# Patient Record
Sex: FEMALE | Race: BLACK OR AFRICAN AMERICAN | NOT HISPANIC OR LATINO | Employment: PART TIME | URBAN - METROPOLITAN AREA
[De-identification: names, ages, dates, MRNs, and addresses within clinical notes are randomized per-mention and may not be internally consistent; named-entity substitution may affect disease eponyms.]

---

## 2017-06-23 ENCOUNTER — LAB VISIT (OUTPATIENT)
Dept: LAB | Facility: HOSPITAL | Age: 62
End: 2017-06-23
Attending: INTERNAL MEDICINE
Payer: COMMERCIAL

## 2017-06-23 DIAGNOSIS — Z13.1 DIABETES MELLITUS SCREENING: Primary | ICD-10-CM

## 2017-06-23 DIAGNOSIS — Z13.1 DIABETES MELLITUS SCREENING: ICD-10-CM

## 2017-06-23 DIAGNOSIS — E78.5 HYPERLIPIDEMIA, UNSPECIFIED HYPERLIPIDEMIA TYPE: ICD-10-CM

## 2017-06-23 DIAGNOSIS — I25.10 CORONARY ARTERY DISEASE INVOLVING NATIVE CORONARY ARTERY, ANGINA PRESENCE UNSPECIFIED, UNSPECIFIED WHETHER NATIVE OR TRANSPLANTED HEART: ICD-10-CM

## 2017-06-23 DIAGNOSIS — Z13.220 ENCOUNTER FOR SCREENING FOR LIPID DISORDER: ICD-10-CM

## 2017-06-23 LAB
ALBUMIN SERPL BCP-MCNC: 4 G/DL
ALP SERPL-CCNC: 103 U/L
ALT SERPL W/O P-5'-P-CCNC: 29 U/L
ANION GAP SERPL CALC-SCNC: 10 MMOL/L
AST SERPL-CCNC: 34 U/L
BILIRUB SERPL-MCNC: 0.3 MG/DL
BNP SERPL-MCNC: <10 PG/ML
BUN SERPL-MCNC: 18 MG/DL
CALCIUM SERPL-MCNC: 9.8 MG/DL
CHLORIDE SERPL-SCNC: 105 MMOL/L
CO2 SERPL-SCNC: 27 MMOL/L
CREAT SERPL-MCNC: 0.8 MG/DL
EST. GFR  (AFRICAN AMERICAN): >60 ML/MIN/1.73 M^2
EST. GFR  (NON AFRICAN AMERICAN): >60 ML/MIN/1.73 M^2
GLUCOSE SERPL-MCNC: 93 MG/DL
HDLC SERPL-MCNC: 215 MG/DL
HDLC SERPL-MCNC: 44 MG/DL
POTASSIUM SERPL-SCNC: 4.3 MMOL/L
PROT SERPL-MCNC: 7.5 G/DL
SODIUM SERPL-SCNC: 142 MMOL/L

## 2017-06-23 PROCEDURE — 83880 ASSAY OF NATRIURETIC PEPTIDE: CPT

## 2017-06-23 PROCEDURE — 83701 LIPOPROTEIN BLD HR FRACTION: CPT

## 2017-06-23 PROCEDURE — 80053 COMPREHEN METABOLIC PANEL: CPT

## 2017-06-23 PROCEDURE — 36415 COLL VENOUS BLD VENIPUNCTURE: CPT | Mod: PO

## 2017-06-23 PROCEDURE — 82465 ASSAY BLD/SERUM CHOLESTEROL: CPT

## 2017-06-23 PROCEDURE — 83718 ASSAY OF LIPOPROTEIN: CPT

## 2017-06-27 LAB — LDLC SERPL-MCNC: 143 MG/DL

## 2017-08-02 ENCOUNTER — HOSPITAL ENCOUNTER (OUTPATIENT)
Dept: RADIOLOGY | Facility: HOSPITAL | Age: 62
Discharge: HOME | End: 2017-08-02
Attending: INTERNAL MEDICINE
Payer: COMMERCIAL

## 2017-08-02 ENCOUNTER — OFFICE VISIT (OUTPATIENT)
Dept: INTERNAL MEDICINE | Facility: CLINIC | Age: 62
End: 2017-08-02
Payer: COMMERCIAL

## 2017-08-02 VITALS
HEIGHT: 59 IN | WEIGHT: 157.44 LBS | SYSTOLIC BLOOD PRESSURE: 136 MMHG | DIASTOLIC BLOOD PRESSURE: 74 MMHG | HEART RATE: 84 BPM | OXYGEN SATURATION: 97 % | BODY MASS INDEX: 31.74 KG/M2

## 2017-08-02 DIAGNOSIS — Z85.3 HISTORY OF LEFT BREAST CANCER: ICD-10-CM

## 2017-08-02 DIAGNOSIS — Z12.11 COLON CANCER SCREENING: ICD-10-CM

## 2017-08-02 DIAGNOSIS — M17.10 KNEE ARTHROPATHY: ICD-10-CM

## 2017-08-02 DIAGNOSIS — R60.0 PEDAL EDEMA: Primary | ICD-10-CM

## 2017-08-02 DIAGNOSIS — Z12.4 PAP SMEAR FOR CERVICAL CANCER SCREENING: ICD-10-CM

## 2017-08-02 DIAGNOSIS — I10 ESSENTIAL HYPERTENSION: ICD-10-CM

## 2017-08-02 DIAGNOSIS — M20.42 HAMMER TOE OF LEFT FOOT: ICD-10-CM

## 2017-08-02 PROCEDURE — 73564 X-RAY EXAM KNEE 4 OR MORE: CPT | Mod: 50,TC,PO

## 2017-08-02 PROCEDURE — 99999 PR PBB SHADOW E&M-EST. PATIENT-LVL IV: CPT | Mod: PBBFAC,,, | Performed by: INTERNAL MEDICINE

## 2017-08-02 PROCEDURE — 73564 X-RAY EXAM KNEE 4 OR MORE: CPT | Mod: 26,50,, | Performed by: RADIOLOGY

## 2017-08-02 PROCEDURE — 99386 PREV VISIT NEW AGE 40-64: CPT | Mod: S$GLB,,, | Performed by: INTERNAL MEDICINE

## 2017-08-02 RX ORDER — METOPROLOL SUCCINATE 25 MG/1
TABLET, EXTENDED RELEASE ORAL
COMMUNITY
Start: 2017-08-01 | End: 2017-08-02 | Stop reason: SDUPTHER

## 2017-08-02 RX ORDER — BUMETANIDE 1 MG/1
TABLET ORAL
COMMUNITY
Start: 2014-09-09 | End: 2017-08-02 | Stop reason: SDUPTHER

## 2017-08-02 RX ORDER — MELOXICAM 15 MG/1
15 TABLET ORAL DAILY
Qty: 90 TABLET | Refills: 0 | Status: SHIPPED | OUTPATIENT
Start: 2017-08-02 | End: 2017-08-02 | Stop reason: SDUPTHER

## 2017-08-02 RX ORDER — ASPIRIN 81 MG/1
TABLET ORAL NIGHTLY
COMMUNITY
Start: 2014-11-20 | End: 2021-03-29 | Stop reason: HOSPADM

## 2017-08-02 RX ORDER — METOPROLOL SUCCINATE 25 MG/1
25 TABLET, EXTENDED RELEASE ORAL DAILY
Qty: 90 TABLET | Refills: 0 | Status: SHIPPED | OUTPATIENT
Start: 2017-08-02 | End: 2018-09-05 | Stop reason: SDUPTHER

## 2017-08-02 RX ORDER — IRBESARTAN 150 MG/1
150 TABLET ORAL NIGHTLY
COMMUNITY
End: 2017-12-06 | Stop reason: SDUPTHER

## 2017-08-02 RX ORDER — ROSUVASTATIN CALCIUM 20 MG/1
20 TABLET, COATED ORAL DAILY
Qty: 90 TABLET | Refills: 1 | Status: SHIPPED | OUTPATIENT
Start: 2017-08-02 | End: 2018-02-14 | Stop reason: SDUPTHER

## 2017-08-02 RX ORDER — TAMOXIFEN CITRATE 20 MG/1
TABLET ORAL
COMMUNITY
Start: 2014-09-22 | End: 2017-08-02

## 2017-08-02 RX ORDER — POTASSIUM CHLORIDE 750 MG/1
10 TABLET, EXTENDED RELEASE ORAL DAILY
Qty: 90 TABLET | Refills: 0 | Status: SHIPPED | OUTPATIENT
Start: 2017-08-02 | End: 2017-10-17 | Stop reason: ALTCHOICE

## 2017-08-02 RX ORDER — MELOXICAM 15 MG/1
15 TABLET ORAL DAILY PRN
Qty: 90 TABLET | Refills: 0 | Status: SHIPPED | OUTPATIENT
Start: 2017-08-02 | End: 2017-12-06 | Stop reason: SDUPTHER

## 2017-08-02 RX ORDER — POTASSIUM CHLORIDE 20 MEQ/15ML
SOLUTION ORAL
COMMUNITY
Start: 2014-09-09 | End: 2017-08-02

## 2017-08-02 RX ORDER — BUMETANIDE 1 MG/1
1 TABLET ORAL DAILY
Qty: 90 TABLET | Refills: 0 | Status: SHIPPED | OUTPATIENT
Start: 2017-08-02 | End: 2017-10-17 | Stop reason: ALTCHOICE

## 2017-08-02 NOTE — PATIENT INSTRUCTIONS
Recommendations for today    Please review the medication list today and verify that home medications exactly match the medication list that we have.  When transitioning between doctors it is very important to help verify that your medication list is accurate.  If there are any inaccuracies please contact my office so that we can investigate this further.    Regarding the potassium please review the potassium tablet provided by the pharmacy before purchasing the potassium.  If you need a different potassium suggestion have the pharmacist contact my office so we can change the prescription

## 2017-08-02 NOTE — PROGRESS NOTES
"Portions of this note are generated with voice recognition software. Typographical errors may exist.     Patient Name:SKINNY FULTON  Patient MRN:   [id number]    History of Present Illness   ================================================================  SKINNY FULTON is a 62 y.o. female here for primary care visit for  Chief Complaint   Patient presents with    [facility]    Annual Exam       No past medical history on file.    Past Surgical History:   Procedure Laterality Date    BREAST LUMPECTOMY  2010    PARTIAL HYSTERECTOMY  1978    fibroids    TOTAL ABDOMINAL HYSTERECTOMY W/ BILATERAL SALPINGOOPHORECTOMY  2000       Review of patient's allergies indicates:   Allergen Reactions    Codeine Nausea And Vomiting       No current outpatient prescriptions on file prior to visit.     No current facility-administered medications on file prior to visit.        No family history on file.    Social History     Social History    Marital status: Single     Spouse name: N/A    Number of children: N/A    Years of education: N/A     Occupational History    Not on file.     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 hx of left lumpectomy for "breast cancer"- with 5yrs of tamoxifen use. Sees Dr. Buckley ([facility] for surveillance/MMG)    Patient states that she is not up-to-date on Pap testing.    States that she has been having difficulty with hammertoe symptoms.    Manage predominantly with primary care provider.  No clear relationship with cardiologist.  States that she was started on diuretic " therapy without a specific diagnosis of heart failure.  States that the pedal edema has gotten a little bit worse in recent months.  States that she has been off of Bumex for perhaps more than one month because she was between providers.        Medications Reviewed and Updated    Past medical, family, and social histories were reviewed and updated.    Review of Systems negative unless otherwise noted in history of present illness-  General ROS: negative  Psychological ROS: negative  ENT ROS: negative  Allergy and Immunology ROS: negative  Cardiovascular ROS: negative  Gastrointestinal ROS: negative  Genito-Urinary ROS: negative  Musculoskeletal ROS: negative  Neurological ROS: negative  Dermatological ROS: negative      Allergic:    Review of patient's allergies indicates:   Allergen Reactions    Codeine Nausea And Vomiting       OBJECTIVE:  BP: 136/74 Pulse: 84    Wt Readings from Last 3 Encounters:   08/02/17 71.4 kg (157 lb 6.5 oz)    Body mass index is 31.79 kg/m².  Previous Blood Pressure Readings :   BP Readings from Last 3 Encounters:   08/02/17 136/74       GEN: healthy appearing  HEENT: sclera non-icteric, conjunctiva clear  CV: no peripheral edema, regular rate and rhythm.  No significant murmurs.  PULM: breathing non-labored.  No wheezing.  ABD: obese .  Supple  PSYCH: appropriate affect  MSK: able to rise from chair without assistance  SKIN: normal skin turgor      Pertinent Labs Reviewed       ASSESSMENT/PLAN:    Pedal edema  -     D dimer, quantitative; Future; Expected date: 08/02/2017    Pap smear for cervical cancer screening  -     Ambulatory Referral to Gynecology    Knee arthropathy  -     X-Ray Knee Complete 4 Or More Views Bilat; Future; Expected date: 08/02/2017    Hammer toe of left foot  -     Ambulatory Referral to Podiatry    Colon cancer screening  -     Case request GI: COLONOSCOPY    Other orders  -     potassium chloride SA (K-DUR,KLOR-CON) 10 MEQ tablet; Take 1 tablet (10 mEq total)  by mouth once daily.  Dispense: 90 tablet; Refill: 0  -     Discontinue: meloxicam (MOBIC) 15 MG tablet; Take 1 tablet (15 mg total) by mouth once daily.  Dispense: 90 tablet; Refill: 0  -     Cancel: Ambulatory Referral to Gynecology  -     meloxicam (MOBIC) 15 MG tablet; Take 1 tablet (15 mg total) by mouth daily as needed for Pain.  Dispense: 90 tablet; Refill: 0  -     bumetanide (BUMEX) 1 MG tablet; Take 1 tablet (1 mg total) by mouth once daily.  Dispense: 90 tablet; Refill: 0  -     metoprolol succinate (TOPROL-XL) 25 MG 24 hr tablet; Take 1 tablet (25 mg total) by mouth once daily.  Dispense: 90 tablet; Refill: 0  -     rosuvastatin (CRESTOR) 20 MG tablet; Take 1 tablet (20 mg total) by mouth once daily.  Dispense: 90 tablet; Refill: 1          Future Appointments  Date Time Provider Department Center   8/16/2017 9:15 AM Gigi Alexis DPM [facility]   8/21/2017 9:30 AM Camelia Dennison MD [facility]   9/6/2017 9:20 AM Nayan Linares MD [facility] Kristina Linares  8/2/2017  6:06 PM

## 2017-08-03 ENCOUNTER — TELEPHONE (OUTPATIENT)
Dept: FAMILY MEDICINE | Facility: CLINIC | Age: 62
End: 2017-08-03

## 2017-08-03 NOTE — TELEPHONE ENCOUNTER
----- Message from Nayan Linares MD sent at 8/3/2017  2:47 PM CDT -----  Please contact patient.  Blood work negative for blood clots.  No further testing warranted at this time.

## 2017-08-03 NOTE — TELEPHONE ENCOUNTER
Informed patient labs negative for blood clots and no further testing needs to be done at this time.

## 2017-08-04 NOTE — TELEPHONE ENCOUNTER
----- Message from Nayan Linares MD sent at 8/2/2017  6:29 PM CDT -----  Contact patient regarding my recommendation for repeat echocardiogram.  Reviewing cardiologist's records last echocardiogram was almost 2 years ago.  Given the persistent nature of swelling in the legs getting a repeat echocardiogram is important to verify that stiffness of the heart muscle has not gotten worse.  If She has concerns about completing this let me know

## 2017-08-04 NOTE — TELEPHONE ENCOUNTER
Spoke with patient and informed Dr. Linares would like to repeat echo. Scheduled for 8-9-2017 at 1030am. Patient voices understanding.

## 2017-08-09 ENCOUNTER — CLINICAL SUPPORT (OUTPATIENT)
Dept: CARDIOLOGY | Facility: CLINIC | Age: 62
End: 2017-08-09
Payer: COMMERCIAL

## 2017-08-09 DIAGNOSIS — R60.0 PEDAL EDEMA: ICD-10-CM

## 2017-08-09 DIAGNOSIS — I35.0 NONRHEUMATIC AORTIC VALVE STENOSIS: ICD-10-CM

## 2017-08-09 LAB
DIASTOLIC DYSFUNCTION: NO
ESTIMATED PA SYSTOLIC PRESSURE: 19.71
MITRAL VALVE MOBILITY: NORMAL
MITRAL VALVE REGURGITATION: NORMAL
RETIRED EF AND QEF - SEE NOTES: 60 (ref 55–65)
TRICUSPID VALVE REGURGITATION: NORMAL

## 2017-08-09 PROCEDURE — 93306 TTE W/DOPPLER COMPLETE: CPT | Mod: S$GLB,,, | Performed by: INTERNAL MEDICINE

## 2017-08-16 ENCOUNTER — OFFICE VISIT (OUTPATIENT)
Dept: PODIATRY | Facility: CLINIC | Age: 62
End: 2017-08-16
Payer: COMMERCIAL

## 2017-08-16 VITALS
WEIGHT: 157 LBS | HEART RATE: 72 BPM | BODY MASS INDEX: 31.65 KG/M2 | DIASTOLIC BLOOD PRESSURE: 86 MMHG | HEIGHT: 59 IN | SYSTOLIC BLOOD PRESSURE: 154 MMHG

## 2017-08-16 DIAGNOSIS — M20.41 HAMMER TOES OF BOTH FEET: Primary | ICD-10-CM

## 2017-08-16 DIAGNOSIS — L60.0 INGROWN NAIL: ICD-10-CM

## 2017-08-16 DIAGNOSIS — M20.42 HAMMER TOES OF BOTH FEET: Primary | ICD-10-CM

## 2017-08-16 PROCEDURE — 3008F BODY MASS INDEX DOCD: CPT | Mod: S$GLB,,, | Performed by: PODIATRIST

## 2017-08-16 PROCEDURE — 99202 OFFICE O/P NEW SF 15 MIN: CPT | Mod: S$GLB,,, | Performed by: PODIATRIST

## 2017-08-16 PROCEDURE — 99999 PR PBB SHADOW E&M-EST. PATIENT-LVL III: CPT | Mod: PBBFAC,,, | Performed by: PODIATRIST

## 2017-08-16 NOTE — PROGRESS NOTES
Subjective:      Patient ID: Chelsea Rodriguez is a 62 y.o. female.    Chief Complaint: Hammer Toe (2nd bilateral toe) and Foot Problem (Bilateral pt states swelling. pain when bumped 10/10)    Chelsea is a 62 y.o. female who presents to the podiatry clinic  with complaint of  bilateral foot pain. Onset of the symptoms was several months ago. Precipitating event: none known. Current symptoms include: occational pain at ingrown nails and hammertoes. Aggravating factors: tighter closed toe shoes or bumping toes. Symptoms have waxed and waned. Patient has had no prior foot problems. Evaluation to date: none. Treatment to date: avoidance of offending activity and corn pads or gel sleaves at toes. Patients rates pain 0/10 on pain scale today.        Review of Systems   Constitution: Negative for chills, fever, weakness, malaise/fatigue and night sweats.   Cardiovascular: Negative for chest pain, leg swelling, orthopnea and palpitations.   Respiratory: Negative for cough, shortness of breath and wheezing.    Skin: Negative for color change, itching, poor wound healing and rash.   Musculoskeletal: Positive for joint pain. Negative for arthritis, gout, joint swelling, muscle weakness and myalgias.   Gastrointestinal: Negative for abdominal pain, constipation and nausea.   Neurological: Negative for disturbances in coordination, dizziness, focal weakness, numbness and tremors.           Objective:      Physical Exam   Constitutional: She is oriented to person, place, and time. Vital signs are normal. She appears well-developed. She is cooperative. No distress.   Cardiovascular: Intact distal pulses.    Pulses:       Dorsalis pedis pulses are 2+ on the right side, and 2+ on the left side.        Posterior tibial pulses are 2+ on the right side, and 2+ on the left side.   Musculoskeletal:        Right ankle: Normal.        Left ankle: Normal.        Right foot: There is normal range of motion, no bony tenderness, normal capillary  refill, no crepitus and no deformity.        Left foot: There is normal range of motion, no bony tenderness, normal capillary refill, no crepitus and no deformity.   Semi - Reducible extensor and flexor contractures at the MTPJ and PIPJ of toes 2-5, bilat.     No pain with IPJ, MTPJ, STJ or Ankle ROM, bilat.     Neurological: She is alert and oriented to person, place, and time. She has normal strength. No sensory deficit. She exhibits normal muscle tone. Gait normal.   Reflex Scores:       Achilles reflexes are 2+ on the right side and 2+ on the left side.  Negative Tinels sign and Albert's click, bilat.   Skin: Skin is intact. Capillary refill takes 2 to 3 seconds. No abrasion, no ecchymosis, no lesion and no rash noted. No erythema. Nails show no clubbing.   Mild incurvation at hallux nail borders, bilat. No tenderness or signs of inflammation today.     Hyperkeratotic lesions at the following locations:   Dorsal 2nd PIPJ, bilat.              Assessment:       Encounter Diagnoses   Name Primary?    Hammer toes of both feet Yes    Ingrown nail          Plan:       Chelsea was seen today for hammer toe and foot problem.    Diagnoses and all orders for this visit:    Hammer toes of both feet    Ingrown nail      I counseled the patient on her conditions, their implications and medical management.    Discussed importance of supportive shoes with accommodative toe box to reduce pressure and irritation to forefoot    Gel toe sleeves or pads as needed.    Discussed conservative vs surgical treatment of recurrent painful ingrown toenails with associated risks and benefits.    She will continue local care and consider nail avulsion if pain returns.    After cleansing the  area w/ alcohol prep pad the above mentioned hyperkeratosis was trimmed utilizing No 15 scapel, to a smooth base with out incident. Patient tolerated this  well and reported comfort to the area of 2nd toes, bilat.    F/u prn.

## 2017-08-16 NOTE — PATIENT INSTRUCTIONS
Ingrown Toenail, Not Infected (Home Treatment)  An ingrown toenail occurs when the nail grows sideways into the skin next to the nail. This can cause pain, especially when wearing tight shoes. It can also lead to an infection with redness, swelling, and pus drainage. Most people respond to the treatments described here. Sometimes surgery is needed, however.  Home care  The following guidelines will help you care for your toenail at home:  · Soak the painful toe in warm water twice a day for 10 to 20 minutes each time. Wash the entire foot with an antibacterial soap.  · If there is redness or swelling around the toenail, apply an antibiotic ointment three times a day.  · Insert a small piece of rolled-up cotton under the corner of the nail to promote growth of the nail outward, away from the cuticle.  · Wear shoes that do not put pressure on the toes, such as a sandal or open shoe. Closed shoes should be big enough in the toes so that there is no pressure on the painful toe.  · You may use acetaminophen or ibuprofen for pain, unless another pain medicine was prescribed. Talk with your healthcare provider before using these medicines if you have chronic liver or kidney disease. Also tell your healthcare provider if you have ever had a stomach ulcer or GI bleeding.  Prevention  The following tips will help you prevent ingrown toenails:  · Avoid pointed, tight, or narrow shoes.  · Trim toenails once a month so they dont grow too long. Cut the nail straight across.  Follow-up care  Follow up with your healthcare provider or as advised.  When to seek medical advice  Call your health care provider right away if any of these occur:  · Increasing redness, pain, or swelling of the toe  · Tender red streaks in the skin leading toward the ankle  · Pus or fluid drainage from the toe  · Fever of 100.4°F (38°C) or higher  Date Last Reviewed: 5/14/2015  © 7056-5770 [employer]. [address] [place] [address], [place]  [address]. All rights reserved. This information is not intended as a substitute for professional medical care. Always follow your healthcare professional's instructions.        Understanding Ingrown Toenails    An ingrown nail is the result of a nail growing into the skin that surrounds it. This often occurs at either edge of the big toe. Ingrown nails may be caused by improper trimming, inherited nail deformities, injuries, fungal infections, or pressure.  Symptoms  Ingrown nails may cause pain at the tip of the toe or all the way to the base of the toe. The pain is often worse while walking. An ingrown nail may also lead to infection, inflammation, or a more serious condition. If its infected, you might see pus or redness.  Evaluation  To determine the extent of your problem, your healthcare provider examines and possibly presses the painful area. If other problems are suspected, blood tests, cultures, and X-rays may be done as well.  Treatment  If the nail isnt infected, your healthcare provider may trim the corner of it to help relieve your symptoms. He or she may need to remove one side of your nail back to the cuticle. The base of the nail may then be treated with a chemical to keep the ingrown part from growing back. Severe infections or ingrown nails may require antibiotics and temporary or permanent removal of a portion of the nail. To prevent pain, a local anesthetic may be used in these procedures. This treatment is usually done at your healthcare provider's office.  Prevention  Many nail problems can be prevented by wearing the right shoes and trimming your nails properly. To help avoid infection, keep your feet clean and dry. If you have diabetes, talk with your healthcare provider before doing any foot self-care.  · The right shoes: Get your feet measured (your size may change as you age). Wear shoes that are supportive and roomy enough for your toes to wiggle. Look for shoes made of natural materials  such as leather, which allow your feet to breathe.  · Proper trimming: To avoid problems, trim your toenails straight across without cutting down into the corners. If you cant trim your own nails, ask your healthcare provider to do so for you.  Date Last Reviewed: 10/1/2016  © 7199-1401 [employer]. [address] [place] [address], [place] [address]. All rights reserved. This information is not intended as a substitute for professional medical care. Always follow your healthcare professional's instructions.        Treating Mallet, Hammer, and Claw Toes  Definitions  A hammer toe has an abnormal bend in the middle joint of your toe (toe is bent upward at joint).  Mallet toe affects the joint nearest your toenail (toe is bent downward at joint).  Claw toe affects the joint at the ball of your foot (toe is bent upward at joint), as well as both toe joints (toe is bent downward at both joints).  Hammer toe and mallet toe are most likely to occur in the toe next to your big toe.  Causes  The most common cause for all 3 deformities is poorly fitting shoes and tight shoes, especially high heels for women. Trauma and nerve damage from various diseases like diabetes may also cause these deformities.  Treatment  Buying shoes with more room in the toes, filing down corns and calluses, and padding, taping, or strapping the toe most often relieve the pain. Toe stretching and exercises may also be helpful. If these steps dont work, you may need surgery to straighten your toes.  Shoes  Buy low-heeled shoes with plenty of room in the front. This keeps your toes from being jammed against the end of your shoe. It also keeps your shoe from rubbing the tops of your toes.  Corns and calluses    To file down a corn or callus, soak your foot in warm water. This softens the hard skin. Dry your foot. Then gently rub the corn or callus with a pumice stone or nail file.  Pads and splints  If you still have pain, you may need to put a  pad or splint on your toe. This helps take pressure off the painful corn or callus.  · For a mallet toe, you can put a gel pad on the toe. This keeps the tip of the toe from rubbing against the bottom of the shoe.  · For a hammer or claw toe, you can put a felt or foam pad over the bent joint. This keeps the toe from rubbing on the top of the shoe.  · For a hammer or claw toe that is still flexible, you can put a splint on the toe. This keeps it straight so it doesn't rub on the top of the shoe.    Date Last Reviewed: 9/29/2015  © 5919-7046 [employer]. [address] [place] [address], [place] [address]. All rights reserved. This information is not intended as a substitute for professional medical care. Always follow your healthcare professional's instructions.

## 2017-08-16 NOTE — LETTER
August 16, 2017      Naayn Linares MD  [address] [facility]  [place] [address]           [place] - Podiatry  [address] [facility] [place] [address]  Phone: [phone]          Patient: Chelsea Rodriguez   MR Number: [id number]   YOB: 1955   Date of Visit: 8/16/2017       Dear Dr. Nayan Linares:    Thank you for referring Chelsea Rodriguez to me for evaluation. Attached you will find relevant portions of my assessment and plan of care.    If you have questions, please do not hesitate to call me. I look forward to following Chelsea Rodriguez along with you.    Sincerely,    Gigi Alexis, DELANEY    Enclosure  CC:  No Recipients    If you would like to receive this communication electronically, please contact [email or web address] or [phone] to request more information on "[employer]" Link access.    For providers and/or their staff who would like to refer a patient to [facility], please contact us through our one-stop-shop provider referral line, [facility], at [phone].    If you feel you have received this communication in error or would no longer like to receive these types of communications, please e-mail [email or web address]

## 2017-08-21 ENCOUNTER — OFFICE VISIT (OUTPATIENT)
Dept: OBSTETRICS AND GYNECOLOGY | Facility: CLINIC | Age: 62
End: 2017-08-21
Payer: COMMERCIAL

## 2017-08-21 VITALS — DIASTOLIC BLOOD PRESSURE: 90 MMHG | WEIGHT: 158.94 LBS | SYSTOLIC BLOOD PRESSURE: 142 MMHG | BODY MASS INDEX: 32.1 KG/M2

## 2017-08-21 DIAGNOSIS — Z01.419 WELL WOMAN EXAM WITH ROUTINE GYNECOLOGICAL EXAM: Primary | ICD-10-CM

## 2017-08-21 DIAGNOSIS — Z12.4 SCREENING FOR CERVICAL CANCER: ICD-10-CM

## 2017-08-21 PROCEDURE — 99386 PREV VISIT NEW AGE 40-64: CPT | Mod: S$GLB,,, | Performed by: OBSTETRICS & GYNECOLOGY

## 2017-08-21 PROCEDURE — 99999 PR PBB SHADOW E&M-EST. PATIENT-LVL III: CPT | Mod: PBBFAC,,, | Performed by: OBSTETRICS & GYNECOLOGY

## 2017-08-21 PROCEDURE — 88175 CYTOPATH C/V AUTO FLUID REDO: CPT

## 2017-08-21 PROCEDURE — 87624 HPV HI-RISK TYP POOLED RSLT: CPT

## 2017-08-21 NOTE — PROGRESS NOTES
"       GYNECOLOGY OFFICE NOTE    Reason for visit: annual    HPI: Pt is a 62 y.o.  female  who presents for annual. Pt with hx of supracervical abdominal hysterectomy for uterine fibroids then had BSO. She is not sexually active. She does not desire STI screening. She denies vaginal discharge.  Last pap: , denies hx of abnormal. Last MMG 2016- negative. Pt with hx of left lumpectomy for "breast cancer"- with 5yrs of tamoxifen use. Sees Dr. Buckley ([facility] for surveillance/MMG)    Past Medical History:   Diagnosis Date    Hyperlipidemia     Hypertension        Past Surgical History:   Procedure Laterality Date    BILATERAL SALPINGOOPHORECTOMY      BREAST LUMPECTOMY      supracervical abdominal hysterectomy  1978    fibroids       Family History   Problem Relation Age of Onset    Breast cancer Cousin        Social History   Substance Use Topics    Smoking status: Never Smoker    Smokeless tobacco: Never Used    Alcohol use Yes      Comment: not often        OB History    Para Term  AB Living   2         2   SAB TAB Ectopic Multiple Live Births           1      # Outcome Date GA Lbr Stephan/2nd Weight Sex Delivery Anes PTL Lv   2      F CS-Unspec      1      M CS-Unspec   CHAY          Current Outpatient Prescriptions   Medication Sig    aspirin (ECOTRIN) 81 MG EC tablet once a day    bumetanide (BUMEX) 1 MG tablet Take 1 tablet (1 mg total) by mouth once daily.    irbesartan (AVAPRO) 150 MG tablet Take 150 mg by mouth every evening.    meloxicam (MOBIC) 15 MG tablet Take 1 tablet (15 mg total) by mouth daily as needed for Pain.    metoprolol succinate (TOPROL-XL) 25 MG 24 hr tablet Take 1 tablet (25 mg total) by mouth once daily.    potassium chloride SA (K-DUR,KLOR-CON) 10 MEQ tablet Take 1 tablet (10 mEq total) by mouth once daily.    rosuvastatin (CRESTOR) 20 MG tablet Take 1 tablet (20 mg total) by mouth once daily.     No current facility-administered " medications for this visit.        Allergies: Codeine     BP (!) 142/90   Wt 72.1 kg (158 lb 15.2 oz)   BMI 32.10 kg/m²     ROS:  GENERAL: Denies fever or chills.   SKIN: Denies rash or lesions.   HEAD: Denies head injury or headache.   CHEST: Denies chest pain or shortness of breath.   CARDIOVASCULAR: Denies palpitations or chest pain.   ABDOMEN: No abdominal pain, constipation, diarrhea, nausea, vomiting or rectal bleeding.   URINARY: No dysuria, hematuria, or burning on urination.  REPRODUCTIVE: See HPI.   BREASTS: Denies pain, lumps, or nipple discharge.   NEUROLOGIC: Denies syncope or weakness.     Physical Exam:  GENERAL: alert, appears stated age and cooperative  CHEST: Normal respiratory effort  HEART: S1 and S2 normal, regular rate and rhythm  NECK: normal appearance, no thyromegaly masses or tenderness  SKIN: no acne, striae, hirsutism  BREAST EXAM: breasts appear normal, no suspicious masses, no skin or nipple changes or axillary nodes  ABDOMEN: abdomen is soft without significant tenderness, masses, organomegaly or guarding, no hernias noted  EXTERNAL GENITALIA:  normal general appearance  URETHRA: normal urethra, normal urethral meatus  VAGINA:  normal mucosa without prolapse or lesions  CERVIX:  Normal  UTERUS:  surgically absent  ADNEXA:  no masses    Diagnosis:  1. Well woman exam with routine gynecological exam    2. Screening for cervical cancer        Plan:   1. Annual- pap/hpv today and if normal no longer needs pap. LUX for pap hx    Orders Placed This Encounter    HPV High Risk Genotypes, PCR    Liquid-based pap smear, screening       Patient was counseled today on the new ACS guidelines for cervical cytology screening as well as the current recommendations for breast cancer screening. She was counseled to follow up with her PCP for other routine health maintenance.     Return in about 1 year (around 8/21/2018) for annual.      Camelia Dennison MD  OB/GYN  Pager: [phone]

## 2017-08-21 NOTE — LETTER
August 21, 2017      Nayan Linares MD  [address] [facility]  [place] [address]           [place] - OB/GYN  [address] [facility]  [address]th [address] [facility]  [place] [address]  Phone: [phone]          Patient: Chelsea Rodriguez   MR Number: [id number]   YOB: 1955   Date of Visit: 8/21/2017       Dear Dr. Nayan Linares:    Thank you for referring Chelsea Rodriguez to me for evaluation. Attached you will find relevant portions of my assessment and plan of care.    If you have questions, please do not hesitate to call me. I look forward to following Chelsea Rodriguez along with you.    Sincerely,    Camelia Dennison MD    Enclosure  CC:  No Recipients    If you would like to receive this communication electronically, please contact [email or web address] or [phone] to request more information on [employer] Link access.    For providers and/or their staff who would like to refer a patient to [facility], please contact us through our one-stop-shop provider referral line, [facility], at [phone].    If you feel you have received this communication in error or would no longer like to receive these types of communications, please e-mail [email or web address]

## 2017-08-25 LAB
HPV HR 12 DNA CVX QL NAA+PROBE: NEGATIVE
HPV16 DNA SPEC QL NAA+PROBE: NEGATIVE
HPV18 DNA SPEC QL NAA+PROBE: NEGATIVE

## 2017-08-28 ENCOUNTER — TELEPHONE (OUTPATIENT)
Dept: OBSTETRICS AND GYNECOLOGY | Facility: CLINIC | Age: 62
End: 2017-08-28

## 2017-09-06 ENCOUNTER — TELEPHONE (OUTPATIENT)
Dept: INTERNAL MEDICINE | Facility: CLINIC | Age: 62
End: 2017-09-06

## 2017-09-06 ENCOUNTER — OFFICE VISIT (OUTPATIENT)
Dept: INTERNAL MEDICINE | Facility: CLINIC | Age: 62
End: 2017-09-06
Payer: COMMERCIAL

## 2017-09-06 VITALS
HEART RATE: 78 BPM | SYSTOLIC BLOOD PRESSURE: 132 MMHG | WEIGHT: 157.63 LBS | BODY MASS INDEX: 31.78 KG/M2 | DIASTOLIC BLOOD PRESSURE: 76 MMHG | HEIGHT: 59 IN | OXYGEN SATURATION: 97 %

## 2017-09-06 DIAGNOSIS — M75.21 BICEPS TENDONITIS, RIGHT: Primary | ICD-10-CM

## 2017-09-06 DIAGNOSIS — M17.0 PRIMARY OSTEOARTHRITIS OF BOTH KNEES: ICD-10-CM

## 2017-09-06 DIAGNOSIS — R60.0 PEDAL EDEMA: ICD-10-CM

## 2017-09-06 DIAGNOSIS — Z12.11 COLON CANCER SCREENING: ICD-10-CM

## 2017-09-06 DIAGNOSIS — Z11.59 ENCOUNTER FOR HEPATITIS C SCREENING TEST FOR LOW RISK PATIENT: ICD-10-CM

## 2017-09-06 PROCEDURE — 99999 PR PBB SHADOW E&M-EST. PATIENT-LVL III: CPT | Mod: PBBFAC,,, | Performed by: INTERNAL MEDICINE

## 2017-09-06 PROCEDURE — 99214 OFFICE O/P EST MOD 30 MIN: CPT | Mod: S$GLB,,, | Performed by: INTERNAL MEDICINE

## 2017-09-06 PROCEDURE — 3008F BODY MASS INDEX DOCD: CPT | Mod: S$GLB,,, | Performed by: INTERNAL MEDICINE

## 2017-09-06 NOTE — TELEPHONE ENCOUNTER
----- Message from Nayan Linares MD sent at 9/6/2017 10:06 AM CDT -----  Please help me find any records that have been received for this patient.  Specifically looking for any cardiology notes or reports.

## 2017-09-06 NOTE — PROGRESS NOTES
Portions of this note are generated with voice recognition software. Typographical errors may exist.     SUBJECTIVE:    This is a/an 62 y.o. female here for primary care visit for  Chief Complaint   Patient presents with    Shoulder Pain     follow up     Old records only indicate that she had ventricular bigeminy but no diagnosis of heart failure.  Patient wonders if she needs to reestablish with cardiology.  After reviewing outside cardiology records I cannot tell what clear indication exists for cardiology referral at this time.  It looks like she was getting preventative cardiology services.    Patient states that she is continuing to have some tendinitis problems.  Self-care measures have been minimal.  She is reluctant to use NSAID medicine but states that when she uses it it is helpful.    Compliance with diuretic is sporadic.      Medications Reviewed and Updated    Past medical, family, and social histories were reviewed and updated.    Review of Systems negative unless otherwise noted in history of present illness-  ROS    General ROS: negative  Psychological ROS: negative  cardiovascular ROS: negative  Gastrointestinal ROS: negative  Genito-Urinary ROS: negative  Musculoskeletal ROS: negative  Neurological ROS: negative  Dermatological ROS: negative        Allergic:    Review of patient's allergies indicates:   Allergen Reactions    Codeine Nausea And Vomiting       OBJECTIVE:  BP: 132/76 Pulse: 78    Wt Readings from Last 3 Encounters:   09/06/17 71.5 kg (157 lb 10.1 oz)   08/21/17 72.1 kg (158 lb 15.2 oz)   08/16/17 71.2 kg (157 lb)    Body mass index is 31.84 kg/m².  Previous Blood Pressure Readings :   BP Readings from Last 3 Encounters:   09/06/17 132/76   08/21/17 (!) 142/90   08/16/17 (!) 154/86       Physical Exam    GEN: No apparent distress  HEENT: sclera non-icteric, conjunctiva clear  CV: trace biperipheral edema, regular rate and rhythm.  No significant murmur.  PULM: breathing  non-labored  ABD: Obese, protuberant abdomen.  PSYCH: appropriate affect  MSK: able to rise from chair without assistance.  Biceps tendinitis.  Proximal to the right shoulder.  No deformities.  Strength intact.  SKIN: normal skin turgor    Pertinent Labs Reviewed       ASSESSMENT/PLAN:    Biceps tendonitis, right.Condition not optimally controlled. Detailed counseling on self care measures. Plan to monitor clinically in addition to plan below.   -     Ambulatory Referral to Physical/Occupational Therapy  -     Renal function panel; Future; Expected date: 09/06/2017    Primary osteoarthritis of both knees.Condition not optimally controlled. Detailed counseling on self care measures. Plan to monitor clinically in addition to plan below.   -     Ambulatory Referral to Physical/Occupational Therapy    Colon cancer screening  -     Case request GI: COLONOSCOPY    Pedal edema.Condition stable.  Counseling on self-care measures. Plan to monitor clinically. Continue current medical plan.     Encounter for hepatitis C screening test for low risk patient  -     Hepatitis C antibody; Future; Expected date: 09/06/2017          Future Appointments  Date Time Provider Department Center   9/14/2017 9:30 AM Marco Howard PT [facility]   10/12/2017 8:30 AM LAB, [facility] [place]   10/17/2017 9:00 AM Nayan Linares MD [facility] [place]       Nayan Linares  9/6/2017  6:20 PM

## 2017-09-06 NOTE — PATIENT INSTRUCTIONS
Bicep tendonitis gets worse despite mobic please call us       If you dont receive a call colon department by this Friday, can please call my office directly.

## 2017-09-07 ENCOUNTER — TELEPHONE (OUTPATIENT)
Dept: GASTROENTEROLOGY | Facility: CLINIC | Age: 62
End: 2017-09-07

## 2017-09-07 NOTE — TELEPHONE ENCOUNTER
Referral was sent from Dr. Linares to schedule an Colonoscopy. Patient was scheduled an OV with Dr. Danielson on 09/25/2017. Last Colonoscopy was done 11/20/2012.

## 2017-09-14 ENCOUNTER — CLINICAL SUPPORT (OUTPATIENT)
Dept: REHABILITATION | Facility: HOSPITAL | Age: 62
End: 2017-09-14
Attending: INTERNAL MEDICINE
Payer: COMMERCIAL

## 2017-09-14 DIAGNOSIS — M79.601 PAIN OF RIGHT UPPER EXTREMITY: ICD-10-CM

## 2017-09-14 DIAGNOSIS — M25.562 PAIN IN BOTH KNEES, UNSPECIFIED CHRONICITY: Primary | ICD-10-CM

## 2017-09-14 DIAGNOSIS — M25.561 PAIN IN BOTH KNEES, UNSPECIFIED CHRONICITY: Primary | ICD-10-CM

## 2017-09-14 PROCEDURE — 97161 PT EVAL LOW COMPLEX 20 MIN: CPT

## 2017-09-14 PROCEDURE — G8979 MOBILITY GOAL STATUS: HCPCS | Mod: CJ

## 2017-09-14 PROCEDURE — G8978 MOBILITY CURRENT STATUS: HCPCS | Mod: CK

## 2017-09-14 NOTE — PLAN OF CARE
[facility]   PATIENT EVALUATION    Date: 09/14/2017  Start Time: 0950  Stop Time: 1030  Visit #:    Patient Name: Chelsea Rodriguez  Clinic Number: [id number]  Age: 62 y.o.  Gender: female    Diagnosis:   Encounter Diagnoses   Name Primary?    Pain in both knees, unspecified chronicity Yes    Pain of right upper extremity        Referring Physician: Nayan Linares*  Treatment Orders: PT Eval and Treat      History     Past Medical History:   Diagnosis Date    Hyperlipidemia     Hypertension        Current Outpatient Prescriptions   Medication Sig    aspirin (ECOTRIN) 81 MG EC tablet once a day    bumetanide (BUMEX) 1 MG tablet Take 1 tablet (1 mg total) by mouth once daily.    irbesartan (AVAPRO) 150 MG tablet Take 150 mg by mouth every evening.    meloxicam (MOBIC) 15 MG tablet Take 1 tablet (15 mg total) by mouth daily as needed for Pain.    metoprolol succinate (TOPROL-XL) 25 MG 24 hr tablet Take 1 tablet (25 mg total) by mouth once daily.    potassium chloride SA (K-DUR,KLOR-CON) 10 MEQ tablet Take 1 tablet (10 mEq total) by mouth once daily.    rosuvastatin (CRESTOR) 20 MG tablet Take 1 tablet (20 mg total) by mouth once daily.     No current facility-administered medications for this visit.        Review of patient's allergies indicates:   Allergen Reactions    Codeine Nausea And Vomiting         Subjective     History of Present Condition:   R Arm- Pt reports that pain began approximately 1 month prior and can only recall having a shot in the R arm that may have caused the pain. Does note that she must perform repetitive duties while at work. Pt has a long history of carpal tunnel syndrome that was present prior to her recent shoulder pain. Notes pain along the Upper trapezius but denies headaches, tinnitus, or change in vision attributable to the recent pain.    B Knees- Pain has worsened over the past year. Pt is now having more difficulty with community  ambulation and squatting activity. Denies any specific BELLE that caused pain to worsen over the pat year but does report falling on her knees 10 year prior. Denies numbness or tingling but notes an occasional clicking.     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 UE use and repetitive bending    Sports/Recreational Activities: None per pt report  Extremity Dominance: Right    Prior Level of Function: Independent  Functional Deficits Leading to Referral/Nature of Injury: Pain with vocational activity  Patient Therapy Goals: Decrease pain with work activity  Cultural/Environmental/Spiritual Barriers to Treatment or Learning: None      Objective     Observation: Pt enters PT independently without an assistive device  Posture: Anterior tipping/rounding of the shoulders  Gait: Mildly antalgic initially but gradually improved    Dermatomes: Intact    Palpation:   - R Arm: tnederness to the UT , Infraspinatous, and Pectoralis Minor/Major  - Bilateral knees: Pain noted along joint line bilaterally    Range of Motion:     Right Shoulder:  Flexion: 165  Er: 60  Ir: L3    Right Knee  Flexion: 115  Extension:0    Left Knee  Flexion:125  Extension: 0    Patella Mobility:   Right Knee: Mild restriction all directions    Left Knee: Mild restriction all directions      Flexibility:   90/90Hamstring: R 70; L 70  Prone Quad: R 80; L 90    Strength:   Right Hip  Flexion: 4  Extension: 4-  Abduction: 4-    Left Hip  Flexion: 4  Extension: 4  Abduction: 4    Right Knee  Flexion:  4-  Extension:4    Left Knee  Flexion: 4-  Extension:4    Right shoulder:  Flexion 4-  Abduction 4-  IR 4  ER 4-    Special Tests:   Patrice's (-)  Félix's  Anterior Drawer (-)  Posterior Drawer (-)  Valgus Laxity (-)  Varus Laxity (-)  Drop arm (-)  Lift off (+)  Full can (+)  Speeds (-)  Biceps Load (-)    Treatment:   - Pt educated in her HEP consisting of global hip/LE strengthening, Pectoral stretching and postural education    Functional Limitations Reports - G Codes  Category: Mobility  Tool: FOTO  Score: UE 61; LE 48       Current ():  CK  Goal (): CJ  Discharge ():  NA    Patient History Examination Clinical Presentation Clinical Decision Making   Comorbidities:   See above    Personal Factors:  None       Activity and Participation Restriction:  Pain with ADLs    Body Systems:  Musculoskeletal    Body Regions:  Upper and Lower Extremities Stable and uncomplicated     Low               Assessment     This is a 62 y.o. female referred to outpatient physical therapy and presents with a medical diagnosis of bilateral knee OA and biceps tendonitis in the RUE and demonstrates limitations as described in the problem list. Signs/sx are consistent with mild rotator cuff pathology and referral pain form overactive UT musculature. Pt demonstrates good rehab potential. Pt will benefit from physcial therapy services in order to maximize pain free and/or functional use of the RUE and improve functional mobility. The following goals were discussed with the patient and patient is in agreement with them as to be addressed in the treatment plan. Pt was given a HEP consisting of gross hip and knee stabilization and soft tissue/pain management techniques for the RUE. Pt verbally understood the instructions as they were given and demonstrated proper form and technique during therapy. Pt was advised to perform these exercises free of pain, and to stop performing them if pain occurs.     Medical necessity is  demonstrated by the following problem list:   - Pain limits function of effected part for all activities  - Unable to participate in daily activities   - Requires skilled supervision to complete and progress HEP  - Fall risk - impaired balance   - Continued inability to participate in vocational pursuits    Short Term Goals (4 Weeks):  - Pt will increase ROM to RUE to match the LUE  - Pt will increase strength to bilateral LE grossly to 4/5 in all limited planes  - Decrease Pain to bilateral knees to < 6/10 with community ambulation and RUE pain to < 5/10 with overhead reaching activity  - Pt to self correct posture in sitting and standing without VC  - Pt independent with HEP with progressions.     Long Term Goals (8 Weeks):  - Pt will increase ROM to RLE to match LLE  - Pt will increase strength to RC and scapular stabilizers to >4/5  - Decrease Pain to < 4/10 in the UE and LE with all vocational activity  - Pt to return to work without restriciton      Plan     Pt will be treated by physical therapy 2 times a week for 10 weeks for manual therapy, therapeutic exercise, home exercise program, patient education, and modalities PRN to achieve established goals. Chelsea may at times be seen by a PTA as part of the Rehab Team.     Marco Howard, PT, DPT  09/14/2017    I CERTIFY THE NEED FOR THESE SERVICES FURNISHED UNDER THIS PLAN OF TREATMENT AND WHILE UNDER MY CAR  Physician's comments: _____________________________________________________________________________________________________________________    Physician's Name: ___________________________________

## 2017-09-14 NOTE — PROGRESS NOTES
[facility]   PATIENT EVALUATION    Date: 09/14/2017  Start Time: 0950  Stop Time: 1030  Visit #:    Patient Name: Chelsea Rodriguez  Clinic Number: [id number]  Age: 62 y.o.  Gender: female    Diagnosis:   Encounter Diagnoses   Name Primary?    Pain in both knees, unspecified chronicity Yes    Pain of right upper extremity        Referring Physician: Nayan Linares*  Treatment Orders: PT Eval and Treat      History     Past Medical History:   Diagnosis Date    Hyperlipidemia     Hypertension        Current Outpatient Prescriptions   Medication Sig    aspirin (ECOTRIN) 81 MG EC tablet once a day    bumetanide (BUMEX) 1 MG tablet Take 1 tablet (1 mg total) by mouth once daily.    irbesartan (AVAPRO) 150 MG tablet Take 150 mg by mouth every evening.    meloxicam (MOBIC) 15 MG tablet Take 1 tablet (15 mg total) by mouth daily as needed for Pain.    metoprolol succinate (TOPROL-XL) 25 MG 24 hr tablet Take 1 tablet (25 mg total) by mouth once daily.    potassium chloride SA (K-DUR,KLOR-CON) 10 MEQ tablet Take 1 tablet (10 mEq total) by mouth once daily.    rosuvastatin (CRESTOR) 20 MG tablet Take 1 tablet (20 mg total) by mouth once daily.     No current facility-administered medications for this visit.        Review of patient's allergies indicates:   Allergen Reactions    Codeine Nausea And Vomiting         Subjective     History of Present Condition:   R Arm- Pt reports that pain began approximately 1 month prior and can only recall having a shot in the R arm that may have caused the pain. Does note that she must perform repetitive duties while at work. Pt has a long history of carpal tunnel syndrome that was present prior to her recent shoulder pain. Notes pain along the Upper trapezius but denies headaches, tinnitus, or change in vision attributable to the recent pain.    B Knees- Pain has worsened over the past year. Pt is now having more difficulty with community  ambulation and squatting activity. Denies any specific BELLE that caused pain to worsen over the pat year but does report falling on her knees 10 year prior. Denies numbness or tingling but notes an occasional clicking.     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 UE use and repetitive bending    Sports/Recreational Activities: None per pt report  Extremity Dominance: Right    Prior Level of Function: Independent  Functional Deficits Leading to Referral/Nature of Injury: Pain with vocational activity  Patient Therapy Goals: Decrease pain with work activity  Cultural/Environmental/Spiritual Barriers to Treatment or Learning: None      Objective     Observation: Pt enters PT independently without an assistive device  Posture: Anterior tipping/rounding of the shoulders  Gait: Mildly antalgic initially but gradually improved    Dermatomes: Intact    Palpation:   - R Arm: tnederness to the UT , Infraspinatous, and Pectoralis Minor/Major  - Bilateral knees: Pain noted along joint line bilaterally    Range of Motion:     Right Shoulder:  Flexion: 165  Er: 60  Ir: L3    Right Knee  Flexion: 115  Extension:0    Left Knee  Flexion:125  Extension: 0    Patella Mobility:   Right Knee: Mild restriction all directions    Left Knee: Mild restriction all directions      Flexibility:   90/90Hamstring: R 70; L 70  Prone Quad: R 80; L 90    Strength:   Right Hip  Flexion: 4  Extension: 4-  Abduction: 4-    Left Hip  Flexion: 4  Extension: 4  Abduction: 4    Right Knee  Flexion:  4-  Extension:4    Left Knee  Flexion: 4-  Extension:4    Right shoulder:  Flexion 4-  Abduction 4-  IR 4  ER 4-    Special Tests:   Patrice's (-)  Félix's  Anterior Drawer (-)  Posterior Drawer (-)  Valgus Laxity (-)  Varus Laxity (-)  Drop arm (-)  Lift off (+)  Full can (+)  Speeds (-)  Biceps Load (-)    Treatment:   - Pt educated in her HEP consisting of global hip/LE strengthening, Pectoral stretching and postural education    Functional Limitations Reports - G Codes  Category: Mobility  Tool: FOTO  Score: UE 61; LE 48       Current ():  CK  Goal (): CJ  Discharge ():  NA    Patient History Examination Clinical Presentation Clinical Decision Making   Comorbidities:   See above    Personal Factors:  None       Activity and Participation Restriction:  Pain with ADLs    Body Systems:  Musculoskeletal    Body Regions:  Upper and Lower Extremities Stable and uncomplicated     Low               Assessment     This is a 62 y.o. female referred to outpatient physical therapy and presents with a medical diagnosis of bilateral knee OA and biceps tendonitis in the RUE and demonstrates limitations as described in the problem list. Signs/sx are consistent with mild rotator cuff pathology and referral pain form overactive UT musculature. Pt demonstrates good rehab potential. Pt will benefit from physcial therapy services in order to maximize pain free and/or functional use of the RUE and improve functional mobility. The following goals were discussed with the patient and patient is in agreement with them as to be addressed in the treatment plan. Pt was given a HEP consisting of gross hip and knee stabilization and soft tissue/pain management techniques for the RUE. Pt verbally understood the instructions as they were given and demonstrated proper form and technique during therapy. Pt was advised to perform these exercises free of pain, and to stop performing them if pain occurs.     Medical necessity is  demonstrated by the following problem list:   - Pain limits function of effected part for all activities  - Unable to participate in daily activities   - Requires skilled supervision to complete and progress HEP  - Fall risk - impaired balance   - Continued inability to participate in vocational pursuits    Short Term Goals (4 Weeks):  - Pt will increase ROM to RUE to match the LUE  - Pt will increase strength to bilateral LE grossly to 4/5 in all limited planes  - Decrease Pain to bilateral knees to < 6/10 with community ambulation and RUE pain to < 5/10 with overhead reaching activity  - Pt to self correct posture in sitting and standing without VC  - Pt independent with HEP with progressions.     Long Term Goals (8 Weeks):  - Pt will increase ROM to RLE to match LLE  - Pt will increase strength to RC and scapular stabilizers to >4/5  - Decrease Pain to < 4/10 in the UE and LE with all vocational activity  - Pt to return to work without restriciton      Plan     Pt will be treated by physical therapy 2 times a week for 10 weeks for manual therapy, therapeutic exercise, home exercise program, patient education, and modalities PRN to achieve established goals. Chelsea may at times be seen by a PTA as part of the Rehab Team.     Marco Howard, PT, DPT  09/14/2017    I CERTIFY THE NEED FOR THESE SERVICES FURNISHED UNDER THIS PLAN OF TREATMENT AND WHILE UNDER MY CAR  Physician's comments: _____________________________________________________________________________________________________________________    Physician's Name: ___________________________________

## 2017-09-18 ENCOUNTER — CLINICAL SUPPORT (OUTPATIENT)
Dept: REHABILITATION | Facility: HOSPITAL | Age: 62
End: 2017-09-18
Attending: INTERNAL MEDICINE
Payer: COMMERCIAL

## 2017-09-18 DIAGNOSIS — M79.601 PAIN OF RIGHT UPPER EXTREMITY: ICD-10-CM

## 2017-09-18 DIAGNOSIS — M25.562 PAIN IN BOTH KNEES, UNSPECIFIED CHRONICITY: Primary | ICD-10-CM

## 2017-09-18 DIAGNOSIS — M25.561 PAIN IN BOTH KNEES, UNSPECIFIED CHRONICITY: Primary | ICD-10-CM

## 2017-09-18 PROCEDURE — 97110 THERAPEUTIC EXERCISES: CPT

## 2017-09-21 ENCOUNTER — CLINICAL SUPPORT (OUTPATIENT)
Dept: REHABILITATION | Facility: HOSPITAL | Age: 62
End: 2017-09-21
Attending: INTERNAL MEDICINE
Payer: COMMERCIAL

## 2017-09-21 DIAGNOSIS — M25.561 PAIN IN BOTH KNEES, UNSPECIFIED CHRONICITY: Primary | ICD-10-CM

## 2017-09-21 DIAGNOSIS — M25.562 PAIN IN BOTH KNEES, UNSPECIFIED CHRONICITY: Primary | ICD-10-CM

## 2017-09-21 DIAGNOSIS — M79.601 PAIN OF RIGHT UPPER EXTREMITY: ICD-10-CM

## 2017-09-21 PROCEDURE — 97110 THERAPEUTIC EXERCISES: CPT

## 2017-09-21 NOTE — PROGRESS NOTES
[facility]   PROGRESS NOTE    Date: 09/21/2017  Start Time: 9:09  Stop Time: 10:00  Visit #: 3    Patient Name: Chelsea Rodriguez  Clinic Number: [id number]  Age: 62 y.o.  Gender: female    Diagnosis:   Encounter Diagnoses   Name Primary?    Pain in both knees, unspecified chronicity Yes    Pain of right upper extremity        Referring Physician: Nayan Linares*  Treatment Orders: PT Eval and Treat      History     Past Medical History:   Diagnosis Date    Hyperlipidemia     Hypertension        Current Outpatient Prescriptions   Medication Sig    aspirin (ECOTRIN) 81 MG EC tablet once a day    bumetanide (BUMEX) 1 MG tablet Take 1 tablet (1 mg total) by mouth once daily.    irbesartan (AVAPRO) 150 MG tablet Take 150 mg by mouth every evening.    meloxicam (MOBIC) 15 MG tablet Take 1 tablet (15 mg total) by mouth daily as needed for Pain.    metoprolol succinate (TOPROL-XL) 25 MG 24 hr tablet Take 1 tablet (25 mg total) by mouth once daily.    potassium chloride SA (K-DUR,KLOR-CON) 10 MEQ tablet Take 1 tablet (10 mEq total) by mouth once daily.    rosuvastatin (CRESTOR) 20 MG tablet Take 1 tablet (20 mg total) by mouth once daily.     No current facility-administered medications for this visit.        Review of patient's allergies indicates:   Allergen Reactions    Codeine Nausea And Vomiting         Subjective     Pt states her R shld feels fine but her knees are still a little achy.        Objective     Observation: Pt enters PT independently without an assistive device  Posture: Anterior tipping/rounding of the shoulders  Gait: Mildly antalgic initially but gradually improved      Treatment:   Pt received therapeutic treatment to improve strength, ROM, flexibility, endurance for 25 min that included:   Pt arrived 5 min late to tx today.     Pec stretch towel roll 5 min  Doorway pec stretch 3 x 30 sec   PPT 3 x 10 3 sec hold  Bridges 3 x 10  Shld rows 30 x/ ext  3 x 10 ytb  Scap retractions 30 x 3 sec hold  SLR 30 x B   LLR 2 x 15 B   TKE 3 x 10 btb B  Therex time: 25 min       Functional Limitations Reports - G Codes  Category: Mobility  Tool: FOTO  Score: UE 61; LE 48       Current ():  CK  Goal (): CJ  Discharge ():  NA    Patient History Examination Clinical Presentation Clinical Decision Making   Comorbidities:   See above    Personal Factors:  None       Activity and Participation Restriction:  Pain with ADLs    Body Systems:  Musculoskeletal    Body Regions:  Upper and Lower Extremities Stable and uncomplicated     Low               Assessment     Pt demonstrated increased strength and endurance during therex w/ Vcs for technique.  Pt alex tx well w/ no c/o pn.  Pt edu on and instructed to cont HEP.  Cont to progress as alex.     Pt will benefit from physcial therapy services in order to maximize pain free and/or functional use of the RUE and improve functional mobility. The following goals were discussed with the patient and patient is in agreement with them as to be addressed in the treatment plan. Pt was given a HEP consisting of gross hip and knee stabilization and soft tissue/pain management techniques for the RUE. Pt verbally understood the instructions as they were given and demonstrated proper form and technique during therapy. Pt was advised to perform these exercises free of pain, and to stop performing them if pain occurs.     Medical necessity is demonstrated by the following problem list:   - Pain limits function of effected part for all activities  - Unable to participate in daily activities   - Requires skilled supervision to complete and progress HEP  - Fall risk - impaired balance   - Continued inability to participate in vocational pursuits    Short Term Goals (4 Weeks):  - Pt will increase ROM to RUE to match the LUE  - Pt will increase strength to bilateral LE grossly to 4/5 in all limited planes  - Decrease Pain to bilateral knees to  < 6/10 with community ambulation and RUE pain to < 5/10 with overhead reaching activity  - Pt to self correct posture in sitting and standing without VC  - Pt independent with HEP with progressions.     Long Term Goals (8 Weeks):  - Pt will increase ROM to RLE to match LLE  - Pt will increase strength to RC and scapular stabilizers to >4/5  - Decrease Pain to < 4/10 in the UE and LE with all vocational activity  - Pt to return to work without restriciton      Plan     Pt will be treated by physical therapy 2 times a week for 10 weeks for manual therapy, therapeutic exercise, home exercise program, patient education, and modalities PRN to achieve established goals. Chelsea may at times be seen by a PTA as part of the Rehab Team.     Andrew Sullivan, SERGE  09/21/2017

## 2017-09-25 ENCOUNTER — OFFICE VISIT (OUTPATIENT)
Dept: GASTROENTEROLOGY | Facility: CLINIC | Age: 62
End: 2017-09-25
Payer: COMMERCIAL

## 2017-09-25 VITALS
WEIGHT: 158.69 LBS | BODY MASS INDEX: 31.99 KG/M2 | SYSTOLIC BLOOD PRESSURE: 139 MMHG | HEIGHT: 59 IN | HEART RATE: 73 BPM | DIASTOLIC BLOOD PRESSURE: 73 MMHG

## 2017-09-25 DIAGNOSIS — K59.01 CONSTIPATION BY DELAYED COLONIC TRANSIT: Primary | ICD-10-CM

## 2017-09-25 DIAGNOSIS — Z86.010 HISTORY OF ADENOMATOUS POLYP OF COLON: ICD-10-CM

## 2017-09-25 PROBLEM — Z86.0101 HISTORY OF ADENOMATOUS POLYP OF COLON: Status: ACTIVE | Noted: 2017-09-25

## 2017-09-25 PROCEDURE — 99999 PR PBB SHADOW E&M-EST. PATIENT-LVL III: CPT | Mod: PBBFAC,,, | Performed by: INTERNAL MEDICINE

## 2017-09-25 PROCEDURE — 99204 OFFICE O/P NEW MOD 45 MIN: CPT | Mod: S$GLB,,, | Performed by: INTERNAL MEDICINE

## 2017-09-25 PROCEDURE — 3008F BODY MASS INDEX DOCD: CPT | Mod: S$GLB,,, | Performed by: INTERNAL MEDICINE

## 2017-09-25 NOTE — PROGRESS NOTES
"Subjective:      Patient ID: Chelsea Rodriguez is a 62 y.o. female.    Chief Complaint: Colonoscopy; Constipation; and Gas    HPI:   Patient 62-year-old female presenting for GI follow-up.  She has a history of chronic constipation.  Does not take MiraLAX consistently.  Indicates that she gets relief with aloe Vera and probiotics  Also has a prior history of a small adenoma in 2012.  She is due for follow-up colonoscopy.  Past history includes breast lumpectomy about 7 years ago.  Hypertension.  Family history negative for GI neoplasm.    Review of patient's allergies indicates:   Allergen Reactions    Codeine Nausea And Vomiting     Past Medical History:   Diagnosis Date    Hyperlipidemia     Hypertension      Past Surgical History:   Procedure Laterality Date    BILATERAL SALPINGOOPHORECTOMY  2000    BREAST LUMPECTOMY  2010    supracervical abdominal hysterectomy  1978    fibroids     Family History   Problem Relation Age of Onset    Breast cancer Cousin      Social History     Social History    Marital status: Single     Spouse name: N/A    Number of children: N/A    Years of education: N/A     Occupational History    Not on file.     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 hx of left lumpectomy for "breast cancer"- with 5yrs of tamoxifen use. Sees Dr. Buckley ([facility] for surveillance/MMG)        Dr. Lala former PCP, [facility]                Review of Systems:  Constitutional: Negative for appetite change.   HENT: Negative for trouble swallowing.  Positive for hoarseness  Eyes: Negative for photophobia.   Respiratory: Negative for cough and shortness of breath.   Cardiovascular: " "Negative for palpitations.   Gastrointestinal: See HPI for details.  Genitourinary: Negative for frequency and hematuria.   Skin: Negative for rash.   Musculoskeletal: Joint pains.  Neurological: Negative for weakness and headaches.   Hematological: Negative.   Psychiatric/Behavioral: Negative for suicidal ideas and behavioral problems.     Objective:     /73 (BP Location: Left arm, Patient Position: Sitting)   Pulse 73   Ht 4' 11" (1.499 m)   Wt 72 kg (158 lb 11.2 oz)   BMI 32.05 kg/m²     Physical Exam:  Eyes: Pupils are equal, round, and reactive to light.   Neck: Supple. No mass  Cardiovascular: Regular rhythm . No murmur   Pulmonary/Chest: Lungs clear   Abdominal: Soft. No mass palpated. Nontender, no guarding. Positive bowel sounds   Musculoskeletal: No deformity.  Trace ankle edema   Psychiatric: Alert and oriented    Assessment:     1. Constipation by delayed colonic transit    2. History of adenomatous polyp of colon      Plan:     Chelsea was seen today for colonoscopy, constipation and gas.    Diagnoses and all orders for this visit:    Constipation by delayed colonic transit    History of adenomatous polyp of colon  -     Case request GI: COLONOSCOPY      Plan:  Constipation pamphlet   colonoscopy      "

## 2017-09-25 NOTE — PATIENT INSTRUCTIONS
Colonoscopy     A camera attached to a flexible tube with a viewing lens is used to take video pictures.     Colonoscopy is a test to view the inside of your lower digestive tract (colon and rectum). Sometimes it can show the last part of the small intestine (ileum). During the test, small pieces of tissue may be removed for testing. This is called a biopsy. Small growths, such as polyps, may also be removed.   Why is colonoscopy done?  The test is done to help look for colon cancer. And it can help find the source of abdominal pain, bleeding, and changes in bowel habits. It may be needed once a year, depending on factors such as your:  · Age  · Health history  · Family health history  · Symptoms  · Results from any prior colonoscopy  Risks and possible complications  These include:  · Bleeding               · A puncture or tear in the colon   · Risks of anesthesia  · A cancer lesion not being seen  Getting ready   To prepare for the test:  · Talk with your healthcare provider about the risks of the test (see below). Also ask your healthcare provider about alternatives to the test.  · Tell your healthcare provider about any medicines you take. Also tell him or her about any health conditions you may have.  · Make sure your rectum and colon are empty for the test. Follow the diet and bowel prep instructions exactly. If you dont, the test may need to be rescheduled.  · Plan for a friend or family member to drive you home after the test.     Colonoscopy provides an inside view of the entire colon.     You may discuss the results with your doctor right away or at a future visit.  During the test   The test is usually done in the hospital on an outpatient basis. This means you go home the same day. The procedure takes about 30 minutes. During that time:  · You are given relaxing (sedating) medicine through an IV line. You may be drowsy, or fully asleep.  · The healthcare provider will first give you a physical exam to  check for anal and rectal problems.  · Then the anus is lubricated and the scope inserted.  · If you are awake, you may have a feeling similar to needing to have a bowel movement. You may also feel pressure as air is pumped into the colon. Its OK to pass gas during the procedure.  · Biopsy, polyp removal, or other treatments may be done during the test.  After the test   You may have gas right after the test. It can help to try to pass it to help prevent later bloating. Your healthcare provider may discuss the results with you right away. Or you may need to schedule a follow-up visit to talk about the results. After the test, you can go back to your normal eating and other activities. You may be tired from the sedation and need to rest for a few hours.  Date Last Reviewed: 11/1/2016 © 2000-2017 [employer]. [address] [place] [address]. All rights reserved. This information is not intended as a substitute for professional medical care. Always follow your healthcare professional's instructions.        Constipation (Adult)  Constipation means that you have bowel movements that are less frequent than usual. Stools often become very hard and difficult to pass.  Constipation is very common. At some point in life it affects almost everyone. Since everyone's bowel habits are different, what is constipation to one person may not be to another. Your healthcare provider may do tests to diagnose constipation. It depends on what he or she finds when evaluating you.    Symptoms of constipation include:  · Abdominal pain  · Bloating  · Vomiting  · Painful bowel movements  · Itching, swelling, bleeding, or pain around the anus  Causes  Constipation can have many causes. These include:  · Diet low in fiber  · Too much dairy  · Not drinking enough liquids  · Lack of exercise or physical activity. This is especially true for older adults.  · Changes in lifestyle or daily routine, including pregnancy,  aging, work, and travel  · Frequent use or misuse of laxatives  · Ignoring the urge to have a bowel movement or delaying it until later  · Medicines, such as certain prescription pain medicines, iron supplements, antacids, certain antidepressants, and calcium supplements  · Diseases like irritable bowel syndrome, bowel obstructions, stroke, diabetes, thyroid disease, Parkinson disease, hemorrhoids, and colon cancer  Complications  Potential complications of constipation can include:  · Hemorrhoids  · Rectal bleeding from hemorrhoids or anal fissures (skin tears)  · Hernias  · Dependency on laxatives  · Chronic constipation  · Fecal impaction  · Bowel obstruction or perforation  Home care  All treatment should be done after talking with your healthcare provider. This is especially true if you have another medical problems, are taking prescription medicines, or are an older adult. Treatment most often involves lifestyle changes. You may also need medicines. Your healthcare provider will tell you which will work best for you. Follow the advice below to help avoid this problem in the future.  Lifestyle changes  These lifestyle changes can help prevent constipation:  · Diet. Eat a high-fiber diet, with fresh fruit and vegetables, and reduce dairy intake, meats, and processed foods  · Fluids. It's important to get enough fluids each day. Drink plenty of water when you eat more fiber. If you are on diet that limits the amount of fluid you can have, talk about this with your healthcare provider.  · Regular exercise. Check with your healthcare provider first.  Medications  Take any medicines as directed. Some laxatives are safe to use only every now and then. Others can be taken on a regular basis. Talk with your doctor or pharmacist if you have questions.  Prescription pain medicines can cause constipation. If you are taking this kind of medicine, ask your healthcare provider if you should also take a stool  softener.  Medicines you may take to treat constipation include:  · Fiber supplements  · Stool softeners  · Laxatives  · Enemas  · Rectal suppositories  Follow-up care  Follow up with your healthcare provider if symptoms don't get better in the next few days. You may need to have more tests or see a specialist.  Call 911  Call 911 if any of these occur:  · Trouble breathing  · Stiff, rigid abdomen that is severely painful to touch  · Confusion  · Fainting or loss of consciousness  · Rapid heart rate  · Chest pain  When to seek medical advice  Call your healthcare provider right away if any of these occur:  · Fever over 100.4°F (38°C)  · Failure to resume normal bowel movements  · Pain in your abdomen or back gets worse  · Nausea or vomiting  · Swelling in your abdomen  · Blood in the stool  · Black, tarry stool  · Involuntary weight loss  · Weakness  Date Last Reviewed: 12/30/2015  © 7097-0247 [employer]. [address] [place] [address], [place] [address]. All rights reserved. This information is not intended as a substitute for professional medical care. Always follow your healthcare professional's instructions.

## 2017-09-26 ENCOUNTER — CLINICAL SUPPORT (OUTPATIENT)
Dept: REHABILITATION | Facility: HOSPITAL | Age: 62
End: 2017-09-26
Attending: INTERNAL MEDICINE
Payer: COMMERCIAL

## 2017-09-26 DIAGNOSIS — M79.601 PAIN OF RIGHT UPPER EXTREMITY: ICD-10-CM

## 2017-09-26 DIAGNOSIS — M25.561 PAIN IN BOTH KNEES, UNSPECIFIED CHRONICITY: Primary | ICD-10-CM

## 2017-09-26 DIAGNOSIS — M25.562 PAIN IN BOTH KNEES, UNSPECIFIED CHRONICITY: Primary | ICD-10-CM

## 2017-09-26 PROCEDURE — 97110 THERAPEUTIC EXERCISES: CPT

## 2017-09-26 NOTE — PROGRESS NOTES
[facility]   PROGRESS NOTE    Date: 09/26/2017  Start Time: 11:07  Stop Time: 12:05  Visit #: 4    Patient Name: Chelsea Rodriguez  Clinic Number: [id number]  Age: 62 y.o.  Gender: female    Diagnosis:   Encounter Diagnoses   Name Primary?    Pain in both knees, unspecified chronicity Yes    Pain of right upper extremity        Referring Physician: Nayan Linares*  Treatment Orders: PT Eval and Treat      History     Past Medical History:   Diagnosis Date    Hyperlipidemia     Hypertension        Current Outpatient Prescriptions   Medication Sig    aspirin (ECOTRIN) 81 MG EC tablet once a day    bumetanide (BUMEX) 1 MG tablet Take 1 tablet (1 mg total) by mouth once daily.    irbesartan (AVAPRO) 150 MG tablet Take 150 mg by mouth every evening.    meloxicam (MOBIC) 15 MG tablet Take 1 tablet (15 mg total) by mouth daily as needed for Pain.    metoprolol succinate (TOPROL-XL) 25 MG 24 hr tablet Take 1 tablet (25 mg total) by mouth once daily.    potassium chloride SA (K-DUR,KLOR-CON) 10 MEQ tablet Take 1 tablet (10 mEq total) by mouth once daily.    rosuvastatin (CRESTOR) 20 MG tablet Take 1 tablet (20 mg total) by mouth once daily.     No current facility-administered medications for this visit.        Review of patient's allergies indicates:   Allergen Reactions    Codeine Nausea And Vomiting         Subjective     Pt reports w/ no c/o pn in R shld and having some soreness in B knees.         Objective     Observation: Pt enters PT independently without an assistive device  Posture: Anterior tipping/rounding of the shoulders  Gait: Mildly antalgic initially but gradually improved      Treatment:   Pt received therapeutic treatment to improve strength, ROM, flexibility, endurance for 25 min that included:   Pt arrived 5 min late to tx today.     Pec stretch towel roll 5 min  Doorway pec stretch 3 x 30 sec   PPT 3 x 10 3 sec hold  Bridges 30 x  Shld rows 30 x/ ext  "3 x 10 gtb  Scap retractions 30 x 3 sec hold  SLR 30 x B   Step Downs 2" 3 x 10  LLR 30 x B   TKE 30 x btb B  Therex time: 25 min       Functional Limitations Reports - G Codes  Category: Mobility  Tool: FOTO  Score: UE 61; LE 48       Current ():  CK  Goal (): CJ  Discharge ():  NA    Patient History Examination Clinical Presentation Clinical Decision Making   Comorbidities:   See above    Personal Factors:  None       Activity and Participation Restriction:  Pain with ADLs    Body Systems:  Musculoskeletal    Body Regions:  Upper and Lower Extremities Stable and uncomplicated     Low               Assessment     Pt alex tx well w/ no c/o pn.  Pt displayed improved endurance during therex w/ Vcs for technique.  Pt edu on and instructed to cont HEP.  Cont to progress as alex.     Pt will benefit from physcial therapy services in order to maximize pain free and/or functional use of the RUE and improve functional mobility. The following goals were discussed with the patient and patient is in agreement with them as to be addressed in the treatment plan. Pt was given a HEP consisting of gross hip and knee stabilization and soft tissue/pain management techniques for the RUE. Pt verbally understood the instructions as they were given and demonstrated proper form and technique during therapy. Pt was advised to perform these exercises free of pain, and to stop performing them if pain occurs.     Medical necessity is demonstrated by the following problem list:   - Pain limits function of effected part for all activities  - Unable to participate in daily activities   - Requires skilled supervision to complete and progress HEP  - Fall risk - impaired balance   - Continued inability to participate in vocational pursuits    Short Term Goals (4 Weeks):  - Pt will increase ROM to RUE to match the LUE  - Pt will increase strength to bilateral LE grossly to 4/5 in all limited planes  - Decrease Pain to bilateral knees " to < 6/10 with community ambulation and RUE pain to < 5/10 with overhead reaching activity  - Pt to self correct posture in sitting and standing without VC  - Pt independent with HEP with progressions.     Long Term Goals (8 Weeks):  - Pt will increase ROM to RLE to match LLE  - Pt will increase strength to RC and scapular stabilizers to >4/5  - Decrease Pain to < 4/10 in the UE and LE with all vocational activity  - Pt to return to work without restriciton      Plan     Pt will be treated by physical therapy 2 times a week for 10 weeks for manual therapy, therapeutic exercise, home exercise program, patient education, and modalities PRN to achieve established goals. Chelsea may at times be seen by a PTA as part of the Rehab Team.     Anderw Sullivan, SERGE  09/26/2017

## 2017-09-28 ENCOUNTER — CLINICAL SUPPORT (OUTPATIENT)
Dept: REHABILITATION | Facility: HOSPITAL | Age: 62
End: 2017-09-28
Attending: INTERNAL MEDICINE
Payer: COMMERCIAL

## 2017-09-28 DIAGNOSIS — M25.562 PAIN IN BOTH KNEES, UNSPECIFIED CHRONICITY: Primary | ICD-10-CM

## 2017-09-28 DIAGNOSIS — M79.601 PAIN OF RIGHT UPPER EXTREMITY: ICD-10-CM

## 2017-09-28 DIAGNOSIS — M25.561 PAIN IN BOTH KNEES, UNSPECIFIED CHRONICITY: Primary | ICD-10-CM

## 2017-09-28 PROCEDURE — 97110 THERAPEUTIC EXERCISES: CPT

## 2017-09-28 NOTE — PROGRESS NOTES
[facility]   PROGRESS NOTE    Date: 09/28/2017  Start Time: 11:05  Stop Time: 12:05  Visit #: 5    Patient Name: Chelsea Rodriguez  Clinic Number: [id number]  Age: 62 y.o.  Gender: female    Diagnosis:   Encounter Diagnoses   Name Primary?    Pain in both knees, unspecified chronicity Yes    Pain of right upper extremity        Referring Physician: Nayan Linares*  Treatment Orders: PT Eval and Treat      History     Past Medical History:   Diagnosis Date    Hyperlipidemia     Hypertension        Current Outpatient Prescriptions   Medication Sig    aspirin (ECOTRIN) 81 MG EC tablet once a day    bumetanide (BUMEX) 1 MG tablet Take 1 tablet (1 mg total) by mouth once daily.    irbesartan (AVAPRO) 150 MG tablet Take 150 mg by mouth every evening.    meloxicam (MOBIC) 15 MG tablet Take 1 tablet (15 mg total) by mouth daily as needed for Pain.    metoprolol succinate (TOPROL-XL) 25 MG 24 hr tablet Take 1 tablet (25 mg total) by mouth once daily.    potassium chloride SA (K-DUR,KLOR-CON) 10 MEQ tablet Take 1 tablet (10 mEq total) by mouth once daily.    rosuvastatin (CRESTOR) 20 MG tablet Take 1 tablet (20 mg total) by mouth once daily.     No current facility-administered medications for this visit.        Review of patient's allergies indicates:   Allergen Reactions    Codeine Nausea And Vomiting         Subjective     Pt cont to have some soreness in B knees but they are feeling better in general.         Objective     Observation: Pt enters PT independently without an assistive device  Posture: Anterior tipping/rounding of the shoulders  Gait: Mildly antalgic initially but gradually improved      Treatment:   Pt received therapeutic treatment to improve strength, ROM, flexibility, endurance for 30 min that included:   Pt arrived 5 min late to tx today.     Pec stretch towel roll 5 min  Doorway pec stretch 3 x 30 sec   PPT 3 x 10 3 sec hold  Bridges 30 x w/ 3 sec  "hold  Shld rows 30 x/ ext 30 x gtb  Scap retractions 30 x 3 sec hold  SLR 3 x 10 2# B   Step Ups 2" 30 x B     LLR 3 x 10 2#   TKE 30 x mtb B  Leg Press 3 x 10 60# B   Therex time: 30 min       Functional Limitations Reports - G Codes  Category: Mobility  Tool: FOTO  Score: UE 61; LE 48       Current ():  CK  Goal (): CJ  Discharge ():  NA    Patient History Examination Clinical Presentation Clinical Decision Making   Comorbidities:   See above    Personal Factors:  None       Activity and Participation Restriction:  Pain with ADLs    Body Systems:  Musculoskeletal    Body Regions:  Upper and Lower Extremities Stable and uncomplicated     Low               Assessment     Pt demonstrated increased strength and endurance during therex w/ Vcs for technique.  Pt alex tx well w/ no c/o pn.  Pt edu on and instructed to cont HEP.  Cont to progress as alex.     Pt will benefit from physcial therapy services in order to maximize pain free and/or functional use of the RUE and improve functional mobility. The following goals were discussed with the patient and patient is in agreement with them as to be addressed in the treatment plan. Pt was given a HEP consisting of gross hip and knee stabilization and soft tissue/pain management techniques for the RUE. Pt verbally understood the instructions as they were given and demonstrated proper form and technique during therapy. Pt was advised to perform these exercises free of pain, and to stop performing them if pain occurs.     Medical necessity is demonstrated by the following problem list:   - Pain limits function of effected part for all activities  - Unable to participate in daily activities   - Requires skilled supervision to complete and progress HEP  - Fall risk - impaired balance   - Continued inability to participate in vocational pursuits    Short Term Goals (4 Weeks):  - Pt will increase ROM to RUE to match the LUE  - Pt will increase strength to bilateral LE " grossly to 4/5 in all limited planes  - Decrease Pain to bilateral knees to < 6/10 with community ambulation and RUE pain to < 5/10 with overhead reaching activity  - Pt to self correct posture in sitting and standing without VC  - Pt independent with HEP with progressions.     Long Term Goals (8 Weeks):  - Pt will increase ROM to RLE to match LLE  - Pt will increase strength to RC and scapular stabilizers to >4/5  - Decrease Pain to < 4/10 in the UE and LE with all vocational activity  - Pt to return to work without restriciton      Plan     Pt will be treated by physical therapy 2 times a week for 10 weeks for manual therapy, therapeutic exercise, home exercise program, patient education, and modalities PRN to achieve established goals. Chelsea may at times be seen by a PTA as part of the Rehab Team.     Andrew Sullivan, PTA  09/28/2017

## 2017-10-03 ENCOUNTER — CLINICAL SUPPORT (OUTPATIENT)
Dept: REHABILITATION | Facility: HOSPITAL | Age: 62
End: 2017-10-03
Attending: INTERNAL MEDICINE
Payer: COMMERCIAL

## 2017-10-03 DIAGNOSIS — M79.601 PAIN OF RIGHT UPPER EXTREMITY: ICD-10-CM

## 2017-10-03 DIAGNOSIS — M25.561 PAIN IN BOTH KNEES, UNSPECIFIED CHRONICITY: Primary | ICD-10-CM

## 2017-10-03 DIAGNOSIS — M25.562 PAIN IN BOTH KNEES, UNSPECIFIED CHRONICITY: Primary | ICD-10-CM

## 2017-10-03 PROCEDURE — 97110 THERAPEUTIC EXERCISES: CPT

## 2017-10-03 NOTE — PROGRESS NOTES
[facility]   PROGRESS NOTE    Date: 10/03/2017  Start Time: 10:05  Stop Time: 11:05  Visit #: 6    Patient Name: Chelsea Rodriguez  Clinic Number: [id number]  Age: 62 y.o.  Gender: female    Diagnosis:   Encounter Diagnoses   Name Primary?    Pain in both knees, unspecified chronicity Yes    Pain of right upper extremity        Referring Physician: Nayan Linares*  Treatment Orders: PT Eval and Treat      History     Past Medical History:   Diagnosis Date    Hyperlipidemia     Hypertension        Current Outpatient Prescriptions   Medication Sig    aspirin (ECOTRIN) 81 MG EC tablet once a day    bumetanide (BUMEX) 1 MG tablet Take 1 tablet (1 mg total) by mouth once daily.    irbesartan (AVAPRO) 150 MG tablet Take 150 mg by mouth every evening.    meloxicam (MOBIC) 15 MG tablet Take 1 tablet (15 mg total) by mouth daily as needed for Pain.    metoprolol succinate (TOPROL-XL) 25 MG 24 hr tablet Take 1 tablet (25 mg total) by mouth once daily.    potassium chloride SA (K-DUR,KLOR-CON) 10 MEQ tablet Take 1 tablet (10 mEq total) by mouth once daily.    rosuvastatin (CRESTOR) 20 MG tablet Take 1 tablet (20 mg total) by mouth once daily.     No current facility-administered medications for this visit.        Review of patient's allergies indicates:   Allergen Reactions    Codeine Nausea And Vomiting         Subjective     Pt states feeling well w no c/o pn in R shld and less pn in B knees.       Objective     Observation: Pt enters PT independently without an assistive device  Posture: Anterior tipping/rounding of the shoulders  Gait: Mildly antalgic initially but gradually improved      Treatment:   Pt received therapeutic treatment to improve strength, ROM, flexibility, endurance for 30 min that included:     Pec stretch towel roll 5 min  Doorway pec stretch 3 x 30 sec   PPT 3 x 10 3 sec hold  Bridges 30 x w/ 3 sec hold  Shld rows 30 x/ ext 30 x gtb  Scap retractions  "30 x 3 sec hold np  No Money ytb 3 x 10  SLR 30 x 2# B   Step Ups 3" 30 x B     LLR 2 x 15 2#   TKE 30 x mtb B NP  LAQ 3# 30 x 3 sec hold B   Leg Press 30 x 60# B   Therex time: 30 min       Functional Limitations Reports - G Codes  Category: Mobility  Tool: FOTO  Score: 59  10/3/17       Current ():  CK  Goal (): CJ  Discharge ():  NA    Patient History Examination Clinical Presentation Clinical Decision Making   Comorbidities:   See above    Personal Factors:  None       Activity and Participation Restriction:  Pain with ADLs    Body Systems:  Musculoskeletal    Body Regions:  Upper and Lower Extremities Stable and uncomplicated     Low               Assessment     Pt alex tx well w/ no c/o pn.  Pt displayed improved strength and endurance during therex w/ Vcs for technique.  Pt edu on and instructed to cont HEP.  Cont to progress as alex.     Pt will benefit from physcial therapy services in order to maximize pain free and/or functional use of the RUE and improve functional mobility. The following goals were discussed with the patient and patient is in agreement with them as to be addressed in the treatment plan. Pt was given a HEP consisting of gross hip and knee stabilization and soft tissue/pain management techniques for the RUE. Pt verbally understood the instructions as they were given and demonstrated proper form and technique during therapy. Pt was advised to perform these exercises free of pain, and to stop performing them if pain occurs.     Medical necessity is demonstrated by the following problem list:   - Pain limits function of effected part for all activities  - Unable to participate in daily activities   - Requires skilled supervision to complete and progress HEP  - Fall risk - impaired balance   - Continued inability to participate in vocational pursuits    Short Term Goals (4 Weeks):  - Pt will increase ROM to RUE to match the LUE  - Pt will increase strength to bilateral LE grossly " to 4/5 in all limited planes  - Decrease Pain to bilateral knees to < 6/10 with community ambulation and RUE pain to < 5/10 with overhead reaching activity  - Pt to self correct posture in sitting and standing without VC  - Pt independent with HEP with progressions.     Long Term Goals (8 Weeks):  - Pt will increase ROM to RLE to match LLE  - Pt will increase strength to RC and scapular stabilizers to >4/5  - Decrease Pain to < 4/10 in the UE and LE with all vocational activity  - Pt to return to work without restriciton      Plan     Pt will be treated by physical therapy 2 times a week for 10 weeks for manual therapy, therapeutic exercise, home exercise program, patient education, and modalities PRN to achieve established goals. Chelsea may at times be seen by a PTA as part of the Rehab Team.     Andrwe Sullivan, SERGE  10/03/2017

## 2017-10-05 ENCOUNTER — CLINICAL SUPPORT (OUTPATIENT)
Dept: REHABILITATION | Facility: HOSPITAL | Age: 62
End: 2017-10-05
Attending: INTERNAL MEDICINE
Payer: COMMERCIAL

## 2017-10-05 DIAGNOSIS — M79.601 PAIN OF RIGHT UPPER EXTREMITY: ICD-10-CM

## 2017-10-05 DIAGNOSIS — M25.561 PAIN IN BOTH KNEES, UNSPECIFIED CHRONICITY: Primary | ICD-10-CM

## 2017-10-05 DIAGNOSIS — M25.562 PAIN IN BOTH KNEES, UNSPECIFIED CHRONICITY: Primary | ICD-10-CM

## 2017-10-05 PROCEDURE — 97110 THERAPEUTIC EXERCISES: CPT

## 2017-10-05 PROCEDURE — 97140 MANUAL THERAPY 1/> REGIONS: CPT

## 2017-10-05 NOTE — PROGRESS NOTES
[facility]   PROGRESS NOTE     Date: 10/03/2017  Start Time: 10:05  Stop Time: 11:05  Visit #: 6     Patient Name: Chelsea Rodriguez  Clinic Number: [id number]  Age: 62 y.o.  Gender: female     Diagnosis:        Encounter Diagnoses   Name Primary?    Pain in both knees, unspecified chronicity Yes    Pain of right upper extremity           Referring Physician: Nayan Linares*  Treatment Orders: PT Eval and Treat        History           Past Medical History:   Diagnosis Date    Hyperlipidemia      Hypertension                Current Outpatient Prescriptions   Medication Sig    aspirin (ECOTRIN) 81 MG EC tablet once a day    bumetanide (BUMEX) 1 MG tablet Take 1 tablet (1 mg total) by mouth once daily.    irbesartan (AVAPRO) 150 MG tablet Take 150 mg by mouth every evening.    meloxicam (MOBIC) 15 MG tablet Take 1 tablet (15 mg total) by mouth daily as needed for Pain.    metoprolol succinate (TOPROL-XL) 25 MG 24 hr tablet Take 1 tablet (25 mg total) by mouth once daily.    potassium chloride SA (K-DUR,KLOR-CON) 10 MEQ tablet Take 1 tablet (10 mEq total) by mouth once daily.    rosuvastatin (CRESTOR) 20 MG tablet Take 1 tablet (20 mg total) by mouth once daily.      No current facility-administered medications for this visit.               Review of patient's allergies indicates:   Allergen Reactions    Codeine Nausea And Vomiting            Subjective      Pt reports that her shoulder is no longer constantly hurting and that the knees seem to be feeling better as well.      Objective      Observation: Pt enters PT independently without an assistive device  Posture: Anterior tipping/rounding of the shoulders  Gait: Mildly antalgic initially but gradually improved        Treatment:   Pt received therapeutic treatment to improve strength, ROM, flexibility, endurance for 45 min that included:      Pec stretch towel roll 5 min  Doorway pec stretch 3 x 30 sec   PPT 3 x  "10 3 sec hold  Bridges 30 x w/ 3 sec hold  Shld rows 30 x/ ext 30 x gtb  Scap retractions 30 x 3 sec hold np  No Money ytb 3 x 10  SLR 30 x 2# B   Slantboard stretch x 2 min  Step Ups 3" 30 x B     LLR 2 x 15 2#   TKE 30 x mtb B NP  LAQ 3# 30 x 3 sec hold B   Leg Press 30 x 60# B   Therex time: 30 min    Manual Therapy:  - IASTM to posterior shoulder   - STM rolling to bilateral quad  - Gross hip mobilization into flexion IR/ER        Functional Limitations Reports - G Codes  Category: Mobility  Tool: FOTO  Score: 59  10/3/17         Current ():  CK  Goal (): CJ  Discharge ():  NA     Patient History Examination Clinical Presentation Clinical Decision Making   Comorbidities:   See above     Personal Factors:  None       Activity and Participation Restriction:  Pain with ADLs     Body Systems:  Musculoskeletal     Body Regions:  Upper and Lower Extremities Stable and uncomplicated    Low                Assessment      Pt left PT demonstrating an improved gait pattern and reports she was without pain. Plan to tx 1 more week and ensure comfort with HEP. At that time she will be transitioned into an independent rehab program.     Pt will benefit from physcial therapy services in order to maximize pain free and/or functional use of the RUE and improve functional mobility. The following goals were discussed with the patient and patient is in agreement with them as to be addressed in the treatment plan. Pt was given a HEP consisting of gross hip and knee stabilization and soft tissue/pain management techniques for the RUE. Pt verbally understood the instructions as they were given and demonstrated proper form and technique during therapy. Pt was advised to perform these exercises free of pain, and to stop performing them if pain occurs.      Medical necessity is demonstrated by the following problem list:   - Pain limits function of effected part for all activities  - Unable to participate in daily " activities   - Requires skilled supervision to complete and progress HEP  - Fall risk - impaired balance   - Continued inability to participate in vocational pursuits     Short Term Goals (4 Weeks):  - Pt will increase ROM to RUE to match the LUE  - Pt will increase strength to bilateral LE grossly to 4/5 in all limited planes  - Decrease Pain to bilateral knees to < 6/10 with community ambulation and RUE pain to < 5/10 with overhead reaching activity  - Pt to self correct posture in sitting and standing without VC  - Pt independent with HEP with progressions.      Long Term Goals (8 Weeks):  - Pt will increase ROM to RLE to match LLE  - Pt will increase strength to RC and scapular stabilizers to >4/5  - Decrease Pain to < 4/10 in the UE and LE with all vocational activity  - Pt to return to work without restriciton        Plan      Pt will be treated by physical therapy 2 times a week for 10 weeks for manual therapy, therapeutic exercise, home exercise program, patient education, and modalities PRN to achieve established goals. Chelsea may at times be seen by a PTA as part of the Rehab Team.      Marco Howard, PT , DPT, OCS  10/03/2017

## 2017-10-17 ENCOUNTER — LAB VISIT (OUTPATIENT)
Dept: LAB | Facility: HOSPITAL | Age: 62
End: 2017-10-17
Attending: INTERNAL MEDICINE
Payer: COMMERCIAL

## 2017-10-17 ENCOUNTER — OFFICE VISIT (OUTPATIENT)
Dept: INTERNAL MEDICINE | Facility: CLINIC | Age: 62
End: 2017-10-17
Payer: COMMERCIAL

## 2017-10-17 VITALS
HEIGHT: 59 IN | DIASTOLIC BLOOD PRESSURE: 64 MMHG | HEART RATE: 74 BPM | WEIGHT: 156.94 LBS | BODY MASS INDEX: 31.64 KG/M2 | SYSTOLIC BLOOD PRESSURE: 126 MMHG | OXYGEN SATURATION: 98 %

## 2017-10-17 DIAGNOSIS — R60.0 PEDAL EDEMA: ICD-10-CM

## 2017-10-17 DIAGNOSIS — Z78.9 VARICELLA VACCINATION STATUS UNKNOWN: ICD-10-CM

## 2017-10-17 DIAGNOSIS — M17.0 PRIMARY OSTEOARTHRITIS OF BOTH KNEES: Primary | ICD-10-CM

## 2017-10-17 DIAGNOSIS — I10 ESSENTIAL HYPERTENSION: ICD-10-CM

## 2017-10-17 PROCEDURE — 86787 VARICELLA-ZOSTER ANTIBODY: CPT

## 2017-10-17 PROCEDURE — 36415 COLL VENOUS BLD VENIPUNCTURE: CPT | Mod: PO

## 2017-10-17 PROCEDURE — 99999 PR PBB SHADOW E&M-EST. PATIENT-LVL III: CPT | Mod: PBBFAC,,, | Performed by: INTERNAL MEDICINE

## 2017-10-17 PROCEDURE — 99214 OFFICE O/P EST MOD 30 MIN: CPT | Mod: S$GLB,,, | Performed by: INTERNAL MEDICINE

## 2017-10-17 NOTE — PATIENT INSTRUCTIONS
Recommendations for today    We recommend stopping diuretic tablet bumetanide and potassium.  You do not have chronic heart failure therefore this medication is not indicated.

## 2017-10-17 NOTE — PROGRESS NOTES
Portions of this note are generated with voice recognition software. Typographical errors may exist.     SUBJECTIVE:    This is a/an 62 y.o. female here for primary care visit for  Chief Complaint   Patient presents with    Follow-up     States that after physical therapy osteoarthritis pain has become much more manageable.    States that she hasn't had edema recurring in the ankles and for this reason she doesn't really take bumetanide her potassium much.  She uses supplements over-the-counter  To induced colonic cleansing.  This provider has asked the patient to stop doing this.    Patient states that she never had chickenpox as a child.  She has not had shingles like illness as an adult.  She wonders if she needs varicella vaccination.      Medications Reviewed and Updated    Past medical, family, and social histories were reviewed and updated.    Review of Systems negative unless otherwise noted in history of present illness-  ROS    General ROS: negative  Psychological ROS: negative  ENT ROS: negative  Allergy and Immunology ROS: negative  Cardiovascular ROS: negative  Gastrointestinal ROS: negative  Genito-Urinary ROS: negative  Musculoskeletal ROS: negative        Allergic:    Review of patient's allergies indicates:   Allergen Reactions    Codeine Nausea And Vomiting       OBJECTIVE:  BP: 126/64 Pulse: 74    Wt Readings from Last 3 Encounters:   10/17/17 71.2 kg (156 lb 15.5 oz)   09/25/17 72 kg (158 lb 11.2 oz)   09/06/17 71.5 kg (157 lb 10.1 oz)    Body mass index is 31.7 kg/m².  Previous Blood Pressure Readings :   BP Readings from Last 3 Encounters:   10/17/17 126/64   09/25/17 139/73   09/06/17 132/76       Physical Exam    GEN: No apparent distress  HEENT: sclera non-icteric, conjunctiva clear  CV: no peripheral edema, RRR, no r/m/g  PULM: breathing non-labored  ABD: Obese, protuberant abdomen.  PSYCH: appropriate affect  MSK: able to rise from chair without assistance  SKIN: normal skin  devonte    Pertinent Labs Reviewed       ASSESSMENT/PLAN:    Primary osteoarthritis of both knees.Condition stable.  Counseling on self-care measures. Plan to monitor clinically. Continue current medical plan.     Varicella vaccination status unknown  -     Varicella zoster antibody, IgG; Future; Expected date: 10/17/2017    Pedal edema.Condition stable.  Counseling on self-care measures. Plan to monitor clinically. Continue current medical plan.     Essential hypertension..Condition stable.  Counseling on self-care measures. Plan to monitor clinically. Continue current medical plan.       Future Appointments  Date Time Provider Department Center   3/19/2018 9:00 AM Nayan Linares MD [facility]       Nayan Linares  10/17/2017  6:43 PM

## 2017-10-18 LAB
VARICELLA INTERPRETATION: POSITIVE
VARICELLA ZOSTER IGG: 2.97 ISR

## 2017-10-20 ENCOUNTER — HOSPITAL ENCOUNTER (OUTPATIENT)
Facility: HOSPITAL | Age: 62
Discharge: HOME | End: 2017-10-20
Attending: INTERNAL MEDICINE | Admitting: INTERNAL MEDICINE
Payer: COMMERCIAL

## 2017-10-20 ENCOUNTER — ANESTHESIA (OUTPATIENT)
Dept: ENDOSCOPY | Facility: HOSPITAL | Age: 62
End: 2017-10-20
Payer: COMMERCIAL

## 2017-10-20 ENCOUNTER — ANESTHESIA EVENT (OUTPATIENT)
Dept: ENDOSCOPY | Facility: HOSPITAL | Age: 62
End: 2017-10-20
Payer: COMMERCIAL

## 2017-10-20 ENCOUNTER — SURGERY (OUTPATIENT)
Age: 62
End: 2017-10-20

## 2017-10-20 VITALS
HEIGHT: 59 IN | OXYGEN SATURATION: 99 % | RESPIRATION RATE: 18 BRPM | DIASTOLIC BLOOD PRESSURE: 87 MMHG | BODY MASS INDEX: 31.85 KG/M2 | TEMPERATURE: 97 F | SYSTOLIC BLOOD PRESSURE: 172 MMHG | WEIGHT: 158 LBS | HEART RATE: 67 BPM

## 2017-10-20 DIAGNOSIS — Z12.12 SCREENING FOR COLORECTAL CANCER: ICD-10-CM

## 2017-10-20 DIAGNOSIS — Z12.11 SCREENING FOR COLORECTAL CANCER: ICD-10-CM

## 2017-10-20 DIAGNOSIS — Z86.010 HISTORY OF ADENOMATOUS POLYP OF COLON: Primary | ICD-10-CM

## 2017-10-20 PROCEDURE — 63600175 PHARM REV CODE 636 W HCPCS: Performed by: NURSE ANESTHETIST, CERTIFIED REGISTERED

## 2017-10-20 PROCEDURE — 88305 TISSUE EXAM BY PATHOLOGIST: CPT | Performed by: PATHOLOGY

## 2017-10-20 PROCEDURE — 37000008 HC ANESTHESIA 1ST 15 MINUTES: Performed by: INTERNAL MEDICINE

## 2017-10-20 PROCEDURE — 27201012 HC FORCEPS, HOT/COLD, DISP: Performed by: INTERNAL MEDICINE

## 2017-10-20 PROCEDURE — 37000009 HC ANESTHESIA EA ADD 15 MINS: Performed by: INTERNAL MEDICINE

## 2017-10-20 PROCEDURE — 25000003 PHARM REV CODE 250: Performed by: INTERNAL MEDICINE

## 2017-10-20 PROCEDURE — 88305 TISSUE EXAM BY PATHOLOGIST: CPT | Mod: 26,,, | Performed by: PATHOLOGY

## 2017-10-20 PROCEDURE — 45380 COLONOSCOPY AND BIOPSY: CPT | Mod: 33,,, | Performed by: INTERNAL MEDICINE

## 2017-10-20 PROCEDURE — 45380 COLONOSCOPY AND BIOPSY: CPT | Performed by: INTERNAL MEDICINE

## 2017-10-20 RX ORDER — PROPOFOL 10 MG/ML
VIAL (ML) INTRAVENOUS
Status: DISCONTINUED | OUTPATIENT
Start: 2017-10-20 | End: 2017-10-20

## 2017-10-20 RX ORDER — PROPOFOL 10 MG/ML
VIAL (ML) INTRAVENOUS CONTINUOUS PRN
Status: DISCONTINUED | OUTPATIENT
Start: 2017-10-20 | End: 2017-10-20

## 2017-10-20 RX ORDER — SODIUM CHLORIDE 9 MG/ML
INJECTION, SOLUTION INTRAVENOUS CONTINUOUS
Status: DISCONTINUED | OUTPATIENT
Start: 2017-10-21 | End: 2017-10-20 | Stop reason: HOSPADM

## 2017-10-20 RX ORDER — LIDOCAINE HCL/PF 100 MG/5ML
SYRINGE (ML) INTRAVENOUS
Status: DISCONTINUED | OUTPATIENT
Start: 2017-10-20 | End: 2017-10-20

## 2017-10-20 RX ADMIN — PROPOFOL 150 MCG/KG/MIN: 10 INJECTION, EMULSION INTRAVENOUS at 08:10

## 2017-10-20 RX ADMIN — PROPOFOL 10 MG: 10 INJECTION, EMULSION INTRAVENOUS at 08:10

## 2017-10-20 RX ADMIN — LIDOCAINE HYDROCHLORIDE 80 MG: 20 INJECTION, SOLUTION INTRAVENOUS at 08:10

## 2017-10-20 RX ADMIN — SODIUM CHLORIDE: 0.9 INJECTION, SOLUTION INTRAVENOUS at 08:10

## 2017-10-20 NOTE — H&P (VIEW-ONLY)
"Subjective:      Patient ID: Chelsea Rodriguez is a 62 y.o. female.    Chief Complaint: Colonoscopy; Constipation; and Gas    HPI:   Patient 62-year-old female presenting for GI follow-up.  She has a history of chronic constipation.  Does not take MiraLAX consistently.  Indicates that she gets relief with aloe Vera and probiotics  Also has a prior history of a small adenoma in 2012.  She is due for follow-up colonoscopy.  Past history includes breast lumpectomy about 7 years ago.  Hypertension.  Family history negative for GI neoplasm.    Review of patient's allergies indicates:   Allergen Reactions    Codeine Nausea And Vomiting     Past Medical History:   Diagnosis Date    Hyperlipidemia     Hypertension      Past Surgical History:   Procedure Laterality Date    BILATERAL SALPINGOOPHORECTOMY  2000    BREAST LUMPECTOMY  2010    supracervical abdominal hysterectomy  1978    fibroids     Family History   Problem Relation Age of Onset    Breast cancer Cousin      Social History     Social History    Marital status: Single     Spouse name: N/A    Number of children: N/A    Years of education: N/A     Occupational History    Not on file.     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 hx of left lumpectomy for "breast cancer"- with 5yrs of tamoxifen use. Sees Dr. Buckley ([facility] for surveillance/MMG)        Dr. Lala former PCP, [facility]                Review of Systems:  Constitutional: Negative for appetite change.   HENT: Negative for trouble swallowing.  Positive for hoarseness  Eyes: Negative for photophobia.   Respiratory: Negative for cough and shortness of breath.   Cardiovascular: " "Negative for palpitations.   Gastrointestinal: See HPI for details.  Genitourinary: Negative for frequency and hematuria.   Skin: Negative for rash.   Musculoskeletal: Joint pains.  Neurological: Negative for weakness and headaches.   Hematological: Negative.   Psychiatric/Behavioral: Negative for suicidal ideas and behavioral problems.     Objective:     /73 (BP Location: Left arm, Patient Position: Sitting)   Pulse 73   Ht 4' 11" (1.499 m)   Wt 72 kg (158 lb 11.2 oz)   BMI 32.05 kg/m²     Physical Exam:  Eyes: Pupils are equal, round, and reactive to light.   Neck: Supple. No mass  Cardiovascular: Regular rhythm . No murmur   Pulmonary/Chest: Lungs clear   Abdominal: Soft. No mass palpated. Nontender, no guarding. Positive bowel sounds   Musculoskeletal: No deformity.  Trace ankle edema   Psychiatric: Alert and oriented    Assessment:     1. Constipation by delayed colonic transit    2. History of adenomatous polyp of colon      Plan:     Chelsea was seen today for colonoscopy, constipation and gas.    Diagnoses and all orders for this visit:    Constipation by delayed colonic transit    History of adenomatous polyp of colon  -     Case request GI: COLONOSCOPY      Plan:  Constipation pamphlet   colonoscopy      "

## 2017-10-20 NOTE — ANESTHESIA PREPROCEDURE EVALUATION
10/20/2017  Chelsea Rodriguez is a 62 y.o., female.    Anesthesia Evaluation     I have reviewed the Nursing Notes.   I have reviewed the Medications.     Review of Systems  Anesthesia Hx:  No problems with previous Anesthesia Denies Hx of Anesthetic complications Denies Family Hx of Anesthesia complications.    Social:  Non-Smoker, No Alcohol Use    Hematology/Oncology:  Hematology Normal   Oncology Normal     EENT/Dental:EENT/Dental Normal   Cardiovascular:   Exercise tolerance: good Hypertension  Functional Capacity good / => 4 METS    Pulmonary:  Pulmonary Normal    Renal/:  Renal/ Normal     Hepatic/GI:  Hepatic/GI Normal    Musculoskeletal:  Musculoskeletal Normal    Neurological:  Neurology Normal    Endocrine:  Endocrine Normal        Physical Exam  General:  Well nourished    Airway/Jaw/Neck:  Airway Findings: Mouth Opening: Normal Tongue: Normal  General Airway Assessment: Adult  Mallampati: II  TM Distance: Normal, at least 6 cm  Jaw/Neck Findings:     Neck ROM: Normal ROM      Dental:  Dental Findings: In tact   Chest/Lungs:  Chest/Lungs Findings: Clear to auscultation     Heart/Vascular:  Heart Findings: Rate: Normal        Mental Status:  Mental Status Findings:  Cooperative, Alert and Oriented         Anesthesia Plan  Type of Anesthesia, risks & benefits discussed:  Anesthesia Type:  MAC  Patient's Preference: MAC  Intra-op Monitoring Plan:   Intra-op Monitoring Plan Comments:   Post Op Pain Control Plan:   Post Op Pain Control Plan Comments:   Induction:   IV  Beta Blocker:         Informed Consent: Patient understands risks and agrees with Anesthesia plan.  Questions answered. Anesthesia consent signed with patient.  ASA Score: 2     Day of Surgery Review of History & Physical:            Ready For Surgery From Anesthesia Perspective.

## 2017-10-20 NOTE — ANESTHESIA POSTPROCEDURE EVALUATION
"Anesthesia Post Evaluation    Patient: Chelsea Rodriguez    Procedure(s) Performed: Procedure(s) (LRB):  COLONOSCOPY (N/A)    Final Anesthesia Type: MAC  Patient location during evaluation: GI PACU  Patient participation: Yes- Able to Participate  Level of consciousness: awake and alert  Post-procedure vital signs: reviewed and stable  Pain management: adequate  Airway patency: patent  PONV status at discharge: No PONV  Anesthetic complications: no      Cardiovascular status: blood pressure returned to baseline and hemodynamically stable  Respiratory status: unassisted, spontaneous ventilation and room air  Hydration status: euvolemic  Follow-up not needed.        Visit Vitals  BP (!) 96/51 (BP Location: Right arm, Patient Position: Lying)   Pulse 65   Temp 36.7 °C (98.1 °F) (Oral)   Resp 14   Ht 4' 11" (1.499 m)   Wt 71.7 kg (158 lb)   SpO2 98%   Breastfeeding? No   BMI 31.91 kg/m²       Pain/Chinyere Score: Pain Assessment Performed: Yes (10/20/2017  7:48 AM)  Presence of Pain: denies (10/20/2017  7:48 AM)      "

## 2017-10-20 NOTE — INTERVAL H&P NOTE
The patient has been examined and the H&P has been reviewed:    I concur with the findings and no changes have occurred since H&P was written.       Anesthesia/Surgery risks, benefits and alternative options discussed and understood by patient/family.          Active Hospital Problems    Diagnosis  POA    Screening for colorectal cancer [Z12.11, Z12.12]  Not Applicable      Resolved Hospital Problems    Diagnosis Date Resolved POA   No resolved problems to display.

## 2017-10-20 NOTE — DISCHARGE INSTRUCTIONS
Discharge Summary/Instructions for after Colonoscopy with Biopsy/Polypectomy    Chelsea Rodriguez  10/20/2017  Mitchell Danielson Jr., MD    Restrictions on Activity:    - Do not drive car or operate machinery until the day after the procedure.  - The following day: return to full activity including work.  - For 3 days: No heavy lifting, straining or running.  - Diet: Eat and drink normally unless instructed otherwise.    Treatment for Common Side Effects:  - Mild abdominal pain and bloating or excessive gas: rest, eat lightly and use a heating pad.     Symptoms to watch for and report to your physician:  1. Severe abdominal pain.  2. Fever within 24 hours after a procedure.  3. A large amount of rectal bleeding. (A small amount of blood from the rectum is not serious, especially if hemorrhoids are present.)  4. Because air was put into your colon during the procedure, expelling large amount of air from your rectum is normal.  5. You may not have a bowel movement for 1-3 days because of the colonoscopy prep. This is normal.  6. Go directly to the emergency room if you notice any of the following:     Chills and/or fever over 101   Persistent vomiting   Severe abdominal pain, other than gas cramps   Severe chest pain   Black, tarry stools   Any bleeding - exceeding one tablespoon    If you have any questions or problems, please call your Physician:    Mitchell Danielson Jr., MD      Lab Results: Contact Physician's Office      If a complication or emergency situation arises and you are unable to reach your Physician - GO TO THE EMERGENCY ROOM.

## 2017-10-20 NOTE — TRANSFER OF CARE
"Anesthesia Transfer of Care Note    Patient: Chelsea Rodriguez    Procedure(s) Performed: Procedure(s) (LRB):  COLONOSCOPY (N/A)    Patient location: GI    Anesthesia Type: MAC    Transport from OR: Transported from OR on room air with adequate spontaneous ventilation    Post pain: adequate analgesia    Post assessment: no apparent anesthetic complications and tolerated procedure well    Post vital signs: stable    Level of consciousness: awake, alert and oriented    Nausea/Vomiting: no nausea/vomiting    Complications: none    Transfer of care protocol was followed      Last vitals:   Visit Vitals  BP (!) 174/85 (BP Location: Right arm, Patient Position: Lying)   Pulse 76   Temp 36.7 °C (98.1 °F) (Oral)   Resp 16   Ht 4' 11" (1.499 m)   Wt 71.7 kg (158 lb)   SpO2 100%   Breastfeeding? No   BMI 31.91 kg/m²     "

## 2017-10-23 ENCOUNTER — TELEPHONE (OUTPATIENT)
Dept: ENDOSCOPY | Facility: HOSPITAL | Age: 62
End: 2017-10-23

## 2017-10-25 ENCOUNTER — TELEPHONE (OUTPATIENT)
Dept: GASTROENTEROLOGY | Facility: CLINIC | Age: 62
End: 2017-10-25

## 2017-10-25 NOTE — TELEPHONE ENCOUNTER
----- Message from Mitchell Danielson Jr., MD sent at 10/25/2017  9:46 AM CDT -----  Polyp is an adenoma.  Follow-up colonoscopy in 4 years.

## 2017-10-25 NOTE — TELEPHONE ENCOUNTER
Patient was notified of pathology report results. Recall was put in for follow up Colonoscopy in 4 years.

## 2017-11-06 ENCOUNTER — TELEPHONE (OUTPATIENT)
Dept: INTERNAL MEDICINE | Facility: CLINIC | Age: 62
End: 2017-11-06

## 2017-11-06 NOTE — TELEPHONE ENCOUNTER
----- Message from Leslie Valdovinos sent at 11/3/2017  2:42 PM CDT -----  Contact: [phone]/self  Pt would like to speak with the nurse concerning an elevated blood pressure of 192/102  Advised pt to go to the nearest URGENT CARE or ER.  Please call and advise

## 2017-11-10 ENCOUNTER — CLINICAL SUPPORT (OUTPATIENT)
Dept: URGENT CARE | Facility: CLINIC | Age: 62
End: 2017-11-10

## 2017-11-10 DIAGNOSIS — Z11.1 ENCOUNTER FOR PPD TEST: Primary | ICD-10-CM

## 2017-11-10 PROCEDURE — 86580 TB INTRADERMAL TEST: CPT | Mod: S$GLB,,,

## 2017-12-06 RX ORDER — IRBESARTAN 150 MG/1
150 TABLET ORAL NIGHTLY
Qty: 90 TABLET | Refills: 1 | Status: SHIPPED | OUTPATIENT
Start: 2017-12-06 | End: 2018-09-05 | Stop reason: SDUPTHER

## 2017-12-06 RX ORDER — MELOXICAM 15 MG/1
15 TABLET ORAL DAILY PRN
Qty: 90 TABLET | Refills: 0 | Status: SHIPPED | OUTPATIENT
Start: 2017-12-06 | End: 2018-11-16 | Stop reason: SDUPTHER

## 2017-12-06 NOTE — TELEPHONE ENCOUNTER
----- Message from Terrie Cee sent at 12/6/2017 11:21 AM CST -----  Contact: [phone]/self  Patient requesting to speak with you regarding elevated blood pressure. Please advise.

## 2017-12-06 NOTE — TELEPHONE ENCOUNTER
Spoke with pt who states that sometimes her blood pressure is sometimes elevated and want to be put back on a medication that she use to take. Pt states that she feels like the Irbesartan was really working for her. Pt was informed that a message will be routes to the doctor. Understanding voiced.

## 2017-12-07 DIAGNOSIS — Z12.31 SCREENING MAMMOGRAM, ENCOUNTER FOR: Primary | ICD-10-CM

## 2017-12-21 ENCOUNTER — HOSPITAL ENCOUNTER (OUTPATIENT)
Dept: RADIOLOGY | Facility: HOSPITAL | Age: 62
Discharge: HOME | End: 2017-12-21
Attending: SURGERY
Payer: COMMERCIAL

## 2017-12-21 DIAGNOSIS — Z12.31 SCREENING MAMMOGRAM, ENCOUNTER FOR: ICD-10-CM

## 2017-12-21 PROCEDURE — 77067 SCR MAMMO BI INCL CAD: CPT | Mod: TC

## 2017-12-21 PROCEDURE — 77067 SCR MAMMO BI INCL CAD: CPT | Mod: 26,,, | Performed by: RADIOLOGY

## 2018-02-14 RX ORDER — ROSUVASTATIN CALCIUM 20 MG/1
TABLET, COATED ORAL
Qty: 90 TABLET | Refills: 0 | Status: SHIPPED | OUTPATIENT
Start: 2018-02-14 | End: 2018-03-28

## 2018-03-19 ENCOUNTER — OFFICE VISIT (OUTPATIENT)
Dept: INTERNAL MEDICINE | Facility: CLINIC | Age: 63
End: 2018-03-19
Payer: COMMERCIAL

## 2018-03-19 VITALS
HEIGHT: 59 IN | DIASTOLIC BLOOD PRESSURE: 70 MMHG | SYSTOLIC BLOOD PRESSURE: 136 MMHG | HEART RATE: 70 BPM | WEIGHT: 155.44 LBS | BODY MASS INDEX: 31.34 KG/M2 | OXYGEN SATURATION: 97 %

## 2018-03-19 DIAGNOSIS — Z51.81 MEDICATION MONITORING ENCOUNTER: ICD-10-CM

## 2018-03-19 DIAGNOSIS — G56.03 BILATERAL CARPAL TUNNEL SYNDROME: Primary | ICD-10-CM

## 2018-03-19 DIAGNOSIS — E55.9 VITAMIN D INSUFFICIENCY: ICD-10-CM

## 2018-03-19 DIAGNOSIS — F43.0 STRESS REACTION: ICD-10-CM

## 2018-03-19 DIAGNOSIS — I10 ESSENTIAL HYPERTENSION: ICD-10-CM

## 2018-03-19 DIAGNOSIS — E78.5 HYPERLIPIDEMIA, UNSPECIFIED HYPERLIPIDEMIA TYPE: ICD-10-CM

## 2018-03-19 PROCEDURE — 3078F DIAST BP <80 MM HG: CPT | Mod: CPTII,S$GLB,, | Performed by: INTERNAL MEDICINE

## 2018-03-19 PROCEDURE — 3075F SYST BP GE 130 - 139MM HG: CPT | Mod: CPTII,S$GLB,, | Performed by: INTERNAL MEDICINE

## 2018-03-19 PROCEDURE — 99214 OFFICE O/P EST MOD 30 MIN: CPT | Mod: S$GLB,,, | Performed by: INTERNAL MEDICINE

## 2018-03-19 PROCEDURE — 99999 PR PBB SHADOW E&M-EST. PATIENT-LVL IV: CPT | Mod: PBBFAC,,, | Performed by: INTERNAL MEDICINE

## 2018-03-19 NOTE — PROGRESS NOTES
Portions of this note are generated with voice recognition software. Typographical errors may exist.     SUBJECTIVE:    This is a/an 63 y.o. female here for primary care visit for  Chief Complaint   Patient presents with    Osteoarthritis     both knees     Patient states that she has had a previous definitive diagnosis of bilateral carpal tunnel syndrome.  Evaluation was about 5 years ago by [facility] neurologist.  Evaluation included bilateral EMG studies.  States that she has been having persistent numbness in bilateral hands and failed to pursue orthopedic consultation for interventional strategies.  States that she would like a second opinion regarding her candidacy for interventional strategies.    Patient overwhelmed with caretaking responsibilities for her mother.  Mother has advancing chronic medical illnesses.  Mother recently hospitalized.  Patient having difficulty with balancing caretaking responsibilities and medical costs relating to caretaking.  She is due to and [facility] and go back to working to help with medical expenses.    Medications Reviewed and Updated    Past medical, family, and social histories were reviewed and updated.    Review of Systems negative unless otherwise noted in history of present illness-  ROS    General ROS: negative  Psychological ROS: negative  ENT ROS: negative  Endocrine ROS: Negative  Allergy and Immunology ROS: negative  Cardiovascular ROS: negative  Pulmonary ROS: Negative  Musculoskeletal ROS: negative  Neurological ROS: negative      Allergic:    Review of patient's allergies indicates:   Allergen Reactions    Codeine Nausea And Vomiting       OBJECTIVE:  BP: 136/70 Pulse: 70    Wt Readings from Last 3 Encounters:   03/19/18 70.5 kg (155 lb 6.8 oz)   10/20/17 71.7 kg (158 lb)   10/17/17 71.2 kg (156 lb 15.5 oz)    Body mass index is 31.39 kg/m².  Previous Blood Pressure Readings :   BP Readings from Last 3 Encounters:   03/19/18 136/70   10/20/17 (!)  172/87   10/17/17 126/64       Physical Exam    GEN: No apparent distress  HEENT: sclera non-icteric, conjunctiva clear  CV: no peripheral edema  PULM: breathing non-labored  ABD: Obese, protuberant abdomen.  PSYCH: appropriate affect  MSK: able to rise from chair without assistance and  Neurologic: Interosseous muscle strength 5 out of 5 bilaterally.  Signs of muscle atrophy along the thenar eminence.  SKIN: normal skin turgor    Pertinent Labs Reviewed       ASSESSMENT/PLAN:    Bilateral carpal tunnel syndrome  -     X-Ray Wrist Complete Bilateral; Future; Expected date: 03/19/2018  -     Ambulatory referral to Orthopedics    Hyperlipidemia, unspecified hyperlipidemia type  -     Lipid panel; Future; Expected date: 03/19/2018    Vitamin D insufficiency  -     Vitamin D; Future; Expected date: 03/19/2018    Medication monitoring encounter  -     CK; Future; Expected date: 03/19/2018    Essential hypertension    Stress reaction      Future Appointments  Date Time Provider Department Center   3/20/2018 7:30 AM LAB, [facility] [place]   4/2/2018 8:00 AM Fili Quarles Jr., MD [facility]   7/19/2018 8:40 AM Nayan Linares MD [facility] [place]       Nayan Linares  3/19/2018  9:33 AM

## 2018-03-26 ENCOUNTER — TELEPHONE (OUTPATIENT)
Dept: FAMILY MEDICINE | Facility: CLINIC | Age: 63
End: 2018-03-26

## 2018-03-26 NOTE — TELEPHONE ENCOUNTER
Spoke with patient and instructed to stop cholesterol medication and scheduled f/u appt with Dr. Linares this Wed. Patient voices understanding.

## 2018-03-26 NOTE — TELEPHONE ENCOUNTER
----- Message from Nayan Linares MD sent at 3/20/2018  5:51 PM CDT -----  Please contact patient to come back to clinic first available appointment.  Blood work was completed looking at muscle health.  Results are concerning for cholesterol medication intolerance causing muscle inflammation.  We recommend she stop cholesterol medication rosuvastatin today and schedule a appointment in clinic so we can discuss the significance of blood work results.

## 2018-03-27 ENCOUNTER — HOSPITAL ENCOUNTER (OUTPATIENT)
Dept: RADIOLOGY | Facility: HOSPITAL | Age: 63
Discharge: HOME | End: 2018-03-27
Attending: INTERNAL MEDICINE
Payer: COMMERCIAL

## 2018-03-27 DIAGNOSIS — G56.03 BILATERAL CARPAL TUNNEL SYNDROME: ICD-10-CM

## 2018-03-27 PROCEDURE — 73110 X-RAY EXAM OF WRIST: CPT | Mod: 26,50,, | Performed by: RADIOLOGY

## 2018-03-27 PROCEDURE — 73110 X-RAY EXAM OF WRIST: CPT | Mod: 50,TC,FY

## 2018-03-28 ENCOUNTER — OFFICE VISIT (OUTPATIENT)
Dept: INTERNAL MEDICINE | Facility: CLINIC | Age: 63
End: 2018-03-28
Payer: COMMERCIAL

## 2018-03-28 VITALS
HEIGHT: 59 IN | OXYGEN SATURATION: 97 % | DIASTOLIC BLOOD PRESSURE: 86 MMHG | SYSTOLIC BLOOD PRESSURE: 138 MMHG | HEART RATE: 85 BPM | WEIGHT: 157.19 LBS | TEMPERATURE: 98 F | BODY MASS INDEX: 31.69 KG/M2

## 2018-03-28 DIAGNOSIS — G56.03 CARPAL TUNNEL SYNDROME, BILATERAL: ICD-10-CM

## 2018-03-28 DIAGNOSIS — E78.5 HYPERLIPIDEMIA, UNSPECIFIED HYPERLIPIDEMIA TYPE: ICD-10-CM

## 2018-03-28 DIAGNOSIS — G25.81 RESTLESS LEGS: ICD-10-CM

## 2018-03-28 DIAGNOSIS — R20.2 SENSATION OF SKIN CRAWLING: ICD-10-CM

## 2018-03-28 DIAGNOSIS — R74.8 ELEVATED CPK: Primary | ICD-10-CM

## 2018-03-28 PROCEDURE — 99214 OFFICE O/P EST MOD 30 MIN: CPT | Mod: S$GLB,,, | Performed by: INTERNAL MEDICINE

## 2018-03-28 PROCEDURE — 3079F DIAST BP 80-89 MM HG: CPT | Mod: CPTII,S$GLB,, | Performed by: INTERNAL MEDICINE

## 2018-03-28 PROCEDURE — 99999 PR PBB SHADOW E&M-EST. PATIENT-LVL III: CPT | Mod: PBBFAC,,, | Performed by: INTERNAL MEDICINE

## 2018-03-28 PROCEDURE — 3075F SYST BP GE 130 - 139MM HG: CPT | Mod: CPTII,S$GLB,, | Performed by: INTERNAL MEDICINE

## 2018-03-28 NOTE — PROGRESS NOTES
Portions of this note are generated with voice recognition software. Typographical errors may exist.     SUBJECTIVE:    This is a/an 63 y.o. female here for primary care visit for  Chief Complaint   Patient presents with    Results     chol levels     Patient reports that at the time that CPK bloodwork was collected she was having paresthesias associated with the hands bilaterally which had previously been attributed to carpal tunnel syndrome.  She was also having a sensation of restlessness in the calves bilaterally which is a relapsing and remitting problem that she has had for some time.  She does not know if there was any association with lower extremity symptoms and starting to take rosuvastatin.  In the past she has had a trial of statin therapy and then had the cholesterol medication withdrawn for reason she doesn't remember.  She has never had a severe adverse reaction to statin therapy that she can think of.    Patient doesn't have a previous history of rheumatologic conditions.  She denies any synovitis or arthritis involving the small joints of the body.  States that she has a sensation of crawling diffusely along the skin relapsing and remitting.  This is not pruritus.  There are not any primary skin abnormalities.      Medications Reviewed and Updated    Past medical, family, and social histories were reviewed and updated.    Review of Systems negative unless otherwise noted in history of present illness-  ROS    General ROS: negative  Psychological ROS: negative  ENT ROS: negative  Endocrine ROS: Negative  Allergy and Immunology ROS: negative  Musculoskeletal ROS: negative  Neurological ROS: negative  Dermatological ROS: negative        Allergic:    Review of patient's allergies indicates:   Allergen Reactions    Codeine Nausea And Vomiting       OBJECTIVE:  BP: 138/86 Pulse: 85 Temp: 98.4 °F (36.9 °C)  Wt Readings from Last 3 Encounters:   03/28/18 71.3 kg (157 lb 3 oz)   03/19/18 70.5 kg (155 lb  6.8 oz)   10/20/17 71.7 kg (158 lb)    Body mass index is 31.75 kg/m².  Previous Blood Pressure Readings :   BP Readings from Last 3 Encounters:   03/28/18 138/86   03/19/18 136/70   10/20/17 (!) 172/87       Physical Exam    GEN: No apparent distress  HEENT: sclera non-icteric, conjunctiva clear  CV: no peripheral edema  PULM: breathing non-labored  ABD: non protuberant abdomen.  PSYCH: appropriate affect  MSK: able to rise from chair without assistance, no synovitis or arthritis small joints   SKIN: normal skin turgor.  No primary skin lesions noted on exposed surfaces of the skin or the scalp.    Pertinent Labs Reviewed       ASSESSMENT/PLAN:    Elevated CPK.This is a New problem. The etiology is unknown. The problem is not adequately controlled. The risk of medical complications is moderate. Treatment/diagnostic recommendations are to modify the diagnostic/treatment plan as follows in addition to instructions noted on the After Visit Summary. The patient advised if symptoms change or intensify to seek medical care.   -     CK; Future; Expected date: 03/28/2018  -     Comprehensive metabolic panel; Future; Expected date: 03/28/2018  -     Sedimentation rate, manual; Future; Expected date: 03/28/2018  -     C-reactive protein; Future; Expected date: 03/28/2018    Sensation of skin crawling.Further evaluation warranted.  Recommendations as below.  -     CBC auto differential; Future; Expected date: 03/28/2018  -     Vitamin B12; Future; Expected date: 03/28/2018    Carpal tunnel syndrome, bilateral.Condition stable.  Counseling on self-care measures. Plan to monitor clinically. Continue current medical plan.     Hyperlipidemia, unspecified hyperlipidemia type.Condition stable.  Counseling on self-care measures. Plan to monitor clinically. Continue current medical plan.     Restless legs.Further evaluation warranted.  Recommendations as below.  -     Ferritin; Future; Expected date: 03/28/2018          Future  Appointments  Date Time Provider Department Center   4/2/2018 8:00 AM Fili Quarles Jr., MD [facility]   7/19/2018 8:40 AM Nayan Linares MD [facility]       Nayan Linares  3/28/2018  11:08 AM

## 2018-04-10 ENCOUNTER — OFFICE VISIT (OUTPATIENT)
Dept: ORTHOPEDICS | Facility: CLINIC | Age: 63
End: 2018-04-10
Payer: COMMERCIAL

## 2018-04-10 VITALS — HEIGHT: 59 IN | WEIGHT: 157 LBS | BODY MASS INDEX: 31.65 KG/M2

## 2018-04-10 DIAGNOSIS — G56.03 BILATERAL CARPAL TUNNEL SYNDROME: ICD-10-CM

## 2018-04-10 PROCEDURE — 20526 THER INJECTION CARP TUNNEL: CPT | Mod: 50,S$GLB,, | Performed by: ORTHOPAEDIC SURGERY

## 2018-04-10 PROCEDURE — 99203 OFFICE O/P NEW LOW 30 MIN: CPT | Mod: 25,S$GLB,, | Performed by: ORTHOPAEDIC SURGERY

## 2018-04-10 PROCEDURE — 99999 PR PBB SHADOW E&M-EST. PATIENT-LVL II: CPT | Mod: PBBFAC,,, | Performed by: ORTHOPAEDIC SURGERY

## 2018-04-10 RX ORDER — TRIAMCINOLONE ACETONIDE 40 MG/ML
40 INJECTION, SUSPENSION INTRA-ARTICULAR; INTRAMUSCULAR
Status: COMPLETED | OUTPATIENT
Start: 2018-04-10 | End: 2018-04-10

## 2018-04-10 RX ADMIN — TRIAMCINOLONE ACETONIDE 40 MG: 40 INJECTION, SUSPENSION INTRA-ARTICULAR; INTRAMUSCULAR at 09:04

## 2018-04-10 NOTE — PROGRESS NOTES
INITIAL VISIT HISTORY:  A 63-year-old female presents for evaluation of   bilateral hand symptoms for the past year or so.  She is reporting numbness and   tingling in both hands, usually worse at night and some weakness both hands.    No history of trauma.  No neck problems, no previous nerve test.    PAST MEDICAL HISTORY:  Significant for arthritis, breast cancer, hyperlipidemia   and hypertension.    PAST SURGICAL HISTORY:  Includes breast lumpectomy, hysterectomy, colonoscopy.    FAMILY HISTORY:  Positive for breast cancer, diabetes and cancer.    SOCIAL HISTORY:  The patient does not smoke.  Drinks alcohol occasionally.    REVIEW OF SYSTEMS:  Negative fever, chills, rashes.    CURRENT MEDICATIONS:  Reviewed on chart.    ALLERGIES:  Codeine.    PHYSICAL EXAMINATION:  GENERAL:  Well-developed, well-nourished female in no acute distress, alert and   oriented x3.  MUSCULOSKELETAL:  Examination of upper extremities significant for the hands,   demonstrating moderate atrophy of the thenar muscles bilaterally, right hand   worse than left.  Range of motion in wrist and fingers full.   strength   slightly decreased.  Tinel sign is positive in both wrists.    IMPRESSION:  Bilateral carpal tunnel syndrome with atrophy.    PLAN:  I explained the nature of the problem to the patient.  Recommended a   nerve conduction study ordered both hands to check the extent of the   involvement.    I have also discussed options including injection versus surgery.  The patient   would like to try an injection today.    After pause for timeout, she identified each carpal tunnel injected bilaterally   with combination of Kenalog 20 mg, 0.5 mL Xylocaine, sterile technique.  She   tolerated the procedure well without complication.    She was given two wrist splints for nighttime use.  Recommended followup after   nerve test is complete.      BRUCE  dd: 04/10/2018 09:39:12 (CDT)  td: 04/11/2018 05:55:36 (CDT)  Doc ID   #[id number]  Job ID  #[id number]    CC:

## 2018-04-10 NOTE — LETTER
April 10, 2018        Nayan Linares MD  [address] [facility]  [place] [address]             [facility]  [address] [place] [address] [address]  [place] [address]  Phone: [phone]   Patient: Chelsea Rodriguez   MR Number: [id number]   YOB: 1955   Date of Visit: 4/10/2018       Dear Dr. Linares:    Thank you for referring Chelsea Rodriguez to me for evaluation. Below are the relevant portions of my assessment and plan of care.            If you have questions, please do not hesitate to call me. I look forward to following Chelsea along with you.    Sincerely,      Fili Quarles Jr., MD           CC  No Recipients

## 2018-04-27 ENCOUNTER — OFFICE VISIT (OUTPATIENT)
Dept: ORTHOPEDICS | Facility: CLINIC | Age: 63
End: 2018-04-27
Payer: COMMERCIAL

## 2018-04-27 VITALS — BODY MASS INDEX: 31.45 KG/M2 | WEIGHT: 156 LBS | HEIGHT: 59 IN

## 2018-04-27 DIAGNOSIS — M17.0 PRIMARY OSTEOARTHRITIS OF BOTH KNEES: Primary | ICD-10-CM

## 2018-04-27 PROCEDURE — 99999 PR PBB SHADOW E&M-EST. PATIENT-LVL III: CPT | Mod: PBBFAC,,, | Performed by: ORTHOPAEDIC SURGERY

## 2018-04-27 PROCEDURE — 99213 OFFICE O/P EST LOW 20 MIN: CPT | Mod: S$GLB,,, | Performed by: ORTHOPAEDIC SURGERY

## 2018-04-27 NOTE — LETTER
April 27, 2018      Fili Quarles Jr., MD  [address]  [address]  [place] [address]           [facility]  [address]  [place] [address]  Phone: [phone]          Patient: Chelsea Rodriguez   MR Number: [id number]   YOB: 1955   Date of Visit: 4/27/2018       Dear Dr. Fili Quarles Jr.:    Thank you for referring Chelsea Rodriguez to me for evaluation. Attached you will find relevant portions of my assessment and plan of care.    If you have questions, please do not hesitate to call me. I look forward to following Chelsea Rodriguez along with you.    Sincerely,    Brett Barrera MD    Enclosure  CC:  No Recipients    If you would like to receive this communication electronically, please contact [email or web address] or [phone] to request more information on [employer] Link access.    For providers and/or their staff who would like to refer a patient to [facility], please contact us through our one-stop-shop provider referral line, [facility], at [phone].    If you feel you have received this communication in error or would no longer like to receive these types of communications, please e-mail [email or web address]

## 2018-04-27 NOTE — PROGRESS NOTES
Subjective:      Patient ID: Chelsea Rodriguez is a 63 y.o. female.    Chief Complaint: Pain of the Right Knee and Pain of the Left Knee    HPI     They have experienced problems with their bilateral knee over the past 2 years. The patient denies relevant history of injury/aggravation. Pain is located medially Associated symptoms include pseudolocking and gelling. They have been treated with NSAIDS.   Symptoms have recently improved. Ambulation reportedly has not been impaired. Self care ADLs are not painful.     Review of Systems   Constitution: Negative for fever and weight loss.   HENT: Negative for congestion.    Eyes: Negative for visual disturbance.   Cardiovascular: Negative for chest pain.   Respiratory: Negative for shortness of breath.    Hematologic/Lymphatic: Negative for bleeding problem. Does not bruise/bleed easily.   Skin: Negative for poor wound healing.   Musculoskeletal: Positive for joint pain.   Gastrointestinal: Negative for abdominal pain.   Genitourinary: Negative for dysuria.   Neurological: Negative for seizures.   Psychiatric/Behavioral: Negative for altered mental status.   Allergic/Immunologic: Negative for persistent infections.         Objective:            Ortho/SPM Exam    Right Knee    There were no vitals filed for this visit.    The patient is not in acute distress.   Body habitus is normal.   The patient walks without a limp.  Resisted SLR negative.   The skin over the knee is intact.  Knee effusion 0  Tendernes is located absent  Range of motion- Flexion 0 deg, Extension 140 deg,   Ligament exam:   MCL trace   Lachman intact              Post sag intact    LCL intact  Patellar apprehension negative.  Popliteal cyst negative  Patellar crepitation present.  Flexion/pinch negative.  Pulses DP present, PT present.  Motor normal 5/5 strength in all tested muscle groups.   Sensory normal.    Left Knee      There were no vitals filed for this visit.    This side is idententical to the  contralateral side.    I reviewed the relevant radiographic images for the patient's condition: Both knees have moderate medial compartment narrowing with osteophytes        Assessment:       No diagnosis found.       The process is structurally moderate and well-controlled for now  Plan:       There are no diagnoses linked to this encounter.  I explained my diagnostic impression and the reasoning behind it in detail, using layman's terms.  Models and/or pictures were used to help in the explanation.    Continue meloxicam as needed    Patient would likely benefit from injection if there is a flareup     I explained the potential role of surgery in the treatment of this condition to the patient.  They understand that if nonsurgical measures do not adequately control symptoms, surgery will be considered in the future.         X-ray at follow-up

## 2018-07-19 ENCOUNTER — OFFICE VISIT (OUTPATIENT)
Dept: INTERNAL MEDICINE | Facility: CLINIC | Age: 63
End: 2018-07-19
Payer: COMMERCIAL

## 2018-07-19 ENCOUNTER — LAB VISIT (OUTPATIENT)
Dept: LAB | Facility: HOSPITAL | Age: 63
End: 2018-07-19
Attending: INTERNAL MEDICINE
Payer: COMMERCIAL

## 2018-07-19 VITALS
WEIGHT: 154.31 LBS | HEIGHT: 59 IN | SYSTOLIC BLOOD PRESSURE: 134 MMHG | BODY MASS INDEX: 31.11 KG/M2 | DIASTOLIC BLOOD PRESSURE: 82 MMHG | OXYGEN SATURATION: 96 % | HEART RATE: 80 BPM

## 2018-07-19 DIAGNOSIS — E78.5 HYPERLIPIDEMIA, UNSPECIFIED HYPERLIPIDEMIA TYPE: ICD-10-CM

## 2018-07-19 DIAGNOSIS — M60.9 STATIN-INDUCED MYOSITIS: Primary | ICD-10-CM

## 2018-07-19 DIAGNOSIS — T46.6X5A STATIN-INDUCED MYOSITIS: Primary | ICD-10-CM

## 2018-07-19 DIAGNOSIS — Z12.31 BREAST CANCER SCREENING BY MAMMOGRAM: ICD-10-CM

## 2018-07-19 DIAGNOSIS — M79.10 MYALGIA: ICD-10-CM

## 2018-07-19 DIAGNOSIS — H25.9 SENILE CATARACT, UNSPECIFIED AGE-RELATED CATARACT TYPE, UNSPECIFIED LATERALITY: ICD-10-CM

## 2018-07-19 DIAGNOSIS — Z63.4 BEREAVEMENT: ICD-10-CM

## 2018-07-19 DIAGNOSIS — E55.9 VITAMIN D INSUFFICIENCY: ICD-10-CM

## 2018-07-19 LAB
ALBUMIN SERPL BCP-MCNC: 4 G/DL
ALP SERPL-CCNC: 107 U/L
ALT SERPL W/O P-5'-P-CCNC: 25 U/L
ANION GAP SERPL CALC-SCNC: 10 MMOL/L
AST SERPL-CCNC: 28 U/L
BILIRUB SERPL-MCNC: 0.8 MG/DL
BUN SERPL-MCNC: 13 MG/DL
CALCIUM SERPL-MCNC: 9.9 MG/DL
CHLORIDE SERPL-SCNC: 103 MMOL/L
CHOLEST SERPL-MCNC: 300 MG/DL
CHOLEST/HDLC SERPL: 5.9 {RATIO}
CK SERPL-CCNC: 232 U/L
CO2 SERPL-SCNC: 29 MMOL/L
CREAT SERPL-MCNC: 0.7 MG/DL
CRP SERPL-MCNC: 1.7 MG/L
ERYTHROCYTE [SEDIMENTATION RATE] IN BLOOD BY WESTERGREN METHOD: 5 MM/HR
EST. GFR  (AFRICAN AMERICAN): >60 ML/MIN/1.73 M^2
EST. GFR  (NON AFRICAN AMERICAN): >60 ML/MIN/1.73 M^2
GLUCOSE SERPL-MCNC: 86 MG/DL
HDLC SERPL-MCNC: 51 MG/DL
HDLC SERPL: 17 %
LDLC SERPL CALC-MCNC: 221.4 MG/DL
NONHDLC SERPL-MCNC: 249 MG/DL
POTASSIUM SERPL-SCNC: 4.2 MMOL/L
PROT SERPL-MCNC: 7.4 G/DL
SODIUM SERPL-SCNC: 142 MMOL/L
TRIGL SERPL-MCNC: 138 MG/DL
TSH SERPL DL<=0.005 MIU/L-ACNC: 1.21 UIU/ML

## 2018-07-19 PROCEDURE — 80053 COMPREHEN METABOLIC PANEL: CPT

## 2018-07-19 PROCEDURE — 3008F BODY MASS INDEX DOCD: CPT | Mod: CPTII,S$GLB,, | Performed by: INTERNAL MEDICINE

## 2018-07-19 PROCEDURE — 82550 ASSAY OF CK (CPK): CPT

## 2018-07-19 PROCEDURE — 80061 LIPID PANEL: CPT

## 2018-07-19 PROCEDURE — 86140 C-REACTIVE PROTEIN: CPT

## 2018-07-19 PROCEDURE — 99215 OFFICE O/P EST HI 40 MIN: CPT | Mod: S$GLB,,, | Performed by: INTERNAL MEDICINE

## 2018-07-19 PROCEDURE — 36415 COLL VENOUS BLD VENIPUNCTURE: CPT | Mod: PO

## 2018-07-19 PROCEDURE — 3079F DIAST BP 80-89 MM HG: CPT | Mod: CPTII,S$GLB,, | Performed by: INTERNAL MEDICINE

## 2018-07-19 PROCEDURE — 84443 ASSAY THYROID STIM HORMONE: CPT

## 2018-07-19 PROCEDURE — 3075F SYST BP GE 130 - 139MM HG: CPT | Mod: CPTII,S$GLB,, | Performed by: INTERNAL MEDICINE

## 2018-07-19 PROCEDURE — 85651 RBC SED RATE NONAUTOMATED: CPT

## 2018-07-19 PROCEDURE — 99999 PR PBB SHADOW E&M-EST. PATIENT-LVL IV: CPT | Mod: PBBFAC,,, | Performed by: INTERNAL MEDICINE

## 2018-07-19 SDOH — SOCIAL DETERMINANTS OF HEALTH (SDOH): DISSAPEARANCE AND DEATH OF FAMILY MEMBER: Z63.4

## 2018-07-19 NOTE — PROGRESS NOTES
Portions of this note are generated with voice recognition software. Typographical errors may exist.     SUBJECTIVE:    This is a/an 63 y.o. female here for primary care visit for  Chief Complaint   Patient presents with    Hypertension     follow up      Prior to establishing care with our clinic the patient states that she was placed on rosuvastatin by a previous provider.  The patient has a history of very elevated LDL cholesterol and a family history of hypercholesterolemia.  The patient has had great success in lowering LDL with rosuvastatin 20 mg however the patient has complained of recurring muscle tenderness predominantly affecting the lower extremities.  Because of this the patient had CPK levels measured at the last clinical visit.  The patient received a message in March to stop rosuvastatin based on CPK levels above 600.  Patient states that muscle aches improve soon after stopping the medication but she is concerned that cholesterol levels will rise.  In the past she has tried diligently to adhere to a low-fat and low-cholesterol diet for the primary management of her hypercholesterolemia.  She states that she has never succeeded in getting the LDL level down adequately just with lifestyle modification.  She also feels that recent events with the death of her mother last week are complicating food choices.    Her mother was chronically ill and started to have deterioration in health since November of last year.  She passed weight last week and  arrangements are ongoing.  Patient is going through bereavement process but she denies severe symptoms of depression such as hopelessness or suicidal ideation.    Patient has for gotten to take blood pressure medications which is why her blood pressure is higher than usual today.  She is checking blood pressure at home and give several blood pressure levels that have been done recently.    Patient is wanting to get assistance to reestablish care with a  new ophthalmologist.  Outside ophthalmologist wanted her to have cataracts removed but that ophthalmologist is not in network.        Medications Reviewed and Updated    Past medical, family, and social histories were reviewed and updated.    Review of Systems negative unless otherwise noted in history of present illness-  ROS    General ROS: negative  Psychological ROS: negative  ENT ROS: negative  Endocrine ROS: Negative  Allergy and Immunology ROS: negative  Cardiovascular ROS: negative  Pulmonary ROS: Negative  Gastrointestinal ROS: negative  Genito-Urinary ROS: negative  Musculoskeletal ROS: negative  Neurological ROS: negative  Dermatological ROS: negative        Allergic:    Review of patient's allergies indicates:   Allergen Reactions    Codeine Nausea And Vomiting       OBJECTIVE:  BP: 134/82 Pulse: 80    Wt Readings from Last 3 Encounters:   07/19/18 70 kg (154 lb 5.2 oz)   04/27/18 70.8 kg (156 lb)   04/10/18 71.2 kg (157 lb)    Body mass index is 31.17 kg/m².  Previous Blood Pressure Readings :   BP Readings from Last 3 Encounters:   07/19/18 134/82   03/28/18 138/86   03/19/18 136/70       Physical Exam    GEN: No apparent distress  HEENT: sclera non-icteric, conjunctiva clear  CV: no peripheral edema  PULM: breathing non-labored  ABD: Obese, protuberant abdomen.  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 Detailed counseling on self care measures. Plan to monitor clinically in addition to plan below or as listed on After Visit Summary.   -     Sedimentation rate; Future; Expected date: 07/19/2018  -     C-reactive protein; Future; Expected date: 07/19/2018  -     CK; Future; Expected date: 07/19/2018  -     TSH; Future; Expected date: 07/19/2018    Hyperlipidemia, unspecified hyperlipidemia type.Condition not optimally controlled.  Detailed counseling on self care measures. Plan to monitor clinically in addition to plan below or as listed on After Visit Summary.   -     Lipid panel; Standing  -     Comprehensive metabolic panel; Standing  -     pravastatin (PRAVACHOL) 20 MG tablet; Take 1 tablet (20 mg total) by mouth once daily.  Dispense: 90 tablet; Refill: 0    Vitamin D insufficiency.Condition not optimally controlled. Detailed counseling on self care measures. Plan to monitor clinically in addition to plan below or as listed on After Visit Summary.   -     Vitamin D; Standing  -     ergocalciferol (ERGOCALCIFEROL) 50,000 unit Cap; Take 1 capsule (50,000 Units total) by mouth every 7 days.  Dispense: 12 capsule; Refill: 3    Senile cataract, unspecified age-related cataract type, unspecified laterality.Condition not optimally controlled. Detailed counseling on self care measures. Plan to monitor clinically in addition to plan below or as listed on After Visit Summary.   -     Ambulatory Referral to Ophthalmology    Bereavement.Condition stable.  Counseling given today on self-care measures. Plan to monitor clinically. Continue current medical plan.     Breast cancer screening by mammogram  -     Mammo Digital Screening Bilat with CAD; Standing          Future Appointments  Date Time Provider Department Center   9/24/2018 1:30 PM Sergio Oneil MD [facility] O[facility]TH[facility] [place]   10/22/2018 10:20 AM Nayan Linares MD [facility]       Nayan Linares  7/20/2018  9:15 AM

## 2018-07-20 PROBLEM — M60.9 STATIN-INDUCED MYOSITIS: Status: ACTIVE | Noted: 2018-07-20

## 2018-07-20 PROBLEM — T46.6X5A STATIN-INDUCED MYOSITIS: Status: ACTIVE | Noted: 2018-07-20

## 2018-07-20 RX ORDER — PRAVASTATIN SODIUM 20 MG/1
20 TABLET ORAL DAILY
Qty: 90 TABLET | Refills: 0 | Status: SHIPPED | OUTPATIENT
Start: 2018-07-20 | End: 2019-03-02 | Stop reason: SDUPTHER

## 2018-07-20 RX ORDER — ERGOCALCIFEROL 1.25 MG/1
50000 CAPSULE ORAL
Qty: 12 CAPSULE | Refills: 3 | Status: SHIPPED | OUTPATIENT
Start: 2018-07-20 | End: 2019-08-05 | Stop reason: SDUPTHER

## 2018-07-23 ENCOUNTER — TELEPHONE (OUTPATIENT)
Dept: FAMILY MEDICINE | Facility: CLINIC | Age: 63
End: 2018-07-23

## 2018-07-23 NOTE — TELEPHONE ENCOUNTER
Spoke with patient and instructions given for Vit D and pravastatin. Instructed to start Vitamin D now and pravastatin in one month. Will repeat labs in 2 month and f/u appt with Dr. Linares. Patient voices understanding.

## 2018-07-23 NOTE — TELEPHONE ENCOUNTER
----- Message from Nayan Linares MD sent at 7/23/2018  5:31 PM CDT -----  Contact: [phone]/self  Please find out what further questions she has.     ----- Message -----  From: Leslie Valdovinos  Sent: 7/23/2018   4:45 PM  To: Vijay GÓMEZ Staff    Patient requesting to speak with you concerning test results   Please call and advise

## 2018-07-23 NOTE — TELEPHONE ENCOUNTER
----- Message from Nayan Linares MD sent at 7/20/2018 12:03 PM CDT -----  Please contact patient about recent cholesterol levels.  LDL is very elevated off of her medication Crestor.  It is very possible that she can start on cholesterol medication again but she must 1st correct vitamin-D deficiency.  To help correct vitamin-D deficiency we have sent prescription strength vitamin D called ergo calciferol to her local pharmacy.  We want her to be on this medicine for at least 1 month before starting the new cholesterol medication called pravastatin.  Pravastatin is a more gentle cholesterol medication that is less likely to cause muscle soreness.  I would like the patient to have blood work appointment in 2 months with vitamin D, CPK, cholesterol and come to see me in 2 months to discuss results.

## 2018-07-26 ENCOUNTER — TELEPHONE (OUTPATIENT)
Dept: INTERNAL MEDICINE | Facility: CLINIC | Age: 63
End: 2018-07-26

## 2018-07-26 NOTE — TELEPHONE ENCOUNTER
Spoke with pt who states that she went to the pharmacy and the medication that she was told to  wasn't there. Pt was informed that the medication was sent on 7/201/2018. Pt states that she went to the pharmacy on 7/21//18 to  medication. Pt then realized that the medication she picked up on 7/21/2018 is the medication the doctor just sent for her to start. Pt apologized for the mix up. Understanding voiced.

## 2018-07-26 NOTE — TELEPHONE ENCOUNTER
----- Message from Elli Pierce sent at 7/26/2018  4:04 PM CDT -----  Contact: self - [phone]  Patient is requesting a call back. Please advise

## 2018-09-04 ENCOUNTER — OFFICE VISIT (OUTPATIENT)
Dept: INTERNAL MEDICINE | Facility: CLINIC | Age: 63
End: 2018-09-04
Payer: COMMERCIAL

## 2018-09-04 VITALS
HEART RATE: 77 BPM | BODY MASS INDEX: 31.25 KG/M2 | SYSTOLIC BLOOD PRESSURE: 136 MMHG | TEMPERATURE: 99 F | DIASTOLIC BLOOD PRESSURE: 85 MMHG | WEIGHT: 155 LBS | HEIGHT: 59 IN | OXYGEN SATURATION: 97 %

## 2018-09-04 DIAGNOSIS — E55.9 VITAMIN D INSUFFICIENCY: ICD-10-CM

## 2018-09-04 DIAGNOSIS — M25.511 CHRONIC RIGHT SHOULDER PAIN: ICD-10-CM

## 2018-09-04 DIAGNOSIS — E78.5 HYPERLIPIDEMIA, UNSPECIFIED HYPERLIPIDEMIA TYPE: ICD-10-CM

## 2018-09-04 DIAGNOSIS — J06.9 VIRAL URI: Primary | ICD-10-CM

## 2018-09-04 DIAGNOSIS — M60.9 MYOSITIS OF MULTIPLE SITES, UNSPECIFIED MYOSITIS TYPE: ICD-10-CM

## 2018-09-04 DIAGNOSIS — G89.29 CHRONIC RIGHT SHOULDER PAIN: ICD-10-CM

## 2018-09-04 PROCEDURE — 3079F DIAST BP 80-89 MM HG: CPT | Mod: CPTII,S$GLB,, | Performed by: INTERNAL MEDICINE

## 2018-09-04 PROCEDURE — 99999 PR PBB SHADOW E&M-EST. PATIENT-LVL IV: CPT | Mod: PBBFAC,,, | Performed by: INTERNAL MEDICINE

## 2018-09-04 PROCEDURE — 99214 OFFICE O/P EST MOD 30 MIN: CPT | Mod: S$GLB,,, | Performed by: INTERNAL MEDICINE

## 2018-09-04 PROCEDURE — 3075F SYST BP GE 130 - 139MM HG: CPT | Mod: CPTII,S$GLB,, | Performed by: INTERNAL MEDICINE

## 2018-09-04 PROCEDURE — 3008F BODY MASS INDEX DOCD: CPT | Mod: CPTII,S$GLB,, | Performed by: INTERNAL MEDICINE

## 2018-09-04 NOTE — PATIENT INSTRUCTIONS
Recommendations for today    Today symptoms likely represent a viral respiratory illness.  Within the 1st 10 days of a sudden viral respiratory illness antibiotics are not recommended and they are unlikely to be helpful.  However, by this Friday morning if symptoms have been worsening we recommend that you contact the clinic and we will consider sending an antibiotic to your local pharmacy.  We recommend that you start using over-the-counter cough and decongestant medication because these medications are easier to tolerate and have fewer side effects for a viral respiratory illness.    We do recommend that you start doing physical therapy exercises to help with shoulder pain and to help reduce straining activities at work.  Meloxicam can be used as needed for aches and pains in the joints.    Should you find that aches and pains in the muscles seem to worsen soon after starting the medication pravastatin stop the medication and contact the clinic.

## 2018-09-04 NOTE — PROGRESS NOTES
Portions of this note are generated with voice recognition software. Typographical errors may exist.     SUBJECTIVE:    This is a/an 63 y.o. female here for primary care visit for  Chief Complaint   Patient presents with    Nasal Congestion     Patient states that symptoms started acutely last Saturday.  She started with some scratchiness to her throat and then proceeded to have recurring coughing with sputum production.  No myalgias fevers or chills.  She denies any unusual or uncharacteristic dyspnea with typical activities of daily living.  A little bit of sinus congestion.  No sinus headaches.  Self care measures have been minimal.  Patient is using   Alternative and supplementary remedies predominantly and is finding that these are not helping adequately.     the patient has not been on antibiotic therapies for this current illness and is requesting antibiotic therapy.  With some counseling the patient is willing to wait several more days to see how her illness changes.      The patient is in complying with vitamin D and has just recently started taking pravastatin.  Since starting the pravastatin she has not noticed any unusual areas of muscle tenderness.  She has recurring problems with right shoulder pain on a recurring basis.  She feels that this is due to overuse and eccentric activities at work.  There is routine heavy lifting required at her place of work.  The patient has received physical therapy to help with recurring shoulder pain but has not been complying with home exercise program.  She takes meloxicam most days to help with orthopedic pain.  She finds this to be very helpful.  The     patient states that she has been having some unusual bruising on her skin and finds that she bruises easily.  She notes that she has had a couple spots on her legs recently.  They have resolved on their own.      Medications Reviewed and Updated    Past medical, family, and social histories were reviewed and  updated.    Review of Systems negative unless otherwise noted in history of present illness-  ROS    General ROS: negative  Psychological ROS: negative  ENT ROS: negative  Endocrine ROS: Negative  Allergy and Immunology ROS: negative  Cardiovascular ROS: negative  Pulmonary ROS: Negative  Gastrointestinal ROS: negative  Musculoskeletal ROS: negative  Neurological ROS: negative  Dermatological ROS: negative        Allergic:    Review of patient's allergies indicates:   Allergen Reactions    Codeine Nausea And Vomiting       OBJECTIVE:  BP: 136/85 Pulse: 77 Temp: 98.7 °F (37.1 °C)  Wt Readings from Last 3 Encounters:   09/04/18 70.3 kg (155 lb)   07/19/18 70 kg (154 lb 5.2 oz)   04/27/18 70.8 kg (156 lb)    Body mass index is 31.31 kg/m².  Previous Blood Pressure Readings :   BP Readings from Last 3 Encounters:   09/04/18 136/85   07/19/18 134/82   03/28/18 138/86       Physical Exam    GEN: No apparent distress  HEENT: sclera non-icteric, conjunctiva clear  No pain bilateral sinuses.  Edematous turbinates bilaterally.  The  CV: no peripheral edema  PULM: breathing non-labored  No wheezing bilateral lung fields.  ABD:  protuberant abdomen.  PSYCH: appropriate affect  MSK: able to rise from chair without assistance  SKIN: normal skin turgor.     Pertinent Labs Reviewed       ASSESSMENT/PLAN:    Viral URI.Condition stable.  Counseling given today on self-care measures. Plan to monitor clinically. Continue current medical plan.     Myositis of multiple sites, unspecified myositis type.Etiology unclear. Not controlled. Further evaluation warranted.  Recommendations as below or as written on After Visit Summary.   -     DANGELO; Future; Expected date: 09/04/2018  -     ANTI- NELLY-1 ANTIBODY; Future; Expected date: 09/04/2018    Chronic right shoulder pain.Condition stable.  Counseling given today on self-care measures. Plan to monitor clinically. Continue current medical plan.     Vitamin D insufficiency.Condition stable.   Counseling given today on self-care measures. Plan to monitor clinically. Continue current medical plan.     Hyperlipidemia, unspecified hyperlipidemia type.Condition stable.  Counseling given today on self-care measures. Plan to monitor clinically. Continue current medical plan.       Future Appointments   Date Time Provider Department Center   9/24/2018  1:30 PM Sergio Oneil MD [facility] [place]   10/18/2018  9:30 AM LAB, [facility] [place]   10/22/2018 10:20 AM Nayan Linares MD [facility] Kristina Linares  9/9/2018  1:29 PM

## 2018-09-05 RX ORDER — IRBESARTAN 150 MG/1
TABLET ORAL
Qty: 90 TABLET | Refills: 1 | Status: SHIPPED | OUTPATIENT
Start: 2018-09-05 | End: 2019-06-14 | Stop reason: SDUPTHER

## 2018-09-05 RX ORDER — METOPROLOL SUCCINATE 25 MG/1
TABLET, EXTENDED RELEASE ORAL
Qty: 180 TABLET | Refills: 3 | Status: SHIPPED | OUTPATIENT
Start: 2018-09-05 | End: 2019-09-10 | Stop reason: SDUPTHER

## 2018-09-24 ENCOUNTER — OFFICE VISIT (OUTPATIENT)
Dept: OPHTHALMOLOGY | Facility: CLINIC | Age: 63
End: 2018-09-24
Payer: COMMERCIAL

## 2018-09-24 ENCOUNTER — TELEPHONE (OUTPATIENT)
Dept: OPHTHALMOLOGY | Facility: CLINIC | Age: 63
End: 2018-09-24

## 2018-09-24 DIAGNOSIS — H04.123 DRY EYE SYNDROME OF BOTH EYES: ICD-10-CM

## 2018-09-24 DIAGNOSIS — H26.9 CORTICAL CATARACT OF BOTH EYES: ICD-10-CM

## 2018-09-24 DIAGNOSIS — H43.813 VITREOUS DETACHMENT OF BOTH EYES: ICD-10-CM

## 2018-09-24 DIAGNOSIS — I10 ESSENTIAL HYPERTENSION: ICD-10-CM

## 2018-09-24 DIAGNOSIS — H25.13 NUCLEAR SCLEROSIS OF BOTH EYES: Primary | ICD-10-CM

## 2018-09-24 DIAGNOSIS — H52.7 REFRACTIVE ERROR: ICD-10-CM

## 2018-09-24 PROCEDURE — 92136 OPHTHALMIC BIOMETRY: CPT | Mod: 26,S$GLB,, | Performed by: OPHTHALMOLOGY

## 2018-09-24 PROCEDURE — 99999 PR PBB SHADOW E&M-EST. PATIENT-LVL II: CPT | Mod: PBBFAC,,, | Performed by: OPHTHALMOLOGY

## 2018-09-24 PROCEDURE — 92004 COMPRE OPH EXAM NEW PT 1/>: CPT | Mod: S$GLB,,, | Performed by: OPHTHALMOLOGY

## 2018-09-24 RX ORDER — NEPAFENAC 3 MG/ML
1 SUSPENSION/ DROPS OPHTHALMIC DAILY
Qty: 3 ML | Refills: 1 | Status: SHIPPED | OUTPATIENT
Start: 2018-11-03 | End: 2018-12-03

## 2018-09-24 RX ORDER — DIFLUPREDNATE OPHTHALMIC 0.5 MG/ML
1 EMULSION OPHTHALMIC 4 TIMES DAILY
Qty: 5 ML | Refills: 1 | Status: SHIPPED | OUTPATIENT
Start: 2018-11-06 | End: 2018-12-06

## 2018-09-24 RX ORDER — OFLOXACIN 3 MG/ML
1 SOLUTION/ DROPS OPHTHALMIC 4 TIMES DAILY
Qty: 5 ML | Refills: 1 | Status: SHIPPED | OUTPATIENT
Start: 2018-11-03 | End: 2018-11-13

## 2018-09-24 NOTE — LETTER
September 24, 2018      Nayan Linares MD  [address] [facility] [address]           [place] [facility] Ophthalmology  [address] [facility] [address]  Phone: [phone]  Fax: [phone]          Patient: Chelsea Rodriguez   MR Number: [id number]   YOB: 1955   Date of Visit: 9/24/2018       Dear Dr. Nayan Linares:    Thank you for referring Chelsea Rodriguez to me for evaluation. Attached you will find relevant portions of my assessment and plan of care.    If you have questions, please do not hesitate to call me. I look forward to following Chelsea Rodriguez along with you.    Sincerely,    Sergio Oneil MD    Enclosure  CC:  No Recipients    If you would like to receive this communication electronically, please contact [email or web address] or [phone] to request more information on [employer] access.    For providers and/or their staff who would like to refer a patient to [facility], please contact us through our one-stop-shop provider referral line, [facility], at [phone].    If you feel you have received this communication in error or would no longer like to receive these types of communications, please e-mail [email or web address]

## 2018-09-24 NOTE — PROGRESS NOTES
Subjective:       Patient ID: Chelsea Rodriguez is a 63 y.o. female.    Chief Complaint: Cataract    HPI     Cataract Eval self referreal. Last Eye Exam was 7/2018. Glaucoma   suspect.Every once have a sharp pain both eyes. Denies flashes H/o   floaters bilateral. Blurred vision at distance and near left eye, see okay   out of the right eye. Do have trouble with glare. Eyes always itch and   burn. Every once in a while eyes tear. Dry Eyes bilateral.     Eye Meds: Refresh prn OU     Last edited by BRIDGETT Sutton on 9/24/2018  1:44 PM. (History)             Assessment:       1. Nuclear sclerosis of both eyes    2. Cortical cataract of both eyes    3. Dry eye syndrome of both eyes    4. Vitreous detachment of both eyes    5. Essential hypertension    6. Refractive error        Plan:       Visually significant cataract OU -Pt. Wants Sx.     ARTIE-Doing well.  PVD's OU-Stable.  HTN-No retinopathy OU.  RE        Cataract Surgery Consent: Patient with a visually significant cataract with difficulties of ADLs, reading, driving, night vision, glare (any and all).  Discussed with Patient/Family/Caregiver: options, risks and benefits, expectations of cataract surgery, utilized an eye model with questions and answers to facilitate discussion.  Discussed lens options and patient understands that glasses may be required for optimal vision for distance and/or near vision after cataract surgery.  The Patient/Family/Caregiver  voice good understanding and patient wishes to proceed with surgery.  The patient will likely benefit from surgery and patient signed consent for Left Eye.  CE OS 11/6/18 SN60WF 18.0,        OD 11/20/18 SN60WF 18.0.  AT's.  Control HTN.

## 2018-10-12 ENCOUNTER — TELEPHONE (OUTPATIENT)
Dept: OPHTHALMOLOGY | Facility: CLINIC | Age: 63
End: 2018-10-12

## 2018-10-12 DIAGNOSIS — H25.12 NUCLEAR SCLEROSIS, LEFT: Primary | ICD-10-CM

## 2018-10-18 ENCOUNTER — LAB VISIT (OUTPATIENT)
Dept: LAB | Facility: HOSPITAL | Age: 63
End: 2018-10-18
Attending: INTERNAL MEDICINE
Payer: COMMERCIAL

## 2018-10-18 DIAGNOSIS — T46.6X5A STATIN-INDUCED MYOSITIS: ICD-10-CM

## 2018-10-18 DIAGNOSIS — M60.9 MYOSITIS OF MULTIPLE SITES, UNSPECIFIED MYOSITIS TYPE: ICD-10-CM

## 2018-10-18 DIAGNOSIS — E55.9 VITAMIN D INSUFFICIENCY: ICD-10-CM

## 2018-10-18 DIAGNOSIS — M60.9 STATIN-INDUCED MYOSITIS: ICD-10-CM

## 2018-10-18 LAB
25(OH)D3+25(OH)D2 SERPL-MCNC: 30 NG/ML
CK SERPL-CCNC: 526 U/L

## 2018-10-18 PROCEDURE — 82550 ASSAY OF CK (CPK): CPT

## 2018-10-18 PROCEDURE — 82306 VITAMIN D 25 HYDROXY: CPT

## 2018-10-18 PROCEDURE — 86038 ANTINUCLEAR ANTIBODIES: CPT

## 2018-10-18 PROCEDURE — 36415 COLL VENOUS BLD VENIPUNCTURE: CPT | Mod: PO

## 2018-10-18 PROCEDURE — 86235 NUCLEAR ANTIGEN ANTIBODY: CPT

## 2018-10-19 LAB — ANA SER QL IF: NORMAL

## 2018-10-22 ENCOUNTER — OFFICE VISIT (OUTPATIENT)
Dept: INTERNAL MEDICINE | Facility: CLINIC | Age: 63
End: 2018-10-22
Payer: COMMERCIAL

## 2018-10-22 VITALS
BODY MASS INDEX: 30.8 KG/M2 | DIASTOLIC BLOOD PRESSURE: 92 MMHG | WEIGHT: 152.75 LBS | OXYGEN SATURATION: 98 % | SYSTOLIC BLOOD PRESSURE: 168 MMHG | HEART RATE: 72 BPM | HEIGHT: 59 IN

## 2018-10-22 DIAGNOSIS — Z71.89 ENCOUNTER FOR HERB AND VITAMIN SUPPLEMENT MANAGEMENT: ICD-10-CM

## 2018-10-22 DIAGNOSIS — E78.5 HYPERLIPIDEMIA, UNSPECIFIED HYPERLIPIDEMIA TYPE: ICD-10-CM

## 2018-10-22 DIAGNOSIS — M60.9 MYOSITIS, UNSPECIFIED MYOSITIS TYPE, UNSPECIFIED SITE: Primary | ICD-10-CM

## 2018-10-22 PROCEDURE — 3008F BODY MASS INDEX DOCD: CPT | Mod: CPTII,S$GLB,, | Performed by: INTERNAL MEDICINE

## 2018-10-22 PROCEDURE — 99214 OFFICE O/P EST MOD 30 MIN: CPT | Mod: S$GLB,,, | Performed by: INTERNAL MEDICINE

## 2018-10-22 PROCEDURE — 3080F DIAST BP >= 90 MM HG: CPT | Mod: CPTII,S$GLB,, | Performed by: INTERNAL MEDICINE

## 2018-10-22 PROCEDURE — 3077F SYST BP >= 140 MM HG: CPT | Mod: CPTII,S$GLB,, | Performed by: INTERNAL MEDICINE

## 2018-10-22 PROCEDURE — 99999 PR PBB SHADOW E&M-EST. PATIENT-LVL IV: CPT | Mod: PBBFAC,,, | Performed by: INTERNAL MEDICINE

## 2018-10-22 NOTE — PROGRESS NOTES
Portions of this note are generated with voice recognition software. Typographical errors may exist.     SUBJECTIVE:    This is a/an 63 y.o. female here for primary care visit for  Chief Complaint   Patient presents with    Hypertension     3 month      Patient states that she does not know why blood pressure is more elevated than usual today.  She has been complying with her blood pressure medication.  She does not feel uncharacteristically stressed today.  She typically works the night shift and this is not any different than previous evaluations in clinic.  She is usually seen after working in over night shift.  She does check home blood pressure numbers at home and feels that blood pressure numbers at home have been better on average.  She has not been checking recently in agrees to do this and report back to the clinic    Patient has been complying with pravastatin daily.  She does not feel that the sites of pain have changed appreciably since starting pravastatin.  She states that she has predominantly pain in the right shoulder and the bilateral knees.  The pain is not always there.  It does seem to be influenced by work activities.  She denies any persisting weakness in the proximal musculature    At the time of the last laboratory evaluation CPK was below 300 and the patient had just recently started statin medication again using pravastatin.  Repeat evaluation is on pravastatin showing an increase in CPK levels    The patient states that she has tried to refrain from taking multivitamins without informing my office but she does state that she is currently on a multivitamin that include niacinamide.  She agrees to discontinue this    Medications Reviewed and Updated    Past medical, family, and social histories were reviewed and updated.    Review of Systems negative unless otherwise noted in history of present illness-  ROS    General ROS: negative  Psychological ROS: negative  ENT ROS:  negative  Endocrine ROS: Negative  Allergy and Immunology ROS: negative  Cardiovascular ROS: negative  Pulmonary ROS: Negative  Gastrointestinal ROS: negative  Genito-Urinary ROS: negative  Musculoskeletal ROS: negative  Neurological ROS: negative  Dermatological ROS: negative        Allergic:    Review of patient's allergies indicates:   Allergen Reactions    Codeine Nausea And Vomiting       OBJECTIVE:  BP: (!) 168/92 Pulse: 72    Wt Readings from Last 3 Encounters:   10/22/18 69.3 kg (152 lb 12.5 oz)   09/04/18 70.3 kg (155 lb)   07/19/18 70 kg (154 lb 5.2 oz)    Body mass index is 30.86 kg/m².  Previous Blood Pressure Readings :   BP Readings from Last 3 Encounters:   10/22/18 (!) 168/92   09/04/18 136/85   07/19/18 134/82       Physical Exam    GEN: No apparent distress  HEENT: sclera non-icteric, conjunctiva clear  CV: no peripheral edema  PULM: breathing non-labored  ABD: Obese, protuberant abdomen.  PSYCH: appropriate affect  MSK: able to rise from chair without assistance  - no synovitis noted along the small joints of the hands bilaterally.  Neurologic:  5/5 muscle strength flexion and extension from the shoulders down upper extremities.  Hip flexion 5/5 bilaterally  SKIN: normal skin turgor    Pertinent Labs Reviewed       ASSESSMENT/PLAN:    Myositis, unspecified myositis type, unspecified site.Etiology unclear. Not controlled. Further evaluation warranted.  Recommendations as below or as written on After Visit Summary.   -     Hepatitis B surface antigen; Future; Expected date: 10/22/2018  -     Ambulatory referral to Rheumatology  -     HIV-1 and HIV-2 antibodies; Future; Expected date: 10/22/2018    Hyperlipidemia, unspecified hyperlipidemia type.Condition stable.  Counseling given today on self-care measures. Plan to monitor clinically. Continue current medical plan.     Encounter for herb and vitamin supplement management    Other orders  -     Cancel: HIV-1 and HIV-2 antibodies; Future; Expected  date: 10/22/2018          Future Appointments   Date Time Provider Department Center   10/25/2018  9:15 AM LAB, [facility] LAB [place]   11/7/2018  8:00 AM Sergio Oneil MD [facility] [place]   11/14/2018  9:00 AM Sergio Oneil MD [facility] [place]   11/21/2018 10:00 AM Sergio Oneil MD [facility] O[facility] [place]   11/27/2018 10:20 AM Nayan Linares MD [facility] IM [place]   11/28/2018  9:00 AM Frandy Harrison OD [facility] OPTOMTY [place]   12/19/2018  9:00 AM Sergio Oneil MD [facility] OPH[facility] [place]   12/26/2018 10:00 AM Teresa Iqbal MD [facility] [place]       Nayan Linares  10/22/2018  10:25 AM

## 2018-10-22 NOTE — PATIENT INSTRUCTIONS
Recommendations for today    After checking blood pressure for 7 days consecutively call my office with a report on home blood pressure numbers.  Based on this information we will decide whether to continue with the scheduled appointment or push it back    We recommend that you stop the supplement Niacinamide as this can cause muscle problems.    .  Watching blood pressure  · Make sure you have a blood pressure machine at home.      · Make sure you read the instructions and use it appropriately.    · Please bring blood pressure machine to your appointments initially so we can review your numbers. Your doctor may ask you to keep a log of your numbers instead.    · Check blood pressure daily    · The best time is first thing in the morning AND just prior to bedtime.    · You should sit down at a table for approximately 3-4 minutes quietly.  If you do not have time to do this then you do not have time to check blood pressure and you should wait to take the blood pressure at a different time..    · Keep a log of your blood pressure.  Write down the date and time and the blood pressure number.      · After checking blood pressure if you're not satisfied with the number you should not check on the same arm.  Instead you should check on the opposite arm.  If still not satisfied with the number should not check blood pressure again for approximately one hour.    · Comment next to any abnormally high blood pressure numbers if you can tell that there was a circumstance that led to this value (for example, taking blood pressure when you were upset, in pain or right after having a cigarette or drinking coffee).     Avoid common mistakes when checking blood pressure  · Taking blood pressure over the top of clothing.  · Checking blood pressure repeatedly on the same arm (better to check on the other arm)  · Using blood pressure cuff that is too small (inflatable portion should cover at least 75% of your upper arm)    GOAL BLOOD  PRESSURE:     Top number less than 140 and bottom number less than 90.    Please provide us with your blood pressure numbers

## 2018-10-23 ENCOUNTER — TELEPHONE (OUTPATIENT)
Dept: OPTOMETRY | Facility: CLINIC | Age: 63
End: 2018-10-23

## 2018-10-23 DIAGNOSIS — H25.11 NS (NUCLEAR SCLEROSIS), RIGHT: Primary | ICD-10-CM

## 2018-10-24 ENCOUNTER — TELEPHONE (OUTPATIENT)
Dept: INTERNAL MEDICINE | Facility: CLINIC | Age: 63
End: 2018-10-24

## 2018-10-24 LAB — ENA JO1 AB SER IA-ACNC: <1 INDEX

## 2018-10-24 NOTE — TELEPHONE ENCOUNTER
Spoke with patient and  She reports sometimes she has a tingling sensation in her head and she thought since she had parasites during chemo she might have them in her head.  Informed pt will send message to Dr. Linares and call back with recommendations.

## 2018-10-24 NOTE — TELEPHONE ENCOUNTER
Spoke with pt who states that she was seen on 10/22/18and spoke with the doctor about things going on in her head. Pt states that she has labs on 10/25/18 and would like to get tested for parasites in her head. Pt was informed that a message will be routed to the doctor and she will get a call back. Pt voiced understanding.

## 2018-10-24 NOTE — TELEPHONE ENCOUNTER
----- Message from Nayan Linares MD sent at 10/24/2018  5:31 PM CDT -----  Kindly let the patient know that this kind of testing is not indicated for healthy individuals. Only people living with HIV or other serious conditions need this kind of testing. If she is concerned about something new that's happening or that she did not tell me about I would recommend an extra visit.     ----- Message -----  From: Carie Tolliver MA  Sent: 10/24/2018   2:48 PM  To: Nayan Linares MD    Please advise pt is requesting testing for parasites in her head. She would like to get this done with her labs on 10/25/2018.

## 2018-10-24 NOTE — TELEPHONE ENCOUNTER
----- Message from Sindhu Dasilva sent at 10/24/2018  1:21 PM CDT -----  Contact: Self/ [phone]  Patient called in requesting to speak with you. Patient prefers to speak with a nurse.     Please call and advise.

## 2018-10-25 ENCOUNTER — LAB VISIT (OUTPATIENT)
Dept: LAB | Facility: HOSPITAL | Age: 63
End: 2018-10-25
Attending: INTERNAL MEDICINE
Payer: COMMERCIAL

## 2018-10-25 DIAGNOSIS — M60.9 MYOSITIS, UNSPECIFIED MYOSITIS TYPE, UNSPECIFIED SITE: ICD-10-CM

## 2018-10-25 DIAGNOSIS — E78.5 HYPERLIPIDEMIA, UNSPECIFIED HYPERLIPIDEMIA TYPE: ICD-10-CM

## 2018-10-25 LAB
CHOLEST SERPL-MCNC: 218 MG/DL
CHOLEST/HDLC SERPL: 4 {RATIO}
HDLC SERPL-MCNC: 55 MG/DL
HDLC SERPL: 25.2 %
LDLC SERPL CALC-MCNC: 147 MG/DL
NONHDLC SERPL-MCNC: 163 MG/DL
TRIGL SERPL-MCNC: 80 MG/DL

## 2018-10-25 PROCEDURE — 87340 HEPATITIS B SURFACE AG IA: CPT

## 2018-10-25 PROCEDURE — 86703 HIV-1/HIV-2 1 RESULT ANTBDY: CPT

## 2018-10-25 PROCEDURE — 36415 COLL VENOUS BLD VENIPUNCTURE: CPT | Mod: PO

## 2018-10-25 PROCEDURE — 80061 LIPID PANEL: CPT

## 2018-10-26 LAB
HBV SURFACE AG SERPL QL IA: NEGATIVE
HIV 1+2 AB+HIV1 P24 AG SERPL QL IA: NEGATIVE

## 2018-11-01 ENCOUNTER — TELEPHONE (OUTPATIENT)
Dept: INTERNAL MEDICINE | Facility: CLINIC | Age: 63
End: 2018-11-01

## 2018-11-01 ENCOUNTER — TELEPHONE (OUTPATIENT)
Dept: OPHTHALMOLOGY | Facility: CLINIC | Age: 63
End: 2018-11-01

## 2018-11-01 NOTE — TELEPHONE ENCOUNTER
----- Message from Lady Graff sent at 11/1/2018 11:36 AM CDT -----  Contact: [phone]  Patient requested to speak with the nurse about her BP. Please call.

## 2018-11-02 ENCOUNTER — TELEPHONE (OUTPATIENT)
Dept: OPHTHALMOLOGY | Facility: CLINIC | Age: 63
End: 2018-11-02

## 2018-11-02 NOTE — TELEPHONE ENCOUNTER
Pt states that she did not get the chance to see her PCP on yesterday. A nurse check her BP and it was normal. Pt schedule to have surgery on 11/06/2018 . If PCP prescribe medication than pt can take medication prior to surgery.

## 2018-11-04 NOTE — H&P
[facility]  History & Physical    Subjective:      Chief Complaint/Reason for Admission:     Chelsea Rodriguez is a 63 y.o. female.    Past Medical History:   Diagnosis Date    Arthritis     Breast cancer 2011    left breast    Cataract     Hyperlipidemia     Hypertension      Past Surgical History:   Procedure Laterality Date    BILATERAL SALPINGOOPHORECTOMY  2000    BREAST BIOPSY Left     BREAST LUMPECTOMY  2010    COLONOSCOPY N/A 10/20/2017    Procedure: COLONOSCOPY;  Surgeon: Mitchell Danielson Jr., MD;  Location: [facility] ENDO;  Service: Endoscopy;  Laterality: N/A;    COLONOSCOPY N/A 10/20/2017    Performed by Mitchell Danielson Jr., MD at [facility] ENDO    HYSTERECTOMY      supracervical abdominal hysterectomy  1978    fibroids     Family History   Problem Relation Age of Onset    Breast cancer Cousin     Hypertension Mother     Heart disease Mother     Diabetes Mother     Hyperlipidemia Mother     Pancreatitis Mother     Cataracts Mother     Macular degeneration Mother     Cancer Father     Amblyopia Neg Hx     Blindness Neg Hx     Glaucoma Neg Hx     Strabismus Neg Hx     Retinal detachment Neg Hx      Social History     Tobacco Use    Smoking status: Never Smoker    Smokeless tobacco: Never Used   Substance Use Topics    Alcohol use: Yes     Comment: not often     Drug use: No       No medications prior to admission.     Review of patient's allergies indicates:   Allergen Reactions    Codeine Nausea And Vomiting        Review of Systems   Eyes: Positive for blurred vision.   All other systems reviewed and are negative.      Objective:      Vital Signs (Most Recent)       Vital Signs Range (Last 24H):       Physical Exam   Constitutional: She is oriented to person, place, and time. She appears well-developed and well-nourished.   HENT:   Head: Normocephalic.   Eyes: Conjunctivae and EOM are normal. Pupils are equal, round, and reactive to light.   Neck: Normal range  of motion. Neck supple.   Cardiovascular: Normal rate, regular rhythm and normal heart sounds.   Pulmonary/Chest: Effort normal and breath sounds normal.   Abdominal: Soft. Bowel sounds are normal.   Musculoskeletal: Normal range of motion.   Neurological: She is alert and oriented to person, place, and time.   Skin: Skin is warm.   Psychiatric: She has a normal mood and affect.       Data Review:   ECG:     Assessment:      Cataract OS.    Plan:    CE OS.

## 2018-11-06 ENCOUNTER — ANESTHESIA EVENT (OUTPATIENT)
Dept: SURGERY | Facility: HOSPITAL | Age: 63
End: 2018-11-06
Payer: COMMERCIAL

## 2018-11-06 ENCOUNTER — HOSPITAL ENCOUNTER (OUTPATIENT)
Facility: HOSPITAL | Age: 63
Discharge: HOME | End: 2018-11-06
Attending: OPHTHALMOLOGY | Admitting: OPHTHALMOLOGY
Payer: COMMERCIAL

## 2018-11-06 ENCOUNTER — ANESTHESIA (OUTPATIENT)
Dept: SURGERY | Facility: HOSPITAL | Age: 63
End: 2018-11-06
Payer: COMMERCIAL

## 2018-11-06 VITALS
OXYGEN SATURATION: 99 % | SYSTOLIC BLOOD PRESSURE: 151 MMHG | HEART RATE: 70 BPM | DIASTOLIC BLOOD PRESSURE: 72 MMHG | RESPIRATION RATE: 18 BRPM | TEMPERATURE: 98 F

## 2018-11-06 DIAGNOSIS — H25.10 SENILE NUCLEAR SCLEROSIS: ICD-10-CM

## 2018-11-06 PROCEDURE — D9220A PRA ANESTHESIA: Mod: ANES,,, | Performed by: ANESTHESIOLOGY

## 2018-11-06 PROCEDURE — 63600175 PHARM REV CODE 636 W HCPCS: Performed by: OPHTHALMOLOGY

## 2018-11-06 PROCEDURE — V2632 POST CHMBR INTRAOCULAR LENS: HCPCS | Performed by: OPHTHALMOLOGY

## 2018-11-06 PROCEDURE — 25000003 PHARM REV CODE 250

## 2018-11-06 PROCEDURE — D9220A PRA ANESTHESIA: Mod: CRNA,,, | Performed by: NURSE ANESTHETIST, CERTIFIED REGISTERED

## 2018-11-06 PROCEDURE — 37000008 HC ANESTHESIA 1ST 15 MINUTES: Performed by: OPHTHALMOLOGY

## 2018-11-06 PROCEDURE — 66984 XCAPSL CTRC RMVL W/O ECP: CPT | Mod: LT,,, | Performed by: OPHTHALMOLOGY

## 2018-11-06 PROCEDURE — 63600175 PHARM REV CODE 636 W HCPCS: Performed by: NURSE ANESTHETIST, CERTIFIED REGISTERED

## 2018-11-06 PROCEDURE — 25000003 PHARM REV CODE 250: Performed by: OPHTHALMOLOGY

## 2018-11-06 PROCEDURE — 36000707: Performed by: OPHTHALMOLOGY

## 2018-11-06 PROCEDURE — 71000015 HC POSTOP RECOV 1ST HR: Performed by: OPHTHALMOLOGY

## 2018-11-06 PROCEDURE — 37000009 HC ANESTHESIA EA ADD 15 MINS: Performed by: OPHTHALMOLOGY

## 2018-11-06 PROCEDURE — 36000706: Performed by: OPHTHALMOLOGY

## 2018-11-06 DEVICE — LENS 18.0: Type: IMPLANTABLE DEVICE | Site: EYE | Status: FUNCTIONAL

## 2018-11-06 RX ORDER — MIDAZOLAM HYDROCHLORIDE 1 MG/ML
INJECTION, SOLUTION INTRAMUSCULAR; INTRAVENOUS
Status: DISCONTINUED | OUTPATIENT
Start: 2018-11-06 | End: 2018-11-06

## 2018-11-06 RX ORDER — LIDOCAINE HYDROCHLORIDE 40 MG/ML
INJECTION, SOLUTION RETROBULBAR
Status: DISCONTINUED | OUTPATIENT
Start: 2018-11-06 | End: 2018-11-06 | Stop reason: HOSPADM

## 2018-11-06 RX ORDER — PREDNISOLONE ACETATE 10 MG/ML
SUSPENSION/ DROPS OPHTHALMIC
Status: DISCONTINUED
Start: 2018-11-06 | End: 2018-11-06 | Stop reason: HOSPADM

## 2018-11-06 RX ORDER — PREDNISOLONE ACETATE 10 MG/ML
SUSPENSION/ DROPS OPHTHALMIC
Status: DISCONTINUED | OUTPATIENT
Start: 2018-11-06 | End: 2018-11-06 | Stop reason: HOSPADM

## 2018-11-06 RX ORDER — FENTANYL CITRATE 50 UG/ML
INJECTION, SOLUTION INTRAMUSCULAR; INTRAVENOUS
Status: DISCONTINUED | OUTPATIENT
Start: 2018-11-06 | End: 2018-11-06

## 2018-11-06 RX ORDER — HYDROCODONE BITARTRATE AND ACETAMINOPHEN 5; 325 MG/1; MG/1
1 TABLET ORAL EVERY 4 HOURS PRN
Status: DISCONTINUED | OUTPATIENT
Start: 2018-11-06 | End: 2018-11-06 | Stop reason: HOSPADM

## 2018-11-06 RX ORDER — TROPICAMIDE 10 MG/ML
1 SOLUTION/ DROPS OPHTHALMIC
Status: DISCONTINUED | OUTPATIENT
Start: 2018-11-06 | End: 2019-06-13

## 2018-11-06 RX ORDER — ACETAMINOPHEN 325 MG/1
TABLET ORAL
Status: DISCONTINUED
Start: 2018-11-06 | End: 2018-11-06 | Stop reason: HOSPADM

## 2018-11-06 RX ORDER — OFLOXACIN 3 MG/ML
SOLUTION/ DROPS OPHTHALMIC
Status: DISCONTINUED | OUTPATIENT
Start: 2018-11-06 | End: 2018-11-06 | Stop reason: HOSPADM

## 2018-11-06 RX ORDER — EPINEPHRINE 1 MG/ML
INJECTION, SOLUTION INTRACARDIAC; INTRAMUSCULAR; INTRAVENOUS; SUBCUTANEOUS
Status: DISCONTINUED | OUTPATIENT
Start: 2018-11-06 | End: 2018-11-06 | Stop reason: HOSPADM

## 2018-11-06 RX ORDER — PHENYLEPHRINE HYDROCHLORIDE 25 MG/ML
SOLUTION/ DROPS OPHTHALMIC
Status: COMPLETED
Start: 2018-11-06 | End: 2018-11-06

## 2018-11-06 RX ORDER — ONDANSETRON 2 MG/ML
INJECTION INTRAMUSCULAR; INTRAVENOUS
Status: DISCONTINUED | OUTPATIENT
Start: 2018-11-06 | End: 2018-11-06

## 2018-11-06 RX ORDER — EPINEPHRINE 1 MG/ML
INJECTION, SOLUTION INTRACARDIAC; INTRAMUSCULAR; INTRAVENOUS; SUBCUTANEOUS
Status: DISCONTINUED
Start: 2018-11-06 | End: 2018-11-06 | Stop reason: WASHOUT

## 2018-11-06 RX ORDER — TROPICAMIDE 10 MG/ML
SOLUTION/ DROPS OPHTHALMIC
Status: COMPLETED
Start: 2018-11-06 | End: 2018-11-06

## 2018-11-06 RX ORDER — CYCLOPENTOLATE HYDROCHLORIDE 10 MG/ML
SOLUTION/ DROPS OPHTHALMIC
Status: COMPLETED
Start: 2018-11-06 | End: 2018-11-06

## 2018-11-06 RX ORDER — CYCLOPENTOLATE HYDROCHLORIDE 10 MG/ML
1 SOLUTION/ DROPS OPHTHALMIC
Status: DISCONTINUED | OUTPATIENT
Start: 2018-11-06 | End: 2019-06-13

## 2018-11-06 RX ORDER — PROPARACAINE HYDROCHLORIDE 5 MG/ML
1 SOLUTION/ DROPS OPHTHALMIC
Status: DISCONTINUED | OUTPATIENT
Start: 2018-11-06 | End: 2019-06-13

## 2018-11-06 RX ORDER — SODIUM CHLORIDE 9 MG/ML
INJECTION, SOLUTION INTRAVENOUS CONTINUOUS
Status: DISCONTINUED | OUTPATIENT
Start: 2018-11-06 | End: 2019-06-13

## 2018-11-06 RX ORDER — LIDOCAINE HYDROCHLORIDE 40 MG/ML
INJECTION, SOLUTION RETROBULBAR
Status: DISCONTINUED
Start: 2018-11-06 | End: 2018-11-06 | Stop reason: HOSPADM

## 2018-11-06 RX ORDER — PHENYLEPHRINE HYDROCHLORIDE 25 MG/ML
1 SOLUTION/ DROPS OPHTHALMIC
Status: DISCONTINUED | OUTPATIENT
Start: 2018-11-06 | End: 2019-06-13

## 2018-11-06 RX ORDER — OFLOXACIN 3 MG/ML
SOLUTION/ DROPS OPHTHALMIC
Status: COMPLETED
Start: 2018-11-06 | End: 2018-11-06

## 2018-11-06 RX ORDER — PROPARACAINE HYDROCHLORIDE 5 MG/ML
SOLUTION/ DROPS OPHTHALMIC
Status: COMPLETED
Start: 2018-11-06 | End: 2018-11-06

## 2018-11-06 RX ORDER — OFLOXACIN 3 MG/ML
1 SOLUTION/ DROPS OPHTHALMIC
Status: COMPLETED | OUTPATIENT
Start: 2018-11-06 | End: 2018-11-06

## 2018-11-06 RX ORDER — ACETAMINOPHEN 325 MG/1
650 TABLET ORAL EVERY 4 HOURS PRN
Status: DISCONTINUED | OUTPATIENT
Start: 2018-11-06 | End: 2018-11-06 | Stop reason: HOSPADM

## 2018-11-06 RX ADMIN — MIDAZOLAM HYDROCHLORIDE 2 MG: 1 INJECTION, SOLUTION INTRAMUSCULAR; INTRAVENOUS at 01:11

## 2018-11-06 RX ADMIN — TROPICAMIDE 1 DROP: 10 SOLUTION/ DROPS OPHTHALMIC at 12:11

## 2018-11-06 RX ADMIN — ONDANSETRON 4 MG: 2 INJECTION INTRAMUSCULAR; INTRAVENOUS at 01:11

## 2018-11-06 RX ADMIN — PHENYLEPHRINE HYDROCHLORIDE 1 DROP: 25 SOLUTION/ DROPS OPHTHALMIC at 12:11

## 2018-11-06 RX ADMIN — OFLOXACIN 1 DROP: 3 SOLUTION OPHTHALMIC at 12:11

## 2018-11-06 RX ADMIN — PROPARACAINE HYDROCHLORIDE 1 DROP: 5 SOLUTION/ DROPS OPHTHALMIC at 12:11

## 2018-11-06 RX ADMIN — CYCLOPENTOLATE HYDROCHLORIDE 1 DROP: 10 SOLUTION/ DROPS OPHTHALMIC at 12:11

## 2018-11-06 RX ADMIN — ACETAMINOPHEN 650 MG: 325 TABLET ORAL at 01:11

## 2018-11-06 RX ADMIN — FENTANYL CITRATE 50 MCG: 50 INJECTION, SOLUTION INTRAMUSCULAR; INTRAVENOUS at 01:11

## 2018-11-06 RX ADMIN — OFLOXACIN 1 DROP: 3 SOLUTION/ DROPS OPHTHALMIC at 12:11

## 2018-11-06 RX ADMIN — SODIUM CHLORIDE 1000 ML: 0.9 INJECTION, SOLUTION INTRAVENOUS at 12:11

## 2018-11-06 NOTE — ANESTHESIA POSTPROCEDURE EVALUATION
Anesthesia Post Evaluation    Patient: Chelsea Rodriguez    Procedure(s) Performed: Procedure(s) (LRB):  INSERTION, IOL PROSTHESIS (Left)  PHACOEMULSIFICATION, CATARACT (Left)    Final Anesthesia Type: MAC  Patient location during evaluation: PACU  Patient participation: Yes- Able to Participate  Level of consciousness: awake and alert  Post-procedure vital signs: reviewed and stable  Pain management: adequate  Airway patency: patent  PONV status at discharge: No PONV  Anesthetic complications: no      Cardiovascular status: hemodynamically stable  Respiratory status: unassisted, spontaneous ventilation and room air  Hydration status: euvolemic  Follow-up not needed.        Visit Vitals  BP (!) 151/72   Pulse 70   Temp 36.6 °C (97.9 °F) (Temporal)   Resp 18   LMP  (LMP Unknown)   SpO2 99%   Breastfeeding? No       Pain/Chinyere Score: Pain Assessment Performed: Yes (11/6/2018  2:26 PM)  Presence of Pain: denies (11/6/2018  2:26 PM)  Pain Rating Prior to Med Admin: 2 (11/6/2018  1:46 PM)  Chinyere Score: 10 (11/6/2018  2:26 PM)

## 2018-11-06 NOTE — PLAN OF CARE
D/C instructions reviewed with pt and family. Questions answered. Handouts and grey bag provided. Pt alert, VSS, tolerating PO liquids, no pain or nausea. Criteria met for d/c home. Out in wheelchair with family driving

## 2018-11-06 NOTE — DISCHARGE INSTRUCTIONS
Discharge Instructions for Cataract Surgery  A surgeon removed the cloudy lens in your eye and replaced it with a clear man-made lens. Be sure to have an adult family member or friend drive you home after surgery. Heres what you can expect following surgery and tips for a healthy recovery.  It is normal to have the following:  · Bruised or bloodshot eye for 7 days  · Itching and mild discomfort for several days  · Some fluid discharge  · Sensitivity to light  · Scratchy, sandlike feeling in the eye for 2 weeks  · Feeling tired, especially during the first 24 hours  Activity level  · Do not drive for 2 days or as instructed by your doctor.  · Do not drink alcohol for at least 24 hours.  · Avoid bending at the waist to  objects or lifting anything heavy for 2 days.  · Relax for the first 24 hours after surgery. Watching TV and reading are OK and wont harm your eye.  Eye protection  · Do not rub or press on your eye.  · Sleep on your back or on your unoperated side for 2 nights.  · If instructed, wear a bandage over your eye for 2 days and 2 nights.  · If instructed, wear a shield to protect your eye for 2 days and 2 nights.  Using eyedrops  You may be given special eyedrops or ointment. Here is one way to use eyedrops:  · Tilt your head back.  · Pull your bottom eyelid down.  · Squeeze one drop into your eye. Do not touch your eye with the bottle tip.  · Close your eyes for a few seconds.  · If you need more than one drop, wait a few minutes before adding the next one.   Call your doctor right away if you have any of the following:  · Bleeding or discharge from the eye  · Your vision suddenly becomes worse  · Pain medicine you are told to take does not ease your pain  · Nausea or vomiting  · Chills or fever over 100.4°F (39.1°C)   Date Last Reviewed: 5/30/2015  © 7447-8027 [employer]. [address] [place] [address], [place] [address]. All rights reserved. This information is not intended as a  substitute for professional medical care. Always follow your healthcare professional's instructions.        Recovery After Procedural Sedation (Adult)  You have been given medicine by vein to make you sleep during your surgery. This may have included both a pain medicine and sleeping medicine. Most of the effects have worn off. But you may still have some drowsiness for the next 6 to 8 hours.  Home care  Follow these guidelines when you get home:  · For the next 8 hours, you should be watched by a responsible adult. This person should make sure your condition is not getting worse.  · Don't drink any alcohol for the next 24 hours.  · Don't drive, operate dangerous machinery, or make important business or personal decisions during the next 24 hours.  Note: Your healthcare provider may tell you not to take any medicine by mouth for pain or sleep in the next 4 hours. These medicines may react with the medicines you were given in the hospital. This could cause a much stronger response than usual.  Follow-up care  Follow up with your healthcare provider if you are not alert and back to your usual level of activity within 12 hours.  When to seek medical advice  Call your healthcare provider right away if any of these occur:  · Drowsiness gets worse  · Weakness or dizziness gets worse  · Repeated vomiting  · You can't be awakened   Date Last Reviewed: 10/18/2016  © 6991-5826 [employer]. [address] [place] [address], [place] [address]. All rights reserved. This information is not intended as a substitute for professional medical care. Always follow your healthcare professional's instructions.

## 2018-11-06 NOTE — ANESTHESIA PREPROCEDURE EVALUATION
11/06/2018  Chelsea Rodriguez is a 63 y.o., female.  Past Medical History:   Diagnosis Date    Arthritis     Breast cancer 2011    left breast    Cataract     Hyperlipidemia     Hypertension      Past Surgical History:   Procedure Laterality Date    BILATERAL SALPINGOOPHORECTOMY  2000    BREAST BIOPSY Left     BREAST LUMPECTOMY  2010    COLONOSCOPY N/A 10/20/2017    Procedure: COLONOSCOPY;  Surgeon: Mitchell Danielson Jr., MD;  Location: [facility] ENDO;  Service: Endoscopy;  Laterality: N/A;    COLONOSCOPY N/A 10/20/2017    Performed by Mitchell Danielson Jr., MD at [facility] ENDO    HYSTERECTOMY      supracervical abdominal hysterectomy  1978    fibroids       Anesthesia Evaluation    I have reviewed the Patient Summary Reports.    I have reviewed the Nursing Notes.   I have reviewed the Medications.     Review of Systems  Anesthesia Hx:  History of prior surgery of interest to airway management or planning: Previous anesthesia: General Denies Family Hx of Anesthesia complications.  Personal Hx of Anesthesia complications, Post-Operative Nausea/Vomiting   Social:  Non-Smoker    Cardiovascular:   Hypertension Denies MI.  Denies CAD.       Pulmonary:  Pulmonary Normal    Renal/:  Renal/ Normal     Hepatic/GI:  Hepatic/GI Normal    Endocrine:  Endocrine Normal        Physical Exam  General:  Well nourished    Airway/Jaw/Neck:  Airway Findings: Mouth Opening: Normal Tongue: Normal  General Airway Assessment: Adult  Mallampati: I  TM Distance: Normal, at least 6 cm  Jaw/Neck Findings:  Neck ROM: Normal ROM      Dental:  Dental Findings: In tact   Chest/Lungs:  Chest/Lungs Findings: Normal Respiratory Rate     Heart/Vascular:  Heart Findings: Rate: Normal        Mental Status:  Mental Status Findings:  Cooperative, Alert and Oriented         Anesthesia Plan  Type of Anesthesia, risks & benefits  discussed:  Anesthesia Type:  MAC  Patient's Preference:   Intra-op Monitoring Plan:   Intra-op Monitoring Plan Comments:   Post Op Pain Control Plan:   Post Op Pain Control Plan Comments:   Induction:   IV  Beta Blocker:  Patient is on a Beta-Blocker and has received one dose within the past 24 hours (No further documentation required).       Informed Consent: Patient understands risks and agrees with Anesthesia plan.  Questions answered. Anesthesia consent signed with patient.  ASA Score: 2     Day of Surgery Review of History & Physical:    H&P update referred to the surgeon.         Ready For Surgery From Anesthesia Perspective.

## 2018-11-06 NOTE — ANESTHESIA RELEASE NOTE
Anesthesia Release from PACU Note    Patient: Chelsea Rodriguez    Procedure(s) Performed: Procedure(s) (LRB):  INSERTION, IOL PROSTHESIS (Left)  PHACOEMULSIFICATION, CATARACT (Left)    Anesthesia type: MAC    Post pain: Adequate analgesia    Post assessment: no apparent anesthetic complications and tolerated procedure well    Last Vitals:   Visit Vitals  BP (!) 151/72   Pulse 70   Temp 36.6 °C (97.9 °F) (Temporal)   Resp 18   LMP  (LMP Unknown)   SpO2 99%   Breastfeeding? No       Post vital signs: stable    Level of consciousness: awake and alert     Nausea/Vomiting: no nausea/no vomiting    Complications: none    Airway Patency: patent    Respiratory: unassisted, spontaneous ventilation, room air    Cardiovascular: stable and blood pressure at baseline    Hydration: euvolemic

## 2018-11-06 NOTE — BRIEF OP NOTE
Operative Note     SUMMARY     Surgery Date: 11/6/2018    Surgeon(s) and Role:      Sergio Oneil MD - Primary    Pre-op Diagnosis:  Nuclear sclerosis     Post-op/ Diagnosis:  Same    Final Diagnosis: Cataract    Procedure(s) (LRB):  PHACOEMULSIFICATION-ASPIRATION-CATARACT   INSERTION-INTRAOCULAR LENS (IOL)     Anesthesia: Monitored Anesthesia Care    Findings/Key Components:  Cataract    Outcome: Tolerated procedure well    Estimated Blood Loss: None         Specimens     None          Follow-up:  Tomorrow in clinic      Discharge Note      SUMMARY     Admit Date: 11/6/2018    Attending Physician: Sergio Oneil MD    Discharge Physician: Sergio Oneil MD    Discharge Date: 11/6/2018    Final Diagnosis: Cataract    Outcome: Tolerated procedure well    Disposition: Discharge to Home.    Medications:       Chelsea Rodriguez   Home Medication Instructions DIONNE:[id number]    Printed on:11/06/18 0310   Medication Information                      aspirin (ECOTRIN) 81 MG EC tablet  once a day             difluprednate (DUREZOL) 0.05 % Drop ophthalmic solution  Place 1 drop into the left eye 4 (four) times daily. For 30 days             ergocalciferol (ERGOCALCIFEROL) 50,000 unit Cap  Take 1 capsule (50,000 Units total) by mouth every 7 days.             ILEVRO 0.3 % DrpS  Place 1 drop into both eyes once daily. For 30 days             irbesartan (AVAPRO) 150 MG tablet  TAKE 1 TABLET(150 MG) BY MOUTH EVERY EVENING             meloxicam (MOBIC) 15 MG tablet  Take 1 tablet (15 mg total) by mouth daily as needed for Pain.             metoprolol succinate (TOPROL-XL) 25 MG 24 hr tablet  TAKE 1 TABLET BY MOUTH TWICE DAILY             ofloxacin (OCUFLOX) 0.3 % ophthalmic solution  Place 1 drop into the left eye 4 (four) times daily. for 10 days             pravastatin (PRAVACHOL) 20 MG tablet  Take 1 tablet (20 mg total) by mouth once daily.                   Patient Discharge Instructions:     Yasmeen Montano  Shield over operated eye when not using drops.     DIET:  Regular    Activity: No heavy lifting or bending X 1 week.    Follow-up:  Tomorrow in clinic

## 2018-11-07 ENCOUNTER — OFFICE VISIT (OUTPATIENT)
Dept: OPHTHALMOLOGY | Facility: CLINIC | Age: 63
End: 2018-11-07
Payer: COMMERCIAL

## 2018-11-07 VITALS — HEART RATE: 60 BPM | SYSTOLIC BLOOD PRESSURE: 130 MMHG | DIASTOLIC BLOOD PRESSURE: 75 MMHG

## 2018-11-07 DIAGNOSIS — Z98.890 POST-OPERATIVE STATE: Primary | ICD-10-CM

## 2018-11-07 PROCEDURE — 99999 PR PBB SHADOW E&M-EST. PATIENT-LVL III: CPT | Mod: PBBFAC,,, | Performed by: OPHTHALMOLOGY

## 2018-11-07 PROCEDURE — 99024 POSTOP FOLLOW-UP VISIT: CPT | Mod: S$GLB,,, | Performed by: OPHTHALMOLOGY

## 2018-11-07 NOTE — PROGRESS NOTES
Subjective:       Patient ID: Chelsea Rodriguez is a 63 y.o. female.    Chief Complaint: Post-op Evaluation (1 day po os)    HPI     Post-op Evaluation      Additional comments: 1 day po os              Comments     S/p phaco w/IOL OS - 11/6/18    1 day po os    Pt states sx went well. Pt denies pain and discomfort.    Eyemeds  Ofloxacin QID OS  Ilevro QD OS  Durezol QID OS           Last edited by Sergio Oneil MD on 11/7/2018  5:12 PM. (History)               Assessment:       1. Post-operative state        Plan:       S/p CE OS- Doing well.             CM OS.  RTC 1 wk.

## 2018-11-07 NOTE — OP NOTE
DATE OF PROCEDURE:  11/06/2018    SURGEON:  Sergio Oneil M.D.    PREOPERATIVE DIAGNOSIS:  Nuclear sclerotic cataract, left eye.    POSTOPERATIVE DIAGNOSIS:  Nuclear sclerotic cataract, left eye.    PROCEDURE: Clear corneal phacoemulsification with posterior chamber intraocular implant,                             left eye.    ANESTHESIA:  Monitored anesthesia care with 4% lidocaine topically.    PROCEDURE IN DETAIL:  After the appropriate preoperative consent was obtained,   the patient had the 4% lidocaine jelly applied to the left cornea.  The patient   was then brought to the operating room and placed into the supine position.  The   left periorbit was then prepped and draped in the usual sterile ophthalmic   fashion.  A lid speculum was then inserted into the left eye.  Several drops of   the 4% lidocaine were placed onto the left cornea.  The 4% lidocaine was also   applied to the perilimbal region with the Weck-stephen sponge.  Using the 0.12-mm   forceps and the Super Sharp blade, a paracentesis site was made at the 6 o'clock   position.  Approximately 0.5 mL of the 4% lidocaine was injected into the   anterior chamber.  Next, Viscoat was injected into the anterior chamber through   the paracentesis site.  The 2.75-mm keratome and the cyclodialysis spatula were   used to create a 2.75-mm clear corneal temporal groove.  The cystitomy needle   and Utrata forceps were then used to create a continuous tear 360-degree   capsulorrhexis.  BSS in a cannula was then used for hydrodissection.    Phacoemulsification then proceeded in a stop-and-chop fashion without any   complications.  Another 0.5 mL of the 4% lidocaine was injected into the   anterior chamber.  The curved tip irrigation aspiration handpiece was then used   to remove the residual cortical material from the capsular bag.  Again, the 4%   lidocaine was applied to the perilimbal region with the Weck-stephen sponge.  Healon   GV was then injected into the  anterior chamber and capsular bag.  An [employer] intraocular lens model SN60WF, 18.0 diopters in power, and serial #   [id number] was injected into the capsular bag with the lens injector.  The   Sinskey hook was used to position the lens into its proper place.  The residual   viscoelastic material was removed from the anterior chamber and capsular bag   with the curved tip irrigation aspiration handpiece.  Both wounds were hydrated   with BSS on a cannula.  Both wounds were checked and found to be watertight.    The lid speculum was then removed from the left eye.  The patient then had 1   drop of Vigamox ophthalmic drop and 1 drop of Econopred +1% ophthalmic drop   instilled onto the left cornea.  The eye was then shielded.  The patient   tolerated the procedure well and was then brought to the recovery room in good   condition.      MALCOLM  dd: 11/06/2018 19:36:15 (CST)  td: 11/06/2018 23:18:20 (CST)  Doc ID   #[id number]  Job ID #[id number]    CC:

## 2018-11-14 ENCOUNTER — OFFICE VISIT (OUTPATIENT)
Dept: OPHTHALMOLOGY | Facility: CLINIC | Age: 63
End: 2018-11-14
Payer: COMMERCIAL

## 2018-11-14 ENCOUNTER — TELEPHONE (OUTPATIENT)
Dept: OPHTHALMOLOGY | Facility: CLINIC | Age: 63
End: 2018-11-14

## 2018-11-14 DIAGNOSIS — Z98.890 POST-OPERATIVE STATE: Primary | ICD-10-CM

## 2018-11-14 DIAGNOSIS — H25.11 NUCLEAR SCLEROSIS OF RIGHT EYE: ICD-10-CM

## 2018-11-14 PROCEDURE — 99999 PR PBB SHADOW E&M-EST. PATIENT-LVL II: CPT | Mod: PBBFAC,,, | Performed by: OPHTHALMOLOGY

## 2018-11-14 PROCEDURE — 99024 POSTOP FOLLOW-UP VISIT: CPT | Mod: S$GLB,,, | Performed by: OPHTHALMOLOGY

## 2018-11-14 PROCEDURE — 92136 OPHTHALMIC BIOMETRY: CPT | Mod: 26,RT,S$GLB, | Performed by: OPHTHALMOLOGY

## 2018-11-14 NOTE — PROGRESS NOTES
Subjective:       Patient ID: Chelsea Rodriguez is a 63 y.o. female.    Chief Complaint: Post-op Evaluation (1 week po os)    HPI     Post-op Evaluation      Additional comments: 1 week po os              Comments     63 y.o female is here for 1 week po os. Pt has floaters OS. Pt denies   pain and discomfort.     Eyemeds  Durezol BID OS  Ilevro QD OS          Last edited by Clarissa Schmitz on 11/14/2018  9:24 AM. (History)             Assessment:       1. Post-operative state    2. Nuclear sclerosis of right eye        Plan:       S/p CE OS- Doing well.     Visually significant cataract OD -Pt. Wants Sx.        Taper gtts OS.  Cataract Surgery Consent: Patient with a visually significant cataract with difficulties of ADLs, reading, driving, night vision, glare (any and all).  Discussed with Patient/Family/Caregiver: options, risks and benefits, expectations of cataract surgery, utilized an eye model with questions and answers to facilitate discussion.  Discussed lens options and patient understands that glasses may be required for optimal vision for distance and/or near vision after cataract surgery.  The Patient/Family/Caregiver  voice good understanding and patient wishes to proceed with surgery.  The patient will likely benefit from surgery and patient signed consent for Right Eye.  CE OD 11/20/18.

## 2018-11-17 RX ORDER — MELOXICAM 15 MG/1
TABLET ORAL
Qty: 90 TABLET | Refills: 0 | Status: SHIPPED | OUTPATIENT
Start: 2018-11-17 | End: 2019-05-27 | Stop reason: SDUPTHER

## 2018-11-17 NOTE — H&P
[facility]  History & Physical    Subjective:      Chief Complaint/Reason for Admission:     Chelsea Rodriguez is a 63 y.o. female.    Past Medical History:   Diagnosis Date    Arthritis     Breast cancer 2011    left breast    Cataract     Hyperlipidemia     Hypertension      Past Surgical History:   Procedure Laterality Date    BILATERAL SALPINGOOPHORECTOMY  2000    BREAST BIOPSY Left     BREAST LUMPECTOMY  2010    COLONOSCOPY N/A 10/20/2017    Procedure: COLONOSCOPY;  Surgeon: Mitchell Danielson Jr., MD;  Location: [facility] ENDO;  Service: Endoscopy;  Laterality: N/A;    COLONOSCOPY N/A 10/20/2017    Performed by Mitchell Danielson Jr., MD at [facility] ENDO    HYSTERECTOMY      INSERTION, IOL PROSTHESIS Left 11/6/2018    Performed by Sergio Oneil MD at [facility] OR [address] [place] [address]    INTRAOCULAR PROSTHESES INSERTION Left 11/6/2018    Procedure: INSERTION, IOL PROSTHESIS;  Surgeon: Sergio Oneil MD;  Location: [facility] OR [address] [place] [address];  Service: Ophthalmology;  Laterality: Left;    PHACOEMULSIFICATION OF CATARACT Left 11/6/2018    Procedure: PHACOEMULSIFICATION, CATARACT;  Surgeon: Sergio Oneil MD;  Location: [facility] OR [address] [place] [address];  Service: Ophthalmology;  Laterality: Left;    PHACOEMULSIFICATION, CATARACT Left 11/6/2018    Performed by Sergio Oneil MD at [facility] OR [address] [place] [address]    supracervical abdominal hysterectomy  1978    fibroids     Family History   Problem Relation Age of Onset    Breast cancer Cousin     Hypertension Mother     Heart disease Mother     Diabetes Mother     Hyperlipidemia Mother     Pancreatitis Mother     Cataracts Mother     Macular degeneration Mother     Cancer Father     Amblyopia Neg Hx     Blindness Neg Hx     Glaucoma Neg Hx     Strabismus Neg Hx     Retinal detachment Neg Hx      Social History     Tobacco Use    Smoking status: Never Smoker    Smokeless tobacco: Never Used   Substance Use Topics    Alcohol use: Yes     Comment: not  often     Drug use: No       No medications prior to admission.     Review of patient's allergies indicates:   Allergen Reactions    Codeine Nausea And Vomiting        Review of Systems   Eyes: Positive for blurred vision.   All other systems reviewed and are negative.      Objective:      Vital Signs (Most Recent)       Vital Signs Range (Last 24H):       Physical Exam   Constitutional: She is oriented to person, place, and time. She appears well-developed and well-nourished.   HENT:   Head: Normocephalic.   Eyes: Conjunctivae and EOM are normal. Pupils are equal, round, and reactive to light.   Neck: Normal range of motion. Neck supple.   Cardiovascular: Normal rate, regular rhythm and normal heart sounds.   Pulmonary/Chest: Effort normal and breath sounds normal.   Abdominal: Soft. Bowel sounds are normal.   Musculoskeletal: Normal range of motion.   Neurological: She is alert and oriented to person, place, and time.   Skin: Skin is warm.   Psychiatric: She has a normal mood and affect.       Data Review:    ECG:    Assessment:      Cataract OD.    Plan:    CE OD.

## 2018-11-20 ENCOUNTER — ANESTHESIA (OUTPATIENT)
Dept: SURGERY | Facility: HOSPITAL | Age: 63
End: 2018-11-20
Payer: COMMERCIAL

## 2018-11-20 ENCOUNTER — HOSPITAL ENCOUNTER (OUTPATIENT)
Facility: HOSPITAL | Age: 63
Discharge: HOME | End: 2018-11-20
Attending: OPHTHALMOLOGY | Admitting: OPHTHALMOLOGY
Payer: COMMERCIAL

## 2018-11-20 ENCOUNTER — ANESTHESIA EVENT (OUTPATIENT)
Dept: SURGERY | Facility: HOSPITAL | Age: 63
End: 2018-11-20
Payer: COMMERCIAL

## 2018-11-20 VITALS
WEIGHT: 150 LBS | BODY MASS INDEX: 30.24 KG/M2 | TEMPERATURE: 98 F | RESPIRATION RATE: 18 BRPM | HEART RATE: 77 BPM | HEIGHT: 59 IN | SYSTOLIC BLOOD PRESSURE: 140 MMHG | OXYGEN SATURATION: 99 % | DIASTOLIC BLOOD PRESSURE: 75 MMHG

## 2018-11-20 DIAGNOSIS — H25.10 SENILE NUCLEAR SCLEROSIS: ICD-10-CM

## 2018-11-20 PROCEDURE — 37000008 HC ANESTHESIA 1ST 15 MINUTES: Performed by: OPHTHALMOLOGY

## 2018-11-20 PROCEDURE — C9447 INJ, PHENYLEPHRINE KETOROLAC: HCPCS | Performed by: OPHTHALMOLOGY

## 2018-11-20 PROCEDURE — 37000009 HC ANESTHESIA EA ADD 15 MINS: Performed by: OPHTHALMOLOGY

## 2018-11-20 PROCEDURE — 63600175 PHARM REV CODE 636 W HCPCS: Performed by: NURSE ANESTHETIST, CERTIFIED REGISTERED

## 2018-11-20 PROCEDURE — 25000003 PHARM REV CODE 250: Performed by: OPHTHALMOLOGY

## 2018-11-20 PROCEDURE — V2632 POST CHMBR INTRAOCULAR LENS: HCPCS | Performed by: OPHTHALMOLOGY

## 2018-11-20 PROCEDURE — 63600175 PHARM REV CODE 636 W HCPCS: Performed by: OPHTHALMOLOGY

## 2018-11-20 PROCEDURE — D9220A PRA ANESTHESIA: Mod: ANES,,, | Performed by: ANESTHESIOLOGY

## 2018-11-20 PROCEDURE — 36000707: Performed by: OPHTHALMOLOGY

## 2018-11-20 PROCEDURE — 36000706: Performed by: OPHTHALMOLOGY

## 2018-11-20 PROCEDURE — 66984 XCAPSL CTRC RMVL W/O ECP: CPT | Mod: 79,RT,, | Performed by: OPHTHALMOLOGY

## 2018-11-20 PROCEDURE — 71000015 HC POSTOP RECOV 1ST HR: Performed by: OPHTHALMOLOGY

## 2018-11-20 PROCEDURE — D9220A PRA ANESTHESIA: Mod: CRNA,,, | Performed by: NURSE ANESTHETIST, CERTIFIED REGISTERED

## 2018-11-20 DEVICE — LENS 18.0: Type: IMPLANTABLE DEVICE | Site: EYE | Status: FUNCTIONAL

## 2018-11-20 RX ORDER — ACETAMINOPHEN 325 MG/1
650 TABLET ORAL EVERY 4 HOURS PRN
Status: DISCONTINUED | OUTPATIENT
Start: 2018-11-20 | End: 2018-11-20 | Stop reason: HOSPADM

## 2018-11-20 RX ORDER — PHENYLEPHRINE HYDROCHLORIDE 25 MG/ML
1 SOLUTION/ DROPS OPHTHALMIC
Status: DISCONTINUED | OUTPATIENT
Start: 2018-11-20 | End: 2019-06-13

## 2018-11-20 RX ORDER — UBIDECARENONE 30 MG
100 CAPSULE ORAL 3 TIMES DAILY
COMMUNITY
End: 2021-03-06 | Stop reason: ALTCHOICE

## 2018-11-20 RX ORDER — TROPICAMIDE 10 MG/ML
1 SOLUTION/ DROPS OPHTHALMIC
Status: DISCONTINUED | OUTPATIENT
Start: 2018-11-20 | End: 2019-06-13

## 2018-11-20 RX ORDER — SODIUM CHLORIDE 0.9 % (FLUSH) 0.9 %
3 SYRINGE (ML) INJECTION
Status: DISCONTINUED | OUTPATIENT
Start: 2018-11-20 | End: 2018-11-20 | Stop reason: HOSPADM

## 2018-11-20 RX ORDER — HYDROCODONE BITARTRATE AND ACETAMINOPHEN 5; 325 MG/1; MG/1
1 TABLET ORAL EVERY 4 HOURS PRN
Status: DISCONTINUED | OUTPATIENT
Start: 2018-11-20 | End: 2018-11-20 | Stop reason: HOSPADM

## 2018-11-20 RX ORDER — PROPARACAINE HYDROCHLORIDE 5 MG/ML
1 SOLUTION/ DROPS OPHTHALMIC
Status: DISCONTINUED | OUTPATIENT
Start: 2018-11-20 | End: 2019-06-13

## 2018-11-20 RX ORDER — MIDAZOLAM HYDROCHLORIDE 1 MG/ML
INJECTION, SOLUTION INTRAMUSCULAR; INTRAVENOUS
Status: DISCONTINUED | OUTPATIENT
Start: 2018-11-20 | End: 2018-11-20

## 2018-11-20 RX ORDER — PREDNISOLONE ACETATE 10 MG/ML
SUSPENSION/ DROPS OPHTHALMIC
Status: DISCONTINUED
Start: 2018-11-20 | End: 2018-11-20 | Stop reason: HOSPADM

## 2018-11-20 RX ORDER — OFLOXACIN 3 MG/ML
SOLUTION/ DROPS OPHTHALMIC
Status: DISCONTINUED | OUTPATIENT
Start: 2018-11-20 | End: 2018-11-20 | Stop reason: HOSPADM

## 2018-11-20 RX ORDER — ACETAMINOPHEN 325 MG/1
TABLET ORAL
Status: DISCONTINUED
Start: 2018-11-20 | End: 2018-11-20 | Stop reason: HOSPADM

## 2018-11-20 RX ORDER — CYCLOPENTOLATE HYDROCHLORIDE 10 MG/ML
1 SOLUTION/ DROPS OPHTHALMIC
Status: COMPLETED | OUTPATIENT
Start: 2018-11-20 | End: 2018-11-20

## 2018-11-20 RX ORDER — LIDOCAINE HYDROCHLORIDE 40 MG/ML
INJECTION, SOLUTION RETROBULBAR
Status: DISCONTINUED | OUTPATIENT
Start: 2018-11-20 | End: 2018-11-20 | Stop reason: HOSPADM

## 2018-11-20 RX ORDER — LIDOCAINE HYDROCHLORIDE 40 MG/ML
INJECTION, SOLUTION RETROBULBAR
Status: DISCONTINUED
Start: 2018-11-20 | End: 2018-11-20 | Stop reason: HOSPADM

## 2018-11-20 RX ORDER — OFLOXACIN 3 MG/ML
1 SOLUTION/ DROPS OPHTHALMIC
Status: COMPLETED | OUTPATIENT
Start: 2018-11-20 | End: 2018-11-20

## 2018-11-20 RX ORDER — SODIUM CHLORIDE 9 MG/ML
INJECTION, SOLUTION INTRAVENOUS CONTINUOUS
Status: DISCONTINUED | OUTPATIENT
Start: 2018-11-20 | End: 2019-06-13

## 2018-11-20 RX ORDER — PREDNISOLONE ACETATE 10 MG/ML
SUSPENSION/ DROPS OPHTHALMIC
Status: DISCONTINUED | OUTPATIENT
Start: 2018-11-20 | End: 2018-11-20 | Stop reason: HOSPADM

## 2018-11-20 RX ORDER — FENTANYL CITRATE 50 UG/ML
INJECTION, SOLUTION INTRAMUSCULAR; INTRAVENOUS
Status: DISCONTINUED | OUTPATIENT
Start: 2018-11-20 | End: 2018-11-20

## 2018-11-20 RX ADMIN — TROPICAMIDE 1 DROP: 10 SOLUTION/ DROPS OPHTHALMIC at 08:11

## 2018-11-20 RX ADMIN — MIDAZOLAM HYDROCHLORIDE 2 MG: 1 INJECTION, SOLUTION INTRAMUSCULAR; INTRAVENOUS at 10:11

## 2018-11-20 RX ADMIN — OFLOXACIN 1 DROP: 3 SOLUTION OPHTHALMIC at 08:11

## 2018-11-20 RX ADMIN — SODIUM CHLORIDE: 0.9 INJECTION, SOLUTION INTRAVENOUS at 10:11

## 2018-11-20 RX ADMIN — CYCLOPENTOLATE HYDROCHLORIDE 1 DROP: 10 SOLUTION/ DROPS OPHTHALMIC at 08:11

## 2018-11-20 RX ADMIN — FENTANYL CITRATE 50 MCG: 50 INJECTION, SOLUTION INTRAMUSCULAR; INTRAVENOUS at 10:11

## 2018-11-20 RX ADMIN — FENTANYL CITRATE 25 MCG: 50 INJECTION, SOLUTION INTRAMUSCULAR; INTRAVENOUS at 10:11

## 2018-11-20 RX ADMIN — PROPARACAINE HYDROCHLORIDE 1 DROP: 5 SOLUTION/ DROPS OPHTHALMIC at 08:11

## 2018-11-20 RX ADMIN — PHENYLEPHRINE HYDROCHLORIDE 1 DROP: 25 SOLUTION/ DROPS OPHTHALMIC at 08:11

## 2018-11-20 RX ADMIN — ACETAMINOPHEN 650 MG: 325 TABLET, FILM COATED ORAL at 11:11

## 2018-11-20 RX ADMIN — SODIUM CHLORIDE 1000 ML: 0.9 INJECTION, SOLUTION INTRAVENOUS at 09:11

## 2018-11-20 NOTE — ANESTHESIA POSTPROCEDURE EVALUATION
"Anesthesia Post Evaluation    Patient: Chelsea Rodriguez    Procedure(s) Performed: Procedure(s) (LRB):  PHACOEMULSIFICATION, CATARACT (Right)  INSERTION, IOL PROSTHESIS (Right)    Final Anesthesia Type: MAC  Patient location during evaluation: [facility]  Patient participation: Yes- Able to Participate  Level of consciousness: awake and alert and oriented  Post-procedure vital signs: reviewed and stable  Pain management: adequate  Airway patency: patent  PONV status at discharge: No PONV  Anesthetic complications: no      Cardiovascular status: hemodynamically stable  Respiratory status: unassisted  Hydration status: euvolemic  Follow-up not needed.        Visit Vitals  BP (!) 140/75   Pulse 77   Temp 36.8 °C (98.2 °F) (Temporal)   Resp 18   Ht 4' 11" (1.499 m)   Wt 68 kg (150 lb)   LMP  (LMP Unknown)   SpO2 99%   Breastfeeding? No   BMI 30.30 kg/m²       Pain/Chinyere Score: Pain Assessment Performed: Yes (11/20/2018 11:35 AM)  Presence of Pain: complains of pain/discomfort (11/20/2018 11:35 AM)  Pain Rating Prior to Med Admin: 3 (11/20/2018 11:15 AM)  Pain Rating Post Med Admin: 1 (11/20/2018 11:35 AM)  Chinyere Score: 10 (11/20/2018 11:35 AM)  Modified Chinyere Score: 19 (11/20/2018 11:35 AM)        "

## 2018-11-20 NOTE — TRANSFER OF CARE
"Anesthesia Transfer of Care Note    Patient: Chelsea Rodriguez    Procedure(s) Performed: Procedure(s) (LRB):  PHACOEMULSIFICATION, CATARACT (Right)  INSERTION, IOL PROSTHESIS (Right)    Patient location: [facility]    Anesthesia Type: MAC    Transport from OR: Transported from OR on room air with adequate spontaneous ventilation    Post pain: adequate analgesia    Post assessment: no apparent anesthetic complications and tolerated procedure well    Post vital signs: stable    Level of consciousness: awake and alert    Nausea/Vomiting: no nausea/vomiting    Complications: none    Transfer of care protocol was followed      Last vitals:   Visit Vitals  BP (!) 165/75 (BP Location: Left arm, Patient Position: Lying)   Pulse 77   Temp 36.7 °C (98.1 °F) (Oral)   Resp 18   Ht 4' 11" (1.499 m)   Wt 68 kg (150 lb)   LMP  (LMP Unknown)   SpO2 99%   Breastfeeding? No   BMI 30.30 kg/m²     "

## 2018-11-20 NOTE — DISCHARGE INSTRUCTIONS
Discharge Instructions for Cataract Surgery  A surgeon removed the cloudy lens in your eye and replaced it with a clear man-made lens. Be sure to have an adult family member or friend drive you home after surgery. Heres what you can expect following surgery and tips for a healthy recovery.  It is normal to have the following:  · Bruised or bloodshot eye for 7 days  · Itching and mild discomfort for several days  · Some fluid discharge  · Sensitivity to light  · Scratchy, sandlike feeling in the eye for 2 weeks  · Feeling tired, especially during the first 24 hours  Activity level  · Do not drive for 2 days or as instructed by your doctor.  · Do not drink alcohol for at least 24 hours.  · Avoid bending at the waist to  objects or lifting anything heavy for 2 days.  · Relax for the first 24 hours after surgery. Watching TV and reading are OK and wont harm your eye.  Eye protection  · Do not rub or press on your eye.  · Sleep on your back or on your unoperated side for 2 nights.  · If instructed, wear a bandage over your eye for 2 days and 2 nights.  · If instructed, wear a shield to protect your eye for 2 days and 2 nights.  Using eyedrops  You may be given special eyedrops or ointment. Here is one way to use eyedrops:  · Tilt your head back.  · Pull your bottom eyelid down.  · Squeeze one drop into your eye. Do not touch your eye with the bottle tip.  · Close your eyes for a few seconds.  · If you need more than one drop, wait a few minutes before adding the next one.   Call your doctor right away if you have any of the following:  · Bleeding or discharge from the eye  · Your vision suddenly becomes worse  · Pain medicine you are told to take does not ease your pain  · Nausea or vomiting  · Chills or fever over 100.4°F (39.1°C)   Date Last Reviewed: 5/30/2015  © 0822-8467 [employer]. [address] [place] [address], [place] [address]. All rights reserved. This information is not intended as a  substitute for professional medical care. Always follow your healthcare professional's instructions.

## 2018-11-20 NOTE — BRIEF OP NOTE
Operative Note     SUMMARY     Surgery Date: 11/20/2018    Surgeon(s) and Role:      Sergio Oneil MD - Primary    Pre-op Diagnosis:  Nuclear sclerosis     Post-op/ Diagnosis:  Same    Final Diagnosis: Cataract    Procedure(s) (LRB):  PHACOEMULSIFICATION-ASPIRATION-CATARACT   INSERTION-INTRAOCULAR LENS (IOL)     Anesthesia: Monitored Anesthesia Care    Findings/Key Components:  Cataract    Outcome: Tolerated procedure well    Estimated Blood Loss: None         Specimens     None          Follow-up:  Tomorrow in clinic      Discharge Note      SUMMARY     Admit Date: 11/20/2018    Attending Physician: Sergio Oneil MD    Discharge Physician: Sergio Oneil MD    Discharge Date: 11/20/2018    Final Diagnosis: Cataract    Outcome: Tolerated procedure well    Disposition: Discharge to Home.    Medications:       Chelsea Rodriguez   Home Medication Instructions DIONNE:[id number]    Printed on:11/20/18 1102   Medication Information                      aspirin (ECOTRIN) 81 MG EC tablet  once a day             co-enzyme Q-10 30 mg capsule  Take 30 mg by mouth 3 (three) times daily.             difluprednate (DUREZOL) 0.05 % Drop ophthalmic solution  Place 1 drop into the left eye 4 (four) times daily. For 30 days             ergocalciferol (ERGOCALCIFEROL) 50,000 unit Cap  Take 1 capsule (50,000 Units total) by mouth every 7 days.             ILEVRO 0.3 % DrpS  Place 1 drop into both eyes once daily. For 30 days             irbesartan (AVAPRO) 150 MG tablet  TAKE 1 TABLET(150 MG) BY MOUTH EVERY EVENING             meloxicam (MOBIC) 15 MG tablet  TAKE 1 TABLET(15 MG) BY MOUTH DAILY AS NEEDED FOR PAIN             metoprolol succinate (TOPROL-XL) 25 MG 24 hr tablet  TAKE 1 TABLET BY MOUTH TWICE DAILY             pravastatin (PRAVACHOL) 20 MG tablet  Take 1 tablet (20 mg total) by mouth once daily.                   Patient Discharge Instructions:     Keep Montano Shield over operated eye when not using  drops.     DIET:  Regular    Activity: No heavy lifting or bending X 1 week.    Follow-up:  Tomorrow in clinic

## 2018-11-20 NOTE — OP NOTE
DATE OF PROCEDURE:  11/20/2018    SURGEON:  Sergio Oneil M.D.    PREOPERATIVE DIAGNOSIS:  Nuclear sclerotic cataract, right eye.    POSTOPERATIVE DIAGNOSIS:  Nuclear sclerotic cataract, right eye.     PROCEDURE:  Clear corneal phacoemulsification with posterior chamber intraocular   lens implant, right eye.     ANESTHESIA:  Monitored anesthesia care with 4% lidocaine topically.     PROCEDURE IN DETAIL:  After the appropriate preoperative consent was obtained,   the patient had the 2% Xylocaine jelly applied to the right cornea.  The patient   was then brought to the operating room and placed into the supine position.    The right periorbit was then prepped and draped in the usual sterile ophthalmic   fashion.  A lid speculum was then inserted into the right eye.  Several drops of   the 1% lidocaine were placed onto the right cornea.  The 1% lidocaine was also   applied to the perilimbal region with the Weck-stephen sponge.  Using the 0.12-mm   forceps and the Super Sharp blade, a paracentesis site was made at the 6 o'clock   position.  Approximately 0.5 mL of the 1% lidocaine was injected into the   anterior chamber.  Next, Viscoat was injected into the anterior chamber through   the paracentesis site.  The 2.75-mm keratome and the cyclodialysis spatula were   used to create a 2.75-mm clear corneal temporal groove.  The cystitomy needle   and Utrata forceps were then used to create a continuous tear 360-degree   capsulorrhexis.  BSS in a cannula was then used for hydrodissection.    Phacoemulsification then proceeded in a stop-and-chop fashion without any   complications.  Another 0.5 mL of the 1% lidocaine was injected into the   anterior chamber.  The curved tip irrigation aspiration handpiece was then used   to remove the residual cortical material from the capsular bag.  Again, the 1%   lidocaine was applied to the perilimbal region with the Weck-stephen sponge.  Healon   GV was then injected into the anterior  chamber and capsular bag.  An [employer] intraocular lens model SN60WF, 18.0 diopters in power, and serial   #[id number] was injected into the capsular bag with the lens injector.  The   Sinskey hook was used to position the lens into its proper place.  The residual   viscoelastic material was removed from the anterior chamber and capsular bag   with the curved tip irrigation aspiration handpiece.  Both wounds were hydrated   with BSS on a cannula.  Both wounds were checked and found to be watertight.    The lid speculum was then removed from the right eye.  The patient then had 1   drop of Vigamox ophthalmic drop and 1 drop of Econopred +1% ophthalmic drop   instilled onto the right cornea.  The eye was then shielded.  The patient   tolerated the procedure well and was then brought to the recovery room in good   condition.      MALCOLM  dd: 11/20/2018 17:56:15 (CST)  td: 11/20/2018 18:10:41 (CST)  Doc ID   #[id number]  Job ID #[id number]    CC:

## 2018-11-20 NOTE — ANESTHESIA PREPROCEDURE EVALUATION
11/20/2018  Chelsea Rodriguez is a 63 y.o., female.    Pre-operative evaluation for Procedure(s) (LRB):  PHACOEMULSIFICATION, CATARACT (Right)  INSERTION, IOL PROSTHESIS (Right)    Chelsea Rodriguez is a 63 y.o. female     Patient Active Problem List   Diagnosis    History of left breast cancer    History of adenomatous polyp of colon    Pain in both knees    Pain of right upper extremity    Screening for colorectal cancer    Essential hypertension    Bilateral carpal tunnel syndrome    Statin-induced myositis    Refractive error    Vitreous detachment of both eyes    Dry eye syndrome of both eyes    Cortical cataract of both eyes    Nuclear sclerosis of right eye    Senile nuclear sclerosis    Post-operative state       Review of patient's allergies indicates:   Allergen Reactions    Codeine Nausea And Vomiting       Current Facility-Administered Medications on File Prior to Encounter   Medication Dose Route Frequency Provider Last Rate Last Dose    0.9%  NaCl infusion   Intravenous Continuous Sergio Oneil MD   Stopped at 11/06/18 1330    cyclopentolate 1% ophthalmic solution 1 drop  1 drop Left Eye On Call Procedure Sergio Oneil MD   1 drop at 11/06/18 1240    phenylephrine HCL 2.5% ophthalmic solution 1 drop  1 drop Left Eye On Call Procedure Sergio Oneil MD   1 drop at 11/06/18 1240    proparacaine 0.5 % ophthalmic solution 1 drop  1 drop Left Eye On Call Procedure Sergio Oneil MD   1 drop at 11/06/18 1200    tropicamide 1% ophthalmic solution 1 drop  1 drop Left Eye On Call Procedure Sergio Oneil MD   1 drop at 11/06/18 1239     Current Outpatient Medications on File Prior to Encounter   Medication Sig Dispense Refill    aspirin (ECOTRIN) 81 MG EC tablet once a day      co-enzyme Q-10 30 mg capsule Take 30 mg by mouth 3 (three) times daily.       ILEVRO 0.3 % DrpS Place 1 drop into both eyes once daily. For 30 days 3 mL 1    irbesartan (AVAPRO) 150 MG tablet TAKE 1 TABLET(150 MG) BY MOUTH EVERY EVENING 90 tablet 1    metoprolol succinate (TOPROL-XL) 25 MG 24 hr tablet TAKE 1 TABLET BY MOUTH TWICE DAILY (Patient taking differently: TAKE 1 TABLET BY MOUTH TWICE DAILY pt taking once a day) 180 tablet 3    pravastatin (PRAVACHOL) 20 MG tablet Take 1 tablet (20 mg total) by mouth once daily. 90 tablet 0    difluprednate (DUREZOL) 0.05 % Drop ophthalmic solution Place 1 drop into the left eye 4 (four) times daily. For 30 days 5 mL 1    ergocalciferol (ERGOCALCIFEROL) 50,000 unit Cap Take 1 capsule (50,000 Units total) by mouth every 7 days. 12 capsule 3       Past Surgical History:   Procedure Laterality Date    BILATERAL SALPINGOOPHORECTOMY      BREAST BIOPSY Left     BREAST LUMPECTOMY       SECTION      x2    COLONOSCOPY N/A 10/20/2017    Procedure: COLONOSCOPY;  Surgeon: Mitchell Danielson Jr., MD;  Location: [facility] ENDO;  Service: Endoscopy;  Laterality: N/A;    COLONOSCOPY N/A 10/20/2017    Performed by Mitchell Danielson Jr., MD at [facility] END[facility]    CYST REMOVAL      on back    HERNIA REPAIR      HYSTERECTOMY      INSERTION, IOL PROSTHESIS Left 2018    Performed by Sergio Oneil MD at [facility] OR [address] [place] [address]    INTRAOCULAR PROSTHESES INSERTION Left 2018    Procedure: INSERTION, IOL PROSTHESIS;  Surgeon: Sergio Oneil MD;  Location: [facility] OR [address] [place] [address];  Service: Ophthalmology;  Laterality: Left;    PHACOEMULSIFICATION OF CATARACT Left 2018    Procedure: PHACOEMULSIFICATION, CATARACT;  Surgeon: Sergio Oneil MD;  Location: [facility] OR [address] [place] [address];  Service: Ophthalmology;  Laterality: Left;    PHACOEMULSIFICATION, CATARACT Left 2018    Performed by Sergio Oneil MD at [facility] OR [address] [place] [address]    supracervical abdominal hysterectomy  1978    fibroids       Social History     Socioeconomic History     "Marital status: Single     Spouse name: Not on file    Number of children: Not on file    Years of education: Not on file    Highest education level: Not on file   Social Needs    Financial resource strain: Not on file    Food insecurity - worry: Not on file    Food insecurity - inability: Not on file    Transportation needs - medical: Not on file    Transportation needs - non-medical: Not on file   Occupational History    Not on file   Tobacco Use    Smoking status: Never Smoker    Smokeless tobacco: Never Used   Substance and Sexual Activity    Alcohol use: Yes     Comment: not often     Drug use: No    Sexual activity: Not Currently   Other Topics Concern    Not on file   Social History Narrative    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 hx of left lumpectomy for "breast cancer"- with 5yrs of tamoxifen use. Sees Dr. Buckley ([facility] for surveillance/MMG)        Dr. Lala former PCP, [facility]              CBC: No results for input(s): WBC, RBC, HGB, HCT, PLT, MCV, MCH, MCHC in the last 72 hours.    CMP: No results for input(s): NA, K, CL, CO2, BUN, CREATININE, GLU, MG, PHOS, CALCIUM, ALBUMIN, PROT, ALKPHOS, ALT, AST, BILITOT in the last 72 hours.    INR  No results for input(s): PT, INR, PROTIME, APTT in the last 72 hours.        Diagnostic Studies:      EKD Echo:  Results for orders placed or performed in visit on 17   2D Echo w/ Color Flow Doppler   Result Value Ref Range    QEF 60 55 - 65    Mitral Valve Regurgitation MILD     Diastolic Dysfunction No     Est. PA Systolic Pressure 19.71     Mitral Valve Mobility NORMAL     Tricuspid Valve Regurgitation TRIVIAL          Anesthesia Evaluation    I have reviewed the Patient Summary Reports.    I have reviewed the Nursing Notes.      Review of Systems  Anesthesia Hx:  Hx of Anesthetic complications PONV History of prior surgery of interest to airway management or planning: " Denies Family Hx of Anesthesia complications.    Hematology/Oncology:         -- Cancer in past history:   Cardiovascular:   Hypertension        Physical Exam  General:  Well nourished    Airway/Jaw/Neck:  Airway Findings: Mouth Opening: Normal Tongue: Normal  General Airway Assessment: Adult  Mallampati: III  Improves to II with phonation.  TM Distance: Normal, at least 6 cm  Jaw/Neck Findings:  Neck ROM: Extension Decreased, Mild      Dental:  Dental Findings: In tact   Chest/Lungs:  Chest/Lungs Findings: Clear to auscultation, Normal Respiratory Rate     Heart/Vascular:  Heart Findings: Rate: Normal  Rhythm: Regular Rhythm        Mental Status:  Mental Status Findings:  Cooperative, Alert and Oriented         Anesthesia Plan  Type of Anesthesia, risks & benefits discussed:  Anesthesia Type:  MAC  Patient's Preference:   Intra-op Monitoring Plan: standard ASA monitors  Intra-op Monitoring Plan Comments:   Post Op Pain Control Plan: IV/PO Opioids PRN  Post Op Pain Control Plan Comments:   Induction:   IV  Beta Blocker:  Patient is not currently on a Beta-Blocker (No further documentation required).       Informed Consent: Patient understands risks and agrees with Anesthesia plan.  Questions answered. Anesthesia consent signed with patient.  ASA Score: 2     Day of Surgery Review of History & Physical:    H&P update referred to the surgeon.         Ready For Surgery From Anesthesia Perspective.

## 2018-11-21 ENCOUNTER — OFFICE VISIT (OUTPATIENT)
Dept: OPHTHALMOLOGY | Facility: CLINIC | Age: 63
End: 2018-11-21
Payer: COMMERCIAL

## 2018-11-21 VITALS — DIASTOLIC BLOOD PRESSURE: 82 MMHG | SYSTOLIC BLOOD PRESSURE: 139 MMHG | HEART RATE: 69 BPM

## 2018-11-21 DIAGNOSIS — Z98.890 POST-OPERATIVE STATE: Primary | ICD-10-CM

## 2018-11-21 PROCEDURE — 99024 POSTOP FOLLOW-UP VISIT: CPT | Mod: S$GLB,,, | Performed by: OPHTHALMOLOGY

## 2018-11-21 PROCEDURE — 99999 PR PBB SHADOW E&M-EST. PATIENT-LVL III: CPT | Mod: PBBFAC,,, | Performed by: OPHTHALMOLOGY

## 2018-11-21 NOTE — PROGRESS NOTES
Subjective:       Patient ID: Chelsea Rodriguez is a 63 y.o. female.    Chief Complaint: Post-op Evaluation (1 day PO OD. CE IOL 11/20/2018 OD. )    HPI     Post-op Evaluation      Additional comments: 1 day PO OD. CE IOL 11/20/2018 OD.               Comments     63 y.o. Female is here for 1 day PO OD. CE IOL 11/20/2018 OD. Denies eye   pain and flashes. Notice white floaters left eye about three times a day,   but has decrease over the last few days. No discomfort.     Eye Meds: Ofloxacin QID OD                     Durezol QID OD                     Ilevro qd OD               Last edited by BRIDGETT Sutton on 11/21/2018  9:52 AM. (History)             Assessment:       1. Post-operative state        Plan:       S/p CE OU- Doing well.        CPM OD.  Taper gtts OS.  RTC 1 wk.

## 2018-11-27 ENCOUNTER — OFFICE VISIT (OUTPATIENT)
Dept: INTERNAL MEDICINE | Facility: CLINIC | Age: 63
End: 2018-11-27
Payer: COMMERCIAL

## 2018-11-27 VITALS
WEIGHT: 150.38 LBS | DIASTOLIC BLOOD PRESSURE: 80 MMHG | OXYGEN SATURATION: 98 % | SYSTOLIC BLOOD PRESSURE: 124 MMHG | HEART RATE: 75 BPM | BODY MASS INDEX: 30.37 KG/M2

## 2018-11-27 DIAGNOSIS — I10 ESSENTIAL HYPERTENSION: Primary | ICD-10-CM

## 2018-11-27 DIAGNOSIS — Z23 NEED FOR SHINGLES VACCINE: ICD-10-CM

## 2018-11-27 DIAGNOSIS — H25.9 SENILE CATARACT, UNSPECIFIED AGE-RELATED CATARACT TYPE, UNSPECIFIED LATERALITY: ICD-10-CM

## 2018-11-27 DIAGNOSIS — R45.89 ANXIETY ABOUT HEALTH: ICD-10-CM

## 2018-11-27 PROCEDURE — 3008F BODY MASS INDEX DOCD: CPT | Mod: CPTII,S$GLB,, | Performed by: INTERNAL MEDICINE

## 2018-11-27 PROCEDURE — 3074F SYST BP LT 130 MM HG: CPT | Mod: CPTII,S$GLB,, | Performed by: INTERNAL MEDICINE

## 2018-11-27 PROCEDURE — 3079F DIAST BP 80-89 MM HG: CPT | Mod: CPTII,S$GLB,, | Performed by: INTERNAL MEDICINE

## 2018-11-27 PROCEDURE — 99999 PR PBB SHADOW E&M-EST. PATIENT-LVL III: CPT | Mod: PBBFAC,,, | Performed by: INTERNAL MEDICINE

## 2018-11-27 PROCEDURE — 99214 OFFICE O/P EST MOD 30 MIN: CPT | Mod: S$GLB,,, | Performed by: INTERNAL MEDICINE

## 2018-11-27 NOTE — PROGRESS NOTES
Portions of this note are generated with voice recognition software. Typographical errors may exist.     SUBJECTIVE:    This is a/an 63 y.o. female here for primary care visit for  Chief Complaint   Patient presents with    Follow-up     HTN     Patient states that she has been checking blood pressure at home and on average the blood pressure numbers are within range as noted with home blood pressure numbers indicated below.  The patient is complying with her blood pressure medication metoprolol.  She is very reluctant to pursue additional pharmacotherapy.  She states that she has chronically had problems with generalized anxiety and gets anxious with most life stressors and has a lot of difficulty controlling the anxiety.  She is very reluctant to pursue pharmacotherapy for this and is also very reluctant to establish care with a counselor.  On recommendation for self-directed learning for better coping strategies for anxiety the patient sounds modestly interested in the self-directed learning.    146/86  129/87  130/86  133/86  142/86  129/78  130/78  130/79    The patient has recently completed successfully extraction of 2 cataracts and is recovering well from the procedures    Medications Reviewed and Updated    Past medical, family, and social histories were reviewed and updated.    Review of Systems negative unless otherwise noted in history of present illness-  ROS    General ROS: negative  Psychological ROS: negative  ENT ROS: negative  Allergy and Immunology ROS: negative  Cardiovascular ROS: negative  Pulmonary ROS: Negative  Gastrointestinal ROS: negative  Genito-Urinary ROS: negative  Musculoskeletal ROS: negative  Neurological ROS: negative      Allergic:    Review of patient's allergies indicates:   Allergen Reactions    Codeine Nausea And Vomiting       OBJECTIVE:  BP: 124/80 Pulse: 75    Wt Readings from Last 3 Encounters:   11/27/18 68.2 kg (150 lb 5.7 oz)   11/20/18 68 kg (150 lb)   10/22/18 69.3  kg (152 lb 12.5 oz)    Body mass index is 30.37 kg/m².  Previous Blood Pressure Readings :   BP Readings from Last 3 Encounters:   11/27/18 124/80   11/21/18 139/82   11/20/18 (!) 140/75       Physical Exam    GEN: No apparent distress  HEENT: sclera non-icteric, conjunctiva clear  CV: no peripheral edema regular rate and rhythm.  No murmurs  PULM: breathing non-labored  ABD:  protuberant abdomen.  PSYCH: appropriate affect  MSK: able to rise from chair without assistance  SKIN: normal skin turgor    Pertinent Labs Reviewed       ASSESSMENT/PLAN:    Essential hypertension.Condition stable.  Counseling given today on self-care measures. Plan to monitor clinically. Continue current medical plan.     Anxiety about health.Condition not optimally controlled. Detailed counseling on self care measures. Plan to monitor clinically in addition to plan below or as listed on After Visit Summary.     Senile cataract, unspecified age-related cataract type, unspecified laterality.Condition improving.  Counseling on self-care measures today.  Continue with current plan in addition to recommendations written on After Visit Summary.     Need for shingles vaccine  -     varicella-zoster [id number], PF, (SHINGRIX, PF,) 50 mcg/0.5 mL injection; Inject 0.5 mLs into the muscle As instructed. 2 doses  Dispense: 0.5 mL; Refill: 2          Future Appointments   Date Time Provider Department Center   11/28/2018  9:00 AM Frandy Harrison OD [facility] OPTOMTY [place]   12/19/2018  9:00 AM Sergio Oneil MD [facility] OPHTHAL [place]   12/26/2018 10:00 AM Teresa Iqbal MD [facility] RHEUM [facility]   3/22/2019  7:45 AM LAB, [facility] [place]   3/27/2019  9:40 AM Nayan Linares MD [facility]  11/27/2018  10:42 AM

## 2018-11-28 ENCOUNTER — OFFICE VISIT (OUTPATIENT)
Dept: OPTOMETRY | Facility: CLINIC | Age: 63
End: 2018-11-28
Payer: COMMERCIAL

## 2018-11-28 DIAGNOSIS — Z98.41 CATARACT EXTRACTION STATUS OF EYE, RIGHT: Primary | ICD-10-CM

## 2018-11-28 PROCEDURE — 99024 POSTOP FOLLOW-UP VISIT: CPT | Mod: S$GLB,,, | Performed by: OPTOMETRIST

## 2018-11-28 PROCEDURE — 99999 PR PBB SHADOW E&M-EST. PATIENT-LVL II: CPT | Mod: PBBFAC,,, | Performed by: OPTOMETRIST

## 2018-11-28 NOTE — PROGRESS NOTES
HPI     DLS:11/14/18 Dr. edmondson   11/20/2018 OD; S/p phaco w/IOL OS - 11/6/18   Pt states her VA is good states the right eye is not as clear as the left   but it is getting better.pt states she can see the leans they put in, in   her Va. States she cant read with out OTC reader +2.50  Flouters in both eyes before  Sx    durezol 0.05% 2x a day   ilevro 0.3% 1 a day     Last edited by Rae Mahoney MA on 11/28/2018  9:24 AM. (History)          ROS     Positive for: Eyes (cat surgery OU)    Negative for: Constitutional, Gastrointestinal, Neurological, Skin,   Genitourinary, Musculoskeletal, HENT, Endocrine, Cardiovascular,   Respiratory, Psychiatric, Allergic/Imm, Heme/Lymph    Last edited by Frandy Harrison, OD on 11/28/2018 11:10 AM. (History)        Assessment /Plan     For exam results, see Encounter Report.    Cataract extraction status of eye, right      1 week post op cat surgery OD--doing well    PLAN:    1. Cont med taper as per written schedule  2. rtc 3 weeks as scheduled w Dr Tillman

## 2018-12-13 DIAGNOSIS — N60.19 FIBROCYSTIC DISEASE OF BREAST: Primary | ICD-10-CM

## 2018-12-14 ENCOUNTER — HOSPITAL ENCOUNTER (OUTPATIENT)
Dept: RADIOLOGY | Facility: HOSPITAL | Age: 63
Discharge: HOME | End: 2018-12-14
Attending: ORTHOPAEDIC SURGERY
Payer: COMMERCIAL

## 2018-12-14 ENCOUNTER — OFFICE VISIT (OUTPATIENT)
Dept: ORTHOPEDICS | Facility: CLINIC | Age: 63
End: 2018-12-14
Payer: COMMERCIAL

## 2018-12-14 VITALS — HEIGHT: 59 IN | WEIGHT: 150 LBS | BODY MASS INDEX: 30.24 KG/M2

## 2018-12-14 DIAGNOSIS — M17.0 PRIMARY OSTEOARTHRITIS OF BOTH KNEES: Primary | ICD-10-CM

## 2018-12-14 DIAGNOSIS — M17.0 PRIMARY OSTEOARTHRITIS OF BOTH KNEES: ICD-10-CM

## 2018-12-14 PROCEDURE — 99213 OFFICE O/P EST LOW 20 MIN: CPT | Mod: S$GLB,,, | Performed by: ORTHOPAEDIC SURGERY

## 2018-12-14 PROCEDURE — 3008F BODY MASS INDEX DOCD: CPT | Mod: CPTII,S$GLB,, | Performed by: ORTHOPAEDIC SURGERY

## 2018-12-14 PROCEDURE — 73564 X-RAY EXAM KNEE 4 OR MORE: CPT | Mod: TC,50,PN

## 2018-12-14 PROCEDURE — 73564 X-RAY EXAM KNEE 4 OR MORE: CPT | Mod: 26,,, | Performed by: RADIOLOGY

## 2018-12-14 PROCEDURE — 99999 PR PBB SHADOW E&M-EST. PATIENT-LVL II: CPT | Mod: PBBFAC,,, | Performed by: ORTHOPAEDIC SURGERY

## 2018-12-14 NOTE — PROGRESS NOTES
Subjective:      Patient ID: Chelsea Rodriguez is a 63 y.o. female.    Chief Complaint: Pain of the Left Knee and Pain of the Right Knee    HPI follow-up for osteoarthritis.  The patient reports that both her of her knees are bothersome.  She finds it difficult to work or walk long distances.  NSAIDs and injections have been modestly beneficial    Review of Systems   Constitution: Negative for fever and weight loss.   HENT: Negative for congestion.    Eyes: Negative for visual disturbance.   Cardiovascular: Negative for chest pain.   Respiratory: Negative for shortness of breath.    Hematologic/Lymphatic: Negative for bleeding problem. Does not bruise/bleed easily.   Skin: Negative for poor wound healing.   Musculoskeletal: Positive for joint pain.   Gastrointestinal: Negative for abdominal pain.   Genitourinary: Negative for dysuria.   Neurological: Negative for seizures.   Psychiatric/Behavioral: Negative for altered mental status.   Allergic/Immunologic: Negative for persistent infections.         Objective:            Ortho/SPM Exam  Right knee    [unfilled]    The patient is not in acute distress.   Sclerae normal  Body habitus is normal.  Respiratory distress:  none   The patient walks with a limp.  Hip irritability  negative.   The skin over the knee is intact.  Knee effusion trace  Tendernes is located medially  Range of motion- Flexion 120 deg, Extension 0 deg,   Ligament laxity exam:   MCL 2+   Lachman negative   Post sag negative    LCL 0  Patellar apprehension negative.  Popliteal cyst negative  Patellar crepitation absent.  Flexion/pinch not applicable  Pulses DP intact, PT intact.  Motor normal 5/5 strength in all tested muscle groups.   Sensory normal.    I reviewed the relevant radiographic images for the patient's condition:  Recent films of both knees show very advanced medial narrowing with osteophytes      Assessment:       Encounter Diagnosis   Name Primary?    Primary osteoarthritis of both knees  Yes        the patient has advanced objective findings and fairly poor symptom control with nonsurgical measures.  Plan:       Chelsea was seen today for pain and pain.    Diagnoses and all orders for this visit:    Primary osteoarthritis of both knees      I explained my diagnostic impression and the reasoning behind it in detail, using layman's terms.  Models and/or pictures were used to help in the explanation.    We discussed the possibility of more injections and bracing and the patient wants to hold off on this for now.    Continue NSAIDs per primary care    The patient ask questions about disability which is reasonable.  I told her that I believe she would qualify but the final determination is made by cover mental agencies.  She is advised to contact social security    I explained the potential role of surgery in the treatment of this condition to the patient.  They understand that if nonsurgical measures do not adequately control symptoms, surgery will be considered in the future.

## 2018-12-19 ENCOUNTER — TELEPHONE (OUTPATIENT)
Dept: INTERNAL MEDICINE | Facility: CLINIC | Age: 63
End: 2018-12-19

## 2018-12-19 ENCOUNTER — TELEPHONE (OUTPATIENT)
Dept: OPHTHALMOLOGY | Facility: CLINIC | Age: 63
End: 2018-12-19

## 2018-12-19 ENCOUNTER — OFFICE VISIT (OUTPATIENT)
Dept: OPHTHALMOLOGY | Facility: CLINIC | Age: 63
End: 2018-12-19
Payer: COMMERCIAL

## 2018-12-19 DIAGNOSIS — Z98.890 POST-OPERATIVE STATE: Primary | ICD-10-CM

## 2018-12-19 DIAGNOSIS — H52.7 REFRACTIVE ERROR: ICD-10-CM

## 2018-12-19 PROCEDURE — 99024 POSTOP FOLLOW-UP VISIT: CPT | Mod: S$GLB,,, | Performed by: OPHTHALMOLOGY

## 2018-12-19 PROCEDURE — 99999 PR PBB SHADOW E&M-EST. PATIENT-LVL II: CPT | Mod: PBBFAC,,, | Performed by: OPHTHALMOLOGY

## 2018-12-19 NOTE — PROGRESS NOTES
Subjective:       Patient ID: Chelsea Rodriguez is a 63 y.o. female.    Chief Complaint: Post-op Evaluation (3 weeks po ou)    HPI     Post-op Evaluation      Additional comments: 3 weeks po ou              Comments     DSL- 11/28/18     64 y/o female is here of 3 weeks po ou. Pt states Va is well. Pt denies   pain and discomfort.     Eyemeds  No gtts          Last edited by Clarissa Schmitz on 12/19/2018  9:38 AM. (History)             Assessment:       1. Post-operative state    2. Refractive error        Plan:       S/p CE OU- Doing well.  RE-Doing well with readers.        Readers.  RTC 6 mos.

## 2018-12-19 NOTE — TELEPHONE ENCOUNTER
----- Message from Christa Gaspar sent at 12/19/2018 12:52 PM CST -----  Contact: Chelsea  Ms. Rodriguez would like to know if you her PD distance. She can be reached at [phone].

## 2018-12-19 NOTE — TELEPHONE ENCOUNTER
----- Message from Elli Pierce sent at 12/19/2018  2:26 PM CST -----  Contact: self / [phone]  Patient is requesting a call back regarding, her rheumatoid arthritis. Please advise

## 2018-12-26 ENCOUNTER — HOSPITAL ENCOUNTER (OUTPATIENT)
Dept: RADIOLOGY | Facility: HOSPITAL | Age: 63
Discharge: HOME | End: 2018-12-26
Attending: SURGERY
Payer: COMMERCIAL

## 2018-12-26 ENCOUNTER — OFFICE VISIT (OUTPATIENT)
Dept: RHEUMATOLOGY | Facility: CLINIC | Age: 63
End: 2018-12-26
Payer: COMMERCIAL

## 2018-12-26 VITALS
DIASTOLIC BLOOD PRESSURE: 84 MMHG | WEIGHT: 153.25 LBS | HEIGHT: 59 IN | BODY MASS INDEX: 30.89 KG/M2 | SYSTOLIC BLOOD PRESSURE: 138 MMHG | HEART RATE: 71 BPM

## 2018-12-26 DIAGNOSIS — R74.8 ABNORMAL CPK: Primary | ICD-10-CM

## 2018-12-26 DIAGNOSIS — Z85.3 HX OF BREAST CANCER: ICD-10-CM

## 2018-12-26 DIAGNOSIS — N60.19 FIBROCYSTIC DISEASE OF BREAST: ICD-10-CM

## 2018-12-26 PROCEDURE — 77063 BREAST TOMOSYNTHESIS BI: CPT | Mod: TC

## 2018-12-26 PROCEDURE — 3008F BODY MASS INDEX DOCD: CPT | Mod: CPTII,S$GLB,, | Performed by: INTERNAL MEDICINE

## 2018-12-26 PROCEDURE — 99999 PR PBB SHADOW E&M-EST. PATIENT-LVL III: CPT | Mod: PBBFAC,,, | Performed by: INTERNAL MEDICINE

## 2018-12-26 PROCEDURE — 77067 SCR MAMMO BI INCL CAD: CPT | Mod: TC

## 2018-12-26 PROCEDURE — 3079F DIAST BP 80-89 MM HG: CPT | Mod: CPTII,S$GLB,, | Performed by: INTERNAL MEDICINE

## 2018-12-26 PROCEDURE — 77063 BREAST TOMOSYNTHESIS BI: CPT | Mod: 26,,, | Performed by: RADIOLOGY

## 2018-12-26 PROCEDURE — 77067 SCR MAMMO BI INCL CAD: CPT | Mod: 26,,, | Performed by: RADIOLOGY

## 2018-12-26 PROCEDURE — 99204 OFFICE O/P NEW MOD 45 MIN: CPT | Mod: S$GLB,,, | Performed by: INTERNAL MEDICINE

## 2018-12-26 PROCEDURE — 3075F SYST BP GE 130 - 139MM HG: CPT | Mod: CPTII,S$GLB,, | Performed by: INTERNAL MEDICINE

## 2018-12-26 NOTE — PROGRESS NOTES
Subjective:       Patient ID: Chelsea Rodriguez is a 63 y.o. female.    Chief Complaint: No chief complaint on file.    HPI 63 year old F with PMH of HTN, HL, left breast cancer( around 2010, s/p lumpectomy/radiation), CTS of both hands here for evaluation.   She  Reports that she has pain in knees and right shoulder. Denies muscle pain.   Reports that her pain level in the knees can be as high as 9/10 when she gets up.  She has had knee pain for last 2 years. Denies joint swelling.  Reports mild stiffness in hands. Reports that her knees prevent her getting up. She reports that she has had the right shoulder pain for last 3 years.  She has not hurt the shoulder. She is on pravastatin since July. She was previously on  rosuvastatin. Denies any rashes, oral ulcers, raynauds, pleurisy , or photosensitivity.  She takes meloxicam and it helps her pain. Moving makes her pain. Resting improves her pain.      Past Medical History:   Diagnosis Date    Arthritis     Breast cancer 2011    left breast    Cataract     Hyperlipidemia     Hypertension        Review of Systems   Constitutional: Negative for activity change, appetite change, chills, diaphoresis, fever and unexpected weight change.   HENT: Negative for congestion, ear discharge, ear pain, facial swelling, mouth sores, sinus pressure, sneezing, sore throat, tinnitus and trouble swallowing.    Eyes: Negative for photophobia, pain, discharge, redness, itching and visual disturbance.   Respiratory: Negative for apnea, chest tightness, shortness of breath, wheezing and stridor.    Cardiovascular: Negative for leg swelling.   Gastrointestinal: Negative for abdominal distention, abdominal pain, anal bleeding, blood in stool, constipation, diarrhea and nausea.   Endocrine: Negative for cold intolerance and heat intolerance.   Genitourinary: Negative for difficulty urinating and dysuria.   Musculoskeletal: Positive for arthralgias. Negative for back pain, gait problem,  "joint swelling, myalgias, neck pain and neck stiffness.   Skin: Negative for color change, pallor, rash and wound.   Neurological: Negative for dizziness, seizures, light-headedness and numbness.   Hematological: Negative for adenopathy. Does not bruise/bleed easily.   Psychiatric/Behavioral: Negative for sleep disturbance. The patient is not nervous/anxious.            Objective:   /84   Pulse 71   Ht 4' 11" (1.499 m)   Wt 69.5 kg (153 lb 3.5 oz)   LMP  (LMP Unknown)   BMI 30.95 kg/m²      Physical Exam   Constitutional: She is oriented to person, place, and time.   HENT:   Head: Normocephalic and atraumatic.   Right Ear: External ear normal.   Left Ear: External ear normal.   Nose: Nose normal.   Mouth/Throat: Oropharynx is clear and moist. No oropharyngeal exudate.   Eyes: Conjunctivae and EOM are normal. Pupils are equal, round, and reactive to light. Right eye exhibits no discharge. Left eye exhibits no discharge. No scleral icterus.   Neck: Neck supple. No JVD present. No thyromegaly present.   Cardiovascular: Normal rate, regular rhythm, normal heart sounds and intact distal pulses.  Exam reveals no gallop and no friction rub.    No murmur heard.  Pulmonary/Chest: Effort normal and breath sounds normal. No respiratory distress. She has no wheezes. She has no rales. She exhibits no tenderness.   Abdominal: Soft. Bowel sounds are normal. She exhibits no distension and no mass. There is no tenderness. There is no rebound and no guarding.   Lymphadenopathy:     She has no cervical adenopathy.   Neurological: She is alert and oriented to person, place, and time. No cranial nerve deficit. Gait normal. Coordination normal.   Skin: Skin is dry. No rash noted. No erythema. No pallor.     Psychiatric: Affect and judgment normal.   Musculoskeletal: She exhibits no edema, tenderness or deformity.         5/5 strength in upper and lower extremity          Knee xrays ( 12/2018)I personally reviewed) : " DJD  Assessment:     63 year old F with PMH of HTN, HL, left breast cancer( around 2010, s/p lumpectomy/radiation), CTS of both hands here for evaluation of elevated CPK.  Patient denies myalgias.  She reports bilateral knee pain but she has known DJD.  She has 5/5 strength on exam so low suspicion for a myositis but she may be having myopathy from statin.        1. Abnormal CPK            Plan:     * labs  Discussed causes of elevated cpk  rtc pending results

## 2018-12-26 NOTE — LETTER
December 26, 2018      Nayan Linares MD  [address] [facility]  [place] [address]           [facility]  [address] [place] [address] [address]  [facility] [address]  Phone: [phone]          Patient: Chelsea Rodriguez   MR Number: [id number]   YOB: 1955   Date of Visit: 12/26/2018       Dear Dr. Nayan Linares:    Thank you for referring Chelsea Rodriguez to me for evaluation. Attached you will find relevant portions of my assessment and plan of care.    If you have questions, please do not hesitate to call me. I look forward to following Chelsea Rodriguez along with you.    Sincerely,    Teresa Iqbal MD    Enclosure  CC:  No Recipients    If you would like to receive this communication electronically, please contact [email or web address] or [phone] to request more information on [facility] access.    For providers and/or their staff who would like to refer a patient to [facility], please contact us through our one-stop-shop provider referral line, [facility], at [phone].    If you feel you have received this communication in error or would no longer like to receive these types of communications, please e-mail [email or web address]

## 2019-02-13 ENCOUNTER — OFFICE VISIT (OUTPATIENT)
Dept: URGENT CARE | Facility: CLINIC | Age: 64
End: 2019-02-13
Payer: COMMERCIAL

## 2019-02-13 VITALS
TEMPERATURE: 98 F | SYSTOLIC BLOOD PRESSURE: 128 MMHG | OXYGEN SATURATION: 96 % | DIASTOLIC BLOOD PRESSURE: 48 MMHG | RESPIRATION RATE: 18 BRPM | HEART RATE: 75 BPM | BODY MASS INDEX: 30.64 KG/M2 | HEIGHT: 59 IN | WEIGHT: 152 LBS

## 2019-02-13 DIAGNOSIS — J10.1 INFLUENZA A: Primary | ICD-10-CM

## 2019-02-13 DIAGNOSIS — R68.89 FLU-LIKE SYMPTOMS: ICD-10-CM

## 2019-02-13 LAB
CTP QC/QA: YES
FLUAV AG NPH QL: POSITIVE
FLUBV AG NPH QL: NEGATIVE

## 2019-02-13 PROCEDURE — 3074F SYST BP LT 130 MM HG: CPT | Mod: CPTII,S$GLB,, | Performed by: PHYSICIAN ASSISTANT

## 2019-02-13 PROCEDURE — 3078F DIAST BP <80 MM HG: CPT | Mod: CPTII,S$GLB,, | Performed by: PHYSICIAN ASSISTANT

## 2019-02-13 PROCEDURE — 87804 INFLUENZA ASSAY W/OPTIC: CPT | Mod: QW,S$GLB,, | Performed by: PHYSICIAN ASSISTANT

## 2019-02-13 PROCEDURE — 87804 POCT INFLUENZA A/B: ICD-10-PCS | Mod: 59,QW,S$GLB, | Performed by: PHYSICIAN ASSISTANT

## 2019-02-13 PROCEDURE — 99214 PR OFFICE/OUTPT VISIT, EST, LEVL IV, 30-39 MIN: ICD-10-PCS | Mod: S$GLB,,, | Performed by: PHYSICIAN ASSISTANT

## 2019-02-13 PROCEDURE — 3074F PR MOST RECENT SYSTOLIC BLOOD PRESSURE < 130 MM HG: ICD-10-PCS | Mod: CPTII,S$GLB,, | Performed by: PHYSICIAN ASSISTANT

## 2019-02-13 PROCEDURE — 99214 OFFICE O/P EST MOD 30 MIN: CPT | Mod: S$GLB,,, | Performed by: PHYSICIAN ASSISTANT

## 2019-02-13 PROCEDURE — 3078F PR MOST RECENT DIASTOLIC BLOOD PRESSURE < 80 MM HG: ICD-10-PCS | Mod: CPTII,S$GLB,, | Performed by: PHYSICIAN ASSISTANT

## 2019-02-13 PROCEDURE — 3008F PR BODY MASS INDEX (BMI) DOCUMENTED: ICD-10-PCS | Mod: CPTII,S$GLB,, | Performed by: PHYSICIAN ASSISTANT

## 2019-02-13 PROCEDURE — 3008F BODY MASS INDEX DOCD: CPT | Mod: CPTII,S$GLB,, | Performed by: PHYSICIAN ASSISTANT

## 2019-02-13 RX ORDER — PROMETHAZINE HYDROCHLORIDE AND DEXTROMETHORPHAN HYDROBROMIDE 6.25; 15 MG/5ML; MG/5ML
5 SYRUP ORAL 3 TIMES DAILY PRN
Qty: 180 ML | Refills: 0 | Status: SHIPPED | OUTPATIENT
Start: 2019-02-13 | End: 2019-02-23

## 2019-02-14 NOTE — PROGRESS NOTES
"Subjective:       Patient ID: Chelsea Rodriguez is a 64 y.o. female.    Vitals:  height is 4' 11" (1.499 m) and weight is 68.9 kg (152 lb). Her temperature is 98.4 °F (36.9 °C). Her blood pressure is 128/48 (abnormal) and her pulse is 75. Her respiration is 18 and oxygen saturation is 96%.     Chief Complaint: Sinus Problem    Sinus Problem   This is a new problem. Episode onset: 4 days. The problem has been gradually worsening since onset. There has been no fever. Her pain is at a severity of 2/10. The pain is mild. Associated symptoms include congestion, coughing and sinus pressure. Pertinent negatives include no chills, diaphoresis, ear pain, shortness of breath or sore throat. Past treatments include oral decongestants. The treatment provided no relief.       Constitution: Positive for fatigue. Negative for chills, sweating and fever.   HENT: Positive for congestion, sinus pain and sinus pressure. Negative for ear pain, sore throat and voice change.    Neck: Negative for painful lymph nodes.   Eyes: Negative for eye redness.   Respiratory: Positive for cough, sputum production and wheezing. Negative for chest tightness, bloody sputum, COPD, shortness of breath, stridor and asthma.    Gastrointestinal: Negative for nausea and vomiting.   Musculoskeletal: Positive for muscle ache.   Skin: Negative for rash.   Allergic/Immunologic: Negative for seasonal allergies and asthma.   Hematologic/Lymphatic: Negative for swollen lymph nodes.       Objective:      Physical Exam   Constitutional: She is oriented to person, place, and time. She appears well-developed and well-nourished. She is cooperative.  Non-toxic appearance. She does not appear ill. No distress.   HENT:   Head: Normocephalic and atraumatic.   Right Ear: Hearing, tympanic membrane, external ear and ear canal normal.   Left Ear: Hearing, tympanic membrane, external ear and ear canal normal.   Nose: Mucosal edema and rhinorrhea present. No nasal deformity. No " epistaxis. Right sinus exhibits no maxillary sinus tenderness and no frontal sinus tenderness. Left sinus exhibits no maxillary sinus tenderness and no frontal sinus tenderness.   Mouth/Throat: Uvula is midline and mucous membranes are normal. No trismus in the jaw. Normal dentition. No uvula swelling. Posterior oropharyngeal erythema present. Tonsils are 1+ on the right. Tonsils are 1+ on the left. No tonsillar exudate.   Eyes: Conjunctivae and lids are normal. No scleral icterus.   Sclera clear bilat   Neck: Trachea normal, full passive range of motion without pain and phonation normal. Neck supple.   Cardiovascular: Normal rate, regular rhythm, normal heart sounds, intact distal pulses and normal pulses.   Pulmonary/Chest: Effort normal and breath sounds normal. No accessory muscle usage or stridor. No respiratory distress. She has no decreased breath sounds. She has no wheezes. She has no rhonchi. She has no rales.   Abdominal: Soft. Normal appearance and bowel sounds are normal. She exhibits no distension. There is no tenderness.   Musculoskeletal: Normal range of motion. She exhibits no edema or deformity.   Neurological: She is alert and oriented to person, place, and time. She exhibits normal muscle tone. Coordination normal.   Skin: Skin is warm, dry and intact. She is not diaphoretic. No pallor.   Psychiatric: She has a normal mood and affect. Her speech is normal and behavior is normal. Judgment and thought content normal. Cognition and memory are normal.   Nursing note and vitals reviewed.      Assessment:       1. Influenza A    2. Flu-like symptoms        Plan:         Influenza A  -     promethazine-dextromethorphan (PROMETHAZINE-DM) 6.25-15 mg/5 mL Syrp; Take 5 mLs by mouth 3 (three) times daily as needed (cough).  Dispense: 180 mL; Refill: 0    Flu-like symptoms  -     POCT Influenza A/B          The Flu (Influenza)     The virus that causes the flu spreads through the air in droplets when someone  who has the flu coughs, sneezes, laughs, or talks.   The flu (influenza) is an infection that affects your respiratory tract. This tract is made up of your mouth, nose, and lungs, and the passages between them. Unlike a cold, the flu can make you very ill. And it can lead to pneumonia, a serious lung infection. The flu can have serious complications and even cause death.  Who is at risk for the flu?  Anyone can get the flu. But you are more likely to become infected if you:  · Have a weakened immune system  · Work in a healthcare setting where you may be exposed to flu germs  · Live or work with someone who has the flu  · Havent had an annual flu shot  How does the flu spread?  The flu is caused by a virus. The virus spreads through the air in droplets when someone who has the flu coughs, sneezes, laughs, or talks. You can become infected when you inhale these viruses directly. You can also become infected when you touch a surface on which the droplets have landed and then transfer the germs to your eyes, nose, or mouth. Touching used tissues, or sharing utensils, drinking glasses, or a toothbrush from an infected person can expose you to flu viruses, too.  What are the symptoms of the flu?  Flu symptoms tend to come on quickly and may last a few days to a few weeks. They include:  · Fever usually higher than 100.4°F  (38°C) and chills  · Sore throat and headache  · Dry cough  · Runny nose  · Tiredness and weakness  · Muscle aches  Who is at risk for flu complications?  For some people, the flu can be very serious. The risk for complications is greater for:  · Children younger than age 5  · Adults ages 65 and older  · People with a chronic illness such as diabetes or heart, kidney, or lung disease  · People who live in a nursing home or long-term care facility   How is the flu treated?  The flu usually gets better after 7 days or so. In some cases, your healthcare provider may prescribe an antiviral medicine. This  may help you get well a little sooner. For the medicine to help, you need to take it as soon as possible (ideally within 48 hours) after your symptoms start. If you develop pneumonia or other serious illness, you may need to stay in the hospital.  Easing flu symptoms  · Drink lots of fluids such as water, juice, and warm soup. A good rule is to drink enough so that you urinate your normal amount.  · Get plenty of rest.  · Ask your healthcare provider what to take for fever and pain.  · Call your provider if your fever is 100.4°F (38°C) or higher, or you become dizzy, lightheaded, or short of breath.  Taking steps to protect others  · Wash your hands often, especially after coughing or sneezing. Or clean your hands with an alcohol-based hand  containing at least 60% alcohol.  · Cough or sneeze into a tissue. Then throw the tissue away and wash your hands. If you dont have a tissue, cough and sneeze into your elbow.  · Stay home until at least 24 hours after you no longer have a fever or chills. Be sure the fever isnt being hidden by fever-reducing medicine.  · Dont share food, utensils, drinking glasses, or a toothbrush with others.  · Ask your healthcare provider if others in your household should get antiviral medicine to help them avoid infection.  How can the flu be prevented?  · One of the best ways to avoid the flu is to get a flu vaccine each year. The virus that causes the flu changes from year to year. For that reason, healthcare providers recommend getting the flu vaccine each year, as soon as it's available in your area. The vaccine is given as a shot. Your healthcare provider can tell you which vaccine is right for you. A nasal spray is also available but is not recommended for the 1821-4894 flu season. The CDC says this is because the nasal spray did not seem to protect against the flu over the last several flu seasons. In the past, it was meant for people ages 2 to 49.  · Wash your hands  often. Frequent handwashing is a proven way to help prevent infection.  · Carry an alcohol-based hand gel containing at least 60% alcohol. Use it when you can't use soap and water. Then wash your hands as soon as you can.  · Avoid touching your eyes, nose, and mouth.  · At home and work, clean phones, computer keyboards, and toys often with disinfectant wipes.  · If possible, avoid close contact with others who have the flu or symptoms of the flu.  Handwashing tips  Handwashing is one of the best ways to prevent many common infections. If you are caring for or visiting someone with the flu, wash your hands each time you enter and leave the room. Follow these steps:  · Use warm water and plenty of soap. Rub your hands together well.  · Clean the whole hand, including under your nails, between your fingers, and up the wrists.  · Wash for at least 15 seconds.  · Rinse, letting the water run down your fingers, not up your wrists.  · Dry your hands well. Use a paper towel to turn off the faucet and open the door.  Using alcohol-based hand   Alcohol-based hand  are also a good choice. Use them when you can't use soap and water. Follow these steps:  · Squeeze about a tablespoon of gel into the palm of one hand.  · Rub your hands together briskly, cleaning the backs of your hands, the palms, between your fingers, and up the wrists.  · Rub until the gel is gone and your hands are completely dry.  Preventing the flu in healthcare settings  The flu is a special concern for people in hospitals and long-term care facilities. To help prevent the spread of flu, many hospitals and nursing homes take these steps:  · Healthcare providers wash their hands or use an alcohol-based hand  before and after treating each patient.  · People with the flu have private rooms and bathrooms or share a room with someone with the same infection.  · People who are at high risk for the flu but don't have it are encouraged to  get the flu and pneumonia vaccines.  · All healthcare workers are encouraged or required to get flu shots.   Date Last Reviewed: 12/1/2016  © 7259-0350 [employer]. [address] [place] [address], [place] [address]. All rights reserved. This information is not intended as a substitute for professional medical care. Always follow your healthcare professional's instructions.      Please follow up with your Primary care provider within 2-5 days if your signs and symptoms have not resolved or worsen.     If your condition worsens or fails to improve we recommend that you receive another evaluation at the emergency room immediately or contact your primary medical clinic to discuss your concerns.   You must understand that you have received an Urgent Care treatment only and that you may be released before all of your medical problems are known or treated. You, the patient, will arrange for follow up care as instructed.     RED FLAGS/WARNING SYMPTOMS DISCUSSED WITH PATIENT THAT WOULD WARRANT EMERGENT MEDICAL ATTENTION. PATIENT VERBALIZED UNDERSTANDING.

## 2019-02-14 NOTE — PATIENT INSTRUCTIONS
The Flu (Influenza)     The virus that causes the flu spreads through the air in droplets when someone who has the flu coughs, sneezes, laughs, or talks.   The flu (influenza) is an infection that affects your respiratory tract. This tract is made up of your mouth, nose, and lungs, and the passages between them. Unlike a cold, the flu can make you very ill. And it can lead to pneumonia, a serious lung infection. The flu can have serious complications and even cause death.  Who is at risk for the flu?  Anyone can get the flu. But you are more likely to become infected if you:  · Have a weakened immune system  · Work in a healthcare setting where you may be exposed to flu germs  · Live or work with someone who has the flu  · Havent had an annual flu shot  How does the flu spread?  The flu is caused by a virus. The virus spreads through the air in droplets when someone who has the flu coughs, sneezes, laughs, or talks. You can become infected when you inhale these viruses directly. You can also become infected when you touch a surface on which the droplets have landed and then transfer the germs to your eyes, nose, or mouth. Touching used tissues, or sharing utensils, drinking glasses, or a toothbrush from an infected person can expose you to flu viruses, too.  What are the symptoms of the flu?  Flu symptoms tend to come on quickly and may last a few days to a few weeks. They include:  · Fever usually higher than 100.4°F  (38°C) and chills  · Sore throat and headache  · Dry cough  · Runny nose  · Tiredness and weakness  · Muscle aches  Who is at risk for flu complications?  For some people, the flu can be very serious. The risk for complications is greater for:  · Children younger than age 5  · Adults ages 65 and older  · People with a chronic illness such as diabetes or heart, kidney, or lung disease  · People who live in a nursing home or long-term care facility   How is the flu treated?  The flu usually gets  better after 7 days or so. In some cases, your healthcare provider may prescribe an antiviral medicine. This may help you get well a little sooner. For the medicine to help, you need to take it as soon as possible (ideally within 48 hours) after your symptoms start. If you develop pneumonia or other serious illness, you may need to stay in the hospital.  Easing flu symptoms  · Drink lots of fluids such as water, juice, and warm soup. A good rule is to drink enough so that you urinate your normal amount.  · Get plenty of rest.  · Ask your healthcare provider what to take for fever and pain.  · Call your provider if your fever is 100.4°F (38°C) or higher, or you become dizzy, lightheaded, or short of breath.  Taking steps to protect others  · Wash your hands often, especially after coughing or sneezing. Or clean your hands with an alcohol-based hand  containing at least 60% alcohol.  · Cough or sneeze into a tissue. Then throw the tissue away and wash your hands. If you dont have a tissue, cough and sneeze into your elbow.  · Stay home until at least 24 hours after you no longer have a fever or chills. Be sure the fever isnt being hidden by fever-reducing medicine.  · Dont share food, utensils, drinking glasses, or a toothbrush with others.  · Ask your healthcare provider if others in your household should get antiviral medicine to help them avoid infection.  How can the flu be prevented?  · One of the best ways to avoid the flu is to get a flu vaccine each year. The virus that causes the flu changes from year to year. For that reason, healthcare providers recommend getting the flu vaccine each year, as soon as it's available in your area. The vaccine is given as a shot. Your healthcare provider can tell you which vaccine is right for you. A nasal spray is also available but is not recommended for the 0929-0045 flu season. The CDC says this is because the nasal spray did not seem to protect against the flu  over the last several flu seasons. In the past, it was meant for people ages 2 to 49.  · Wash your hands often. Frequent handwashing is a proven way to help prevent infection.  · Carry an alcohol-based hand gel containing at least 60% alcohol. Use it when you can't use soap and water. Then wash your hands as soon as you can.  · Avoid touching your eyes, nose, and mouth.  · At home and work, clean phones, computer keyboards, and toys often with disinfectant wipes.  · If possible, avoid close contact with others who have the flu or symptoms of the flu.  Handwashing tips  Handwashing is one of the best ways to prevent many common infections. If you are caring for or visiting someone with the flu, wash your hands each time you enter and leave the room. Follow these steps:  · Use warm water and plenty of soap. Rub your hands together well.  · Clean the whole hand, including under your nails, between your fingers, and up the wrists.  · Wash for at least 15 seconds.  · Rinse, letting the water run down your fingers, not up your wrists.  · Dry your hands well. Use a paper towel to turn off the faucet and open the door.  Using alcohol-based hand   Alcohol-based hand  are also a good choice. Use them when you can't use soap and water. Follow these steps:  · Squeeze about a tablespoon of gel into the palm of one hand.  · Rub your hands together briskly, cleaning the backs of your hands, the palms, between your fingers, and up the wrists.  · Rub until the gel is gone and your hands are completely dry.  Preventing the flu in healthcare settings  The flu is a special concern for people in hospitals and long-term care facilities. To help prevent the spread of flu, many hospitals and nursing homes take these steps:  · Healthcare providers wash their hands or use an alcohol-based hand  before and after treating each patient.  · People with the flu have private rooms and bathrooms or share a room with someone  with the same infection.  · People who are at high risk for the flu but don't have it are encouraged to get the flu and pneumonia vaccines.  · All healthcare workers are encouraged or required to get flu shots.   Date Last Reviewed: 12/1/2016  © 3094-6774 [employer]. [address] [place] [address]. All rights reserved. This information is not intended as a substitute for professional medical care. Always follow your healthcare professional's instructions.      Please follow up with your Primary care provider within 2-5 days if your signs and symptoms have not resolved or worsen.     If your condition worsens or fails to improve we recommend that you receive another evaluation at the emergency room immediately or contact your primary medical clinic to discuss your concerns.   You must understand that you have received an Urgent Care treatment only and that you may be released before all of your medical problems are known or treated. You, the patient, will arrange for follow up care as instructed.     RED FLAGS/WARNING SYMPTOMS DISCUSSED WITH PATIENT THAT WOULD WARRANT EMERGENT MEDICAL ATTENTION. PATIENT VERBALIZED UNDERSTANDING.

## 2019-03-02 DIAGNOSIS — E78.5 HYPERLIPIDEMIA, UNSPECIFIED HYPERLIPIDEMIA TYPE: ICD-10-CM

## 2019-03-06 RX ORDER — PRAVASTATIN SODIUM 20 MG/1
TABLET ORAL
Qty: 90 TABLET | Refills: 0 | Status: SHIPPED | OUTPATIENT
Start: 2019-03-06 | End: 2019-05-01

## 2019-03-15 ENCOUNTER — OFFICE VISIT (OUTPATIENT)
Dept: ORTHOPEDICS | Facility: CLINIC | Age: 64
End: 2019-03-15
Payer: COMMERCIAL

## 2019-03-15 VITALS — WEIGHT: 152 LBS | HEIGHT: 59 IN | BODY MASS INDEX: 30.64 KG/M2

## 2019-03-15 DIAGNOSIS — M17.0 PRIMARY OSTEOARTHRITIS OF BOTH KNEES: Primary | ICD-10-CM

## 2019-03-15 PROCEDURE — 3008F PR BODY MASS INDEX (BMI) DOCUMENTED: ICD-10-PCS | Mod: CPTII,S$GLB,, | Performed by: ORTHOPAEDIC SURGERY

## 2019-03-15 PROCEDURE — 99214 PR OFFICE/OUTPT VISIT, EST, LEVL IV, 30-39 MIN: ICD-10-PCS | Mod: S$GLB,,, | Performed by: ORTHOPAEDIC SURGERY

## 2019-03-15 PROCEDURE — 99214 OFFICE O/P EST MOD 30 MIN: CPT | Mod: S$GLB,,, | Performed by: ORTHOPAEDIC SURGERY

## 2019-03-15 PROCEDURE — 99999 PR PBB SHADOW E&M-EST. PATIENT-LVL III: ICD-10-PCS | Mod: PBBFAC,,, | Performed by: ORTHOPAEDIC SURGERY

## 2019-03-15 PROCEDURE — 3008F BODY MASS INDEX DOCD: CPT | Mod: CPTII,S$GLB,, | Performed by: ORTHOPAEDIC SURGERY

## 2019-03-15 PROCEDURE — 99999 PR PBB SHADOW E&M-EST. PATIENT-LVL III: CPT | Mod: PBBFAC,,, | Performed by: ORTHOPAEDIC SURGERY

## 2019-03-22 ENCOUNTER — LAB VISIT (OUTPATIENT)
Dept: LAB | Facility: HOSPITAL | Age: 64
End: 2019-03-22
Attending: INTERNAL MEDICINE
Payer: COMMERCIAL

## 2019-03-22 DIAGNOSIS — E78.5 HYPERLIPIDEMIA, UNSPECIFIED HYPERLIPIDEMIA TYPE: ICD-10-CM

## 2019-03-22 LAB
ALBUMIN SERPL BCP-MCNC: 4.2 G/DL
ALP SERPL-CCNC: 94 U/L
ALT SERPL W/O P-5'-P-CCNC: 22 U/L
ANION GAP SERPL CALC-SCNC: 9 MMOL/L
AST SERPL-CCNC: 31 U/L
BILIRUB SERPL-MCNC: 0.7 MG/DL
BUN SERPL-MCNC: 17 MG/DL
CALCIUM SERPL-MCNC: 10 MG/DL
CHLORIDE SERPL-SCNC: 105 MMOL/L
CHOLEST SERPL-MCNC: 234 MG/DL
CHOLEST/HDLC SERPL: 4.4 {RATIO}
CO2 SERPL-SCNC: 27 MMOL/L
CREAT SERPL-MCNC: 0.7 MG/DL
EST. GFR  (AFRICAN AMERICAN): >60 ML/MIN/1.73 M^2
EST. GFR  (NON AFRICAN AMERICAN): >60 ML/MIN/1.73 M^2
GLUCOSE SERPL-MCNC: 88 MG/DL
HDLC SERPL-MCNC: 53 MG/DL
HDLC SERPL: 22.6 %
LDLC SERPL CALC-MCNC: 168.4 MG/DL
NONHDLC SERPL-MCNC: 181 MG/DL
POTASSIUM SERPL-SCNC: 3.8 MMOL/L
PROT SERPL-MCNC: 7.5 G/DL
SODIUM SERPL-SCNC: 141 MMOL/L
TRIGL SERPL-MCNC: 63 MG/DL

## 2019-03-22 PROCEDURE — 36415 COLL VENOUS BLD VENIPUNCTURE: CPT | Mod: PO

## 2019-03-22 PROCEDURE — 80061 LIPID PANEL: CPT

## 2019-03-22 PROCEDURE — 80053 COMPREHEN METABOLIC PANEL: CPT

## 2019-04-17 ENCOUNTER — TELEPHONE (OUTPATIENT)
Dept: RHEUMATOLOGY | Facility: CLINIC | Age: 64
End: 2019-04-17

## 2019-05-01 ENCOUNTER — OFFICE VISIT (OUTPATIENT)
Dept: INTERNAL MEDICINE | Facility: CLINIC | Age: 64
End: 2019-05-01
Payer: COMMERCIAL

## 2019-05-01 VITALS
HEIGHT: 59 IN | SYSTOLIC BLOOD PRESSURE: 136 MMHG | HEART RATE: 80 BPM | BODY MASS INDEX: 30.32 KG/M2 | WEIGHT: 150.38 LBS | OXYGEN SATURATION: 98 % | DIASTOLIC BLOOD PRESSURE: 78 MMHG

## 2019-05-01 DIAGNOSIS — I10 ESSENTIAL HYPERTENSION: Primary | ICD-10-CM

## 2019-05-01 DIAGNOSIS — E78.5 HYPERLIPIDEMIA, UNSPECIFIED HYPERLIPIDEMIA TYPE: ICD-10-CM

## 2019-05-01 DIAGNOSIS — S86.912A KNEE STRAIN, LEFT, INITIAL ENCOUNTER: ICD-10-CM

## 2019-05-01 PROCEDURE — 3075F PR MOST RECENT SYSTOLIC BLOOD PRESS GE 130-139MM HG: ICD-10-PCS | Mod: CPTII,S$GLB,, | Performed by: INTERNAL MEDICINE

## 2019-05-01 PROCEDURE — 3075F SYST BP GE 130 - 139MM HG: CPT | Mod: CPTII,S$GLB,, | Performed by: INTERNAL MEDICINE

## 2019-05-01 PROCEDURE — 99214 PR OFFICE/OUTPT VISIT, EST, LEVL IV, 30-39 MIN: ICD-10-PCS | Mod: S$GLB,,, | Performed by: INTERNAL MEDICINE

## 2019-05-01 PROCEDURE — 3008F BODY MASS INDEX DOCD: CPT | Mod: CPTII,S$GLB,, | Performed by: INTERNAL MEDICINE

## 2019-05-01 PROCEDURE — 99214 OFFICE O/P EST MOD 30 MIN: CPT | Mod: S$GLB,,, | Performed by: INTERNAL MEDICINE

## 2019-05-01 PROCEDURE — 3078F DIAST BP <80 MM HG: CPT | Mod: CPTII,S$GLB,, | Performed by: INTERNAL MEDICINE

## 2019-05-01 PROCEDURE — 3078F PR MOST RECENT DIASTOLIC BLOOD PRESSURE < 80 MM HG: ICD-10-PCS | Mod: CPTII,S$GLB,, | Performed by: INTERNAL MEDICINE

## 2019-05-01 PROCEDURE — 99999 PR PBB SHADOW E&M-EST. PATIENT-LVL III: CPT | Mod: PBBFAC,,, | Performed by: INTERNAL MEDICINE

## 2019-05-01 PROCEDURE — 99999 PR PBB SHADOW E&M-EST. PATIENT-LVL III: ICD-10-PCS | Mod: PBBFAC,,, | Performed by: INTERNAL MEDICINE

## 2019-05-01 PROCEDURE — 3008F PR BODY MASS INDEX (BMI) DOCUMENTED: ICD-10-PCS | Mod: CPTII,S$GLB,, | Performed by: INTERNAL MEDICINE

## 2019-05-01 RX ORDER — PRAVASTATIN SODIUM 40 MG/1
40 TABLET ORAL DAILY
Qty: 90 TABLET | Refills: 0 | Status: SHIPPED | OUTPATIENT
Start: 2019-05-01 | End: 2019-09-10 | Stop reason: SDUPTHER

## 2019-05-01 NOTE — PATIENT INSTRUCTIONS
Recommendations for today    Continue checking blood pressure daily.  In a given week if you start to have 3 or more blood pressure numbers that are above the target contact my office for an extra visit to adjust blood pressure medicine    Top number should be consistently less than 140 and bottom number should be less than 90    We recommend increasing the dosage of pravastatin from the very low dosage of 20 mg to 40 mg daily.  This should substantially help you to reach sure LDL cholesterol target of less than 130.

## 2019-05-01 NOTE — PROGRESS NOTES
Portions of this note are generated with voice recognition software. Typographical errors may exist.     SUBJECTIVE:    This is a/an 64 y.o. female here for primary care visit for  Chief Complaint   Patient presents with    Hypertension     4m follow up      The patient states that she has been tolerating her pravastatin 20 mg.  Since she has started the medication there have not been any problems with diffuse myalgias or arthralgias.    The only problem she is having is focal.  States that the left shin has been causing problems but she feels that she favors this leg because she has chronic osteoarthritis pain in the right knee.    Patient has been checking home blood pressure and these were the following numbers.    175/71  123/76  137/73  135/83  131/79  148/83  151/89  130/71      Medications Reviewed and Updated    Past medical, family, and social histories were reviewed and updated.    Review of Systems negative unless otherwise noted in history of present illness-  ROS    General ROS: negative  Psychological ROS: negative  Endocrine ROS: Negative  Allergy and Immunology ROS: negative  Cardiovascular ROS: negative  Pulmonary ROS: Negative  Gastrointestinal ROS: negative  Genito-Urinary ROS: negative  Musculoskeletal ROS: negative  Neurological ROS: negative        Allergic:    Review of patient's allergies indicates:   Allergen Reactions    Codeine Nausea And Vomiting       OBJECTIVE:  BP: 136/78 Pulse: 80    Wt Readings from Last 3 Encounters:   05/01/19 68.2 kg (150 lb 5.7 oz)   03/15/19 68.9 kg (152 lb)   02/13/19 68.9 kg (152 lb)    Body mass index is 30.37 kg/m².  Previous Blood Pressure Readings :   BP Readings from Last 3 Encounters:   05/01/19 136/78   02/13/19 (!) 128/48   12/26/18 138/84       Physical Exam    GEN: No apparent distress  HEENT: sclera non-icteric, conjunctiva clear  CV: no peripheral edema  PULM: breathing non-labored  ABD: Obese, protuberant abdomen.  PSYCH: appropriate  affect  MSK: able to rise from chair without assistance  SKIN: normal skin turgor    Pertinent Labs Reviewed       ASSESSMENT/PLAN:    Essential hypertension.Condition stable.  Counseling given today on self-care measures. Plan to monitor clinically. Continue current medical plan.     Hyperlipidemia, unspecified hyperlipidemia type.Condition not optimally controlled. Detailed counseling on self care measures. Plan to monitor clinically in addition to plan below or as listed on After Visit Summary.   -     pravastatin (PRAVACHOL) 40 MG tablet; Take 1 tablet (40 mg total) by mouth once daily.  Dispense: 90 tablet; Refill: 0    Knee strain, left, initial encounter.Condition improving.  Counseling on self-care measures today.  Continue with current plan in addition to recommendations written on After Visit Summary.           Future Appointments   Date Time Provider Department Center   6/5/2019 11:30 AM Teresa Iqbal MD [facility]   6/6/2019  9:00 AM [facility]   6/13/2019  9:40 AM Nayan Linares MD [facility]  5/3/2019  10:52 AM

## 2019-05-27 RX ORDER — MELOXICAM 15 MG/1
TABLET ORAL
Qty: 90 TABLET | Refills: 0 | Status: SHIPPED | OUTPATIENT
Start: 2019-05-27 | End: 2019-09-07 | Stop reason: SDUPTHER

## 2019-05-31 ENCOUNTER — OFFICE VISIT (OUTPATIENT)
Dept: ORTHOPEDICS | Facility: CLINIC | Age: 64
End: 2019-05-31
Payer: COMMERCIAL

## 2019-05-31 ENCOUNTER — TELEPHONE (OUTPATIENT)
Dept: ORTHOPEDICS | Facility: CLINIC | Age: 64
End: 2019-05-31

## 2019-05-31 VITALS — BODY MASS INDEX: 30.24 KG/M2 | WEIGHT: 150 LBS | HEIGHT: 59 IN

## 2019-05-31 DIAGNOSIS — M53.87 SCIATICA ASSOCIATED WITH DISORDER OF LUMBOSACRAL SPINE: Primary | ICD-10-CM

## 2019-05-31 DIAGNOSIS — M17.0 PRIMARY OSTEOARTHRITIS OF BOTH KNEES: ICD-10-CM

## 2019-05-31 PROCEDURE — 99999 PR PBB SHADOW E&M-EST. PATIENT-LVL III: ICD-10-PCS | Mod: PBBFAC,,, | Performed by: ORTHOPAEDIC SURGERY

## 2019-05-31 PROCEDURE — 99999 PR PBB SHADOW E&M-EST. PATIENT-LVL III: CPT | Mod: PBBFAC,,, | Performed by: ORTHOPAEDIC SURGERY

## 2019-05-31 PROCEDURE — 3008F PR BODY MASS INDEX (BMI) DOCUMENTED: ICD-10-PCS | Mod: CPTII,S$GLB,, | Performed by: ORTHOPAEDIC SURGERY

## 2019-05-31 PROCEDURE — 99213 PR OFFICE/OUTPT VISIT, EST, LEVL III, 20-29 MIN: ICD-10-PCS | Mod: S$GLB,,, | Performed by: ORTHOPAEDIC SURGERY

## 2019-05-31 PROCEDURE — 3008F BODY MASS INDEX DOCD: CPT | Mod: CPTII,S$GLB,, | Performed by: ORTHOPAEDIC SURGERY

## 2019-05-31 PROCEDURE — 99213 OFFICE O/P EST LOW 20 MIN: CPT | Mod: S$GLB,,, | Performed by: ORTHOPAEDIC SURGERY

## 2019-05-31 RX ORDER — METHYLPREDNISOLONE 4 MG/1
TABLET ORAL
Qty: 1 PACKAGE | Refills: 1 | Status: SHIPPED | OUTPATIENT
Start: 2019-05-31 | End: 2019-06-13

## 2019-05-31 NOTE — TELEPHONE ENCOUNTER
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    Please call to discuss today.

## 2019-05-31 NOTE — PROGRESS NOTES
Subjective:      Patient ID: Chelsea Rodriguez is a 64 y.o. female.    Chief Complaint: Follow-up of the Left Knee and Follow-up of the Right Knee    HPI follow-up for osteoarthritis.  Patient complains of recent worsening in the left lower extremity.  She has pain in the lower back and buttock radiating down the side of the extremity to the foot. She also notes gelling in the knee. No current specific treatment  Review of Systems   Constitution: Negative for fever and weight loss.   HENT: Negative for congestion.    Eyes: Negative for visual disturbance.   Cardiovascular: Negative for chest pain.   Respiratory: Negative for shortness of breath.    Hematologic/Lymphatic: Negative for bleeding problem. Does not bruise/bleed easily.   Skin: Negative for poor wound healing.   Musculoskeletal: Positive for back pain and joint pain.   Gastrointestinal: Negative for abdominal pain.   Genitourinary: Negative for dysuria.   Neurological: Negative for seizures.   Psychiatric/Behavioral: Negative for altered mental status.   Allergic/Immunologic: Negative for persistent infections.         Objective:            Ortho/SPM Exam    Left knee    [unfilled]    The patient is not in acute distress.   Sclerae normal  Body habitus is normal.  Respiratory distress:  none   The patient walks without a limp.  Hip irritability  negative.   The skin over the knee is intact.  Knee effusion trace  Tendernes is located none  Range of motion- Flexion 125 deg, Extension 0 deg,   Ligament laxity exam:   MCL 1+   Lachman 0   Post sag  0    LCL 0  Patellar apprehension negative.  Popliteal cyst negative  Patellar crepitation present.  Flexion/pinch negative  Pulses DP present, PT present.  Motor normal 5/5 strength in all tested muscle groups.   Sensory normal  The sciatic tension findings.        Assessment:       No diagnosis found.       The arthritis is chronic and longstanding  The sciatic is causing the acute symptoms  Plan:       There are no  diagnoses linked to this encounter.  I explained my diagnostic impression and the reasoning behind it in detail, using layman's terms.  Models and/or pictures were used to help in the explanation.    Medrol Dosepak protocol    Physical therapy    I explained the potential role of surgery in the treatment of this condition to the patient.  They understand that if nonsurgical measures do not adequately control symptoms, surgery will be considered in the future.

## 2019-06-03 ENCOUNTER — TELEPHONE (OUTPATIENT)
Dept: ORTHOPEDICS | Facility: CLINIC | Age: 64
End: 2019-06-03

## 2019-06-03 NOTE — TELEPHONE ENCOUNTER
Confirmed patient's handicap paperwork , patient was told to pick it up at our [place] office on Thursday 6/6 . Verbalized understanding   ----- Message from Sumi Kim sent at 5/31/2019  3:22 PM CDT -----  Contact: self, [phone]  Patient called in returning your call. Please advise.

## 2019-06-05 ENCOUNTER — OFFICE VISIT (OUTPATIENT)
Dept: RHEUMATOLOGY | Facility: CLINIC | Age: 64
End: 2019-06-05
Payer: COMMERCIAL

## 2019-06-05 VITALS
SYSTOLIC BLOOD PRESSURE: 129 MMHG | HEIGHT: 58 IN | DIASTOLIC BLOOD PRESSURE: 70 MMHG | WEIGHT: 147.69 LBS | HEART RATE: 72 BPM | BODY MASS INDEX: 31 KG/M2

## 2019-06-05 DIAGNOSIS — R74.8 ABNORMAL CPK: Primary | ICD-10-CM

## 2019-06-05 DIAGNOSIS — M17.0 PRIMARY OSTEOARTHRITIS OF BOTH KNEES: ICD-10-CM

## 2019-06-05 PROCEDURE — 3078F DIAST BP <80 MM HG: CPT | Mod: CPTII,S$GLB,, | Performed by: INTERNAL MEDICINE

## 2019-06-05 PROCEDURE — 3074F PR MOST RECENT SYSTOLIC BLOOD PRESSURE < 130 MM HG: ICD-10-PCS | Mod: CPTII,S$GLB,, | Performed by: INTERNAL MEDICINE

## 2019-06-05 PROCEDURE — 3008F PR BODY MASS INDEX (BMI) DOCUMENTED: ICD-10-PCS | Mod: CPTII,S$GLB,, | Performed by: INTERNAL MEDICINE

## 2019-06-05 PROCEDURE — 99214 PR OFFICE/OUTPT VISIT, EST, LEVL IV, 30-39 MIN: ICD-10-PCS | Mod: S$GLB,,, | Performed by: INTERNAL MEDICINE

## 2019-06-05 PROCEDURE — 99999 PR PBB SHADOW E&M-EST. PATIENT-LVL III: CPT | Mod: PBBFAC,,, | Performed by: INTERNAL MEDICINE

## 2019-06-05 PROCEDURE — 99999 PR PBB SHADOW E&M-EST. PATIENT-LVL III: ICD-10-PCS | Mod: PBBFAC,,, | Performed by: INTERNAL MEDICINE

## 2019-06-05 PROCEDURE — 3008F BODY MASS INDEX DOCD: CPT | Mod: CPTII,S$GLB,, | Performed by: INTERNAL MEDICINE

## 2019-06-05 PROCEDURE — 99214 OFFICE O/P EST MOD 30 MIN: CPT | Mod: S$GLB,,, | Performed by: INTERNAL MEDICINE

## 2019-06-05 PROCEDURE — 3078F PR MOST RECENT DIASTOLIC BLOOD PRESSURE < 80 MM HG: ICD-10-PCS | Mod: CPTII,S$GLB,, | Performed by: INTERNAL MEDICINE

## 2019-06-05 PROCEDURE — 3074F SYST BP LT 130 MM HG: CPT | Mod: CPTII,S$GLB,, | Performed by: INTERNAL MEDICINE

## 2019-06-05 NOTE — PROGRESS NOTES
Subjective:       Patient ID: Chelsea Rodriguez is a 63 y.o. female.    Chief Complaint: No chief complaint on file.    HPI 63 year old F with PMH of HTN, HL, left breast cancer( around 2010, s/p lumpectomy/radiation), CTS of both hands here for evaluation.   She  Reports that she has pain in knees and right shoulder. Denies muscle pain.   Reports that her pain level in the knees can be as high as 9/10 when she gets up.  She has had knee pain for last 2 years. Denies joint swelling.  Reports mild stiffness in hands. Reports that her knees prevent her getting up. She reports that she has had the right shoulder pain for last 3 years.  She has not hurt the shoulder. She is on pravastatin since July. She was previously on  rosuvastatin. Denies any rashes, oral ulcers, raynauds, pleurisy , or photosensitivity.  She takes meloxicam and it helps her pain. Moving makes her pain. Resting improves her pain.      Interval history: denies any muscle pain. Reports she continues to have pain in both knees.     Reports that her pain level in the knees can be as high as 9/10 when she gets up.  She has had knee pain for last 2 years. Denies joint swelling.  Reports mild stiffness in hands. Reports that her knees prevent her getting up.  Past Medical History:   Diagnosis Date    Arthritis     Breast cancer 2011    left breast    Cataract     Hyperlipidemia     Hypertension        Review of Systems   Constitutional: Negative for activity change, appetite change, chills, diaphoresis, fever and unexpected weight change.   HENT: Negative for congestion, ear discharge, ear pain, facial swelling, mouth sores, sinus pressure, sneezing, sore throat, tinnitus and trouble swallowing.    Eyes: Negative for photophobia, pain, discharge, redness, itching and visual disturbance.   Respiratory: Negative for apnea, chest tightness, shortness of breath, wheezing and stridor.    Cardiovascular: Negative for leg swelling.   Gastrointestinal: Negative  for abdominal distention, abdominal pain, anal bleeding, blood in stool, constipation, diarrhea and nausea.   Endocrine: Negative for cold intolerance and heat intolerance.   Genitourinary: Negative for difficulty urinating and dysuria.   Musculoskeletal: Positive for arthralgias. Negative for back pain, gait problem, joint swelling, myalgias, neck pain and neck stiffness.   Skin: Negative for color change, pallor, rash and wound.   Neurological: Negative for dizziness, seizures, light-headedness and numbness.   Hematological: Negative for adenopathy. Does not bruise/bleed easily.   Psychiatric/Behavioral: Negative for sleep disturbance. The patient is not nervous/anxious.            Objective:        Physical Exam   Constitutional: She is oriented to person, place, and time.   HENT:   Head: Normocephalic and atraumatic.   Right Ear: External ear normal.   Left Ear: External ear normal.   Nose: Nose normal.   Mouth/Throat: Oropharynx is clear and moist. No oropharyngeal exudate.   Eyes: Conjunctivae and EOM are normal. Pupils are equal, round, and reactive to light. Right eye exhibits no discharge. Left eye exhibits no discharge. No scleral icterus.   Neck: Neck supple. No JVD present. No thyromegaly present.   Cardiovascular: Normal rate, regular rhythm, normal heart sounds and intact distal pulses.  Exam reveals no gallop and no friction rub.    No murmur heard.  Pulmonary/Chest: Effort normal and breath sounds normal. No respiratory distress. She has no wheezes. She has no rales. She exhibits no tenderness.   Abdominal: Soft. Bowel sounds are normal. She exhibits no distension and no mass. There is no tenderness. There is no rebound and no guarding.   Lymphadenopathy:     She has no cervical adenopathy.   Neurological: She is alert and oriented to person, place, and time. No cranial nerve deficit. Gait normal. Coordination normal.   Skin: Skin is dry. No rash noted. No erythema. No pallor.     Psychiatric: Affect  and judgment normal.   Musculoskeletal: She exhibits no edema, tenderness or deformity.         5/5 strength in upper and lower extremity          Knee xrays ( 12/2018)I personally reviewed) : DJD  Assessment:     63 year old F with PMH of HTN, HL, left breast cancer( around 2010, s/p lumpectomy/radiation), CTS of both hands here for evaluation of elevated CPK.  Patient denies myalgias.  She reports bilateral knee pain but she has known DJD.  She has 5/5 strength on exam so low suspicion for a myositis.        1. Abnormal CPK              Plan:     * labs  Discussed causes of elevated cpk  Follow up with ortho for cpk elevation  rtc prn

## 2019-06-06 ENCOUNTER — LAB VISIT (OUTPATIENT)
Dept: LAB | Facility: HOSPITAL | Age: 64
End: 2019-06-06
Attending: INTERNAL MEDICINE
Payer: COMMERCIAL

## 2019-06-06 DIAGNOSIS — T46.6X5A STATIN-INDUCED MYOSITIS: ICD-10-CM

## 2019-06-06 DIAGNOSIS — E78.5 HYPERLIPIDEMIA, UNSPECIFIED HYPERLIPIDEMIA TYPE: ICD-10-CM

## 2019-06-06 DIAGNOSIS — M60.9 STATIN-INDUCED MYOSITIS: ICD-10-CM

## 2019-06-06 DIAGNOSIS — E55.9 VITAMIN D INSUFFICIENCY: ICD-10-CM

## 2019-06-06 LAB
25(OH)D3+25(OH)D2 SERPL-MCNC: 26 NG/ML (ref 30–96)
ALBUMIN SERPL BCP-MCNC: 3.8 G/DL (ref 3.5–5.2)
ALP SERPL-CCNC: 86 U/L (ref 55–135)
ALT SERPL W/O P-5'-P-CCNC: 17 U/L (ref 10–44)
ANION GAP SERPL CALC-SCNC: 11 MMOL/L (ref 8–16)
AST SERPL-CCNC: 22 U/L (ref 10–40)
BILIRUB SERPL-MCNC: 0.5 MG/DL (ref 0.1–1)
BUN SERPL-MCNC: 16 MG/DL (ref 8–23)
CALCIUM SERPL-MCNC: 9.5 MG/DL (ref 8.7–10.5)
CHLORIDE SERPL-SCNC: 106 MMOL/L (ref 95–110)
CHOLEST SERPL-MCNC: 205 MG/DL (ref 120–199)
CHOLEST/HDLC SERPL: 3.9 {RATIO} (ref 2–5)
CK SERPL-CCNC: 226 U/L (ref 20–180)
CO2 SERPL-SCNC: 24 MMOL/L (ref 23–29)
CREAT SERPL-MCNC: 0.7 MG/DL (ref 0.5–1.4)
EST. GFR  (AFRICAN AMERICAN): >60 ML/MIN/1.73 M^2
EST. GFR  (NON AFRICAN AMERICAN): >60 ML/MIN/1.73 M^2
GLUCOSE SERPL-MCNC: 86 MG/DL (ref 70–110)
HDLC SERPL-MCNC: 53 MG/DL (ref 40–75)
HDLC SERPL: 25.9 % (ref 20–50)
LDLC SERPL CALC-MCNC: 138.4 MG/DL (ref 63–159)
NONHDLC SERPL-MCNC: 152 MG/DL
POTASSIUM SERPL-SCNC: 4 MMOL/L (ref 3.5–5.1)
PROT SERPL-MCNC: 7 G/DL (ref 6–8.4)
SODIUM SERPL-SCNC: 141 MMOL/L (ref 136–145)
TRIGL SERPL-MCNC: 68 MG/DL (ref 30–150)

## 2019-06-06 PROCEDURE — 80061 LIPID PANEL: CPT

## 2019-06-06 PROCEDURE — 80053 COMPREHEN METABOLIC PANEL: CPT

## 2019-06-06 PROCEDURE — 82550 ASSAY OF CK (CPK): CPT

## 2019-06-06 PROCEDURE — 36415 COLL VENOUS BLD VENIPUNCTURE: CPT | Mod: PO

## 2019-06-06 PROCEDURE — 82306 VITAMIN D 25 HYDROXY: CPT

## 2019-06-13 ENCOUNTER — OFFICE VISIT (OUTPATIENT)
Dept: INTERNAL MEDICINE | Facility: CLINIC | Age: 64
End: 2019-06-13
Payer: COMMERCIAL

## 2019-06-13 VITALS
BODY MASS INDEX: 30.73 KG/M2 | WEIGHT: 146.38 LBS | HEART RATE: 68 BPM | OXYGEN SATURATION: 98 % | DIASTOLIC BLOOD PRESSURE: 85 MMHG | HEIGHT: 58 IN | SYSTOLIC BLOOD PRESSURE: 121 MMHG

## 2019-06-13 DIAGNOSIS — E78.5 HYPERLIPIDEMIA, UNSPECIFIED HYPERLIPIDEMIA TYPE: ICD-10-CM

## 2019-06-13 DIAGNOSIS — I10 ESSENTIAL HYPERTENSION: ICD-10-CM

## 2019-06-13 DIAGNOSIS — M54.32 SCIATICA OF LEFT SIDE: Primary | ICD-10-CM

## 2019-06-13 PROCEDURE — 3074F SYST BP LT 130 MM HG: CPT | Mod: CPTII,S$GLB,, | Performed by: INTERNAL MEDICINE

## 2019-06-13 PROCEDURE — 3079F DIAST BP 80-89 MM HG: CPT | Mod: CPTII,S$GLB,, | Performed by: INTERNAL MEDICINE

## 2019-06-13 PROCEDURE — 3008F BODY MASS INDEX DOCD: CPT | Mod: CPTII,S$GLB,, | Performed by: INTERNAL MEDICINE

## 2019-06-13 PROCEDURE — 3074F PR MOST RECENT SYSTOLIC BLOOD PRESSURE < 130 MM HG: ICD-10-PCS | Mod: CPTII,S$GLB,, | Performed by: INTERNAL MEDICINE

## 2019-06-13 PROCEDURE — 3079F PR MOST RECENT DIASTOLIC BLOOD PRESSURE 80-89 MM HG: ICD-10-PCS | Mod: CPTII,S$GLB,, | Performed by: INTERNAL MEDICINE

## 2019-06-13 PROCEDURE — 3008F PR BODY MASS INDEX (BMI) DOCUMENTED: ICD-10-PCS | Mod: CPTII,S$GLB,, | Performed by: INTERNAL MEDICINE

## 2019-06-13 PROCEDURE — 99213 OFFICE O/P EST LOW 20 MIN: CPT | Mod: S$GLB,,, | Performed by: INTERNAL MEDICINE

## 2019-06-13 PROCEDURE — 99999 PR PBB SHADOW E&M-EST. PATIENT-LVL IV: ICD-10-PCS | Mod: PBBFAC,,, | Performed by: INTERNAL MEDICINE

## 2019-06-13 PROCEDURE — 99999 PR PBB SHADOW E&M-EST. PATIENT-LVL IV: CPT | Mod: PBBFAC,,, | Performed by: INTERNAL MEDICINE

## 2019-06-13 PROCEDURE — 99213 PR OFFICE/OUTPT VISIT, EST, LEVL III, 20-29 MIN: ICD-10-PCS | Mod: S$GLB,,, | Performed by: INTERNAL MEDICINE

## 2019-06-13 NOTE — PATIENT INSTRUCTIONS
Recommendations for today    It is very important that you  the medication irbesartan consistently from the pharmacy.  Current pharmacy records indicate that you are falling behind on picking up the medication.    Should you have a flare-up of sciatica pain we would recommend that you contact my clinic so we can consider a supplemental prescription tramadol.  If I am not in the office I would recommend that you schedule an urgent care appointment with the 1st available general provider who may be able to address the prescription request      Sciatica    Sciatica is a condition that causes pain in the lower back that spreads down into the buttock, hip, and leg. Sometimes the leg pain can happen without any back pain. Sciatica happens when a spinal nerve is irritated or has pressure put on it as comes out of the spinal canal in the lower back. This most often happens when a bulge or rupture of a nearby spinal disk presses on the nerve. Sciatica can also be caused by a narrowing of the spinal canal (spinal stenosis) or spasm of the muscle in the buttocks that the sciatic nerve passes through (pyriform muscle). Sciatica is also called lumbar radiculopathy.  Sciatica may begin after a sudden twisting or bending force, such as in a car accident. Or it can happen after a simple awkward movement. In either case, muscle spasm often also happens. Muscle spasm makes the pain worse.  A healthcare provider makes a diagnosis of sciatica from your symptoms and a physical exam. Unless you had an injury from a car accident or fall, you usually wont have X-rays taken at this time. This is because the nerves and disks in your back cant be seen on an X-ray. If the provider sees signs of a compressed nerve, you will need to schedule an MRI scan as an outpatient. Signs of a compressed nerve include loss of strength in a leg.  Most sciatica gets better with medicine, exercise, and physical therapy. If your symptoms continue after  at least 3 months of medical treatment, you may need surgery or injections to your lower back.  Home care  Follow these tips when caring for yourself at home:  · You may need to stay in bed the first few days. But as soon as possible, begin sitting up or walking. This will help you avoid problems that come from staying in bed for long periods.  · When in bed, try to find a position that is comfortable. A firm mattress is best. Try lying flat on your back with pillows under your knees. You can also try lying on your side with your knees bent up toward your chest and a pillow between your knees.  · Avoid sitting for long periods. This puts more stress on your lower back than standing or walking.  · Use heat from a hot shower, hot bath, or heating pad to help ease pain. Massage can also help. You can also try using an ice pack. You can make your own ice pack by putting ice cubes in a plastic bag. Wrap the bag in a thin towel. Try both heat and cold to see which works best. Use the method that feels best for 20 minutes several times a day.  · You may use acetaminophen or ibuprofen to ease pain, unless another pain medicine was prescribed. Note: If you have chronic liver or kidney disease, talk with your healthcare provider before taking these medicines. Also talk with your provider if youve had a stomach ulcer or gastrointestinal bleeding.  · Use safe lifting methods. Dont lift anything heavier than 15 pounds until all of the pain is gone.  Follow-up care  Follow up with your healthcare provider, or as advised. You may need physical therapy or additional tests.  If X-rays were taken, a radiologist will look at them. You will be told of any new findings that may affect your care.  When to seek medical advice  Call your healthcare provider right away if any of these occur:  · Pain gets worse even after taking prescribed medicine  · Weakness or numbness in 1 or both legs or hips  · Numbness in your groin or genital  area  · You cant control your bowel or bladder  · Fever  · Redness or swelling over your back or spine   Date Last Reviewed: 8/1/2016  © 3305-5478 [employer]. [address] [place] [address], [place] [address]. All rights reserved. This information is not intended as a substitute for professional medical care. Always follow your healthcare professional's instructions.         independent

## 2019-06-13 NOTE — PROGRESS NOTES
Portions of this note are generated with voice recognition software. Typographical errors may exist.     SUBJECTIVE:    This is a/an 64 y.o. female here for primary care visit for  Chief Complaint   Patient presents with    Hypertension     follow up     Results     Patient states that she might have been inconsistent with irbesartan over the last 6 months after reviewing refill data from pharmacy.  Patient has picked up her medication at the pharmacy cholesterol May 2019 but did not  her irbesartan.  Patient states that she has been having for about 3 or 4 weeks daily symptoms of sciatica.  This is her 1st episode of sciatica.  She finds that she can't find positions to avoid the pain. However sitting on hard surfaces for prolonged duration does worsen the pain. Goes down the left gluteal and posterior thigh sometimes crossing the knee.  The pain is tolerable now that she has adapted to different positions to stop the pain. Is not disrupting sleep.  She takes meloxicam for arthritis regularly.  Provide some modest relief to sciatica.  She never started the Medrol Dosepak fearing side effects.  Because her pain is not severe she wants to forego taking the medication for now.    120/78  119/84  153/91  117/74  115/90  128/78  131/85  148/82  121/68      Medications Reviewed and Updated    Past medical, family, and social histories were reviewed and updated.    Review of Systems negative unless otherwise noted in history of present illness-  ROS    General ROS: negative  Psychological ROS: negative  ENT ROS: negative  Allergy and Immunology ROS: negative  Cardiovascular ROS: negative  Pulmonary ROS: Negative  Gastrointestinal ROS: negative  Genito-Urinary ROS: negative  Musculoskeletal ROS: negative  Neurological ROS: negative    Allergic:    Review of patient's allergies indicates:   Allergen Reactions    Codeine Nausea And Vomiting       OBJECTIVE:  BP: 121/85(home log/ here in acute pain) Pulse: 68    Wt  Readings from Last 3 Encounters:   06/13/19 66.4 kg (146 lb 6.2 oz)   06/05/19 67 kg (147 lb 11.3 oz)   05/31/19 68 kg (150 lb)    Body mass index is 30.59 kg/m².  Previous Blood Pressure Readings :   BP Readings from Last 3 Encounters:   06/13/19 121/85   06/05/19 129/70   05/01/19 136/78       Physical Exam    GEN: No apparent distress  HEENT: sclera non-icteric, conjunctiva clear  CV: no peripheral edema regular rate and rhythm. No murmurs.  PULM: breathing non-labored  ABD: Obese, protuberant abdomen.  PSYCH: appropriate affect  MSK: able to rise from chair without assistance  SKIN: normal skin turgor    Pertinent Labs Reviewed       ASSESSMENT/PLAN:    Sciatica of left side.Condition not optimally controlled. Detailed counseling on self care measures. Plan to monitor clinically in addition to plan below or as listed on After Visit Summary.   -     Ambulatory Referral to Physical/Occupational Therapy    Essential hypertension.Condition not optimally controlled. Detailed counseling on self care measures. Plan to monitor clinically in addition to plan below or as listed on After Visit Summary.    - adherence support    Hyperlipidemia. .Condition stable.  Counseling given today on self-care measures. Plan to monitor clinically. Continue current medical plan.       Future Appointments   Date Time Provider Department Center   7/12/2019 10:00 AM Brett Barrera MD [facility]   9/18/2019 10:00 AM Nayan Linares MD [facility]       Nayan Linares  6/13/2019  10:05 AM

## 2019-06-14 RX ORDER — IRBESARTAN 150 MG/1
TABLET ORAL
Qty: 90 TABLET | Refills: 0 | Status: SHIPPED | OUTPATIENT
Start: 2019-06-14 | End: 2019-09-07 | Stop reason: SDUPTHER

## 2019-06-25 ENCOUNTER — TELEPHONE (OUTPATIENT)
Dept: OPHTHALMOLOGY | Facility: CLINIC | Age: 64
End: 2019-06-25

## 2019-06-25 NOTE — TELEPHONE ENCOUNTER
----- Message from Elana Gonzalez sent at 6/25/2019  2:38 PM CDT -----  Contact: Patient    Reason for call: Patient would like to ask Dr. ZARAGOZA question        Communication Preference: [phone]    Additional Information:

## 2019-06-27 ENCOUNTER — OFFICE VISIT (OUTPATIENT)
Dept: OPTOMETRY | Facility: CLINIC | Age: 64
End: 2019-06-27
Payer: COMMERCIAL

## 2019-06-27 DIAGNOSIS — H52.4 PRESBYOPIA: ICD-10-CM

## 2019-06-27 DIAGNOSIS — H26.493 CLOUDY POSTERIOR CAPSULE, BILATERAL: Primary | ICD-10-CM

## 2019-06-27 DIAGNOSIS — Z13.5 GLAUCOMA SCREENING: ICD-10-CM

## 2019-06-27 PROCEDURE — 92014 PR EYE EXAM, EST PATIENT,COMPREHESV: ICD-10-PCS | Mod: S$GLB,,, | Performed by: OPTOMETRIST

## 2019-06-27 PROCEDURE — 99999 PR PBB SHADOW E&M-EST. PATIENT-LVL II: ICD-10-PCS | Mod: PBBFAC,,, | Performed by: OPTOMETRIST

## 2019-06-27 PROCEDURE — 92015 PR REFRACTION: ICD-10-PCS | Mod: S$GLB,,, | Performed by: OPTOMETRIST

## 2019-06-27 PROCEDURE — 99999 PR PBB SHADOW E&M-EST. PATIENT-LVL II: CPT | Mod: PBBFAC,,, | Performed by: OPTOMETRIST

## 2019-06-27 PROCEDURE — 92015 DETERMINE REFRACTIVE STATE: CPT | Mod: S$GLB,,, | Performed by: OPTOMETRIST

## 2019-06-27 PROCEDURE — 92014 COMPRE OPH EXAM EST PT 1/>: CPT | Mod: S$GLB,,, | Performed by: OPTOMETRIST

## 2019-06-27 NOTE — PROGRESS NOTES
HPI     DLS: 12/19/18 11/20/2018 OD; S/p phaco w/IOL OS - 11/6/18   Pt states no VA problems but states should would like an RX for   progressive glasses with blue blocker. States that she is having more   floaters then before. Also states sometimes she has a stabbing pain in her   eyes sharp then goes away. Wears OTC readers +2.75  No flashes   Refresh gtts PRN       Last edited by Rae Mahoney MA on 6/27/2019  1:15 PM. (History)        ROS     Positive for: Eyes (cat surgery OU)    Negative for: Constitutional, Gastrointestinal, Neurological, Skin,   Genitourinary, Musculoskeletal, HENT, Endocrine, Cardiovascular,   Respiratory, Psychiatric, Allergic/Imm, Heme/Lymph    Last edited by Frandy Harrison, OD on 6/27/2019  1:26 PM. (History)        Assessment /Plan     For exam results, see Encounter Report.    Cloudy posterior capsule, bilateral    Glaucoma screening    Presbyopia      1. Cloudy capsule sp pciol OU    PLAN:    1. Consult--Dr Tillman--YAG OU  2. Wrote new spex Rx today per pts request

## 2019-07-24 ENCOUNTER — TELEPHONE (OUTPATIENT)
Dept: ORTHOPEDICS | Facility: CLINIC | Age: 64
End: 2019-07-24

## 2019-07-24 NOTE — TELEPHONE ENCOUNTER
----- Message from Mahogany Mahoney sent at 7/24/2019 12:31 PM CDT -----  Contact: [phone]/ self   Patient requesting to speak with you regarding not taking medication prescribed by MD. Pt would like to process of gel shot for knee. Please call to further discuss.

## 2019-07-31 ENCOUNTER — TELEPHONE (OUTPATIENT)
Dept: OPHTHALMOLOGY | Facility: CLINIC | Age: 64
End: 2019-07-31

## 2019-07-31 NOTE — TELEPHONE ENCOUNTER
----- Message from Melva Henriquez sent at 7/31/2019  1:08 PM CDT -----  Contact: Chelsea  Needs Advice    Reason for call:Pt called to discuss lasik surgery options.        Communication Preference:[phone]    Additional Information:

## 2019-08-05 DIAGNOSIS — E55.9 VITAMIN D INSUFFICIENCY: ICD-10-CM

## 2019-08-05 RX ORDER — ERGOCALCIFEROL 1.25 MG/1
CAPSULE ORAL
Qty: 12 CAPSULE | Refills: 0 | Status: SHIPPED | OUTPATIENT
Start: 2019-08-05 | End: 2019-11-04 | Stop reason: SDUPTHER

## 2019-08-09 ENCOUNTER — TELEPHONE (OUTPATIENT)
Dept: ORTHOPEDICS | Facility: CLINIC | Age: 64
End: 2019-08-09

## 2019-08-09 NOTE — TELEPHONE ENCOUNTER
I do not know the answer to this.  Seda will be aware if her medication is available at [facility].

## 2019-08-09 NOTE — TELEPHONE ENCOUNTER
Returned call to Ms Rodriguez explained to her the process of euflexxa injections. Told her that when she comes in to see Dr Barrera he will decided if she needs the injections or not.

## 2019-08-09 NOTE — TELEPHONE ENCOUNTER
----- Message from Sindhu Dasilva sent at 8/9/2019  8:53 AM CDT -----  Contact: Self/ [phone]  Patient called in requesting to speak with you. Patient prefers to speak with a nurse. Please call and advise.

## 2019-08-09 NOTE — TELEPHONE ENCOUNTER
Returned call, spoke with patient.  Appointment with Dr Barrera next week, questioning if gel can be injected into bilateral knee same day as follow up visit.  If so, will there be any restrictions for that day.  Patient would like to know in advance due to work on the same day (entails moderate physical activity)  Questioned initiation of PT, patient was not able to start due to financial circumstances.  Please advise.  Thanks

## 2019-08-16 ENCOUNTER — HOSPITAL ENCOUNTER (OUTPATIENT)
Dept: RADIOLOGY | Facility: HOSPITAL | Age: 64
Discharge: HOME | End: 2019-08-16
Attending: ORTHOPAEDIC SURGERY
Payer: COMMERCIAL

## 2019-08-16 ENCOUNTER — OFFICE VISIT (OUTPATIENT)
Dept: ORTHOPEDICS | Facility: CLINIC | Age: 64
End: 2019-08-16
Payer: COMMERCIAL

## 2019-08-16 VITALS — WEIGHT: 146 LBS | HEIGHT: 58 IN | BODY MASS INDEX: 30.64 KG/M2

## 2019-08-16 DIAGNOSIS — M17.0 PRIMARY OSTEOARTHRITIS OF BOTH KNEES: Primary | ICD-10-CM

## 2019-08-16 DIAGNOSIS — M17.0 PRIMARY OSTEOARTHRITIS OF BOTH KNEES: ICD-10-CM

## 2019-08-16 PROCEDURE — 73564 X-RAY EXAM KNEE 4 OR MORE: CPT | Mod: 26,,, | Performed by: RADIOLOGY

## 2019-08-16 PROCEDURE — 20610 PR DRAIN/INJECT LARGE JOINT/BURSA: ICD-10-PCS | Mod: 50,S$GLB,, | Performed by: ORTHOPAEDIC SURGERY

## 2019-08-16 PROCEDURE — 73564 X-RAY EXAM KNEE 4 OR MORE: CPT | Mod: TC,50,PN

## 2019-08-16 PROCEDURE — 3008F BODY MASS INDEX DOCD: CPT | Mod: CPTII,S$GLB,, | Performed by: ORTHOPAEDIC SURGERY

## 2019-08-16 PROCEDURE — 73564 XR KNEE ORTHO BILAT WITH FLEXION: ICD-10-PCS | Mod: 26,,, | Performed by: RADIOLOGY

## 2019-08-16 PROCEDURE — 99213 OFFICE O/P EST LOW 20 MIN: CPT | Mod: 25,S$GLB,, | Performed by: ORTHOPAEDIC SURGERY

## 2019-08-16 PROCEDURE — 20610 DRAIN/INJ JOINT/BURSA W/O US: CPT | Mod: 50,S$GLB,, | Performed by: ORTHOPAEDIC SURGERY

## 2019-08-16 PROCEDURE — 99999 PR PBB SHADOW E&M-EST. PATIENT-LVL II: CPT | Mod: PBBFAC,,, | Performed by: ORTHOPAEDIC SURGERY

## 2019-08-16 PROCEDURE — 99999 PR PBB SHADOW E&M-EST. PATIENT-LVL II: ICD-10-PCS | Mod: PBBFAC,,, | Performed by: ORTHOPAEDIC SURGERY

## 2019-08-16 PROCEDURE — 99213 PR OFFICE/OUTPT VISIT, EST, LEVL III, 20-29 MIN: ICD-10-PCS | Mod: 25,S$GLB,, | Performed by: ORTHOPAEDIC SURGERY

## 2019-08-16 PROCEDURE — 3008F PR BODY MASS INDEX (BMI) DOCUMENTED: ICD-10-PCS | Mod: CPTII,S$GLB,, | Performed by: ORTHOPAEDIC SURGERY

## 2019-08-16 RX ORDER — TRIAMCINOLONE ACETONIDE 40 MG/ML
40 INJECTION, SUSPENSION INTRA-ARTICULAR; INTRAMUSCULAR
Status: COMPLETED | OUTPATIENT
Start: 2019-08-16 | End: 2019-08-16

## 2019-08-16 RX ADMIN — TRIAMCINOLONE ACETONIDE 40 MG: 40 INJECTION, SUSPENSION INTRA-ARTICULAR; INTRAMUSCULAR at 10:08

## 2019-08-16 NOTE — PROCEDURES
Procedures     After obtaining verbal informed consent the patient's right knee was prepped aseptically and injected through an inferior lateral approach using 40 mg of triamcinolone and 1 cc of 1% plain Xylocaine.  The patient was warned about postinjection flare and how to manage it with ice, rest and over-the-counter analgesics.  They're advised to contact me for any severe, uncontrolled pain.      After obtaining verbal informed consent the patient's left knee was prepped aseptically and injected through an inferior lateral approach using 40 mg of triamcinolone and 1 cc of 1% plain Xylocaine.  The patient was warned about postinjection flare and how to manage it with ice, rest and over-the-counter analgesics.  They're advised to contact me for any severe, uncontrolled pain.

## 2019-08-16 NOTE — PROGRESS NOTES
Subjective:      Patient ID: Chelsea Rodriguez is a 64 y.o. female.    Chief Complaint: Knee Pain (bilateral)    HPI follow-up for osteoarthritis.  The patient reports that her left knee is worse.  She has pain with working and prolonged ambulation but finds that she is able to meet her functional demands without severe disability.  She also has gelling in both knees.  She denies any sciatica symptoms at this time.    Review of Systems   Constitution: Negative for fever and weight loss.   HENT: Negative for congestion.    Eyes: Negative for visual disturbance.   Cardiovascular: Negative for chest pain.   Respiratory: Negative for shortness of breath.    Hematologic/Lymphatic: Negative for bleeding problem. Does not bruise/bleed easily.   Skin: Negative for poor wound healing.   Musculoskeletal: Positive for joint pain.   Gastrointestinal: Negative for abdominal pain.   Genitourinary: Negative for dysuria.   Neurological: Negative for seizures.   Psychiatric/Behavioral: Negative for altered mental status.   Allergic/Immunologic: Negative for persistent infections.         Objective:            Ortho/SPM Exam  Right knee    [unfilled]    The patient is not in acute distress.   Sclerae normal  Body habitus is normal.  Respiratory distress:  none   The patient walks without a limp.  Hip irritability  negative.   The skin over the knee is intact.  Knee effusion trace  Tendernes is located none  Range of motion- Flexion 115 deg, Extension 3 deg,   Ligament laxity exam:   MCL 1+   Lachman 0   Post sag  0    LCL 0  Patellar apprehension negative.  Popliteal cyst negative  Patellar crepitation absent.  Flexion/pinch negative  Pulses DP present, PT present.  Motor normal 5/5 strength in all tested muscle groups.   Sensory normal.    The left knee has trace effusion. No real tenderness. Range of motion 3-100 degrees.    Weight-bearing radiographs of both knees show advanced medial narrowing with osteophytes        Assessment:        Encounter Diagnosis   Name Primary?    Primary osteoarthritis of both knees Yes          Although the patient's radiographic findings are advanced and her physical findings correlate with this, her level impairment is only moderate at this time.  Plan:       Chelsea was seen today for knee pain.    Diagnoses and all orders for this visit:    Primary osteoarthritis of both knees        I explained my diagnostic impression and the reasoning behind it in detail, using layman's terms.  Models and/or pictures were used to help in the explanation.    Over-the-counter analgesics recommended    Injection requested and consent given    I explained the potential role of surgery in the treatment of this condition to the patient.  They understand that if nonsurgical measures do not adequately control symptoms, surgery will be considered in the future.

## 2019-08-26 ENCOUNTER — OFFICE VISIT (OUTPATIENT)
Dept: OPHTHALMOLOGY | Facility: CLINIC | Age: 64
End: 2019-08-26
Payer: COMMERCIAL

## 2019-08-26 VITALS — DIASTOLIC BLOOD PRESSURE: 81 MMHG | SYSTOLIC BLOOD PRESSURE: 134 MMHG | HEART RATE: 65 BPM

## 2019-08-26 DIAGNOSIS — Z96.1 PSEUDOPHAKIA: ICD-10-CM

## 2019-08-26 DIAGNOSIS — H04.123 DRY EYE SYNDROME OF BOTH EYES: ICD-10-CM

## 2019-08-26 DIAGNOSIS — H26.493 PCO (POSTERIOR CAPSULAR OPACIFICATION), BILATERAL: Primary | ICD-10-CM

## 2019-08-26 DIAGNOSIS — H43.813 VITREOUS DETACHMENT OF BOTH EYES: ICD-10-CM

## 2019-08-26 DIAGNOSIS — I10 ESSENTIAL HYPERTENSION: ICD-10-CM

## 2019-08-26 DIAGNOSIS — H52.7 REFRACTIVE ERROR: ICD-10-CM

## 2019-08-26 PROCEDURE — 92014 PR EYE EXAM, EST PATIENT,COMPREHESV: ICD-10-PCS | Mod: S$GLB,,, | Performed by: OPHTHALMOLOGY

## 2019-08-26 PROCEDURE — 99999 PR PBB SHADOW E&M-EST. PATIENT-LVL III: ICD-10-PCS | Mod: PBBFAC,,, | Performed by: OPHTHALMOLOGY

## 2019-08-26 PROCEDURE — 92014 COMPRE OPH EXAM EST PT 1/>: CPT | Mod: S$GLB,,, | Performed by: OPHTHALMOLOGY

## 2019-08-26 PROCEDURE — 99999 PR PBB SHADOW E&M-EST. PATIENT-LVL III: CPT | Mod: PBBFAC,,, | Performed by: OPHTHALMOLOGY

## 2019-08-26 NOTE — PROGRESS NOTES
Subjective:       Patient ID: Chelsea Rodriguez is a 64 y.o. female.    Chief Complaint: Laser Treatment (Pt is here for YAG Laser OU per Dr. Harrison)    HPI     Laser Treatment      Additional comments: Pt is here for YAG Laser OU per Dr. Harrison              Comments     64 y.o. Female is here for Pt is here for YAG Laser OU per Dr. Harrison.   Denies eye pain and flashes.H/o floaters. Blurred vision at near. No   itching, burning or tearing. Slight trouble with glare.     Eye meds: no gtt           Last edited by BRIDGETT Sutton on 8/26/2019  3:23 PM. (History)             Assessment:       1. PCO (posterior capsular opacification), bilateral    2. Dry eye syndrome of both eyes    3. Vitreous detachment of both eyes    4. Essential hypertension    5. Refractive error    6. Pseudophakia        Plan:       Early PCO OD>OS- Not Visually Significant.     ARTIE-Doing well.  PVD OD-Stable.  HTN-No retinopathy OD.  RE-Pt does not want MRx.        AT's.  Control HTN.  RTC me in 6 mos for BAT OU & possible YAG CAP OD.

## 2019-09-07 DIAGNOSIS — E78.5 HYPERLIPIDEMIA, UNSPECIFIED HYPERLIPIDEMIA TYPE: ICD-10-CM

## 2019-09-09 RX ORDER — MELOXICAM 15 MG/1
TABLET ORAL
Qty: 90 TABLET | Refills: 0 | Status: SHIPPED | OUTPATIENT
Start: 2019-09-09 | End: 2020-05-27 | Stop reason: SDUPTHER

## 2019-09-09 RX ORDER — PRAVASTATIN SODIUM 20 MG/1
TABLET ORAL
Qty: 90 TABLET | Refills: 0 | Status: SHIPPED | OUTPATIENT
Start: 2019-09-09 | End: 2019-09-10

## 2019-09-09 RX ORDER — IRBESARTAN 150 MG/1
TABLET ORAL
Qty: 90 TABLET | Refills: 0 | Status: SHIPPED | OUTPATIENT
Start: 2019-09-09 | End: 2019-12-09 | Stop reason: SDUPTHER

## 2019-09-10 DIAGNOSIS — E78.5 HYPERLIPIDEMIA, UNSPECIFIED HYPERLIPIDEMIA TYPE: ICD-10-CM

## 2019-09-10 RX ORDER — METOPROLOL SUCCINATE 25 MG/1
TABLET, EXTENDED RELEASE ORAL
Qty: 180 TABLET | Refills: 0 | Status: SHIPPED | OUTPATIENT
Start: 2019-09-10 | End: 2020-06-24 | Stop reason: SDUPTHER

## 2019-09-10 RX ORDER — PRAVASTATIN SODIUM 40 MG/1
TABLET ORAL
Qty: 90 TABLET | Refills: 0 | Status: SHIPPED | OUTPATIENT
Start: 2019-09-10 | End: 2020-05-27 | Stop reason: SDUPTHER

## 2019-09-18 ENCOUNTER — OFFICE VISIT (OUTPATIENT)
Dept: INTERNAL MEDICINE | Facility: CLINIC | Age: 64
End: 2019-09-18
Payer: COMMERCIAL

## 2019-09-18 VITALS
OXYGEN SATURATION: 100 % | HEIGHT: 58 IN | SYSTOLIC BLOOD PRESSURE: 110 MMHG | WEIGHT: 144.38 LBS | BODY MASS INDEX: 30.31 KG/M2 | HEART RATE: 81 BPM | DIASTOLIC BLOOD PRESSURE: 78 MMHG

## 2019-09-18 DIAGNOSIS — I10 ESSENTIAL HYPERTENSION: Primary | ICD-10-CM

## 2019-09-18 DIAGNOSIS — Z28.21 REFUSED INFLUENZA VACCINE: ICD-10-CM

## 2019-09-18 DIAGNOSIS — Z71.89 ENCOUNTER FOR HERB AND VITAMIN SUPPLEMENT MANAGEMENT: ICD-10-CM

## 2019-09-18 PROCEDURE — 3078F DIAST BP <80 MM HG: CPT | Mod: CPTII,S$GLB,, | Performed by: INTERNAL MEDICINE

## 2019-09-18 PROCEDURE — 3008F BODY MASS INDEX DOCD: CPT | Mod: CPTII,S$GLB,, | Performed by: INTERNAL MEDICINE

## 2019-09-18 PROCEDURE — 3074F PR MOST RECENT SYSTOLIC BLOOD PRESSURE < 130 MM HG: ICD-10-PCS | Mod: CPTII,S$GLB,, | Performed by: INTERNAL MEDICINE

## 2019-09-18 PROCEDURE — 99213 PR OFFICE/OUTPT VISIT, EST, LEVL III, 20-29 MIN: ICD-10-PCS | Mod: S$GLB,,, | Performed by: INTERNAL MEDICINE

## 2019-09-18 PROCEDURE — 3008F PR BODY MASS INDEX (BMI) DOCUMENTED: ICD-10-PCS | Mod: CPTII,S$GLB,, | Performed by: INTERNAL MEDICINE

## 2019-09-18 PROCEDURE — 99999 PR PBB SHADOW E&M-EST. PATIENT-LVL III: CPT | Mod: PBBFAC,,, | Performed by: INTERNAL MEDICINE

## 2019-09-18 PROCEDURE — 99213 OFFICE O/P EST LOW 20 MIN: CPT | Mod: S$GLB,,, | Performed by: INTERNAL MEDICINE

## 2019-09-18 PROCEDURE — 99999 PR PBB SHADOW E&M-EST. PATIENT-LVL III: ICD-10-PCS | Mod: PBBFAC,,, | Performed by: INTERNAL MEDICINE

## 2019-09-18 PROCEDURE — 3078F PR MOST RECENT DIASTOLIC BLOOD PRESSURE < 80 MM HG: ICD-10-PCS | Mod: CPTII,S$GLB,, | Performed by: INTERNAL MEDICINE

## 2019-09-18 PROCEDURE — 3074F SYST BP LT 130 MM HG: CPT | Mod: CPTII,S$GLB,, | Performed by: INTERNAL MEDICINE

## 2019-09-18 NOTE — PATIENT INSTRUCTIONS
Meloxicam on side      Using Herbs and Spices    Herbs and spices add flavor to cooking without adding fat or sodium. That's why they're great for healthy cooking. Try these tips to help create tasty, healthy meals.  How much to use?  If a recipe calls for: 1 Tablespoon of fresh herbs You can use: 1 teaspoon of dried herbs Or: 1/4 teaspoon of powdered herbs   Tips for Getting the Most of Herbs and Spices  · Use kitchen elton or a sharp knife to cut leaves of fresh herbs. Cutting the leaves finely will release the most flavor.  · When using whole spices, don't grind them until you need them. Crushing the berries and seeds with a mortar and pestle or grating whole nutmeg or cinnamon sticks just before adding them to your recipe will guarantee the most flavor.  · Dried herbs pack more flavor for the same quantity than fresh herbs, and powdered herbs are more potent than dried flakes. If you are using powdered herbs in a recipe that calls for fresh, you'll want to decrease the amount you add.  · When adding fresh or dried spices and herbs to cold recipes, such as dips or salad dressings, allow the food to sit in the refrigerator for at least a couple of hours before serving so the flavors can blend.  · Add fresh spices and herbs to hot dishes as close to serving time as possible for the most flavorful results. Dried herbs and spices should be added early in the cooking process to prevent a powdery taste.  · The longer dried herbs and spices sit in your cupboard without being used, the more flavor they lose. Whole spices last longer than ground or powdered spices. Store dried herbs and spices in a cool, dry, dark place. Keep powdered herbs and spices for no longer than a year.  Date Last Reviewed: 6/1/2015  © 5270-7726 [employer]. [address] [place] [address], [place] [address]. All rights reserved. This information is not intended as a substitute for professional medical care. Always follow your healthcare  professional's instructions.        Low-Salt Choices  Eating salt (sodium) can make your body retain too much water. Excess water makes your heart work harder. Canned, packaged, and frozen foods are easy to prepare, but they are often high in sodium. Here are some ideas for low-salt foods you can easily prepare yourself.    For breakfast  · Fruit or 100% fruit juice  · Whole-wheat bread or an English muffin. Compare sodium content on labels.  · Low-fat milk or yogurt  · Unsalted eggs  · Shredded wheat  · Corn tortillas  · Unsalted steamed rice  · Regular (not instant) hot cereal, made without salt  Stay away from:  · Sausage, roberts, and ham  · Flour tortillas  · Packaged muffins, pancakes, and biscuits  · Instant hot cereals  · Cottage cheese  For lunch and dinner  · Fresh fish, chicken, turkey, or meat--baked, broiled, or roasted without salt  · Dry beans, cooked without salt  · Tofu, stir-fried without salt  · Unsalted fresh fruit and vegetables, or frozen or canned fruit and vegetables with no added salt  Stay away from:  · Lunch or deli meat that is cured or smoked  · Cheese  · Tomato juice and catsup  · Canned vegetables, soups, and fish not labeled as no-salt-added or reduced sodium  · Packaged gravies and sauces  · Olives, pickles, and relish  · Bottled salad dressings  For snacks and desserts  · Yogurt  · Unsalted, air popped popcorn  · Unsalted nuts or seeds  Stay away from:  · Pies and cakes  · Packaged dessert mixes  · Pizza  · Canned and packaged puddings  · Pretzels, chips, crackers, and nuts--unless the label says unsalted  Date Last Reviewed: 6/17/2015  © 1366-0956 [employer]. [address] [place] [address], [place] [address]. All rights reserved. This information is not intended as a substitute for professional medical care. Always follow your healthcare professional's instructions.

## 2019-09-18 NOTE — PROGRESS NOTES
Portions of this note are generated with voice recognition software. Typographical errors may exist.     SUBJECTIVE:    This is a/an 64 y.o. female here for primary care visit for  Chief Complaint   Patient presents with    Hypertension     Volatile blood pressure.  Patient having several blood pressure numbers in the 150s coinciding with salt indiscretions.  She is complying with blood pressure medications.  She would like to get back on a multivitamin but is seeking instruction because she wants to be cautious not to take excessive doses of niacin which she recognizes could contribute to myalgias or myositis adverse effects while she is on statin      Medications Reviewed and Updated    Past medical, family, and social histories were reviewed and updated.    Review of Systems negative unless otherwise noted in history of present illness-  ROS    General ROS: negative  Psychological ROS: negative  ENT ROS: negative  Endocrine ROS: Negative  Allergy and Immunology ROS: negative  Cardiovascular ROS: negative  Pulmonary ROS: Negative  Gastrointestinal ROS: negative  Genito-Urinary ROS: negative  Musculoskeletal ROS: negative    Allergic:    Review of patient's allergies indicates:   Allergen Reactions    Codeine Nausea And Vomiting       OBJECTIVE:  BP: 110/78 Pulse: 81    Wt Readings from Last 3 Encounters:   09/18/19 65.5 kg (144 lb 6.4 oz)   08/16/19 66.2 kg (146 lb)   06/13/19 66.4 kg (146 lb 6.2 oz)    Body mass index is 30.18 kg/m².  Previous Blood Pressure Readings :   BP Readings from Last 3 Encounters:   09/18/19 110/78   08/26/19 134/81   06/13/19 121/85       Physical Exam    GEN: No apparent distress  HEENT: sclera non-icteric, conjunctiva clear  CV: no peripheral edema regular rate and rhythm.  No murmurs.  PULM: breathing non-labored  ABD: non, protuberant abdomen.  PSYCH: appropriate affect  MSK: able to rise from chair without assistance  SKIN: normal skin turgor    Pertinent Labs Reviewed        ASSESSMENT/PLAN:    Essential hypertension.Condition stable.  Counseling given today on self-care measures. Plan to monitor clinically. Continue current medical plan.     Encounter for herb and vitamin supplement management    Refused influenza vaccine          No future appointments.    Nyaan Linares  9/18/2019  10:32 AM

## 2019-09-24 ENCOUNTER — HOSPITAL ENCOUNTER (EMERGENCY)
Facility: HOSPITAL | Age: 64
Discharge: HOME | End: 2019-09-24
Attending: EMERGENCY MEDICINE
Payer: COMMERCIAL

## 2019-09-24 VITALS
TEMPERATURE: 98 F | BODY MASS INDEX: 29.03 KG/M2 | HEART RATE: 77 BPM | RESPIRATION RATE: 13 BRPM | OXYGEN SATURATION: 99 % | HEIGHT: 59 IN | DIASTOLIC BLOOD PRESSURE: 69 MMHG | SYSTOLIC BLOOD PRESSURE: 148 MMHG | WEIGHT: 144 LBS

## 2019-09-24 DIAGNOSIS — M79.601 RIGHT ARM PAIN: Primary | ICD-10-CM

## 2019-09-24 PROCEDURE — 99284 EMERGENCY DEPT VISIT MOD MDM: CPT | Mod: 25

## 2019-09-24 NOTE — DISCHARGE INSTRUCTIONS
Return to the ED if your condition changes, progresses, or if you have any concerns.  Use tylenol as directed on the labeling for your pain.

## 2019-09-24 NOTE — ED NOTES
NAWAF at bedside.  Pt reports that she had pain in right upper arm just distal to shoulder 1 day ago, took aspirin and pain resolved.  Woke today and noted bruised area to right upper arm in same area, concerned that she might have a blood clot.  Denies SOB, CP, hx of blood clots.  Denies any known trauma or injury to area.  Full ROM to bilateral arms, elbows, hands, digits and shoulders.  Soreness increases when lifting right arm at the shoulder.  NO deformity or swelling, with mild bruising noted toright upper arm area

## 2019-09-24 NOTE — ED PROVIDER NOTES
"Encounter Date: 2019    SCRIBE #1 NOTE: I, Tod Herrera, am scribing for, and in the presence of,  PAT Hayes. I have scribed the entire note.       History     Chief Complaint   Patient presents with    Arm Pain     right upper arm bruising, swelling, and tender for 2 days.  Patient is concerned about having a blood clot after googling it.  Took 2 aspirin PTA.  No history of injury.  Denies shortness of breath.     Patient is a 64 year-old non-smoker female who presents to the ED with c/o arm pain that started yesterday. Patient reports pain began in her right elbow and then noticed a "knot" on her right arm which felt very strange. She was concerned of a blood clot and took 2 doses of Aspirin yesterday with some relief in pain. States the pain returned today which improved with 2 doses of Aspirin. She denies any arm tingling, chest pain, SOB, or blood in stool. Patient is complaining of bruising to her right upper arm "like a leopard." She denies history of DVT/PE. Denies recent injury or trauma. No recent travelling or hormone therapy/birth control. No other complaints at this time.        The history is provided by the patient.     Review of patient's allergies indicates:   Allergen Reactions    Codeine Nausea And Vomiting     Past Medical History:   Diagnosis Date    Arthritis     Breast cancer     left breast    Cataract     Hyperlipidemia     Hypertension      Past Surgical History:   Procedure Laterality Date    BILATERAL SALPINGOOPHORECTOMY  2000    BREAST BIOPSY Left 2010    malignant    BREAST BIOPSY Left     negative    BREAST LUMPECTOMY Left     CATARACT EXTRACTION W/  INTRAOCULAR LENS IMPLANT Right 2018    Dr. Oneil    CATARACT EXTRACTION W/  INTRAOCULAR LENS IMPLANT Left 2018    Dr. Oneil     SECTION      x2    COLONOSCOPY N/A 10/20/2017    Procedure: COLONOSCOPY;  Surgeon: Mitchell Danielson Jr., MD;  Location: [facility];  " Service: Endoscopy;  Laterality: N/A;    CYST REMOVAL      on back    HERNIA REPAIR      HYSTERECTOMY      at 25 yrs old    INTRAOCULAR PROSTHESES INSERTION Left 11/6/2018    Procedure: INSERTION, IOL PROSTHESIS;  Surgeon: Sergio Oneil MD;  [facility];  Service: Ophthalmology;  Laterality: Left;    INTRAOCULAR PROSTHESES INSERTION Right 11/20/2018    Procedure: INSERTION, IOL PROSTHESIS;  Surgeon: Sergio Oneil MD;  Location: [facility] OR 2ND FLR;  Service: Ophthalmology;  Laterality: Right;    OOPHORECTOMY      @ 45 yrs old    PHACOEMULSIFICATION OF CATARACT Left 11/6/2018    Procedure: PHACOEMULSIFICATION, CATARACT;  Surgeon: Sergio Oneil MD;  [facility];  Service: Ophthalmology;  Laterality: Left;    PHACOEMULSIFICATION OF CATARACT Right 11/20/2018    Procedure: PHACOEMULSIFICATION, CATARACT;  Surgeon: Sergio Oneil MD;  [facility];  Service: Ophthalmology;  Laterality: Right;    supracervical abdominal hysterectomy  1978    fibroids     Family History   Problem Relation Age of Onset    Hypertension Mother     Heart disease Mother     Diabetes Mother     Hyperlipidemia Mother     Pancreatitis Mother     Cataracts Mother     Macular degeneration Mother     Cancer Father     Breast cancer Paternal Cousin     Amblyopia Neg Hx     Blindness Neg Hx     Glaucoma Neg Hx     Strabismus Neg Hx     Retinal detachment Neg Hx      Social History     Tobacco Use    Smoking status: Never Smoker    Smokeless tobacco: Never Used   Substance Use Topics    Alcohol use: Yes     Comment: not often     Drug use: No     Review of Systems   Respiratory: Negative for shortness of breath.    Cardiovascular: Negative for chest pain.   Gastrointestinal: Negative for blood in stool.   Musculoskeletal: Positive for myalgias (right arm).   Neurological: Negative for weakness.   All other systems reviewed and are negative.      Physical  Exam     Initial Vitals [09/24/19 1557]   BP Pulse Resp Temp SpO2   (!) 148/69 77 13 98 °F (36.7 °C) 99 %      MAP       --         Physical Exam    Nursing note and vitals reviewed.  Constitutional: She appears well-developed and well-nourished. No distress.   HENT:   Head: Normocephalic and atraumatic.   Eyes: EOM are normal. Pupils are equal, round, and reactive to light.   Neck: Normal range of motion. Neck supple.   Cardiovascular: Normal rate, regular rhythm, normal heart sounds and intact distal pulses.   Pulses:       Radial pulses are 2+ on the right side, and 2+ on the left side.   Pulmonary/Chest: Breath sounds normal. No respiratory distress. She has no wheezes.   Musculoskeletal: Normal range of motion.   Right shoulder with full active ROM.  Ecchymosis noted to anterior aspect of right shoulder.  No swelling, erythema, fluctuance, or induration.   Neurological: She is alert and oriented to person, place, and time.   Skin: Skin is warm and dry.         ED Course   Procedures  Labs Reviewed - No data to display       Imaging Results          US Upper Extremity Veins Right (Final result)  Result time 09/24/19 16:40:53    Final result by Frandy Brar MD (09/24/19 16:40:53)                 Impression:      No thrombus in central veins of the right upper extremity.      Electronically signed by: Frandy Brar MD  Date:    09/24/2019  Time:    16:40             Narrative:    EXAMINATION:  US UPPER EXTREMITY VEINS RIGHT    CLINICAL HISTORY:  arm pain;    TECHNIQUE:  Duplex and color flow Doppler evaluation and dynamic compression was performed of the right upper extremity veins.    COMPARISON:  None    FINDINGS:  Central veins: The internal jugular, subclavian, and axillary veins are patent and free of thrombus.    Arm veins: The brachial, basilic, and cephalic veins are patent and compressible.    Contralateral subclavian/internal jugular veins: The left internal jugular vein is patent and free of  thrombus.    Other findings: None.                                 Medical Decision Making:   Initial Assessment:   65yo female here for nontraumatic right upper arm pain and bruising.  Pt is concerned for DVT.  NVI bilateral hands.   Differential Diagnosis:   Contusion, DVT, ecchymosis  Clinical Tests:   Radiological Study: Ordered and Reviewed  ED Management:  US RUE veins  US negative for DVT. No sign of septic joint or infection.  No pmhx of coagulopathy.  Pt to follow up with PCP within 2 days.  I reviewed strict return precautions. In addition, pt is to return to the ED if condition changes, progresses, or if there are any concerns.  Pt verbalized understanding, compliance, and agreement with the treatment plan.    OTC tylenol                       Clinical Impression:       ICD-10-CM ICD-9-CM   1. Right arm pain M79.601 729.5                 I, Xenia STEWART, personally performed the services described in this documentation. All medical record entries made by the scribe were at my direction and in my presence.  I have reviewed the chart and agree that the record reflects my personal performance and is accurate and complete.     PAT Hayes-BC  4:53 PM 09/24/2019                   PAT Hernandez  09/24/19 [id number]

## 2019-10-02 ENCOUNTER — OFFICE VISIT (OUTPATIENT)
Dept: INTERNAL MEDICINE | Facility: CLINIC | Age: 64
End: 2019-10-02
Payer: COMMERCIAL

## 2019-10-02 VITALS
SYSTOLIC BLOOD PRESSURE: 120 MMHG | OXYGEN SATURATION: 97 % | HEIGHT: 58 IN | BODY MASS INDEX: 30.73 KG/M2 | DIASTOLIC BLOOD PRESSURE: 80 MMHG | WEIGHT: 146.38 LBS | HEART RATE: 80 BPM

## 2019-10-02 DIAGNOSIS — M79.10 MYALGIA: ICD-10-CM

## 2019-10-02 DIAGNOSIS — M75.81 TENDINITIS OF RIGHT ROTATOR CUFF: Primary | ICD-10-CM

## 2019-10-02 PROCEDURE — 99214 PR OFFICE/OUTPT VISIT, EST, LEVL IV, 30-39 MIN: ICD-10-PCS | Mod: S$GLB,,, | Performed by: INTERNAL MEDICINE

## 2019-10-02 PROCEDURE — 3008F BODY MASS INDEX DOCD: CPT | Mod: CPTII,S$GLB,, | Performed by: INTERNAL MEDICINE

## 2019-10-02 PROCEDURE — 3008F PR BODY MASS INDEX (BMI) DOCUMENTED: ICD-10-PCS | Mod: CPTII,S$GLB,, | Performed by: INTERNAL MEDICINE

## 2019-10-02 PROCEDURE — 99999 PR PBB SHADOW E&M-EST. PATIENT-LVL IV: CPT | Mod: PBBFAC,,, | Performed by: INTERNAL MEDICINE

## 2019-10-02 PROCEDURE — 3079F PR MOST RECENT DIASTOLIC BLOOD PRESSURE 80-89 MM HG: ICD-10-PCS | Mod: CPTII,S$GLB,, | Performed by: INTERNAL MEDICINE

## 2019-10-02 PROCEDURE — 3079F DIAST BP 80-89 MM HG: CPT | Mod: CPTII,S$GLB,, | Performed by: INTERNAL MEDICINE

## 2019-10-02 PROCEDURE — 3074F SYST BP LT 130 MM HG: CPT | Mod: CPTII,S$GLB,, | Performed by: INTERNAL MEDICINE

## 2019-10-02 PROCEDURE — 3074F PR MOST RECENT SYSTOLIC BLOOD PRESSURE < 130 MM HG: ICD-10-PCS | Mod: CPTII,S$GLB,, | Performed by: INTERNAL MEDICINE

## 2019-10-02 PROCEDURE — 99999 PR PBB SHADOW E&M-EST. PATIENT-LVL IV: ICD-10-PCS | Mod: PBBFAC,,, | Performed by: INTERNAL MEDICINE

## 2019-10-02 PROCEDURE — 99214 OFFICE O/P EST MOD 30 MIN: CPT | Mod: S$GLB,,, | Performed by: INTERNAL MEDICINE

## 2019-10-03 NOTE — PROGRESS NOTES
Portions of this note are generated with voice recognition software. Typographical errors may exist.     SUBJECTIVE:    This is a/an 64 y.o. female here for primary care visit for  Chief Complaint   Patient presents with    Follow-up     ER visit/bruise to right arm     Patient states with no provocation or any trauma she started to develop bruising along the anterior portion of her biceps muscle with swelling and pain. Patient went to the emergency room after urgent care determined that she might have an upper extremity DVT.  This was ruled out in the emergency room with ultrasound.  Patient states that even since the our emergency room evaluation the bruising has continued..  States that the radius of involvement has slowly increased.  States that she also has a worsening of range of motion in the right shoulder since these bruises developed.  She now feels that she has a significant difficulty elevating her arm above the level of her shoulder.  It is not severely painful it appears to just get stuck.  Patient has not had formal evaluation of the right shoulder with MRI.  Does not have any implanted metal.  No severe claustrophobia.    Patient has a lot of catastrophic thinking and has been looking on the Internet and endorses significant anxiety about rare disorders loosely related to this and many other somatic complaints.  Patient states that she has some stress urinary incontinence and feels that she has arthralgias in many joints and feels that she might have a rare disorder such as Paget's.  Patient is reassured that available radiologic evidence does not support the diagnosis of Paget's disease of the bone.  Even so the patient is very worried and wants to return to Rheumatology for further evaluation.      Medications Reviewed and Updated    Past medical, family, and social histories were reviewed and updated.    Review of Systems negative unless otherwise noted in history of present  illness-  ROS    General ROS: negative  Psychological ROS: negative  ENT ROS: negative  Endocrine ROS: Negative  Allergy and Immunology ROS: negative  Cardiovascular ROS: negative  Pulmonary ROS: Negative  Gastrointestinal ROS: negative  Genito-Urinary ROS: negative  Musculoskeletal ROS: negative  Neurological ROS: negative  Dermatological ROS: negative        Allergic:    Review of patient's allergies indicates:   Allergen Reactions    Codeine Nausea And Vomiting       OBJECTIVE:  BP: 120/80 Pulse: 80    Wt Readings from Last 3 Encounters:   10/02/19 66.4 kg (146 lb 6.2 oz)   09/24/19 65.3 kg (144 lb)   09/18/19 65.5 kg (144 lb 6.4 oz)    Body mass index is 30.59 kg/m².  Previous Blood Pressure Readings :   BP Readings from Last 3 Encounters:   10/02/19 120/80   09/24/19 (!) 148/69   09/18/19 110/78       Physical Exam    GEN: No apparent distress  HEENT: sclera non-icteric, conjunctiva clear  CV: no peripheral edema  PULM: breathing non-labored  ABD: Obese, protuberant abdomen.  PSYCH: appropriate affect  MSK: able to rise from chair without assistance  - abduction right shoulder limited passively to about 90°.  - no significant point tenderness to the structures of the rotator cuff.  SKIN: normal skin turgor  - stable nodular subcutaneous masses distal biceps (see ultrasonography report for benign findings)    Pertinent Labs Reviewed       ASSESSMENT/PLAN:    Tendinitis of right rotator cuff.Etiology unclear. Suspect Rotator Cuff Tear.  Not controlled. Further evaluation warranted.  Recommendations as below or as written on After Visit Summary.   -     MRI Shoulder Without Contrast Right; Future; Expected date: 10/02/2019  -     Ambulatory referral/consult to Orthopedics    Myalgia.Etiology unclear. Not controlled. Further evaluation warranted.  Recommendations as below or as written on After Visit Summary.   -     Ambulatory Referral to Rheumatology          Future Appointments   Date Time Provider Department  [place]   10/12/2019 12:15 PM [facility] MRI1 450 LB LIMIT [facility]   10/14/2019 11:20 AM Fili Quarles Jr., MD [facility]THO [facility]   11/19/2019  1:20 PM Nayan Linares MD [facility]   12/11/2019  9:30 AM [facility]   1/13/2020  9:00 AM [facility], [facility] [place]   1/15/2020 10:00 AM Nayan Linares MD [facility]       Nayan Linares  10/3/2019  12:23 PM

## 2019-10-12 ENCOUNTER — HOSPITAL ENCOUNTER (OUTPATIENT)
Dept: RADIOLOGY | Facility: HOSPITAL | Age: 64
Discharge: HOME | End: 2019-10-12
Attending: INTERNAL MEDICINE
Payer: COMMERCIAL

## 2019-10-12 DIAGNOSIS — M75.81 TENDINITIS OF RIGHT ROTATOR CUFF: ICD-10-CM

## 2019-10-12 PROCEDURE — 73221 MRI SHOULDER WITHOUT CONTRAST RIGHT: ICD-10-PCS | Mod: 26,RT,, | Performed by: RADIOLOGY

## 2019-10-12 PROCEDURE — 73221 MRI JOINT UPR EXTREM W/O DYE: CPT | Mod: TC,RT

## 2019-10-12 PROCEDURE — 73221 MRI JOINT UPR EXTREM W/O DYE: CPT | Mod: 26,RT,, | Performed by: RADIOLOGY

## 2019-10-14 ENCOUNTER — OFFICE VISIT (OUTPATIENT)
Dept: ORTHOPEDICS | Facility: CLINIC | Age: 64
End: 2019-10-14
Payer: COMMERCIAL

## 2019-10-14 VITALS — HEIGHT: 58 IN | BODY MASS INDEX: 32.75 KG/M2 | WEIGHT: 156 LBS

## 2019-10-14 DIAGNOSIS — M75.81 TENDINITIS OF RIGHT ROTATOR CUFF: ICD-10-CM

## 2019-10-14 DIAGNOSIS — M75.121 NONTRAUMATIC COMPLETE TEAR OF RIGHT ROTATOR CUFF: ICD-10-CM

## 2019-10-14 PROCEDURE — 3008F BODY MASS INDEX DOCD: CPT | Mod: CPTII,S$GLB,, | Performed by: ORTHOPAEDIC SURGERY

## 2019-10-14 PROCEDURE — 99213 OFFICE O/P EST LOW 20 MIN: CPT | Mod: 25,S$GLB,, | Performed by: ORTHOPAEDIC SURGERY

## 2019-10-14 PROCEDURE — 99213 PR OFFICE/OUTPT VISIT, EST, LEVL III, 20-29 MIN: ICD-10-PCS | Mod: 25,S$GLB,, | Performed by: ORTHOPAEDIC SURGERY

## 2019-10-14 PROCEDURE — 20610 PR DRAIN/INJECT LARGE JOINT/BURSA: ICD-10-PCS | Mod: S$GLB,,, | Performed by: ORTHOPAEDIC SURGERY

## 2019-10-14 PROCEDURE — 20610 DRAIN/INJ JOINT/BURSA W/O US: CPT | Mod: S$GLB,,, | Performed by: ORTHOPAEDIC SURGERY

## 2019-10-14 PROCEDURE — 99999 PR PBB SHADOW E&M-EST. PATIENT-LVL III: ICD-10-PCS | Mod: PBBFAC,,, | Performed by: ORTHOPAEDIC SURGERY

## 2019-10-14 PROCEDURE — 99999 PR PBB SHADOW E&M-EST. PATIENT-LVL III: CPT | Mod: PBBFAC,,, | Performed by: ORTHOPAEDIC SURGERY

## 2019-10-14 PROCEDURE — 3008F PR BODY MASS INDEX (BMI) DOCUMENTED: ICD-10-PCS | Mod: CPTII,S$GLB,, | Performed by: ORTHOPAEDIC SURGERY

## 2019-10-14 RX ORDER — TRIAMCINOLONE ACETONIDE 40 MG/ML
40 INJECTION, SUSPENSION INTRA-ARTICULAR; INTRAMUSCULAR
Status: COMPLETED | OUTPATIENT
Start: 2019-10-14 | End: 2019-10-14

## 2019-10-14 RX ADMIN — TRIAMCINOLONE ACETONIDE 40 MG: 40 INJECTION, SUSPENSION INTRA-ARTICULAR; INTRAMUSCULAR at 11:10

## 2019-10-14 NOTE — LETTER
October 14, 2019      Nayan Linares MD  [address] [facility]  [place] [address]           [facility]  [address], [address]  [place] [address]  Phone: [phone]          Patient: Chelsea Rodriguez   MR Number: [id number]   YOB: 1955   Date of Visit: 10/14/2019       Dear Dr. Nayan Linares:    Thank you for referring Chelsea Rodriguez to me for evaluation. Attached you will find relevant portions of my assessment and plan of care.    If you have questions, please do not hesitate to call me. I look forward to following Chelsea Rodriguez along with you.    Sincerely,    Fili Quarles Jr., MD    Enclosure  CC:  No Recipients    If you would like to receive this communication electronically, please contact [email or web address] or [phone] to request more information on [employer] access.    For providers and/or their staff who would like to refer a patient to [facility], please contact us through our one-stop-shop provider referral line, [facility], at [phone].    If you feel you have received this communication in error or would no longer like to receive these types of communications, please e-mail [email or web address]

## 2019-10-14 NOTE — PROGRESS NOTES
Subjective:      Patient ID: Chelsea Rodriguez is a 64 y.o. female.  Chief Complaint: Pain of the Right Shoulder      HPI  Chelsea Rodriguez is a  64 y.o. female presenting today for follow up of right shoulder pain.  She reports that she is here today for her right shoulder  She had some bruising and swelling in the right shoulder few weeks back no history of trauma but it did concerned her quite a bit she had associated pain with that  Prior to that she has had pain on and off with the right shoulder for years but no history of trauma  No numbness or tingling is reported.    Review of patient's allergies indicates:   Allergen Reactions    Codeine Nausea And Vomiting         Current Outpatient Medications   Medication Sig Dispense Refill    aspirin (ECOTRIN) 81 MG EC tablet once a day      co-enzyme Q-10 30 mg capsule Take 30 mg by mouth 3 (three) times daily.      ergocalciferol (ERGOCALCIFEROL) 50,000 unit Cap TAKE 1 CAPSULE BY MOUTH EVERY 7 DAYS 12 capsule 0    irbesartan (AVAPRO) 150 MG tablet TAKE 1 TABLET(150 MG) BY MOUTH EVERY EVENING 90 tablet 0    meloxicam (MOBIC) 15 MG tablet TAKE 1 TABLET(15 MG) BY MOUTH DAILY AS NEEDED FOR PAIN 90 tablet 0    metoprolol succinate (TOPROL-XL) 25 MG 24 hr tablet TAKE 1 TABLET BY MOUTH TWICE DAILY pt taking once a day 180 tablet 0    pravastatin (PRAVACHOL) 40 MG tablet TAKE 1 TABLET(40 MG) BY MOUTH EVERY DAY 90 tablet 0     No current facility-administered medications for this visit.        Past Medical History:   Diagnosis Date    Arthritis     Breast cancer 2010    left breast    Cataract     Hyperlipidemia     Hypertension        Past Surgical History:   Procedure Laterality Date    BILATERAL SALPINGOOPHORECTOMY  2000    BREAST BIOPSY Left 2010    malignant    BREAST BIOPSY Left 2008    negative    BREAST LUMPECTOMY Left 2010    CATARACT EXTRACTION W/  INTRAOCULAR LENS IMPLANT Right 11/06/2018    Dr. Oneil    CATARACT EXTRACTION W/  INTRAOCULAR LENS  "IMPLANT Left 2018    Dr. Oneil     SECTION      x2    COLONOSCOPY N/A 10/20/2017    Procedure: COLONOSCOPY;  Surgeon: Mitchell Danielson Jr., MD;  [facility] ENDO;  Service: Endoscopy;  Laterality: N/A;    CYST REMOVAL      on back    HERNIA REPAIR      HYSTERECTOMY      at 25 yrs old    INTRAOCULAR PROSTHESES INSERTION Left 2018    Procedure: INSERTION, IOL PROSTHESIS;  Surgeon: Sergio Oneil MD;  Location: [facility] OR 1ST FLR;  Service: Ophthalmology;  Laterality: Left;    INTRAOCULAR PROSTHESES INSERTION Right 2018    Procedure: INSERTION, IOL PROSTHESIS;  Surgeon: Sergio Oneil MD;  Location: [facility] OR 2ND FLR;  Service: Ophthalmology;  Laterality: Right;    OOPHORECTOMY      @ 45 yrs old    PHACOEMULSIFICATION OF CATARACT Left 2018    Procedure: PHACOEMULSIFICATION, CATARACT;  Surgeon: Sergio Oneil MD;  [facility];  Service: Ophthalmology;  Laterality: Left;    PHACOEMULSIFICATION OF CATARACT Right 2018    Procedure: PHACOEMULSIFICATION, CATARACT;  Surgeon: Sergio Oneil MD;  Location: [facility] OR 2ND FLR;  Service: Ophthalmology;  Laterality: Right;    supracervical abdominal hysterectomy  1978    fibroids       OBJECTIVE:   PHYSICAL EXAM:  Height: 4' 10" (147.3 cm) Weight: 70.8 kg (156 lb)  Vitals:    10/14/19 1127   Weight: 70.8 kg (156 lb)   Height: 4' 10" (1.473 m)   PainSc: 0-No pain     Ortho/SPM Exam  Examination right shoulder there is no swelling there is some mild tenderness anteriorly the biceps maybe a little bit shifted but not obviously  Range of motion right shoulder is actually pretty good both passive and active she has excellent active elevation and abduction of the shoulder there is some weakness of the rotator cuff however no instability neurologic    RADIOGRAPHS:  Exam intact P and lateral x-rays right shoulder demonstrates some degenerative changes  MRI right shoulder demonstrates chronic " tear rotator cuff with retraction all the way to the glenoid and fatty infiltration which is significant  Comments: I have personally reviewed the imaging and I agree with the above radiologist's report.    ASSESSMENT/PLAN:     IMPRESSION:  1.  Recent history of swelling right shoulder possibly biceps rupture long head.  2.  Chronic rotator cuff tear right shoulder with retraction and fatty infiltration    PLAN:  I explained the nature of the problem to the patient I suspect a recent biceps rupture which brought on her symptoms  She is improving still has some pain however  Injection performed today as follows  After pause for time-out identified the right shoulder injected with Kenalog 40 mg 2 cc xylocaine sterile technique  Tolerated procedure well without complication  I also explained that the rotator cuff is been torn for percent several years  And is not repairable.  If surgery were to be considered would need to be a reverse total shoulder    FOLLOW UP:  4-6 weeks    Disclaimer: This note has been generated using voice-recognition software. There may be typographical errors that have been missed during proof-reading.

## 2019-11-04 DIAGNOSIS — E55.9 VITAMIN D INSUFFICIENCY: ICD-10-CM

## 2019-11-04 RX ORDER — ERGOCALCIFEROL 1.25 MG/1
CAPSULE ORAL
Qty: 12 CAPSULE | Refills: 0 | Status: SHIPPED | OUTPATIENT
Start: 2019-11-04 | End: 2020-01-07

## 2019-11-19 ENCOUNTER — OFFICE VISIT (OUTPATIENT)
Dept: INTERNAL MEDICINE | Facility: CLINIC | Age: 64
End: 2019-11-19
Payer: COMMERCIAL

## 2019-11-19 VITALS
WEIGHT: 143.06 LBS | SYSTOLIC BLOOD PRESSURE: 134 MMHG | HEIGHT: 58 IN | BODY MASS INDEX: 30.03 KG/M2 | OXYGEN SATURATION: 96 % | HEART RATE: 86 BPM | DIASTOLIC BLOOD PRESSURE: 74 MMHG

## 2019-11-19 DIAGNOSIS — I83.893 VARICOSE VEINS OF LOWER EXTREMITY WITH EDEMA, BILATERAL: ICD-10-CM

## 2019-11-19 DIAGNOSIS — I10 ESSENTIAL HYPERTENSION: ICD-10-CM

## 2019-11-19 DIAGNOSIS — M75.101 TEAR OF RIGHT ROTATOR CUFF, UNSPECIFIED TEAR EXTENT, UNSPECIFIED WHETHER TRAUMATIC: Primary | ICD-10-CM

## 2019-11-19 DIAGNOSIS — R45.89 ANXIETY ABOUT HEALTH: ICD-10-CM

## 2019-11-19 PROCEDURE — 99999 PR PBB SHADOW E&M-EST. PATIENT-LVL III: ICD-10-PCS | Mod: PBBFAC,,, | Performed by: INTERNAL MEDICINE

## 2019-11-19 PROCEDURE — 99999 PR PBB SHADOW E&M-EST. PATIENT-LVL III: CPT | Mod: PBBFAC,,, | Performed by: INTERNAL MEDICINE

## 2019-11-19 PROCEDURE — 3075F PR MOST RECENT SYSTOLIC BLOOD PRESS GE 130-139MM HG: ICD-10-PCS | Mod: CPTII,S$GLB,, | Performed by: INTERNAL MEDICINE

## 2019-11-19 PROCEDURE — 3008F PR BODY MASS INDEX (BMI) DOCUMENTED: ICD-10-PCS | Mod: CPTII,S$GLB,, | Performed by: INTERNAL MEDICINE

## 2019-11-19 PROCEDURE — 99213 PR OFFICE/OUTPT VISIT, EST, LEVL III, 20-29 MIN: ICD-10-PCS | Mod: S$GLB,,, | Performed by: INTERNAL MEDICINE

## 2019-11-19 PROCEDURE — 3008F BODY MASS INDEX DOCD: CPT | Mod: CPTII,S$GLB,, | Performed by: INTERNAL MEDICINE

## 2019-11-19 PROCEDURE — 99213 OFFICE O/P EST LOW 20 MIN: CPT | Mod: S$GLB,,, | Performed by: INTERNAL MEDICINE

## 2019-11-19 PROCEDURE — 3078F DIAST BP <80 MM HG: CPT | Mod: CPTII,S$GLB,, | Performed by: INTERNAL MEDICINE

## 2019-11-19 PROCEDURE — 3078F PR MOST RECENT DIASTOLIC BLOOD PRESSURE < 80 MM HG: ICD-10-PCS | Mod: CPTII,S$GLB,, | Performed by: INTERNAL MEDICINE

## 2019-11-19 PROCEDURE — 3075F SYST BP GE 130 - 139MM HG: CPT | Mod: CPTII,S$GLB,, | Performed by: INTERNAL MEDICINE

## 2019-11-19 NOTE — PROGRESS NOTES
Portions of this note are generated with voice recognition software. Typographical errors may exist.     SUBJECTIVE:    This is a/an 64 y.o. female here for primary care visit for  Chief Complaint   Patient presents with    Follow-up     shoulder     Patient has a number of somatic complaints and anxiety about many of them.  Patient recognizes that she has levels of general anxiety.  However states that they are not actively causing distress. States that she wants reassurance more than anything.    Patient states that since she received injection in orthopedic office things have changed with regard to her range of motion.  States that she was not having much pain in the right shoulder but since the injection she now has pain with abduction and feels that once she gets to about 75° of abduction she starts to have pain and weakness.  Prior to the injection she was able to elevate the arm fully without limitation due to pain or range of motion.    Patient states that she has chronically had varicose veins in bilateral lower extremities.  States that there have always been more prominent varicose veins in the right leg compared to the left leg mostly on the thighs.  No pain affecting the right lower extremity.  She is worried the catastrophic underlying cause.  She has a history of breast cancer but successfully treated.  No active malignancy.  No recent immobilization.  No history of DVT.     Patient has catastrophic thinking about avoiding rare cancers.  Asks if cell phones cause bone cancer.  Reassured that they do not    Medications Reviewed and Updated    Past medical, family, and social histories were reviewed and updated.    Review of Systems negative unless otherwise noted in history of present illness-  ROS    General ROS: negative  Psychological ROS: negative  Endocrine ROS: Negative  Allergy and Immunology ROS: negative  Cardiovascular ROS: negative  Pulmonary ROS: Negative  Gastrointestinal ROS:  negative  Genito-Urinary ROS: negative  Musculoskeletal ROS: negative  Neurological ROS: negative        Allergic:    Review of patient's allergies indicates:   Allergen Reactions    Codeine Nausea And Vomiting       OBJECTIVE:  BP: 134/74 Pulse: 86    Wt Readings from Last 3 Encounters:   11/19/19 64.9 kg (143 lb 1.3 oz)   10/14/19 70.8 kg (156 lb)   10/02/19 66.4 kg (146 lb 6.2 oz)    Body mass index is 29.9 kg/m².  Previous Blood Pressure Readings :   BP Readings from Last 3 Encounters:   11/19/19 134/74   10/02/19 120/80   09/24/19 (!) 148/69       Physical Exam    GEN: No apparent distress  HEENT: sclera non-icteric, conjunctiva clear  CV: no peripheral edema  PULM: breathing non-labored  ABD: Obese, protuberant abdomen.  PSYCH: appropriate affect  MSK: able to rise from chair without assistance  SKIN: normal skin turgor    Pertinent Labs Reviewed       ASSESSMENT/PLAN:    Tear of right rotator cuff, unspecified tear extent, unspecified whether traumatic.Condition not optimally controlled. Detailed counseling on self care measures. Plan to monitor clinically in addition to plan below or as listed on After Visit Summary.    - discuss with Dr. Quarles     Varicose veins of lower extremity with edema, bilateral.Condition stable.  Counseling given today on self-care measures. Plan to monitor clinically. Continue current medical plan.    - .patient advised if symptoms change or intensify to seek care in the nearest [facility] center     Essential hypertension.Condition stable.  Counseling given today on self-care measures. Plan to monitor clinically. Continue current medical plan.     Anxiety about health.Condition not optimally controlled.  Patient not ready for definitive treatment plan at this time.  Plan to readdress at next clinic visit.        Future Appointments   Date Time Provider Department Center   11/25/2019 11:00 AM Fili Quarles Jr., MD [facility] ORTHO [facility]   12/11/2019  9:30 AM Teresa AHUMADA  MD [facility] Iqbal [facility] [place]   1/13/2020  9:00 AM LAB, [place] [facility]   1/15/2020 10:00 AM MD JESSE Yancey  [place] Linares  11/19/2019  1:43 PM

## 2019-11-19 NOTE — PATIENT INSTRUCTIONS
Recommendations for today    With regard to plans for [facility] bring any papers that need to be filled out by a physician.  We will contact you once we have had an opportunity to review the papers.  Extra visits may be necessary to complete the paperwork.

## 2019-11-25 ENCOUNTER — OFFICE VISIT (OUTPATIENT)
Dept: ORTHOPEDICS | Facility: CLINIC | Age: 64
End: 2019-11-25
Payer: COMMERCIAL

## 2019-11-25 ENCOUNTER — HOSPITAL ENCOUNTER (OUTPATIENT)
Dept: RADIOLOGY | Facility: HOSPITAL | Age: 64
Discharge: HOME | End: 2019-11-25
Attending: ORTHOPAEDIC SURGERY
Payer: COMMERCIAL

## 2019-11-25 VITALS
BODY MASS INDEX: 30.02 KG/M2 | WEIGHT: 143 LBS | SYSTOLIC BLOOD PRESSURE: 141 MMHG | HEIGHT: 58 IN | TEMPERATURE: 98 F | DIASTOLIC BLOOD PRESSURE: 77 MMHG | HEART RATE: 77 BPM

## 2019-11-25 DIAGNOSIS — M25.511 RIGHT SHOULDER PAIN, UNSPECIFIED CHRONICITY: ICD-10-CM

## 2019-11-25 DIAGNOSIS — M75.121 NONTRAUMATIC COMPLETE TEAR OF RIGHT ROTATOR CUFF: ICD-10-CM

## 2019-11-25 DIAGNOSIS — M25.511 RIGHT SHOULDER PAIN, UNSPECIFIED CHRONICITY: Primary | ICD-10-CM

## 2019-11-25 PROCEDURE — 3008F BODY MASS INDEX DOCD: CPT | Mod: CPTII,S$GLB,, | Performed by: ORTHOPAEDIC SURGERY

## 2019-11-25 PROCEDURE — 3078F DIAST BP <80 MM HG: CPT | Mod: CPTII,S$GLB,, | Performed by: ORTHOPAEDIC SURGERY

## 2019-11-25 PROCEDURE — 99213 OFFICE O/P EST LOW 20 MIN: CPT | Mod: S$GLB,,, | Performed by: ORTHOPAEDIC SURGERY

## 2019-11-25 PROCEDURE — 73030 X-RAY EXAM OF SHOULDER: CPT | Mod: 26,RT,, | Performed by: RADIOLOGY

## 2019-11-25 PROCEDURE — 99213 PR OFFICE/OUTPT VISIT, EST, LEVL III, 20-29 MIN: ICD-10-PCS | Mod: S$GLB,,, | Performed by: ORTHOPAEDIC SURGERY

## 2019-11-25 PROCEDURE — 3078F PR MOST RECENT DIASTOLIC BLOOD PRESSURE < 80 MM HG: ICD-10-PCS | Mod: CPTII,S$GLB,, | Performed by: ORTHOPAEDIC SURGERY

## 2019-11-25 PROCEDURE — 99999 PR PBB SHADOW E&M-EST. PATIENT-LVL III: ICD-10-PCS | Mod: PBBFAC,,, | Performed by: ORTHOPAEDIC SURGERY

## 2019-11-25 PROCEDURE — 99999 PR PBB SHADOW E&M-EST. PATIENT-LVL III: CPT | Mod: PBBFAC,,, | Performed by: ORTHOPAEDIC SURGERY

## 2019-11-25 PROCEDURE — 3077F SYST BP >= 140 MM HG: CPT | Mod: CPTII,S$GLB,, | Performed by: ORTHOPAEDIC SURGERY

## 2019-11-25 PROCEDURE — 73030 XR SHOULDER COMPLETE 2 OR MORE VIEWS RIGHT: ICD-10-PCS | Mod: 26,RT,, | Performed by: RADIOLOGY

## 2019-11-25 PROCEDURE — 3008F PR BODY MASS INDEX (BMI) DOCUMENTED: ICD-10-PCS | Mod: CPTII,S$GLB,, | Performed by: ORTHOPAEDIC SURGERY

## 2019-11-25 PROCEDURE — 73030 X-RAY EXAM OF SHOULDER: CPT | Mod: TC,PN,RT

## 2019-11-25 PROCEDURE — 3077F PR MOST RECENT SYSTOLIC BLOOD PRESSURE >= 140 MM HG: ICD-10-PCS | Mod: CPTII,S$GLB,, | Performed by: ORTHOPAEDIC SURGERY

## 2019-11-25 NOTE — PROGRESS NOTES
Subjective:      Patient ID: Chelsea Rodriguez is a 64 y.o. female.  Chief Complaint: Shoulder Pain (right ) and Follow-up (6 wk )      HPI  Chelsea Rodriguez is a  64 y.o. female presenting today for follow up of right shoulder pain related to chronic tear rotator cuff.  She reports that she is had a flare-up recently after a fall last week  She had an injection about a month ago with improvement.    Review of patient's allergies indicates:   Allergen Reactions    Codeine Nausea And Vomiting         Current Outpatient Medications   Medication Sig Dispense Refill    aspirin (ECOTRIN) 81 MG EC tablet once a day      co-enzyme Q-10 30 mg capsule Take 30 mg by mouth 3 (three) times daily.      ergocalciferol (ERGOCALCIFEROL) 50,000 unit Cap TAKE 1 CAPSULE BY MOUTH EVERY 7 DAYS 12 capsule 0    irbesartan (AVAPRO) 150 MG tablet TAKE 1 TABLET(150 MG) BY MOUTH EVERY EVENING 90 tablet 0    meloxicam (MOBIC) 15 MG tablet TAKE 1 TABLET(15 MG) BY MOUTH DAILY AS NEEDED FOR PAIN 90 tablet 0    metoprolol succinate (TOPROL-XL) 25 MG 24 hr tablet TAKE 1 TABLET BY MOUTH TWICE DAILY pt taking once a day 180 tablet 0    pravastatin (PRAVACHOL) 40 MG tablet TAKE 1 TABLET(40 MG) BY MOUTH EVERY DAY 90 tablet 0     No current facility-administered medications for this visit.        Past Medical History:   Diagnosis Date    Arthritis     Breast cancer     left breast    Cataract     Hyperlipidemia     Hypertension        Past Surgical History:   Procedure Laterality Date    BILATERAL SALPINGOOPHORECTOMY  2000    BREAST BIOPSY Left     malignant    BREAST BIOPSY Left     negative    BREAST LUMPECTOMY Left     CATARACT EXTRACTION W/  INTRAOCULAR LENS IMPLANT Right 2018    Dr. Oneil    CATARACT EXTRACTION W/  INTRAOCULAR LENS IMPLANT Left 2018    Dr. Oneil     SECTION      x2    COLONOSCOPY N/A 10/20/2017    Procedure: COLONOSCOPY;  Surgeon: Mitchell Danielson Jr., MD;  Location:  "[facility] ENDO;  Service: Endoscopy;  Laterality: N/A;    CYST REMOVAL      on back    HERNIA REPAIR      HYSTERECTOMY      at 25 yrs old    INTRAOCULAR PROSTHESES INSERTION Left 11/6/2018    Procedure: INSERTION, IOL PROSTHESIS;  Surgeon: Sergio Oneil MD;  Location: [facility] OR 1ST FLR;  Service: Ophthalmology;  Laterality: Left;    INTRAOCULAR PROSTHESES INSERTION Right 11/20/2018    Procedure: INSERTION, IOL PROSTHESIS;  Surgeon: Sergio Oneil MD;  Location: [facility] OR 2ND FLR;  Service: Ophthalmology;  Laterality: Right;    OOPHORECTOMY      @ 45 yrs old    PHACOEMULSIFICATION OF CATARACT Left 11/6/2018    Procedure: PHACOEMULSIFICATION, CATARACT;  Surgeon: Sergio Oneil MD;  Location: [facility] OR 1ST FLR;  Service: Ophthalmology;  Laterality: Left;    PHACOEMULSIFICATION OF CATARACT Right 11/20/2018    Procedure: PHACOEMULSIFICATION, CATARACT;  Surgeon: Sergio Oneil MD;  Location: [facility] OR 2ND FLR;  Service: Ophthalmology;  Laterality: Right;    supracervical abdominal hysterectomy  1978    fibroids       OBJECTIVE:   PHYSICAL EXAM:  Height: 4' 10" (147.3 cm) Weight: 64.9 kg (143 lb)  Vitals:    11/25/19 1113   BP: (!) 141/77   Pulse: 77   Temp: 97.7 °F (36.5 °C)   TempSrc: Oral   Weight: 64.9 kg (143 lb)   Height: 4' 10" (1.473 m)   PainSc: 0-No pain     Ortho/SPM Exam  Examination right shoulder mild tenderness no bruising no swelling  Range of motion slightly decreased and there is weakness of the rotator cuff    RADIOGRAPHS:  AP lateral x-rays right shoulder demonstrates slight elevation of the humeral head no fracture or dislocation  Comments: I have personally reviewed the imaging and I agree with the above radiologist's report.    ASSESSMENT/PLAN:   1.  Right shoulder sprain.  2.  Underlying severe rotator cuff tear chronic  IMPRESSION:  As above    PLAN:  I think she would benefit from some physical therapy lifting her or heavy use of the right arm Advil or Motrin by mouth " on the right shoulder but she is waiting until after the holidays for Medicare to start  In the meantime just gentle range of motion avoid overhead    FOLLOW UP:  1-2 months    Disclaimer: This note has been generated using voice-recognition software. There may be typographical errors that have been missed during proof-reading.   Previous Accession (Optional): [id number]) Previous Accession (Optional): [id number](B)

## 2019-12-09 RX ORDER — IRBESARTAN 150 MG/1
TABLET ORAL
Qty: 90 TABLET | Refills: 1 | Status: SHIPPED | OUTPATIENT
Start: 2019-12-09 | End: 2020-06-24 | Stop reason: SDUPTHER

## 2019-12-11 ENCOUNTER — OFFICE VISIT (OUTPATIENT)
Dept: RHEUMATOLOGY | Facility: CLINIC | Age: 64
End: 2019-12-11
Payer: COMMERCIAL

## 2019-12-11 VITALS
HEIGHT: 58 IN | DIASTOLIC BLOOD PRESSURE: 76 MMHG | HEART RATE: 75 BPM | SYSTOLIC BLOOD PRESSURE: 143 MMHG | BODY MASS INDEX: 30.31 KG/M2 | WEIGHT: 144.38 LBS

## 2019-12-11 DIAGNOSIS — R74.8 ABNORMAL CPK: Primary | ICD-10-CM

## 2019-12-11 PROCEDURE — 99214 OFFICE O/P EST MOD 30 MIN: CPT | Mod: S$GLB,,, | Performed by: INTERNAL MEDICINE

## 2019-12-11 PROCEDURE — 3008F PR BODY MASS INDEX (BMI) DOCUMENTED: ICD-10-PCS | Mod: CPTII,S$GLB,, | Performed by: INTERNAL MEDICINE

## 2019-12-11 PROCEDURE — 3008F BODY MASS INDEX DOCD: CPT | Mod: CPTII,S$GLB,, | Performed by: INTERNAL MEDICINE

## 2019-12-11 PROCEDURE — 99214 PR OFFICE/OUTPT VISIT, EST, LEVL IV, 30-39 MIN: ICD-10-PCS | Mod: S$GLB,,, | Performed by: INTERNAL MEDICINE

## 2019-12-11 PROCEDURE — 3078F DIAST BP <80 MM HG: CPT | Mod: CPTII,S$GLB,, | Performed by: INTERNAL MEDICINE

## 2019-12-11 PROCEDURE — 3077F SYST BP >= 140 MM HG: CPT | Mod: CPTII,S$GLB,, | Performed by: INTERNAL MEDICINE

## 2019-12-11 PROCEDURE — 3077F PR MOST RECENT SYSTOLIC BLOOD PRESSURE >= 140 MM HG: ICD-10-PCS | Mod: CPTII,S$GLB,, | Performed by: INTERNAL MEDICINE

## 2019-12-11 PROCEDURE — 99999 PR PBB SHADOW E&M-EST. PATIENT-LVL III: ICD-10-PCS | Mod: PBBFAC,,, | Performed by: INTERNAL MEDICINE

## 2019-12-11 PROCEDURE — 3078F PR MOST RECENT DIASTOLIC BLOOD PRESSURE < 80 MM HG: ICD-10-PCS | Mod: CPTII,S$GLB,, | Performed by: INTERNAL MEDICINE

## 2019-12-11 PROCEDURE — 99999 PR PBB SHADOW E&M-EST. PATIENT-LVL III: CPT | Mod: PBBFAC,,, | Performed by: INTERNAL MEDICINE

## 2019-12-11 NOTE — LETTER
December 17, 2019      Nayan Linares MD  [address] [place] [address]           [facility]  [address]  [place] [address]  Phone: [phone]  Fax: [phone]          Patient: Chelsea Rodriguez   MR Number: [id number]   YOB: 1955   Date of Visit: 12/11/2019       Dear Dr. Nayan Linares:    Thank you for referring Chelsea Rodriguez to me for evaluation. Attached you will find relevant portions of my assessment and plan of care.    If you have questions, please do not hesitate to call me. I look forward to following Chelsea Rodriguez along with you.    Sincerely,    Teresa Iqbal MD    Enclosure  CC:  No Recipients    If you would like to receive this communication electronically, please contact [email or web address] or [phone] to request more information on [employer] access.    For providers and/or their staff who would like to refer a patient to [facility], please contact us through our one-stop-shop provider referral line, [facility], at [phone].    If you feel you have received this communication in error or would no longer like to receive these types of communications, please e-mail [email or web address]

## 2019-12-11 NOTE — PROGRESS NOTES
Subjective:       Patient ID: Chelsea Rodriguez is a 63 y.o. female.    Chief Complaint: No chief complaint on file.    HPI 63 year old F with PMH of HTN, HL, left breast cancer( around 2010, s/p lumpectomy/radiation), CTS of both hands here for evaluation.   She  Reports that she has pain in knees and right shoulder. Denies muscle pain.   Reports that her pain level in the knees can be as high as 9/10 when she gets up.  She has had knee pain for last 2 years. Denies joint swelling.  Reports mild stiffness in hands. Reports that her knees prevent her getting up. She reports that she has had the right shoulder pain for last 3 years.  She has not hurt the shoulder. She is on pravastatin since July. She was previously on  rosuvastatin. Denies any rashes, oral ulcers, raynauds, pleurisy , or photosensitivity.  She takes meloxicam and it helps her pain. Moving makes her pain. Resting improves her pain.      Interval history:she had right rotator cuff injury. She has significant pain in right shoulder.   Denies any muscle pain.  Denies joint swelling.    Past Medical History:   Diagnosis Date    Arthritis     Breast cancer 2011    left breast    Cataract     Hyperlipidemia     Hypertension        Review of Systems   Constitutional: Negative for activity change, appetite change, chills, diaphoresis, fever and unexpected weight change.   HENT: Negative for congestion, ear discharge, ear pain, facial swelling, mouth sores, sinus pressure, sneezing, sore throat, tinnitus and trouble swallowing.    Eyes: Negative for photophobia, pain, discharge, redness, itching and visual disturbance.   Respiratory: Negative for apnea, chest tightness, shortness of breath, wheezing and stridor.    Cardiovascular: Negative for leg swelling.   Gastrointestinal: Negative for abdominal distention, abdominal pain, anal bleeding, blood in stool, constipation, diarrhea and nausea.   Endocrine: Negative for cold intolerance and heat intolerance.    Genitourinary: Negative for difficulty urinating and dysuria.   Musculoskeletal: Positive for arthralgias. Negative for back pain, gait problem, joint swelling, myalgias, neck pain and neck stiffness.   Skin: Negative for color change, pallor, rash and wound.   Neurological: Negative for dizziness, seizures, light-headedness and numbness.   Hematological: Negative for adenopathy. Does not bruise/bleed easily.   Psychiatric/Behavioral: Negative for sleep disturbance. The patient is not nervous/anxious.            Objective:        Physical Exam   Constitutional: She is oriented to person, place, and time.   HENT:   Head: Normocephalic and atraumatic.   Right Ear: External ear normal.   Left Ear: External ear normal.   Nose: Nose normal.   Mouth/Throat: Oropharynx is clear and moist. No oropharyngeal exudate.   Eyes: Conjunctivae and EOM are normal. Pupils are equal, round, and reactive to light. Right eye exhibits no discharge. Left eye exhibits no discharge. No scleral icterus.   Neck: Neck supple. No JVD present. No thyromegaly present.   Cardiovascular: Normal rate, regular rhythm, normal heart sounds and intact distal pulses.  Exam reveals no gallop and no friction rub.    No murmur heard.  Pulmonary/Chest: Effort normal and breath sounds normal. No respiratory distress. She has no wheezes. She has no rales. She exhibits no tenderness.   Abdominal: Soft. Bowel sounds are normal. She exhibits no distension and no mass. There is no tenderness. There is no rebound and no guarding.   Lymphadenopathy:     She has no cervical adenopathy.   Neurological: She is alert and oriented to person, place, and time. No cranial nerve deficit. Gait normal. Coordination normal.   Skin: Skin is dry. No rash noted. No erythema. No pallor.     Psychiatric: Affect and judgment normal.   Musculoskeletal: She exhibits no edema, tenderness or deformity.         5/5 strength in upper and lower extremity          Knee xrays ( 12/2018)I  personally reviewed) : DJD  Assessment:     63 year old F with PMH of HTN, HL, left breast cancer( around 2010, s/p lumpectomy/radiation), CTS of both hands here for evaluation of elevated CPK.  Patient denies myalgias.  She reports bilateral knee pain but she has known DJD.  She has 5/5 strength on exam so low suspicion for a myositis. I told patient she can return in a year or prn.      1. Abnormal CPK              Plan:     * labs reviewed  Discussed causes of elevated cpk  rtc prn

## 2020-01-06 ENCOUNTER — OFFICE VISIT (OUTPATIENT)
Dept: ORTHOPEDICS | Facility: CLINIC | Age: 65
End: 2020-01-06
Payer: MEDICARE

## 2020-01-06 VITALS — HEIGHT: 58 IN | WEIGHT: 144 LBS | BODY MASS INDEX: 30.23 KG/M2

## 2020-01-06 DIAGNOSIS — E55.9 VITAMIN D INSUFFICIENCY: ICD-10-CM

## 2020-01-06 DIAGNOSIS — M75.121 NONTRAUMATIC COMPLETE TEAR OF RIGHT ROTATOR CUFF: Primary | ICD-10-CM

## 2020-01-06 PROCEDURE — 3008F BODY MASS INDEX DOCD: CPT | Mod: HCNC,CPTII,S$GLB, | Performed by: ORTHOPAEDIC SURGERY

## 2020-01-06 PROCEDURE — 99999 PR PBB SHADOW E&M-EST. PATIENT-LVL II: CPT | Mod: PBBFAC,HCNC,, | Performed by: ORTHOPAEDIC SURGERY

## 2020-01-06 PROCEDURE — 99999 PR PBB SHADOW E&M-EST. PATIENT-LVL II: ICD-10-PCS | Mod: PBBFAC,HCNC,, | Performed by: ORTHOPAEDIC SURGERY

## 2020-01-06 PROCEDURE — 3008F PR BODY MASS INDEX (BMI) DOCUMENTED: ICD-10-PCS | Mod: HCNC,CPTII,S$GLB, | Performed by: ORTHOPAEDIC SURGERY

## 2020-01-06 PROCEDURE — 99213 PR OFFICE/OUTPT VISIT, EST, LEVL III, 20-29 MIN: ICD-10-PCS | Mod: HCNC,S$GLB,, | Performed by: ORTHOPAEDIC SURGERY

## 2020-01-06 PROCEDURE — 99213 OFFICE O/P EST LOW 20 MIN: CPT | Mod: HCNC,S$GLB,, | Performed by: ORTHOPAEDIC SURGERY

## 2020-01-06 RX ORDER — TRAMADOL HYDROCHLORIDE 50 MG/1
50 TABLET ORAL EVERY 4 HOURS PRN
Qty: 30 TABLET | Refills: 1 | Status: SHIPPED | OUTPATIENT
Start: 2020-01-06 | End: 2020-01-15

## 2020-01-06 NOTE — PROGRESS NOTES
Subjective:      Patient ID: Chelsea Rodriguez is a 64 y.o. female.  Chief Complaint: Follow-up of the Right Shoulder      HPI  Chelsea Rodriguez is a  64 y.o. female presenting today for follow up of chronic rotator cuff tear right shoulder.  She reports that she is continues to have pain in the shoulder  Previously we have tried injections and I recommended therapy but she did not follow-up with the therapy because of large co-pay  She is not interested in surgery.    Review of patient's allergies indicates:   Allergen Reactions    Codeine Nausea And Vomiting         Current Outpatient Medications   Medication Sig Dispense Refill    aspirin (ECOTRIN) 81 MG EC tablet once a day      co-enzyme Q-10 30 mg capsule Take 100 mg by mouth 3 (three) times daily.       ergocalciferol (ERGOCALCIFEROL) 50,000 unit Cap TAKE 1 CAPSULE BY MOUTH EVERY 7 DAYS 12 capsule 0    irbesartan (AVAPRO) 150 MG tablet TAKE 1 TABLET(150 MG) BY MOUTH EVERY EVENING 90 tablet 1    meloxicam (MOBIC) 15 MG tablet TAKE 1 TABLET(15 MG) BY MOUTH DAILY AS NEEDED FOR PAIN 90 tablet 0    metoprolol succinate (TOPROL-XL) 25 MG 24 hr tablet TAKE 1 TABLET BY MOUTH TWICE DAILY pt taking once a day 180 tablet 0    pravastatin (PRAVACHOL) 40 MG tablet TAKE 1 TABLET(40 MG) BY MOUTH EVERY DAY 90 tablet 0    traMADol (ULTRAM) 50 mg tablet Take 1 tablet (50 mg total) by mouth every 4 (four) hours as needed for Pain. 30 tablet 1     No current facility-administered medications for this visit.        Past Medical History:   Diagnosis Date    Arthritis     Breast cancer 2010    left breast    Cataract     Hyperlipidemia     Hypertension        Past Surgical History:   Procedure Laterality Date    BILATERAL SALPINGOOPHORECTOMY  2000    BREAST BIOPSY Left 2010    malignant    BREAST BIOPSY Left 2008    negative    BREAST LUMPECTOMY Left 2010    CATARACT EXTRACTION W/  INTRAOCULAR LENS IMPLANT Right 11/06/2018    Dr. Oneil    CATARACT EXTRACTION W/   "INTRAOCULAR LENS IMPLANT Left 2018    Dr. Oneil     SECTION      x2    COLONOSCOPY N/A 10/20/2017    Procedure: COLONOSCOPY;  Surgeon: Mitchell Danielson Jr., MD;  Location: [facility] ENDO;  Service: Endoscopy;  Laterality: N/A;    CYST REMOVAL      on back    HERNIA REPAIR      HYSTERECTOMY      at 25 yrs old    INTRAOCULAR PROSTHESES INSERTION Left 2018    Procedure: INSERTION, IOL PROSTHESIS;  Surgeon: Sergio Oneil MD;  Location: [facility] OR 1ST FLR;  Service: Ophthalmology;  Laterality: Left;    INTRAOCULAR PROSTHESES INSERTION Right 2018    Procedure: INSERTION, IOL PROSTHESIS;  Surgeon: Sergio Oneil MD;  Location: [facility] OR 2ND FLR;  Service: Ophthalmology;  Laterality: Right;    OOPHORECTOMY      @ 45 yrs old    PHACOEMULSIFICATION OF CATARACT Left 2018    Procedure: PHACOEMULSIFICATION, CATARACT;  Surgeon: Sergio Oneil MD;  [facility];  Service: Ophthalmology;  Laterality: Left;    PHACOEMULSIFICATION OF CATARACT Right 2018    Procedure: PHACOEMULSIFICATION, CATARACT;  Surgeon: Sergio Oneil MD;  [facility];  Service: Ophthalmology;  Laterality: Right;    supracervical abdominal hysterectomy  1978    fibroids       OBJECTIVE:   PHYSICAL EXAM:  Height: 4' 10" (147.3 cm) Weight: 65.3 kg (144 lb)  Vitals:    20 1037 20 1038   Weight: 65.3 kg (144 lb)    Height: 4' 10" (1.473 m)    PainSc:   7   7     Ortho/SPM Exam  Examination right shoulder no tenderness no swelling  Range of motion actually pretty good passively but she has live limited active elevation some weakness  Positive impingement    RADIOGRAPHS:  Sign  None  Comments: I have personally reviewed the imaging and I agree with the above radiologist's report.    ASSESSMENT/PLAN:     IMPRESSION:  Chronic tear rotator cuff right shoulder    PLAN:  I have shown her some exercises to do at home  I offered her option of surgery but she was " not interested in that  I have given her some Ultram for pain  Continue Mobic mine by mouth      FOLLOW UP:  6-8 weeks    Disclaimer: This note has been generated using voice-recognition software. There may be typographical errors that have been missed during proof-reading.

## 2020-01-07 RX ORDER — ERGOCALCIFEROL 1.25 MG/1
CAPSULE ORAL
Qty: 12 CAPSULE | Refills: 0 | Status: SHIPPED | OUTPATIENT
Start: 2020-01-07 | End: 2020-05-27 | Stop reason: SDUPTHER

## 2020-01-12 ENCOUNTER — HOSPITAL ENCOUNTER (EMERGENCY)
Facility: HOSPITAL | Age: 65
Discharge: HOME | End: 2020-01-13
Attending: EMERGENCY MEDICINE
Payer: MEDICARE

## 2020-01-12 DIAGNOSIS — B09 VIRAL EXANTHEM: Primary | ICD-10-CM

## 2020-01-12 DIAGNOSIS — R50.9 FEVER: ICD-10-CM

## 2020-01-12 PROCEDURE — 87502 INFLUENZA DNA AMP PROBE: CPT | Mod: HCNC

## 2020-01-12 PROCEDURE — 99284 PR EMERGENCY DEPT VISIT,LEVEL IV: ICD-10-PCS | Mod: HCNC,,, | Performed by: EMERGENCY MEDICINE

## 2020-01-12 PROCEDURE — 99283 EMERGENCY DEPT VISIT LOW MDM: CPT | Mod: 25,HCNC

## 2020-01-12 PROCEDURE — 99284 EMERGENCY DEPT VISIT MOD MDM: CPT | Mod: HCNC,,, | Performed by: EMERGENCY MEDICINE

## 2020-01-13 VITALS
OXYGEN SATURATION: 97 % | BODY MASS INDEX: 30.1 KG/M2 | RESPIRATION RATE: 18 BRPM | HEART RATE: 96 BPM | TEMPERATURE: 99 F | SYSTOLIC BLOOD PRESSURE: 127 MMHG | DIASTOLIC BLOOD PRESSURE: 70 MMHG | WEIGHT: 144 LBS

## 2020-01-13 LAB
BACTERIA #/AREA URNS AUTO: ABNORMAL /HPF
BILIRUB UR QL STRIP: NEGATIVE
CLARITY UR REFRACT.AUTO: ABNORMAL
COLOR UR AUTO: YELLOW
CTP QC/QA: YES
GLUCOSE UR QL STRIP: NEGATIVE
HGB UR QL STRIP: ABNORMAL
KETONES UR QL STRIP: NEGATIVE
LEUKOCYTE ESTERASE UR QL STRIP: NEGATIVE
MICROSCOPIC COMMENT: ABNORMAL
NITRITE UR QL STRIP: NEGATIVE
NON-SQ EPI CELLS #/AREA URNS AUTO: 3 /HPF
PH UR STRIP: 5 [PH] (ref 5–8)
POC MOLECULAR INFLUENZA A AGN: NEGATIVE
POC MOLECULAR INFLUENZA B AGN: NEGATIVE
PROT UR QL STRIP: NEGATIVE
RBC #/AREA URNS AUTO: 1 /HPF (ref 0–4)
SP GR UR STRIP: 1.02 (ref 1–1.03)
SQUAMOUS #/AREA URNS AUTO: 0 /HPF
URN SPEC COLLECT METH UR: ABNORMAL
WBC #/AREA URNS AUTO: 3 /HPF (ref 0–5)

## 2020-01-13 PROCEDURE — 81001 URINALYSIS AUTO W/SCOPE: CPT | Mod: HCNC

## 2020-01-13 NOTE — ED PROVIDER NOTES
Encounter Date: 2020       History     Chief Complaint   Patient presents with    Chills     Started this afternoon. Also c/o itching. Temp at home 103.     HPI   Ms. Rodriguez is a 65 y.o. female with h/o breast cancer, HTN here today with fever and rash. Reports fever to 103 at home earlier today. Has felt well until today. Had red, itchy rash erupt on arms as well with rash, but that has since improved. Has some associated chills and mylagias. Tylenol and motrin have helped. Denies sore throat, adenopathy, cough, abd pain, chest pain, neck pain, neck stiffness, n/v, lethargy, numbness, tingling, weakness.     Review of patient's allergies indicates:   Allergen Reactions    Codeine Nausea And Vomiting     Past Medical History:   Diagnosis Date    Arthritis     Breast cancer     left breast    Cataract     Hyperlipidemia     Hypertension      Past Surgical History:   Procedure Laterality Date    BILATERAL SALPINGOOPHORECTOMY  2000    BREAST BIOPSY Left 2010    malignant    BREAST BIOPSY Left     negative    BREAST LUMPECTOMY Left     CATARACT EXTRACTION W/  INTRAOCULAR LENS IMPLANT Right 2018    Dr. Oneil    CATARACT EXTRACTION W/  INTRAOCULAR LENS IMPLANT Left 2018    Dr. Oneil     SECTION      x2    COLONOSCOPY N/A 10/20/2017    Procedure: COLONOSCOPY;  Surgeon: Mitchell Danielson Jr., MD;  Location: [facility];  Service: Endoscopy;  Laterality: N/A;    CYST REMOVAL      on back    HERNIA REPAIR      HYSTERECTOMY      at 25 yrs old    INTRAOCULAR PROSTHESES INSERTION Left 2018    Procedure: INSERTION, IOL PROSTHESIS;  Surgeon: Sergio Oneil MD;  Location: [facility] OR [address] [place] [address];  Service: Ophthalmology;  Laterality: Left;    INTRAOCULAR PROSTHESES INSERTION Right 2018    Procedure: INSERTION, IOL PROSTHESIS;  Surgeon: Sergio Oneil MD;  Location: [facility] OR 2ND FLR;  Service: Ophthalmology;  Laterality: Right;    OOPHORECTOMY      @  45 yrs old    PHACOEMULSIFICATION OF CATARACT Left 11/6/2018    Procedure: PHACOEMULSIFICATION, CATARACT;  Surgeon: Sergio Oneil MD;  Location: [facility] OR [address] [place] [address];  Service: Ophthalmology;  Laterality: Left;    PHACOEMULSIFICATION OF CATARACT Right 11/20/2018    Procedure: PHACOEMULSIFICATION, CATARACT;  Surgeon: Sergio Oneil MD;  Location: [facility] OR [address] [place] [address];  Service: Ophthalmology;  Laterality: Right;    supracervical abdominal hysterectomy  1978    fibroids     Family History   Problem Relation Age of Onset    Hypertension Mother     Heart disease Mother     Diabetes Mother     Hyperlipidemia Mother     Pancreatitis Mother     Cataracts Mother     Macular degeneration Mother     Cancer Father     Breast cancer Paternal Cousin     Amblyopia Neg Hx     Blindness Neg Hx     Glaucoma Neg Hx     Strabismus Neg Hx     Retinal detachment Neg Hx      Social History     Tobacco Use    Smoking status: Never Smoker    Smokeless tobacco: Never Used   Substance Use Topics    Alcohol use: Yes     Comment: not often     Drug use: No     Review of Systems   Constitutional: Positive for chills and fever. Negative for fatigue.   HENT: Negative for congestion, ear pain, facial swelling, rhinorrhea, sore throat and trouble swallowing.    Respiratory: Negative for cough and shortness of breath.    Cardiovascular: Negative for chest pain.   Gastrointestinal: Negative for abdominal distention, abdominal pain, nausea and vomiting.   Genitourinary: Negative for dysuria.   Musculoskeletal: Negative for back pain.   Skin: Positive for rash.   Neurological: Negative for weakness.   Hematological: Does not bruise/bleed easily.       Physical Exam     Initial Vitals [01/12/20 2325]   BP Pulse Resp Temp SpO2   126/60 93 16 99 °F (37.2 °C) 95 %      MAP       --         Physical Exam    Nursing note and vitals reviewed.  Constitutional: She appears well-developed and well-nourished. She is not diaphoretic. No  distress.   HENT:   Head: Normocephalic and atraumatic.   Right Ear: External ear normal.   Left Ear: External ear normal.   Nose: Nose normal.   Mouth/Throat: Oropharynx is clear and moist. No oropharyngeal exudate.   No pharyngeal erythema.   Eyes: Conjunctivae and EOM are normal. Pupils are equal, round, and reactive to light. Right eye exhibits no discharge. Left eye exhibits no discharge.   Neck: Neck supple.   Cardiovascular: Normal rate, regular rhythm, normal heart sounds and intact distal pulses.   Pulmonary/Chest: Breath sounds normal. No respiratory distress. She has no wheezes. She has no rhonchi. She has no rales.   Abdominal: Soft. She exhibits no distension. There is no tenderness. There is no rebound and no guarding.   Musculoskeletal: She exhibits no edema.   Lymphadenopathy:     She has no cervical adenopathy.   Neurological: She is alert and oriented to person, place, and time. GCS score is 15. GCS eye subscore is 4. GCS verbal subscore is 5. GCS motor subscore is 6.   Skin: Skin is warm. Capillary refill takes less than 2 seconds. No rash noted.   Psychiatric: She has a normal mood and affect.         ED Course   Procedures  Labs Reviewed   URINALYSIS, REFLEX TO URINE CULTURE - Abnormal; Notable for the following components:       Result Value    Appearance, UA Hazy (*)     Occult Blood UA 1+ (*)     All other components within normal limits    Narrative:     Preferred Collection Type->Urine, Clean Catch   URINALYSIS MICROSCOPIC - Abnormal; Notable for the following components:    Non-Squam Epith 3 (*)     All other components within normal limits    Narrative:     Preferred Collection Type->Urine, Clean Catch   POCT INFLUENZA A/B MOLECULAR          Imaging Results          X-Ray Chest PA And Lateral (Final result)  Result time 01/13/20 00:51:59    Final result by Ivan Santos MD (01/13/20 00:51:59)                 Impression:      No acute cardiopulmonary finding.      Electronically  "signed by: Ivan Santos MD  Date:    01/13/2020  Time:    00:51             Narrative:    EXAMINATION:  XR CHEST PA AND LATERAL    CLINICAL HISTORY:  Provided history is "  Fever, unspecified".    TECHNIQUE:  Frontal and lateral views of the chest were performed.    COMPARISON:  None.    FINDINGS:  Cardiac silhouette is not enlarged. No focal consolidation.  No sizable pleural effusion.  No pneumothorax.  Mild degenerative changes in the thoracic spine.                                 Medical Decision Making:   History:   Old Medical Records: I decided to obtain old medical records.  Independently Interpreted Test(s):   I have ordered and independently interpreted X-rays - see summary below.       <> Summary of X-Ray Reading(s): CXR viewed. No acute infiltrate, effusion or PTX on my read.   Clinical Tests:   Lab Tests: Ordered and Reviewed  Radiological Study: Ordered and Reviewed  ED Management:  Vitals normal. Afebrile. Here w/ fever at home and rash. Rash resolved now. Suspect this is viral exanthem. But will check rapid flu, CXR and UA.     CXR negative for PNA. UA without UTI. Flu negative.  Will treat w/ supportive care for now.  Educated on antipyretic dosing. Advised for benadryl use for rash.  Advised to f/u w/ PCP for persistent symptoms.    Stable for discharge at this time. Return precautions discussed.                                    Clinical Impression:       ICD-10-CM ICD-9-CM   1. Viral exanthem B09 057.9   2. Fever R50.9 780.60         Disposition:   Disposition: Discharged  Condition: Stable                     Luis Aguilar MD  01/13/20 1551    "

## 2020-01-13 NOTE — ED NOTES
Pt c/o chills, weakness/fatigue, temp with max temp 103, and itching of legs, arms, and abdomen. Pt denies cough, sore throat, SOB, HA, N/V, abdominal pain, changes in urination/BM. No rash noted. Pt reports taking tylenol at 5:30 pm and ibuprofen at 9:30 pm

## 2020-01-15 ENCOUNTER — OFFICE VISIT (OUTPATIENT)
Dept: INTERNAL MEDICINE | Facility: CLINIC | Age: 65
End: 2020-01-15
Payer: MEDICARE

## 2020-01-15 ENCOUNTER — HOSPITAL ENCOUNTER (OUTPATIENT)
Dept: RADIOLOGY | Facility: HOSPITAL | Age: 65
Discharge: HOME | End: 2020-01-15
Attending: INTERNAL MEDICINE
Payer: MEDICARE

## 2020-01-15 ENCOUNTER — LAB VISIT (OUTPATIENT)
Dept: LAB | Facility: HOSPITAL | Age: 65
End: 2020-01-15
Attending: INTERNAL MEDICINE
Payer: MEDICARE

## 2020-01-15 VITALS
BODY MASS INDEX: 30.03 KG/M2 | WEIGHT: 143.06 LBS | HEART RATE: 73 BPM | HEIGHT: 58 IN | DIASTOLIC BLOOD PRESSURE: 62 MMHG | OXYGEN SATURATION: 98 % | SYSTOLIC BLOOD PRESSURE: 124 MMHG

## 2020-01-15 DIAGNOSIS — B09 VIRAL EXANTHEM: Primary | ICD-10-CM

## 2020-01-15 DIAGNOSIS — B09 VIRAL EXANTHEM: ICD-10-CM

## 2020-01-15 DIAGNOSIS — R60.0 LEG EDEMA, LEFT: ICD-10-CM

## 2020-01-15 PROCEDURE — 99999 PR PBB SHADOW E&M-EST. PATIENT-LVL III: CPT | Mod: PBBFAC,HCNC,, | Performed by: INTERNAL MEDICINE

## 2020-01-15 PROCEDURE — 3078F PR MOST RECENT DIASTOLIC BLOOD PRESSURE < 80 MM HG: ICD-10-PCS | Mod: HCNC,CPTII,S$GLB, | Performed by: INTERNAL MEDICINE

## 2020-01-15 PROCEDURE — 3074F SYST BP LT 130 MM HG: CPT | Mod: HCNC,CPTII,S$GLB, | Performed by: INTERNAL MEDICINE

## 2020-01-15 PROCEDURE — 99999 PR PBB SHADOW E&M-EST. PATIENT-LVL III: ICD-10-PCS | Mod: PBBFAC,HCNC,, | Performed by: INTERNAL MEDICINE

## 2020-01-15 PROCEDURE — 93970 US LOWER EXTREMITY VENOUS INSUFFICIENCY RIGHT: ICD-10-PCS | Mod: 26,HCNC,, | Performed by: RADIOLOGY

## 2020-01-15 PROCEDURE — 99215 OFFICE O/P EST HI 40 MIN: CPT | Mod: HCNC,S$GLB,, | Performed by: INTERNAL MEDICINE

## 2020-01-15 PROCEDURE — 1101F PR PT FALLS ASSESS DOC 0-1 FALLS W/OUT INJ PAST YR: ICD-10-PCS | Mod: HCNC,CPTII,S$GLB, | Performed by: INTERNAL MEDICINE

## 2020-01-15 PROCEDURE — 3078F DIAST BP <80 MM HG: CPT | Mod: HCNC,CPTII,S$GLB, | Performed by: INTERNAL MEDICINE

## 2020-01-15 PROCEDURE — 86765 RUBEOLA ANTIBODY: CPT | Mod: HCNC

## 2020-01-15 PROCEDURE — 1101F PT FALLS ASSESS-DOCD LE1/YR: CPT | Mod: HCNC,CPTII,S$GLB, | Performed by: INTERNAL MEDICINE

## 2020-01-15 PROCEDURE — 3008F BODY MASS INDEX DOCD: CPT | Mod: HCNC,CPTII,S$GLB, | Performed by: INTERNAL MEDICINE

## 2020-01-15 PROCEDURE — 93970 EXTREMITY STUDY: CPT | Mod: TC,HCNC,RT

## 2020-01-15 PROCEDURE — 86765 RUBEOLA ANTIBODY: CPT | Mod: 91,HCNC

## 2020-01-15 PROCEDURE — 93970 EXTREMITY STUDY: CPT | Mod: 26,HCNC,, | Performed by: RADIOLOGY

## 2020-01-15 PROCEDURE — 99215 PR OFFICE/OUTPT VISIT, EST, LEVL V, 40-54 MIN: ICD-10-PCS | Mod: HCNC,S$GLB,, | Performed by: INTERNAL MEDICINE

## 2020-01-15 PROCEDURE — 3008F PR BODY MASS INDEX (BMI) DOCUMENTED: ICD-10-PCS | Mod: HCNC,CPTII,S$GLB, | Performed by: INTERNAL MEDICINE

## 2020-01-15 PROCEDURE — 3074F PR MOST RECENT SYSTOLIC BLOOD PRESSURE < 130 MM HG: ICD-10-PCS | Mod: HCNC,CPTII,S$GLB, | Performed by: INTERNAL MEDICINE

## 2020-01-15 NOTE — PROGRESS NOTES
Portions of this note are generated with voice recognition software. Typographical errors may exist.     SUBJECTIVE:    This is a/an 65 y.o. female here for primary care visit for  Chief Complaint   Patient presents with    Hypertension     patient states that she was in her usual state of health until the day that she went to the emergency room.  That day she started to have high fevers and diffuse itching.  States that she then developed a rash starting around the trunk and then generalizing to her extremities.  The patient states that she has been using an over-the-counter anti itching medicine since being sent home from the emergency room and the rash and itching have subsided almost completely.  During this time fever has also improved substantially.  No corresponding sore throat , conjunctivitis, headache, coughing.  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  Unfortunately she does not have MMR vaccination record nor does she remember getting MMR vaccination titer as a part of her Employee Health enrollment.      Patient states that she has had issues with edema   Waxing and waning in weaning affecting mostly the right lower extremity.  States that during this acute illness swelling has been more than usual.  Unusual tenderness in the right calf region.  No recent immobility for significant time.  Has never had DVT. Denies unusual dyspnea with typical activities      Medications Reviewed and Updated    Past medical, family, and social histories were reviewed and updated.    Review of Systems negative unless otherwise noted in history of present illness-  ROS    General ROS: negative  Psychological ROS: negative  ENT ROS: negative  Endocrine ROS: Negative  Allergy and Immunology ROS: negative  Cardiovascular ROS: negative  Pulmonary ROS: Negative  Musculoskeletal ROS: negative  Neurological  ROS: negative  Dermatological ROS: negative        Allergic:    Review of patient's allergies indicates:   Allergen Reactions    Codeine Nausea And Vomiting       OBJECTIVE:  BP: 124/62 Pulse: 73    Wt Readings from Last 3 Encounters:   01/15/20 64.9 kg (143 lb 1.3 oz)   01/12/20 65.3 kg (144 lb)   01/06/20 65.3 kg (144 lb)    Body mass index is 29.9 kg/m².  Previous Blood Pressure Readings :   BP Readings from Last 3 Encounters:   01/15/20 124/62   01/13/20 127/70   12/11/19 (!) 143/76       Physical Exam    GEN: No apparent distress  HEENT: sclera non-icteric, conjunctiva clear  CV:   1+ peripheral edema right ankle.  Nontender regular rate and rhythm.  No murmurs.  PULM: breathing non-labored  ABD:  protuberant abdomen.  PSYCH: appropriate affect  MSK: able to rise from chair without assistance no popliteal cyst on examination.  Gastrocnemius pain  SKIN: normal skin turgor    Pertinent Labs Reviewed     A total of 45 minutes were spent with the patient during this encounter and over half of that time was spent on counseling and coordination of care.  We discussed in depth the importance of diagnostic and/or treatment plans for as below.  I also spent significant time coordinating care with other care team members to ensure the adequacy of this care plan..          ASSESSMENT/PLAN:    Viral exanthem.Etiology unclear. Not controlled. Further evaluation warranted.  Recommendations as below or as written on After Visit Summary.   -     Rubeola antibody IgG; Future; Expected date: 01/15/2020  -     RUBEOLA ANTIBODY, IGM; Future; Expected date: 01/15/2020    Leg edema, left.Etiology unclear. Not controlled. Further evaluation warranted.  Recommendations as below or as written on After Visit Summary.   -     US Lower Extremity Venous Insufficiency Right; Future; Expected date: 01/15/2020  -     Cancel: US Lower Extremity Veins Right; Future; Expected date: 01/15/2020          Future Appointments   Date Time Provider  Department Center   2/27/2020  9:40 AM Nayan Linares [facility] [place]   3/2/2020 10:20 AM Fili Qu[facility]les Jr., [facility]       Nayan Linares  1/15/2020  12:20 PM

## 2020-01-16 ENCOUNTER — PATIENT MESSAGE (OUTPATIENT)
Dept: INTERNAL MEDICINE | Facility: CLINIC | Age: 65
End: 2020-01-16

## 2020-01-16 ENCOUNTER — TELEPHONE (OUTPATIENT)
Dept: INTERNAL MEDICINE | Facility: CLINIC | Age: 65
End: 2020-01-16

## 2020-01-16 NOTE — TELEPHONE ENCOUNTER
Patient states that she is not having active cough or other upper respiratory symptoms.  States that she understands recommendation to stay home from work.  She will do so until she is cleared to return to work based on laboratory studies.  States that she is almost certain that her daughter who lives with her was vaccinated with mm are as a child.  She will confer with her daughter to verify that this is true.  Daughter has not had any symptoms a viral exanthem.    Patient is to cancel mammogram scheduled today we will help her to reschedule this.  Infection control will contact her to complete supplemental blood work later today

## 2020-01-17 ENCOUNTER — TELEPHONE (OUTPATIENT)
Dept: INTERNAL MEDICINE | Facility: CLINIC | Age: 65
End: 2020-01-17

## 2020-01-17 LAB
MEV IGM SER QL: NEGATIVE
RUBEOLA IGG ANTIBODY: 2.32 ISR (ref 0–0.9)
RUBEOLA INTERPRETATION: POSITIVE

## 2020-01-17 NOTE — TELEPHONE ENCOUNTER
----- Message from Nayan Linares MD sent at 1/16/2020  7:57 PM CST -----  Contact patient to reschedule routine mammogram

## 2020-01-17 NOTE — TELEPHONE ENCOUNTER
Spoke with pt. Who informed me insurance did not allow her to have mammogram done at [facility] . Patient informed me she will have mammogram at another location that insurance will cover.

## 2020-02-27 ENCOUNTER — OFFICE VISIT (OUTPATIENT)
Dept: INTERNAL MEDICINE | Facility: CLINIC | Age: 65
End: 2020-02-27
Payer: MEDICARE

## 2020-02-27 VITALS — HEART RATE: 84 BPM | OXYGEN SATURATION: 98 % | SYSTOLIC BLOOD PRESSURE: 108 MMHG | DIASTOLIC BLOOD PRESSURE: 60 MMHG

## 2020-02-27 DIAGNOSIS — R05.8 POST-VIRAL COUGH SYNDROME: Primary | ICD-10-CM

## 2020-02-27 DIAGNOSIS — I87.2 VENOUS STASIS DERMATITIS OF LOWER EXTREMITY: ICD-10-CM

## 2020-02-27 DIAGNOSIS — Z78.0 POSTMENOPAUSAL ESTROGEN DEFICIENCY: ICD-10-CM

## 2020-02-27 PROCEDURE — 3074F PR MOST RECENT SYSTOLIC BLOOD PRESSURE < 130 MM HG: ICD-10-PCS | Mod: HCNC,CPTII,S$GLB, | Performed by: INTERNAL MEDICINE

## 2020-02-27 PROCEDURE — 99213 PR OFFICE/OUTPT VISIT, EST, LEVL III, 20-29 MIN: ICD-10-PCS | Mod: HCNC,S$GLB,, | Performed by: INTERNAL MEDICINE

## 2020-02-27 PROCEDURE — 99999 PR PBB SHADOW E&M-EST. PATIENT-LVL III: ICD-10-PCS | Mod: PBBFAC,HCNC,, | Performed by: INTERNAL MEDICINE

## 2020-02-27 PROCEDURE — 1101F PT FALLS ASSESS-DOCD LE1/YR: CPT | Mod: HCNC,CPTII,S$GLB, | Performed by: INTERNAL MEDICINE

## 2020-02-27 PROCEDURE — 3074F SYST BP LT 130 MM HG: CPT | Mod: HCNC,CPTII,S$GLB, | Performed by: INTERNAL MEDICINE

## 2020-02-27 PROCEDURE — 99213 OFFICE O/P EST LOW 20 MIN: CPT | Mod: HCNC,S$GLB,, | Performed by: INTERNAL MEDICINE

## 2020-02-27 PROCEDURE — 1101F PR PT FALLS ASSESS DOC 0-1 FALLS W/OUT INJ PAST YR: ICD-10-PCS | Mod: HCNC,CPTII,S$GLB, | Performed by: INTERNAL MEDICINE

## 2020-02-27 PROCEDURE — 3078F DIAST BP <80 MM HG: CPT | Mod: HCNC,CPTII,S$GLB, | Performed by: INTERNAL MEDICINE

## 2020-02-27 PROCEDURE — 99999 PR PBB SHADOW E&M-EST. PATIENT-LVL III: CPT | Mod: PBBFAC,HCNC,, | Performed by: INTERNAL MEDICINE

## 2020-02-27 PROCEDURE — 3078F PR MOST RECENT DIASTOLIC BLOOD PRESSURE < 80 MM HG: ICD-10-PCS | Mod: HCNC,CPTII,S$GLB, | Performed by: INTERNAL MEDICINE

## 2020-02-27 RX ORDER — PROMETHAZINE HYDROCHLORIDE AND DEXTROMETHORPHAN HYDROBROMIDE 6.25; 15 MG/5ML; MG/5ML
5 SYRUP ORAL NIGHTLY PRN
Qty: 118 ML | Refills: 0 | Status: SHIPPED | OUTPATIENT
Start: 2020-02-27 | End: 2020-03-08

## 2020-02-27 RX ORDER — DIAZEPAM 2 MG/1
TABLET ORAL
COMMUNITY
Start: 2020-02-04 | End: 2020-10-21

## 2020-02-27 NOTE — PATIENT INSTRUCTIONS
By next Monday if cough is getting worse for some  Reason contact via [facility] so we can consider antibiotic treatment     [facility] [employer]   Call ahead to get info regarding whether your equipment will be covered by insurance and if not, what you expected out of pocket cost might be. [phone]  [address] [place], [place] [address] [address] Monday-Friday between 9am and 5pm      Putting on Compression Stockings     Turn the stocking inside-out, then fit it over your toes and heel.          Roll the stocking up your leg.            Once stockings are on, make sure the top of the stocking is about two fingers width below the crease of the knee (or the groin if you wear thigh-high stockings).          Use equipment, such as a stocking tanika, or wear rubber gloves to make it easier to put on compression stockings.         Elastic compression stockings are prescribed to treat many vein problems. Wearing them may be the most important thing you do to manage your symptoms. The stockings fit tightly around your ankle, gradually reducing in pressure as they go up your legs. This helps keep blood flowing to your heart. As a result, swelling is reduced. Your healthcare provider will prescribe stockings at a safe pressure for you. He or she will also tell you how often to wear and remove the stockings. Follow these instructions closely. Also, do not buy or wear compression stockings without first seeing your healthcare provider.  Tips for wear and care  To wear stockings safely and to get the most benefit:  · Wear the length prescribed by your healthcare provider.  · Pull them to the designated height and no farther. Dont let them bunch at the top. This can restrict blood flow and increase swelling.  · Wear the stockings for the amount of time your healthcare provider recommends. Replace them when they start to feel loose. This will likely be every 3 to 6 months.  · Remove them as your healthcare provider  directs. When removed, wash your legs. Then check your legs and feet for sores. Call your healthcare provider if you find a sore. Dont put the stockings back on unless your healthcare provider directs.   · Wash the stockings as instructed. They may need to be hand-washed.  Date Last Reviewed: 5/1/2016  © 4478-5398 [employer]. [address] [place] [address], [place] [address]. All rights reserved. This information is not intended as a substitute for professional medical care. Always follow your healthcare professional's instructions.

## 2020-02-27 NOTE — PROGRESS NOTES
Portions of this note are generated with voice recognition software. Typographical errors may exist.     SUBJECTIVE:    This is a/an 65 y.o. female here for primary care visit for  Chief Complaint   Patient presents with    Leg Swelling     Patient states swelling has been painless in RLE. Is not wearing compression stockings.       Medications Reviewed and Updated    Past medical, family, and social histories were reviewed and updated.    Review of Systems negative unless otherwise noted in history of present illness-  ROS    General ROS: negative  Psychological ROS: negative  ENT ROS: negative  Endocrine ROS: Negative  Allergy and Immunology ROS: negative  Cardiovascular ROS: negative  Pulmonary ROS: Negative  Musculoskeletal ROS: negative  Neurological ROS: negative  Dermatological ROS: negative        Allergic:    Review of patient's allergies indicates:   Allergen Reactions    Codeine Nausea And Vomiting       OBJECTIVE:  BP: 108/60 Pulse: 84    Wt Readings from Last 3 Encounters:   01/15/20 64.9 kg (143 lb 1.3 oz)   01/12/20 65.3 kg (144 lb)   01/06/20 65.3 kg (144 lb)    There is no height or weight on file to calculate BMI.  Previous Blood Pressure Readings :   BP Readings from Last 3 Encounters:   02/27/20 108/60   01/15/20 124/62   01/13/20 127/70       Physical Exam    GEN: No apparent distress  HEENT: sclera non-icteric, conjunctiva clear  CV: 1+ peripheral edema, below knee, RLE.  Regular rate and rhythm.  No murmurs  PULM: breathing non-labored no wheezing bilateral lung fields  ABD: non, protuberant abdomen.  PSYCH: appropriate affect  MSK: able to rise from chair without assistance  SKIN: normal skin turgor.  Nontender erythema to the low right lower extremity    Pertinent Labs Reviewed       ASSESSMENT/PLAN:    Post-viral cough syndrome.Condition improving.  Counseling on self-care measures today.  Continue with current plan in addition to recommendations written on After Visit Summary.   -      promethazine-dextromethorphan (PROMETHAZINE-DM) 6.25-15 mg/5 mL Syrp; Take 5 mLs by mouth nightly as needed (cough).  Dispense: 118 mL; Refill: 0    Venous stasis dermatitis of lower extremity.Condition not optimally controlled. Detailed counseling on self care measures. Plan to monitor clinically in addition to plan below or as listed on After Visit Summary.   -     COMPRESSION SLEEVE/SOCK FOR HOME USE    Postmenopausal estrogen deficiency  -     DXA Bone Density Appendicular Skeleton; Future; Expected date: 02/27/2020          Future Appointments   Date Time Provider Department Center   3/2/2020 10:20 AM Fili Quarles Jr., MD [facility] ORTHO [facility]   3/25/2020  1:30 PM Sergio Oneil MD [facility] OPHTHAL [facility]   5/21/2020  9:20 AM [facility] DEXA1 LIMIT 350 LBS [facility] BMD [facility]   5/27/2020 10:00 AM Nayan Linares MD [facility]       Nayan Linares  2/27/2020  12:12 PM

## 2020-02-28 ENCOUNTER — PATIENT OUTREACH (OUTPATIENT)
Dept: ADMINISTRATIVE | Facility: OTHER | Age: 65
End: 2020-02-28

## 2020-03-02 ENCOUNTER — OFFICE VISIT (OUTPATIENT)
Dept: ORTHOPEDICS | Facility: CLINIC | Age: 65
End: 2020-03-02
Payer: MEDICARE

## 2020-03-02 ENCOUNTER — PATIENT MESSAGE (OUTPATIENT)
Dept: INTERNAL MEDICINE | Facility: CLINIC | Age: 65
End: 2020-03-02

## 2020-03-02 VITALS — HEIGHT: 58 IN | BODY MASS INDEX: 30.02 KG/M2 | WEIGHT: 143 LBS

## 2020-03-02 DIAGNOSIS — M75.121 NONTRAUMATIC COMPLETE TEAR OF RIGHT ROTATOR CUFF: Primary | ICD-10-CM

## 2020-03-02 PROCEDURE — 99499 UNLISTED E&M SERVICE: CPT | Mod: HCNC,S$GLB,, | Performed by: ORTHOPAEDIC SURGERY

## 2020-03-02 PROCEDURE — 99499 NO LOS: ICD-10-PCS | Mod: HCNC,S$GLB,, | Performed by: ORTHOPAEDIC SURGERY

## 2020-03-02 PROCEDURE — 99999 PR PBB SHADOW E&M-EST. PATIENT-LVL III: ICD-10-PCS | Mod: PBBFAC,HCNC,, | Performed by: ORTHOPAEDIC SURGERY

## 2020-03-02 PROCEDURE — 99999 PR PBB SHADOW E&M-EST. PATIENT-LVL III: CPT | Mod: PBBFAC,HCNC,, | Performed by: ORTHOPAEDIC SURGERY

## 2020-03-02 NOTE — PROGRESS NOTES
Subjective:      Patient ID: Chelsea Rodriguez is a 65 y.o. female.  Chief Complaint: Follow-up of the Right Shoulder      HPI  Chelsea Rodriguez is a  65 y.o. female presenting today for follow up of right shoulder pain due to chronic tear rotator cuff.  She reports that she is doing much better pain is minimal she is very pleased even though we did not do any surgery the injections helped in the past.    Review of patient's allergies indicates:   Allergen Reactions    Codeine Nausea And Vomiting         Current Outpatient Medications   Medication Sig Dispense Refill    aspirin (ECOTRIN) 81 MG EC tablet once a day      co-enzyme Q-10 30 mg capsule Take 100 mg by mouth 3 (three) times daily.       diazePAM (VALIUM) 2 MG tablet       ergocalciferol (ERGOCALCIFEROL) 50,000 unit Cap TAKE 1 CAPSULE BY MOUTH EVERY 7 DAYS 12 capsule 0    irbesartan (AVAPRO) 150 MG tablet TAKE 1 TABLET(150 MG) BY MOUTH EVERY EVENING 90 tablet 1    meloxicam (MOBIC) 15 MG tablet TAKE 1 TABLET(15 MG) BY MOUTH DAILY AS NEEDED FOR PAIN 90 tablet 0    metoprolol succinate (TOPROL-XL) 25 MG 24 hr tablet TAKE 1 TABLET BY MOUTH TWICE DAILY pt taking once a day 180 tablet 0    pravastatin (PRAVACHOL) 40 MG tablet TAKE 1 TABLET(40 MG) BY MOUTH EVERY DAY 90 tablet 0    promethazine-dextromethorphan (PROMETHAZINE-DM) 6.25-15 mg/5 mL Syrp Take 5 mLs by mouth nightly as needed (cough). 118 mL 0     No current facility-administered medications for this visit.        Past Medical History:   Diagnosis Date    Arthritis     Breast cancer 2010    left breast    Cataract     Hyperlipidemia     Hypertension        Past Surgical History:   Procedure Laterality Date    BILATERAL SALPINGOOPHORECTOMY  2000    BREAST BIOPSY Left 2010    malignant    BREAST BIOPSY Left 2008    negative    BREAST LUMPECTOMY Left 2010    CATARACT EXTRACTION W/  INTRAOCULAR LENS IMPLANT Right 11/06/2018    Dr. Oneil    CATARACT EXTRACTION W/  INTRAOCULAR LENS IMPLANT  "Left 2018    Dr. Oneil     SECTION      x2    COLONOSCOPY N/A 10/20/2017    Procedure: COLONOSCOPY;  Surgeon: Mitchell Danielson Jr., MD;  Location: [facility] ENDO;  Service: Endoscopy;  Laterality: N/A;    CYST REMOVAL      on back    HERNIA REPAIR      HYSTERECTOMY      at 25 yrs old    INTRAOCULAR PROSTHESES INSERTION Left 2018    Procedure: INSERTION, IOL PROSTHESIS;  Surgeon: Sergio Oneil MD;  Location: [facility] OR 1ST FLR;  Service: Ophthalmology;  Laterality: Left;    INTRAOCULAR PROSTHESES INSERTION Right 2018    Procedure: INSERTION, IOL PROSTHESIS;  Surgeon: Sergio Oneil MD;  Location: [facility] OR 2ND FLR;  Service: Ophthalmology;  Laterality: Right;    OOPHORECTOMY      @ 45 yrs old    PHACOEMULSIFICATION OF CATARACT Left 2018    Procedure: PHACOEMULSIFICATION, CATARACT;  Surgeon: Sergio Oneil MD;  Location: [facility] OR 1ST FLR;  Service: Ophthalmology;  Laterality: Left;    PHACOEMULSIFICATION OF CATARACT Right 2018    Procedure: PHACOEMULSIFICATION, CATARACT;  Surgeon: Sergio Oneil MD;  Location: [facility] OR 2ND FLR;  Service: Ophthalmology;  Laterality: Right;    supracervical abdominal hysterectomy  1978    fibroids       OBJECTIVE:   PHYSICAL EXAM:  Height: 4' 10" (147.3 cm) Weight: 64.9 kg (143 lb)  Vitals:    20 1029   Weight: 64.9 kg (143 lb)   Height: 4' 10" (1.473 m)   PainSc: 0-No pain     Ortho/SPM Exam  Right shoulder no tenderness no swelling  Passive range of motion is pretty good active elevation is limited and there is weakness of the rotator cuff    RADIOGRAPHS:  None  Comments: I have personally reviewed the imaging and I agree with the above radiologist's report.    ASSESSMENT/PLAN:     IMPRESSION:  Chronic tear rotator cuff right shoulder improving    PLAN:  Keep an eye on symptoms gentle range of motion to prevent stiffness gentle strengthening  Advil or Motrin by mouth      FOLLOW UP:  As " needed    Disclaimer: This note has been generated using voice-recognition software. There may be typographical errors that have been missed during proof-reading.

## 2020-04-08 ENCOUNTER — PATIENT MESSAGE (OUTPATIENT)
Dept: INTERNAL MEDICINE | Facility: CLINIC | Age: 65
End: 2020-04-08

## 2020-05-27 ENCOUNTER — PATIENT MESSAGE (OUTPATIENT)
Dept: INTERNAL MEDICINE | Facility: CLINIC | Age: 65
End: 2020-05-27

## 2020-05-27 ENCOUNTER — OFFICE VISIT (OUTPATIENT)
Dept: INTERNAL MEDICINE | Facility: CLINIC | Age: 65
End: 2020-05-27
Payer: MEDICARE

## 2020-05-27 DIAGNOSIS — E78.5 HYPERLIPIDEMIA, UNSPECIFIED HYPERLIPIDEMIA TYPE: ICD-10-CM

## 2020-05-27 DIAGNOSIS — Z20.822 SUSPECTED COVID-19 VIRUS INFECTION: ICD-10-CM

## 2020-05-27 DIAGNOSIS — R60.0 PEDAL EDEMA: Primary | ICD-10-CM

## 2020-05-27 DIAGNOSIS — E55.9 VITAMIN D INSUFFICIENCY: ICD-10-CM

## 2020-05-27 PROCEDURE — 99442 PR PHYSICIAN TELEPHONE EVALUATION 11-20 MIN: CPT | Mod: HCNC,95,, | Performed by: INTERNAL MEDICINE

## 2020-05-27 PROCEDURE — 99442 PR PHYSICIAN TELEPHONE EVALUATION 11-20 MIN: ICD-10-PCS | Mod: HCNC,95,, | Performed by: INTERNAL MEDICINE

## 2020-05-27 RX ORDER — MELOXICAM 15 MG/1
TABLET ORAL
Qty: 90 TABLET | Refills: 0 | Status: SHIPPED | OUTPATIENT
Start: 2020-05-27 | End: 2020-06-24 | Stop reason: SDUPTHER

## 2020-05-27 RX ORDER — PRAVASTATIN SODIUM 40 MG/1
TABLET ORAL
Qty: 90 TABLET | Refills: 0 | Status: SHIPPED | OUTPATIENT
Start: 2020-05-27 | End: 2020-06-08

## 2020-05-27 RX ORDER — ERGOCALCIFEROL 1.25 MG/1
50000 CAPSULE ORAL
Qty: 12 CAPSULE | Refills: 0 | Status: SHIPPED | OUTPATIENT
Start: 2020-05-27 | End: 2021-08-16 | Stop reason: SDUPTHER

## 2020-05-27 NOTE — PROGRESS NOTES
The patient location is: home  Visit type: Virtual visit with synchronous audio and video  Total time spent with patient: 20 min    Each patient to whom he or she provides medical services by telemedicine is:  (1) informed of the relationship between the physician and patient and the respective role of any other health care provider with respect to management of the patient; and (2) notified that he or she may decline to receive medical services by telemedicine and may withdraw from such care at any time.     Portions of this note are generated with voice recognition software. Typographical errors may exist.     SUBJECTIVE:    This is a/an 65 y.o. female here for primary care visit for  Chief Complaint   Patient presents with    Hypertension     Patient states that she is complying with blood pressure medication.  Still get some episodes of trace pedal edema in the ankles.  She feels that this might be related to dietary indiscretions with salt.    Patient wonders if viral symptoms that she had in January might have been COVID-19.  She was evaluated for measles because she had a viral exanthem.  Results were negative.  She has not had any known exposure to COVID-19.    Patient states that she does not misuse meloxicam.  Takes it on some occasions.  Needs a refill.    Vital Signs      Blood Pressure:  120s to 80s        Medications Reviewed and Updated    Past medical, family, and social histories were reviewed and updated.    Review of Systems negative unless otherwise noted in history of present illness-  ROS    General ROS: negative  Psychological ROS: negative  ENT ROS: negative  Endocrine ROS: Negative  Allergy and Immunology ROS: negative  Cardiovascular ROS: negative  Pulmonary ROS: Negative  Gastrointestinal ROS: negative  Genito-Urinary ROS: negative  Musculoskeletal ROS: negative      Allergic:    Review of patient's allergies indicates:   Allergen Reactions    Codeine Nausea And Vomiting        OBJECTIVE:         Wt Readings from Last 3 Encounters:   03/02/20 64.9 kg (143 lb)   01/15/20 64.9 kg (143 lb 1.3 oz)   01/12/20 65.3 kg (144 lb)    There is no height or weight on file to calculate BMI.  Previous Blood Pressure Readings :   BP Readings from Last 3 Encounters:   02/27/20 108/60   01/15/20 124/62   01/13/20 127/70       Physical Exam      Pertinent Labs Reviewed       ASSESSMENT/PLAN:    Pedal edema.Etiology unclear. Not controlled. Further evaluation warranted.  Recommendations as below or as written on After Visit Summary.   -     Brain Natriuretic Peptide; Future; Expected date: 05/27/2020    Hyperlipidemia, unspecified hyperlipidemia type.Condition stable.  Counseling given today on self-care measures. Plan to monitor clinically. Continue current medical plan.   -     pravastatin (PRAVACHOL) 40 MG tablet; TAKE 1 TABLET(40 MG) BY MOUTH EVERY DAY  Dispense: 90 tablet; Refill: 0    Suspected Covid-19 Virus Infection  -     COVID-19 (SARS CoV-2) IgG Antibody; Future; Expected date: 05/27/2020    Other orders  -     meloxicam (MOBIC) 15 MG tablet; TAKE 1 TABLET(15 MG) BY MOUTH DAILY AS NEEDED FOR PAIN (limit use to prevent hypertension)  Dispense: 90 tablet; Refill: 0          Future Appointments   Date Time Provider Department Center   5/29/2020  9:10 AM LAB, [facility] [place]   6/17/2020  2:30 PM Sergio Oneil MD [facility] PASCALE [place]   7/13/2020 10:00 AM [facility] DEXA1 LIMIT 350 LBS [facility] BMD Megan Linares  5/27/2020  8:07 AM

## 2020-05-29 ENCOUNTER — LAB VISIT (OUTPATIENT)
Dept: LAB | Facility: HOSPITAL | Age: 65
End: 2020-05-29
Attending: INTERNAL MEDICINE
Payer: MEDICARE

## 2020-05-29 DIAGNOSIS — Z20.822 SUSPECTED COVID-19 VIRUS INFECTION: ICD-10-CM

## 2020-05-29 DIAGNOSIS — R60.0 PEDAL EDEMA: ICD-10-CM

## 2020-05-29 DIAGNOSIS — E78.5 HYPERLIPIDEMIA, UNSPECIFIED HYPERLIPIDEMIA TYPE: ICD-10-CM

## 2020-05-29 LAB
ALBUMIN SERPL BCP-MCNC: 3.9 G/DL (ref 3.5–5.2)
ALP SERPL-CCNC: 107 U/L (ref 55–135)
ALT SERPL W/O P-5'-P-CCNC: 16 U/L (ref 10–44)
ANION GAP SERPL CALC-SCNC: 8 MMOL/L (ref 8–16)
AST SERPL-CCNC: 21 U/L (ref 10–40)
BILIRUB SERPL-MCNC: 0.3 MG/DL (ref 0.1–1)
BNP SERPL-MCNC: <10 PG/ML (ref 0–99)
BUN SERPL-MCNC: 16 MG/DL (ref 8–23)
CALCIUM SERPL-MCNC: 9.4 MG/DL (ref 8.7–10.5)
CHLORIDE SERPL-SCNC: 103 MMOL/L (ref 95–110)
CHOLEST SERPL-MCNC: 224 MG/DL (ref 120–199)
CHOLEST/HDLC SERPL: 5.1 {RATIO} (ref 2–5)
CO2 SERPL-SCNC: 28 MMOL/L (ref 23–29)
CREAT SERPL-MCNC: 0.7 MG/DL (ref 0.5–1.4)
EST. GFR  (AFRICAN AMERICAN): >60 ML/MIN/1.73 M^2
EST. GFR  (NON AFRICAN AMERICAN): >60 ML/MIN/1.73 M^2
GLUCOSE SERPL-MCNC: 86 MG/DL (ref 70–110)
HDLC SERPL-MCNC: 44 MG/DL (ref 40–75)
HDLC SERPL: 19.6 % (ref 20–50)
LDLC SERPL CALC-MCNC: 129.6 MG/DL (ref 63–159)
NONHDLC SERPL-MCNC: 180 MG/DL
POTASSIUM SERPL-SCNC: 4.1 MMOL/L (ref 3.5–5.1)
PROT SERPL-MCNC: 7.1 G/DL (ref 6–8.4)
SARS-COV-2 IGG SERPLBLD QL IA.RAPID: NEGATIVE
SODIUM SERPL-SCNC: 139 MMOL/L (ref 136–145)
TRIGL SERPL-MCNC: 252 MG/DL (ref 30–150)

## 2020-05-29 PROCEDURE — 80053 COMPREHEN METABOLIC PANEL: CPT | Mod: HCNC

## 2020-05-29 PROCEDURE — 86769 SARS-COV-2 COVID-19 ANTIBODY: CPT | Mod: HCNC

## 2020-05-29 PROCEDURE — 80061 LIPID PANEL: CPT | Mod: HCNC

## 2020-05-29 PROCEDURE — 83880 ASSAY OF NATRIURETIC PEPTIDE: CPT | Mod: HCNC

## 2020-05-29 PROCEDURE — 36415 COLL VENOUS BLD VENIPUNCTURE: CPT | Mod: HCNC,PO

## 2020-06-16 ENCOUNTER — PATIENT OUTREACH (OUTPATIENT)
Dept: ADMINISTRATIVE | Facility: OTHER | Age: 65
End: 2020-06-16

## 2020-06-16 NOTE — PROGRESS NOTES
Care Everywhere: updated  Immunization: updated  Health Maintenance: updated  Media Review: n/a  Legacy Review: n/a  Order placed: n/a  Upcoming appts:na/

## 2020-06-17 ENCOUNTER — OFFICE VISIT (OUTPATIENT)
Dept: OPHTHALMOLOGY | Facility: CLINIC | Age: 65
End: 2020-06-17
Payer: MEDICARE

## 2020-06-17 VITALS — SYSTOLIC BLOOD PRESSURE: 139 MMHG | DIASTOLIC BLOOD PRESSURE: 76 MMHG | HEART RATE: 76 BPM

## 2020-06-17 DIAGNOSIS — H26.493 PCO (POSTERIOR CAPSULAR OPACIFICATION), BILATERAL: Primary | ICD-10-CM

## 2020-06-17 DIAGNOSIS — Z96.1 PSEUDOPHAKIA: ICD-10-CM

## 2020-06-17 DIAGNOSIS — I10 ESSENTIAL HYPERTENSION: ICD-10-CM

## 2020-06-17 DIAGNOSIS — H04.123 DRY EYE SYNDROME OF BOTH EYES: ICD-10-CM

## 2020-06-17 DIAGNOSIS — H43.813 VITREOUS DETACHMENT OF BOTH EYES: ICD-10-CM

## 2020-06-17 DIAGNOSIS — H52.7 REFRACTIVE ERROR: ICD-10-CM

## 2020-06-17 PROCEDURE — 92014 PR EYE EXAM, EST PATIENT,COMPREHESV: ICD-10-PCS | Mod: HCNC,S$GLB,, | Performed by: OPHTHALMOLOGY

## 2020-06-17 PROCEDURE — 99499 UNLISTED E&M SERVICE: CPT | Mod: HCNC,S$GLB,, | Performed by: OPHTHALMOLOGY

## 2020-06-17 PROCEDURE — 99999 PR PBB SHADOW E&M-EST. PATIENT-LVL III: ICD-10-PCS | Mod: PBBFAC,HCNC,, | Performed by: OPHTHALMOLOGY

## 2020-06-17 PROCEDURE — 99499 RISK ADDL DX/OHS AUDIT: ICD-10-PCS | Mod: HCNC,S$GLB,, | Performed by: OPHTHALMOLOGY

## 2020-06-17 PROCEDURE — 92014 COMPRE OPH EXAM EST PT 1/>: CPT | Mod: HCNC,S$GLB,, | Performed by: OPHTHALMOLOGY

## 2020-06-17 PROCEDURE — 99999 PR PBB SHADOW E&M-EST. PATIENT-LVL III: CPT | Mod: PBBFAC,HCNC,, | Performed by: OPHTHALMOLOGY

## 2020-06-17 NOTE — PROGRESS NOTES
Subjective:       Patient ID: Chelsea Rodriguez is a 65 y.o. female.    Chief Complaint: Laser Treatment    HPI     65 y.o. female is here for BAT & possible YAG CAP OD. Denies flashes. H/o   floaters, bilateral. Occasional sharp eye pain, bilateral. Blurred vision   up close. No noticeable problems with glare.     Eye Med's: Refresh prn OU     Last edited by BRIDGETT Sutton on 6/17/2020  4:04 PM. (History)             Assessment:       1. PCO (posterior capsular opacification), bilateral    2. Dry eye syndrome of both eyes    3. Vitreous detachment of both eyes    4. Essential hypertension    5. Refractive error    6. Pseudophakia        Plan:       Early PCO OU- Not Visually Significant.     ARTIE-Needs more AT's.  PVD's OU-Stable.  HTN-No retinopathy OU.  RE-Pt does not want MRx.      AT's.  Control HTN.  RTC 1 yr.

## 2020-06-24 DIAGNOSIS — E78.5 HYPERLIPIDEMIA, UNSPECIFIED HYPERLIPIDEMIA TYPE: ICD-10-CM

## 2020-06-24 RX ORDER — PRAVASTATIN SODIUM 40 MG/1
TABLET ORAL
Qty: 90 TABLET | Refills: 0 | Status: SHIPPED | OUTPATIENT
Start: 2020-06-24 | End: 2021-04-13 | Stop reason: SDUPTHER

## 2020-06-24 RX ORDER — MELOXICAM 15 MG/1
TABLET ORAL
Qty: 90 TABLET | Refills: 0 | Status: SHIPPED | OUTPATIENT
Start: 2020-06-24 | End: 2020-09-07 | Stop reason: ALTCHOICE

## 2020-06-24 RX ORDER — METOPROLOL SUCCINATE 25 MG/1
TABLET, EXTENDED RELEASE ORAL
Qty: 180 TABLET | Refills: 0 | Status: SHIPPED | OUTPATIENT
Start: 2020-06-24 | End: 2020-06-26 | Stop reason: SDUPTHER

## 2020-06-24 RX ORDER — IRBESARTAN 150 MG/1
TABLET ORAL
Qty: 90 TABLET | Refills: 0 | Status: SHIPPED | OUTPATIENT
Start: 2020-06-24 | End: 2021-04-13 | Stop reason: SDUPTHER

## 2020-06-26 RX ORDER — METOPROLOL SUCCINATE 25 MG/1
TABLET, EXTENDED RELEASE ORAL
Qty: 180 TABLET | Refills: 0 | Status: SHIPPED | OUTPATIENT
Start: 2020-06-26 | End: 2020-07-01 | Stop reason: SDUPTHER

## 2020-07-02 RX ORDER — METOPROLOL SUCCINATE 25 MG/1
TABLET, EXTENDED RELEASE ORAL
Qty: 180 TABLET | Refills: 0 | Status: SHIPPED | OUTPATIENT
Start: 2020-07-02 | End: 2021-04-13 | Stop reason: SDUPTHER

## 2020-07-13 ENCOUNTER — HOSPITAL ENCOUNTER (OUTPATIENT)
Dept: RADIOLOGY | Facility: HOSPITAL | Age: 65
Discharge: HOME | End: 2020-07-13
Attending: INTERNAL MEDICINE
Payer: MEDICARE

## 2020-07-13 DIAGNOSIS — Z78.0 POSTMENOPAUSAL ESTROGEN DEFICIENCY: ICD-10-CM

## 2020-07-13 PROCEDURE — 77081 DEXA BONE DENSITY APPENDICULAR SKELETON: ICD-10-PCS | Mod: 26,HCNC,, | Performed by: RADIOLOGY

## 2020-07-13 PROCEDURE — 77081 DXA BONE DENSITY APPENDICULR: CPT | Mod: TC,HCNC

## 2020-07-13 PROCEDURE — 77081 DXA BONE DENSITY APPENDICULR: CPT | Mod: 26,HCNC,, | Performed by: RADIOLOGY

## 2020-09-07 ENCOUNTER — HOSPITAL ENCOUNTER (EMERGENCY)
Facility: HOSPITAL | Age: 65
Discharge: HOME | End: 2020-09-07
Attending: EMERGENCY MEDICINE
Payer: MEDICARE

## 2020-09-07 VITALS
DIASTOLIC BLOOD PRESSURE: 82 MMHG | OXYGEN SATURATION: 97 % | HEART RATE: 82 BPM | TEMPERATURE: 99 F | RESPIRATION RATE: 17 BRPM | SYSTOLIC BLOOD PRESSURE: 164 MMHG

## 2020-09-07 DIAGNOSIS — M54.6 LEFT-SIDED THORACIC BACK PAIN, UNSPECIFIED CHRONICITY: Primary | ICD-10-CM

## 2020-09-07 PROCEDURE — 25000003 PHARM REV CODE 250: Mod: HCNC | Performed by: PHYSICIAN ASSISTANT

## 2020-09-07 PROCEDURE — 99284 PR EMERGENCY DEPT VISIT,LEVEL IV: ICD-10-PCS | Mod: ,,, | Performed by: PHYSICIAN ASSISTANT

## 2020-09-07 PROCEDURE — 99284 EMERGENCY DEPT VISIT MOD MDM: CPT | Mod: HCNC

## 2020-09-07 PROCEDURE — 99284 EMERGENCY DEPT VISIT MOD MDM: CPT | Mod: ,,, | Performed by: PHYSICIAN ASSISTANT

## 2020-09-07 RX ORDER — ACETAMINOPHEN 500 MG
1000 TABLET ORAL
Status: COMPLETED | OUTPATIENT
Start: 2020-09-07 | End: 2020-09-07

## 2020-09-07 RX ORDER — LIDOCAINE 50 MG/G
1 PATCH TOPICAL
Status: DISCONTINUED | OUTPATIENT
Start: 2020-09-07 | End: 2020-09-07 | Stop reason: HOSPADM

## 2020-09-07 RX ORDER — LIDOCAINE 50 MG/G
1 PATCH TOPICAL
Qty: 5 PATCH | Refills: 0 | Status: SHIPPED | OUTPATIENT
Start: 2020-09-07 | End: 2020-10-21

## 2020-09-07 RX ORDER — NAPROXEN 500 MG/1
500 TABLET ORAL 2 TIMES DAILY WITH MEALS
Qty: 14 TABLET | Refills: 0 | Status: SHIPPED | OUTPATIENT
Start: 2020-09-07 | End: 2020-09-14

## 2020-09-07 RX ADMIN — ACETAMINOPHEN 1000 MG: 500 TABLET ORAL at 05:09

## 2020-09-07 RX ADMIN — LIDOCAINE 1 PATCH: 50 PATCH TOPICAL at 05:09

## 2020-09-07 NOTE — ED NOTES
Discharge instructions, diagnosis, medications, and follow up discussed with patient. Patient verbalized understanding. All questions and concerns answered. No needs expressed at the time. Pt is awake, alert and oriented with no acute distress noted. Respirations even and unlabored. Ambulatory out of ED with cane for assistance.

## 2020-09-07 NOTE — ED TRIAGE NOTES
"Chelsea Rodriguez, a 65 y.o. female presents to the ED w/ complaint of R lower back pain that radiates to the middle back and to the L shoulder blade. States pain presented at 1400 yesterday afternoon. Reports taking an Aspirin 81 mg and an ice pack, pain improved with the ice pack.  Pt describes pain as "stabbing". Rates pain 9 on 0-10 scale.     Triage note:  Chief Complaint   Patient presents with    Back Pain     lower back pain since Friday that radiates up to the top of her back.      Review of patient's allergies indicates:   Allergen Reactions    Codeine Nausea And Vomiting     Past Medical History:   Diagnosis Date    Arthritis     Breast cancer 2010    left breast    Cataract     Hyperlipidemia     Hypertension      "

## 2020-09-07 NOTE — ED PROVIDER NOTES
Encounter Date: 2020       History     Chief Complaint   Patient presents with    Back Pain     lower back pain since Friday that radiates up to the top of her back.      Patient is a 65 year old female with PMHx of HTN, HLD, and hx of breast cancer in . She presents to the ED for back pain. She reports having thoracic back pain since today. Reports recent heavy lifting of 2x4 lumber two days ago. Describes pain as constant and pulling. Rates pain 9/10. Denies OTC medication use. Reports pain worse with movement. Reports hx of similar pain in past. Denies recent falls or trauma. Denies pain with deep inspiration. Denies urinary/fecal incontinence, paresthesias, or lower extremity weakness. Denies active chemo or radiation. She denies fever,chills, nausea, vomiting, sob, chest pain, abd pain, dysuria, diarrhea, or constipation. She is a non smoker and reports alcohol use. Patient ambulates with cane at baseline.    The history is provided by the patient and medical records. No  was used.     Review of patient's allergies indicates:   Allergen Reactions    Codeine Nausea And Vomiting     Past Medical History:   Diagnosis Date    Arthritis     Breast cancer 2010    left breast    Cataract     Hyperlipidemia     Hypertension      Past Surgical History:   Procedure Laterality Date    BILATERAL SALPINGOOPHORECTOMY  2000    BREAST BIOPSY Left 2010    malignant    BREAST BIOPSY Left     negative    BREAST LUMPECTOMY Left     CATARACT EXTRACTION W/  INTRAOCULAR LENS IMPLANT Right 2018    Dr. Oneil    CATARACT EXTRACTION W/  INTRAOCULAR LENS IMPLANT Left 2018    Dr. Oneil     SECTION      x2    COLONOSCOPY N/A 10/20/2017    Procedure: COLONOSCOPY;  Surgeon: Mitchell Danielson Jr., MD;  Location[facility];  Service: Endoscopy;  Laterality: N/A;    CYST REMOVAL      on back    HERNIA REPAIR      HYSTERECTOMY      at 25 yrs old    INTRAOCULAR  PROSTHESES INSERTION Left 11/6/2018    Procedure: INSERTION, IOL PROSTHESIS;  Surgeon: Sergio Oneil MD;  Location: [facility] OR [address] [place] [address];  Service: Ophthalmology;  Laterality: Left;    INTRAOCULAR PROSTHESES INSERTION Right 11/20/2018    Procedure: INSERTION, IOL PROSTHESIS;  Surgeon: Sergio Oneil MD;  Location: [facility] OR [address] [place] [address];  Service: Ophthalmology;  Laterality: Right;    OOPHORECTOMY      @ 45 yrs old    PHACOEMULSIFICATION OF CATARACT Left 11/6/2018    Procedure: PHACOEMULSIFICATION, CATARACT;  Surgeon: Sergio Oneil MD;  Location: [facility] OR [address] [place] [address];  Service: Ophthalmology;  Laterality: Left;    PHACOEMULSIFICATION OF CATARACT Right 11/20/2018    Procedure: PHACOEMULSIFICATION, CATARACT;  Surgeon: Sergio Oneil MD;  Location: [facility] OR [address] [place] [address];  Service: Ophthalmology;  Laterality: Right;    supracervical abdominal hysterectomy  1978    fibroids     Family History   Problem Relation Age of Onset    Hypertension Mother     Heart disease Mother     Diabetes Mother     Hyperlipidemia Mother     Pancreatitis Mother     Cataracts Mother     Macular degeneration Mother     Cancer Father     Breast cancer Paternal Cousin     Amblyopia Neg Hx     Blindness Neg Hx     Glaucoma Neg Hx     Strabismus Neg Hx     Retinal detachment Neg Hx      Social History     Tobacco Use    Smoking status: Never Smoker    Smokeless tobacco: Never Used   Substance Use Topics    Alcohol use: Yes     Comment: not often     Drug use: No     Review of Systems   Constitutional: Negative for fever.   HENT: Negative for sore throat.    Respiratory: Negative for shortness of breath.    Cardiovascular: Negative for chest pain.   Gastrointestinal: Negative for abdominal pain, nausea and vomiting.   Genitourinary: Negative for dysuria.   Musculoskeletal: Positive for back pain.   Skin: Negative for rash.   Neurological: Negative for weakness.   Hematological: Does not bruise/bleed easily.        Physical Exam     Initial Vitals [09/07/20 0502]   BP Pulse Resp Temp SpO2   (!) 164/82 82 17 98.9 °F (37.2 °C) 97 %      MAP       --         Physical Exam    Vitals reviewed.  Constitutional: She appears well-developed and well-nourished. No distress.   HENT:   Head: Normocephalic.   Eyes: Conjunctivae are normal.   Neck: Normal range of motion.   Cardiovascular: Normal rate and regular rhythm.   No murmur heard.  Pulmonary/Chest: Breath sounds normal. No respiratory distress. She has no wheezes. She has no rales.   Abdominal: Soft. Bowel sounds are normal. She exhibits no distension. There is no abdominal tenderness.   Musculoskeletal: Normal range of motion.      Comments: No midline spinal tenderness. TTP over left thoracic musculature. Patient reports reproducing pain.   Neurological: She is alert and oriented to person, place, and time. She has normal strength. No sensory deficit.   Reflex Scores:       Patellar reflexes are 2+ on the right side and 2+ on the left side.       Achilles reflexes are 2+ on the right side and 2+ on the left side.  Skin: Skin is warm and dry.         ED Course   Procedures  Labs Reviewed - No data to display            Medical Decision Making:   History:   Old Medical Records: I decided to obtain old medical records.       APC / Resident Notes:   Patient is a 65 year old female presents to the ED for emergent evaluation of left thoracic back pain.     Will order pain medication for symptomatic relief. Will continue to monitor.     Differential diagnoses include, but are not limited to: lumbar strain, sciatica, sacroiliac joint dysfunction, or contusion.     I have discussed emergency department findings, and plan with the patient. Will discharge home with naprosyn and Lidoderm patches and F/U with PCP in approximately one week. Additional verbal discharge instructions were given and discussed with the patient. Patient verbalizes understanding of plan and agrees. Return  precautions given.    I have discussed and reviewed with my supervising physician.        Clinical Impression:       ICD-10-CM ICD-9-CM   1. Left-sided thoracic back pain, unspecified chronicity  M54.6 724.1         Disposition:   Disposition: Discharged  Condition: Stable     ED Disposition Condition    Discharge Stable        ED Prescriptions     Medication Sig Dispense Start Date End Date Auth. Provider    naproxen (NAPROSYN) 500 MG tablet Take 1 tablet (500 mg total) by mouth 2 (two) times daily with meals. for 7 days 14 tablet 9/7/2020 9/14/2020 Carmen Delgado PA-C    lidocaine (LIDODERM) 5 % Place 1 patch onto the skin every 24 hours as needed. Remove & Discard patch within 12 hours or as directed by MD 5 patch 9/7/2020  Carmen Delgado PA-C        Follow-up Information     Follow up With Specialties Details Why Contact Info    Nayan Linares MD Internal Medicine Schedule an appointment as soon as possible for a visit in 1 week  [address] [facility] [address]  [phone]                                       Carmen Delgado PA-C  09/07/20 0843

## 2020-09-18 ENCOUNTER — TELEPHONE (OUTPATIENT)
Dept: FAMILY MEDICINE | Facility: CLINIC | Age: 65
End: 2020-09-18

## 2020-09-18 NOTE — TELEPHONE ENCOUNTER
----- Message from Samantha Brice sent at 9/18/2020 11:00 AM CDT -----  Patient called to speak with the doctor concerning wellness visit.     She would like a callback at [phone]    Thanks  KB

## 2020-09-18 NOTE — TELEPHONE ENCOUNTER
attemptd to reach pt, no answer left voicemail to call back and schedule an appointment with Dr. Alva.

## 2020-10-21 ENCOUNTER — OFFICE VISIT (OUTPATIENT)
Dept: INTERNAL MEDICINE | Facility: CLINIC | Age: 65
End: 2020-10-21
Payer: MEDICARE

## 2020-10-21 VITALS
DIASTOLIC BLOOD PRESSURE: 78 MMHG | SYSTOLIC BLOOD PRESSURE: 138 MMHG | BODY MASS INDEX: 33.17 KG/M2 | WEIGHT: 158 LBS | TEMPERATURE: 98 F | HEIGHT: 58 IN | OXYGEN SATURATION: 98 % | HEART RATE: 75 BPM

## 2020-10-21 DIAGNOSIS — Z85.3 HISTORY OF BREAST CANCER: ICD-10-CM

## 2020-10-21 DIAGNOSIS — R60.0 BILATERAL LOWER EXTREMITY EDEMA: ICD-10-CM

## 2020-10-21 DIAGNOSIS — I10 ESSENTIAL HYPERTENSION: Primary | ICD-10-CM

## 2020-10-21 DIAGNOSIS — K59.09 CHRONIC CONSTIPATION: ICD-10-CM

## 2020-10-21 PROCEDURE — 99214 OFFICE O/P EST MOD 30 MIN: CPT | Mod: HCNC,S$GLB,, | Performed by: INTERNAL MEDICINE

## 2020-10-21 PROCEDURE — 3078F PR MOST RECENT DIASTOLIC BLOOD PRESSURE < 80 MM HG: ICD-10-PCS | Mod: HCNC,CPTII,S$GLB, | Performed by: INTERNAL MEDICINE

## 2020-10-21 PROCEDURE — 3288F PR FALLS RISK ASSESSMENT DOCUMENTED: ICD-10-PCS | Mod: HCNC,CPTII,S$GLB, | Performed by: INTERNAL MEDICINE

## 2020-10-21 PROCEDURE — 99999 PR PBB SHADOW E&M-EST. PATIENT-LVL IV: ICD-10-PCS | Mod: PBBFAC,HCNC,, | Performed by: INTERNAL MEDICINE

## 2020-10-21 PROCEDURE — 1100F PTFALLS ASSESS-DOCD GE2>/YR: CPT | Mod: HCNC,CPTII,S$GLB, | Performed by: INTERNAL MEDICINE

## 2020-10-21 PROCEDURE — 99499 RISK ADDL DX/OHS AUDIT: ICD-10-PCS | Mod: S$GLB,,, | Performed by: INTERNAL MEDICINE

## 2020-10-21 PROCEDURE — 3008F BODY MASS INDEX DOCD: CPT | Mod: HCNC,CPTII,S$GLB, | Performed by: INTERNAL MEDICINE

## 2020-10-21 PROCEDURE — 99499 UNLISTED E&M SERVICE: CPT | Mod: S$GLB,,, | Performed by: INTERNAL MEDICINE

## 2020-10-21 PROCEDURE — 3075F PR MOST RECENT SYSTOLIC BLOOD PRESS GE 130-139MM HG: ICD-10-PCS | Mod: HCNC,CPTII,S$GLB, | Performed by: INTERNAL MEDICINE

## 2020-10-21 PROCEDURE — 3078F DIAST BP <80 MM HG: CPT | Mod: HCNC,CPTII,S$GLB, | Performed by: INTERNAL MEDICINE

## 2020-10-21 PROCEDURE — 1100F PR PT FALLS ASSESS DOC 2+ FALLS/FALL W/INJURY/YR: ICD-10-PCS | Mod: HCNC,CPTII,S$GLB, | Performed by: INTERNAL MEDICINE

## 2020-10-21 PROCEDURE — 3008F PR BODY MASS INDEX (BMI) DOCUMENTED: ICD-10-PCS | Mod: HCNC,CPTII,S$GLB, | Performed by: INTERNAL MEDICINE

## 2020-10-21 PROCEDURE — 3075F SYST BP GE 130 - 139MM HG: CPT | Mod: HCNC,CPTII,S$GLB, | Performed by: INTERNAL MEDICINE

## 2020-10-21 PROCEDURE — 99999 PR PBB SHADOW E&M-EST. PATIENT-LVL IV: CPT | Mod: PBBFAC,HCNC,, | Performed by: INTERNAL MEDICINE

## 2020-10-21 PROCEDURE — 3288F FALL RISK ASSESSMENT DOCD: CPT | Mod: HCNC,CPTII,S$GLB, | Performed by: INTERNAL MEDICINE

## 2020-10-21 PROCEDURE — 99214 PR OFFICE/OUTPT VISIT, EST, LEVL IV, 30-39 MIN: ICD-10-PCS | Mod: HCNC,S$GLB,, | Performed by: INTERNAL MEDICINE

## 2020-10-21 RX ORDER — HYDROCHLOROTHIAZIDE 12.5 MG/1
12.5 CAPSULE ORAL DAILY
Qty: 30 CAPSULE | Refills: 2 | Status: SHIPPED | OUTPATIENT
Start: 2020-10-21 | End: 2021-01-26

## 2020-10-21 NOTE — PROGRESS NOTES
Patient ID: Chelsea Rodriguez is a 65 y.o. female.    Chief Complaint: Establish Care    HPI Chelsea is a 65 y.o. female with hypertension, arthritis, hyperlipidemia, history of breast cancer, history of cataracts who presents to establish care.  She is a former patient Dr. Linares.  Patient of breast cancer in the right breast in 2010.  She follows with  at [facility] for this issue.  She reports the doctor is both a cancer doctor and a breast surgeon.  She is currently seeing him every 6 months but hopes to space out to every 1 year soon.  She has issues with chronic constipation.  Currently taking Dulcolax, MiraLax, improved juice for relief.  She would like another colonoscopy and referral to gastroenterology.  We reviewed her last colonoscopy in the chart today.  Patient is compliant with her irbesartan and metoprolol medications.  She has brought a log of her blood pressure measurements to clinic today.  Systolic blood pressure between 120 and 162 and diastolic between 76 and 97.  She does not recall ever being treated with diuretic such as hydrochlorothiazide in the past.  She also complains today of leg swelling.  We reviewed the chart today.  Normal BMP and kidney function.  Also had normal ultrasound to evaluate for venous insufficiency.    Review of Systems   Cardiovascular: Positive for leg swelling (chronic).   Gastrointestinal: Positive for constipation (chronic).   All other systems reviewed and are negative.        Objective:     Vitals:    10/21/20 1027   BP: 138/78   Pulse: 75   Temp: 98.3 °F (36.8 °C)        Physical Exam  Vitals signs reviewed.   Constitutional:       General: She is not in acute distress.     Appearance: Normal appearance. She is well-developed and normal weight. She is not ill-appearing, toxic-appearing or diaphoretic.   HENT:      Head: Normocephalic and atraumatic.      Right Ear: External ear normal.      Left Ear: External ear normal.      Nose: Nose normal.    Eyes:      General: No scleral icterus.        Right eye: No discharge.         Left eye: No discharge.      Extraocular Movements: Extraocular movements intact.      Conjunctiva/sclera: Conjunctivae normal.   Cardiovascular:      Rate and Rhythm: Normal rate and regular rhythm.      Heart sounds: Normal heart sounds. No murmur. No friction rub. No gallop.    Pulmonary:      Effort: Pulmonary effort is normal. No respiratory distress.      Breath sounds: Normal breath sounds. No stridor. No wheezing, rhonchi or rales.   Musculoskeletal:      Right lower leg: Edema (nonpitting) present.      Left lower leg: Edema (nonpitting) present.   Skin:     General: Skin is warm and dry.   Neurological:      General: No focal deficit present.      Mental Status: She is alert and oriented to person, place, and time. Mental status is at baseline.   Psychiatric:         Mood and Affect: Mood normal.         Behavior: Behavior normal.         Thought Content: Thought content normal.         Judgment: Judgment normal.         Assessment:       1. Essential hypertension Sub-optimally controlled   2. Chronic constipation Chronic   3. History of breast cancer Chronic   4. Bilateral lower extremity edema Chronic       Plan:         Essential hypertension  Comments:  Continue current meds. Add HCTZ  Orders:  -     hydroCHLOROthiazide (MICROZIDE) 12.5 mg capsule; Take 1 capsule (12.5 mg total) by mouth once daily.  Dispense: 30 capsule; Refill: 2  -     Basic Metabolic Panel; Future; Expected date: 10/21/2020    Chronic constipation  -     Ambulatory referral/consult to Gastroenterology; Future; Expected date: 10/28/2020    History of breast cancer  Comments:  Continue to follow up with doctor at [facility]    Bilateral lower extremity edema  Comments:  Likely due to weight, immobility and/or salt intake. Continue compression stockings. Low salt diet. Start HCTZ        RTC one month, f/u HTN    Warning signs discussed, patient to call with any  further issues or worsening of symptoms.       Parts of the above note were dictated using a voice dictation software. Please excuse any grammatical or typographical errors.

## 2020-10-26 ENCOUNTER — OFFICE VISIT (OUTPATIENT)
Dept: GASTROENTEROLOGY | Facility: CLINIC | Age: 65
End: 2020-10-26
Payer: MEDICARE

## 2020-10-26 VITALS
BODY MASS INDEX: 32.19 KG/M2 | SYSTOLIC BLOOD PRESSURE: 116 MMHG | HEART RATE: 75 BPM | WEIGHT: 154 LBS | DIASTOLIC BLOOD PRESSURE: 76 MMHG

## 2020-10-26 DIAGNOSIS — K59.09 CHRONIC CONSTIPATION: ICD-10-CM

## 2020-10-26 DIAGNOSIS — Z86.010 HISTORY OF COLON POLYPS: ICD-10-CM

## 2020-10-26 DIAGNOSIS — Z01.812 PRE-PROCEDURE LAB EXAM: ICD-10-CM

## 2020-10-26 DIAGNOSIS — K59.04 CHRONIC IDIOPATHIC CONSTIPATION: Primary | ICD-10-CM

## 2020-10-26 DIAGNOSIS — Z12.11 SCREENING FOR COLON CANCER: ICD-10-CM

## 2020-10-26 PROCEDURE — 99214 PR OFFICE/OUTPT VISIT, EST, LEVL IV, 30-39 MIN: ICD-10-PCS | Mod: HCNC,S$GLB,, | Performed by: NURSE PRACTITIONER

## 2020-10-26 PROCEDURE — 3288F FALL RISK ASSESSMENT DOCD: CPT | Mod: HCNC,CPTII,S$GLB, | Performed by: NURSE PRACTITIONER

## 2020-10-26 PROCEDURE — 3008F PR BODY MASS INDEX (BMI) DOCUMENTED: ICD-10-PCS | Mod: HCNC,CPTII,S$GLB, | Performed by: NURSE PRACTITIONER

## 2020-10-26 PROCEDURE — 3288F PR FALLS RISK ASSESSMENT DOCUMENTED: ICD-10-PCS | Mod: HCNC,CPTII,S$GLB, | Performed by: NURSE PRACTITIONER

## 2020-10-26 PROCEDURE — 3074F SYST BP LT 130 MM HG: CPT | Mod: HCNC,CPTII,S$GLB, | Performed by: NURSE PRACTITIONER

## 2020-10-26 PROCEDURE — 1100F PTFALLS ASSESS-DOCD GE2>/YR: CPT | Mod: HCNC,CPTII,S$GLB, | Performed by: NURSE PRACTITIONER

## 2020-10-26 PROCEDURE — 3078F PR MOST RECENT DIASTOLIC BLOOD PRESSURE < 80 MM HG: ICD-10-PCS | Mod: HCNC,CPTII,S$GLB, | Performed by: NURSE PRACTITIONER

## 2020-10-26 PROCEDURE — 3074F PR MOST RECENT SYSTOLIC BLOOD PRESSURE < 130 MM HG: ICD-10-PCS | Mod: HCNC,CPTII,S$GLB, | Performed by: NURSE PRACTITIONER

## 2020-10-26 PROCEDURE — 99214 OFFICE O/P EST MOD 30 MIN: CPT | Mod: HCNC,S$GLB,, | Performed by: NURSE PRACTITIONER

## 2020-10-26 PROCEDURE — 99999 PR PBB SHADOW E&M-EST. PATIENT-LVL IV: CPT | Mod: PBBFAC,HCNC,, | Performed by: NURSE PRACTITIONER

## 2020-10-26 PROCEDURE — 3008F BODY MASS INDEX DOCD: CPT | Mod: HCNC,CPTII,S$GLB, | Performed by: NURSE PRACTITIONER

## 2020-10-26 PROCEDURE — 99999 PR PBB SHADOW E&M-EST. PATIENT-LVL IV: ICD-10-PCS | Mod: PBBFAC,HCNC,, | Performed by: NURSE PRACTITIONER

## 2020-10-26 PROCEDURE — 3078F DIAST BP <80 MM HG: CPT | Mod: HCNC,CPTII,S$GLB, | Performed by: NURSE PRACTITIONER

## 2020-10-26 PROCEDURE — 1100F PR PT FALLS ASSESS DOC 2+ FALLS/FALL W/INJURY/YR: ICD-10-PCS | Mod: HCNC,CPTII,S$GLB, | Performed by: NURSE PRACTITIONER

## 2020-10-26 RX ORDER — SODIUM, POTASSIUM,MAG SULFATES 17.5-3.13G
1 SOLUTION, RECONSTITUTED, ORAL ORAL DAILY
Qty: 1 KIT | Refills: 0 | Status: ON HOLD | OUTPATIENT
Start: 2020-10-26 | End: 2021-02-05 | Stop reason: CLARIF

## 2020-10-26 NOTE — PROGRESS NOTES
GASTROENTEROLOGY CLINIC NOTE    Chief Complaint: The encounter diagnosis was Chronic constipation.  Referring provider/PCP: Eliza Alva MD    HPI:  Chelsea Rodriguez is a 65 y.o. female who is a new patient to me with a PMH that is significant for HTN, HLD, h/o breast cancer, and h/o colon polyps.  She is here today to establish care for constipation.  This is not a new problem.  Patient states she has been suffering with constipation for years but has worsened recently over the last three months.  She reports having 1-2 BM per week that are hard in consistency and are small in amount.  She does not feel as though she has complete emptying of her bowels with the BM.  She does endorse straining along with RLQ abdominal pain that is relieved with BMs.  She denies melena, hematochezia, or unexplained weight loss.  She has tried enemas, dulcolax, stool softeners, mirilax, and magnesium citrate which have not helped fully relieve her symptoms.     Prior Endoscopy: None  Prior Colonoscopy: 2017  Findings: Multiple small and large-mouthed diverticula were found in the sigmoid colon. The sigmoid colon was moderately tortuous. A 5 mm polyp was found in the ascending colon. The polyp was sessile. The polyp was removed with a piecemeal technique using a cold biopsy forceps. Resection and retrieval were complete. A diffuse area of mild melanosis was found in the entire colon.  Internal hemorrhoids were found during retroflexion. The hemorrhoids were Grade I (internal hemorrhoids that do not prolapse). The exam was otherwise without abnormality.     Impression:   - Diverticulosis in the sigmoid colon.                         - Tortuous colon.                         - One 5 mm polyp in the ascending colon, removed piecemeal using a cold biopsy forceps. Resected and retrieved.                         - Melanosis in the colon.                         - Internal hemorrhoids.                         - The examination was  otherwise normal.     Recommendation:       - Discharge patient to home.                         - Resume previous diet.                         - Continue present medications.                         - Await pathology results.                         - Repeat colonoscopy in 4 years for surveillance.    FINAL PATHOLOGIC DIAGNOSIS  Right colon polyp, biopsy:  -Tubular adenoma  -Negative for high-grade dysplasia or malignancy  -Melanosis coli also present    Family h/o Colon Cancer: No  Family h/o Crohn's Disease or Ulcerative Colitis: No  Abdominal Surgeries: Hernia Repair,     GI ROS:  Reflux: No  Dysphagia: No   Constipation: Yes  Diarrhea: No  Rectal bleeding/Melena/hematemesis: No  NSAIDs: No  Anticoagulation or Antiplatelet: ASA      Review of Systems   Constitutional: Negative for weight loss.   HENT: Negative for sore throat.    Eyes: Negative for blurred vision.   Respiratory: Negative for cough.    Cardiovascular: Negative for chest pain.   Gastrointestinal: Positive for constipation. Negative for abdominal pain, blood in stool, diarrhea, heartburn, melena, nausea and vomiting.   Genitourinary: Negative for dysuria.   Musculoskeletal: Negative for myalgias.   Skin: Negative for rash.   Neurological: Negative for headaches.   Endo/Heme/Allergies: Negative for environmental allergies.   Psychiatric/Behavioral: Negative for suicidal ideas. The patient is not nervous/anxious.        Past Medical History: has a past medical history of Arthritis, Breast cancer, Cataract, Hyperlipidemia, and Hypertension.    Past Surgical History: has a past surgical history that includes supracervical abdominal hysterectomy (); Bilateral salpingoophorectomy (); Colonoscopy (N/A, 10/20/2017); Intraocular prosthesis insertion (Left, 2018); Phacoemulsification of cataract (Left, 2018); Hernia repair; Cyst Removal;  section; Phacoemulsification of cataract (Right, 2018); Intraocular prosthesis  insertion (Right, 11/20/2018); Breast lumpectomy (Left, 2010); Breast biopsy (Left, 2010); Breast biopsy (Left, 2008); Hysterectomy; Oophorectomy; Cataract extraction w/  intraocular lens implant (Right, 11/06/2018); and Cataract extraction w/  intraocular lens implant (Left, 11/20/2018).    Family History:family history includes Breast cancer in her paternal cousin; Cancer in her father; Cataracts in her mother; Diabetes in her mother; Heart disease in her mother; Hyperlipidemia in her mother; Hypertension in her mother; Macular degeneration in her mother; Pancreatitis in her mother.    Allergies:   Review of patient's allergies indicates:   Allergen Reactions    Codeine Nausea And Vomiting       Social History: reports that she has never smoked. She has never used smokeless tobacco. She reports current alcohol use. She reports that she does not use drugs.    Home medications:   Current Outpatient Medications on File Prior to Visit   Medication Sig Dispense Refill    aspirin (ECOTRIN) 81 MG EC tablet once a day      co-enzyme Q-10 30 mg capsule Take 100 mg by mouth 3 (three) times daily.       ergocalciferol (ERGOCALCIFEROL) 50,000 unit Cap Take 1 capsule (50,000 Units total) by mouth every 7 days. 12 capsule 0    hydroCHLOROthiazide (MICROZIDE) 12.5 mg capsule Take 1 capsule (12.5 mg total) by mouth once daily. 30 capsule 2    irbesartan (AVAPRO) 150 MG tablet TAKE 1 TABLET(150 MG) BY MOUTH EVERY EVENING 90 tablet 0    metoprolol succinate (TOPROL-XL) 25 MG 24 hr tablet TAKE 1 TABLET BY MOUTH TWICE DAILY pt taking once a day 180 tablet 0    pravastatin (PRAVACHOL) 40 MG tablet TAKE 1 TABLET(40 MG) BY MOUTH EVERY DAY 90 tablet 0     No current facility-administered medications on file prior to visit.        Vital signs:  /76   Pulse 75   Wt 69.9 kg (154 lb)   LMP  (LMP Unknown)   BMI 32.19 kg/m²     Physical Exam  Vitals signs and nursing note reviewed.   Constitutional:       General: She is not  in acute distress.     Appearance: Normal appearance. She is not ill-appearing.   Cardiovascular:      Rate and Rhythm: Normal rate and regular rhythm.      Heart sounds: Normal heart sounds. No murmur.   Pulmonary:      Effort: Pulmonary effort is normal. No respiratory distress.      Breath sounds: Normal breath sounds.   Chest:      Chest wall: No tenderness.   Abdominal:      General: Bowel sounds are normal. There is no distension.      Palpations: Abdomen is soft.      Tenderness: There is no abdominal tenderness. Negative signs include Cochran's sign.      Hernia: No hernia is present.   Skin:     General: Skin is warm.   Neurological:      Mental Status: She is alert and oriented to person, place, and time.   Psychiatric:         Mood and Affect: Mood normal.         Behavior: Behavior normal.         Routine labs:  No results found for: WBC, HGB, HCT, MCV, PLT  No results found for: INR  No results found for: IRON, FERRITIN, TIBC, FESATURATED  Lab Results   Component Value Date     05/29/2020    K 4.1 05/29/2020     05/29/2020    CO2 28 05/29/2020    BUN 16 05/29/2020    CREATININE 0.7 05/29/2020     Lab Results   Component Value Date    ALBUMIN 3.9 05/29/2020    ALT 16 05/29/2020    AST 21 05/29/2020    ALKPHOS 107 05/29/2020    BILITOT 0.3 05/29/2020     No results found for: GLUCOSE  Lab Results   Component Value Date    TSH 1.212 07/19/2018     Lab Results   Component Value Date    CALCIUM 9.4 05/29/2020    PHOS 2.9 10/12/2017       Imaging:      I have reviewed prior labs, imaging, and notes.      Assessment:  1. Chronic idiopathic constipation    2. Chronic constipation Chronic   3. Screening for colon cancer    4. History of colon polyps    5. Pre-procedure lab exam        Plan:  Orders Placed This Encounter    COVID-19 Routine Screening    linaCLOtide (LINZESS) 145 mcg Cap capsule    sodium,potassium,mag sulfates (SUPREP BOWEL PREP KIT) 17.5-3.13-1.6 gram SolR    Case request GI:  COLONOSCOPY     Increase water intake  Linzess 145mcg daily  Colonoscopy for h/o colon polyps       Follow Up: 6 weeks      Discussed plan of care with patient who is in agreement and verbalized understanding.       RYAN Mccracken,FNP-BC  [facility]

## 2020-10-26 NOTE — LETTER
October 26, 2020      Eliza Alva MD  [address] [facility]  [place] [id number]           [facility]  [address], [address]  [place] [address]  Phone: [phone]          Patient: Chelsea Rodriguez   MR Number: [id number]   YOB: 1955   Date of Visit: 10/26/2020       Dear Dr. Eliza Alva:    Thank you for referring Chelsea Rodriguez to me for evaluation. Attached you will find relevant portions of my assessment and plan of care.    If you have questions, please do not hesitate to call me. I look forward to following Chelsea Rodriguez along with you.    Sincerely,    Sienna Yoder, NP    Enclosure  CC:  No Recipients    If you would like to receive this communication electronically, please contact [email or web address] or [phone] to request more information on Getting-in Link access.    For providers and/or their staff who would like to refer a patient to [facility], please contact us through our one-stop-shop provider referral line, [facility], at [phone].    If you feel you have received this communication in error or would no longer like to receive these types of communications, please e-mail [email or web address]

## 2020-10-26 NOTE — PATIENT INSTRUCTIONS
SUPREP Instructions    [facility] [address]  Clinic Office [phone]  Endoscopy Lab [phone]    You are scheduled for a Colonoscopy with Dr. Malcolm Reyes on 02/05/2021 at [facility].  You will check in at the Information desk on the first floor of the hospital. Please contact the office to reschedule if needed at     Do not follow instructions listed in the box.     A responsible adult (family member or friend) must come with you and transport you home.  You are not allowed to drive, take a taxi/ride share or bus or leave the Endoscopy Center alone.  If you do not have a responsible adult with you to take you home, your exam will be cancelled.      Please follow instructions of  if you are taking anticoagulant/blood thinning medications such as Aggrenox, Brilinta, Effient, Eliquis, Lovenox, Plavix, Pletal, Pradaxa, Ticilid, Xarelto or Coumadin.      Please skip your morning dose of insulin or other oral medications for diabetes the morning of the procedure unless instructed otherwise by your doctor. You should take your blood pressure, heart, anti-rejection and or seizure medication the morning of your procedure.     To ensure that your test is accurate and complete, you must follow these instructions - please do not use the instructions provided from the pharmacy.     For your safety and the safety of the providers and staff, You will be required to have a screening Covid test 72 hours before procedure.    Do not follow instructions listed in the box.      The Day Before The Procedure:     Follow a CLEAR LIQUID DIET for the entire day before your scheduled colonoscopy.  This means no solid food the entire day.   A clear liquid diet includes the following:  o Water, Coffee or decaffeinated coffee (no milk or cream)  o Tea, Herbal tea  o Carbonated beverages (soft drinks), regular and sugar free  o Gelatin  o Apple Juice, white grape  juice, white cranberry juice  o Gatorade, Power Aid, Crystal Light, Justin Aid (Yellow, Green, Clear)  o Lemonade and Limeade  o Bouillon, clear consomme'  o Snowball, popsicles  (Yellow, Green, Clear)    DO NOT DRINK ANY LIQUIDS CONTAINING RED DYE  DO NOT DRINK ANY LIQUIDS NOT SPECIFICALLY LISTED  DO NOT DRINK ALCOHOL     At 5 pm the evening before your colonoscopy:  o Pour one (1) bottle of SUPREP into the container provided in the box. Add water to the line on the container and mix well.  Drink the whole container and then drink 2 more containers of water over 1 hour.  - This is sometimes easier to drink if this solution is cold, so you can mix the solution 20 minutes ahead of time and place in the refrigerator prior to drinking.  You have to drink the solution within 30-45 minutes of mixing it.  Do NOT put this solution over ice.  It IS ok to drink with a straw.    The Day of the Procedure - The Endoscopy department will call you 2 days prior to your procedure to give you the exact time     5 hours prior to your ARRIVAL TIME (this may be in the middle of the night)  o Pour the 2nd bottle of SUPREP into the container provided in the box.  Add water to the line on the container and mix well.  Drink the whole container and then drink 2 more containers of water over 1 hour.    o AFTER YOU HAVE COMPLETED YOUR PREP, YOU MAY HAVE NOTHING ELSE BY MOUTH    o Please leave all valuables and jewelry at home.    o At 600 am, you may take the last dose of any medications you are allowed to take with a small sip of water (blood pressure, heart, anti-rejection and or seizure medication).  If you use inhalers bring them with you.    o You may call the Endoscopy department at [phone] with any questions regarding your procedure.

## 2020-11-18 ENCOUNTER — TELEPHONE (OUTPATIENT)
Dept: FAMILY MEDICINE | Facility: CLINIC | Age: 65
End: 2020-11-18

## 2020-11-18 NOTE — TELEPHONE ENCOUNTER
----- Message from Pernell Mojica sent at 11/18/2020  3:24 PM CST -----  Type:  Needs Medical Advice    Who Called: Chelsea  Symptoms (please be specific):  pt is calling to inform the nurse that the linaCLOtide (LINZESS) 145 mcg Cap capsule did not help, she says the only thing it did was bind her up   How long has patient had these symptoms:  unknown  Pharmacy name and phone #:  n/a  Would the patient rather a call back or a response via [facility]? Call back  Best Call Back Number: [phone]  Additional Information: none

## 2020-11-21 ENCOUNTER — LAB VISIT (OUTPATIENT)
Dept: LAB | Facility: HOSPITAL | Age: 65
End: 2020-11-21
Attending: INTERNAL MEDICINE
Payer: MEDICARE

## 2020-11-21 DIAGNOSIS — I10 ESSENTIAL HYPERTENSION: ICD-10-CM

## 2020-11-21 LAB
ANION GAP SERPL CALC-SCNC: 9 MMOL/L (ref 8–16)
BUN SERPL-MCNC: 16 MG/DL (ref 8–23)
CALCIUM SERPL-MCNC: 9.6 MG/DL (ref 8.7–10.5)
CHLORIDE SERPL-SCNC: 102 MMOL/L (ref 95–110)
CO2 SERPL-SCNC: 29 MMOL/L (ref 23–29)
CREAT SERPL-MCNC: 0.8 MG/DL (ref 0.5–1.4)
EST. GFR  (AFRICAN AMERICAN): >60 ML/MIN/1.73 M^2
EST. GFR  (NON AFRICAN AMERICAN): >60 ML/MIN/1.73 M^2
GLUCOSE SERPL-MCNC: 102 MG/DL (ref 70–110)
POTASSIUM SERPL-SCNC: 4.1 MMOL/L (ref 3.5–5.1)
SODIUM SERPL-SCNC: 140 MMOL/L (ref 136–145)

## 2020-11-21 PROCEDURE — 36415 COLL VENOUS BLD VENIPUNCTURE: CPT | Mod: HCNC,PO

## 2020-11-21 PROCEDURE — 80048 BASIC METABOLIC PNL TOTAL CA: CPT | Mod: HCNC

## 2020-11-23 ENCOUNTER — OFFICE VISIT (OUTPATIENT)
Dept: INTERNAL MEDICINE | Facility: CLINIC | Age: 65
End: 2020-11-23
Payer: MEDICARE

## 2020-11-23 ENCOUNTER — TELEPHONE (OUTPATIENT)
Dept: GASTROENTEROLOGY | Facility: CLINIC | Age: 65
End: 2020-11-23

## 2020-11-23 VITALS
HEART RATE: 87 BPM | SYSTOLIC BLOOD PRESSURE: 124 MMHG | HEIGHT: 58 IN | BODY MASS INDEX: 33.58 KG/M2 | OXYGEN SATURATION: 98 % | DIASTOLIC BLOOD PRESSURE: 65 MMHG | TEMPERATURE: 98 F | WEIGHT: 160 LBS

## 2020-11-23 DIAGNOSIS — I10 ESSENTIAL HYPERTENSION: Primary | ICD-10-CM

## 2020-11-23 DIAGNOSIS — R60.0 BILATERAL LOWER EXTREMITY EDEMA: ICD-10-CM

## 2020-11-23 DIAGNOSIS — E78.5 HYPERLIPIDEMIA, UNSPECIFIED HYPERLIPIDEMIA TYPE: ICD-10-CM

## 2020-11-23 DIAGNOSIS — E66.9 OBESITY (BMI 30-39.9): ICD-10-CM

## 2020-11-23 DIAGNOSIS — Z00.00 ANNUAL PHYSICAL EXAM: ICD-10-CM

## 2020-11-23 DIAGNOSIS — K59.09 CHRONIC CONSTIPATION: ICD-10-CM

## 2020-11-23 PROCEDURE — 3074F PR MOST RECENT SYSTOLIC BLOOD PRESSURE < 130 MM HG: ICD-10-PCS | Mod: HCNC,CPTII,S$GLB, | Performed by: INTERNAL MEDICINE

## 2020-11-23 PROCEDURE — 99214 PR OFFICE/OUTPT VISIT, EST, LEVL IV, 30-39 MIN: ICD-10-PCS | Mod: 25,HCNC,S$GLB, | Performed by: INTERNAL MEDICINE

## 2020-11-23 PROCEDURE — 1126F AMNT PAIN NOTED NONE PRSNT: CPT | Mod: HCNC,S$GLB,, | Performed by: INTERNAL MEDICINE

## 2020-11-23 PROCEDURE — 99999 PR PBB SHADOW E&M-EST. PATIENT-LVL IV: ICD-10-PCS | Mod: PBBFAC,HCNC,, | Performed by: INTERNAL MEDICINE

## 2020-11-23 PROCEDURE — 3288F FALL RISK ASSESSMENT DOCD: CPT | Mod: HCNC,CPTII,S$GLB, | Performed by: INTERNAL MEDICINE

## 2020-11-23 PROCEDURE — 3078F DIAST BP <80 MM HG: CPT | Mod: HCNC,CPTII,S$GLB, | Performed by: INTERNAL MEDICINE

## 2020-11-23 PROCEDURE — 3078F PR MOST RECENT DIASTOLIC BLOOD PRESSURE < 80 MM HG: ICD-10-PCS | Mod: HCNC,CPTII,S$GLB, | Performed by: INTERNAL MEDICINE

## 2020-11-23 PROCEDURE — 99214 OFFICE O/P EST MOD 30 MIN: CPT | Mod: 25,HCNC,S$GLB, | Performed by: INTERNAL MEDICINE

## 2020-11-23 PROCEDURE — 1157F PR ADVANCE CARE PLAN OR EQUIV PRESENT IN MEDICAL RECORD: ICD-10-PCS | Mod: HCNC,S$GLB,, | Performed by: INTERNAL MEDICINE

## 2020-11-23 PROCEDURE — 1126F PR PAIN SEVERITY QUANTIFIED, NO PAIN PRESENT: ICD-10-PCS | Mod: HCNC,S$GLB,, | Performed by: INTERNAL MEDICINE

## 2020-11-23 PROCEDURE — 3008F PR BODY MASS INDEX (BMI) DOCUMENTED: ICD-10-PCS | Mod: HCNC,CPTII,S$GLB, | Performed by: INTERNAL MEDICINE

## 2020-11-23 PROCEDURE — 3288F PR FALLS RISK ASSESSMENT DOCUMENTED: ICD-10-PCS | Mod: HCNC,CPTII,S$GLB, | Performed by: INTERNAL MEDICINE

## 2020-11-23 PROCEDURE — 99999 PR PBB SHADOW E&M-EST. PATIENT-LVL IV: CPT | Mod: PBBFAC,HCNC,, | Performed by: INTERNAL MEDICINE

## 2020-11-23 PROCEDURE — 1157F ADVNC CARE PLAN IN RCRD: CPT | Mod: HCNC,S$GLB,, | Performed by: INTERNAL MEDICINE

## 2020-11-23 PROCEDURE — 1101F PR PT FALLS ASSESS DOC 0-1 FALLS W/OUT INJ PAST YR: ICD-10-PCS | Mod: HCNC,CPTII,S$GLB, | Performed by: INTERNAL MEDICINE

## 2020-11-23 PROCEDURE — 3008F BODY MASS INDEX DOCD: CPT | Mod: HCNC,CPTII,S$GLB, | Performed by: INTERNAL MEDICINE

## 2020-11-23 PROCEDURE — 3074F SYST BP LT 130 MM HG: CPT | Mod: HCNC,CPTII,S$GLB, | Performed by: INTERNAL MEDICINE

## 2020-11-23 PROCEDURE — 1101F PT FALLS ASSESS-DOCD LE1/YR: CPT | Mod: HCNC,CPTII,S$GLB, | Performed by: INTERNAL MEDICINE

## 2020-11-23 NOTE — TELEPHONE ENCOUNTER
----- Message from Eliza Alva MD sent at 11/23/2020  3:57 PM CST -----  Hi Mrs. Yoder  I saw our mutual patient today and she wasn't sure when she needs to follow up with you. Your notes says f/u in 6 weeks but no appointment was made. Can you please reach out to her and clarify when you need to see her back? She would also like to know what physician is doing her scope.     Thanks  Dr. Alva

## 2020-11-23 NOTE — TELEPHONE ENCOUNTER
Spoke with patient. Scheduled an appointment for 12/14/2020 @9:20AM with Dr. El. Verbal understanding.

## 2020-11-23 NOTE — PROGRESS NOTES
Patient ID: Chelsea Rodriguez is a 65 y.o. female.    Chief Complaint: Hypertension (1 month )    Hypertension     Chelsea is a 65 y.o. female with HTN, HLD, arthritis, lower extremity edema and colon polyps who presents for follow-up of hypertension. She has been compliant with her antihypertensive medications which include metoprolol, hydrochlorothiazide, and irbesartan.  Hydrochlorothiazide was started at last visit.  Patient reports no problems with the hydrochlorothiazide medication.  She does not think it has made much of a difference regarding her lower extremity edema.  But she continues to wear compression stockings and elevate her legs which do help some.  At last visit, she was also referred to gastroenterology for issue of chronic constipation.  She is unsure when they want to see her back.  They do plan to do colonoscopy in February.    Review of Systems   All other systems reviewed and are negative.        Objective:     Vitals:    11/23/20 1559   BP: 124/65   Pulse:    Temp:         Physical Exam  Vitals signs reviewed.   Constitutional:       General: She is not in acute distress.     Appearance: Normal appearance. She is well-developed. She is not ill-appearing, toxic-appearing or diaphoretic.   HENT:      Head: Normocephalic and atraumatic.      Right Ear: External ear normal.      Left Ear: External ear normal.      Nose: Nose normal.   Eyes:      General: No scleral icterus.        Right eye: No discharge.         Left eye: No discharge.      Extraocular Movements: Extraocular movements intact.      Conjunctiva/sclera: Conjunctivae normal.   Cardiovascular:      Rate and Rhythm: Normal rate and regular rhythm.      Heart sounds: Normal heart sounds. No murmur. No friction rub. No gallop.    Pulmonary:      Effort: Pulmonary effort is normal. No respiratory distress.      Breath sounds: Normal breath sounds. No stridor. No wheezing, rhonchi or rales.   Skin:     General: Skin is warm and dry.    Neurological:      General: No focal deficit present.      Mental Status: She is alert and oriented to person, place, and time. Mental status is at baseline.   Psychiatric:         Mood and Affect: Mood normal.         Behavior: Behavior normal.         Thought Content: Thought content normal.         Judgment: Judgment normal.         Assessment:       1. Essential hypertension Well controlled   2. Hyperlipidemia, unspecified hyperlipidemia type Active   3. Obesity (BMI 30-39.9) Active   4. Chronic constipation Chronic   5. Bilateral lower extremity edema Chronic   6. Annual physical exam        Plan:         Essential hypertension  Comments:  Continue current medications    Hyperlipidemia, unspecified hyperlipidemia type  -     Lipid Panel; Future; Expected date: 05/23/2021    Obesity (BMI 30-39.9)  -     CBC Auto Differential; Future; Expected date: 05/23/2021    Chronic constipation  Comments:  Continue current medication.  Will message GI to see when she needs to return to them. Keep C scope appt for Feb    Bilateral lower extremity edema  Comments:  Continue hydrochlorothiazide, elevation of legs as needed, compression stockings.    Annual physical exam  -     Comprehensive Metabolic Panel; Future; Expected date: 05/23/2021        RTC 6 months, f/u HTN    Warning signs discussed, patient to call with any further issues or worsening of symptoms.       Parts of the above note were dictated using a voice dictation software. Please excuse any grammatical or typographical errors.

## 2020-11-30 DIAGNOSIS — M17.0 PRIMARY OSTEOARTHRITIS OF BOTH KNEES: Primary | ICD-10-CM

## 2020-12-01 ENCOUNTER — HOSPITAL ENCOUNTER (OUTPATIENT)
Dept: RADIOLOGY | Facility: HOSPITAL | Age: 65
Discharge: HOME | End: 2020-12-01
Attending: ORTHOPAEDIC SURGERY
Payer: MEDICARE

## 2020-12-01 ENCOUNTER — OFFICE VISIT (OUTPATIENT)
Dept: ORTHOPEDICS | Facility: CLINIC | Age: 65
End: 2020-12-01
Payer: MEDICARE

## 2020-12-01 VITALS — HEIGHT: 59 IN | BODY MASS INDEX: 32.31 KG/M2 | WEIGHT: 160.25 LBS

## 2020-12-01 DIAGNOSIS — M17.0 PRIMARY OSTEOARTHRITIS OF BOTH KNEES: ICD-10-CM

## 2020-12-01 DIAGNOSIS — M17.0 PRIMARY OSTEOARTHRITIS OF BOTH KNEES: Primary | ICD-10-CM

## 2020-12-01 PROCEDURE — 20610 LARGE JOINT ASPIRATION/INJECTION: BILATERAL KNEE: ICD-10-PCS | Mod: 50,HCNC,S$GLB, | Performed by: ORTHOPAEDIC SURGERY

## 2020-12-01 PROCEDURE — 73564 X-RAY EXAM KNEE 4 OR MORE: CPT | Mod: 26,50,HCNC, | Performed by: RADIOLOGY

## 2020-12-01 PROCEDURE — 3008F PR BODY MASS INDEX (BMI) DOCUMENTED: ICD-10-PCS | Mod: HCNC,CPTII,S$GLB, | Performed by: ORTHOPAEDIC SURGERY

## 2020-12-01 PROCEDURE — 99999 PR PBB SHADOW E&M-EST. PATIENT-LVL III: CPT | Mod: PBBFAC,HCNC,, | Performed by: ORTHOPAEDIC SURGERY

## 2020-12-01 PROCEDURE — 99214 OFFICE O/P EST MOD 30 MIN: CPT | Mod: 25,HCNC,S$GLB, | Performed by: ORTHOPAEDIC SURGERY

## 2020-12-01 PROCEDURE — 1125F AMNT PAIN NOTED PAIN PRSNT: CPT | Mod: HCNC,S$GLB,, | Performed by: ORTHOPAEDIC SURGERY

## 2020-12-01 PROCEDURE — 99214 PR OFFICE/OUTPT VISIT, EST, LEVL IV, 30-39 MIN: ICD-10-PCS | Mod: 25,HCNC,S$GLB, | Performed by: ORTHOPAEDIC SURGERY

## 2020-12-01 PROCEDURE — 99999 PR PBB SHADOW E&M-EST. PATIENT-LVL III: ICD-10-PCS | Mod: PBBFAC,HCNC,, | Performed by: ORTHOPAEDIC SURGERY

## 2020-12-01 PROCEDURE — 1157F PR ADVANCE CARE PLAN OR EQUIV PRESENT IN MEDICAL RECORD: ICD-10-PCS | Mod: HCNC,S$GLB,, | Performed by: ORTHOPAEDIC SURGERY

## 2020-12-01 PROCEDURE — 1125F PR PAIN SEVERITY QUANTIFIED, PAIN PRESENT: ICD-10-PCS | Mod: HCNC,S$GLB,, | Performed by: ORTHOPAEDIC SURGERY

## 2020-12-01 PROCEDURE — 73564 X-RAY EXAM KNEE 4 OR MORE: CPT | Mod: TC,50,HCNC

## 2020-12-01 PROCEDURE — 73564 XR KNEE ORTHO BILAT WITH FLEXION: ICD-10-PCS | Mod: 26,50,HCNC, | Performed by: RADIOLOGY

## 2020-12-01 PROCEDURE — 20610 DRAIN/INJ JOINT/BURSA W/O US: CPT | Mod: 50,HCNC,S$GLB, | Performed by: ORTHOPAEDIC SURGERY

## 2020-12-01 PROCEDURE — 3008F BODY MASS INDEX DOCD: CPT | Mod: HCNC,CPTII,S$GLB, | Performed by: ORTHOPAEDIC SURGERY

## 2020-12-01 PROCEDURE — 1157F ADVNC CARE PLAN IN RCRD: CPT | Mod: HCNC,S$GLB,, | Performed by: ORTHOPAEDIC SURGERY

## 2020-12-01 RX ADMIN — TRIAMCINOLONE ACETONIDE 40 MG: 40 INJECTION, SUSPENSION INTRA-ARTICULAR; INTRAMUSCULAR at 01:12

## 2020-12-01 NOTE — PROGRESS NOTES
Subjective:     HPI:   Chelsea Rodriguez is a 65 y.o. female who presents for evaluation of bilateral knee arthritis.  Right sides about equal to the left.    She has seen Dr. Barrera in the past diagnosed with bilateral knee arthritis.  He has been offered a knee replacement.  He has done several rounds of injections.  She is thinking about surgery sometime in March or April.    She complains of moderate to severe global predominantly anterior knee pain activity related and relieved rest.  Denies any groin anterior thigh radicular pain or paresthesias.  Pain is got progressively worse over the past few months    Medications: Meloxicam in past, nothing now was not helping, worried about side effects    Injections:  CSI with Dr. Barrera, less than 1 month of relief, 2-3 rounds of CSI B knees diminishing returns    Physical Therapy: ordered by Holly May 2019    Walkin-5 blocks     Assistive Devices: Cane (as of recently of 2020). The patient purchased a knee brace online and it helps for the first part of the day    Limitations: general walking, going up/down steps      Occupation: Retired - the patient retired from the [employer]. 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     Social support: The patient's daughter lives at home with the patient and would be able to help her if she were to have surgery.     ROS:  The updated medical history is in the chart and has been reviewed. A review of systems is updated and there is no reported vision changes, ear/nose/mouth/throat complaints,  chest pain, shortness of breath, abdominal pain, urological complaints, fevers or chills, psychiatric complaints. Musculoskeletal and neurologcial symptoms are as documented. All other systems are negative.      Objective:   Exam:  There were no vitals filed for this visit.  Body mass index is 32.37 kg/m².    Physical examination included assessment of the patient's general  appearance with particular attention to development, nutrition, body habitus, attention to grooming, and any evidence of distress.  Constitutional: The patient is a well-developed, well-nourished patient in no acute distress.   Cardiovascular: Vascular examination included warmth and capillary refill as well inspection for edema and assessment of pedal pulses. Pulses are palpable and regular.  Musculoskeletal: Gait was assessed as to whether it was steady, non-antalgic, and/or required the use of an assist device. The patient was also asked to walk independently and get onto the examination table.  Skin: The skin was examined for any obvious rashes or lesions in the extremity.  Neurologic: Sensation is intact to light touch in the extremity. The patient has good coordination without hyperreflexia and is alert and oriented to person, place and time and has normal mood and affect.     All of the above were examined and found to be within normal limits except for the following pertinent clinical findings:    Antalgic gait with a limp.  Left knee 0-100 right knee 0-110 degree knee range of motion 5° varus alignment bilaterally.  She is tender palpation medial lateral patellofemoral joint line left side more than right.  Mild to moderate effusions knees are stable anterior-posterior varus and valgus stresses no flexion contracture or extensor lag      Imaging:  Indication:  Right knee pain  Exam Ordered: Radiographs of the right knee include a standing anteroposterior view, a standing posterioanterior view, a lateral view in full flexion, and a sunrise view  Details of Examination: Todays exam shows evidence of joint space narrowing, osteophyte formation, and subchondral sclerosis, all consistent with degenerative arthritis of the knee.  No other significant findings are noted.  Impression:  Degenerative Arthritis, Right Knee    Indication:  Left knee pain  Exam Ordered: Radiographs of the left knee include a standing  anteroposterior view, a standing posterioanterior view, a lateral view in full flexion, and a sunrise view  Details of Examination: Todays exam shows evidence of joint space narrowing, osteophyte formation, and subchondral sclerosis, all consistent with degenerative arthritis of the knee.  No other significant findings are noted.  Impression:  Degenerative Arthritis, Left Knee    Bilateral knee grade 4 arthritis varus      Assessment:       ICD-10-CM ICD-9-CM   1. 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  Rheumatologic workup was negative.  Breast cancer    Obed is Witness.  Says no red blood cells but albumin is okay but is going to do some more research     Brother is Dr Olsen patient, Stu Garza     Plan:       This patient has significant symptoms in their knee that are affecting their quality of life and daily activities.  They have tried non-operative treatment including analgesics, an exercise program, and activity modification, but the symptoms have persisted. I believe they make a good candidate for knee arthroplasty.     The risks and benefits of knee arthroplasty have been discussed with the patient which include, but are not limited to infections (including severe sequelae), potential component failure, fracture, DVT, pulmonary embolus, nerve palsy, poor range of motion, the possibility of bone grafting, persistent pain, wound healing complications, transfusions, medical complications and death.     We discussed risks and benefits for knee replacement.  Given the fact she is a [employer] would only do unilateral knee replacement staged.    In the meantime she would like bilateral knee steroid injections.  Will tried knee home exercise conditioning program.  Information sheet about non operative arthritis management.    Macro NSAID education    She is thinking about knee replacements in March or April.  We would use [facility].    She is getting  a colonoscopy in February.             Past Medical History:   Diagnosis Date    Arthritis     Breast cancer     left breast    Cataract     Colon polyp     Hyperlipidemia     Hypertension     Lower extremity edema        Past Surgical History:   Procedure Laterality Date    BILATERAL SALPINGOOPHORECTOMY  2000    BREAST BIOPSY Left 2010    malignant    BREAST BIOPSY Left 2008    negative    BREAST LUMPECTOMY Left     CATARACT EXTRACTION W/  INTRAOCULAR LENS IMPLANT Right 2018    Dr. Oneil    CATARACT EXTRACTION W/  INTRAOCULAR LENS IMPLANT Left 2018    Dr. Oneil     SECTION      x2    COLONOSCOPY N/A 10/20/2017    Procedure: COLONOSCOPY;  Surgeon: Mitchell Danielson Jr., MD;  Location: [facility] ENDO;  Service: Endoscopy;  Laterality: N/A;    CYST REMOVAL      on back    HERNIA REPAIR      HYSTERECTOMY      at 25 yrs old    INTRAOCULAR PROSTHESES INSERTION Left 2018    Procedure: INSERTION, IOL PROSTHESIS;  Surgeon: Sergio Oneil MD;  Location: [facility] OR [address] [place] [address];  Service: Ophthalmology;  Laterality: Left;    INTRAOCULAR PROSTHESES INSERTION Right 2018    Procedure: INSERTION, IOL PROSTHESIS;  Surgeon: Sergio Oneil MD;  Location: [facility] OR [address] [place] [address];  Service: Ophthalmology;  Laterality: Right;    OOPHORECTOMY      @ 45 yrs old    PHACOEMULSIFICATION OF CATARACT Left 2018    Procedure: PHACOEMULSIFICATION, CATARACT;  Surgeon: Sergio Oneil MD;  Location: [facility] OR [address] [place] [address];  Service: Ophthalmology;  Laterality: Left;    PHACOEMULSIFICATION OF CATARACT Right 2018    Procedure: PHACOEMULSIFICATION, CATARACT;  Surgeon: Sergio Oneil MD;  Location: [facility] OR [address] [place] [address];  Service: Ophthalmology;  Laterality: Right;    supracervical abdominal hysterectomy  1978    fibroids       Family History   Problem Relation Age of Onset    Hypertension Mother     Heart disease Mother     Diabetes Mother     Hyperlipidemia Mother   "   Pancreatitis Mother     Cataracts Mother     Macular degeneration Mother     Cancer Father     Breast cancer Paternal Cousin     Amblyopia Neg Hx     Blindness Neg Hx     Glaucoma Neg Hx     Strabismus Neg Hx     Retinal detachment Neg Hx        Social History     Socioeconomic History    Marital status: Single     Spouse name: Not on file    Number of children: Not on file    Years of education: Not on file    Highest education level: Not on file   Occupational History    Not on file   Social Needs    Financial resource strain: Not on file    Food insecurity     Worry: Not on file     Inability: Not on file    Transportation needs     Medical: Not on file     Non-medical: Not on file   Tobacco Use    Smoking status: Never Smoker    Smokeless tobacco: Never Used   Substance and Sexual Activity    Alcohol use: Yes     Comment: not often     Drug use: No    Sexual activity: Not Currently   Lifestyle    Physical activity     Days per week: Not on file     Minutes per session: Not on file    Stress: Not on file   Relationships    Social connections     Talks on phone: Not on file     Gets together: Not on file     Attends Latter day service: Not on file     Active member of club or organization: Not on file     Attends meetings of clubs or organizations: Not on file     Relationship status: Not on file   Other Topics Concern    Not on file   Social History Narrative    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 hx of left lumpectomy for "breast cancer"- with 5yrs of tamoxifen use. Sees Dr. Buckley ([facility] for surveillance/MMG)        Dr. Lala former PCP, [facility]                      "

## 2020-12-02 RX ORDER — TRIAMCINOLONE ACETONIDE 40 MG/ML
40 INJECTION, SUSPENSION INTRA-ARTICULAR; INTRAMUSCULAR
Status: DISCONTINUED | OUTPATIENT
Start: 2020-12-01 | End: 2020-12-02 | Stop reason: HOSPADM

## 2020-12-08 ENCOUNTER — OFFICE VISIT (OUTPATIENT)
Dept: FAMILY MEDICINE | Facility: CLINIC | Age: 65
End: 2020-12-08
Payer: MEDICARE

## 2020-12-08 VITALS
HEART RATE: 78 BPM | HEIGHT: 59 IN | BODY MASS INDEX: 32.44 KG/M2 | SYSTOLIC BLOOD PRESSURE: 120 MMHG | OXYGEN SATURATION: 97 % | TEMPERATURE: 97 F | DIASTOLIC BLOOD PRESSURE: 80 MMHG | WEIGHT: 160.94 LBS

## 2020-12-08 DIAGNOSIS — Z23 NEED FOR PROPHYLACTIC VACCINATION AGAINST STREPTOCOCCUS PNEUMONIAE (PNEUMOCOCCUS) AND INFLUENZA: ICD-10-CM

## 2020-12-08 DIAGNOSIS — Z00.00 ENCOUNTER FOR PREVENTIVE HEALTH EXAMINATION: Primary | ICD-10-CM

## 2020-12-08 DIAGNOSIS — E55.9 VITAMIN D DEFICIENCY: ICD-10-CM

## 2020-12-08 DIAGNOSIS — E78.5 HYPERLIPIDEMIA, UNSPECIFIED HYPERLIPIDEMIA TYPE: ICD-10-CM

## 2020-12-08 DIAGNOSIS — I10 ESSENTIAL HYPERTENSION: ICD-10-CM

## 2020-12-08 DIAGNOSIS — E66.9 OBESITY (BMI 30.0-34.9): ICD-10-CM

## 2020-12-08 PROBLEM — E66.811 OBESITY (BMI 30.0-34.9): Status: ACTIVE | Noted: 2020-12-08

## 2020-12-08 PROCEDURE — 99999 PR PBB SHADOW E&M-EST. PATIENT-LVL V: CPT | Mod: PBBFAC,,, | Performed by: NURSE PRACTITIONER

## 2020-12-08 PROCEDURE — 99999 PR PBB SHADOW E&M-EST. PATIENT-LVL V: ICD-10-PCS | Mod: PBBFAC,,, | Performed by: NURSE PRACTITIONER

## 2020-12-08 PROCEDURE — G0402 INITIAL PREVENTIVE EXAM: HCPCS | Mod: S$GLB,,, | Performed by: NURSE PRACTITIONER

## 2020-12-08 PROCEDURE — 90471 IMMUNIZATION ADMIN: CPT | Mod: PBBFAC,HCNC,PO

## 2020-12-08 PROCEDURE — 1157F ADVNC CARE PLAN IN RCRD: CPT | Mod: CPTII,S$GLB,, | Performed by: NURSE PRACTITIONER

## 2020-12-08 PROCEDURE — G0402 PR WELCOME MEDICARE PREVENTIVE VISIT NEW ENROLLEE: ICD-10-PCS | Mod: S$GLB,,, | Performed by: NURSE PRACTITIONER

## 2020-12-08 PROCEDURE — 1157F PR ADVANCE CARE PLAN OR EQUIV PRESENT IN MEDICAL RECORD: ICD-10-PCS | Mod: CPTII,S$GLB,, | Performed by: NURSE PRACTITIONER

## 2020-12-08 NOTE — PROGRESS NOTES
"Chelsea Rodriguez presented for a  Medicare AWV and comprehensive Health Risk Assessment today. The following components were reviewed and updated:    · Medical history  · Family History  · Social history  · Allergies and Current Medications  · Health Risk Assessment  · Health Maintenance  · Care Team         ** See Completed Assessments for Annual Wellness Visit within the encounter summary.**         The following assessments were completed:  · Living Situation  · CAGE  · Depression Screening  · Timed Get Up and Go  · Whisper Test  · Cognitive Function Screening  · Nutrition Screening  · ADL Screening  · PAQ Screening        Vitals:    12/08/20 0915   BP: 120/80   BP Location: Right arm   Patient Position: Sitting   BP Method: Medium (Manual)   Pulse: 78   Temp: 97.2 °F (36.2 °C)   SpO2: 97%   Weight: 73 kg (160 lb 15 oz)   Height: 4' 11" (1.499 m)     Body mass index is 32.51 kg/m².     Physical Exam  Constitutional:       General: She is not in acute distress.     Appearance: Normal appearance.   HENT:      Head: Normocephalic and atraumatic.   Pulmonary:      Effort: No respiratory distress.   Skin:     Coloration: Skin is not pale.   Neurological:      Mental Status: She is alert and oriented to person, place, and time.   Psychiatric:         Mood and Affect: Mood normal.         Behavior: Behavior normal.         Judgment: Judgment normal.           Diagnoses and health risks identified today and associated recommendations/orders:    1. Encounter for preventive health examination    2. Essential hypertension  Chronic; stable on medication.  Followed by PCP.    3. Hyperlipidemia, unspecified hyperlipidemia type  Chronic; stable on medication.  Followed by PCP.    4. Vitamin D deficiency  Chronic; stable on medication.  Followed by PCP.    5. Obesity (BMI 30.0-34.9)  Chronic, stable. Therapeutic lifestyle changes discussed. Followed by PCP.    6. Need for prophylactic vaccination against Streptococcus pneumoniae " (pneumococcus) and influenza  Prevnar 13 vaccine administered today in clinic.  - PNEUMOCOCCAL CONJUGATE VACCINE 13-VALENT LESS THAN 6YO & GREATER THAN 51YO IM    I offered to discuss end of life issues, including information on how to make advance directives that the patient could use to name someone who would make medical decisions on their behalf if they became too ill to make themselves.      _x__Patient declined - already done.   ___Virtual Visit, not discussed   ___Patient is interested, I provided paperwork and offered to discuss          Provided Chelsea with a 5-10 year written screening schedule and personal prevention plan. Recommendations were developed using the USPSTF age appropriate recommendations. Education, counseling, and referrals were provided as needed. After Visit Summary printed and given to patient which includes a list of additional screenings\tests needed.    Follow up for Annual Wellness Visit in 1 year.    Stephanie Martinez NP

## 2020-12-08 NOTE — PATIENT INSTRUCTIONS
Counseling and Referral of Other Preventative  (Italic type indicates deductible and co-insurance are waived)    Patient Name: Chelsea Rodriguez  Today's Date: 12/8/2020    Health Maintenance       Date Due Completion Date    Pneumococcal Vaccine (65+ Low/Medium Risk) (1 of 2 - PCV13) 10/21/2021 (Originally 1/8/2020) ---    Colorectal Cancer Screening 10/20/2021 10/20/2017    Override on 11/5/2012: Done    Pap Smear with HPV Cotest 08/21/2022 8/21/2017    Mammogram 09/10/2022 9/10/2020    Override on 11/16/2016: Done    DEXA SCAN 07/13/2023 7/13/2020    Lipid Panel 05/29/2025 5/29/2020    TETANUS VACCINE 11/03/2027 11/3/2017        Orders Placed This Encounter   Procedures    PNEUMOCOCCAL CONJUGATE VACCINE 13-VALENT LESS THAN 6YO & GREATER THAN 51YO IM     The following information is provided to all patients.  This information is to help you find resources for any of the problems found today that may be affecting your health:                Living healthy guide: www.[facility].[place].gov      Understanding Diabetes: www.diabetes.org      Eating healthy: www.cdc.gov/healthyweight      CDC home safety checklist: www.cdc.gov/steadi/patient.html      Agency on Aging: www.goea.[place].gov      Alcoholics anonymous (AA): www.aa.org      Physical Activity: www.agusto.nih.gov/aa0cfbj      Tobacco use: www.quitwithusla.org

## 2020-12-10 DIAGNOSIS — M17.11 PRIMARY OSTEOARTHRITIS OF RIGHT KNEE: Primary | ICD-10-CM

## 2020-12-14 ENCOUNTER — OFFICE VISIT (OUTPATIENT)
Dept: GASTROENTEROLOGY | Facility: CLINIC | Age: 65
End: 2020-12-14
Payer: MEDICARE

## 2020-12-14 VITALS — WEIGHT: 162.94 LBS | BODY MASS INDEX: 32.91 KG/M2

## 2020-12-14 DIAGNOSIS — R13.10 DYSPHAGIA, UNSPECIFIED TYPE: ICD-10-CM

## 2020-12-14 DIAGNOSIS — K59.04 CHRONIC IDIOPATHIC CONSTIPATION: Primary | ICD-10-CM

## 2020-12-14 PROCEDURE — 1157F PR ADVANCE CARE PLAN OR EQUIV PRESENT IN MEDICAL RECORD: ICD-10-PCS | Mod: HCNC,S$GLB,, | Performed by: NURSE PRACTITIONER

## 2020-12-14 PROCEDURE — 3288F FALL RISK ASSESSMENT DOCD: CPT | Mod: HCNC,CPTII,S$GLB, | Performed by: NURSE PRACTITIONER

## 2020-12-14 PROCEDURE — 1125F AMNT PAIN NOTED PAIN PRSNT: CPT | Mod: HCNC,S$GLB,, | Performed by: NURSE PRACTITIONER

## 2020-12-14 PROCEDURE — 1101F PR PT FALLS ASSESS DOC 0-1 FALLS W/OUT INJ PAST YR: ICD-10-PCS | Mod: HCNC,CPTII,S$GLB, | Performed by: NURSE PRACTITIONER

## 2020-12-14 PROCEDURE — 99999 PR PBB SHADOW E&M-EST. PATIENT-LVL III: CPT | Mod: PBBFAC,HCNC,, | Performed by: NURSE PRACTITIONER

## 2020-12-14 PROCEDURE — 1101F PT FALLS ASSESS-DOCD LE1/YR: CPT | Mod: HCNC,CPTII,S$GLB, | Performed by: NURSE PRACTITIONER

## 2020-12-14 PROCEDURE — 3008F BODY MASS INDEX DOCD: CPT | Mod: HCNC,CPTII,S$GLB, | Performed by: NURSE PRACTITIONER

## 2020-12-14 PROCEDURE — 99214 PR OFFICE/OUTPT VISIT, EST, LEVL IV, 30-39 MIN: ICD-10-PCS | Mod: HCNC,S$GLB,, | Performed by: NURSE PRACTITIONER

## 2020-12-14 PROCEDURE — 1157F ADVNC CARE PLAN IN RCRD: CPT | Mod: HCNC,S$GLB,, | Performed by: NURSE PRACTITIONER

## 2020-12-14 PROCEDURE — 3008F PR BODY MASS INDEX (BMI) DOCUMENTED: ICD-10-PCS | Mod: HCNC,CPTII,S$GLB, | Performed by: NURSE PRACTITIONER

## 2020-12-14 PROCEDURE — 1125F PR PAIN SEVERITY QUANTIFIED, PAIN PRESENT: ICD-10-PCS | Mod: HCNC,S$GLB,, | Performed by: NURSE PRACTITIONER

## 2020-12-14 PROCEDURE — 99999 PR PBB SHADOW E&M-EST. PATIENT-LVL III: ICD-10-PCS | Mod: PBBFAC,HCNC,, | Performed by: NURSE PRACTITIONER

## 2020-12-14 PROCEDURE — 3288F PR FALLS RISK ASSESSMENT DOCUMENTED: ICD-10-PCS | Mod: HCNC,CPTII,S$GLB, | Performed by: NURSE PRACTITIONER

## 2020-12-14 PROCEDURE — 99214 OFFICE O/P EST MOD 30 MIN: CPT | Mod: HCNC,S$GLB,, | Performed by: NURSE PRACTITIONER

## 2020-12-14 RX ORDER — CETIRIZINE HYDROCHLORIDE 5 MG/1
5 TABLET ORAL DAILY
COMMUNITY
End: 2021-03-06 | Stop reason: ALTCHOICE

## 2020-12-14 NOTE — PROGRESS NOTES
GASTROENTEROLOGY CLINIC NOTE    Chief Complaint: The primary encounter diagnosis was Chronic idiopathic constipation. A diagnosis of Dysphagia, unspecified type was also pertinent to this visit.  Referring provider/PCP: Eliza Alva MD    HPI:  Chelsea Rodriguez is a 65 y.o. female who is a new patient to me with a PMH that is significant for HTN, HLD, h/o breast cancer, and h/o colon polyps.  She is here today to establish care for constipation.  This is not a new problem.  Patient states she has been suffering with constipation for years but has worsened recently over the last three months.  She reports having 1-2 BM per week that are hard in consistency and are small in amount.  She does not feel as though she has complete emptying of her bowels with the BM.  She does endorse straining along with RLQ abdominal pain that is relieved with BMs.  She denies melena, hematochezia, or unexplained weight loss.  She has tried enemas, dulcolax, stool softeners, mirilax, and magnesium citrate which have not helped fully relieve her symptoms.     Interval Note 12/14/2020  Ms. Rodriguez is a known patient to me.  She presents today for a follow up visit regarding constipation. At her last appointment, she reported having 1-2BMs per week that were hard in consistency.  She additionally endorsed RLQ abdominal pain that was relieved with BMs. She was started on Linzess 145mcg daily.  Today she reports that she is still having problems with constipation, bloating, and abdominal distension.  She continues to have 1-2 BMs per week that are small and hard in consistency.  Denies lower abdominal pain but reports lower back pain that radiates to both sides.  Denies any  symptoms.  She is no longer taking the Linzess.  She reports she took it for 2 weeks and did not feel it helped with her constipation.  She is currently taking dulcolax and reports is occasionally helps.  She also reports she is not drinking enough  water.    Additionally, patient reports having dysphagia.  This is a new problem that began a few months ago.  She reports she feels as though food is sticking after she swallows.  Denies any problems with bolus formation or the act of swallowing.  Solids such as meat, bread, and potato salad are more problematic.  Denies dysphagia with soft food, liquid, or pills.  Denies reflux, epigastric pain, water brash, or pyrosis.  Denies any unexpected weight loss, regurgitation, or nasal regurgitation.    Prior Endoscopy: None  Prior Colonoscopy: 2017  Findings: Multiple small and large-mouthed diverticula were found in the sigmoid colon. The sigmoid colon was moderately tortuous. A 5 mm polyp was found in the ascending colon. The polyp was sessile. The polyp was removed with a piecemeal technique using a cold biopsy forceps. Resection and retrieval were complete. A diffuse area of mild melanosis was found in the entire colon.  Internal hemorrhoids were found during retroflexion. The hemorrhoids were Grade I (internal hemorrhoids that do not prolapse). The exam was otherwise without abnormality.     Impression:   - Diverticulosis in the sigmoid colon.                         - Tortuous colon.                         - One 5 mm polyp in the ascending colon, removed piecemeal using a cold biopsy forceps. Resected and retrieved.                         - Melanosis in the colon.                         - Internal hemorrhoids.                         - The examination was otherwise normal.     Recommendation:       - Discharge patient to home.                         - Resume previous diet.                         - Continue present medications.                         - Await pathology results.                         - Repeat colonoscopy in 4 years for surveillance.    FINAL PATHOLOGIC DIAGNOSIS  Right colon polyp, biopsy:  -Tubular adenoma  -Negative for high-grade dysplasia or malignancy  -Melanosis coli also  present    Family h/o Colon Cancer: No  Family h/o Crohn's Disease or Ulcerative Colitis: No  Abdominal Surgeries: Hernia Repair,     GI ROS:  Reflux: No  Dysphagia: Yes   Constipation: Yes  Diarrhea: No  Rectal bleeding/Melena/hematemesis: No  NSAIDs: No  Anticoagulation or Antiplatelet: ASA      Review of Systems   Constitutional: Negative for weight loss.   HENT: Negative for sore throat.    Eyes: Negative for blurred vision.   Respiratory: Negative for cough.    Cardiovascular: Negative for chest pain.   Gastrointestinal: Positive for constipation. Negative for abdominal pain, blood in stool, diarrhea, heartburn, melena, nausea and vomiting.   Genitourinary: Negative for dysuria.   Musculoskeletal: Negative for myalgias.   Skin: Negative for rash.   Neurological: Negative for headaches.   Endo/Heme/Allergies: Negative for environmental allergies.   Psychiatric/Behavioral: Negative for suicidal ideas. The patient is not nervous/anxious.        Past Medical History: has a past medical history of Arthritis, Breast cancer, Cataract, Colon polyp, Hyperlipidemia, Hypertension, and Lower extremity edema.    Past Surgical History: has a past surgical history that includes supracervical abdominal hysterectomy (); Bilateral salpingoophorectomy (); Colonoscopy (N/A, 10/20/2017); Intraocular prosthesis insertion (Left, 2018); Phacoemulsification of cataract (Left, 2018); Hernia repair; Cyst Removal;  section; Phacoemulsification of cataract (Right, 2018); Intraocular prosthesis insertion (Right, 2018); Breast lumpectomy (Left, ); Breast biopsy (Left, ); Breast biopsy (Left, ); Hysterectomy; Oophorectomy; Cataract extraction w/  intraocular lens implant (Right, 2018); and Cataract extraction w/  intraocular lens implant (Left, 2018).    Family History:family history includes Breast cancer in her paternal cousin; Cancer in her father; Cataracts in her  mother; Diabetes in her brother and mother; Heart disease in her brother and mother; Hyperlipidemia in her mother; Hypertension in her mother and sister; Macular degeneration in her mother; Pancreatitis in her mother; Thyroid disease in her sister.    Allergies:   Review of patient's allergies indicates:   Allergen Reactions    Codeine Nausea And Vomiting       Social History: reports that she has never smoked. She has never used smokeless tobacco. She reports current alcohol use. She reports that she does not use drugs.    Home medications:   Current Outpatient Medications on File Prior to Visit   Medication Sig Dispense Refill    aspirin (ECOTRIN) 81 MG EC tablet once a day      co-enzyme Q-10 30 mg capsule Take 100 mg by mouth 3 (three) times daily.       ergocalciferol (ERGOCALCIFEROL) 50,000 unit Cap Take 1 capsule (50,000 Units total) by mouth every 7 days. 12 capsule 0    hydroCHLOROthiazide (MICROZIDE) 12.5 mg capsule Take 1 capsule (12.5 mg total) by mouth once daily. 30 capsule 2    irbesartan (AVAPRO) 150 MG tablet TAKE 1 TABLET(150 MG) BY MOUTH EVERY EVENING 90 tablet 0    linaCLOtide (LINZESS) 145 mcg Cap capsule Take 1 capsule (145 mcg total) by mouth before breakfast. (Patient not taking: Reported on 12/8/2020) 30 capsule 3    metoprolol succinate (TOPROL-XL) 25 MG 24 hr tablet TAKE 1 TABLET BY MOUTH TWICE DAILY pt taking once a day 180 tablet 0    pravastatin (PRAVACHOL) 40 MG tablet TAKE 1 TABLET(40 MG) BY MOUTH EVERY DAY 90 tablet 0    sodium,potassium,mag sulfates (SUPREP BOWEL PREP KIT) 17.5-3.13-1.6 gram SolR Take 177 mLs by mouth once daily. (Patient not taking: Reported on 12/8/2020) 1 kit 0     No current facility-administered medications on file prior to visit.        Vital signs:  Wt 73.9 kg (162 lb 14.7 oz)   LMP  (LMP Unknown)   BMI 32.91 kg/m²     Physical Exam  Vitals signs and nursing note reviewed.   Constitutional:       General: She is not in acute distress.      Appearance: Normal appearance. She is not ill-appearing.   Cardiovascular:      Rate and Rhythm: Normal rate and regular rhythm.      Heart sounds: Normal heart sounds. No murmur.   Pulmonary:      Effort: Pulmonary effort is normal. No respiratory distress.      Breath sounds: Normal breath sounds.   Chest:      Chest wall: No tenderness.   Abdominal:      General: Bowel sounds are normal. There is no distension.      Palpations: Abdomen is soft.      Tenderness: There is no abdominal tenderness. Negative signs include Cochran's sign.      Hernia: No hernia is present.   Skin:     General: Skin is warm.   Neurological:      Mental Status: She is alert and oriented to person, place, and time.   Psychiatric:         Mood and Affect: Mood normal.         Behavior: Behavior normal.         Routine labs:  No results found for: WBC, HGB, HCT, MCV, PLT  No results found for: INR  No results found for: IRON, FERRITIN, TIBC, FESATURATED  Lab Results   Component Value Date     11/21/2020    K 4.1 11/21/2020     11/21/2020    CO2 29 11/21/2020    BUN 16 11/21/2020    CREATININE 0.8 11/21/2020     Lab Results   Component Value Date    ALBUMIN 3.9 05/29/2020    ALT 16 05/29/2020    AST 21 05/29/2020    ALKPHOS 107 05/29/2020    BILITOT 0.3 05/29/2020     No results found for: GLUCOSE  Lab Results   Component Value Date    TSH 1.212 07/19/2018     Lab Results   Component Value Date    CALCIUM 9.6 11/21/2020    PHOS 2.9 10/12/2017       Imaging:      I have reviewed prior labs, imaging, and notes.      Assessment:  1. Chronic idiopathic constipation    2. Dysphagia, unspecified type        Plan:    EGD to be added on to previously scheduled colonoscopy in 2/2021 to evaluate dysphagia.  Colonoscopy scheduled in 2/2021 for h/o colon polyps.  Patient would like to trial Linzess 145mcg again as she only took it for 2 weeks.  Explained to patient that maximum benefit is usually achieved after 6 weeks of taking medication.    Increase water intake    If relief is not obtained with Linzess then recommend trial of Amitiza for constipation.    Discussed following precautions with patient: Eat slowly and chew food completely.  Cut food up into small pieces. If food gets stuck and patient cannot clear food or if he has trouble breathing, go to the emergency room. Patient verbalized understanding.       Discussed plan of care with patient who is in agreement and verbalized understanding.       RYAN Mccracken,FNP-BC  [facility]

## 2020-12-15 DIAGNOSIS — Z01.818 PRE-OP TESTING: ICD-10-CM

## 2021-02-02 ENCOUNTER — LAB VISIT (OUTPATIENT)
Dept: FAMILY MEDICINE | Facility: CLINIC | Age: 66
End: 2021-02-02
Payer: MEDICARE

## 2021-02-02 DIAGNOSIS — Z01.812 PRE-PROCEDURE LAB EXAM: ICD-10-CM

## 2021-02-02 LAB — SARS-COV-2 RNA RESP QL NAA+PROBE: NOT DETECTED

## 2021-02-02 PROCEDURE — U0003 INFECTIOUS AGENT DETECTION BY NUCLEIC ACID (DNA OR RNA); SEVERE ACUTE RESPIRATORY SYNDROME CORONAVIRUS 2 (SARS-COV-2) (CORONAVIRUS DISEASE [COVID-19]), AMPLIFIED PROBE TECHNIQUE, MAKING USE OF HIGH THROUGHPUT TECHNOLOGIES AS DESCRIBED BY CMS-2020-01-R: HCPCS

## 2021-02-03 ENCOUNTER — TELEPHONE (OUTPATIENT)
Dept: ENDOSCOPY | Facility: HOSPITAL | Age: 66
End: 2021-02-03

## 2021-02-05 ENCOUNTER — HOSPITAL ENCOUNTER (OUTPATIENT)
Facility: HOSPITAL | Age: 66
Discharge: HOME | End: 2021-02-05
Attending: INTERNAL MEDICINE | Admitting: INTERNAL MEDICINE
Payer: MEDICARE

## 2021-02-05 ENCOUNTER — ANESTHESIA (OUTPATIENT)
Dept: ENDOSCOPY | Facility: HOSPITAL | Age: 66
End: 2021-02-05
Payer: MEDICARE

## 2021-02-05 ENCOUNTER — ANESTHESIA EVENT (OUTPATIENT)
Dept: ENDOSCOPY | Facility: HOSPITAL | Age: 66
End: 2021-02-05
Payer: MEDICARE

## 2021-02-05 VITALS
DIASTOLIC BLOOD PRESSURE: 68 MMHG | SYSTOLIC BLOOD PRESSURE: 125 MMHG | WEIGHT: 162 LBS | RESPIRATION RATE: 20 BRPM | BODY MASS INDEX: 34 KG/M2 | OXYGEN SATURATION: 100 % | HEART RATE: 89 BPM | HEIGHT: 58 IN | TEMPERATURE: 98 F

## 2021-02-05 DIAGNOSIS — Z12.11 SCREENING FOR MALIGNANT NEOPLASM OF COLON: ICD-10-CM

## 2021-02-05 PROCEDURE — 45380 COLONOSCOPY AND BIOPSY: CPT | Performed by: INTERNAL MEDICINE

## 2021-02-05 PROCEDURE — 27201012 HC FORCEPS, HOT/COLD, DISP: Performed by: INTERNAL MEDICINE

## 2021-02-05 PROCEDURE — 63600175 PHARM REV CODE 636 W HCPCS: Performed by: NURSE ANESTHETIST, CERTIFIED REGISTERED

## 2021-02-05 PROCEDURE — 88305 TISSUE EXAM BY PATHOLOGIST: CPT | Performed by: PATHOLOGY

## 2021-02-05 PROCEDURE — 37000009 HC ANESTHESIA EA ADD 15 MINS: Performed by: INTERNAL MEDICINE

## 2021-02-05 PROCEDURE — C1769 GUIDE WIRE: HCPCS | Performed by: INTERNAL MEDICINE

## 2021-02-05 PROCEDURE — 43248 EGD GUIDE WIRE INSERTION: CPT | Mod: 51,,, | Performed by: INTERNAL MEDICINE

## 2021-02-05 PROCEDURE — 88305 TISSUE EXAM BY PATHOLOGIST: ICD-10-PCS | Mod: 26,,, | Performed by: PATHOLOGY

## 2021-02-05 PROCEDURE — 25000003 PHARM REV CODE 250: Performed by: INTERNAL MEDICINE

## 2021-02-05 PROCEDURE — 45380 PR COLONOSCOPY,BIOPSY: ICD-10-PCS | Mod: PT,,, | Performed by: INTERNAL MEDICINE

## 2021-02-05 PROCEDURE — 43248 EGD GUIDE WIRE INSERTION: CPT | Performed by: INTERNAL MEDICINE

## 2021-02-05 PROCEDURE — 88305 TISSUE EXAM BY PATHOLOGIST: CPT | Mod: 26,,, | Performed by: PATHOLOGY

## 2021-02-05 PROCEDURE — 37000008 HC ANESTHESIA 1ST 15 MINUTES: Performed by: INTERNAL MEDICINE

## 2021-02-05 PROCEDURE — 45380 COLONOSCOPY AND BIOPSY: CPT | Mod: PT,,, | Performed by: INTERNAL MEDICINE

## 2021-02-05 PROCEDURE — 25000003 PHARM REV CODE 250: Performed by: NURSE ANESTHETIST, CERTIFIED REGISTERED

## 2021-02-05 PROCEDURE — 43248 PR EGD, FLEX, W/DILATION OVER GUIDEWIRE: ICD-10-PCS | Mod: 51,,, | Performed by: INTERNAL MEDICINE

## 2021-02-05 RX ORDER — PROPOFOL 10 MG/ML
VIAL (ML) INTRAVENOUS
Status: DISCONTINUED | OUTPATIENT
Start: 2021-02-05 | End: 2021-02-05

## 2021-02-05 RX ORDER — SODIUM CHLORIDE 0.9 % (FLUSH) 0.9 %
10 SYRINGE (ML) INJECTION
Status: DISCONTINUED | OUTPATIENT
Start: 2021-02-05 | End: 2021-02-05 | Stop reason: HOSPADM

## 2021-02-05 RX ORDER — LIDOCAINE HYDROCHLORIDE 20 MG/ML
INJECTION INTRAVENOUS
Status: DISCONTINUED | OUTPATIENT
Start: 2021-02-05 | End: 2021-02-05

## 2021-02-05 RX ORDER — PROPOFOL 10 MG/ML
VIAL (ML) INTRAVENOUS CONTINUOUS PRN
Status: DISCONTINUED | OUTPATIENT
Start: 2021-02-05 | End: 2021-02-05

## 2021-02-05 RX ORDER — SODIUM CHLORIDE 9 MG/ML
INJECTION, SOLUTION INTRAVENOUS CONTINUOUS
Status: DISCONTINUED | OUTPATIENT
Start: 2021-02-05 | End: 2021-02-05 | Stop reason: HOSPADM

## 2021-02-05 RX ADMIN — PROPOFOL 40 MG: 10 INJECTION, EMULSION INTRAVENOUS at 08:02

## 2021-02-05 RX ADMIN — PROPOFOL 60 MG: 10 INJECTION, EMULSION INTRAVENOUS at 08:02

## 2021-02-05 RX ADMIN — PROPOFOL 150 MCG/KG/MIN: 10 INJECTION, EMULSION INTRAVENOUS at 08:02

## 2021-02-05 RX ADMIN — SODIUM CHLORIDE: 0.9 INJECTION, SOLUTION INTRAVENOUS at 08:02

## 2021-02-05 RX ADMIN — LIDOCAINE HYDROCHLORIDE 50 MG: 20 INJECTION, SOLUTION INTRAVENOUS at 08:02

## 2021-02-10 LAB
FINAL PATHOLOGIC DIAGNOSIS: NORMAL
GROSS: NORMAL
Lab: NORMAL

## 2021-03-03 DIAGNOSIS — M17.11 PRIMARY OSTEOARTHRITIS OF RIGHT KNEE: Primary | ICD-10-CM

## 2021-03-06 DIAGNOSIS — M79.604 PAIN OF RIGHT LOWER EXTREMITY: ICD-10-CM

## 2021-03-06 DIAGNOSIS — I10 HYPERTENSION, UNSPECIFIED TYPE: Primary | ICD-10-CM

## 2021-03-06 RX ORDER — LORATADINE 10 MG/1
10 TABLET ORAL NIGHTLY
COMMUNITY
End: 2023-10-06 | Stop reason: SDUPTHER

## 2021-03-06 RX ORDER — EPINEPHRINE 0.22MG
100 AEROSOL WITH ADAPTER (ML) INHALATION NIGHTLY
COMMUNITY

## 2021-03-08 ENCOUNTER — TELEPHONE (OUTPATIENT)
Dept: PREADMISSION TESTING | Facility: HOSPITAL | Age: 66
End: 2021-03-08

## 2021-03-18 ENCOUNTER — OFFICE VISIT (OUTPATIENT)
Dept: ORTHOPEDICS | Facility: CLINIC | Age: 66
End: 2021-03-18
Payer: MEDICARE

## 2021-03-18 ENCOUNTER — HOSPITAL ENCOUNTER (OUTPATIENT)
Dept: RADIOLOGY | Facility: HOSPITAL | Age: 66
Discharge: HOME | End: 2021-03-18
Attending: ORTHOPAEDIC SURGERY
Payer: MEDICARE

## 2021-03-18 VITALS
HEIGHT: 58 IN | BODY MASS INDEX: 34 KG/M2 | HEART RATE: 84 BPM | DIASTOLIC BLOOD PRESSURE: 88 MMHG | SYSTOLIC BLOOD PRESSURE: 153 MMHG | WEIGHT: 162 LBS | TEMPERATURE: 98 F

## 2021-03-18 VITALS — WEIGHT: 162 LBS | HEIGHT: 58 IN | BODY MASS INDEX: 34 KG/M2

## 2021-03-18 DIAGNOSIS — M17.11 PRIMARY OSTEOARTHRITIS OF RIGHT KNEE: Primary | ICD-10-CM

## 2021-03-18 DIAGNOSIS — M17.11 PRIMARY OSTEOARTHRITIS OF RIGHT KNEE: ICD-10-CM

## 2021-03-18 PROCEDURE — 99999 PR PBB SHADOW E&M-EST. PATIENT-LVL III: ICD-10-PCS | Mod: PBBFAC,,, | Performed by: ORTHOPAEDIC SURGERY

## 2021-03-18 PROCEDURE — 1101F PR PT FALLS ASSESS DOC 0-1 FALLS W/OUT INJ PAST YR: ICD-10-PCS | Mod: CPTII,S$GLB,, | Performed by: ORTHOPAEDIC SURGERY

## 2021-03-18 PROCEDURE — 1101F PT FALLS ASSESS-DOCD LE1/YR: CPT | Mod: CPTII,S$GLB,, | Performed by: ORTHOPAEDIC SURGERY

## 2021-03-18 PROCEDURE — 99999 PR PBB SHADOW E&M-EST. PATIENT-LVL III: CPT | Mod: PBBFAC,,, | Performed by: PHYSICIAN ASSISTANT

## 2021-03-18 PROCEDURE — 3008F PR BODY MASS INDEX (BMI) DOCUMENTED: ICD-10-PCS | Mod: CPTII,S$GLB,, | Performed by: ORTHOPAEDIC SURGERY

## 2021-03-18 PROCEDURE — 3288F FALL RISK ASSESSMENT DOCD: CPT | Mod: CPTII,S$GLB,, | Performed by: ORTHOPAEDIC SURGERY

## 2021-03-18 PROCEDURE — 73560 X-RAY EXAM OF KNEE 1 OR 2: CPT | Mod: TC,RT

## 2021-03-18 PROCEDURE — 1101F PR PT FALLS ASSESS DOC 0-1 FALLS W/OUT INJ PAST YR: ICD-10-PCS | Mod: CPTII,S$GLB,, | Performed by: PHYSICIAN ASSISTANT

## 2021-03-18 PROCEDURE — 3288F PR FALLS RISK ASSESSMENT DOCUMENTED: ICD-10-PCS | Mod: CPTII,S$GLB,, | Performed by: ORTHOPAEDIC SURGERY

## 2021-03-18 PROCEDURE — 1101F PT FALLS ASSESS-DOCD LE1/YR: CPT | Mod: CPTII,S$GLB,, | Performed by: PHYSICIAN ASSISTANT

## 2021-03-18 PROCEDURE — 3008F BODY MASS INDEX DOCD: CPT | Mod: CPTII,S$GLB,, | Performed by: ORTHOPAEDIC SURGERY

## 2021-03-18 PROCEDURE — 99499 NO LOS: ICD-10-PCS | Mod: S$GLB,,, | Performed by: PHYSICIAN ASSISTANT

## 2021-03-18 PROCEDURE — 73560 X-RAY EXAM OF KNEE 1 OR 2: CPT | Mod: 26,RT,, | Performed by: RADIOLOGY

## 2021-03-18 PROCEDURE — 3288F FALL RISK ASSESSMENT DOCD: CPT | Mod: CPTII,S$GLB,, | Performed by: PHYSICIAN ASSISTANT

## 2021-03-18 PROCEDURE — 1157F ADVNC CARE PLAN IN RCRD: CPT | Mod: S$GLB,,, | Performed by: ORTHOPAEDIC SURGERY

## 2021-03-18 PROCEDURE — 1126F PR PAIN SEVERITY QUANTIFIED, NO PAIN PRESENT: ICD-10-PCS | Mod: S$GLB,,, | Performed by: PHYSICIAN ASSISTANT

## 2021-03-18 PROCEDURE — 1125F AMNT PAIN NOTED PAIN PRSNT: CPT | Mod: S$GLB,,, | Performed by: ORTHOPAEDIC SURGERY

## 2021-03-18 PROCEDURE — 99999 PR PBB SHADOW E&M-EST. PATIENT-LVL III: CPT | Mod: PBBFAC,,, | Performed by: ORTHOPAEDIC SURGERY

## 2021-03-18 PROCEDURE — 1157F PR ADVANCE CARE PLAN OR EQUIV PRESENT IN MEDICAL RECORD: ICD-10-PCS | Mod: S$GLB,,, | Performed by: PHYSICIAN ASSISTANT

## 2021-03-18 PROCEDURE — 1126F AMNT PAIN NOTED NONE PRSNT: CPT | Mod: S$GLB,,, | Performed by: PHYSICIAN ASSISTANT

## 2021-03-18 PROCEDURE — 99499 UNLISTED E&M SERVICE: CPT | Mod: S$GLB,,, | Performed by: ORTHOPAEDIC SURGERY

## 2021-03-18 PROCEDURE — 99499 NO LOS: ICD-10-PCS | Mod: S$GLB,,, | Performed by: ORTHOPAEDIC SURGERY

## 2021-03-18 PROCEDURE — 1157F ADVNC CARE PLAN IN RCRD: CPT | Mod: S$GLB,,, | Performed by: PHYSICIAN ASSISTANT

## 2021-03-18 PROCEDURE — 73560 XR KNEE 1 OR 2 VIEW RIGHT: ICD-10-PCS | Mod: 26,RT,, | Performed by: RADIOLOGY

## 2021-03-18 PROCEDURE — 1125F PR PAIN SEVERITY QUANTIFIED, PAIN PRESENT: ICD-10-PCS | Mod: S$GLB,,, | Performed by: ORTHOPAEDIC SURGERY

## 2021-03-18 PROCEDURE — 3288F PR FALLS RISK ASSESSMENT DOCUMENTED: ICD-10-PCS | Mod: CPTII,S$GLB,, | Performed by: PHYSICIAN ASSISTANT

## 2021-03-18 PROCEDURE — 3008F BODY MASS INDEX DOCD: CPT | Mod: CPTII,S$GLB,, | Performed by: PHYSICIAN ASSISTANT

## 2021-03-18 PROCEDURE — 99499 UNLISTED E&M SERVICE: CPT | Mod: S$GLB,,, | Performed by: PHYSICIAN ASSISTANT

## 2021-03-18 PROCEDURE — 3008F PR BODY MASS INDEX (BMI) DOCUMENTED: ICD-10-PCS | Mod: CPTII,S$GLB,, | Performed by: PHYSICIAN ASSISTANT

## 2021-03-18 PROCEDURE — 1157F PR ADVANCE CARE PLAN OR EQUIV PRESENT IN MEDICAL RECORD: ICD-10-PCS | Mod: S$GLB,,, | Performed by: ORTHOPAEDIC SURGERY

## 2021-03-18 PROCEDURE — 99999 PR PBB SHADOW E&M-EST. PATIENT-LVL III: ICD-10-PCS | Mod: PBBFAC,,, | Performed by: PHYSICIAN ASSISTANT

## 2021-03-18 RX ORDER — CELECOXIB 200 MG/1
400 CAPSULE ORAL
Status: CANCELLED | OUTPATIENT
Start: 2021-03-18

## 2021-03-18 RX ORDER — BISACODYL 10 MG
10 SUPPOSITORY, RECTAL RECTAL EVERY 12 HOURS PRN
Status: CANCELLED | OUTPATIENT
Start: 2021-03-18

## 2021-03-18 RX ORDER — ROPIVACAINE HYDROCHLORIDE 2 MG/ML
8 INJECTION, SOLUTION EPIDURAL; INFILTRATION; PERINEURAL CONTINUOUS
Status: CANCELLED | OUTPATIENT
Start: 2021-03-18

## 2021-03-18 RX ORDER — TALC
6 POWDER (GRAM) TOPICAL NIGHTLY PRN
Status: CANCELLED | OUTPATIENT
Start: 2021-03-18

## 2021-03-18 RX ORDER — OXYCODONE HYDROCHLORIDE 5 MG/1
5 TABLET ORAL
Status: CANCELLED | OUTPATIENT
Start: 2021-03-18

## 2021-03-18 RX ORDER — ACETAMINOPHEN 500 MG
1000 TABLET ORAL EVERY 6 HOURS
Status: CANCELLED | OUTPATIENT
Start: 2021-03-18 | End: 2021-03-20

## 2021-03-18 RX ORDER — POLYETHYLENE GLYCOL 3350 17 G/17G
17 POWDER, FOR SOLUTION ORAL DAILY
Status: CANCELLED | OUTPATIENT
Start: 2021-03-19

## 2021-03-18 RX ORDER — OXYCODONE HYDROCHLORIDE 5 MG/1
10 TABLET ORAL
Status: CANCELLED | OUTPATIENT
Start: 2021-03-18

## 2021-03-18 RX ORDER — MUPIROCIN 20 MG/G
1 OINTMENT TOPICAL 2 TIMES DAILY
Status: CANCELLED | OUTPATIENT
Start: 2021-03-18 | End: 2021-03-23

## 2021-03-18 RX ORDER — SODIUM CHLORIDE 0.9 % (FLUSH) 0.9 %
10 SYRINGE (ML) INJECTION
Status: CANCELLED | OUTPATIENT
Start: 2021-03-18

## 2021-03-18 RX ORDER — PREGABALIN 75 MG/1
75 CAPSULE ORAL
Status: CANCELLED | OUTPATIENT
Start: 2021-03-18

## 2021-03-18 RX ORDER — FENTANYL CITRATE 50 UG/ML
25 INJECTION, SOLUTION INTRAMUSCULAR; INTRAVENOUS EVERY 5 MIN PRN
Status: CANCELLED | OUTPATIENT
Start: 2021-03-18

## 2021-03-18 RX ORDER — ASPIRIN 81 MG/1
81 TABLET ORAL 2 TIMES DAILY
Status: CANCELLED | OUTPATIENT
Start: 2021-03-18

## 2021-03-18 RX ORDER — NALOXONE HCL 0.4 MG/ML
0.02 VIAL (ML) INJECTION
Status: CANCELLED | OUTPATIENT
Start: 2021-03-18

## 2021-03-18 RX ORDER — AMOXICILLIN 250 MG
1 CAPSULE ORAL 2 TIMES DAILY
Status: CANCELLED | OUTPATIENT
Start: 2021-03-18

## 2021-03-18 RX ORDER — SODIUM CHLORIDE 9 MG/ML
INJECTION, SOLUTION INTRAVENOUS CONTINUOUS
Status: CANCELLED | OUTPATIENT
Start: 2021-03-18 | End: 2021-03-19

## 2021-03-18 RX ORDER — CELECOXIB 200 MG/1
200 CAPSULE ORAL DAILY
Status: CANCELLED | OUTPATIENT
Start: 2021-03-19

## 2021-03-18 RX ORDER — MUPIROCIN 20 MG/G
1 OINTMENT TOPICAL
Status: CANCELLED | OUTPATIENT
Start: 2021-03-18

## 2021-03-18 RX ORDER — PROCHLORPERAZINE EDISYLATE 5 MG/ML
5 INJECTION INTRAMUSCULAR; INTRAVENOUS EVERY 6 HOURS PRN
Status: CANCELLED | OUTPATIENT
Start: 2021-03-18

## 2021-03-18 RX ORDER — PREGABALIN 75 MG/1
75 CAPSULE ORAL NIGHTLY
Status: CANCELLED | OUTPATIENT
Start: 2021-03-18

## 2021-03-18 RX ORDER — MORPHINE SULFATE 2 MG/ML
2 INJECTION, SOLUTION INTRAMUSCULAR; INTRAVENOUS
Status: CANCELLED | OUTPATIENT
Start: 2021-03-18

## 2021-03-18 RX ORDER — ONDANSETRON 2 MG/ML
4 INJECTION INTRAMUSCULAR; INTRAVENOUS EVERY 8 HOURS PRN
Status: CANCELLED | OUTPATIENT
Start: 2021-03-18

## 2021-03-18 RX ORDER — SODIUM CHLORIDE 9 MG/ML
INJECTION, SOLUTION INTRAVENOUS
Status: CANCELLED | OUTPATIENT
Start: 2021-03-18

## 2021-03-18 RX ORDER — CEFAZOLIN SODIUM 2 G/50ML
2 SOLUTION INTRAVENOUS
Status: CANCELLED | OUTPATIENT
Start: 2021-03-18 | End: 2021-03-19

## 2021-03-18 RX ORDER — FAMOTIDINE 20 MG/1
20 TABLET, FILM COATED ORAL 2 TIMES DAILY
Status: CANCELLED | OUTPATIENT
Start: 2021-03-18

## 2021-03-18 RX ORDER — MIDAZOLAM HYDROCHLORIDE 1 MG/ML
1 INJECTION INTRAMUSCULAR; INTRAVENOUS EVERY 5 MIN PRN
Status: CANCELLED | OUTPATIENT
Start: 2021-03-18

## 2021-03-18 RX ORDER — ACETAMINOPHEN 500 MG
1000 TABLET ORAL
Status: CANCELLED | OUTPATIENT
Start: 2021-03-18

## 2021-03-18 RX ORDER — LIDOCAINE HYDROCHLORIDE 10 MG/ML
1 INJECTION, SOLUTION EPIDURAL; INFILTRATION; INTRACAUDAL; PERINEURAL
Status: CANCELLED | OUTPATIENT
Start: 2021-03-18

## 2021-03-18 RX ORDER — CEFAZOLIN SODIUM 2 G/50ML
2 SOLUTION INTRAVENOUS
Status: CANCELLED | OUTPATIENT
Start: 2021-03-18

## 2021-03-22 PROBLEM — Z12.12 SCREENING FOR COLORECTAL CANCER: Status: RESOLVED | Noted: 2017-10-20 | Resolved: 2021-03-22

## 2021-03-22 PROBLEM — Z12.11 SCREENING FOR MALIGNANT NEOPLASM OF COLON: Status: RESOLVED | Noted: 2021-02-05 | Resolved: 2021-03-22

## 2021-03-22 PROBLEM — Z12.11 SCREENING FOR COLORECTAL CANCER: Status: RESOLVED | Noted: 2017-10-20 | Resolved: 2021-03-22

## 2021-03-23 ENCOUNTER — OFFICE VISIT (OUTPATIENT)
Dept: INTERNAL MEDICINE | Facility: CLINIC | Age: 66
End: 2021-03-23
Payer: MEDICARE

## 2021-03-23 ENCOUNTER — HOSPITAL ENCOUNTER (OUTPATIENT)
Dept: CARDIOLOGY | Facility: CLINIC | Age: 66
Discharge: HOME | End: 2021-03-23
Payer: MEDICARE

## 2021-03-23 ENCOUNTER — OFFICE VISIT (OUTPATIENT)
Dept: CARDIOLOGY | Facility: CLINIC | Age: 66
End: 2021-03-23
Payer: MEDICARE

## 2021-03-23 VITALS
HEART RATE: 86 BPM | HEIGHT: 59 IN | WEIGHT: 161.38 LBS | DIASTOLIC BLOOD PRESSURE: 76 MMHG | BODY MASS INDEX: 32.53 KG/M2 | SYSTOLIC BLOOD PRESSURE: 154 MMHG

## 2021-03-23 VITALS
DIASTOLIC BLOOD PRESSURE: 74 MMHG | TEMPERATURE: 99 F | SYSTOLIC BLOOD PRESSURE: 158 MMHG | HEART RATE: 102 BPM | BODY MASS INDEX: 32.25 KG/M2 | WEIGHT: 160 LBS | HEIGHT: 59 IN | OXYGEN SATURATION: 100 %

## 2021-03-23 DIAGNOSIS — Z98.890 PONV (POSTOPERATIVE NAUSEA AND VOMITING): ICD-10-CM

## 2021-03-23 DIAGNOSIS — M25.562 PAIN IN BOTH KNEES, UNSPECIFIED CHRONICITY: ICD-10-CM

## 2021-03-23 DIAGNOSIS — Z01.810 PREOP CARDIOVASCULAR EXAM: ICD-10-CM

## 2021-03-23 DIAGNOSIS — G47.33 OSA (OBSTRUCTIVE SLEEP APNEA): Primary | ICD-10-CM

## 2021-03-23 DIAGNOSIS — E66.9 OBESITY (BMI 30.0-34.9): ICD-10-CM

## 2021-03-23 DIAGNOSIS — M25.561 PAIN IN BOTH KNEES, UNSPECIFIED CHRONICITY: ICD-10-CM

## 2021-03-23 DIAGNOSIS — M79.604 PAIN OF RIGHT LOWER EXTREMITY: ICD-10-CM

## 2021-03-23 DIAGNOSIS — R07.9 CHEST PAIN, UNSPECIFIED TYPE: ICD-10-CM

## 2021-03-23 DIAGNOSIS — E55.9 VITAMIN D DEFICIENCY: ICD-10-CM

## 2021-03-23 DIAGNOSIS — R11.2 PONV (POSTOPERATIVE NAUSEA AND VOMITING): ICD-10-CM

## 2021-03-23 DIAGNOSIS — I10 ESSENTIAL HYPERTENSION: ICD-10-CM

## 2021-03-23 DIAGNOSIS — Z85.3 HISTORY OF LEFT BREAST CANCER: ICD-10-CM

## 2021-03-23 DIAGNOSIS — H04.123 DRY EYE SYNDROME OF BOTH EYES: ICD-10-CM

## 2021-03-23 DIAGNOSIS — R07.89 NON-CARDIAC CHEST PAIN: ICD-10-CM

## 2021-03-23 DIAGNOSIS — Z86.010 HISTORY OF ADENOMATOUS POLYP OF COLON: ICD-10-CM

## 2021-03-23 DIAGNOSIS — I83.893 VARICOSE VEINS OF LOWER EXTREMITY WITH EDEMA, BILATERAL: ICD-10-CM

## 2021-03-23 DIAGNOSIS — I10 HYPERTENSION, UNSPECIFIED TYPE: ICD-10-CM

## 2021-03-23 DIAGNOSIS — K08.409 HISTORY OF TOOTH EXTRACTION, UNSPECIFIED EDENTULISM CLASS: ICD-10-CM

## 2021-03-23 PROBLEM — M60.9 STATIN-INDUCED MYOSITIS: Status: RESOLVED | Noted: 2018-07-20 | Resolved: 2021-03-23

## 2021-03-23 PROBLEM — T46.6X5A STATIN-INDUCED MYOSITIS: Status: RESOLVED | Noted: 2018-07-20 | Resolved: 2021-03-23

## 2021-03-23 PROCEDURE — 1159F PR MEDICATION LIST DOCUMENTED IN MEDICAL RECORD: ICD-10-PCS | Mod: S$GLB,,, | Performed by: HOSPITALIST

## 2021-03-23 PROCEDURE — 93010 EKG 12-LEAD: ICD-10-PCS | Mod: S$GLB,,, | Performed by: INTERNAL MEDICINE

## 2021-03-23 PROCEDURE — 99214 PR OFFICE/OUTPT VISIT, EST, LEVL IV, 30-39 MIN: ICD-10-PCS | Mod: S$GLB,,, | Performed by: HOSPITALIST

## 2021-03-23 PROCEDURE — 1157F PR ADVANCE CARE PLAN OR EQUIV PRESENT IN MEDICAL RECORD: ICD-10-PCS | Mod: S$GLB,,, | Performed by: HOSPITALIST

## 2021-03-23 PROCEDURE — 99999 PR PBB SHADOW E&M-EST. PATIENT-LVL IV: ICD-10-PCS | Mod: PBBFAC,GC,, | Performed by: INTERNAL MEDICINE

## 2021-03-23 PROCEDURE — 3077F PR MOST RECENT SYSTOLIC BLOOD PRESSURE >= 140 MM HG: ICD-10-PCS | Mod: CPTII,GC,S$GLB, | Performed by: INTERNAL MEDICINE

## 2021-03-23 PROCEDURE — 3078F PR MOST RECENT DIASTOLIC BLOOD PRESSURE < 80 MM HG: ICD-10-PCS | Mod: CPTII,S$GLB,, | Performed by: HOSPITALIST

## 2021-03-23 PROCEDURE — 3078F DIAST BP <80 MM HG: CPT | Mod: CPTII,GC,S$GLB, | Performed by: INTERNAL MEDICINE

## 2021-03-23 PROCEDURE — 93010 ELECTROCARDIOGRAM REPORT: CPT | Mod: S$GLB,,, | Performed by: INTERNAL MEDICINE

## 2021-03-23 PROCEDURE — 99214 OFFICE O/P EST MOD 30 MIN: CPT | Mod: S$GLB,,, | Performed by: HOSPITALIST

## 2021-03-23 PROCEDURE — 99499 UNLISTED E&M SERVICE: CPT | Mod: S$GLB,,, | Performed by: NURSE PRACTITIONER

## 2021-03-23 PROCEDURE — 1159F PR MEDICATION LIST DOCUMENTED IN MEDICAL RECORD: ICD-10-PCS | Mod: GC,S$GLB,, | Performed by: INTERNAL MEDICINE

## 2021-03-23 PROCEDURE — 99999 PR PBB SHADOW E&M-EST. PATIENT-LVL IV: ICD-10-PCS | Mod: PBBFAC,,,

## 2021-03-23 PROCEDURE — 99499 RISK ADDL DX/OHS AUDIT: ICD-10-PCS | Mod: S$GLB,,, | Performed by: NURSE PRACTITIONER

## 2021-03-23 PROCEDURE — 99204 OFFICE O/P NEW MOD 45 MIN: CPT | Mod: GC,S$GLB,, | Performed by: INTERNAL MEDICINE

## 2021-03-23 PROCEDURE — 3077F PR MOST RECENT SYSTOLIC BLOOD PRESSURE >= 140 MM HG: ICD-10-PCS | Mod: CPTII,S$GLB,, | Performed by: HOSPITALIST

## 2021-03-23 PROCEDURE — 3078F DIAST BP <80 MM HG: CPT | Mod: CPTII,S$GLB,, | Performed by: HOSPITALIST

## 2021-03-23 PROCEDURE — 1159F MED LIST DOCD IN RCRD: CPT | Mod: S$GLB,,, | Performed by: HOSPITALIST

## 2021-03-23 PROCEDURE — 3078F PR MOST RECENT DIASTOLIC BLOOD PRESSURE < 80 MM HG: ICD-10-PCS | Mod: CPTII,GC,S$GLB, | Performed by: INTERNAL MEDICINE

## 2021-03-23 PROCEDURE — 93005 EKG 12-LEAD: ICD-10-PCS | Mod: S$GLB,,, | Performed by: ANESTHESIOLOGY

## 2021-03-23 PROCEDURE — 99999 PR PBB SHADOW E&M-EST. PATIENT-LVL IV: CPT | Mod: PBBFAC,GC,, | Performed by: INTERNAL MEDICINE

## 2021-03-23 PROCEDURE — 1159F MED LIST DOCD IN RCRD: CPT | Mod: GC,S$GLB,, | Performed by: INTERNAL MEDICINE

## 2021-03-23 PROCEDURE — 99999 PR PBB SHADOW E&M-EST. PATIENT-LVL IV: CPT | Mod: PBBFAC,,,

## 2021-03-23 PROCEDURE — 93005 ELECTROCARDIOGRAM TRACING: CPT | Mod: S$GLB,,, | Performed by: ANESTHESIOLOGY

## 2021-03-23 PROCEDURE — 3077F SYST BP >= 140 MM HG: CPT | Mod: CPTII,GC,S$GLB, | Performed by: INTERNAL MEDICINE

## 2021-03-23 PROCEDURE — 3077F SYST BP >= 140 MM HG: CPT | Mod: CPTII,S$GLB,, | Performed by: HOSPITALIST

## 2021-03-23 PROCEDURE — 3008F BODY MASS INDEX DOCD: CPT | Mod: CPTII,S$GLB,, | Performed by: HOSPITALIST

## 2021-03-23 PROCEDURE — 3008F PR BODY MASS INDEX (BMI) DOCUMENTED: ICD-10-PCS | Mod: CPTII,S$GLB,, | Performed by: HOSPITALIST

## 2021-03-23 PROCEDURE — 99204 PR OFFICE/OUTPT VISIT, NEW, LEVL IV, 45-59 MIN: ICD-10-PCS | Mod: GC,S$GLB,, | Performed by: INTERNAL MEDICINE

## 2021-03-23 PROCEDURE — 1157F ADVNC CARE PLAN IN RCRD: CPT | Mod: S$GLB,,, | Performed by: HOSPITALIST

## 2021-03-23 RX ORDER — OXYCODONE AND ACETAMINOPHEN 5; 325 MG/1; MG/1
TABLET ORAL
COMMUNITY
Start: 2021-03-19 | End: 2021-03-29 | Stop reason: HOSPADM

## 2021-03-23 RX ORDER — AMOXICILLIN 875 MG/1
875 TABLET, FILM COATED ORAL EVERY 12 HOURS
Status: ON HOLD | COMMUNITY
Start: 2021-03-19 | End: 2021-03-31 | Stop reason: HOSPADM

## 2021-03-28 ENCOUNTER — LAB VISIT (OUTPATIENT)
Dept: URGENT CARE | Facility: CLINIC | Age: 66
End: 2021-03-28
Payer: MEDICARE

## 2021-03-28 DIAGNOSIS — Z01.818 PRE-OP TESTING: ICD-10-CM

## 2021-03-28 PROCEDURE — U0005 INFEC AGEN DETEC AMPLI PROBE: HCPCS | Performed by: ORTHOPAEDIC SURGERY

## 2021-03-28 PROCEDURE — U0003 INFECTIOUS AGENT DETECTION BY NUCLEIC ACID (DNA OR RNA); SEVERE ACUTE RESPIRATORY SYNDROME CORONAVIRUS 2 (SARS-COV-2) (CORONAVIRUS DISEASE [COVID-19]), AMPLIFIED PROBE TECHNIQUE, MAKING USE OF HIGH THROUGHPUT TECHNOLOGIES AS DESCRIBED BY CMS-2020-01-R: HCPCS | Performed by: ORTHOPAEDIC SURGERY

## 2021-03-29 ENCOUNTER — TELEPHONE (OUTPATIENT)
Dept: ORTHOPEDICS | Facility: CLINIC | Age: 66
End: 2021-03-29

## 2021-03-29 LAB — SARS-COV-2 RNA RESP QL NAA+PROBE: NOT DETECTED

## 2021-03-29 RX ORDER — DOCUSATE SODIUM 100 MG/1
100 CAPSULE, LIQUID FILLED ORAL 2 TIMES DAILY PRN
Qty: 60 CAPSULE | Refills: 0 | Status: SHIPPED | OUTPATIENT
Start: 2021-03-29

## 2021-03-29 RX ORDER — CELECOXIB 200 MG/1
200 CAPSULE ORAL DAILY
Qty: 30 CAPSULE | Refills: 0 | Status: SHIPPED | OUTPATIENT
Start: 2021-03-29 | End: 2021-05-01

## 2021-03-29 RX ORDER — DEXTROMETHORPHAN HYDROBROMIDE, GUAIFENESIN 5; 100 MG/5ML; MG/5ML
650 LIQUID ORAL EVERY 8 HOURS PRN
Qty: 120 TABLET | Refills: 0 | Status: SHIPPED | OUTPATIENT
Start: 2021-03-29

## 2021-03-29 RX ORDER — OXYCODONE HYDROCHLORIDE 5 MG/1
TABLET ORAL
Qty: 50 TABLET | Refills: 0 | Status: SHIPPED | OUTPATIENT
Start: 2021-03-29 | End: 2021-04-07 | Stop reason: ALTCHOICE

## 2021-03-29 RX ORDER — ASPIRIN 81 MG/1
81 TABLET ORAL 2 TIMES DAILY
Qty: 60 TABLET | Refills: 0 | Status: SHIPPED | OUTPATIENT
Start: 2021-03-29 | End: 2024-03-25

## 2021-03-31 ENCOUNTER — ANESTHESIA EVENT (OUTPATIENT)
Dept: SURGERY | Facility: HOSPITAL | Age: 66
End: 2021-03-31
Payer: MEDICARE

## 2021-03-31 ENCOUNTER — ANESTHESIA (OUTPATIENT)
Dept: SURGERY | Facility: HOSPITAL | Age: 66
End: 2021-03-31
Payer: MEDICARE

## 2021-03-31 ENCOUNTER — HOSPITAL ENCOUNTER (OUTPATIENT)
Facility: HOSPITAL | Age: 66
Discharge: HOME | End: 2021-04-01
Attending: ORTHOPAEDIC SURGERY | Admitting: ORTHOPAEDIC SURGERY
Payer: MEDICARE

## 2021-03-31 DIAGNOSIS — Z96.651 STATUS POST TOTAL RIGHT KNEE REPLACEMENT: Primary | ICD-10-CM

## 2021-03-31 DIAGNOSIS — M17.11 PRIMARY OSTEOARTHRITIS OF RIGHT KNEE: ICD-10-CM

## 2021-03-31 PROCEDURE — 63600175 PHARM REV CODE 636 W HCPCS: Performed by: ANESTHESIOLOGY

## 2021-03-31 PROCEDURE — 63600175 PHARM REV CODE 636 W HCPCS: Performed by: PHYSICIAN ASSISTANT

## 2021-03-31 PROCEDURE — 25000003 PHARM REV CODE 250: Performed by: PHYSICIAN ASSISTANT

## 2021-03-31 PROCEDURE — 63600175 PHARM REV CODE 636 W HCPCS: Performed by: NURSE ANESTHETIST, CERTIFIED REGISTERED

## 2021-03-31 PROCEDURE — 27447 TOTAL KNEE ARTHROPLASTY: CPT | Mod: RT,,, | Performed by: ORTHOPAEDIC SURGERY

## 2021-03-31 PROCEDURE — 71000033 HC RECOVERY, INTIAL HOUR: Performed by: ORTHOPAEDIC SURGERY

## 2021-03-31 PROCEDURE — 36000711: Performed by: ORTHOPAEDIC SURGERY

## 2021-03-31 PROCEDURE — 97116 GAIT TRAINING THERAPY: CPT

## 2021-03-31 PROCEDURE — 63600175 PHARM REV CODE 636 W HCPCS: Performed by: ORTHOPAEDIC SURGERY

## 2021-03-31 PROCEDURE — 27447 PR TOTAL KNEE ARTHROPLASTY: ICD-10-PCS | Mod: RT,,, | Performed by: ORTHOPAEDIC SURGERY

## 2021-03-31 PROCEDURE — D9220A PRA ANESTHESIA: Mod: CRNA,,, | Performed by: NURSE ANESTHETIST, CERTIFIED REGISTERED

## 2021-03-31 PROCEDURE — 27447 TOTAL KNEE ARTHROPLASTY: CPT | Mod: AS,RT,, | Performed by: PHYSICIAN ASSISTANT

## 2021-03-31 PROCEDURE — 37000008 HC ANESTHESIA 1ST 15 MINUTES: Performed by: ORTHOPAEDIC SURGERY

## 2021-03-31 PROCEDURE — 27201423 OPTIME MED/SURG SUP & DEVICES STERILE SUPPLY: Performed by: ORTHOPAEDIC SURGERY

## 2021-03-31 PROCEDURE — C1713 ANCHOR/SCREW BN/BN,TIS/BN: HCPCS | Performed by: ORTHOPAEDIC SURGERY

## 2021-03-31 PROCEDURE — 71000039 HC RECOVERY, EACH ADD'L HOUR: Performed by: ORTHOPAEDIC SURGERY

## 2021-03-31 PROCEDURE — 36000710: Performed by: ORTHOPAEDIC SURGERY

## 2021-03-31 PROCEDURE — D9220A PRA ANESTHESIA: Mod: ANES,,, | Performed by: ANESTHESIOLOGY

## 2021-03-31 PROCEDURE — 25000003 PHARM REV CODE 250: Performed by: ORTHOPAEDIC SURGERY

## 2021-03-31 PROCEDURE — 25000003 PHARM REV CODE 250: Performed by: ANESTHESIOLOGY

## 2021-03-31 PROCEDURE — 94761 N-INVAS EAR/PLS OXIMETRY MLT: CPT

## 2021-03-31 PROCEDURE — 25000003 PHARM REV CODE 250: Performed by: NURSE ANESTHETIST, CERTIFIED REGISTERED

## 2021-03-31 PROCEDURE — 27447 PR TOTAL KNEE ARTHROPLASTY: ICD-10-PCS | Mod: AS,RT,, | Performed by: PHYSICIAN ASSISTANT

## 2021-03-31 PROCEDURE — 27100025 HC TUBING, SET FLUID WARMER: Performed by: ANESTHESIOLOGY

## 2021-03-31 PROCEDURE — D9220A PRA ANESTHESIA: ICD-10-PCS | Mod: ANES,,, | Performed by: ANESTHESIOLOGY

## 2021-03-31 PROCEDURE — 99900035 HC TECH TIME PER 15 MIN (STAT)

## 2021-03-31 PROCEDURE — 37000009 HC ANESTHESIA EA ADD 15 MINS: Performed by: ORTHOPAEDIC SURGERY

## 2021-03-31 PROCEDURE — C1776 JOINT DEVICE (IMPLANTABLE): HCPCS | Performed by: ORTHOPAEDIC SURGERY

## 2021-03-31 PROCEDURE — 97161 PT EVAL LOW COMPLEX 20 MIN: CPT

## 2021-03-31 PROCEDURE — D9220A PRA ANESTHESIA: ICD-10-PCS | Mod: CRNA,,, | Performed by: NURSE ANESTHETIST, CERTIFIED REGISTERED

## 2021-03-31 DEVICE — BASEPLATE TIBIAL CEM MOD SZ 2: Type: IMPLANTABLE DEVICE | Site: KNEE | Status: FUNCTIONAL

## 2021-03-31 DEVICE — INSERT SIGMA TIB FIXED 10MM 2: Type: IMPLANTABLE DEVICE | Site: KNEE | Status: FUNCTIONAL

## 2021-03-31 DEVICE — PATELLA OVAL DOME 32MM: Type: IMPLANTABLE DEVICE | Site: KNEE | Status: FUNCTIONAL

## 2021-03-31 DEVICE — IMPLANTABLE DEVICE: Type: IMPLANTABLE DEVICE | Site: KNEE | Status: FUNCTIONAL

## 2021-03-31 DEVICE — CEMENT BONE WHOLE BATCH: Type: IMPLANTABLE DEVICE | Site: KNEE | Status: FUNCTIONAL

## 2021-03-31 RX ORDER — SODIUM CHLORIDE 0.9 % (FLUSH) 0.9 %
10 SYRINGE (ML) INJECTION
Status: DISCONTINUED | OUTPATIENT
Start: 2021-03-31 | End: 2021-04-01 | Stop reason: HOSPADM

## 2021-03-31 RX ORDER — DEXAMETHASONE SODIUM PHOSPHATE 4 MG/ML
INJECTION, SOLUTION INTRA-ARTICULAR; INTRALESIONAL; INTRAMUSCULAR; INTRAVENOUS; SOFT TISSUE
Status: DISCONTINUED | OUTPATIENT
Start: 2021-03-31 | End: 2021-03-31

## 2021-03-31 RX ORDER — OXYCODONE HYDROCHLORIDE 5 MG/1
5 TABLET ORAL
Status: DISCONTINUED | OUTPATIENT
Start: 2021-03-31 | End: 2021-04-01 | Stop reason: HOSPADM

## 2021-03-31 RX ORDER — VANCOMYCIN HYDROCHLORIDE 1 G/20ML
INJECTION, POWDER, LYOPHILIZED, FOR SOLUTION INTRAVENOUS
Status: DISCONTINUED | OUTPATIENT
Start: 2021-03-31 | End: 2021-03-31 | Stop reason: HOSPADM

## 2021-03-31 RX ORDER — ASPIRIN 81 MG/1
81 TABLET ORAL 2 TIMES DAILY
Status: DISCONTINUED | OUTPATIENT
Start: 2021-03-31 | End: 2021-04-01 | Stop reason: HOSPADM

## 2021-03-31 RX ORDER — FAMOTIDINE 10 MG/ML
INJECTION INTRAVENOUS
Status: DISCONTINUED | OUTPATIENT
Start: 2021-03-31 | End: 2021-03-31

## 2021-03-31 RX ORDER — PROCHLORPERAZINE EDISYLATE 5 MG/ML
5 INJECTION INTRAMUSCULAR; INTRAVENOUS EVERY 6 HOURS PRN
Status: DISCONTINUED | OUTPATIENT
Start: 2021-03-31 | End: 2021-04-01 | Stop reason: HOSPADM

## 2021-03-31 RX ORDER — CEFAZOLIN SODIUM 1 G/3ML
2 INJECTION, POWDER, FOR SOLUTION INTRAMUSCULAR; INTRAVENOUS
Status: COMPLETED | OUTPATIENT
Start: 2021-03-31 | End: 2021-03-31

## 2021-03-31 RX ORDER — PREGABALIN 75 MG/1
75 CAPSULE ORAL NIGHTLY
Status: DISCONTINUED | OUTPATIENT
Start: 2021-03-31 | End: 2021-04-01 | Stop reason: HOSPADM

## 2021-03-31 RX ORDER — NALOXONE HCL 0.4 MG/ML
0.02 VIAL (ML) INJECTION
Status: DISCONTINUED | OUTPATIENT
Start: 2021-03-31 | End: 2021-04-01 | Stop reason: HOSPADM

## 2021-03-31 RX ORDER — LIDOCAINE HYDROCHLORIDE 10 MG/ML
1 INJECTION, SOLUTION EPIDURAL; INFILTRATION; INTRACAUDAL; PERINEURAL
Status: DISCONTINUED | OUTPATIENT
Start: 2021-03-31 | End: 2021-03-31 | Stop reason: HOSPADM

## 2021-03-31 RX ORDER — LOSARTAN POTASSIUM 25 MG/1
25 TABLET ORAL DAILY
Refills: 0 | Status: DISCONTINUED | OUTPATIENT
Start: 2021-04-01 | End: 2021-03-31

## 2021-03-31 RX ORDER — LIDOCAINE HYDROCHLORIDE AND EPINEPHRINE 15; 5 MG/ML; UG/ML
INJECTION, SOLUTION EPIDURAL
Status: DISCONTINUED | OUTPATIENT
Start: 2021-03-31 | End: 2021-03-31

## 2021-03-31 RX ORDER — PREGABALIN 75 MG/1
75 CAPSULE ORAL
Status: COMPLETED | OUTPATIENT
Start: 2021-03-31 | End: 2021-03-31

## 2021-03-31 RX ORDER — MUPIROCIN 20 MG/G
1 OINTMENT TOPICAL
Status: COMPLETED | OUTPATIENT
Start: 2021-03-31 | End: 2021-03-31

## 2021-03-31 RX ORDER — MUPIROCIN 20 MG/G
1 OINTMENT TOPICAL 2 TIMES DAILY
Status: DISCONTINUED | OUTPATIENT
Start: 2021-03-31 | End: 2021-04-01 | Stop reason: HOSPADM

## 2021-03-31 RX ORDER — PROPOFOL 10 MG/ML
VIAL (ML) INTRAVENOUS CONTINUOUS PRN
Status: DISCONTINUED | OUTPATIENT
Start: 2021-03-31 | End: 2021-03-31

## 2021-03-31 RX ORDER — KETAMINE HCL IN 0.9 % NACL 50 MG/5 ML
SYRINGE (ML) INTRAVENOUS
Status: DISCONTINUED | OUTPATIENT
Start: 2021-03-31 | End: 2021-03-31

## 2021-03-31 RX ORDER — DEXMEDETOMIDINE HYDROCHLORIDE 100 UG/ML
INJECTION, SOLUTION INTRAVENOUS
Status: DISCONTINUED | OUTPATIENT
Start: 2021-03-31 | End: 2021-03-31

## 2021-03-31 RX ORDER — TALC
6 POWDER (GRAM) TOPICAL NIGHTLY PRN
Status: DISCONTINUED | OUTPATIENT
Start: 2021-03-31 | End: 2021-03-31 | Stop reason: HOSPADM

## 2021-03-31 RX ORDER — METOPROLOL SUCCINATE 25 MG/1
25 TABLET, EXTENDED RELEASE ORAL NIGHTLY
Status: DISCONTINUED | OUTPATIENT
Start: 2021-03-31 | End: 2021-04-01 | Stop reason: HOSPADM

## 2021-03-31 RX ORDER — ROPIVACAINE HYDROCHLORIDE 2 MG/ML
8 INJECTION, SOLUTION EPIDURAL; INFILTRATION; PERINEURAL CONTINUOUS
Status: DISCONTINUED | OUTPATIENT
Start: 2021-03-31 | End: 2021-04-01 | Stop reason: HOSPADM

## 2021-03-31 RX ORDER — TRANEXAMIC ACID 100 MG/ML
1000 INJECTION, SOLUTION INTRAVENOUS
Status: COMPLETED | OUTPATIENT
Start: 2021-03-31 | End: 2021-03-31

## 2021-03-31 RX ORDER — OXYCODONE HYDROCHLORIDE 10 MG/1
10 TABLET ORAL
Status: DISCONTINUED | OUTPATIENT
Start: 2021-03-31 | End: 2021-04-01 | Stop reason: HOSPADM

## 2021-03-31 RX ORDER — PRAVASTATIN SODIUM 20 MG/1
40 TABLET ORAL NIGHTLY
Status: DISCONTINUED | OUTPATIENT
Start: 2021-03-31 | End: 2021-04-01 | Stop reason: HOSPADM

## 2021-03-31 RX ORDER — ONDANSETRON 2 MG/ML
4 INJECTION INTRAMUSCULAR; INTRAVENOUS EVERY 8 HOURS PRN
Status: DISCONTINUED | OUTPATIENT
Start: 2021-03-31 | End: 2021-04-01 | Stop reason: HOSPADM

## 2021-03-31 RX ORDER — SODIUM CHLORIDE 0.9 % (FLUSH) 0.9 %
10 SYRINGE (ML) INJECTION
Status: DISCONTINUED | OUTPATIENT
Start: 2021-03-31 | End: 2021-03-31 | Stop reason: HOSPADM

## 2021-03-31 RX ORDER — MORPHINE SULFATE 2 MG/ML
2 INJECTION, SOLUTION INTRAMUSCULAR; INTRAVENOUS
Status: DISCONTINUED | OUTPATIENT
Start: 2021-03-31 | End: 2021-04-01 | Stop reason: HOSPADM

## 2021-03-31 RX ORDER — ACETAMINOPHEN 500 MG
1000 TABLET ORAL EVERY 6 HOURS
Status: DISCONTINUED | OUTPATIENT
Start: 2021-03-31 | End: 2021-04-01 | Stop reason: HOSPADM

## 2021-03-31 RX ORDER — LABETALOL HCL 20 MG/4 ML
5 SYRINGE (ML) INTRAVENOUS EVERY 30 MIN PRN
Status: DISCONTINUED | OUTPATIENT
Start: 2021-03-31 | End: 2021-04-01 | Stop reason: HOSPADM

## 2021-03-31 RX ORDER — MIDAZOLAM HYDROCHLORIDE 1 MG/ML
1 INJECTION INTRAMUSCULAR; INTRAVENOUS EVERY 5 MIN PRN
Status: DISCONTINUED | OUTPATIENT
Start: 2021-03-31 | End: 2021-03-31 | Stop reason: HOSPADM

## 2021-03-31 RX ORDER — LOSARTAN POTASSIUM 25 MG/1
25 TABLET ORAL NIGHTLY
Status: DISCONTINUED | OUTPATIENT
Start: 2021-03-31 | End: 2021-04-01 | Stop reason: HOSPADM

## 2021-03-31 RX ORDER — CELECOXIB 200 MG/1
400 CAPSULE ORAL
Status: COMPLETED | OUTPATIENT
Start: 2021-03-31 | End: 2021-03-31

## 2021-03-31 RX ORDER — HYDROCHLOROTHIAZIDE 12.5 MG/1
12.5 TABLET ORAL DAILY
Refills: 2 | Status: DISCONTINUED | OUTPATIENT
Start: 2021-04-01 | End: 2021-04-01 | Stop reason: HOSPADM

## 2021-03-31 RX ORDER — BISACODYL 10 MG
10 SUPPOSITORY, RECTAL RECTAL EVERY 12 HOURS PRN
Status: DISCONTINUED | OUTPATIENT
Start: 2021-03-31 | End: 2021-04-01 | Stop reason: HOSPADM

## 2021-03-31 RX ORDER — ROPIVACAINE/EPI/CLONIDINE/KET 2.46-0.005
SYRINGE (ML) INJECTION
Status: DISCONTINUED | OUTPATIENT
Start: 2021-03-31 | End: 2021-03-31 | Stop reason: HOSPADM

## 2021-03-31 RX ORDER — PHENYLEPHRINE HYDROCHLORIDE 10 MG/ML
INJECTION INTRAVENOUS
Status: DISCONTINUED | OUTPATIENT
Start: 2021-03-31 | End: 2021-03-31

## 2021-03-31 RX ORDER — ONDANSETRON 2 MG/ML
INJECTION INTRAMUSCULAR; INTRAVENOUS
Status: DISCONTINUED | OUTPATIENT
Start: 2021-03-31 | End: 2021-03-31

## 2021-03-31 RX ORDER — OXYCODONE HYDROCHLORIDE 5 MG/1
5 TABLET ORAL
Status: DISCONTINUED | OUTPATIENT
Start: 2021-03-31 | End: 2021-03-31 | Stop reason: HOSPADM

## 2021-03-31 RX ORDER — VANCOMYCIN HCL IN 5 % DEXTROSE 1G/250ML
1000 PLASTIC BAG, INJECTION (ML) INTRAVENOUS
Status: COMPLETED | OUTPATIENT
Start: 2021-03-31 | End: 2021-03-31

## 2021-03-31 RX ORDER — HALOPERIDOL 5 MG/ML
0.5 INJECTION INTRAMUSCULAR EVERY 10 MIN PRN
Status: DISCONTINUED | OUTPATIENT
Start: 2021-03-31 | End: 2021-03-31 | Stop reason: HOSPADM

## 2021-03-31 RX ORDER — EPHEDRINE SULFATE 50 MG/ML
INJECTION, SOLUTION INTRAVENOUS
Status: DISCONTINUED | OUTPATIENT
Start: 2021-03-31 | End: 2021-03-31

## 2021-03-31 RX ORDER — POLYETHYLENE GLYCOL 3350 17 G/17G
17 POWDER, FOR SOLUTION ORAL DAILY
Status: DISCONTINUED | OUTPATIENT
Start: 2021-04-01 | End: 2021-04-01 | Stop reason: HOSPADM

## 2021-03-31 RX ORDER — FENTANYL CITRATE 50 UG/ML
25 INJECTION, SOLUTION INTRAMUSCULAR; INTRAVENOUS EVERY 5 MIN PRN
Status: DISCONTINUED | OUTPATIENT
Start: 2021-03-31 | End: 2021-03-31 | Stop reason: HOSPADM

## 2021-03-31 RX ORDER — FENTANYL CITRATE 50 UG/ML
INJECTION, SOLUTION INTRAMUSCULAR; INTRAVENOUS
Status: DISCONTINUED | OUTPATIENT
Start: 2021-03-31 | End: 2021-03-31

## 2021-03-31 RX ORDER — AMOXICILLIN 250 MG
1 CAPSULE ORAL 2 TIMES DAILY
Status: DISCONTINUED | OUTPATIENT
Start: 2021-03-31 | End: 2021-04-01 | Stop reason: HOSPADM

## 2021-03-31 RX ORDER — SODIUM CHLORIDE 9 MG/ML
INJECTION, SOLUTION INTRAVENOUS CONTINUOUS
Status: DISCONTINUED | OUTPATIENT
Start: 2021-03-31 | End: 2021-04-01 | Stop reason: HOSPADM

## 2021-03-31 RX ORDER — CEFADROXIL 500 MG/1
500 CAPSULE ORAL EVERY 12 HOURS
Qty: 14 CAPSULE | Refills: 0 | Status: SHIPPED | OUTPATIENT
Start: 2021-03-31 | End: 2021-04-08

## 2021-03-31 RX ORDER — LIDOCAINE HYDROCHLORIDE 20 MG/ML
INJECTION INTRAVENOUS
Status: DISCONTINUED | OUTPATIENT
Start: 2021-03-31 | End: 2021-03-31

## 2021-03-31 RX ORDER — CEFAZOLIN SODIUM 1 G/3ML
2 INJECTION, POWDER, FOR SOLUTION INTRAMUSCULAR; INTRAVENOUS
Status: DISCONTINUED | OUTPATIENT
Start: 2021-03-31 | End: 2021-03-31

## 2021-03-31 RX ORDER — ACETAMINOPHEN 500 MG
1000 TABLET ORAL
Status: COMPLETED | OUTPATIENT
Start: 2021-03-31 | End: 2021-03-31

## 2021-03-31 RX ORDER — MIDAZOLAM HYDROCHLORIDE 1 MG/ML
INJECTION, SOLUTION INTRAMUSCULAR; INTRAVENOUS
Status: DISCONTINUED | OUTPATIENT
Start: 2021-03-31 | End: 2021-03-31

## 2021-03-31 RX ORDER — CEFAZOLIN SODIUM 1 G/3ML
2 INJECTION, POWDER, FOR SOLUTION INTRAMUSCULAR; INTRAVENOUS
Status: COMPLETED | OUTPATIENT
Start: 2021-03-31 | End: 2021-04-01

## 2021-03-31 RX ORDER — TRANEXAMIC ACID 100 MG/ML
1000 INJECTION, SOLUTION INTRAVENOUS
Status: DISCONTINUED | OUTPATIENT
Start: 2021-03-31 | End: 2021-03-31 | Stop reason: HOSPADM

## 2021-03-31 RX ORDER — NAPROXEN SODIUM 220 MG/1
81 TABLET, FILM COATED ORAL DAILY
Status: ON HOLD | COMMUNITY
End: 2021-03-31 | Stop reason: HOSPADM

## 2021-03-31 RX ORDER — FAMOTIDINE 20 MG/1
20 TABLET, FILM COATED ORAL 2 TIMES DAILY
Status: DISCONTINUED | OUTPATIENT
Start: 2021-03-31 | End: 2021-04-01 | Stop reason: HOSPADM

## 2021-03-31 RX ORDER — SODIUM CHLORIDE 9 MG/ML
INJECTION, SOLUTION INTRAVENOUS
Status: COMPLETED | OUTPATIENT
Start: 2021-03-31 | End: 2021-03-31

## 2021-03-31 RX ADMIN — MEPIVACAINE HYDROCHLORIDE 2.5 ML: 15 INJECTION, SOLUTION EPIDURAL; INFILTRATION at 02:03

## 2021-03-31 RX ADMIN — PHENYLEPHRINE HYDROCHLORIDE 50 MCG: 10 INJECTION INTRAVENOUS at 02:03

## 2021-03-31 RX ADMIN — FAMOTIDINE 20 MG: 20 TABLET, FILM COATED ORAL at 08:03

## 2021-03-31 RX ADMIN — FAMOTIDINE 20 MG: 10 INJECTION, SOLUTION INTRAVENOUS at 02:03

## 2021-03-31 RX ADMIN — CEFAZOLIN 2 G: 330 INJECTION, POWDER, FOR SOLUTION INTRAMUSCULAR; INTRAVENOUS at 10:03

## 2021-03-31 RX ADMIN — FENTANYL CITRATE 50 MCG: 50 INJECTION, SOLUTION INTRAMUSCULAR; INTRAVENOUS at 01:03

## 2021-03-31 RX ADMIN — LOSARTAN POTASSIUM 25 MG: 25 TABLET ORAL at 08:03

## 2021-03-31 RX ADMIN — METOPROLOL SUCCINATE 25 MG: 25 TABLET, EXTENDED RELEASE ORAL at 08:03

## 2021-03-31 RX ADMIN — PROPOFOL 40 MCG/KG/MIN: 10 INJECTION, EMULSION INTRAVENOUS at 02:03

## 2021-03-31 RX ADMIN — PHENYLEPHRINE HYDROCHLORIDE 50 MCG: 10 INJECTION INTRAVENOUS at 03:03

## 2021-03-31 RX ADMIN — MIDAZOLAM HYDROCHLORIDE 2 MG: 1 INJECTION, SOLUTION INTRAMUSCULAR; INTRAVENOUS at 01:03

## 2021-03-31 RX ADMIN — SODIUM CHLORIDE: 0.9 INJECTION, SOLUTION INTRAVENOUS at 11:03

## 2021-03-31 RX ADMIN — DOCUSATE SODIUM 50MG AND SENNOSIDES 8.6MG 1 TABLET: 8.6; 5 TABLET, FILM COATED ORAL at 08:03

## 2021-03-31 RX ADMIN — PROPOFOL 50 MCG/KG/MIN: 10 INJECTION, EMULSION INTRAVENOUS at 01:03

## 2021-03-31 RX ADMIN — MUPIROCIN 1 G: 20 OINTMENT TOPICAL at 11:03

## 2021-03-31 RX ADMIN — ASPIRIN 81 MG: 81 TABLET, COATED ORAL at 08:03

## 2021-03-31 RX ADMIN — CELECOXIB 400 MG: 200 CAPSULE ORAL at 11:03

## 2021-03-31 RX ADMIN — EPHEDRINE SULFATE 5 MG: 50 INJECTION INTRAVENOUS at 02:03

## 2021-03-31 RX ADMIN — PRAVASTATIN SODIUM 40 MG: 20 TABLET ORAL at 08:03

## 2021-03-31 RX ADMIN — Medication 10 MG: at 02:03

## 2021-03-31 RX ADMIN — DEXAMETHASONE SODIUM PHOSPHATE 8 MG: 4 INJECTION, SOLUTION INTRAMUSCULAR; INTRAVENOUS at 02:03

## 2021-03-31 RX ADMIN — VANCOMYCIN HYDROCHLORIDE 1000 MG: 1 INJECTION, POWDER, LYOPHILIZED, FOR SOLUTION INTRAVENOUS at 11:03

## 2021-03-31 RX ADMIN — FENTANYL CITRATE 25 MCG: 50 INJECTION INTRAMUSCULAR; INTRAVENOUS at 04:03

## 2021-03-31 RX ADMIN — OXYCODONE 5 MG: 5 TABLET ORAL at 04:03

## 2021-03-31 RX ADMIN — DEXMEDETOMIDINE HYDROCHLORIDE 8 MCG: 100 INJECTION, SOLUTION, CONCENTRATE INTRAVENOUS at 02:03

## 2021-03-31 RX ADMIN — ACETAMINOPHEN 1000 MG: 500 TABLET ORAL at 11:03

## 2021-03-31 RX ADMIN — EPHEDRINE SULFATE 10 MG: 50 INJECTION INTRAVENOUS at 02:03

## 2021-03-31 RX ADMIN — Medication 5 MG: at 05:03

## 2021-03-31 RX ADMIN — TRANEXAMIC ACID 1000 MG: 100 INJECTION, SOLUTION INTRAVENOUS at 02:03

## 2021-03-31 RX ADMIN — LIDOCAINE HYDROCHLORIDE,EPINEPHRINE BITARTRATE 3 ML: 15; .005 INJECTION, SOLUTION EPIDURAL; INFILTRATION; INTRACAUDAL; PERINEURAL at 01:03

## 2021-03-31 RX ADMIN — MUPIROCIN 1 G: 20 OINTMENT TOPICAL at 08:03

## 2021-03-31 RX ADMIN — PREGABALIN 75 MG: 75 CAPSULE ORAL at 10:03

## 2021-03-31 RX ADMIN — CEFAZOLIN 2 G: 330 INJECTION, POWDER, FOR SOLUTION INTRAMUSCULAR; INTRAVENOUS at 02:03

## 2021-03-31 RX ADMIN — LIDOCAINE HYDROCHLORIDE 50 MG: 20 INJECTION, SOLUTION INTRAVENOUS at 01:03

## 2021-03-31 RX ADMIN — SODIUM CHLORIDE: 0.9 INJECTION, SOLUTION INTRAVENOUS at 10:03

## 2021-03-31 RX ADMIN — ONDANSETRON 4 MG: 2 INJECTION, SOLUTION INTRAMUSCULAR; INTRAVENOUS at 03:03

## 2021-03-31 RX ADMIN — TRANEXAMIC ACID 1000 MG: 100 INJECTION, SOLUTION INTRAVENOUS at 03:03

## 2021-03-31 RX ADMIN — ACETAMINOPHEN 1000 MG: 500 TABLET ORAL at 05:03

## 2021-03-31 RX ADMIN — PREGABALIN 75 MG: 75 CAPSULE ORAL at 11:03

## 2021-03-31 RX ADMIN — SODIUM CHLORIDE: 0.9 INJECTION, SOLUTION INTRAVENOUS at 04:03

## 2021-04-01 VITALS
OXYGEN SATURATION: 100 % | DIASTOLIC BLOOD PRESSURE: 71 MMHG | BODY MASS INDEX: 32.46 KG/M2 | HEART RATE: 70 BPM | SYSTOLIC BLOOD PRESSURE: 134 MMHG | TEMPERATURE: 98 F | HEIGHT: 59 IN | RESPIRATION RATE: 16 BRPM | WEIGHT: 161 LBS

## 2021-04-01 PROCEDURE — 97535 SELF CARE MNGMENT TRAINING: CPT | Mod: 59

## 2021-04-01 PROCEDURE — 94761 N-INVAS EAR/PLS OXIMETRY MLT: CPT

## 2021-04-01 PROCEDURE — 99202 PR OFFICE/OUTPT VISIT, NEW, LEVL II, 15-29 MIN: ICD-10-PCS | Mod: ,,, | Performed by: ANESTHESIOLOGY

## 2021-04-01 PROCEDURE — 99900035 HC TECH TIME PER 15 MIN (STAT)

## 2021-04-01 PROCEDURE — 97110 THERAPEUTIC EXERCISES: CPT

## 2021-04-01 PROCEDURE — 97165 OT EVAL LOW COMPLEX 30 MIN: CPT

## 2021-04-01 PROCEDURE — 97116 GAIT TRAINING THERAPY: CPT

## 2021-04-01 PROCEDURE — 99202 OFFICE O/P NEW SF 15 MIN: CPT | Mod: ,,, | Performed by: ANESTHESIOLOGY

## 2021-04-01 PROCEDURE — 25000003 PHARM REV CODE 250: Performed by: PHYSICIAN ASSISTANT

## 2021-04-01 PROCEDURE — 25000003 PHARM REV CODE 250: Performed by: ANESTHESIOLOGY

## 2021-04-01 PROCEDURE — 97530 THERAPEUTIC ACTIVITIES: CPT

## 2021-04-01 PROCEDURE — 63600175 PHARM REV CODE 636 W HCPCS: Performed by: ORTHOPAEDIC SURGERY

## 2021-04-01 RX ORDER — CELECOXIB 200 MG/1
200 CAPSULE ORAL DAILY
Status: DISCONTINUED | OUTPATIENT
Start: 2021-04-01 | End: 2021-04-01 | Stop reason: HOSPADM

## 2021-04-01 RX ADMIN — FAMOTIDINE 20 MG: 20 TABLET, FILM COATED ORAL at 08:04

## 2021-04-01 RX ADMIN — HYDROCHLOROTHIAZIDE 12.5 MG: 12.5 TABLET ORAL at 08:04

## 2021-04-01 RX ADMIN — POLYETHYLENE GLYCOL 3350 17 G: 17 POWDER, FOR SOLUTION ORAL at 08:04

## 2021-04-01 RX ADMIN — CEFAZOLIN 2 G: 330 INJECTION, POWDER, FOR SOLUTION INTRAMUSCULAR; INTRAVENOUS at 06:04

## 2021-04-01 RX ADMIN — DOCUSATE SODIUM 50MG AND SENNOSIDES 8.6MG 1 TABLET: 8.6; 5 TABLET, FILM COATED ORAL at 08:04

## 2021-04-01 RX ADMIN — ACETAMINOPHEN 1000 MG: 500 TABLET ORAL at 06:04

## 2021-04-01 RX ADMIN — CELECOXIB 200 MG: 200 CAPSULE ORAL at 08:04

## 2021-04-01 RX ADMIN — ASPIRIN 81 MG: 81 TABLET, COATED ORAL at 08:04

## 2021-04-01 RX ADMIN — MUPIROCIN 1 G: 20 OINTMENT TOPICAL at 08:04

## 2021-04-01 RX ADMIN — OXYCODONE HYDROCHLORIDE 5 MG: 5 TABLET ORAL at 06:04

## 2021-04-05 ENCOUNTER — TELEPHONE (OUTPATIENT)
Dept: ORTHOPEDICS | Facility: CLINIC | Age: 66
End: 2021-04-05

## 2021-04-05 ENCOUNTER — CLINICAL SUPPORT (OUTPATIENT)
Dept: REHABILITATION | Facility: HOSPITAL | Age: 66
End: 2021-04-05
Payer: MEDICARE

## 2021-04-05 DIAGNOSIS — M17.11 PRIMARY OSTEOARTHRITIS OF RIGHT KNEE: ICD-10-CM

## 2021-04-05 DIAGNOSIS — M25.562 CHRONIC PAIN OF BOTH KNEES: Primary | ICD-10-CM

## 2021-04-05 DIAGNOSIS — M25.561 CHRONIC PAIN OF BOTH KNEES: Primary | ICD-10-CM

## 2021-04-05 DIAGNOSIS — G89.29 CHRONIC PAIN OF BOTH KNEES: Primary | ICD-10-CM

## 2021-04-05 DIAGNOSIS — M62.81 MUSCLE WEAKNESS: ICD-10-CM

## 2021-04-05 DIAGNOSIS — M25.60 DECREASED RANGE OF MOTION: ICD-10-CM

## 2021-04-05 PROBLEM — M25.569 KNEE PAIN: Status: ACTIVE | Noted: 2017-10-05

## 2021-04-05 PROCEDURE — 97161 PT EVAL LOW COMPLEX 20 MIN: CPT | Mod: PN

## 2021-04-07 ENCOUNTER — PATIENT MESSAGE (OUTPATIENT)
Dept: ORTHOPEDICS | Facility: CLINIC | Age: 66
End: 2021-04-07

## 2021-04-07 ENCOUNTER — CLINICAL SUPPORT (OUTPATIENT)
Dept: REHABILITATION | Facility: HOSPITAL | Age: 66
End: 2021-04-07
Payer: MEDICARE

## 2021-04-07 DIAGNOSIS — M25.60 DECREASED RANGE OF MOTION: ICD-10-CM

## 2021-04-07 DIAGNOSIS — M62.81 MUSCLE WEAKNESS: ICD-10-CM

## 2021-04-07 PROCEDURE — 97110 THERAPEUTIC EXERCISES: CPT | Mod: PN

## 2021-04-07 RX ORDER — HYDROMORPHONE HYDROCHLORIDE 2 MG/1
2 TABLET ORAL
Qty: 30 TABLET | Refills: 0 | Status: SHIPPED | OUTPATIENT
Start: 2021-04-07 | End: 2021-05-26

## 2021-04-09 ENCOUNTER — CLINICAL SUPPORT (OUTPATIENT)
Dept: REHABILITATION | Facility: HOSPITAL | Age: 66
End: 2021-04-09
Payer: MEDICARE

## 2021-04-09 DIAGNOSIS — M25.60 DECREASED RANGE OF MOTION: ICD-10-CM

## 2021-04-09 DIAGNOSIS — M62.81 MUSCLE WEAKNESS: ICD-10-CM

## 2021-04-09 PROCEDURE — 97110 THERAPEUTIC EXERCISES: CPT | Mod: PN,CQ

## 2021-04-13 ENCOUNTER — CLINICAL SUPPORT (OUTPATIENT)
Dept: REHABILITATION | Facility: HOSPITAL | Age: 66
End: 2021-04-13
Payer: MEDICARE

## 2021-04-13 DIAGNOSIS — E78.5 HYPERLIPIDEMIA, UNSPECIFIED HYPERLIPIDEMIA TYPE: ICD-10-CM

## 2021-04-13 DIAGNOSIS — I10 ESSENTIAL HYPERTENSION: ICD-10-CM

## 2021-04-13 DIAGNOSIS — M25.60 DECREASED RANGE OF MOTION: ICD-10-CM

## 2021-04-13 DIAGNOSIS — M62.81 MUSCLE WEAKNESS: ICD-10-CM

## 2021-04-13 PROCEDURE — 97110 THERAPEUTIC EXERCISES: CPT | Mod: PN

## 2021-04-13 RX ORDER — IRBESARTAN 150 MG/1
TABLET ORAL
Qty: 90 TABLET | Refills: 0 | Status: SHIPPED | OUTPATIENT
Start: 2021-04-13 | End: 2021-08-16 | Stop reason: SDUPTHER

## 2021-04-13 RX ORDER — HYDROCHLOROTHIAZIDE 12.5 MG/1
12.5 CAPSULE ORAL NIGHTLY
Qty: 90 CAPSULE | Refills: 0 | Status: SHIPPED | OUTPATIENT
Start: 2021-04-13 | End: 2021-08-25 | Stop reason: SDUPTHER

## 2021-04-13 RX ORDER — METOPROLOL SUCCINATE 25 MG/1
25 TABLET, EXTENDED RELEASE ORAL NIGHTLY
Qty: 90 TABLET | Refills: 0 | Status: SHIPPED | OUTPATIENT
Start: 2021-04-13 | End: 2021-08-16 | Stop reason: SDUPTHER

## 2021-04-13 RX ORDER — PRAVASTATIN SODIUM 40 MG/1
40 TABLET ORAL NIGHTLY
Qty: 90 TABLET | Refills: 0 | Status: SHIPPED | OUTPATIENT
Start: 2021-04-13 | End: 2021-08-16 | Stop reason: SDUPTHER

## 2021-04-14 ENCOUNTER — CLINICAL SUPPORT (OUTPATIENT)
Dept: REHABILITATION | Facility: HOSPITAL | Age: 66
End: 2021-04-14
Payer: MEDICARE

## 2021-04-14 DIAGNOSIS — M25.60 DECREASED RANGE OF MOTION: ICD-10-CM

## 2021-04-14 DIAGNOSIS — M62.81 MUSCLE WEAKNESS: ICD-10-CM

## 2021-04-14 PROCEDURE — 97110 THERAPEUTIC EXERCISES: CPT | Mod: PN

## 2021-04-16 ENCOUNTER — CLINICAL SUPPORT (OUTPATIENT)
Dept: REHABILITATION | Facility: HOSPITAL | Age: 66
End: 2021-04-16
Payer: MEDICARE

## 2021-04-16 ENCOUNTER — OFFICE VISIT (OUTPATIENT)
Dept: ORTHOPEDICS | Facility: CLINIC | Age: 66
End: 2021-04-16
Payer: MEDICARE

## 2021-04-16 VITALS — WEIGHT: 165.31 LBS | BODY MASS INDEX: 33.32 KG/M2 | HEIGHT: 59 IN

## 2021-04-16 DIAGNOSIS — Z96.651 STATUS POST TOTAL RIGHT KNEE REPLACEMENT: Primary | ICD-10-CM

## 2021-04-16 DIAGNOSIS — M25.60 DECREASED RANGE OF MOTION: ICD-10-CM

## 2021-04-16 DIAGNOSIS — M62.81 MUSCLE WEAKNESS: ICD-10-CM

## 2021-04-16 PROCEDURE — 1157F PR ADVANCE CARE PLAN OR EQUIV PRESENT IN MEDICAL RECORD: ICD-10-PCS | Mod: S$GLB,,, | Performed by: PHYSICIAN ASSISTANT

## 2021-04-16 PROCEDURE — 99999 PR PBB SHADOW E&M-EST. PATIENT-LVL III: CPT | Mod: PBBFAC,,, | Performed by: PHYSICIAN ASSISTANT

## 2021-04-16 PROCEDURE — 97110 THERAPEUTIC EXERCISES: CPT | Mod: PN

## 2021-04-16 PROCEDURE — 1125F PR PAIN SEVERITY QUANTIFIED, PAIN PRESENT: ICD-10-PCS | Mod: S$GLB,,, | Performed by: PHYSICIAN ASSISTANT

## 2021-04-16 PROCEDURE — 99024 POSTOP FOLLOW-UP VISIT: CPT | Mod: S$GLB,,, | Performed by: PHYSICIAN ASSISTANT

## 2021-04-16 PROCEDURE — 1125F AMNT PAIN NOTED PAIN PRSNT: CPT | Mod: S$GLB,,, | Performed by: PHYSICIAN ASSISTANT

## 2021-04-16 PROCEDURE — 3008F BODY MASS INDEX DOCD: CPT | Mod: CPTII,S$GLB,, | Performed by: PHYSICIAN ASSISTANT

## 2021-04-16 PROCEDURE — 99024 PR POST-OP FOLLOW-UP VISIT: ICD-10-PCS | Mod: S$GLB,,, | Performed by: PHYSICIAN ASSISTANT

## 2021-04-16 PROCEDURE — 99999 PR PBB SHADOW E&M-EST. PATIENT-LVL III: ICD-10-PCS | Mod: PBBFAC,,, | Performed by: PHYSICIAN ASSISTANT

## 2021-04-16 PROCEDURE — 1157F ADVNC CARE PLAN IN RCRD: CPT | Mod: S$GLB,,, | Performed by: PHYSICIAN ASSISTANT

## 2021-04-16 PROCEDURE — 3008F PR BODY MASS INDEX (BMI) DOCUMENTED: ICD-10-PCS | Mod: CPTII,S$GLB,, | Performed by: PHYSICIAN ASSISTANT

## 2021-04-20 ENCOUNTER — CLINICAL SUPPORT (OUTPATIENT)
Dept: REHABILITATION | Facility: HOSPITAL | Age: 66
End: 2021-04-20
Payer: MEDICARE

## 2021-04-20 DIAGNOSIS — M62.81 MUSCLE WEAKNESS: ICD-10-CM

## 2021-04-20 DIAGNOSIS — M25.60 DECREASED RANGE OF MOTION: ICD-10-CM

## 2021-04-20 PROCEDURE — 97110 THERAPEUTIC EXERCISES: CPT | Mod: PN

## 2021-04-21 ENCOUNTER — CLINICAL SUPPORT (OUTPATIENT)
Dept: REHABILITATION | Facility: HOSPITAL | Age: 66
End: 2021-04-21
Payer: MEDICARE

## 2021-04-21 DIAGNOSIS — Z96.651 STATUS POST TOTAL RIGHT KNEE REPLACEMENT: Primary | ICD-10-CM

## 2021-04-21 DIAGNOSIS — M25.60 DECREASED RANGE OF MOTION: ICD-10-CM

## 2021-04-21 DIAGNOSIS — M62.81 MUSCLE WEAKNESS: ICD-10-CM

## 2021-04-21 PROCEDURE — 97110 THERAPEUTIC EXERCISES: CPT | Mod: PN

## 2021-04-21 PROCEDURE — 97140 MANUAL THERAPY 1/> REGIONS: CPT | Mod: PN

## 2021-04-24 NOTE — PROGRESS NOTES
Subjective:      Patient ID: Chelsea Rodriguez is a 64 y.o. female.    Chief Complaint: Follow-up of the Right Knee and Follow-up of the Left Knee    HPI follow-up for bilateral osteoarthritis right greater than left.  The patient is been managed nonsurgically and feels that her symptoms are tolerable.  She has significant gelling and pain after work, with some relief with current regimen    Review of Systems   Constitution: Negative for fever and weight loss.   HENT: Negative for congestion.    Eyes: Negative for visual disturbance.   Cardiovascular: Negative for chest pain.   Respiratory: Negative for shortness of breath.    Hematologic/Lymphatic: Negative for bleeding problem. Does not bruise/bleed easily.   Skin: Negative for poor wound healing.   Musculoskeletal: Positive for joint pain.   Gastrointestinal: Negative for abdominal pain.   Genitourinary: Negative for dysuria.   Neurological: Negative for seizures.   Psychiatric/Behavioral: Negative for altered mental status.   Allergic/Immunologic: Negative for persistent infections.         Objective:            Ortho/SPM Exam    Right knee        The patient is not in acute distress.   Sclerae normal  Body habitus is normal.  Respiratory distress:  none   The patient walks with a limp.  Hip irritability  negative.   The skin over the knee is intact.  Knee effusion trace  Tendernes is located medially  Range of motion- Flexion 120 deg, Extension 0 deg,   Ligament laxity exam:   MCL 2+   Lachman negative   Post sag negative    LCL 0  Patellar apprehension negative.  Popliteal cyst negative  Patellar crepitation absent.  Flexion/pinch not applicable  Pulses DP intact, PT intact.  Motor normal 5/5 strength in all tested muscle groups.   Sensory normal.    The left knee has range of motion 0-125 degrees 1+ valgus laxity.        Assessment:       Encounter Diagnosis   Name Primary?    Primary osteoarthritis of both knees Yes          The patient is objective findings  and impairment are at least moderate.  Plan:       Chelsea was seen today for follow-up and follow-up.    Diagnoses and all orders for this visit:    Primary osteoarthritis of both knees        I explained my diagnostic impression and the reasoning behind it in detail, using layman's terms.  Models and/or pictures were used to help in the explanation.    Treatment options discussed included continuation of present nonsurgical measures versus arthroplasty. I explained the nature of knee replacement along with the expected postoperative course and possible complications.    The patient indicates that she wishes to continue nonsurgical care for the present time.    Over-the-counter analgesics recommended    Icing regimen explained    Activity modification discussed    I explained the potential role of surgery in the treatment of this condition to the patient.  They understand that if nonsurgical measures do not adequately control symptoms, surgery will be considered in the future.    X-ray follow-up         no

## 2021-04-26 ENCOUNTER — CLINICAL SUPPORT (OUTPATIENT)
Dept: REHABILITATION | Facility: HOSPITAL | Age: 66
End: 2021-04-26
Payer: MEDICARE

## 2021-04-26 DIAGNOSIS — M62.81 MUSCLE WEAKNESS: ICD-10-CM

## 2021-04-26 DIAGNOSIS — M25.60 DECREASED RANGE OF MOTION: ICD-10-CM

## 2021-04-26 PROCEDURE — 97110 THERAPEUTIC EXERCISES: CPT | Mod: PN

## 2021-04-27 ENCOUNTER — CLINICAL SUPPORT (OUTPATIENT)
Dept: REHABILITATION | Facility: HOSPITAL | Age: 66
End: 2021-04-27
Payer: MEDICARE

## 2021-04-27 DIAGNOSIS — M25.60 DECREASED RANGE OF MOTION: ICD-10-CM

## 2021-04-27 DIAGNOSIS — M62.81 MUSCLE WEAKNESS: ICD-10-CM

## 2021-04-27 PROCEDURE — 97140 MANUAL THERAPY 1/> REGIONS: CPT | Mod: PN

## 2021-04-27 PROCEDURE — 97110 THERAPEUTIC EXERCISES: CPT | Mod: PN

## 2021-04-30 ENCOUNTER — CLINICAL SUPPORT (OUTPATIENT)
Dept: REHABILITATION | Facility: HOSPITAL | Age: 66
End: 2021-04-30
Payer: MEDICARE

## 2021-04-30 DIAGNOSIS — M62.81 MUSCLE WEAKNESS: ICD-10-CM

## 2021-04-30 DIAGNOSIS — M25.60 DECREASED RANGE OF MOTION: ICD-10-CM

## 2021-04-30 PROCEDURE — 97110 THERAPEUTIC EXERCISES: CPT | Mod: PN

## 2021-05-04 ENCOUNTER — CLINICAL SUPPORT (OUTPATIENT)
Dept: REHABILITATION | Facility: HOSPITAL | Age: 66
End: 2021-05-04
Payer: MEDICARE

## 2021-05-04 DIAGNOSIS — M25.60 DECREASED RANGE OF MOTION: ICD-10-CM

## 2021-05-04 DIAGNOSIS — M62.81 MUSCLE WEAKNESS: ICD-10-CM

## 2021-05-04 PROCEDURE — 97116 GAIT TRAINING THERAPY: CPT | Mod: PN,CQ

## 2021-05-04 PROCEDURE — 97140 MANUAL THERAPY 1/> REGIONS: CPT | Mod: PN,CQ

## 2021-05-04 PROCEDURE — 97110 THERAPEUTIC EXERCISES: CPT | Mod: PN,CQ

## 2021-05-06 ENCOUNTER — CLINICAL SUPPORT (OUTPATIENT)
Dept: REHABILITATION | Facility: HOSPITAL | Age: 66
End: 2021-05-06
Payer: MEDICARE

## 2021-05-06 ENCOUNTER — TELEPHONE (OUTPATIENT)
Dept: ORTHOPEDICS | Facility: CLINIC | Age: 66
End: 2021-05-06

## 2021-05-06 DIAGNOSIS — M25.60 DECREASED RANGE OF MOTION: ICD-10-CM

## 2021-05-06 DIAGNOSIS — Z96.651 STATUS POST TOTAL RIGHT KNEE REPLACEMENT: Primary | ICD-10-CM

## 2021-05-06 DIAGNOSIS — M62.81 MUSCLE WEAKNESS: ICD-10-CM

## 2021-05-06 PROCEDURE — 97140 MANUAL THERAPY 1/> REGIONS: CPT | Mod: PN,CQ

## 2021-05-06 PROCEDURE — 97110 THERAPEUTIC EXERCISES: CPT | Mod: PN,CQ

## 2021-05-06 RX ORDER — TRAMADOL HYDROCHLORIDE 50 MG/1
50 TABLET ORAL EVERY 8 HOURS PRN
Qty: 21 TABLET | Refills: 0 | Status: SHIPPED | OUTPATIENT
Start: 2021-05-06 | End: 2021-05-13

## 2021-05-07 ENCOUNTER — CLINICAL SUPPORT (OUTPATIENT)
Dept: REHABILITATION | Facility: HOSPITAL | Age: 66
End: 2021-05-07
Payer: MEDICARE

## 2021-05-07 DIAGNOSIS — M25.60 DECREASED RANGE OF MOTION: ICD-10-CM

## 2021-05-07 DIAGNOSIS — M62.81 MUSCLE WEAKNESS: ICD-10-CM

## 2021-05-07 PROCEDURE — 97140 MANUAL THERAPY 1/> REGIONS: CPT | Mod: PN,CQ

## 2021-05-07 PROCEDURE — 97110 THERAPEUTIC EXERCISES: CPT | Mod: PN,CQ

## 2021-05-11 ENCOUNTER — CLINICAL SUPPORT (OUTPATIENT)
Dept: REHABILITATION | Facility: HOSPITAL | Age: 66
End: 2021-05-11
Payer: MEDICARE

## 2021-05-11 DIAGNOSIS — M62.81 MUSCLE WEAKNESS: ICD-10-CM

## 2021-05-11 DIAGNOSIS — M25.60 DECREASED RANGE OF MOTION: ICD-10-CM

## 2021-05-11 PROCEDURE — 97140 MANUAL THERAPY 1/> REGIONS: CPT | Mod: PN

## 2021-05-11 PROCEDURE — 97110 THERAPEUTIC EXERCISES: CPT | Mod: PN

## 2021-05-12 ENCOUNTER — CLINICAL SUPPORT (OUTPATIENT)
Dept: REHABILITATION | Facility: HOSPITAL | Age: 66
End: 2021-05-12
Payer: MEDICARE

## 2021-05-12 DIAGNOSIS — M62.81 MUSCLE WEAKNESS: ICD-10-CM

## 2021-05-12 DIAGNOSIS — M25.60 DECREASED RANGE OF MOTION: ICD-10-CM

## 2021-05-12 PROCEDURE — 97140 MANUAL THERAPY 1/> REGIONS: CPT | Mod: PN

## 2021-05-12 PROCEDURE — 97110 THERAPEUTIC EXERCISES: CPT | Mod: PN

## 2021-05-14 ENCOUNTER — OFFICE VISIT (OUTPATIENT)
Dept: ORTHOPEDICS | Facility: CLINIC | Age: 66
End: 2021-05-14
Payer: MEDICARE

## 2021-05-14 ENCOUNTER — CLINICAL SUPPORT (OUTPATIENT)
Dept: REHABILITATION | Facility: HOSPITAL | Age: 66
End: 2021-05-14
Payer: MEDICARE

## 2021-05-14 ENCOUNTER — HOSPITAL ENCOUNTER (OUTPATIENT)
Dept: RADIOLOGY | Facility: HOSPITAL | Age: 66
Discharge: HOME | End: 2021-05-14
Attending: ORTHOPAEDIC SURGERY
Payer: MEDICARE

## 2021-05-14 VITALS — BODY MASS INDEX: 34.72 KG/M2 | HEIGHT: 58 IN | WEIGHT: 165.38 LBS

## 2021-05-14 DIAGNOSIS — M25.60 DECREASED RANGE OF MOTION: ICD-10-CM

## 2021-05-14 DIAGNOSIS — Z96.651 STATUS POST TOTAL RIGHT KNEE REPLACEMENT: Primary | ICD-10-CM

## 2021-05-14 DIAGNOSIS — Z96.651 STATUS POST TOTAL RIGHT KNEE REPLACEMENT: ICD-10-CM

## 2021-05-14 DIAGNOSIS — M62.81 MUSCLE WEAKNESS: ICD-10-CM

## 2021-05-14 PROCEDURE — 99024 PR POST-OP FOLLOW-UP VISIT: ICD-10-PCS | Mod: S$GLB,,, | Performed by: ORTHOPAEDIC SURGERY

## 2021-05-14 PROCEDURE — 99024 POSTOP FOLLOW-UP VISIT: CPT | Mod: S$GLB,,, | Performed by: ORTHOPAEDIC SURGERY

## 2021-05-14 PROCEDURE — 1101F PR PT FALLS ASSESS DOC 0-1 FALLS W/OUT INJ PAST YR: ICD-10-PCS | Mod: CPTII,S$GLB,, | Performed by: ORTHOPAEDIC SURGERY

## 2021-05-14 PROCEDURE — 97140 MANUAL THERAPY 1/> REGIONS: CPT | Mod: PN

## 2021-05-14 PROCEDURE — 73562 XR KNEE 3 VIEW RIGHT: ICD-10-PCS | Mod: 26,RT,, | Performed by: RADIOLOGY

## 2021-05-14 PROCEDURE — 99999 PR PBB SHADOW E&M-EST. PATIENT-LVL III: ICD-10-PCS | Mod: PBBFAC,,, | Performed by: ORTHOPAEDIC SURGERY

## 2021-05-14 PROCEDURE — 1157F PR ADVANCE CARE PLAN OR EQUIV PRESENT IN MEDICAL RECORD: ICD-10-PCS | Mod: S$GLB,,, | Performed by: ORTHOPAEDIC SURGERY

## 2021-05-14 PROCEDURE — 1157F ADVNC CARE PLAN IN RCRD: CPT | Mod: S$GLB,,, | Performed by: ORTHOPAEDIC SURGERY

## 2021-05-14 PROCEDURE — 3288F PR FALLS RISK ASSESSMENT DOCUMENTED: ICD-10-PCS | Mod: CPTII,S$GLB,, | Performed by: ORTHOPAEDIC SURGERY

## 2021-05-14 PROCEDURE — 3288F FALL RISK ASSESSMENT DOCD: CPT | Mod: CPTII,S$GLB,, | Performed by: ORTHOPAEDIC SURGERY

## 2021-05-14 PROCEDURE — 97110 THERAPEUTIC EXERCISES: CPT | Mod: PN

## 2021-05-14 PROCEDURE — 3008F BODY MASS INDEX DOCD: CPT | Mod: CPTII,S$GLB,, | Performed by: ORTHOPAEDIC SURGERY

## 2021-05-14 PROCEDURE — 1125F AMNT PAIN NOTED PAIN PRSNT: CPT | Mod: S$GLB,,, | Performed by: ORTHOPAEDIC SURGERY

## 2021-05-14 PROCEDURE — 73562 X-RAY EXAM OF KNEE 3: CPT | Mod: 26,RT,, | Performed by: RADIOLOGY

## 2021-05-14 PROCEDURE — 1101F PT FALLS ASSESS-DOCD LE1/YR: CPT | Mod: CPTII,S$GLB,, | Performed by: ORTHOPAEDIC SURGERY

## 2021-05-14 PROCEDURE — 3008F PR BODY MASS INDEX (BMI) DOCUMENTED: ICD-10-PCS | Mod: CPTII,S$GLB,, | Performed by: ORTHOPAEDIC SURGERY

## 2021-05-14 PROCEDURE — 73562 X-RAY EXAM OF KNEE 3: CPT | Mod: TC,RT

## 2021-05-14 PROCEDURE — 1125F PR PAIN SEVERITY QUANTIFIED, PAIN PRESENT: ICD-10-PCS | Mod: S$GLB,,, | Performed by: ORTHOPAEDIC SURGERY

## 2021-05-14 PROCEDURE — 99999 PR PBB SHADOW E&M-EST. PATIENT-LVL III: CPT | Mod: PBBFAC,,, | Performed by: ORTHOPAEDIC SURGERY

## 2021-05-18 ENCOUNTER — CLINICAL SUPPORT (OUTPATIENT)
Dept: REHABILITATION | Facility: HOSPITAL | Age: 66
End: 2021-05-18
Payer: MEDICARE

## 2021-05-18 DIAGNOSIS — M62.81 MUSCLE WEAKNESS: ICD-10-CM

## 2021-05-18 DIAGNOSIS — M25.60 DECREASED RANGE OF MOTION: ICD-10-CM

## 2021-05-18 PROCEDURE — 97110 THERAPEUTIC EXERCISES: CPT | Mod: PN

## 2021-05-18 PROCEDURE — 97140 MANUAL THERAPY 1/> REGIONS: CPT | Mod: PN

## 2021-05-25 ENCOUNTER — CLINICAL SUPPORT (OUTPATIENT)
Dept: REHABILITATION | Facility: HOSPITAL | Age: 66
End: 2021-05-25
Payer: MEDICARE

## 2021-05-25 ENCOUNTER — LAB VISIT (OUTPATIENT)
Dept: LAB | Facility: HOSPITAL | Age: 66
End: 2021-05-25
Attending: INTERNAL MEDICINE
Payer: MEDICARE

## 2021-05-25 DIAGNOSIS — M62.81 MUSCLE WEAKNESS: ICD-10-CM

## 2021-05-25 DIAGNOSIS — E78.5 HYPERLIPIDEMIA, UNSPECIFIED HYPERLIPIDEMIA TYPE: ICD-10-CM

## 2021-05-25 DIAGNOSIS — Z00.00 ANNUAL PHYSICAL EXAM: ICD-10-CM

## 2021-05-25 DIAGNOSIS — E66.9 OBESITY (BMI 30-39.9): ICD-10-CM

## 2021-05-25 DIAGNOSIS — M25.60 DECREASED RANGE OF MOTION: ICD-10-CM

## 2021-05-25 LAB
ALBUMIN SERPL BCP-MCNC: 3.9 G/DL (ref 3.5–5.2)
ALP SERPL-CCNC: 100 U/L (ref 55–135)
ALT SERPL W/O P-5'-P-CCNC: 25 U/L (ref 10–44)
ANION GAP SERPL CALC-SCNC: 12 MMOL/L (ref 8–16)
AST SERPL-CCNC: 29 U/L (ref 10–40)
BASOPHILS # BLD AUTO: 0.02 K/UL (ref 0–0.2)
BASOPHILS NFR BLD: 0.3 % (ref 0–1.9)
BILIRUB SERPL-MCNC: 0.3 MG/DL (ref 0.1–1)
BUN SERPL-MCNC: 17 MG/DL (ref 8–23)
CALCIUM SERPL-MCNC: 9.6 MG/DL (ref 8.7–10.5)
CHLORIDE SERPL-SCNC: 104 MMOL/L (ref 95–110)
CHOLEST SERPL-MCNC: 219 MG/DL (ref 120–199)
CHOLEST/HDLC SERPL: 4.9 {RATIO} (ref 2–5)
CO2 SERPL-SCNC: 25 MMOL/L (ref 23–29)
CREAT SERPL-MCNC: 0.8 MG/DL (ref 0.5–1.4)
DIFFERENTIAL METHOD: ABNORMAL
EOSINOPHIL # BLD AUTO: 0.2 K/UL (ref 0–0.5)
EOSINOPHIL NFR BLD: 3.5 % (ref 0–8)
ERYTHROCYTE [DISTWIDTH] IN BLOOD BY AUTOMATED COUNT: 15.2 % (ref 11.5–14.5)
EST. GFR  (AFRICAN AMERICAN): >60 ML/MIN/1.73 M^2
EST. GFR  (NON AFRICAN AMERICAN): >60 ML/MIN/1.73 M^2
GLUCOSE SERPL-MCNC: 89 MG/DL (ref 70–110)
HCT VFR BLD AUTO: 37.9 % (ref 37–48.5)
HDLC SERPL-MCNC: 45 MG/DL (ref 40–75)
HDLC SERPL: 20.5 % (ref 20–50)
HGB BLD-MCNC: 11.6 G/DL (ref 12–16)
IMM GRANULOCYTES # BLD AUTO: 0.04 K/UL (ref 0–0.04)
IMM GRANULOCYTES NFR BLD AUTO: 0.6 % (ref 0–0.5)
LDLC SERPL CALC-MCNC: 144 MG/DL (ref 63–159)
LYMPHOCYTES # BLD AUTO: 1.7 K/UL (ref 1–4.8)
LYMPHOCYTES NFR BLD: 27.7 % (ref 18–48)
MCH RBC QN AUTO: 25.9 PG (ref 27–31)
MCHC RBC AUTO-ENTMCNC: 30.6 G/DL (ref 32–36)
MCV RBC AUTO: 85 FL (ref 82–98)
MONOCYTES # BLD AUTO: 0.5 K/UL (ref 0.3–1)
MONOCYTES NFR BLD: 7.6 % (ref 4–15)
NEUTROPHILS # BLD AUTO: 3.8 K/UL (ref 1.8–7.7)
NEUTROPHILS NFR BLD: 60.3 % (ref 38–73)
NONHDLC SERPL-MCNC: 174 MG/DL
NRBC BLD-RTO: 0 /100 WBC
PLATELET # BLD AUTO: 333 K/UL (ref 150–450)
PMV BLD AUTO: 10.2 FL (ref 9.2–12.9)
POTASSIUM SERPL-SCNC: 4.3 MMOL/L (ref 3.5–5.1)
PROT SERPL-MCNC: 7.3 G/DL (ref 6–8.4)
RBC # BLD AUTO: 4.48 M/UL (ref 4–5.4)
SODIUM SERPL-SCNC: 141 MMOL/L (ref 136–145)
TRIGL SERPL-MCNC: 150 MG/DL (ref 30–150)
WBC # BLD AUTO: 6.29 K/UL (ref 3.9–12.7)

## 2021-05-25 PROCEDURE — 97110 THERAPEUTIC EXERCISES: CPT | Mod: PN

## 2021-05-25 PROCEDURE — 80053 COMPREHEN METABOLIC PANEL: CPT | Performed by: INTERNAL MEDICINE

## 2021-05-25 PROCEDURE — 36415 COLL VENOUS BLD VENIPUNCTURE: CPT | Mod: PO | Performed by: INTERNAL MEDICINE

## 2021-05-25 PROCEDURE — 80061 LIPID PANEL: CPT | Performed by: INTERNAL MEDICINE

## 2021-05-25 PROCEDURE — 97140 MANUAL THERAPY 1/> REGIONS: CPT | Mod: PN

## 2021-05-25 PROCEDURE — 85025 COMPLETE CBC W/AUTO DIFF WBC: CPT | Performed by: INTERNAL MEDICINE

## 2021-05-26 ENCOUNTER — OFFICE VISIT (OUTPATIENT)
Dept: INTERNAL MEDICINE | Facility: CLINIC | Age: 66
End: 2021-05-26
Payer: MEDICARE

## 2021-05-26 VITALS
BODY MASS INDEX: 34.14 KG/M2 | OXYGEN SATURATION: 98 % | DIASTOLIC BLOOD PRESSURE: 84 MMHG | SYSTOLIC BLOOD PRESSURE: 128 MMHG | WEIGHT: 163.38 LBS | HEART RATE: 82 BPM

## 2021-05-26 DIAGNOSIS — H61.23 BILATERAL IMPACTED CERUMEN: ICD-10-CM

## 2021-05-26 DIAGNOSIS — E78.5 HYPERLIPIDEMIA, UNSPECIFIED HYPERLIPIDEMIA TYPE: ICD-10-CM

## 2021-05-26 DIAGNOSIS — E55.9 VITAMIN D INSUFFICIENCY: ICD-10-CM

## 2021-05-26 DIAGNOSIS — D64.9 ANEMIA, UNSPECIFIED TYPE: ICD-10-CM

## 2021-05-26 DIAGNOSIS — I10 ESSENTIAL HYPERTENSION: ICD-10-CM

## 2021-05-26 PROCEDURE — 3079F PR MOST RECENT DIASTOLIC BLOOD PRESSURE 80-89 MM HG: ICD-10-PCS | Mod: CPTII,S$GLB,, | Performed by: INTERNAL MEDICINE

## 2021-05-26 PROCEDURE — 1159F MED LIST DOCD IN RCRD: CPT | Mod: S$GLB,,, | Performed by: INTERNAL MEDICINE

## 2021-05-26 PROCEDURE — 1157F ADVNC CARE PLAN IN RCRD: CPT | Mod: S$GLB,,, | Performed by: INTERNAL MEDICINE

## 2021-05-26 PROCEDURE — 3008F PR BODY MASS INDEX (BMI) DOCUMENTED: ICD-10-PCS | Mod: CPTII,S$GLB,, | Performed by: INTERNAL MEDICINE

## 2021-05-26 PROCEDURE — 1101F PR PT FALLS ASSESS DOC 0-1 FALLS W/OUT INJ PAST YR: ICD-10-PCS | Mod: CPTII,S$GLB,, | Performed by: INTERNAL MEDICINE

## 2021-05-26 PROCEDURE — 1125F PR PAIN SEVERITY QUANTIFIED, PAIN PRESENT: ICD-10-PCS | Mod: S$GLB,,, | Performed by: INTERNAL MEDICINE

## 2021-05-26 PROCEDURE — 1157F PR ADVANCE CARE PLAN OR EQUIV PRESENT IN MEDICAL RECORD: ICD-10-PCS | Mod: S$GLB,,, | Performed by: INTERNAL MEDICINE

## 2021-05-26 PROCEDURE — 1159F PR MEDICATION LIST DOCUMENTED IN MEDICAL RECORD: ICD-10-PCS | Mod: S$GLB,,, | Performed by: INTERNAL MEDICINE

## 2021-05-26 PROCEDURE — 99999 PR PBB SHADOW E&M-EST. PATIENT-LVL III: CPT | Mod: PBBFAC,,, | Performed by: INTERNAL MEDICINE

## 2021-05-26 PROCEDURE — 3288F PR FALLS RISK ASSESSMENT DOCUMENTED: ICD-10-PCS | Mod: CPTII,S$GLB,, | Performed by: INTERNAL MEDICINE

## 2021-05-26 PROCEDURE — 99214 OFFICE O/P EST MOD 30 MIN: CPT | Mod: S$GLB,,, | Performed by: INTERNAL MEDICINE

## 2021-05-26 PROCEDURE — 99999 PR PBB SHADOW E&M-EST. PATIENT-LVL III: ICD-10-PCS | Mod: PBBFAC,,, | Performed by: INTERNAL MEDICINE

## 2021-05-26 PROCEDURE — 3008F BODY MASS INDEX DOCD: CPT | Mod: CPTII,S$GLB,, | Performed by: INTERNAL MEDICINE

## 2021-05-26 PROCEDURE — 1125F AMNT PAIN NOTED PAIN PRSNT: CPT | Mod: S$GLB,,, | Performed by: INTERNAL MEDICINE

## 2021-05-26 PROCEDURE — 3079F DIAST BP 80-89 MM HG: CPT | Mod: CPTII,S$GLB,, | Performed by: INTERNAL MEDICINE

## 2021-05-26 PROCEDURE — 3074F PR MOST RECENT SYSTOLIC BLOOD PRESSURE < 130 MM HG: ICD-10-PCS | Mod: CPTII,S$GLB,, | Performed by: INTERNAL MEDICINE

## 2021-05-26 PROCEDURE — 1101F PT FALLS ASSESS-DOCD LE1/YR: CPT | Mod: CPTII,S$GLB,, | Performed by: INTERNAL MEDICINE

## 2021-05-26 PROCEDURE — 3288F FALL RISK ASSESSMENT DOCD: CPT | Mod: CPTII,S$GLB,, | Performed by: INTERNAL MEDICINE

## 2021-05-26 PROCEDURE — 99214 PR OFFICE/OUTPT VISIT, EST, LEVL IV, 30-39 MIN: ICD-10-PCS | Mod: S$GLB,,, | Performed by: INTERNAL MEDICINE

## 2021-05-26 PROCEDURE — 3074F SYST BP LT 130 MM HG: CPT | Mod: CPTII,S$GLB,, | Performed by: INTERNAL MEDICINE

## 2021-05-28 ENCOUNTER — CLINICAL SUPPORT (OUTPATIENT)
Dept: REHABILITATION | Facility: HOSPITAL | Age: 66
End: 2021-05-28
Payer: MEDICARE

## 2021-05-28 DIAGNOSIS — M62.81 MUSCLE WEAKNESS: ICD-10-CM

## 2021-05-28 DIAGNOSIS — M25.60 DECREASED RANGE OF MOTION: ICD-10-CM

## 2021-05-28 PROCEDURE — 97110 THERAPEUTIC EXERCISES: CPT | Mod: PN,CQ

## 2021-05-28 PROCEDURE — 97140 MANUAL THERAPY 1/> REGIONS: CPT | Mod: PN,CQ

## 2021-06-02 ENCOUNTER — DOCUMENTATION ONLY (OUTPATIENT)
Dept: REHABILITATION | Facility: HOSPITAL | Age: 66
End: 2021-06-02

## 2021-06-02 ENCOUNTER — CLINICAL SUPPORT (OUTPATIENT)
Dept: REHABILITATION | Facility: HOSPITAL | Age: 66
End: 2021-06-02
Payer: MEDICARE

## 2021-06-02 DIAGNOSIS — M25.60 DECREASED RANGE OF MOTION: ICD-10-CM

## 2021-06-02 DIAGNOSIS — M62.81 MUSCLE WEAKNESS: ICD-10-CM

## 2021-06-02 PROCEDURE — 97140 MANUAL THERAPY 1/> REGIONS: CPT | Mod: PN,CQ

## 2021-06-02 PROCEDURE — 97110 THERAPEUTIC EXERCISES: CPT | Mod: PN,CQ

## 2021-06-03 ENCOUNTER — CLINICAL SUPPORT (OUTPATIENT)
Dept: REHABILITATION | Facility: HOSPITAL | Age: 66
End: 2021-06-03
Payer: MEDICARE

## 2021-06-03 DIAGNOSIS — M62.81 MUSCLE WEAKNESS: ICD-10-CM

## 2021-06-03 DIAGNOSIS — M25.60 DECREASED RANGE OF MOTION: ICD-10-CM

## 2021-06-03 PROCEDURE — 97110 THERAPEUTIC EXERCISES: CPT | Mod: PN

## 2021-06-16 ENCOUNTER — PATIENT MESSAGE (OUTPATIENT)
Dept: ORTHOPEDICS | Facility: CLINIC | Age: 66
End: 2021-06-16

## 2021-06-23 ENCOUNTER — OFFICE VISIT (OUTPATIENT)
Dept: OTOLARYNGOLOGY | Facility: CLINIC | Age: 66
End: 2021-06-23
Payer: MEDICARE

## 2021-06-23 VITALS — WEIGHT: 164.44 LBS | BODY MASS INDEX: 34.37 KG/M2

## 2021-06-23 DIAGNOSIS — H61.23 BILATERAL IMPACTED CERUMEN: Primary | ICD-10-CM

## 2021-06-23 PROCEDURE — 1157F ADVNC CARE PLAN IN RCRD: CPT | Mod: S$GLB,,, | Performed by: OTOLARYNGOLOGY

## 2021-06-23 PROCEDURE — 69210 PR REMOVAL IMPACTED CERUMEN REQUIRING INSTRUMENTATION, UNILATERAL: ICD-10-PCS | Mod: S$GLB,,, | Performed by: OTOLARYNGOLOGY

## 2021-06-23 PROCEDURE — 1126F PR PAIN SEVERITY QUANTIFIED, NO PAIN PRESENT: ICD-10-PCS | Mod: S$GLB,,, | Performed by: OTOLARYNGOLOGY

## 2021-06-23 PROCEDURE — 1101F PR PT FALLS ASSESS DOC 0-1 FALLS W/OUT INJ PAST YR: ICD-10-PCS | Mod: CPTII,S$GLB,, | Performed by: OTOLARYNGOLOGY

## 2021-06-23 PROCEDURE — 1157F PR ADVANCE CARE PLAN OR EQUIV PRESENT IN MEDICAL RECORD: ICD-10-PCS | Mod: S$GLB,,, | Performed by: OTOLARYNGOLOGY

## 2021-06-23 PROCEDURE — 69210 REMOVE IMPACTED EAR WAX UNI: CPT | Mod: S$GLB,,, | Performed by: OTOLARYNGOLOGY

## 2021-06-23 PROCEDURE — 3008F BODY MASS INDEX DOCD: CPT | Mod: CPTII,S$GLB,, | Performed by: OTOLARYNGOLOGY

## 2021-06-23 PROCEDURE — 1126F AMNT PAIN NOTED NONE PRSNT: CPT | Mod: S$GLB,,, | Performed by: OTOLARYNGOLOGY

## 2021-06-23 PROCEDURE — 99499 NO LOS: ICD-10-PCS | Mod: S$GLB,,, | Performed by: OTOLARYNGOLOGY

## 2021-06-23 PROCEDURE — 3288F PR FALLS RISK ASSESSMENT DOCUMENTED: ICD-10-PCS | Mod: CPTII,S$GLB,, | Performed by: OTOLARYNGOLOGY

## 2021-06-23 PROCEDURE — 1101F PT FALLS ASSESS-DOCD LE1/YR: CPT | Mod: CPTII,S$GLB,, | Performed by: OTOLARYNGOLOGY

## 2021-06-23 PROCEDURE — 99999 PR PBB SHADOW E&M-EST. PATIENT-LVL III: CPT | Mod: PBBFAC,,, | Performed by: OTOLARYNGOLOGY

## 2021-06-23 PROCEDURE — 99999 PR PBB SHADOW E&M-EST. PATIENT-LVL III: ICD-10-PCS | Mod: PBBFAC,,, | Performed by: OTOLARYNGOLOGY

## 2021-06-23 PROCEDURE — 3288F FALL RISK ASSESSMENT DOCD: CPT | Mod: CPTII,S$GLB,, | Performed by: OTOLARYNGOLOGY

## 2021-06-23 PROCEDURE — 3008F PR BODY MASS INDEX (BMI) DOCUMENTED: ICD-10-PCS | Mod: CPTII,S$GLB,, | Performed by: OTOLARYNGOLOGY

## 2021-06-23 PROCEDURE — 99499 UNLISTED E&M SERVICE: CPT | Mod: S$GLB,,, | Performed by: OTOLARYNGOLOGY

## 2021-06-24 ENCOUNTER — PATIENT MESSAGE (OUTPATIENT)
Dept: ADMINISTRATIVE | Facility: OTHER | Age: 66
End: 2021-06-24

## 2021-06-24 ENCOUNTER — OFFICE VISIT (OUTPATIENT)
Dept: ORTHOPEDICS | Facility: CLINIC | Age: 66
End: 2021-06-24
Payer: MEDICARE

## 2021-06-24 VITALS — HEIGHT: 58 IN | BODY MASS INDEX: 34.02 KG/M2 | WEIGHT: 162.06 LBS

## 2021-06-24 DIAGNOSIS — Z96.651 STATUS POST TOTAL RIGHT KNEE REPLACEMENT: Primary | ICD-10-CM

## 2021-06-24 PROCEDURE — 1101F PT FALLS ASSESS-DOCD LE1/YR: CPT | Mod: CPTII,S$GLB,, | Performed by: ORTHOPAEDIC SURGERY

## 2021-06-24 PROCEDURE — 1125F PR PAIN SEVERITY QUANTIFIED, PAIN PRESENT: ICD-10-PCS | Mod: S$GLB,,, | Performed by: ORTHOPAEDIC SURGERY

## 2021-06-24 PROCEDURE — 3288F FALL RISK ASSESSMENT DOCD: CPT | Mod: CPTII,S$GLB,, | Performed by: ORTHOPAEDIC SURGERY

## 2021-06-24 PROCEDURE — 1157F PR ADVANCE CARE PLAN OR EQUIV PRESENT IN MEDICAL RECORD: ICD-10-PCS | Mod: S$GLB,,, | Performed by: ORTHOPAEDIC SURGERY

## 2021-06-24 PROCEDURE — 99999 PR PBB SHADOW E&M-EST. PATIENT-LVL III: ICD-10-PCS | Mod: PBBFAC,,, | Performed by: ORTHOPAEDIC SURGERY

## 2021-06-24 PROCEDURE — 99999 PR PBB SHADOW E&M-EST. PATIENT-LVL III: CPT | Mod: PBBFAC,,, | Performed by: ORTHOPAEDIC SURGERY

## 2021-06-24 PROCEDURE — 1101F PR PT FALLS ASSESS DOC 0-1 FALLS W/OUT INJ PAST YR: ICD-10-PCS | Mod: CPTII,S$GLB,, | Performed by: ORTHOPAEDIC SURGERY

## 2021-06-24 PROCEDURE — 3008F BODY MASS INDEX DOCD: CPT | Mod: CPTII,S$GLB,, | Performed by: ORTHOPAEDIC SURGERY

## 2021-06-24 PROCEDURE — 99024 PR POST-OP FOLLOW-UP VISIT: ICD-10-PCS | Mod: S$GLB,,, | Performed by: ORTHOPAEDIC SURGERY

## 2021-06-24 PROCEDURE — 1125F AMNT PAIN NOTED PAIN PRSNT: CPT | Mod: S$GLB,,, | Performed by: ORTHOPAEDIC SURGERY

## 2021-06-24 PROCEDURE — 3008F PR BODY MASS INDEX (BMI) DOCUMENTED: ICD-10-PCS | Mod: CPTII,S$GLB,, | Performed by: ORTHOPAEDIC SURGERY

## 2021-06-24 PROCEDURE — 3288F PR FALLS RISK ASSESSMENT DOCUMENTED: ICD-10-PCS | Mod: CPTII,S$GLB,, | Performed by: ORTHOPAEDIC SURGERY

## 2021-06-24 PROCEDURE — 99024 POSTOP FOLLOW-UP VISIT: CPT | Mod: S$GLB,,, | Performed by: ORTHOPAEDIC SURGERY

## 2021-06-24 PROCEDURE — 1157F ADVNC CARE PLAN IN RCRD: CPT | Mod: S$GLB,,, | Performed by: ORTHOPAEDIC SURGERY

## 2021-07-30 ENCOUNTER — PATIENT MESSAGE (OUTPATIENT)
Dept: INTERNAL MEDICINE | Facility: CLINIC | Age: 66
End: 2021-07-30

## 2021-07-30 ENCOUNTER — TELEPHONE (OUTPATIENT)
Dept: INTERNAL MEDICINE | Facility: CLINIC | Age: 66
End: 2021-07-30

## 2021-07-30 ENCOUNTER — NURSE TRIAGE (OUTPATIENT)
Dept: ADMINISTRATIVE | Facility: CLINIC | Age: 66
End: 2021-07-30

## 2021-08-05 ENCOUNTER — IMMUNIZATION (OUTPATIENT)
Dept: INTERNAL MEDICINE | Facility: CLINIC | Age: 66
End: 2021-08-05
Payer: MEDICARE

## 2021-08-05 DIAGNOSIS — Z23 NEED FOR VACCINATION: Primary | ICD-10-CM

## 2021-08-05 PROCEDURE — 91300 COVID-19, MRNA, LNP-S, PF, 30 MCG/0.3 ML DOSE VACCINE: ICD-10-PCS | Mod: ,,, | Performed by: INTERNAL MEDICINE

## 2021-08-05 PROCEDURE — 0001A COVID-19, MRNA, LNP-S, PF, 30 MCG/0.3 ML DOSE VACCINE: ICD-10-PCS | Mod: CV19,,, | Performed by: INTERNAL MEDICINE

## 2021-08-05 PROCEDURE — 0001A COVID-19, MRNA, LNP-S, PF, 30 MCG/0.3 ML DOSE VACCINE: CPT | Mod: CV19,,, | Performed by: INTERNAL MEDICINE

## 2021-08-05 PROCEDURE — 91300 COVID-19, MRNA, LNP-S, PF, 30 MCG/0.3 ML DOSE VACCINE: CPT | Mod: ,,, | Performed by: INTERNAL MEDICINE

## 2021-08-09 ENCOUNTER — TELEPHONE (OUTPATIENT)
Dept: ORTHOPEDICS | Facility: CLINIC | Age: 66
End: 2021-08-09

## 2021-08-12 ENCOUNTER — OFFICE VISIT (OUTPATIENT)
Dept: ORTHOPEDICS | Facility: CLINIC | Age: 66
End: 2021-08-12
Payer: MEDICARE

## 2021-08-12 ENCOUNTER — HOSPITAL ENCOUNTER (OUTPATIENT)
Dept: RADIOLOGY | Facility: HOSPITAL | Age: 66
Discharge: HOME | End: 2021-08-12
Attending: PHYSICIAN ASSISTANT
Payer: MEDICARE

## 2021-08-12 VITALS — WEIGHT: 167.63 LBS | HEIGHT: 59 IN | BODY MASS INDEX: 33.8 KG/M2

## 2021-08-12 DIAGNOSIS — Z96.651 STATUS POST TOTAL RIGHT KNEE REPLACEMENT: ICD-10-CM

## 2021-08-12 DIAGNOSIS — Z96.651 STATUS POST TOTAL RIGHT KNEE REPLACEMENT: Primary | ICD-10-CM

## 2021-08-12 PROCEDURE — 99999 PR PBB SHADOW E&M-EST. PATIENT-LVL III: ICD-10-PCS | Mod: PBBFAC,,, | Performed by: PHYSICIAN ASSISTANT

## 2021-08-12 PROCEDURE — 1159F MED LIST DOCD IN RCRD: CPT | Mod: CPTII,S$GLB,, | Performed by: PHYSICIAN ASSISTANT

## 2021-08-12 PROCEDURE — 1157F ADVNC CARE PLAN IN RCRD: CPT | Mod: CPTII,S$GLB,, | Performed by: PHYSICIAN ASSISTANT

## 2021-08-12 PROCEDURE — 1159F PR MEDICATION LIST DOCUMENTED IN MEDICAL RECORD: ICD-10-PCS | Mod: CPTII,S$GLB,, | Performed by: PHYSICIAN ASSISTANT

## 2021-08-12 PROCEDURE — 73560 X-RAY EXAM OF KNEE 1 OR 2: CPT | Mod: 26,RT,, | Performed by: RADIOLOGY

## 2021-08-12 PROCEDURE — 73560 X-RAY EXAM OF KNEE 1 OR 2: CPT | Mod: TC,RT

## 2021-08-12 PROCEDURE — 3008F BODY MASS INDEX DOCD: CPT | Mod: CPTII,S$GLB,, | Performed by: PHYSICIAN ASSISTANT

## 2021-08-12 PROCEDURE — 1125F PR PAIN SEVERITY QUANTIFIED, PAIN PRESENT: ICD-10-PCS | Mod: CPTII,S$GLB,, | Performed by: PHYSICIAN ASSISTANT

## 2021-08-12 PROCEDURE — 99213 OFFICE O/P EST LOW 20 MIN: CPT | Mod: S$GLB,,, | Performed by: PHYSICIAN ASSISTANT

## 2021-08-12 PROCEDURE — 1101F PT FALLS ASSESS-DOCD LE1/YR: CPT | Mod: CPTII,S$GLB,, | Performed by: PHYSICIAN ASSISTANT

## 2021-08-12 PROCEDURE — 1101F PR PT FALLS ASSESS DOC 0-1 FALLS W/OUT INJ PAST YR: ICD-10-PCS | Mod: CPTII,S$GLB,, | Performed by: PHYSICIAN ASSISTANT

## 2021-08-12 PROCEDURE — 1160F RVW MEDS BY RX/DR IN RCRD: CPT | Mod: CPTII,S$GLB,, | Performed by: PHYSICIAN ASSISTANT

## 2021-08-12 PROCEDURE — 3288F FALL RISK ASSESSMENT DOCD: CPT | Mod: CPTII,S$GLB,, | Performed by: PHYSICIAN ASSISTANT

## 2021-08-12 PROCEDURE — 1160F PR REVIEW ALL MEDS BY PRESCRIBER/CLIN PHARMACIST DOCUMENTED: ICD-10-PCS | Mod: CPTII,S$GLB,, | Performed by: PHYSICIAN ASSISTANT

## 2021-08-12 PROCEDURE — 99999 PR PBB SHADOW E&M-EST. PATIENT-LVL III: CPT | Mod: PBBFAC,,, | Performed by: PHYSICIAN ASSISTANT

## 2021-08-12 PROCEDURE — 3288F PR FALLS RISK ASSESSMENT DOCUMENTED: ICD-10-PCS | Mod: CPTII,S$GLB,, | Performed by: PHYSICIAN ASSISTANT

## 2021-08-12 PROCEDURE — 99213 PR OFFICE/OUTPT VISIT, EST, LEVL III, 20-29 MIN: ICD-10-PCS | Mod: S$GLB,,, | Performed by: PHYSICIAN ASSISTANT

## 2021-08-12 PROCEDURE — 73560 XR KNEE 1 OR 2 VIEW RIGHT: ICD-10-PCS | Mod: 26,RT,, | Performed by: RADIOLOGY

## 2021-08-12 PROCEDURE — 1157F PR ADVANCE CARE PLAN OR EQUIV PRESENT IN MEDICAL RECORD: ICD-10-PCS | Mod: CPTII,S$GLB,, | Performed by: PHYSICIAN ASSISTANT

## 2021-08-12 PROCEDURE — 1125F AMNT PAIN NOTED PAIN PRSNT: CPT | Mod: CPTII,S$GLB,, | Performed by: PHYSICIAN ASSISTANT

## 2021-08-12 PROCEDURE — 3008F PR BODY MASS INDEX (BMI) DOCUMENTED: ICD-10-PCS | Mod: CPTII,S$GLB,, | Performed by: PHYSICIAN ASSISTANT

## 2021-08-16 DIAGNOSIS — E78.5 HYPERLIPIDEMIA, UNSPECIFIED HYPERLIPIDEMIA TYPE: ICD-10-CM

## 2021-08-16 DIAGNOSIS — E55.9 VITAMIN D INSUFFICIENCY: ICD-10-CM

## 2021-08-17 RX ORDER — METOPROLOL SUCCINATE 25 MG/1
25 TABLET, EXTENDED RELEASE ORAL NIGHTLY
Qty: 90 TABLET | Refills: 0 | Status: SHIPPED | OUTPATIENT
Start: 2021-08-17 | End: 2021-08-25 | Stop reason: SDUPTHER

## 2021-08-17 RX ORDER — IRBESARTAN 150 MG/1
TABLET ORAL
Qty: 90 TABLET | Refills: 0 | Status: SHIPPED | OUTPATIENT
Start: 2021-08-17 | End: 2021-08-27

## 2021-08-17 RX ORDER — ERGOCALCIFEROL 1.25 MG/1
50000 CAPSULE ORAL
Qty: 12 CAPSULE | Refills: 0 | Status: SHIPPED | OUTPATIENT
Start: 2021-08-17 | End: 2021-08-25 | Stop reason: SDUPTHER

## 2021-08-17 RX ORDER — PRAVASTATIN SODIUM 40 MG/1
40 TABLET ORAL NIGHTLY
Qty: 90 TABLET | Refills: 0 | Status: SHIPPED | OUTPATIENT
Start: 2021-08-17 | End: 2021-08-25 | Stop reason: SDUPTHER

## 2021-08-25 DIAGNOSIS — E78.5 HYPERLIPIDEMIA, UNSPECIFIED HYPERLIPIDEMIA TYPE: ICD-10-CM

## 2021-08-25 DIAGNOSIS — E55.9 VITAMIN D INSUFFICIENCY: ICD-10-CM

## 2021-08-25 DIAGNOSIS — I10 ESSENTIAL HYPERTENSION: ICD-10-CM

## 2021-08-25 RX ORDER — PRAVASTATIN SODIUM 40 MG/1
40 TABLET ORAL NIGHTLY
Qty: 90 TABLET | Refills: 0 | Status: SHIPPED | OUTPATIENT
Start: 2021-08-25 | End: 2021-11-30

## 2021-08-25 RX ORDER — METOPROLOL SUCCINATE 25 MG/1
25 TABLET, EXTENDED RELEASE ORAL NIGHTLY
Qty: 90 TABLET | Refills: 0 | Status: SHIPPED | OUTPATIENT
Start: 2021-08-25 | End: 2021-11-30

## 2021-08-25 RX ORDER — ERGOCALCIFEROL 1.25 MG/1
50000 CAPSULE ORAL
Qty: 12 CAPSULE | Refills: 0 | Status: SHIPPED | OUTPATIENT
Start: 2021-08-25 | End: 2021-10-19

## 2021-08-25 RX ORDER — HYDROCHLOROTHIAZIDE 12.5 MG/1
12.5 CAPSULE ORAL NIGHTLY
Qty: 90 CAPSULE | Refills: 0 | Status: SHIPPED | OUTPATIENT
Start: 2021-08-25 | End: 2021-11-30

## 2021-08-26 ENCOUNTER — IMMUNIZATION (OUTPATIENT)
Dept: INTERNAL MEDICINE | Facility: CLINIC | Age: 66
End: 2021-08-26
Payer: MEDICARE

## 2021-08-26 DIAGNOSIS — Z23 NEED FOR VACCINATION: Primary | ICD-10-CM

## 2021-08-26 PROCEDURE — 91300 COVID-19, MRNA, LNP-S, PF, 30 MCG/0.3 ML DOSE VACCINE: CPT | Mod: HCNC,PBBFAC | Performed by: INTERNAL MEDICINE

## 2021-08-26 PROCEDURE — 0002A COVID-19, MRNA, LNP-S, PF, 30 MCG/0.3 ML DOSE VACCINE: CPT | Mod: HCNC,PBBFAC | Performed by: INTERNAL MEDICINE

## 2021-10-06 RX ORDER — IRBESARTAN 150 MG/1
150 TABLET ORAL DAILY
Qty: 90 TABLET | Refills: 0 | Status: SHIPPED | OUTPATIENT
Start: 2021-10-06 | End: 2022-02-03 | Stop reason: SDUPTHER

## 2021-11-04 RX ORDER — MELOXICAM 15 MG/1
7.5 TABLET ORAL DAILY PRN
Qty: 90 TABLET | Refills: 1 | Status: SHIPPED | OUTPATIENT
Start: 2021-11-04 | End: 2022-04-19

## 2021-11-23 ENCOUNTER — LAB VISIT (OUTPATIENT)
Dept: LAB | Facility: HOSPITAL | Age: 66
End: 2021-11-23
Attending: INTERNAL MEDICINE
Payer: MEDICARE

## 2021-11-23 DIAGNOSIS — I10 ESSENTIAL HYPERTENSION: ICD-10-CM

## 2021-11-23 DIAGNOSIS — E55.9 VITAMIN D INSUFFICIENCY: ICD-10-CM

## 2021-11-23 DIAGNOSIS — E78.5 HYPERLIPIDEMIA, UNSPECIFIED HYPERLIPIDEMIA TYPE: ICD-10-CM

## 2021-11-23 DIAGNOSIS — D64.9 ANEMIA, UNSPECIFIED TYPE: ICD-10-CM

## 2021-11-23 LAB
25(OH)D3+25(OH)D2 SERPL-MCNC: 32 NG/ML (ref 30–96)
ALBUMIN SERPL BCP-MCNC: 3.8 G/DL (ref 3.5–5.2)
ALP SERPL-CCNC: 90 U/L (ref 55–135)
ALT SERPL W/O P-5'-P-CCNC: 36 U/L (ref 10–44)
ANION GAP SERPL CALC-SCNC: 8 MMOL/L (ref 8–16)
AST SERPL-CCNC: 36 U/L (ref 10–40)
BASOPHILS # BLD AUTO: 0.02 K/UL (ref 0–0.2)
BASOPHILS NFR BLD: 0.3 % (ref 0–1.9)
BILIRUB SERPL-MCNC: 0.6 MG/DL (ref 0.1–1)
BUN SERPL-MCNC: 13 MG/DL (ref 8–23)
CALCIUM SERPL-MCNC: 9.9 MG/DL (ref 8.7–10.5)
CHLORIDE SERPL-SCNC: 102 MMOL/L (ref 95–110)
CHOLEST SERPL-MCNC: 229 MG/DL (ref 120–199)
CHOLEST/HDLC SERPL: 5 {RATIO} (ref 2–5)
CO2 SERPL-SCNC: 30 MMOL/L (ref 23–29)
CREAT SERPL-MCNC: 0.7 MG/DL (ref 0.5–1.4)
DIFFERENTIAL METHOD: ABNORMAL
EOSINOPHIL # BLD AUTO: 0.2 K/UL (ref 0–0.5)
EOSINOPHIL NFR BLD: 2.8 % (ref 0–8)
ERYTHROCYTE [DISTWIDTH] IN BLOOD BY AUTOMATED COUNT: 16.5 % (ref 11.5–14.5)
EST. GFR  (AFRICAN AMERICAN): >60 ML/MIN/1.73 M^2
EST. GFR  (NON AFRICAN AMERICAN): >60 ML/MIN/1.73 M^2
GLUCOSE SERPL-MCNC: 101 MG/DL (ref 70–110)
HCT VFR BLD AUTO: 43.1 % (ref 37–48.5)
HDLC SERPL-MCNC: 46 MG/DL (ref 40–75)
HDLC SERPL: 20.1 % (ref 20–50)
HGB BLD-MCNC: 13.3 G/DL (ref 12–16)
IMM GRANULOCYTES # BLD AUTO: 0.03 K/UL (ref 0–0.04)
IMM GRANULOCYTES NFR BLD AUTO: 0.5 % (ref 0–0.5)
LDLC SERPL CALC-MCNC: 159.8 MG/DL (ref 63–159)
LYMPHOCYTES # BLD AUTO: 1.8 K/UL (ref 1–4.8)
LYMPHOCYTES NFR BLD: 30.8 % (ref 18–48)
MCH RBC QN AUTO: 25.7 PG (ref 27–31)
MCHC RBC AUTO-ENTMCNC: 30.9 G/DL (ref 32–36)
MCV RBC AUTO: 83 FL (ref 82–98)
MONOCYTES # BLD AUTO: 0.5 K/UL (ref 0.3–1)
MONOCYTES NFR BLD: 8 % (ref 4–15)
NEUTROPHILS # BLD AUTO: 3.4 K/UL (ref 1.8–7.7)
NEUTROPHILS NFR BLD: 57.6 % (ref 38–73)
NONHDLC SERPL-MCNC: 183 MG/DL
NRBC BLD-RTO: 0 /100 WBC
PLATELET # BLD AUTO: 323 K/UL (ref 150–450)
PMV BLD AUTO: 10.3 FL (ref 9.2–12.9)
POTASSIUM SERPL-SCNC: 4.1 MMOL/L (ref 3.5–5.1)
PROT SERPL-MCNC: 7.1 G/DL (ref 6–8.4)
RBC # BLD AUTO: 5.18 M/UL (ref 4–5.4)
SODIUM SERPL-SCNC: 140 MMOL/L (ref 136–145)
TRIGL SERPL-MCNC: 116 MG/DL (ref 30–150)
WBC # BLD AUTO: 5.98 K/UL (ref 3.9–12.7)

## 2021-11-23 PROCEDURE — 85025 COMPLETE CBC W/AUTO DIFF WBC: CPT | Mod: HCNC | Performed by: INTERNAL MEDICINE

## 2021-11-23 PROCEDURE — 80053 COMPREHEN METABOLIC PANEL: CPT | Mod: HCNC | Performed by: INTERNAL MEDICINE

## 2021-11-23 PROCEDURE — 82306 VITAMIN D 25 HYDROXY: CPT | Mod: HCNC | Performed by: INTERNAL MEDICINE

## 2021-11-23 PROCEDURE — 36415 COLL VENOUS BLD VENIPUNCTURE: CPT | Mod: HCNC,PO | Performed by: INTERNAL MEDICINE

## 2021-11-23 PROCEDURE — 80061 LIPID PANEL: CPT | Mod: HCNC | Performed by: INTERNAL MEDICINE

## 2021-11-29 ENCOUNTER — OFFICE VISIT (OUTPATIENT)
Dept: GASTROENTEROLOGY | Facility: CLINIC | Age: 66
End: 2021-11-29
Payer: MEDICARE

## 2021-11-29 VITALS — HEIGHT: 59 IN | WEIGHT: 165.56 LBS | BODY MASS INDEX: 33.38 KG/M2

## 2021-11-29 DIAGNOSIS — K59.09 CHRONIC CONSTIPATION: Primary | ICD-10-CM

## 2021-11-29 PROCEDURE — 99213 OFFICE O/P EST LOW 20 MIN: CPT | Mod: PBBFAC,PO | Performed by: INTERNAL MEDICINE

## 2021-11-29 PROCEDURE — 99999 PR PBB SHADOW E&M-EST. PATIENT-LVL III: ICD-10-PCS | Mod: PBBFAC,HCNC,, | Performed by: INTERNAL MEDICINE

## 2021-11-29 PROCEDURE — 99999 PR PBB SHADOW E&M-EST. PATIENT-LVL III: CPT | Mod: PBBFAC,HCNC,, | Performed by: INTERNAL MEDICINE

## 2021-12-01 ENCOUNTER — TELEPHONE (OUTPATIENT)
Dept: INTERNAL MEDICINE | Facility: CLINIC | Age: 66
End: 2021-12-01

## 2021-12-01 ENCOUNTER — OFFICE VISIT (OUTPATIENT)
Dept: INTERNAL MEDICINE | Facility: CLINIC | Age: 66
End: 2021-12-01
Payer: MEDICARE

## 2021-12-01 VITALS
WEIGHT: 167.13 LBS | OXYGEN SATURATION: 95 % | HEART RATE: 83 BPM | DIASTOLIC BLOOD PRESSURE: 80 MMHG | HEIGHT: 59 IN | BODY MASS INDEX: 33.69 KG/M2 | SYSTOLIC BLOOD PRESSURE: 124 MMHG

## 2021-12-01 DIAGNOSIS — C50.919 MALIGNANT NEOPLASM OF FEMALE BREAST, UNSPECIFIED ESTROGEN RECEPTOR STATUS, UNSPECIFIED LATERALITY, UNSPECIFIED SITE OF BREAST: ICD-10-CM

## 2021-12-01 DIAGNOSIS — M25.561 CHRONIC PAIN OF RIGHT KNEE: ICD-10-CM

## 2021-12-01 DIAGNOSIS — K59.09 CHRONIC CONSTIPATION: Primary | ICD-10-CM

## 2021-12-01 DIAGNOSIS — E78.5 HYPERLIPIDEMIA, UNSPECIFIED HYPERLIPIDEMIA TYPE: ICD-10-CM

## 2021-12-01 DIAGNOSIS — G89.29 CHRONIC PAIN OF RIGHT KNEE: ICD-10-CM

## 2021-12-01 DIAGNOSIS — G47.33 OSA (OBSTRUCTIVE SLEEP APNEA): ICD-10-CM

## 2021-12-01 DIAGNOSIS — I10 ESSENTIAL HYPERTENSION: ICD-10-CM

## 2021-12-01 DIAGNOSIS — E66.9 OBESITY (BMI 30-39.9): ICD-10-CM

## 2021-12-01 DIAGNOSIS — Z00.00 ANNUAL PHYSICAL EXAM: ICD-10-CM

## 2021-12-01 PROCEDURE — 99999 PR PBB SHADOW E&M-EST. PATIENT-LVL IV: CPT | Mod: PBBFAC,,, | Performed by: INTERNAL MEDICINE

## 2021-12-01 PROCEDURE — 99999 PR PBB SHADOW E&M-EST. PATIENT-LVL IV: ICD-10-PCS | Mod: PBBFAC,,, | Performed by: INTERNAL MEDICINE

## 2021-12-01 PROCEDURE — 99214 OFFICE O/P EST MOD 30 MIN: CPT | Mod: S$GLB,,, | Performed by: INTERNAL MEDICINE

## 2021-12-01 PROCEDURE — 99499 RISK ADDL DX/OHS AUDIT: ICD-10-PCS | Mod: HCNC,S$GLB,, | Performed by: INTERNAL MEDICINE

## 2021-12-01 PROCEDURE — 99214 PR OFFICE/OUTPT VISIT, EST, LEVL IV, 30-39 MIN: ICD-10-PCS | Mod: S$GLB,,, | Performed by: INTERNAL MEDICINE

## 2021-12-01 PROCEDURE — 99214 OFFICE O/P EST MOD 30 MIN: CPT | Mod: PBBFAC,PO | Performed by: INTERNAL MEDICINE

## 2021-12-01 PROCEDURE — 99499 UNLISTED E&M SERVICE: CPT | Mod: HCNC,S$GLB,, | Performed by: INTERNAL MEDICINE

## 2021-12-01 RX ORDER — LUBIPROSTONE 24 UG/1
24 CAPSULE ORAL 2 TIMES DAILY WITH MEALS
Qty: 60 CAPSULE | Refills: 3 | Status: SHIPPED | OUTPATIENT
Start: 2021-12-01 | End: 2022-04-19

## 2021-12-01 RX ORDER — PRAVASTATIN SODIUM 80 MG/1
80 TABLET ORAL DAILY
Qty: 90 TABLET | Refills: 1 | Status: SHIPPED | OUTPATIENT
Start: 2021-12-01 | End: 2022-04-19 | Stop reason: ALTCHOICE

## 2021-12-02 ENCOUNTER — TELEPHONE (OUTPATIENT)
Dept: GASTROENTEROLOGY | Facility: CLINIC | Age: 66
End: 2021-12-02
Payer: MEDICARE

## 2021-12-14 DIAGNOSIS — E55.9 VITAMIN D INSUFFICIENCY: ICD-10-CM

## 2021-12-15 RX ORDER — ERGOCALCIFEROL 1.25 MG/1
CAPSULE ORAL
Qty: 12 CAPSULE | Refills: 0 | Status: SHIPPED | OUTPATIENT
Start: 2021-12-15 | End: 2022-03-17

## 2021-12-16 ENCOUNTER — OFFICE VISIT (OUTPATIENT)
Dept: SLEEP MEDICINE | Facility: CLINIC | Age: 66
End: 2021-12-16
Payer: MEDICARE

## 2021-12-16 DIAGNOSIS — R06.81 WITNESSED EPISODE OF APNEA: ICD-10-CM

## 2021-12-16 DIAGNOSIS — R35.1 NOCTURIA: ICD-10-CM

## 2021-12-16 DIAGNOSIS — R06.83 SNORING: ICD-10-CM

## 2021-12-16 DIAGNOSIS — R53.83 FATIGUE, UNSPECIFIED TYPE: Primary | ICD-10-CM

## 2021-12-16 DIAGNOSIS — G47.33 OSA (OBSTRUCTIVE SLEEP APNEA): ICD-10-CM

## 2021-12-16 PROCEDURE — 4010F ACE/ARB THERAPY RXD/TAKEN: CPT | Mod: CPTII,S$GLB,, | Performed by: INTERNAL MEDICINE

## 2021-12-16 PROCEDURE — 99204 PR OFFICE/OUTPT VISIT, NEW, LEVL IV, 45-59 MIN: ICD-10-PCS | Mod: S$PBB,,, | Performed by: INTERNAL MEDICINE

## 2021-12-16 PROCEDURE — 99204 OFFICE O/P NEW MOD 45 MIN: CPT | Mod: S$PBB,,, | Performed by: INTERNAL MEDICINE

## 2021-12-16 PROCEDURE — 4010F PR ACE/ARB THEARPY RXD/TAKEN: ICD-10-PCS | Mod: CPTII,S$GLB,, | Performed by: INTERNAL MEDICINE

## 2021-12-16 PROCEDURE — 1157F PR ADVANCE CARE PLAN OR EQUIV PRESENT IN MEDICAL RECORD: ICD-10-PCS | Mod: CPTII,S$GLB,, | Performed by: INTERNAL MEDICINE

## 2021-12-16 PROCEDURE — 1157F ADVNC CARE PLAN IN RCRD: CPT | Mod: CPTII,S$GLB,, | Performed by: INTERNAL MEDICINE

## 2021-12-20 ENCOUNTER — TELEPHONE (OUTPATIENT)
Dept: SLEEP MEDICINE | Facility: OTHER | Age: 66
End: 2021-12-20
Payer: MEDICARE

## 2021-12-20 ENCOUNTER — TELEPHONE (OUTPATIENT)
Dept: OPHTHALMOLOGY | Facility: CLINIC | Age: 66
End: 2021-12-20
Payer: MEDICARE

## 2021-12-28 ENCOUNTER — TELEPHONE (OUTPATIENT)
Dept: SLEEP MEDICINE | Facility: OTHER | Age: 66
End: 2021-12-28
Payer: MEDICARE

## 2021-12-29 ENCOUNTER — HOSPITAL ENCOUNTER (OUTPATIENT)
Dept: SLEEP MEDICINE | Facility: OTHER | Age: 66
Discharge: HOME | End: 2021-12-29
Attending: INTERNAL MEDICINE
Payer: MEDICARE

## 2021-12-29 DIAGNOSIS — R06.81 WITNESSED EPISODE OF APNEA: ICD-10-CM

## 2021-12-29 DIAGNOSIS — R35.1 NOCTURIA: ICD-10-CM

## 2021-12-29 DIAGNOSIS — R06.83 SNORING: ICD-10-CM

## 2021-12-29 DIAGNOSIS — R53.83 FATIGUE, UNSPECIFIED TYPE: ICD-10-CM

## 2021-12-29 PROCEDURE — 95800 SLP STDY UNATTENDED: CPT

## 2021-12-29 PROCEDURE — 95806 SLEEP STUDY UNATT&RESP EFFT: CPT | Mod: 26,HCNC,, | Performed by: INTERNAL MEDICINE

## 2021-12-29 PROCEDURE — 95806 PR SLEEP STUDY, UNATTENDED, SIMUL RECORD HR/O2 SAT/RESP FLOW/RESP EFFT: ICD-10-PCS | Mod: 26,HCNC,, | Performed by: INTERNAL MEDICINE

## 2022-01-05 ENCOUNTER — PATIENT MESSAGE (OUTPATIENT)
Dept: SLEEP MEDICINE | Facility: CLINIC | Age: 67
End: 2022-01-05
Payer: MEDICARE

## 2022-01-05 DIAGNOSIS — G47.33 OSA (OBSTRUCTIVE SLEEP APNEA): Primary | ICD-10-CM

## 2022-02-02 ENCOUNTER — PROCEDURE VISIT (OUTPATIENT)
Dept: OPHTHALMOLOGY | Facility: CLINIC | Age: 67
End: 2022-02-02
Payer: MEDICARE

## 2022-02-02 DIAGNOSIS — I10 ESSENTIAL HYPERTENSION: ICD-10-CM

## 2022-02-02 DIAGNOSIS — H43.813 VITREOUS DETACHMENT OF BOTH EYES: ICD-10-CM

## 2022-02-02 DIAGNOSIS — H04.123 DRY EYE SYNDROME OF BOTH EYES: ICD-10-CM

## 2022-02-02 DIAGNOSIS — Z96.1 PSEUDOPHAKIA: ICD-10-CM

## 2022-02-02 DIAGNOSIS — H26.493 PCO (POSTERIOR CAPSULAR OPACIFICATION), BILATERAL: Primary | ICD-10-CM

## 2022-02-02 PROCEDURE — 92014 COMPRE OPH EXAM EST PT 1/>: CPT | Mod: S$GLB,,, | Performed by: OPHTHALMOLOGY

## 2022-02-02 PROCEDURE — 92014 PR EYE EXAM, EST PATIENT,COMPREHESV: ICD-10-PCS | Mod: S$GLB,,, | Performed by: OPHTHALMOLOGY

## 2022-02-02 NOTE — PROGRESS NOTES
Subjective:       Patient ID: Chelsea Rodriguez is a 67 y.o. female.    Chief Complaint: Concerns About Ocular Health    HPI     65 y.o. female is here for yearly check     Eye Med's: Refresh prn OU     Last edited by Erin Ellis on 2/2/2022  3:12 PM. (History)             Assessment:       1. PCO (posterior capsular opacification), bilateral    2. Dry eye syndrome of both eyes    3. Vitreous detachment of both eyes    4. Essential hypertension    5. Pseudophakia        Plan:       Early PCO OU- Not Visually Significant.     ARTIE-Needs more AT's.  PVD's OU-Stable.  HTN-No retinopathy OU.      AT's prn.  Control HTN.  RTC 1 yr.

## 2022-02-03 ENCOUNTER — PATIENT MESSAGE (OUTPATIENT)
Dept: SLEEP MEDICINE | Facility: CLINIC | Age: 67
End: 2022-02-03
Payer: MEDICARE

## 2022-02-03 RX ORDER — IRBESARTAN 150 MG/1
150 TABLET ORAL DAILY
Qty: 90 TABLET | Refills: 0 | Status: SHIPPED | OUTPATIENT
Start: 2022-02-03 | End: 2022-08-05 | Stop reason: SDUPTHER

## 2022-02-03 NOTE — TELEPHONE ENCOUNTER
No new care gaps identified.  Powered by [employer]. Reference number: [id number].   2/03/2022 3:28:07 PM CST

## 2022-02-03 NOTE — TELEPHONE ENCOUNTER
----- Message from Miryam Tello sent at 2/3/2022  2:29 PM CST -----  Contact: [phone]  Type:  RX Refill Request    Who Called: pt called  Refill or New Rx:refill  RX Name and Strength:irbesartan (AVAPRO) 150 MG tablet  How is the patient currently taking it? (ex. 1XDay):  Is this a 30 day or 90 day RX:90 days  Preferred Pharmacy with phone number:[id number] - [place], [place] - [address] AT [facility] [address]  Local or Mail Order:local  Ordering Provider:Dr. Bui  Would the patient rather a call back or a response via [facility]? Call back  Best Call Back Number:[phone]  Additional Information:

## 2022-02-16 ENCOUNTER — PATIENT MESSAGE (OUTPATIENT)
Dept: ORTHOPEDICS | Facility: CLINIC | Age: 67
End: 2022-02-16
Payer: MEDICARE

## 2022-02-17 ENCOUNTER — IMMUNIZATION (OUTPATIENT)
Dept: INTERNAL MEDICINE | Facility: CLINIC | Age: 67
End: 2022-02-17
Payer: MEDICARE

## 2022-02-17 DIAGNOSIS — Z23 NEED FOR VACCINATION: Primary | ICD-10-CM

## 2022-02-17 PROCEDURE — 0054A COVID-19, MRNA, LNP-S, PF, 30 MCG/0.3 ML DOSE VACCINE (PFIZER): ICD-10-PCS | Mod: HCNC,S$GLB,, | Performed by: FAMILY MEDICINE

## 2022-02-17 PROCEDURE — 91305 COVID-19, MRNA, LNP-S, PF, 30 MCG/0.3 ML DOSE VACCINE (PFIZER): ICD-10-PCS | Mod: HCNC,S$GLB,, | Performed by: FAMILY MEDICINE

## 2022-02-17 PROCEDURE — 0054A COVID-19, MRNA, LNP-S, PF, 30 MCG/0.3 ML DOSE VACCINE (PFIZER): CPT | Mod: HCNC,S$GLB,, | Performed by: FAMILY MEDICINE

## 2022-02-17 PROCEDURE — 91305 COVID-19, MRNA, LNP-S, PF, 30 MCG/0.3 ML DOSE VACCINE (PFIZER): CPT | Mod: HCNC,S$GLB,, | Performed by: FAMILY MEDICINE

## 2022-02-22 DIAGNOSIS — I10 ESSENTIAL HYPERTENSION: ICD-10-CM

## 2022-02-22 NOTE — TELEPHONE ENCOUNTER
No new care gaps identified.  Powered by [email or web address]. Reference number: [id number].   2/22/2022 1:59:37 PM CST

## 2022-02-24 RX ORDER — METOPROLOL SUCCINATE 25 MG/1
TABLET, EXTENDED RELEASE ORAL
Qty: 90 TABLET | Refills: 3 | Status: SHIPPED | OUTPATIENT
Start: 2022-02-24 | End: 2023-06-08

## 2022-02-24 RX ORDER — HYDROCHLOROTHIAZIDE 12.5 MG/1
CAPSULE ORAL
Qty: 90 CAPSULE | Refills: 2 | Status: SHIPPED | OUTPATIENT
Start: 2022-02-24 | End: 2022-10-10

## 2022-02-24 NOTE — TELEPHONE ENCOUNTER
Refill Authorization Note   Chelsea Rodriguez  is requesting a refill authorization.  Brief Assessment and Rationale for Refill:  Approve     Medication Therapy Plan:       Medication Reconciliation Completed: No   Comments:   --->Care Gap information included below if applicable.   Orders Placed This Encounter    hydroCHLOROthiazide (MICROZIDE) 12.5 mg capsule    metoprolol succinate (TOPROL-XL) 25 MG 24 hr tablet      Requested Prescriptions   Signed Prescriptions Disp Refills    hydroCHLOROthiazide (MICROZIDE) 12.5 mg capsule 90 capsule 2     Sig: TAKE 1 CAPSULE EVERY EVENING       Cardiovascular: Diuretics - Thiazide Passed - 2/24/2022  4:46 PM        Passed - Patient is at least 18 years old        Passed - Last BP in normal range within 360 days     BP Readings from Last 1 Encounters:   12/01/21 124/80               Passed - Valid encounter within last 15 months     Recent Visits  Date Type Provider Dept   12/01/21 Office Visit Eliza Alva MD [facility]   05/26/21 Office Visit Eliza Alva MD [facility]   11/23/20 Office Visit Eliza Alva MD [facility]   10/21/20 Office Visit Eliza Alva MD [facility]   05/27/20 Office Visit Nayan Linares MD [facility]   Showing recent visits within past 720 days and meeting all other requirements  Future Appointments  No visits were found meeting these conditions.  Showing future appointments within next 150 days and meeting all other requirements      Future Appointments              In 4 weeks MD Barron David III - Orthopedics [facility], [place]    In 3 months LAB, [place] - [facility], [place]    In 3 months MD Kristina Talavera - Internal Medicine [place]                Passed - Cr is 1.39 or below and within 360 days     Lab Results   Component Value Date    CREATININE 0.7 11/23/2021    CREATININE 0.8 05/25/2021    CREATININE 0.7 03/23/2021               Passed - K in normal range and within 360 days     Potassium   Date Value Ref Range Status   11/23/2021 4.1 3.5 - 5.1 mmol/L Final   05/25/2021 4.3 3.5 - 5.1 mmol/L Final   03/23/2021 4.5 3.5 - 5.1 mmol/L Final              Passed - Na is between 130 and 148 and within 360 days     Sodium   Date Value Ref Range Status   11/23/2021 140 136 - 145 mmol/L Final   05/25/2021 141 136 - 145 mmol/L Final   03/23/2021 141 136 - 145 mmol/L Final              Passed - eGFR within 360 days     Lab Results   Component Value Date    EGFRNONAA >60.0 11/23/2021    EGFRNONAA >60.0 05/25/2021    EGFRNONAA >60.0 03/23/2021                  metoprolol succinate (TOPROL-XL) 25 MG 24 hr tablet 90 tablet 3     Sig: TAKE 1 TABLET EVERY EVENING       Cardiovascular:  Beta Blockers Passed - 2/22/2022  1:59 PM        Passed - Patient is at least 18 years old        Passed - Last BP in normal range within 360 days     BP Readings from Last 1 Encounters:   12/01/21 124/80               Passed - Last Heart Rate in normal range within 360 days     Pulse Readings from Last 1 Encounters:   12/01/21 83              Passed - Valid encounter within last 15 months     Recent Visits  Date Type Provider Dept   12/01/21 Office Visit Eliza Alva MD [facility] Internal Medicine   05/26/21 Office Visit Eliza Alva MD [facility] Internal Medicine   11/23/20 Office Visit Eliza Alva MD [facility] Medicine   10/21/20 Office Visit Eliza Alva MD [facility] Internal Medicine   05/27/20 Office Visit Nayan Linares MD [facility] Internal Medicine   Showing recent visits within past 720 days and meeting all other requirements  Future Appointments  No visits were found meeting these conditions.  Showing future appointments within next 150 days and meeting all other requirements      Future Appointments              In 4 weeks MD Barron David III - Orthopedics [facility], Barron [place]    In 3 months LAB, [place] - Lab,  [place]    In 3 months MD Kristina Talavera - Internal Medicine, [place]                    Appointments  past 12m or future 3m with PCP    Date Provider   Last Visit   12/1/2021 Eliza Alva MD   Next Visit   6/1/2022 Eliza Alva MD   ED visits in past 90 days: 0     Note composed:4:49 PM 02/24/2022

## 2022-02-28 ENCOUNTER — PATIENT MESSAGE (OUTPATIENT)
Dept: ORTHOPEDICS | Facility: CLINIC | Age: 67
End: 2022-02-28
Payer: MEDICARE

## 2022-03-03 ENCOUNTER — PATIENT MESSAGE (OUTPATIENT)
Dept: ORTHOPEDICS | Facility: CLINIC | Age: 67
End: 2022-03-03
Payer: MEDICARE

## 2022-03-03 DIAGNOSIS — Z96.651 STATUS POST TOTAL RIGHT KNEE REPLACEMENT: Primary | ICD-10-CM

## 2022-03-08 ENCOUNTER — PATIENT MESSAGE (OUTPATIENT)
Dept: ORTHOPEDICS | Facility: CLINIC | Age: 67
End: 2022-03-08
Payer: MEDICARE

## 2022-03-25 ENCOUNTER — PATIENT MESSAGE (OUTPATIENT)
Dept: ADMINISTRATIVE | Facility: OTHER | Age: 67
End: 2022-03-25
Payer: MEDICARE

## 2022-03-25 ENCOUNTER — TELEPHONE (OUTPATIENT)
Dept: CARDIOLOGY | Facility: CLINIC | Age: 67
End: 2022-03-25
Payer: MEDICARE

## 2022-03-25 ENCOUNTER — OFFICE VISIT (OUTPATIENT)
Dept: ORTHOPEDICS | Facility: CLINIC | Age: 67
End: 2022-03-25
Payer: MEDICARE

## 2022-03-25 ENCOUNTER — HOSPITAL ENCOUNTER (OUTPATIENT)
Dept: RADIOLOGY | Facility: HOSPITAL | Age: 67
Discharge: HOME | End: 2022-03-25
Attending: ORTHOPAEDIC SURGERY
Payer: MEDICARE

## 2022-03-25 VITALS — WEIGHT: 161.69 LBS | BODY MASS INDEX: 32.6 KG/M2 | HEIGHT: 59 IN

## 2022-03-25 DIAGNOSIS — Z96.651 STATUS POST TOTAL RIGHT KNEE REPLACEMENT: ICD-10-CM

## 2022-03-25 DIAGNOSIS — Z96.651 STATUS POST TOTAL RIGHT KNEE REPLACEMENT: Primary | ICD-10-CM

## 2022-03-25 PROCEDURE — 73562 XR KNEE 3 VIEW RIGHT: ICD-10-PCS | Mod: 26,RT,, | Performed by: RADIOLOGY

## 2022-03-25 PROCEDURE — 1101F PT FALLS ASSESS-DOCD LE1/YR: CPT | Mod: CPTII,S$GLB,, | Performed by: ORTHOPAEDIC SURGERY

## 2022-03-25 PROCEDURE — 1157F PR ADVANCE CARE PLAN OR EQUIV PRESENT IN MEDICAL RECORD: ICD-10-PCS | Mod: CPTII,S$GLB,, | Performed by: ORTHOPAEDIC SURGERY

## 2022-03-25 PROCEDURE — 1159F PR MEDICATION LIST DOCUMENTED IN MEDICAL RECORD: ICD-10-PCS | Mod: CPTII,S$GLB,, | Performed by: ORTHOPAEDIC SURGERY

## 2022-03-25 PROCEDURE — 3288F PR FALLS RISK ASSESSMENT DOCUMENTED: ICD-10-PCS | Mod: CPTII,S$GLB,, | Performed by: ORTHOPAEDIC SURGERY

## 2022-03-25 PROCEDURE — 99213 OFFICE O/P EST LOW 20 MIN: CPT | Mod: S$GLB,,, | Performed by: ORTHOPAEDIC SURGERY

## 2022-03-25 PROCEDURE — 3008F BODY MASS INDEX DOCD: CPT | Mod: CPTII,S$GLB,, | Performed by: ORTHOPAEDIC SURGERY

## 2022-03-25 PROCEDURE — 1125F PR PAIN SEVERITY QUANTIFIED, PAIN PRESENT: ICD-10-PCS | Mod: CPTII,S$GLB,, | Performed by: ORTHOPAEDIC SURGERY

## 2022-03-25 PROCEDURE — 99213 PR OFFICE/OUTPT VISIT, EST, LEVL III, 20-29 MIN: ICD-10-PCS | Mod: S$GLB,,, | Performed by: ORTHOPAEDIC SURGERY

## 2022-03-25 PROCEDURE — 1157F ADVNC CARE PLAN IN RCRD: CPT | Mod: CPTII,S$GLB,, | Performed by: ORTHOPAEDIC SURGERY

## 2022-03-25 PROCEDURE — 73562 X-RAY EXAM OF KNEE 3: CPT | Mod: TC,RT

## 2022-03-25 PROCEDURE — 3008F PR BODY MASS INDEX (BMI) DOCUMENTED: ICD-10-PCS | Mod: CPTII,S$GLB,, | Performed by: ORTHOPAEDIC SURGERY

## 2022-03-25 PROCEDURE — 1101F PR PT FALLS ASSESS DOC 0-1 FALLS W/OUT INJ PAST YR: ICD-10-PCS | Mod: CPTII,S$GLB,, | Performed by: ORTHOPAEDIC SURGERY

## 2022-03-25 PROCEDURE — 1125F AMNT PAIN NOTED PAIN PRSNT: CPT | Mod: CPTII,S$GLB,, | Performed by: ORTHOPAEDIC SURGERY

## 2022-03-25 PROCEDURE — 73562 X-RAY EXAM OF KNEE 3: CPT | Mod: 26,RT,, | Performed by: RADIOLOGY

## 2022-03-25 PROCEDURE — 3288F FALL RISK ASSESSMENT DOCD: CPT | Mod: CPTII,S$GLB,, | Performed by: ORTHOPAEDIC SURGERY

## 2022-03-25 PROCEDURE — 4010F PR ACE/ARB THEARPY RXD/TAKEN: ICD-10-PCS | Mod: CPTII,S$GLB,, | Performed by: ORTHOPAEDIC SURGERY

## 2022-03-25 PROCEDURE — 4010F ACE/ARB THERAPY RXD/TAKEN: CPT | Mod: CPTII,S$GLB,, | Performed by: ORTHOPAEDIC SURGERY

## 2022-03-25 PROCEDURE — 99213 OFFICE O/P EST LOW 20 MIN: CPT | Mod: PBBFAC | Performed by: ORTHOPAEDIC SURGERY

## 2022-03-25 PROCEDURE — 99999 PR PBB SHADOW E&M-EST. PATIENT-LVL III: CPT | Mod: PBBFAC,,, | Performed by: ORTHOPAEDIC SURGERY

## 2022-03-25 PROCEDURE — 99999 PR PBB SHADOW E&M-EST. PATIENT-LVL III: ICD-10-PCS | Mod: PBBFAC,,, | Performed by: ORTHOPAEDIC SURGERY

## 2022-03-25 PROCEDURE — 1159F MED LIST DOCD IN RCRD: CPT | Mod: CPTII,S$GLB,, | Performed by: ORTHOPAEDIC SURGERY

## 2022-03-25 NOTE — TELEPHONE ENCOUNTER
----- Message from Ion Herrera sent at 3/25/2022 11:41 AM CDT -----  Regarding: Referral  Good morning,       Dr. Summers has placed a referral for this patient to see Dr. Marcell Rico.     Can someone please call the patient and schedule her an appointment? I attempted to schedule the appointment but could not get one for her.     Referral information: Legs Cramp / Difficult to Walk. Dr Marcell Rico BLE swelling/edema/pain    The patient verbalized understanding and has no further questions.     Sincerely,   Mio Herrera MS, O[facility]  OR & Clinical Assistant to Dr. Pedro Summers III  Phone: [phone]  Fax: [phone]

## 2022-03-25 NOTE — PROGRESS NOTES
Subjective:     HPI:   Chelsea Rodriguez is a 67 y.o. female who presents for annual follow up right TKA    Date of surgery: 3/31/21    Medications: stopped meloxicam not helping and PCP stopped, stopped tylenol    Assistive Devices: rare cane    Limitations: going up stairs, medial knee pain    Not super happy, wants to be able to go up stairs without pain and has difficulty doing this without pain  Flat level ground walking ok    C/o stiffness if stands for more than 15 minutes    C/o BLE swelling, wears compression socks    Int water fasting helps knee swelling       Objective:   Body mass index is 33.22 kg/m².  Exam:    Gait: limp/antalgic none, min B TB    Incision: healed    Stability:  Knee stable anterior-posterior varus and valgus stresses, no extensor lag  Appropriate laxity in chilo/nely/ant/post, not too tight/no arthrofibrosis    Extension: 0    Flexion: 95 seated and supine    Valgus angle: 5    Quad strength good, no ext lag    Pre-op 0-100      Imaging:  Indication:  Exam status post right total knee arthroplasty  Exam Ordered: Radiographs of the right knee include a standing anteroposterior view, a lateral view, and a sunrise view  Details of Examination: Todays exam show a well fixed, well positioned total knee arthroplasty with no evidence of wear, osteolysis, or loosening.  Impression:  Status post right total knee arthroplasty, implant in good position with no abnormality     Stable RL lines med and lat tray      Assessment:       ICD-10-CM ICD-9-CM   1. Status post total right knee replacement  Z96.651 V43.65      Doing well     Plan:       Patient is doing very well with their total knee arthroplasty.  They will continue with their routine care of the knee replacement and see me back for their follow-up at the routine interval.  If there are problems in the interim they will see me back sooner.    Rx compound cream    ?BLE edema   Referral to vascular medicine Dr Marcell Rico for eval for his  thoughts on what direction to go    6 month f/u no xrays      No orders of the defined types were placed in this encounter.            Past Medical History:   Diagnosis Date    Bilateral knee pain     Carpal tunnel syndrome, bilateral     Fissure in skin of foot     Right small toe    HTN (hypertension)     Hyperlipidemia     Palpitations     Rotator cuff injury     right    Screening for colorectal cancer 10/20/2017    Screening for malignant neoplasm of colon 2021    Statin-induced myositis 2018       Past Surgical History:   Procedure Laterality Date    BILATERAL SALPINGOOPHORECTOMY  2000    BREAST BIOPSY Left 2010    malignant    BREAST BIOPSY Left     negative    BREAST LUMPECTOMY Left     CATARACT EXTRACTION W/  INTRAOCULAR LENS IMPLANT Right 2018    Dr. Oneil    CATARACT EXTRACTION W/  INTRAOCULAR LENS IMPLANT Left 2018    Dr. Oneil     SECTION      x2    COLONOSCOPY N/A 10/20/2017    Procedure: COLONOSCOPY;  Surgeon: Mitchell Danielson Jr., MD;  Location: [facility];  Service: Endoscopy;  Laterality: N/A;    COLONOSCOPY N/A 2021    Procedure: COLONOSCOPY/Suprep;  Surgeon: Malcolm Reyes MD;  Location: [facility];  Service: Endoscopy;  Laterality: N/A;    CYST REMOVAL      on back    ESOPHAGOGASTRODUODENOSCOPY N/A 2021    Procedure: EGD (ESOPHAGOGASTRODUODENOSCOPY);  Surgeon: Malcolm Reyes MD;  Location: [facility];  Service: Endoscopy;  Laterality: N/A;    HERNIA REPAIR      HYSTERECTOMY      at 25 yrs old    INTRAOCULAR PROSTHESES INSERTION Left 2018    Procedure: INSERTION, IOL PROSTHESIS;  Surgeon: Sergio Oneil MD;  Location: [facility] OR 1ST FLR;  Service: Ophthalmology;  Laterality: Left;    INTRAOCULAR PROSTHESES INSERTION Right 2018    Procedure: INSERTION, IOL PROSTHESIS;  Surgeon: Sergio Oneil MD;  Location: [facility] OR 2ND FLR;  Service: Ophthalmology;  Laterality: Right;    KNEE ARTHROPLASTY  "Right 3/31/2021    Procedure: ARTHROPLASTY, KNEE:RIGHT:DEPUY-SIGMA ;  Surgeon: Pedro Summers III, MD;  Location: [facility];  Service: Orthopedics;  Laterality: Right;    OOPHORECTOMY      @ 45 yrs old    PHACOEMULSIFICATION OF CATARACT Left 11/6/2018    Procedure: PHACOEMULSIFICATION, CATARACT;  Surgeon: Sergio Oneil MD;  Location: [facility] OR 1ST FLR;  Service: Ophthalmology;  Laterality: Left;    PHACOEMULSIFICATION OF CATARACT Right 11/20/2018    Procedure: PHACOEMULSIFICATION, CATARACT;  Surgeon: Sergio Oneil MD;  Location: [facility] OR 2ND FLR;  Service: Ophthalmology;  Laterality: Right;    supracervical abdominal hysterectomy  1978    fibroids       Family History   Problem Relation Age of Onset    Hypertension Mother     Heart disease Mother     Diabetes Mother     Hyperlipidemia Mother     Pancreatitis Mother     Cataracts Mother     Macular degeneration Mother     Cancer Father     Breast cancer Paternal Cousin     Hypertension Sister     Thyroid disease Sister     Diabetes Brother     Heart disease Brother     Amblyopia Neg Hx     Blindness Neg Hx     Glaucoma Neg Hx     Strabismus Neg Hx     Retinal detachment Neg Hx        Social History     Socioeconomic History    Marital status: Single    Number of children: 2   Occupational History     Comment: retired   Tobacco Use    Smoking status: Never Smoker    Smokeless tobacco: Never Used   Substance and Sexual Activity    Alcohol use: Yes     Comment: once per month    Drug use: No    Sexual activity: Not Currently   Social History Narrative    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 hx of left lumpectomy for "breast cancer"- with 5yrs of tamoxifen use. Sees Dr. Buckley ([facility] for surveillance/MMG)        Dr. Lala former PCP, [facility]                  "

## 2022-03-25 NOTE — TELEPHONE ENCOUNTER
Amauri SOLOMON Staff  Caller: SKINNY FULTON [[id number]] (Today,  3:27 PM)  Type:  Patient Returning Call     Who Called: SKINNY FULTON [[id number]]     Who Left Message for Patient: Leanna     Does the patient know what this is regarding?: yes     Would the patient rather a call back or a response via [facility]? call     Best Call Back Number: [phone]     Additional Information:

## 2022-04-19 ENCOUNTER — OFFICE VISIT (OUTPATIENT)
Dept: CARDIOLOGY | Facility: CLINIC | Age: 67
End: 2022-04-19
Payer: MEDICARE

## 2022-04-19 VITALS
BODY MASS INDEX: 33.93 KG/M2 | HEIGHT: 58 IN | OXYGEN SATURATION: 97 % | WEIGHT: 161.63 LBS | DIASTOLIC BLOOD PRESSURE: 70 MMHG | HEART RATE: 89 BPM | SYSTOLIC BLOOD PRESSURE: 136 MMHG

## 2022-04-19 DIAGNOSIS — I89.0 LYMPHEDEMA OF BOTH LOWER EXTREMITIES: ICD-10-CM

## 2022-04-19 DIAGNOSIS — I10 ESSENTIAL HYPERTENSION: ICD-10-CM

## 2022-04-19 DIAGNOSIS — G47.33 OSA (OBSTRUCTIVE SLEEP APNEA): ICD-10-CM

## 2022-04-19 DIAGNOSIS — I87.2 VENOUS STASIS DERMATITIS OF BOTH LOWER EXTREMITIES: ICD-10-CM

## 2022-04-19 DIAGNOSIS — I83.893 VARICOSE VEINS OF LOWER EXTREMITY WITH EDEMA, BILATERAL: ICD-10-CM

## 2022-04-19 DIAGNOSIS — R60.0 EDEMA OF BOTH LOWER EXTREMITIES: Primary | ICD-10-CM

## 2022-04-19 DIAGNOSIS — Z96.651 STATUS POST TOTAL RIGHT KNEE REPLACEMENT: ICD-10-CM

## 2022-04-19 DIAGNOSIS — E78.2 MIXED HYPERLIPIDEMIA: ICD-10-CM

## 2022-04-19 PROCEDURE — 3075F PR MOST RECENT SYSTOLIC BLOOD PRESS GE 130-139MM HG: ICD-10-PCS | Mod: CPTII,S$GLB,, | Performed by: INTERNAL MEDICINE

## 2022-04-19 PROCEDURE — 1159F PR MEDICATION LIST DOCUMENTED IN MEDICAL RECORD: ICD-10-PCS | Mod: CPTII,S$GLB,, | Performed by: INTERNAL MEDICINE

## 2022-04-19 PROCEDURE — 1157F PR ADVANCE CARE PLAN OR EQUIV PRESENT IN MEDICAL RECORD: ICD-10-PCS | Mod: CPTII,S$GLB,, | Performed by: INTERNAL MEDICINE

## 2022-04-19 PROCEDURE — 1101F PT FALLS ASSESS-DOCD LE1/YR: CPT | Mod: CPTII,S$GLB,, | Performed by: INTERNAL MEDICINE

## 2022-04-19 PROCEDURE — 99205 OFFICE O/P NEW HI 60 MIN: CPT | Mod: S$GLB,,, | Performed by: INTERNAL MEDICINE

## 2022-04-19 PROCEDURE — 99999 PR PBB SHADOW E&M-EST. PATIENT-LVL V: CPT | Mod: PBBFAC,,, | Performed by: INTERNAL MEDICINE

## 2022-04-19 PROCEDURE — 1159F MED LIST DOCD IN RCRD: CPT | Mod: CPTII,S$GLB,, | Performed by: INTERNAL MEDICINE

## 2022-04-19 PROCEDURE — 1126F AMNT PAIN NOTED NONE PRSNT: CPT | Mod: CPTII,S$GLB,, | Performed by: INTERNAL MEDICINE

## 2022-04-19 PROCEDURE — 1126F PR PAIN SEVERITY QUANTIFIED, NO PAIN PRESENT: ICD-10-PCS | Mod: CPTII,S$GLB,, | Performed by: INTERNAL MEDICINE

## 2022-04-19 PROCEDURE — 3075F SYST BP GE 130 - 139MM HG: CPT | Mod: CPTII,S$GLB,, | Performed by: INTERNAL MEDICINE

## 2022-04-19 PROCEDURE — 3008F BODY MASS INDEX DOCD: CPT | Mod: CPTII,S$GLB,, | Performed by: INTERNAL MEDICINE

## 2022-04-19 PROCEDURE — 3078F PR MOST RECENT DIASTOLIC BLOOD PRESSURE < 80 MM HG: ICD-10-PCS | Mod: CPTII,S$GLB,, | Performed by: INTERNAL MEDICINE

## 2022-04-19 PROCEDURE — 3288F FALL RISK ASSESSMENT DOCD: CPT | Mod: CPTII,S$GLB,, | Performed by: INTERNAL MEDICINE

## 2022-04-19 PROCEDURE — 99999 PR PBB SHADOW E&M-EST. PATIENT-LVL V: ICD-10-PCS | Mod: PBBFAC,,, | Performed by: INTERNAL MEDICINE

## 2022-04-19 PROCEDURE — 1157F ADVNC CARE PLAN IN RCRD: CPT | Mod: CPTII,S$GLB,, | Performed by: INTERNAL MEDICINE

## 2022-04-19 PROCEDURE — 3288F PR FALLS RISK ASSESSMENT DOCUMENTED: ICD-10-PCS | Mod: CPTII,S$GLB,, | Performed by: INTERNAL MEDICINE

## 2022-04-19 PROCEDURE — 4010F ACE/ARB THERAPY RXD/TAKEN: CPT | Mod: CPTII,S$GLB,, | Performed by: INTERNAL MEDICINE

## 2022-04-19 PROCEDURE — 3078F DIAST BP <80 MM HG: CPT | Mod: CPTII,S$GLB,, | Performed by: INTERNAL MEDICINE

## 2022-04-19 PROCEDURE — 3008F PR BODY MASS INDEX (BMI) DOCUMENTED: ICD-10-PCS | Mod: CPTII,S$GLB,, | Performed by: INTERNAL MEDICINE

## 2022-04-19 PROCEDURE — 99205 PR OFFICE/OUTPT VISIT, NEW, LEVL V, 60-74 MIN: ICD-10-PCS | Mod: S$GLB,,, | Performed by: INTERNAL MEDICINE

## 2022-04-19 PROCEDURE — 4010F PR ACE/ARB THEARPY RXD/TAKEN: ICD-10-PCS | Mod: CPTII,S$GLB,, | Performed by: INTERNAL MEDICINE

## 2022-04-19 PROCEDURE — 1101F PR PT FALLS ASSESS DOC 0-1 FALLS W/OUT INJ PAST YR: ICD-10-PCS | Mod: CPTII,S$GLB,, | Performed by: INTERNAL MEDICINE

## 2022-04-19 RX ORDER — ATORVASTATIN CALCIUM 80 MG/1
80 TABLET, FILM COATED ORAL DAILY
Qty: 90 TABLET | Refills: 3 | Status: SHIPPED | OUTPATIENT
Start: 2022-04-19 | End: 2022-08-02

## 2022-04-19 NOTE — PATIENT INSTRUCTIONS
Assessment/Plan:  Chelsea Rodriguez is a 67 y.o. female with HTN,. HLD, obesity, HENOK (on CPAP), who presents for an initial appointment.    1. BLE Edema- Due to lymphedema and possible venous insufficiency.  Check BLE venous reflux study and RAMOS study.  Refer to lymphedema clinic.  Limit sodium intake to 2,000 mg daily.  Limit volume intake to 1.5 liters daily.  Elevate legs when resting.    2. HTN- Continue current medications.    3. HLD- Discontinue pravastatin and start atorvastatin 80 mg daily.  Continue ASA 81 mg daily.    4. Obesity- Encourage diet, exercise and weight loss.    5. HENOK- Continue CPAP nightly.    Follow up in 2 months with lipids prior

## 2022-04-19 NOTE — PROGRESS NOTES
[facility]    CC: BLE edema      Patient ID: Chelsea Rodriguez is a 67 y.o. female with HTN,. HLD, obesity, HENOK (on CPAP), who presents for an initial appointment.  Pertinent history/events are as follows:     -Pt kindly referred by Dr. Summers for evaluation of BLE edema.    HPI:  Mrs. Rodriguez reports leg swelling for several years.  States leg welling became worse following right knee replacement surgery in 3/2021.  She has no claudication or tissue loss.    Past Medical History:   Diagnosis Date    Bilateral knee pain     Carpal tunnel syndrome, bilateral     Fissure in skin of foot     Right small toe    HTN (hypertension)     Hyperlipidemia     Palpitations     Rotator cuff injury     right    Screening for colorectal cancer 10/20/2017    Screening for malignant neoplasm of colon 2021    Statin-induced myositis 2018     Past Surgical History:   Procedure Laterality Date    BILATERAL SALPINGOOPHORECTOMY  2000    BREAST BIOPSY Left 2010    malignant    BREAST BIOPSY Left     negative    BREAST LUMPECTOMY Left     CATARACT EXTRACTION W/  INTRAOCULAR LENS IMPLANT Right 2018    Dr. Oneil    CATARACT EXTRACTION W/  INTRAOCULAR LENS IMPLANT Left 2018    Dr. Oneil     SECTION      x2    COLONOSCOPY N/A 10/20/2017    Procedure: COLONOSCOPY;  Surgeon: Mitchell Danielson Jr., MD;  Location: [facility];  Service: Endoscopy;  Laterality: N/A;    COLONOSCOPY N/A 2021    Procedure: COLONOSCOPY/Suprep;  Surgeon: Malcolm Reyes MD;  Location: [facility];  Service: Endoscopy;  Laterality: N/A;    CYST REMOVAL      on back    ESOPHAGOGASTRODUODENOSCOPY N/A 2021    Procedure: EGD (ESOPHAGOGASTRODUODENOSCOPY);  Surgeon: Malcolm Reyes MD;  Location: [facility];  Service: Endoscopy;  Laterality: N/A;    HERNIA REPAIR      HYSTERECTOMY      at 25 yrs old    INTRAOCULAR PROSTHESES INSERTION Left 2018    Procedure: INSERTION, IOL PROSTHESIS;   "Surgeon: Sergio Oneil MD;  Location: [facility] OR [address] [place] [address];  Service: Ophthalmology;  Laterality: Left;    INTRAOCULAR PROSTHESES INSERTION Right 11/20/2018    Procedure: INSERTION, IOL PROSTHESIS;  Surgeon: Sergio Oneil MD;  Location: [facility] OR 2ND FLR;  Service: Ophthalmology;  Laterality: Right;    KNEE ARTHROPLASTY Right 3/31/2021    Procedure: ARTHROPLASTY, KNEE:RIGHT:DEPUY-SIGMA ;  Surgeon: Pedro Summers III, MD;  Location: [facility];  Service: Orthopedics;  Laterality: Right;    OOPHORECTOMY      @ 45 yrs old    PHACOEMULSIFICATION OF CATARACT Left 11/6/2018    Procedure: PHACOEMULSIFICATION, CATARACT;  Surgeon: Sergio Oneil MD;  Location: [facility] OR [address] [place] [address];  Service: Ophthalmology;  Laterality: Left;    PHACOEMULSIFICATION OF CATARACT Right 11/20/2018    Procedure: PHACOEMULSIFICATION, CATARACT;  Surgeon: Sergio Oneil MD;  Location: [facility] OR [address] [place] [address];  Service: Ophthalmology;  Laterality: Right;    supracervical abdominal hysterectomy  1978    fibroids     Social History     Socioeconomic History    Marital status: Single    Number of children: 2   Occupational History     Comment: retired   Tobacco Use    Smoking status: Never Smoker    Smokeless tobacco: Never Used   Substance and Sexual Activity    Alcohol use: Yes     Comment: once per month    Drug use: No    Sexual activity: Not Currently   Social History Narrative    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 hx of left lumpectomy for "breast cancer"- with 5yrs of tamoxifen use. Sees Dr. Buckley ([facility] for surveillance/MMG)        Dr. Lala former PCP, [facility]          Family History   Problem Relation Age of Onset    Hypertension Mother     Heart disease Mother     Diabetes Mother     Hyperlipidemia Mother     Pancreatitis Mother     Cataracts Mother     Macular degeneration Mother     Cancer Father     Breast cancer " Paternal Cousin     Hypertension Sister     Thyroid disease Sister     Diabetes Brother     Heart disease Brother     Amblyopia Neg Hx     Blindness Neg Hx     Glaucoma Neg Hx     Strabismus Neg Hx     Retinal detachment Neg Hx        Review of patient's allergies indicates:   Allergen Reactions    Codeine Nausea And Vomiting       Medication List with Changes/Refills   New Medications    ATORVASTATIN (LIPITOR) 80 MG TABLET    Take 1 tablet (80 mg total) by mouth once daily.   Current Medications    ACETAMINOPHEN (TYLENOL) 650 MG TBSR    Take 1 tablet (650 mg total) by mouth every 8 (eight) hours as needed (pain).    ASPIRIN (ECOTRIN) 81 MG EC TABLET    Take 1 tablet (81 mg total) by mouth 2 (two) times daily.    COENZYME Q10 100 MG CAPSULE    Take 100 mg by mouth every evening.    DOCUSATE SODIUM (COLACE) 100 MG CAPSULE    Take 1 capsule (100 mg total) by mouth 2 (two) times daily as needed for Constipation.    ERGOCALCIFEROL (ERGOCALCIFEROL) 50,000 UNIT CAP    TAKE 1 CAPSULE EVERY 7 DAYS    HYDROCHLOROTHIAZIDE (MICROZIDE) 12.5 MG CAPSULE    TAKE 1 CAPSULE EVERY EVENING    IRBESARTAN (AVAPRO) 150 MG TABLET    Take 1 tablet (150 mg total) by mouth once daily.    LORATADINE (CLARITIN) 10 MG TABLET    Take 10 mg by mouth every evening.    METOPROLOL SUCCINATE (TOPROL-XL) 25 MG 24 HR TABLET    TAKE 1 TABLET EVERY EVENING   Discontinued Medications    LUBIPROSTONE (AMITIZA) 24 MCG CAP    Take 1 capsule (24 mcg total) by mouth 2 (two) times daily with meals.    MELOXICAM (MOBIC) 15 MG TABLET    Take 0.5 tablets (7.5 mg total) by mouth daily as needed for Pain.    PRAVASTATIN (PRAVACHOL) 80 MG TABLET    Take 1 tablet (80 mg total) by mouth once daily.       Review of Systems  Constitution: Denies chills, fever, and sweats.  HENT: Denies headaches or blurry vision.  Cardiovascular: Denies chest pain or irregular heart beat.  Respiratory: Denies cough or shortness of breath.  Gastrointestinal: Denies abdominal  "pain, nausea, or vomiting.  Musculoskeletal: Positive for leg swelling.  Neurological: Denies dizziness or focal weakness.  Psychiatric/Behavioral: Normal mental status.  Hematologic/Lymphatic: Denies bleeding problem or easy bruising/bleeding.  Skin: Denies rash or suspicious lesions    Physical Examination  /70   Pulse 89   Ht 4' 10" (1.473 m)   Wt 73.3 kg (161 lb 9.6 oz)   LMP  (LMP Unknown)   SpO2 97%   BMI 33.77 kg/m²     Constitutional: No acute distress, conversant  HEENT: Sclera anicteric, Pupils equal, round and reactive to light, extraocular motions intact, Oropharynx clear  Neck: No JVD, no carotid bruits  Cardiovascular: regular rate and rhythm, no murmur, rubs or gallops, normal S1/S2  Pulmonary: Clear to auscultation bilaterally  Abdominal: Abdomen soft, nontender, nondistended, positive bowel sounds  Extremities: BLE's with trace edema, venous stasis dermatitis, and changes consistent with lymphedema   Pulses:  Carotid pulses are 2+ on the right side, and 2+ on the left side.  Radial pulses are 2+ on the right side, and 2+ on the left side.   Femoral pulses are 2+ on the right side, and 2+ on the left side.  Popliteal pulses are 2+ on the right side, and 2+ on the left side.   Dorsalis pedis pulses are 2+ on the right side, and 2+ on the left side.   Posterior tibial pulses are 2+ on the right side, and 2+ on the left side.    Skin: No ecchymosis, erythema, or ulcers  Psych: Alert and oriented x 3, appropriate affect  Neuro: CNII-XII intact, no focal deficits    Labs:  Most Recent Data  CBC:   Lab Results   Component Value Date    WBC 5.98 11/23/2021    HGB 13.3 11/23/2021    HCT 43.1 11/23/2021     11/23/2021    MCV 83 11/23/2021    RDW 16.5 (H) 11/23/2021     BMP:   Lab Results   Component Value Date     11/23/2021    K 4.1 11/23/2021     11/23/2021    CO2 30 (H) 11/23/2021    BUN 13 11/23/2021    CREATININE 0.7 11/23/2021     11/23/2021    CALCIUM 9.9 " 11/23/2021    PHOS 2.9 10/12/2017     LFTS;   Lab Results   Component Value Date    PROT 7.1 11/23/2021    ALBUMIN 3.8 11/23/2021    BILITOT 0.6 11/23/2021    AST 36 11/23/2021    ALKPHOS 90 11/23/2021    ALT 36 11/23/2021     COAGS:   Lab Results   Component Value Date    INR 0.9 03/23/2021     FLP:   Lab Results   Component Value Date    CHOL 229 (H) 11/23/2021    HDL 46 11/23/2021    LDLCALC 159.8 (H) 11/23/2021    TRIG 116 11/23/2021    CHOLHDL 20.1 11/23/2021     CARDIAC:   Lab Results   Component Value Date    BNP <10 05/29/2020       Assessment/Plan:  Chelsea Rodriguez is a 67 y.o. female with HTN,. HLD, obesity, HENOK (on CPAP), who presents for an initial appointment.    1. BLE Edema- Due to lymphedema and possible venous insufficiency.  Check BLE venous reflux study and RAMOS study.  Refer to lymphedema clinic.  Limit sodium intake to 2,000 mg daily.  Limit volume intake to 1.5 liters daily.  Elevate legs when resting.    2. HTN- Continue current medications.    3. HLD- Discontinue pravastatin and start atorvastatin 80 mg daily.  Continue ASA 81 mg daily.    4. Obesity- Encourage diet, exercise and weight loss.    5. HENOK- Continue CPAP nightly.    Follow up in 2 months with lipids prior     Total duration of face to face visit time 30 minutes.  Total time spent counseling greater than fifty percent of total visit time.  Counseling included discussion regarding imaging findings, diagnosis, possibilities, treatment options, risks and benefits.  The patient had many questions regarding the options and long-term effects.    Marcell Rico MD, PhD  Interventional Cardiology

## 2022-04-20 ENCOUNTER — PES CALL (OUTPATIENT)
Dept: ADMINISTRATIVE | Facility: CLINIC | Age: 67
End: 2022-04-20
Payer: MEDICARE

## 2022-04-22 ENCOUNTER — OFFICE VISIT (OUTPATIENT)
Dept: INTERNAL MEDICINE | Facility: CLINIC | Age: 67
End: 2022-04-22
Payer: MEDICARE

## 2022-04-22 VITALS
SYSTOLIC BLOOD PRESSURE: 136 MMHG | WEIGHT: 163.81 LBS | HEIGHT: 58 IN | BODY MASS INDEX: 34.38 KG/M2 | HEART RATE: 70 BPM | DIASTOLIC BLOOD PRESSURE: 82 MMHG | RESPIRATION RATE: 16 BRPM

## 2022-04-22 DIAGNOSIS — I87.2 VENOUS STASIS DERMATITIS OF BOTH LOWER EXTREMITIES: ICD-10-CM

## 2022-04-22 DIAGNOSIS — G56.03 BILATERAL CARPAL TUNNEL SYNDROME: ICD-10-CM

## 2022-04-22 DIAGNOSIS — E55.9 VITAMIN D DEFICIENCY: ICD-10-CM

## 2022-04-22 DIAGNOSIS — R26.9 ABNORMALITY OF GAIT AND MOBILITY: ICD-10-CM

## 2022-04-22 DIAGNOSIS — E78.2 MIXED HYPERLIPIDEMIA: ICD-10-CM

## 2022-04-22 DIAGNOSIS — I10 ESSENTIAL HYPERTENSION: ICD-10-CM

## 2022-04-22 DIAGNOSIS — E66.9 OBESITY (BMI 30.0-34.9): ICD-10-CM

## 2022-04-22 DIAGNOSIS — G47.33 OSA (OBSTRUCTIVE SLEEP APNEA): ICD-10-CM

## 2022-04-22 DIAGNOSIS — I89.0 LYMPHEDEMA OF BOTH LOWER EXTREMITIES: ICD-10-CM

## 2022-04-22 DIAGNOSIS — Z85.3 HISTORY OF LEFT BREAST CANCER: ICD-10-CM

## 2022-04-22 DIAGNOSIS — Z00.00 ENCOUNTER FOR PREVENTIVE HEALTH EXAMINATION: Primary | ICD-10-CM

## 2022-04-22 PROCEDURE — 3075F SYST BP GE 130 - 139MM HG: CPT | Mod: CPTII,S$GLB,, | Performed by: NURSE PRACTITIONER

## 2022-04-22 PROCEDURE — 1101F PT FALLS ASSESS-DOCD LE1/YR: CPT | Mod: CPTII,S$GLB,, | Performed by: NURSE PRACTITIONER

## 2022-04-22 PROCEDURE — 1159F PR MEDICATION LIST DOCUMENTED IN MEDICAL RECORD: ICD-10-PCS | Mod: CPTII,S$GLB,, | Performed by: NURSE PRACTITIONER

## 2022-04-22 PROCEDURE — 3079F PR MOST RECENT DIASTOLIC BLOOD PRESSURE 80-89 MM HG: ICD-10-PCS | Mod: CPTII,S$GLB,, | Performed by: NURSE PRACTITIONER

## 2022-04-22 PROCEDURE — 3288F FALL RISK ASSESSMENT DOCD: CPT | Mod: CPTII,S$GLB,, | Performed by: NURSE PRACTITIONER

## 2022-04-22 PROCEDURE — 1170F PR FUNCTIONAL STATUS ASSESSED: ICD-10-PCS | Mod: CPTII,S$GLB,, | Performed by: NURSE PRACTITIONER

## 2022-04-22 PROCEDURE — G0439 PPPS, SUBSEQ VISIT: HCPCS | Mod: S$GLB,,, | Performed by: NURSE PRACTITIONER

## 2022-04-22 PROCEDURE — 99999 PR PBB SHADOW E&M-EST. PATIENT-LVL IV: ICD-10-PCS | Mod: PBBFAC,,, | Performed by: NURSE PRACTITIONER

## 2022-04-22 PROCEDURE — 1157F PR ADVANCE CARE PLAN OR EQUIV PRESENT IN MEDICAL RECORD: ICD-10-PCS | Mod: CPTII,S$GLB,, | Performed by: NURSE PRACTITIONER

## 2022-04-22 PROCEDURE — 1126F PR PAIN SEVERITY QUANTIFIED, NO PAIN PRESENT: ICD-10-PCS | Mod: CPTII,S$GLB,, | Performed by: NURSE PRACTITIONER

## 2022-04-22 PROCEDURE — 1159F MED LIST DOCD IN RCRD: CPT | Mod: CPTII,S$GLB,, | Performed by: NURSE PRACTITIONER

## 2022-04-22 PROCEDURE — 1170F FXNL STATUS ASSESSED: CPT | Mod: CPTII,S$GLB,, | Performed by: NURSE PRACTITIONER

## 2022-04-22 PROCEDURE — G9919 PR SCREENING AND POSITIVE: ICD-10-PCS | Mod: CPTII,S$GLB,, | Performed by: NURSE PRACTITIONER

## 2022-04-22 PROCEDURE — 1160F PR REVIEW ALL MEDS BY PRESCRIBER/CLIN PHARMACIST DOCUMENTED: ICD-10-PCS | Mod: CPTII,S$GLB,, | Performed by: NURSE PRACTITIONER

## 2022-04-22 PROCEDURE — 3008F PR BODY MASS INDEX (BMI) DOCUMENTED: ICD-10-PCS | Mod: CPTII,S$GLB,, | Performed by: NURSE PRACTITIONER

## 2022-04-22 PROCEDURE — 3288F PR FALLS RISK ASSESSMENT DOCUMENTED: ICD-10-PCS | Mod: CPTII,S$GLB,, | Performed by: NURSE PRACTITIONER

## 2022-04-22 PROCEDURE — 1126F AMNT PAIN NOTED NONE PRSNT: CPT | Mod: CPTII,S$GLB,, | Performed by: NURSE PRACTITIONER

## 2022-04-22 PROCEDURE — G0439 PR MEDICARE ANNUAL WELLNESS SUBSEQUENT VISIT: ICD-10-PCS | Mod: S$GLB,,, | Performed by: NURSE PRACTITIONER

## 2022-04-22 PROCEDURE — 4010F PR ACE/ARB THEARPY RXD/TAKEN: ICD-10-PCS | Mod: CPTII,S$GLB,, | Performed by: NURSE PRACTITIONER

## 2022-04-22 PROCEDURE — 1101F PR PT FALLS ASSESS DOC 0-1 FALLS W/OUT INJ PAST YR: ICD-10-PCS | Mod: CPTII,S$GLB,, | Performed by: NURSE PRACTITIONER

## 2022-04-22 PROCEDURE — 3075F PR MOST RECENT SYSTOLIC BLOOD PRESS GE 130-139MM HG: ICD-10-PCS | Mod: CPTII,S$GLB,, | Performed by: NURSE PRACTITIONER

## 2022-04-22 PROCEDURE — G9919 SCRN ND POS ND PROV OF REC: HCPCS | Mod: CPTII,S$GLB,, | Performed by: NURSE PRACTITIONER

## 2022-04-22 PROCEDURE — 3079F DIAST BP 80-89 MM HG: CPT | Mod: CPTII,S$GLB,, | Performed by: NURSE PRACTITIONER

## 2022-04-22 PROCEDURE — 3008F BODY MASS INDEX DOCD: CPT | Mod: CPTII,S$GLB,, | Performed by: NURSE PRACTITIONER

## 2022-04-22 PROCEDURE — 1160F RVW MEDS BY RX/DR IN RCRD: CPT | Mod: CPTII,S$GLB,, | Performed by: NURSE PRACTITIONER

## 2022-04-22 PROCEDURE — 99999 PR PBB SHADOW E&M-EST. PATIENT-LVL IV: CPT | Mod: PBBFAC,,, | Performed by: NURSE PRACTITIONER

## 2022-04-22 PROCEDURE — 4010F ACE/ARB THERAPY RXD/TAKEN: CPT | Mod: CPTII,S$GLB,, | Performed by: NURSE PRACTITIONER

## 2022-04-22 PROCEDURE — 1157F ADVNC CARE PLAN IN RCRD: CPT | Mod: CPTII,S$GLB,, | Performed by: NURSE PRACTITIONER

## 2022-04-22 NOTE — PROGRESS NOTES
"Chelsea Rodriguez presented for a  Medicare AWV and comprehensive Health Risk Assessment today. The following components were reviewed and updated:    · Medical history  · Family History  · Social history  · Allergies and Current Medications  · Health Risk Assessment  · Health Maintenance  · Care Team     Patient screened moderate and/or high risk for one or more social determinants of health (SDOH). Patient connected to community resources through the ED Navigator.      ** See Completed Assessments for Annual Wellness Visit within the encounter summary.**         The following assessments were completed:  · Living Situation  · CAGE  · Depression Screening  · Timed Get Up and Go  · Whisper Test  · Cognitive Function Screening    · Nutrition Screening  · ADL Screening  · PAQ Screening        Vitals:    04/22/22 1120 04/22/22 1142   BP: (!) 160/88 136/82   BP Location: Right arm Right arm   Patient Position: Sitting Sitting   BP Method: Large (Manual) Large (Manual)   Pulse: 70    Resp: 16    Weight: 74.3 kg (163 lb 12.8 oz)    Height: 4' 10" (1.473 m)      Body mass index is 34.23 kg/m².     Physical Exam  Vitals reviewed.   Constitutional:       Appearance: Normal appearance.   HENT:      Head: Normocephalic.   Cardiovascular:      Rate and Rhythm: Normal rate.   Pulmonary:      Effort: Pulmonary effort is normal.   Abdominal:      General: Bowel sounds are normal.   Musculoskeletal:         General: Normal range of motion.      Cervical back: Normal range of motion.      Right lower leg: Edema present.      Left lower leg: Edema present.   Skin:     General: Skin is warm and dry.      Capillary Refill: Capillary refill takes less than 2 seconds.   Neurological:      Mental Status: She is alert and oriented to person, place, and time.   Psychiatric:         Behavior: Behavior normal.         Thought Content: Thought content normal.         Judgment: Judgment normal.               Diagnoses and health risks identified " today and associated recommendations/orders:    1. Encounter for preventive health examination  Assessments completed.  HM recommendations reviewed. Discussed second dose of pneumonia vaccine, PCV23 vs [id number].  F/u with PCP as instructed.    2. Essential hypertension  Chronic, stable on current regimen. Followed by PCP / cardiology.    3. Mixed hyperlipidemia  Chronic, stable on current regimen. Followed by PCP / cardiology.    4. Venous stasis dermatitis of both lower extremities  Chronic, stable on current regimen. Followed by PCP / cardiology.    5. Lymphedema of both lower extremities  Chronic, stable on current regimen. Followed by PCP / cardiology.    6. Bilateral carpal tunnel syndrome  Chronic, stable on current regimen. Followed by PCP.    7. History of left breast cancer  Chronic, stable on current regimen. Followed by outside breast surgery.    8. Vitamin D deficiency  Chronic, stable on current regimen. Followed by PCP.    9. Obesity (BMI 30.0-34.9)  Chronic, stable on current regimen. Followed by PCP.    10. HENOK (obstructive sleep apnea)  Chronic, stable on current regimen. Followed by sleep medicine.    11. Abnormality of gait and mobility  Chronic, stable. No recent falls. Followed by PCP.      Provided Chelsea with a 5-10 year written screening schedule and personal prevention plan. Recommendations were developed using the USPSTF age appropriate recommendations. Education, counseling, and referrals were provided as needed. After Visit Summary printed and given to patient which includes a list of additional screenings\tests needed.    Follow up in about 1 year (around 4/22/2023) for Medicare AWV and with PCP as scheduled.       Neetu Alvarez NP     I offered to discuss advanced care planning, including how to pick a person who would make decisions for you if you were unable to make them for yourself, called a health care power of , and what kind of decisions you might make such as use of  life sustaining treatments such as ventilators and tube feeding when faced with a life limiting illness recorded on a living will that they will need to know. (How you want to be cared for as you near the end of your natural life)     X  Patient has advanced directives on file, which we reviewed, and they do not wish to make changes.

## 2022-04-22 NOTE — PATIENT INSTRUCTIONS
1. Follow up with Dr. Eliza Alva MD as scheduled.    2. You are eligible for a second dose of the pneumonia vaccine, now there are two options [id number] OR Prevnar 20.    Counseling and Referral of Other Preventative  (Italic type indicates deductible and co-insurance are waived)    Patient Name: Chelsea Rodriguez  Today's Date: 4/22/2022    Health Maintenance         Date Due Completion Date    Pneumococcal Vaccines (Age 65+) (2 - PPSV23 or PCV20) 12/08/2021 12/8/2020    Cervical Cancer Screening 08/21/2022 8/21/2017    Mammogram 01/18/2023 1/18/2022    Override on 11/16/2016: Done    DEXA Scan 07/13/2023 7/13/2020    Colorectal Cancer Screening 02/05/2025 2/5/2021    Override on 11/5/2012: Done    Lipid Panel 11/23/2026 11/23/2021    TETANUS VACCINE 11/03/2027 11/3/2017          No orders of the defined types were placed in this encounter.    The following information is provided to all patients.  This information is to help you find resources for any of the problems found today that may be affecting your health:                Living healthy guide: www.[facility].[place].gov      Understanding Diabetes: www.diabetes.org      Eating healthy: www.cdc.gov/healthyweight      CDC home safety checklist: www.cdc.gov/steadi/patient.html      Agency on Aging: www.goea.[place].[facility]      Alcoholics anonymous (AA): www.aa.org      Physical Activity: www.agusto.nih.gov/hn1hdtv      Tobacco use: www.quitwithusla.org

## 2022-04-25 ENCOUNTER — PATIENT OUTREACH (OUTPATIENT)
Dept: ADMINISTRATIVE | Facility: OTHER | Age: 67
End: 2022-04-25
Payer: MEDICARE

## 2022-04-25 NOTE — PROGRESS NOTES
CHW - Initial Contact    This Community Health Worker completed OR updated the Social Determinant of Health questionnaire with Chelsea Rodriguez by telephone today.    Pt identified barriers of most importance are: Lack of physical activity.   Referrals to community agencies completed with patient/caregiver consent outside of [facility] include: No  Referrals were put through [facility] - No  Support and Services: Encourage patient by exercising along with her for the next two weeks.  Other information discussed the patient needs / wants help with: SDOH   Follow up required: Yes. For closer.  Follow-up Outreach - Due: 5/9/2022

## 2022-04-26 ENCOUNTER — HOSPITAL ENCOUNTER (OUTPATIENT)
Dept: CARDIOLOGY | Facility: HOSPITAL | Age: 67
Discharge: HOME | End: 2022-04-26
Attending: INTERNAL MEDICINE
Payer: MEDICARE

## 2022-04-26 DIAGNOSIS — R60.0 EDEMA OF BOTH LOWER EXTREMITIES: ICD-10-CM

## 2022-04-26 LAB
IMMEDIATE ARM BP: 142 MMHG
IMMEDIATE LEFT ABI: 1.01
IMMEDIATE LEFT TIBIAL: 143 MMHG
IMMEDIATE RIGHT ABI: 1.04
IMMEDIATE RIGHT TIBIAL: 148 MMHG
LEFT ABI: 1.02
LEFT ARM BP: 127 MMHG
LEFT DORSALIS PEDIS: 144 MMHG
LEFT GREAT SAPHENOUS DISTAL THIGH DIA: 0.22 CM
LEFT GREAT SAPHENOUS JUNCTION DIA: 0.48 CM
LEFT GREAT SAPHENOUS KNEE DIA: 0.23 CM
LEFT GREAT SAPHENOUS MIDDLE THIGH DIA: 0.3 CM
LEFT GREAT SAPHENOUS PROXIMAL CALF DIA: 0.23 CM
LEFT POSTERIOR TIBIAL: 143 MMHG
LEFT SMALL SAPHENOUS KNEE DIA: 0.23 CM
LEFT SMALL SAPHENOUS SPJ DIA: 0.2 CM
RIGHT ABI: 1.09
RIGHT ARM BP: 141 MMHG
RIGHT DORSALIS PEDIS: 126 MMHG
RIGHT GREAT SAPHENOUS DISTAL THIGH DIA: 0.32 CM
RIGHT GREAT SAPHENOUS JUNCTION DIA: 0.36 CM
RIGHT GREAT SAPHENOUS KNEE DIA: 0.2 CM
RIGHT GREAT SAPHENOUS MIDDLE THIGH DIA: 0.23 CM
RIGHT GREAT SAPHENOUS PROXIMAL CALF DIA: 0.2 CM
RIGHT POSTERIOR TIBIAL: 154 MMHG
RIGHT SMALL SAPHENOUS KNEE DIA: 0.28 CM
RIGHT SMALL SAPHENOUS SPJ DIA: 0.23 CM
TREADMILL GRADE: 12 %
TREADMILL SPEED: 2 MPH
TREADMILL TIME: 5 MIN

## 2022-04-26 PROCEDURE — 93970 EXTREMITY STUDY: CPT | Mod: 26,,, | Performed by: INTERNAL MEDICINE

## 2022-04-26 PROCEDURE — 93970 EXTREMITY STUDY: CPT | Mod: TC

## 2022-04-26 PROCEDURE — 93924 ANKLE BRACHIAL INDICES (ABI): ICD-10-PCS | Mod: 26,,, | Performed by: INTERNAL MEDICINE

## 2022-04-26 PROCEDURE — 93970 CV US LOWER VENOUS INSUFFICIENCY BILATERAL (CUPID ONLY): ICD-10-PCS | Mod: 26,,, | Performed by: INTERNAL MEDICINE

## 2022-04-26 PROCEDURE — 93924 LWR XTR VASC STDY BILAT: CPT

## 2022-04-26 PROCEDURE — 93924 LWR XTR VASC STDY BILAT: CPT | Mod: 26,,, | Performed by: INTERNAL MEDICINE

## 2022-05-01 NOTE — TRANSFER OF CARE
Anesthesia Transfer of Care Note    Patient: Chelsea Rodriguez    Procedure(s) Performed: Procedure(s) (LRB):  INSERTION, IOL PROSTHESIS (Left)  PHACOEMULSIFICATION, CATARACT (Left)    Patient location: PACU    Anesthesia Type: MAC    Transport from OR: Transported from OR on room air with adequate spontaneous ventilation    Post pain: adequate analgesia    Post assessment: no apparent anesthetic complications    Post vital signs: stable    Level of consciousness: awake, alert and oriented    Nausea/Vomiting: no nausea/vomiting    Complications: none    Transfer of care protocol was followed      Last vitals:   Visit Vitals  LMP  (LMP Unknown)   Breastfeeding? No      Increase OOB  PO Pain Protocol  Continue Regular Diet  Continue routine prenatal care Cont. PP/PO Care

## 2022-05-09 ENCOUNTER — PATIENT OUTREACH (OUTPATIENT)
Dept: ADMINISTRATIVE | Facility: OTHER | Age: 67
End: 2022-05-09
Payer: MEDICARE

## 2022-05-25 ENCOUNTER — LAB VISIT (OUTPATIENT)
Dept: LAB | Facility: HOSPITAL | Age: 67
End: 2022-05-25
Attending: INTERNAL MEDICINE
Payer: MEDICARE

## 2022-05-25 DIAGNOSIS — E78.5 HYPERLIPIDEMIA, UNSPECIFIED HYPERLIPIDEMIA TYPE: ICD-10-CM

## 2022-05-25 DIAGNOSIS — E66.9 OBESITY (BMI 30-39.9): ICD-10-CM

## 2022-05-25 DIAGNOSIS — Z00.00 ANNUAL PHYSICAL EXAM: ICD-10-CM

## 2022-05-25 LAB
ALBUMIN SERPL BCP-MCNC: 3.8 G/DL (ref 3.5–5.2)
ALP SERPL-CCNC: 98 U/L (ref 55–135)
ALT SERPL W/O P-5'-P-CCNC: 30 U/L (ref 10–44)
ANION GAP SERPL CALC-SCNC: 7 MMOL/L (ref 8–16)
AST SERPL-CCNC: 33 U/L (ref 10–40)
BASOPHILS # BLD AUTO: 0.03 K/UL (ref 0–0.2)
BASOPHILS NFR BLD: 0.4 % (ref 0–1.9)
BILIRUB SERPL-MCNC: 0.5 MG/DL (ref 0.1–1)
BUN SERPL-MCNC: 14 MG/DL (ref 8–23)
CALCIUM SERPL-MCNC: 9.4 MG/DL (ref 8.7–10.5)
CHLORIDE SERPL-SCNC: 103 MMOL/L (ref 95–110)
CHOLEST SERPL-MCNC: 173 MG/DL (ref 120–199)
CHOLEST/HDLC SERPL: 3.9 {RATIO} (ref 2–5)
CO2 SERPL-SCNC: 30 MMOL/L (ref 23–29)
CREAT SERPL-MCNC: 0.7 MG/DL (ref 0.5–1.4)
DIFFERENTIAL METHOD: ABNORMAL
EOSINOPHIL # BLD AUTO: 0.2 K/UL (ref 0–0.5)
EOSINOPHIL NFR BLD: 2.8 % (ref 0–8)
ERYTHROCYTE [DISTWIDTH] IN BLOOD BY AUTOMATED COUNT: 15.1 % (ref 11.5–14.5)
EST. GFR  (AFRICAN AMERICAN): >60 ML/MIN/1.73 M^2
EST. GFR  (NON AFRICAN AMERICAN): >60 ML/MIN/1.73 M^2
GLUCOSE SERPL-MCNC: 91 MG/DL (ref 70–110)
HCT VFR BLD AUTO: 41.3 % (ref 37–48.5)
HDLC SERPL-MCNC: 44 MG/DL (ref 40–75)
HDLC SERPL: 25.4 % (ref 20–50)
HGB BLD-MCNC: 12.6 G/DL (ref 12–16)
IMM GRANULOCYTES # BLD AUTO: 0.02 K/UL (ref 0–0.04)
IMM GRANULOCYTES NFR BLD AUTO: 0.3 % (ref 0–0.5)
LDLC SERPL CALC-MCNC: 114.2 MG/DL (ref 63–159)
LYMPHOCYTES # BLD AUTO: 2.6 K/UL (ref 1–4.8)
LYMPHOCYTES NFR BLD: 35.9 % (ref 18–48)
MCH RBC QN AUTO: 26.2 PG (ref 27–31)
MCHC RBC AUTO-ENTMCNC: 30.5 G/DL (ref 32–36)
MCV RBC AUTO: 86 FL (ref 82–98)
MONOCYTES # BLD AUTO: 0.5 K/UL (ref 0.3–1)
MONOCYTES NFR BLD: 7.3 % (ref 4–15)
NEUTROPHILS # BLD AUTO: 3.9 K/UL (ref 1.8–7.7)
NEUTROPHILS NFR BLD: 53.3 % (ref 38–73)
NONHDLC SERPL-MCNC: 129 MG/DL
NRBC BLD-RTO: 0 /100 WBC
PLATELET # BLD AUTO: 295 K/UL (ref 150–450)
PMV BLD AUTO: 10.6 FL (ref 9.2–12.9)
POTASSIUM SERPL-SCNC: 4.8 MMOL/L (ref 3.5–5.1)
PROT SERPL-MCNC: 6.4 G/DL (ref 6–8.4)
RBC # BLD AUTO: 4.81 M/UL (ref 4–5.4)
SODIUM SERPL-SCNC: 140 MMOL/L (ref 136–145)
TRIGL SERPL-MCNC: 74 MG/DL (ref 30–150)
WBC # BLD AUTO: 7.24 K/UL (ref 3.9–12.7)

## 2022-05-25 PROCEDURE — 36415 COLL VENOUS BLD VENIPUNCTURE: CPT | Mod: PO | Performed by: INTERNAL MEDICINE

## 2022-05-25 PROCEDURE — 80061 LIPID PANEL: CPT | Performed by: INTERNAL MEDICINE

## 2022-05-25 PROCEDURE — 80053 COMPREHEN METABOLIC PANEL: CPT | Performed by: INTERNAL MEDICINE

## 2022-05-25 PROCEDURE — 85025 COMPLETE CBC W/AUTO DIFF WBC: CPT | Performed by: INTERNAL MEDICINE

## 2022-06-01 ENCOUNTER — OFFICE VISIT (OUTPATIENT)
Dept: INTERNAL MEDICINE | Facility: CLINIC | Age: 67
End: 2022-06-01
Payer: MEDICARE

## 2022-06-01 VITALS
WEIGHT: 163.13 LBS | OXYGEN SATURATION: 96 % | BODY MASS INDEX: 34.24 KG/M2 | HEIGHT: 58 IN | HEART RATE: 74 BPM | SYSTOLIC BLOOD PRESSURE: 112 MMHG | DIASTOLIC BLOOD PRESSURE: 60 MMHG

## 2022-06-01 DIAGNOSIS — I10 ESSENTIAL HYPERTENSION: ICD-10-CM

## 2022-06-01 DIAGNOSIS — Z00.00 ANNUAL PHYSICAL EXAM: ICD-10-CM

## 2022-06-01 DIAGNOSIS — E78.5 HYPERLIPIDEMIA, UNSPECIFIED HYPERLIPIDEMIA TYPE: ICD-10-CM

## 2022-06-01 DIAGNOSIS — I89.0 LYMPHEDEMA OF BOTH LOWER EXTREMITIES: ICD-10-CM

## 2022-06-01 DIAGNOSIS — G47.33 OSA (OBSTRUCTIVE SLEEP APNEA): ICD-10-CM

## 2022-06-01 DIAGNOSIS — C50.919 MALIGNANT NEOPLASM OF FEMALE BREAST, UNSPECIFIED ESTROGEN RECEPTOR STATUS, UNSPECIFIED LATERALITY, UNSPECIFIED SITE OF BREAST: ICD-10-CM

## 2022-06-01 DIAGNOSIS — Z23 NEED FOR VACCINATION AGAINST STREPTOCOCCUS PNEUMONIAE: ICD-10-CM

## 2022-06-01 PROCEDURE — G0009 PNEUMOCOCCAL CONJUGATE VACCINE 20-VALENT: ICD-10-PCS | Mod: S$GLB,,, | Performed by: INTERNAL MEDICINE

## 2022-06-01 PROCEDURE — 4010F ACE/ARB THERAPY RXD/TAKEN: CPT | Mod: CPTII,S$GLB,, | Performed by: INTERNAL MEDICINE

## 2022-06-01 PROCEDURE — 1157F PR ADVANCE CARE PLAN OR EQUIV PRESENT IN MEDICAL RECORD: ICD-10-PCS | Mod: CPTII,S$GLB,, | Performed by: INTERNAL MEDICINE

## 2022-06-01 PROCEDURE — 99999 PR PBB SHADOW E&M-EST. PATIENT-LVL IV: ICD-10-PCS | Mod: PBBFAC,,, | Performed by: INTERNAL MEDICINE

## 2022-06-01 PROCEDURE — 4010F PR ACE/ARB THEARPY RXD/TAKEN: ICD-10-PCS | Mod: CPTII,S$GLB,, | Performed by: INTERNAL MEDICINE

## 2022-06-01 PROCEDURE — G0009 ADMIN PNEUMOCOCCAL VACCINE: HCPCS | Mod: S$GLB,,, | Performed by: INTERNAL MEDICINE

## 2022-06-01 PROCEDURE — 3008F BODY MASS INDEX DOCD: CPT | Mod: CPTII,S$GLB,, | Performed by: INTERNAL MEDICINE

## 2022-06-01 PROCEDURE — 99214 OFFICE O/P EST MOD 30 MIN: CPT | Mod: 25,S$GLB,, | Performed by: INTERNAL MEDICINE

## 2022-06-01 PROCEDURE — 90677 PNEUMOCOCCAL CONJUGATE VACCINE 20-VALENT: ICD-10-PCS | Mod: S$GLB,,, | Performed by: INTERNAL MEDICINE

## 2022-06-01 PROCEDURE — 90677 PCV20 VACCINE IM: CPT | Mod: S$GLB,,, | Performed by: INTERNAL MEDICINE

## 2022-06-01 PROCEDURE — 3288F FALL RISK ASSESSMENT DOCD: CPT | Mod: CPTII,S$GLB,, | Performed by: INTERNAL MEDICINE

## 2022-06-01 PROCEDURE — 99499 RISK ADDL DX/OHS AUDIT: ICD-10-PCS | Mod: S$GLB,,, | Performed by: INTERNAL MEDICINE

## 2022-06-01 PROCEDURE — 99999 PR PBB SHADOW E&M-EST. PATIENT-LVL IV: CPT | Mod: PBBFAC,,, | Performed by: INTERNAL MEDICINE

## 2022-06-01 PROCEDURE — 3074F SYST BP LT 130 MM HG: CPT | Mod: CPTII,S$GLB,, | Performed by: INTERNAL MEDICINE

## 2022-06-01 PROCEDURE — 3078F DIAST BP <80 MM HG: CPT | Mod: CPTII,S$GLB,, | Performed by: INTERNAL MEDICINE

## 2022-06-01 PROCEDURE — 99499 UNLISTED E&M SERVICE: CPT | Mod: S$GLB,,, | Performed by: INTERNAL MEDICINE

## 2022-06-01 PROCEDURE — 1125F AMNT PAIN NOTED PAIN PRSNT: CPT | Mod: CPTII,S$GLB,, | Performed by: INTERNAL MEDICINE

## 2022-06-01 PROCEDURE — 1125F PR PAIN SEVERITY QUANTIFIED, PAIN PRESENT: ICD-10-PCS | Mod: CPTII,S$GLB,, | Performed by: INTERNAL MEDICINE

## 2022-06-01 PROCEDURE — 1101F PT FALLS ASSESS-DOCD LE1/YR: CPT | Mod: CPTII,S$GLB,, | Performed by: INTERNAL MEDICINE

## 2022-06-01 PROCEDURE — 1101F PR PT FALLS ASSESS DOC 0-1 FALLS W/OUT INJ PAST YR: ICD-10-PCS | Mod: CPTII,S$GLB,, | Performed by: INTERNAL MEDICINE

## 2022-06-01 PROCEDURE — 1159F PR MEDICATION LIST DOCUMENTED IN MEDICAL RECORD: ICD-10-PCS | Mod: CPTII,S$GLB,, | Performed by: INTERNAL MEDICINE

## 2022-06-01 PROCEDURE — 3288F PR FALLS RISK ASSESSMENT DOCUMENTED: ICD-10-PCS | Mod: CPTII,S$GLB,, | Performed by: INTERNAL MEDICINE

## 2022-06-01 PROCEDURE — 3008F PR BODY MASS INDEX (BMI) DOCUMENTED: ICD-10-PCS | Mod: CPTII,S$GLB,, | Performed by: INTERNAL MEDICINE

## 2022-06-01 PROCEDURE — 3074F PR MOST RECENT SYSTOLIC BLOOD PRESSURE < 130 MM HG: ICD-10-PCS | Mod: CPTII,S$GLB,, | Performed by: INTERNAL MEDICINE

## 2022-06-01 PROCEDURE — 1159F MED LIST DOCD IN RCRD: CPT | Mod: CPTII,S$GLB,, | Performed by: INTERNAL MEDICINE

## 2022-06-01 PROCEDURE — 3078F PR MOST RECENT DIASTOLIC BLOOD PRESSURE < 80 MM HG: ICD-10-PCS | Mod: CPTII,S$GLB,, | Performed by: INTERNAL MEDICINE

## 2022-06-01 PROCEDURE — 99214 PR OFFICE/OUTPT VISIT, EST, LEVL IV, 30-39 MIN: ICD-10-PCS | Mod: 25,S$GLB,, | Performed by: INTERNAL MEDICINE

## 2022-06-01 PROCEDURE — 1157F ADVNC CARE PLAN IN RCRD: CPT | Mod: CPTII,S$GLB,, | Performed by: INTERNAL MEDICINE

## 2022-06-01 RX ORDER — DICLOFENAC SODIUM 10 MG/G
GEL TOPICAL
COMMUNITY
Start: 2022-03-25 | End: 2022-11-10

## 2022-06-01 RX ORDER — LIDOCAINE AND PRILOCAINE 25; 25 MG/G; MG/G
CREAM TOPICAL
COMMUNITY
Start: 2022-03-25 | End: 2022-11-10

## 2022-06-01 NOTE — PROGRESS NOTES
Patient ID: Chelsea Rodriguez is a 67 y.o. female.    Chief Complaint: Annual Exam and Hypertension    HPI Chelsea is a 67 y.o. female with hypertension, hyperlipidemia, osteoarthritis, obstructive sleep apnea and history of breast cancer who presents for annual exam and follow up of medical conditions. No new acute complaints today. Working on getting CPAP from sleep medicine.   Of note, cardiology recently changed her HLD medication to atorvastatin 80 mg. He sent her for ABIs and venous ultrasounds of legs which we reviewed today. He referred her to lymphedema clinic.   Reviewed lab results from 5/25/22 with her today.  BP well controlled in clinic today.     Health Maintenance Topics with due status: Not Due       Topic Last Completion Date    Cervical Cancer Screening 08/21/2017    TETANUS VACCINE 11/03/2017    DEXA Scan 07/13/2020    Colorectal Cancer Screening 02/05/2021    Mammogram 01/18/2022    Lipid Panel 05/25/2022       Review of Systems   All other systems reviewed and are negative.     Objective:     Vitals:    06/01/22 0956   BP: 112/60   Pulse: 74        Physical Exam  Vitals reviewed.   Constitutional:       General: She is not in acute distress.     Appearance: Normal appearance. She is well-developed. She is obese. She is not ill-appearing, toxic-appearing or diaphoretic.   HENT:      Head: Normocephalic and atraumatic.      Right Ear: Ear canal and external ear normal. There is impacted cerumen.      Left Ear: Ear canal and external ear normal. There is impacted cerumen.      Nose: Nose normal.   Eyes:      General: No scleral icterus.        Right eye: No discharge.         Left eye: No discharge.      Extraocular Movements: Extraocular movements intact.      Conjunctiva/sclera: Conjunctivae normal.   Neck:      Thyroid: No thyromegaly.      Trachea: No tracheal deviation.   Cardiovascular:      Rate and Rhythm: Normal rate and regular rhythm.      Pulses: Normal pulses.      Heart sounds: Normal  heart sounds. No murmur heard.    No friction rub. No gallop.   Pulmonary:      Effort: Pulmonary effort is normal. No respiratory distress.      Breath sounds: Normal breath sounds. No stridor. No wheezing, rhonchi or rales.   Chest:      Chest wall: No tenderness.   Abdominal:      General: Bowel sounds are normal. There is no distension.      Palpations: Abdomen is soft. There is no mass.      Tenderness: There is no abdominal tenderness. There is no guarding or rebound.      Hernia: No hernia is present.   Musculoskeletal:         General: No tenderness or deformity.      Cervical back: Neck supple.      Right lower leg: No edema.      Left lower leg: No edema.   Lymphadenopathy:      Cervical: No cervical adenopathy.   Skin:     General: Skin is warm and dry.      Findings: No erythema or rash.   Neurological:      General: No focal deficit present.      Mental Status: She is alert and oriented to person, place, and time. Mental status is at baseline.   Psychiatric:         Mood and Affect: Mood normal.         Behavior: Behavior normal.         Thought Content: Thought content normal.         Judgment: Judgment normal.         Assessment:       1. Annual physical exam    2. Need for vaccination against Streptococcus pneumoniae    3. Essential hypertension Well controlled   4. Malignant neoplasm of female breast, unspecified estrogen receptor status, unspecified laterality, unspecified site of breast Inactive   5. Hyperlipidemia, unspecified hyperlipidemia type Well controlled   6. HENOK (obstructive sleep apnea) Chronic   7. Lymphedema of both lower extremities Chronic       Plan:         Annual physical exam    Need for vaccination against Streptococcus pneumoniae  -     (In Office Administered) Pneumococcal Conjugate Vaccine (20 Valent) (IM)    Essential hypertension  Comments:  cont current medication  Orders:  -     Comprehensive Metabolic Panel; Future; Expected date: 12/01/2022    Malignant neoplasm of  female breast, unspecified estrogen receptor status, unspecified laterality, unspecified site of breast    Hyperlipidemia, unspecified hyperlipidemia type  Comments:  Cont current medication    HENOK (obstructive sleep apnea)  Comments:  Managed by sleep medicine    Lymphedema of both lower extremities  Comments:  Asking my  to assist with lymphedema PT referral        RTC 6 months     Warning signs discussed, patient to call with any further issues or worsening of symptoms.       Parts of the above note were dictated using a voice dictation software. Please excuse any grammatical or typographical errors.

## 2022-06-08 ENCOUNTER — PATIENT OUTREACH (OUTPATIENT)
Dept: ADMINISTRATIVE | Facility: OTHER | Age: 67
End: 2022-06-08
Payer: MEDICARE

## 2022-06-15 NOTE — PROGRESS NOTES
CHW - Case Closure    This Community Health Worker spoke to Chelsea Rodriguez by telephone today.   Pt/Caregiver reported: Patient states she increased her moving around and more walking.  Pt/Caregiver denied any additional needs at this time and agrees with episode closure at this time.  Provided Chelsea Rodriguez with Community Health Worker's contact information and encouraged him/her to contact this Community Health Worker if additional needs arise.

## 2022-06-16 ENCOUNTER — OFFICE VISIT (OUTPATIENT)
Dept: SLEEP MEDICINE | Facility: CLINIC | Age: 67
End: 2022-06-16
Payer: MEDICARE

## 2022-06-16 VITALS
WEIGHT: 163 LBS | HEART RATE: 67 BPM | DIASTOLIC BLOOD PRESSURE: 55 MMHG | BODY MASS INDEX: 34.22 KG/M2 | HEIGHT: 58 IN | SYSTOLIC BLOOD PRESSURE: 112 MMHG

## 2022-06-16 DIAGNOSIS — G47.10 HYPERSOMNOLENCE: ICD-10-CM

## 2022-06-16 DIAGNOSIS — G47.33 OSA (OBSTRUCTIVE SLEEP APNEA): Primary | ICD-10-CM

## 2022-06-16 DIAGNOSIS — F51.09 OTHER INSOMNIA NOT DUE TO A SUBSTANCE OR KNOWN PHYSIOLOGICAL CONDITION: ICD-10-CM

## 2022-06-16 DIAGNOSIS — R35.1 NOCTURIA: ICD-10-CM

## 2022-06-16 PROCEDURE — 99999 PR PBB SHADOW E&M-EST. PATIENT-LVL III: ICD-10-PCS | Mod: PBBFAC,,, | Performed by: INTERNAL MEDICINE

## 2022-06-16 PROCEDURE — 1126F AMNT PAIN NOTED NONE PRSNT: CPT | Mod: CPTII,S$GLB,, | Performed by: INTERNAL MEDICINE

## 2022-06-16 PROCEDURE — 4010F PR ACE/ARB THEARPY RXD/TAKEN: ICD-10-PCS | Mod: CPTII,S$GLB,, | Performed by: INTERNAL MEDICINE

## 2022-06-16 PROCEDURE — 3288F FALL RISK ASSESSMENT DOCD: CPT | Mod: CPTII,S$GLB,, | Performed by: INTERNAL MEDICINE

## 2022-06-16 PROCEDURE — 1159F PR MEDICATION LIST DOCUMENTED IN MEDICAL RECORD: ICD-10-PCS | Mod: CPTII,S$GLB,, | Performed by: INTERNAL MEDICINE

## 2022-06-16 PROCEDURE — 3288F PR FALLS RISK ASSESSMENT DOCUMENTED: ICD-10-PCS | Mod: CPTII,S$GLB,, | Performed by: INTERNAL MEDICINE

## 2022-06-16 PROCEDURE — 1157F ADVNC CARE PLAN IN RCRD: CPT | Mod: CPTII,S$GLB,, | Performed by: INTERNAL MEDICINE

## 2022-06-16 PROCEDURE — 3008F PR BODY MASS INDEX (BMI) DOCUMENTED: ICD-10-PCS | Mod: CPTII,S$GLB,, | Performed by: INTERNAL MEDICINE

## 2022-06-16 PROCEDURE — 3008F BODY MASS INDEX DOCD: CPT | Mod: CPTII,S$GLB,, | Performed by: INTERNAL MEDICINE

## 2022-06-16 PROCEDURE — 1159F MED LIST DOCD IN RCRD: CPT | Mod: CPTII,S$GLB,, | Performed by: INTERNAL MEDICINE

## 2022-06-16 PROCEDURE — 1157F PR ADVANCE CARE PLAN OR EQUIV PRESENT IN MEDICAL RECORD: ICD-10-PCS | Mod: CPTII,S$GLB,, | Performed by: INTERNAL MEDICINE

## 2022-06-16 PROCEDURE — 99999 PR PBB SHADOW E&M-EST. PATIENT-LVL III: CPT | Mod: PBBFAC,,, | Performed by: INTERNAL MEDICINE

## 2022-06-16 PROCEDURE — 1126F PR PAIN SEVERITY QUANTIFIED, NO PAIN PRESENT: ICD-10-PCS | Mod: CPTII,S$GLB,, | Performed by: INTERNAL MEDICINE

## 2022-06-16 PROCEDURE — 3074F SYST BP LT 130 MM HG: CPT | Mod: CPTII,S$GLB,, | Performed by: INTERNAL MEDICINE

## 2022-06-16 PROCEDURE — 3078F DIAST BP <80 MM HG: CPT | Mod: CPTII,S$GLB,, | Performed by: INTERNAL MEDICINE

## 2022-06-16 PROCEDURE — 1101F PR PT FALLS ASSESS DOC 0-1 FALLS W/OUT INJ PAST YR: ICD-10-PCS | Mod: CPTII,S$GLB,, | Performed by: INTERNAL MEDICINE

## 2022-06-16 PROCEDURE — 4010F ACE/ARB THERAPY RXD/TAKEN: CPT | Mod: CPTII,S$GLB,, | Performed by: INTERNAL MEDICINE

## 2022-06-16 PROCEDURE — 99214 PR OFFICE/OUTPT VISIT, EST, LEVL IV, 30-39 MIN: ICD-10-PCS | Mod: S$GLB,,, | Performed by: INTERNAL MEDICINE

## 2022-06-16 PROCEDURE — 3078F PR MOST RECENT DIASTOLIC BLOOD PRESSURE < 80 MM HG: ICD-10-PCS | Mod: CPTII,S$GLB,, | Performed by: INTERNAL MEDICINE

## 2022-06-16 PROCEDURE — 3074F PR MOST RECENT SYSTOLIC BLOOD PRESSURE < 130 MM HG: ICD-10-PCS | Mod: CPTII,S$GLB,, | Performed by: INTERNAL MEDICINE

## 2022-06-16 PROCEDURE — 99214 OFFICE O/P EST MOD 30 MIN: CPT | Mod: S$GLB,,, | Performed by: INTERNAL MEDICINE

## 2022-06-16 PROCEDURE — 1101F PT FALLS ASSESS-DOCD LE1/YR: CPT | Mod: CPTII,S$GLB,, | Performed by: INTERNAL MEDICINE

## 2022-06-16 NOTE — PROGRESS NOTES
ESTABLISHED PATIENT VISIT    Chelsea Rodriguez  is a pleasant 67 y.o. female  with PMH significant for HTN, BrCa 2010,  who presented late 2021 for evaluation of snoring.   Here today for CPAP follow-up    PLAN last visit:   -will proceed with sleep testing   -discussed trial of PAP therapy if HENOK present   -driving precautions were discussed with the patient    Since last visit:   Started CPAP  Some congestion    PAP history   Problems    Mask Nasal   Pressure 5-12   Benefit    DME HME   Machine age 2021   Download 6.15.2022: 12/30 x 4.9 hrs, 5-12 (6.7)       SLEEP SCHEDULE   Bed Time Varies widely   Sleep Latency Not long (unless took a nap)   Arousals occasional   Nocturia 1-2 times per night   Back to sleep    Wake time 6:30 AM   Naps occasional   Work Worked nights x 15 years  March 2020       There were no vitals filed for this visit.  Physical Exam:    GEN:   Well-appearing  Psych:  Appropriate affect, demonstrates insight  SKIN:  No rash on the face or bridge of the nose    LABS:   Lab Results   Component Value Date    HGB 12.6 05/25/2022    CO2 30 (H) 05/25/2022       RECORDS REVIEWED PREVIOUSLY:    No prior sleep testing.    ASSESSMENT    No flowsheet data found.  PROBLEM DESCRIPTION Interval History STATUS   Mild HENOK   + snoring, very rare snoring arousals, + wintessed apneas.   HEENT: MP4, + tongue scalloping Working on getting used to CPAP uncontrolled   Daytime Sx   + sleepiness when inactive   Occasional sleepiness when driving   ESS12 /24 on intake Still some sleepiness uncontrolled   Nocturia   1-2 times per ngiht 0-1 Feels this is similar to prior   Allergic sinusitis   Frequent congestion  Antihistamine:   Nasal sprays:   Surgeries:      + mucous, coughing at night NEW   Other issues:     PLAN     -continue current pressures 5-12  -discussed compliance  -trial of flonase daily  -the patient is using and benefiting from PAP therapy    RTC          The patient was given open opportunity to ask questions  and/or express concerns about treatment plan.   All questions/concerns were discussed.     Two patient identifiers used prior to evaluation.

## 2022-06-29 ENCOUNTER — TELEPHONE (OUTPATIENT)
Dept: INTERNAL MEDICINE | Facility: CLINIC | Age: 67
End: 2022-06-29
Payer: MEDICARE

## 2022-06-29 NOTE — TELEPHONE ENCOUNTER
----- Message from Antony Hewitt sent at 6/29/2022  1:16 PM CDT -----  Contact: pt.   .Type:  Patient Requesting Referral    Who Called:pt  Does the patient already have the specialty appointment scheduled?:no  If yes, what is the date of that appointment?:  Referral to What Specialty:gastrology  Reason for Referral: constipation/ stomach big  Does the patient want the referral with a specific physician?:  [facility]   Patient Requesting a Response?:yes   Would the patient rather a call back or a response via [facility]? Call back  Best Call Back Number:[phone]  Additional Information:

## 2022-07-13 ENCOUNTER — LAB VISIT (OUTPATIENT)
Dept: LAB | Facility: HOSPITAL | Age: 67
End: 2022-07-13
Attending: INTERNAL MEDICINE
Payer: MEDICARE

## 2022-07-13 DIAGNOSIS — E78.2 MIXED HYPERLIPIDEMIA: ICD-10-CM

## 2022-07-13 LAB
CHOLEST SERPL-MCNC: 200 MG/DL (ref 120–199)
CHOLEST/HDLC SERPL: 4.1 {RATIO} (ref 2–5)
HDLC SERPL-MCNC: 49 MG/DL (ref 40–75)
HDLC SERPL: 24.5 % (ref 20–50)
LDLC SERPL CALC-MCNC: 132.6 MG/DL (ref 63–159)
NONHDLC SERPL-MCNC: 151 MG/DL
TRIGL SERPL-MCNC: 92 MG/DL (ref 30–150)

## 2022-07-13 PROCEDURE — 36415 COLL VENOUS BLD VENIPUNCTURE: CPT | Mod: PO | Performed by: STUDENT IN AN ORGANIZED HEALTH CARE EDUCATION/TRAINING PROGRAM

## 2022-07-13 PROCEDURE — 80061 LIPID PANEL: CPT | Performed by: STUDENT IN AN ORGANIZED HEALTH CARE EDUCATION/TRAINING PROGRAM

## 2022-07-14 ENCOUNTER — OFFICE VISIT (OUTPATIENT)
Dept: INTERNAL MEDICINE | Facility: CLINIC | Age: 67
End: 2022-07-14
Payer: MEDICARE

## 2022-07-14 VITALS
HEIGHT: 58 IN | WEIGHT: 164.88 LBS | SYSTOLIC BLOOD PRESSURE: 131 MMHG | OXYGEN SATURATION: 96 % | DIASTOLIC BLOOD PRESSURE: 67 MMHG | BODY MASS INDEX: 34.61 KG/M2 | HEART RATE: 84 BPM

## 2022-07-14 DIAGNOSIS — K59.09 CHRONIC CONSTIPATION: Primary | ICD-10-CM

## 2022-07-14 PROCEDURE — 99999 PR PBB SHADOW E&M-EST. PATIENT-LVL IV: CPT | Mod: PBBFAC,,, | Performed by: INTERNAL MEDICINE

## 2022-07-14 PROCEDURE — 99213 PR OFFICE/OUTPT VISIT, EST, LEVL III, 20-29 MIN: ICD-10-PCS | Mod: S$GLB,,, | Performed by: INTERNAL MEDICINE

## 2022-07-14 PROCEDURE — 3075F SYST BP GE 130 - 139MM HG: CPT | Mod: CPTII,S$GLB,, | Performed by: INTERNAL MEDICINE

## 2022-07-14 PROCEDURE — 3078F DIAST BP <80 MM HG: CPT | Mod: CPTII,S$GLB,, | Performed by: INTERNAL MEDICINE

## 2022-07-14 PROCEDURE — 1101F PT FALLS ASSESS-DOCD LE1/YR: CPT | Mod: CPTII,S$GLB,, | Performed by: INTERNAL MEDICINE

## 2022-07-14 PROCEDURE — 1159F MED LIST DOCD IN RCRD: CPT | Mod: CPTII,S$GLB,, | Performed by: INTERNAL MEDICINE

## 2022-07-14 PROCEDURE — 99999 PR PBB SHADOW E&M-EST. PATIENT-LVL IV: ICD-10-PCS | Mod: PBBFAC,,, | Performed by: INTERNAL MEDICINE

## 2022-07-14 PROCEDURE — 3008F PR BODY MASS INDEX (BMI) DOCUMENTED: ICD-10-PCS | Mod: CPTII,S$GLB,, | Performed by: INTERNAL MEDICINE

## 2022-07-14 PROCEDURE — 4010F PR ACE/ARB THEARPY RXD/TAKEN: ICD-10-PCS | Mod: CPTII,S$GLB,, | Performed by: INTERNAL MEDICINE

## 2022-07-14 PROCEDURE — 3008F BODY MASS INDEX DOCD: CPT | Mod: CPTII,S$GLB,, | Performed by: INTERNAL MEDICINE

## 2022-07-14 PROCEDURE — 1126F AMNT PAIN NOTED NONE PRSNT: CPT | Mod: CPTII,S$GLB,, | Performed by: INTERNAL MEDICINE

## 2022-07-14 PROCEDURE — 1157F ADVNC CARE PLAN IN RCRD: CPT | Mod: CPTII,S$GLB,, | Performed by: INTERNAL MEDICINE

## 2022-07-14 PROCEDURE — 1159F PR MEDICATION LIST DOCUMENTED IN MEDICAL RECORD: ICD-10-PCS | Mod: CPTII,S$GLB,, | Performed by: INTERNAL MEDICINE

## 2022-07-14 PROCEDURE — 3288F PR FALLS RISK ASSESSMENT DOCUMENTED: ICD-10-PCS | Mod: CPTII,S$GLB,, | Performed by: INTERNAL MEDICINE

## 2022-07-14 PROCEDURE — 4010F ACE/ARB THERAPY RXD/TAKEN: CPT | Mod: CPTII,S$GLB,, | Performed by: INTERNAL MEDICINE

## 2022-07-14 PROCEDURE — 3288F FALL RISK ASSESSMENT DOCD: CPT | Mod: CPTII,S$GLB,, | Performed by: INTERNAL MEDICINE

## 2022-07-14 PROCEDURE — 99213 OFFICE O/P EST LOW 20 MIN: CPT | Mod: S$GLB,,, | Performed by: INTERNAL MEDICINE

## 2022-07-14 PROCEDURE — 3078F PR MOST RECENT DIASTOLIC BLOOD PRESSURE < 80 MM HG: ICD-10-PCS | Mod: CPTII,S$GLB,, | Performed by: INTERNAL MEDICINE

## 2022-07-14 PROCEDURE — 1157F PR ADVANCE CARE PLAN OR EQUIV PRESENT IN MEDICAL RECORD: ICD-10-PCS | Mod: CPTII,S$GLB,, | Performed by: INTERNAL MEDICINE

## 2022-07-14 PROCEDURE — 3075F PR MOST RECENT SYSTOLIC BLOOD PRESS GE 130-139MM HG: ICD-10-PCS | Mod: CPTII,S$GLB,, | Performed by: INTERNAL MEDICINE

## 2022-07-14 PROCEDURE — 1126F PR PAIN SEVERITY QUANTIFIED, NO PAIN PRESENT: ICD-10-PCS | Mod: CPTII,S$GLB,, | Performed by: INTERNAL MEDICINE

## 2022-07-14 PROCEDURE — 1101F PR PT FALLS ASSESS DOC 0-1 FALLS W/OUT INJ PAST YR: ICD-10-PCS | Mod: CPTII,S$GLB,, | Performed by: INTERNAL MEDICINE

## 2022-07-14 NOTE — PROGRESS NOTES
Patient ID: Chelsea Rodriguez is a 67 y.o. female.    Chief Complaint: Bloated (Bothersome within the last month)    HPI Chelsea is a 67 y.o. female with chronic constipation who presents with chief complaint of constipation. Onset was years ago. But it has worsened recently. She used to go for 3-4 days before having a bowel movement. Now will go a week without BM. Drinking lots of water and using OTC dulcolax pills and enemas now. But nothing relieving constipation which is constant in duration. She followed with GI Dr. Reyes in the past but he is no longer working at [facility]. Last C scope was 2021. She has either tried and failed the following meds or didn't take them due to cost: linzess, miralax, amitiza, trulance.   Does not recall trying lactulose. Has tried mag citrate and this has helped in the past.     Review of Systems   Gastrointestinal: Positive for constipation.   All other systems reviewed and are negative.     Objective:     Vitals:    07/14/22 1131   BP: 131/67   Pulse: 84        Physical Exam  Vitals reviewed.   Constitutional:       General: She is not in acute distress.     Appearance: Normal appearance. She is well-developed. She is not ill-appearing, toxic-appearing or diaphoretic.   HENT:      Head: Normocephalic and atraumatic.      Right Ear: External ear normal.      Left Ear: External ear normal.      Nose: Nose normal.   Eyes:      General: No scleral icterus.        Right eye: No discharge.         Left eye: No discharge.      Extraocular Movements: Extraocular movements intact.      Conjunctiva/sclera: Conjunctivae normal.   Cardiovascular:      Rate and Rhythm: Normal rate and regular rhythm.      Heart sounds: Normal heart sounds. No murmur heard.    No friction rub. No gallop.   Pulmonary:      Effort: Pulmonary effort is normal. No respiratory distress.      Breath sounds: Normal breath sounds. No stridor. No wheezing, rhonchi or rales.   Skin:     General: Skin is warm and dry.    Neurological:      General: No focal deficit present.      Mental Status: She is alert and oriented to person, place, and time. Mental status is at baseline.   Psychiatric:         Mood and Affect: Mood normal.         Behavior: Behavior normal.         Thought Content: Thought content normal.         Judgment: Judgment normal.         Assessment:       1. Chronic constipation Chronic       Plan:         Chronic constipation  Comments:  She will use OTC mag citrate for now. Needs to re-establish with GI  Orders:  -     Ambulatory referral/consult to Gastroenterology; Future; Expected date: 07/21/2022        RTC PRN    Warning signs discussed, patient to call with any further issues or worsening of symptoms.       Parts of the above note were dictated using a voice dictation software. Please excuse any grammatical or typographical errors.

## 2022-07-14 NOTE — PATIENT INSTRUCTIONS
You can use over the counter magnesium citrate as needed for constipation. Will schedule with GI .

## 2022-07-19 ENCOUNTER — OFFICE VISIT (OUTPATIENT)
Dept: CARDIOLOGY | Facility: CLINIC | Age: 67
End: 2022-07-19
Payer: MEDICARE

## 2022-07-19 VITALS
OXYGEN SATURATION: 96 % | HEIGHT: 58 IN | HEART RATE: 81 BPM | SYSTOLIC BLOOD PRESSURE: 126 MMHG | BODY MASS INDEX: 34.2 KG/M2 | DIASTOLIC BLOOD PRESSURE: 72 MMHG | WEIGHT: 162.94 LBS

## 2022-07-19 DIAGNOSIS — I10 ESSENTIAL HYPERTENSION: ICD-10-CM

## 2022-07-19 DIAGNOSIS — I83.893 VARICOSE VEINS OF LOWER EXTREMITY WITH EDEMA, BILATERAL: ICD-10-CM

## 2022-07-19 DIAGNOSIS — E78.2 MIXED HYPERLIPIDEMIA: Primary | ICD-10-CM

## 2022-07-19 DIAGNOSIS — Z01.810 PREOP CARDIOVASCULAR EXAM: ICD-10-CM

## 2022-07-19 PROCEDURE — 1126F PR PAIN SEVERITY QUANTIFIED, NO PAIN PRESENT: ICD-10-PCS | Mod: CPTII,S$GLB,, | Performed by: INTERNAL MEDICINE

## 2022-07-19 PROCEDURE — 3074F PR MOST RECENT SYSTOLIC BLOOD PRESSURE < 130 MM HG: ICD-10-PCS | Mod: CPTII,S$GLB,, | Performed by: INTERNAL MEDICINE

## 2022-07-19 PROCEDURE — 3008F PR BODY MASS INDEX (BMI) DOCUMENTED: ICD-10-PCS | Mod: CPTII,S$GLB,, | Performed by: INTERNAL MEDICINE

## 2022-07-19 PROCEDURE — 3008F BODY MASS INDEX DOCD: CPT | Mod: CPTII,S$GLB,, | Performed by: INTERNAL MEDICINE

## 2022-07-19 PROCEDURE — 99215 PR OFFICE/OUTPT VISIT, EST, LEVL V, 40-54 MIN: ICD-10-PCS | Mod: S$GLB,,, | Performed by: INTERNAL MEDICINE

## 2022-07-19 PROCEDURE — 1157F PR ADVANCE CARE PLAN OR EQUIV PRESENT IN MEDICAL RECORD: ICD-10-PCS | Mod: CPTII,S$GLB,, | Performed by: INTERNAL MEDICINE

## 2022-07-19 PROCEDURE — 99999 PR PBB SHADOW E&M-EST. PATIENT-LVL IV: CPT | Mod: PBBFAC,,, | Performed by: INTERNAL MEDICINE

## 2022-07-19 PROCEDURE — 3288F FALL RISK ASSESSMENT DOCD: CPT | Mod: CPTII,S$GLB,, | Performed by: INTERNAL MEDICINE

## 2022-07-19 PROCEDURE — 1157F ADVNC CARE PLAN IN RCRD: CPT | Mod: CPTII,S$GLB,, | Performed by: INTERNAL MEDICINE

## 2022-07-19 PROCEDURE — 1159F PR MEDICATION LIST DOCUMENTED IN MEDICAL RECORD: ICD-10-PCS | Mod: CPTII,S$GLB,, | Performed by: INTERNAL MEDICINE

## 2022-07-19 PROCEDURE — 99215 OFFICE O/P EST HI 40 MIN: CPT | Mod: S$GLB,,, | Performed by: INTERNAL MEDICINE

## 2022-07-19 PROCEDURE — 1126F AMNT PAIN NOTED NONE PRSNT: CPT | Mod: CPTII,S$GLB,, | Performed by: INTERNAL MEDICINE

## 2022-07-19 PROCEDURE — 3288F PR FALLS RISK ASSESSMENT DOCUMENTED: ICD-10-PCS | Mod: CPTII,S$GLB,, | Performed by: INTERNAL MEDICINE

## 2022-07-19 PROCEDURE — 3078F DIAST BP <80 MM HG: CPT | Mod: CPTII,S$GLB,, | Performed by: INTERNAL MEDICINE

## 2022-07-19 PROCEDURE — 4010F PR ACE/ARB THEARPY RXD/TAKEN: ICD-10-PCS | Mod: CPTII,S$GLB,, | Performed by: INTERNAL MEDICINE

## 2022-07-19 PROCEDURE — 99999 PR PBB SHADOW E&M-EST. PATIENT-LVL IV: ICD-10-PCS | Mod: PBBFAC,,, | Performed by: INTERNAL MEDICINE

## 2022-07-19 PROCEDURE — 1101F PR PT FALLS ASSESS DOC 0-1 FALLS W/OUT INJ PAST YR: ICD-10-PCS | Mod: CPTII,S$GLB,, | Performed by: INTERNAL MEDICINE

## 2022-07-19 PROCEDURE — 4010F ACE/ARB THERAPY RXD/TAKEN: CPT | Mod: CPTII,S$GLB,, | Performed by: INTERNAL MEDICINE

## 2022-07-19 PROCEDURE — 1159F MED LIST DOCD IN RCRD: CPT | Mod: CPTII,S$GLB,, | Performed by: INTERNAL MEDICINE

## 2022-07-19 PROCEDURE — 3074F SYST BP LT 130 MM HG: CPT | Mod: CPTII,S$GLB,, | Performed by: INTERNAL MEDICINE

## 2022-07-19 PROCEDURE — 1101F PT FALLS ASSESS-DOCD LE1/YR: CPT | Mod: CPTII,S$GLB,, | Performed by: INTERNAL MEDICINE

## 2022-07-19 PROCEDURE — 3078F PR MOST RECENT DIASTOLIC BLOOD PRESSURE < 80 MM HG: ICD-10-PCS | Mod: CPTII,S$GLB,, | Performed by: INTERNAL MEDICINE

## 2022-07-19 NOTE — PROGRESS NOTES
[facility]    CC: BLE edema      Patient ID: Chelsea Rodriguez is a 67 y.o. female with HTN,. HLD, obesity, HENOK (on CPAP), who presents for a follow up.  Pertinent history/events are as follows:     -Pt kindly referred by Dr. Summers for evaluation of BLE edema.    Initial clinic visit 2022: Mrs. Rodriguez reports leg swelling for several years.  States leg welling became worse following right knee replacement surgery in 3/2021.  She has no claudication or tissue loss.  Plan:  BLE Edema- Due to lymphedema and possible venous insufficiency.  Check BLE venous reflux study and RAMOS study.  Refer to lymphedema clinic.  Limit sodium intake to 2,000 mg daily.  Limit volume intake to 1.5 liters daily.  Elevate legs when resting.  HTN- Continue current medications.  HLD- Discontinue pravastatin and start atorvastatin 80 mg daily.  Continue ASA 81 mg daily.  Obesity- Encourage diet, exercise and weight loss.  HENOK- Continue CPAP nightly.     HPI:  Mrs. Rodriguez reports feeling well. She states she missed some doses of cholesterol medicine and may have been eating more fatty foods recently. She has no other complaints.    Past Medical History:   Diagnosis Date    Bilateral knee pain     Carpal tunnel syndrome, bilateral     Fissure in skin of foot     Right small toe    HTN (hypertension)     Hyperlipidemia     Palpitations     Rotator cuff injury     right    Screening for colorectal cancer 10/20/2017    Screening for malignant neoplasm of colon 2021    Statin-induced myositis 2018     Past Surgical History:   Procedure Laterality Date    BILATERAL SALPINGOOPHORECTOMY  2000    BREAST BIOPSY Left     malignant    BREAST BIOPSY Left     negative    BREAST LUMPECTOMY Left     CATARACT EXTRACTION W/  INTRAOCULAR LENS IMPLANT Right 2018    Dr. Oneil    CATARACT EXTRACTION W/  INTRAOCULAR LENS IMPLANT Left 2018    Dr. Oneil     SECTION      x2    COLONOSCOPY  N/A 10/20/2017    Procedure: COLONOSCOPY;  Surgeon: Mitchell Danielson Jr., MD;  Location: [facility] ENDO;  Service: Endoscopy;  Laterality: N/A;    COLONOSCOPY N/A 2/5/2021    Procedure: COLONOSCOPY/Suprep;  Surgeon: Malcolm Reyes MD;  Location: [facility] ENDO;  Service: Endoscopy;  Laterality: N/A;    CYST REMOVAL      on back    ESOPHAGOGASTRODUODENOSCOPY N/A 2/5/2021    Procedure: EGD (ESOPHAGOGASTRODUODENOSCOPY);  Surgeon: Malcolm Reyes MD;  Location: [facility] ENDO;  Service: Endoscopy;  Laterality: N/A;    HERNIA REPAIR      HYSTERECTOMY      at 25 yrs old    INTRAOCULAR PROSTHESES INSERTION Left 11/6/2018    Procedure: INSERTION, IOL PROSTHESIS;  Surgeon: Sergio Oneil MD;  Location: [facility] OR 1ST FLR;  Service: Ophthalmology;  Laterality: Left;    INTRAOCULAR PROSTHESES INSERTION Right 11/20/2018    Procedure: INSERTION, IOL PROSTHESIS;  Surgeon: Sergoi Oneil MD;  Location: [facility] OR 2ND FLR;  Service: Ophthalmology;  Laterality: Right;    KNEE ARTHROPLASTY Right 3/31/2021    Procedure: ARTHROPLASTY, KNEE:RIGHT:DEPUY-SIGMA ;  Surgeon: Pedro Summers III, MD;  Location: [facility] OR;  Service: Orthopedics;  Laterality: Right;    OOPHORECTOMY      @ 45 yrs old    PHACOEMULSIFICATION OF CATARACT Left 11/6/2018    Procedure: PHACOEMULSIFICATION, CATARACT;  Surgeon: Sergio Oneil MD;  Location: [facility] OR 1ST FLR;  Service: Ophthalmology;  Laterality: Left;    PHACOEMULSIFICATION OF CATARACT Right 11/20/2018    Procedure: PHACOEMULSIFICATION, CATARACT;  Surgeon: Sergio Oneil MD;  Location: [facility] OR 2ND FLR;  Service: Ophthalmology;  Laterality: Right;    supracervical abdominal hysterectomy  1978    fibroids     Social History     Socioeconomic History    Marital status: Single    Number of children: 2   Occupational History     Comment: retired   Tobacco Use    Smoking status: Never Smoker    Smokeless tobacco: Never Used   Substance and Sexual Activity    Alcohol use: Yes      "Comment: once per month    Drug use: No    Sexual activity: Not Currently   Social History Narrative    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 hx of left lumpectomy for "breast cancer"- with 5yrs of tamoxifen use. Sees Dr. Buckley ([facility] for surveillance/MMG)        Dr. Lala former PCP, [facility]          Social Determinants of Health     Financial Resource Strain: Low Risk     Difficulty of Paying Living Expenses: Not hard at all   Food Insecurity: No Food Insecurity    Worried About Running Out of Food in the Last Year: Never true    Ran Out of Food in the Last Year: Never true   Transportation Needs: No Transportation Needs    Lack of Transportation (Medical): No    Lack of Transportation (Non-Medical): No   Physical Activity: Inactive    Days of Exercise per Week: 0 days    Minutes of Exercise per Session: 0 min   Stress: No Stress Concern Present    Feeling of Stress : Only a little   Social Connections: Moderately Integrated    Frequency of Communication with Friends and Family: More than three times a week    Frequency of Social Gatherings with Friends and Family: More than three times a week    Attends Restorationism Services: More than 4 times per year    Active Member of Clubs or Organizations: Yes    Attends Club or Organization Meetings: More than 4 times per year    Marital Status:    Housing Stability: Low Risk     Unable to Pay for Housing in the Last Year: No    Number of Places Lived in the Last Year: 1    Unstable Housing in the Last Year: No     Family History   Problem Relation Age of Onset    Hypertension Mother     Heart disease Mother     Diabetes Mother     Hyperlipidemia Mother     Pancreatitis Mother     Cataracts Mother     Macular degeneration Mother     Cancer Father     Breast cancer Paternal Cousin     Hypertension Sister     Thyroid disease Sister     Diabetes Brother  " "   Heart disease Brother     Amblyopia Neg Hx     Blindness Neg Hx     Glaucoma Neg Hx     Strabismus Neg Hx     Retinal detachment Neg Hx        Review of patient's allergies indicates:   Allergen Reactions    Codeine Nausea And Vomiting       Medication List with Changes/Refills   Current Medications    ACETAMINOPHEN (TYLENOL) 650 MG TBSR    Take 1 tablet (650 mg total) by mouth every 8 (eight) hours as needed (pain).    ASPIRIN (ECOTRIN) 81 MG EC TABLET    Take 1 tablet (81 mg total) by mouth 2 (two) times daily.    ATORVASTATIN (LIPITOR) 80 MG TABLET    Take 1 tablet (80 mg total) by mouth once daily.    COENZYME Q10 100 MG CAPSULE    Take 100 mg by mouth every evening.    DICLOFENAC SODIUM (VOLTAREN) 1 % GEL        DOCUSATE SODIUM (COLACE) 100 MG CAPSULE    Take 1 capsule (100 mg total) by mouth 2 (two) times daily as needed for Constipation.    ERGOCALCIFEROL (ERGOCALCIFEROL) 50,000 UNIT CAP    TAKE 1 CAPSULE EVERY 7 DAYS    HYDROCHLOROTHIAZIDE (MICROZIDE) 12.5 MG CAPSULE    TAKE 1 CAPSULE EVERY EVENING    IRBESARTAN (AVAPRO) 150 MG TABLET    Take 1 tablet (150 mg total) by mouth once daily.    LIDOCAINE-PRILOCAINE (EMLA) CREAM        LORATADINE (CLARITIN) 10 MG TABLET    Take 10 mg by mouth every evening.    METOPROLOL SUCCINATE (TOPROL-XL) 25 MG 24 HR TABLET    TAKE 1 TABLET EVERY EVENING       Review of Systems  Constitution: Denies chills, fever, and sweats.  HENT: Denies headaches or blurry vision.  Cardiovascular: Denies chest pain or irregular heart beat.  Respiratory: Denies cough or shortness of breath.  Gastrointestinal: Denies abdominal pain, nausea, or vomiting.  Musculoskeletal: Positive for leg swelling.  Neurological: Denies dizziness or focal weakness.  Psychiatric/Behavioral: Normal mental status.  Hematologic/Lymphatic: Denies bleeding problem or easy bruising/bleeding.  Skin: Denies rash or suspicious lesions    Physical Examination  Ht 4' 10" (1.473 m)   Wt 73.9 kg (162 lb 14.7 oz)  "  LMP  (LMP Unknown)   BMI 34.05 kg/m²     Constitutional: No acute distress, conversant  HEENT: Sclera anicteric, Pupils equal, round and reactive to light, extraocular motions intact, Oropharynx clear  Neck: No JVD, no carotid bruits  Cardiovascular: regular rate and rhythm, no murmur, rubs or gallops, normal S1/S2  Pulmonary: Clear to auscultation bilaterally  Abdominal: Abdomen soft, nontender, nondistended, positive bowel sounds  Extremities: BLE's with trace edema, venous stasis dermatitis, and changes consistent with lymphedema   Pulses:  Carotid pulses are 2+ on the right side, and 2+ on the left side.  Radial pulses are 2+ on the right side, and 2+ on the left side.   Femoral pulses are 2+ on the right side, and 2+ on the left side.  Popliteal pulses are 2+ on the right side, and 2+ on the left side.   Dorsalis pedis pulses are 2+ on the right side, and 2+ on the left side.   Posterior tibial pulses are 2+ on the right side, and 2+ on the left side.    Skin: No ecchymosis, erythema, or ulcers  Psych: Alert and oriented x 3, appropriate affect  Neuro: CNII-XII intact, no focal deficits    Labs:  Most Recent Data  CBC:   Lab Results   Component Value Date    WBC 7.24 05/25/2022    HGB 12.6 05/25/2022    HCT 41.3 05/25/2022     05/25/2022    MCV 86 05/25/2022    RDW 15.1 (H) 05/25/2022     BMP:   Lab Results   Component Value Date     05/25/2022    K 4.8 05/25/2022     05/25/2022    CO2 30 (H) 05/25/2022    BUN 14 05/25/2022    CREATININE 0.7 05/25/2022    GLU 91 05/25/2022    CALCIUM 9.4 05/25/2022    PHOS 2.9 10/12/2017     LFTS;   Lab Results   Component Value Date    PROT 6.4 05/25/2022    ALBUMIN 3.8 05/25/2022    BILITOT 0.5 05/25/2022    AST 33 05/25/2022    ALKPHOS 98 05/25/2022    ALT 30 05/25/2022     COAGS:   Lab Results   Component Value Date    INR 0.9 03/23/2021     FLP:   Lab Results   Component Value Date    CHOL 200 (H) 07/13/2022    HDL 49 07/13/2022    LDLCALC 132.6  07/13/2022    TRIG 92 07/13/2022    CHOLHDL 24.5 07/13/2022     CARDIAC:   Lab Results   Component Value Date    BNP <10 05/29/2020     Imaging    RAMOS 4/26/22:  Normal rest and exercise RAMOS bilaterally.  Normal PVR waveforms bilaterally.    BLE Venous Ultrasound 4/26/22:  No evidence of lower extremity DVT or venous reflux bilaterally.    Assessment/Plan:  Chelsea Rodriguez is a 67 y.o. female with HTN, HLD, obesity, HENOK (on CPAP), who presents for a follow up.    1. BLE Edema- Due to lymphedema. Lymphedema clinic on the waiting list starting early September. Venous ultrasound negative for reflux however she does have clinical features of venous insufficiency and this may be a false negative test.  Limit sodium intake to 2,000 mg daily.  Limit volume intake to 1.5 liters daily.  Elevate legs when resting. Compression stockings.    2. HTN- Continue current medications.    3. HLD-  while taking atorvastatin 80mg. Instructed to modify diet and increase exercise. Continue ASA 81 mg daily. Lipid panel prior to next visit.    4. Obesity- Encourage diet, exercise and weight loss.    5. HENOK- Continue CPAP nightly.    Follow up in 5 months    Total duration of face to face visit time 30 minutes.  Total time spent counseling greater than fifty percent of total visit time.  Counseling included discussion regarding imaging findings, diagnosis, possibilities, treatment options, risks and benefits.  The patient had many questions regarding the options and long-term effects.    Genny Zuleta MD  Vascular Medicine Fellow    Patient seen and discussed with Dr. Rico

## 2022-07-19 NOTE — PATIENT INSTRUCTIONS
Assessment/Plan:  Chelsea Rodriguez is a 67 y.o. female with HTN, HLD, obesity, HENOK (on CPAP), who presents for a follow up.    1. BLE Edema- Due to lymphedema. Lymphedema clinic on the waiting list starting early September. Venous ultrasound negative for reflux however she does have clinical features of venous insufficiency and this may be a false negative test.  Limit sodium intake to 2,000 mg daily.  Limit volume intake to 1.5 liters daily.  Elevate legs when resting. Compression stockings.    2. HTN- Continue current medications.    3. HLD-  while taking atorvastatin 80mg. Instructed to modify diet and increase exercise. Continue ASA 81 mg daily.    4. Obesity- Encourage diet, exercise and weight loss.    5. HENOK- Continue CPAP nightly.    Follow up in 5 months

## 2022-07-22 ENCOUNTER — TELEPHONE (OUTPATIENT)
Dept: SLEEP MEDICINE | Facility: CLINIC | Age: 67
End: 2022-07-22
Payer: MEDICARE

## 2022-07-22 NOTE — TELEPHONE ENCOUNTER
Spoke to the pt and she stated that she is going to speak to Kailee when she come here on the 26th.

## 2022-08-05 ENCOUNTER — PATIENT MESSAGE (OUTPATIENT)
Dept: INTERNAL MEDICINE | Facility: CLINIC | Age: 67
End: 2022-08-05
Payer: MEDICARE

## 2022-08-05 RX ORDER — IRBESARTAN 150 MG/1
150 TABLET ORAL DAILY
Qty: 90 TABLET | Refills: 1 | Status: SHIPPED | OUTPATIENT
Start: 2022-08-05 | End: 2022-09-21

## 2022-08-05 NOTE — TELEPHONE ENCOUNTER
No new care gaps identified.  [facility] Embedded Care Gaps. Reference number: [id number]. 8/05/2022   11:10:25 AM CDT

## 2022-08-24 ENCOUNTER — OFFICE VISIT (OUTPATIENT)
Dept: OTOLARYNGOLOGY | Facility: CLINIC | Age: 67
End: 2022-08-24
Payer: MEDICARE

## 2022-08-24 VITALS — WEIGHT: 168.19 LBS | BODY MASS INDEX: 35.16 KG/M2

## 2022-08-24 DIAGNOSIS — H61.23 BILATERAL IMPACTED CERUMEN: Primary | ICD-10-CM

## 2022-08-24 PROCEDURE — 3288F FALL RISK ASSESSMENT DOCD: CPT | Mod: CPTII,S$GLB,, | Performed by: OTOLARYNGOLOGY

## 2022-08-24 PROCEDURE — 1126F PR PAIN SEVERITY QUANTIFIED, NO PAIN PRESENT: ICD-10-PCS | Mod: CPTII,S$GLB,, | Performed by: OTOLARYNGOLOGY

## 2022-08-24 PROCEDURE — 1157F PR ADVANCE CARE PLAN OR EQUIV PRESENT IN MEDICAL RECORD: ICD-10-PCS | Mod: CPTII,S$GLB,, | Performed by: OTOLARYNGOLOGY

## 2022-08-24 PROCEDURE — 4010F PR ACE/ARB THEARPY RXD/TAKEN: ICD-10-PCS | Mod: CPTII,S$GLB,, | Performed by: OTOLARYNGOLOGY

## 2022-08-24 PROCEDURE — 1126F AMNT PAIN NOTED NONE PRSNT: CPT | Mod: CPTII,S$GLB,, | Performed by: OTOLARYNGOLOGY

## 2022-08-24 PROCEDURE — 1101F PR PT FALLS ASSESS DOC 0-1 FALLS W/OUT INJ PAST YR: ICD-10-PCS | Mod: CPTII,S$GLB,, | Performed by: OTOLARYNGOLOGY

## 2022-08-24 PROCEDURE — 3008F BODY MASS INDEX DOCD: CPT | Mod: CPTII,S$GLB,, | Performed by: OTOLARYNGOLOGY

## 2022-08-24 PROCEDURE — 69210 REMOVE IMPACTED EAR WAX UNI: CPT | Mod: S$GLB,,, | Performed by: OTOLARYNGOLOGY

## 2022-08-24 PROCEDURE — 99499 UNLISTED E&M SERVICE: CPT | Mod: S$GLB,,, | Performed by: OTOLARYNGOLOGY

## 2022-08-24 PROCEDURE — 1101F PT FALLS ASSESS-DOCD LE1/YR: CPT | Mod: CPTII,S$GLB,, | Performed by: OTOLARYNGOLOGY

## 2022-08-24 PROCEDURE — 1159F MED LIST DOCD IN RCRD: CPT | Mod: CPTII,S$GLB,, | Performed by: OTOLARYNGOLOGY

## 2022-08-24 PROCEDURE — 99999 PR PBB SHADOW E&M-EST. PATIENT-LVL III: CPT | Mod: PBBFAC,,, | Performed by: OTOLARYNGOLOGY

## 2022-08-24 PROCEDURE — 3288F PR FALLS RISK ASSESSMENT DOCUMENTED: ICD-10-PCS | Mod: CPTII,S$GLB,, | Performed by: OTOLARYNGOLOGY

## 2022-08-24 PROCEDURE — 3008F PR BODY MASS INDEX (BMI) DOCUMENTED: ICD-10-PCS | Mod: CPTII,S$GLB,, | Performed by: OTOLARYNGOLOGY

## 2022-08-24 PROCEDURE — 4010F ACE/ARB THERAPY RXD/TAKEN: CPT | Mod: CPTII,S$GLB,, | Performed by: OTOLARYNGOLOGY

## 2022-08-24 PROCEDURE — 1157F ADVNC CARE PLAN IN RCRD: CPT | Mod: CPTII,S$GLB,, | Performed by: OTOLARYNGOLOGY

## 2022-08-24 PROCEDURE — 99999 PR PBB SHADOW E&M-EST. PATIENT-LVL III: ICD-10-PCS | Mod: PBBFAC,,, | Performed by: OTOLARYNGOLOGY

## 2022-08-24 PROCEDURE — 69210 PR REMOVAL IMPACTED CERUMEN REQUIRING INSTRUMENTATION, UNILATERAL: ICD-10-PCS | Mod: S$GLB,,, | Performed by: OTOLARYNGOLOGY

## 2022-08-24 PROCEDURE — 1159F PR MEDICATION LIST DOCUMENTED IN MEDICAL RECORD: ICD-10-PCS | Mod: CPTII,S$GLB,, | Performed by: OTOLARYNGOLOGY

## 2022-08-24 PROCEDURE — 99499 NO LOS: ICD-10-PCS | Mod: S$GLB,,, | Performed by: OTOLARYNGOLOGY

## 2022-08-24 NOTE — PROGRESS NOTES
Has B CI with Tinn.    Procedure Note:    Patient was brought to the minor procedure room and using the operating microscope the right ear canal  was cleaned of ceruminous debris. There was a significant cerumen impaction.  The forceps and suction were both used to perform this. Tympanic membrane intact. Pt tolerated well. There were no complications.    Procedure Note:    The patient was brought to the minor procedure room and placed under the operating microscope. Using a combination of suction, curettes and cup forceps the patient's cerumen impaction was removed. The tympanic membrane was evaluated and was unremarkable. The patient tolerated the procedure well. There were no complications.    P: RTC prn.

## 2022-08-25 ENCOUNTER — IMMUNIZATION (OUTPATIENT)
Dept: INTERNAL MEDICINE | Facility: CLINIC | Age: 67
End: 2022-08-25
Payer: MEDICARE

## 2022-08-25 DIAGNOSIS — Z23 NEED FOR VACCINATION: Primary | ICD-10-CM

## 2022-08-25 PROCEDURE — 0054A COVID-19, MRNA, LNP-S, PF, 30 MCG/0.3 ML DOSE VACCINE (PFIZER): CPT | Mod: PBBFAC | Performed by: FAMILY MEDICINE

## 2022-08-26 ENCOUNTER — OFFICE VISIT (OUTPATIENT)
Dept: GASTROENTEROLOGY | Facility: CLINIC | Age: 67
End: 2022-08-26
Payer: MEDICARE

## 2022-08-26 VITALS — BODY MASS INDEX: 34.99 KG/M2 | HEIGHT: 58 IN | WEIGHT: 166.69 LBS

## 2022-08-26 DIAGNOSIS — K59.09 CHRONIC CONSTIPATION: ICD-10-CM

## 2022-08-26 DIAGNOSIS — K59.04 CHRONIC IDIOPATHIC CONSTIPATION: Primary | ICD-10-CM

## 2022-08-26 PROCEDURE — 4010F PR ACE/ARB THEARPY RXD/TAKEN: ICD-10-PCS | Mod: CPTII,S$GLB,, | Performed by: INTERNAL MEDICINE

## 2022-08-26 PROCEDURE — 99214 PR OFFICE/OUTPT VISIT, EST, LEVL IV, 30-39 MIN: ICD-10-PCS | Mod: S$GLB,,, | Performed by: INTERNAL MEDICINE

## 2022-08-26 PROCEDURE — 1157F PR ADVANCE CARE PLAN OR EQUIV PRESENT IN MEDICAL RECORD: ICD-10-PCS | Mod: CPTII,S$GLB,, | Performed by: INTERNAL MEDICINE

## 2022-08-26 PROCEDURE — 99214 OFFICE O/P EST MOD 30 MIN: CPT | Mod: S$GLB,,, | Performed by: INTERNAL MEDICINE

## 2022-08-26 PROCEDURE — 1159F PR MEDICATION LIST DOCUMENTED IN MEDICAL RECORD: ICD-10-PCS | Mod: CPTII,S$GLB,, | Performed by: INTERNAL MEDICINE

## 2022-08-26 PROCEDURE — 3288F FALL RISK ASSESSMENT DOCD: CPT | Mod: CPTII,S$GLB,, | Performed by: INTERNAL MEDICINE

## 2022-08-26 PROCEDURE — 1159F MED LIST DOCD IN RCRD: CPT | Mod: CPTII,S$GLB,, | Performed by: INTERNAL MEDICINE

## 2022-08-26 PROCEDURE — 1157F ADVNC CARE PLAN IN RCRD: CPT | Mod: CPTII,S$GLB,, | Performed by: INTERNAL MEDICINE

## 2022-08-26 PROCEDURE — 99999 PR PBB SHADOW E&M-EST. PATIENT-LVL IV: CPT | Mod: PBBFAC,,, | Performed by: INTERNAL MEDICINE

## 2022-08-26 PROCEDURE — 1126F AMNT PAIN NOTED NONE PRSNT: CPT | Mod: CPTII,S$GLB,, | Performed by: INTERNAL MEDICINE

## 2022-08-26 PROCEDURE — 99999 PR PBB SHADOW E&M-EST. PATIENT-LVL IV: ICD-10-PCS | Mod: PBBFAC,,, | Performed by: INTERNAL MEDICINE

## 2022-08-26 PROCEDURE — 1126F PR PAIN SEVERITY QUANTIFIED, NO PAIN PRESENT: ICD-10-PCS | Mod: CPTII,S$GLB,, | Performed by: INTERNAL MEDICINE

## 2022-08-26 PROCEDURE — 3288F PR FALLS RISK ASSESSMENT DOCUMENTED: ICD-10-PCS | Mod: CPTII,S$GLB,, | Performed by: INTERNAL MEDICINE

## 2022-08-26 PROCEDURE — 3008F BODY MASS INDEX DOCD: CPT | Mod: CPTII,S$GLB,, | Performed by: INTERNAL MEDICINE

## 2022-08-26 PROCEDURE — 1101F PR PT FALLS ASSESS DOC 0-1 FALLS W/OUT INJ PAST YR: ICD-10-PCS | Mod: CPTII,S$GLB,, | Performed by: INTERNAL MEDICINE

## 2022-08-26 PROCEDURE — 4010F ACE/ARB THERAPY RXD/TAKEN: CPT | Mod: CPTII,S$GLB,, | Performed by: INTERNAL MEDICINE

## 2022-08-26 PROCEDURE — 1101F PT FALLS ASSESS-DOCD LE1/YR: CPT | Mod: CPTII,S$GLB,, | Performed by: INTERNAL MEDICINE

## 2022-08-26 PROCEDURE — 3008F PR BODY MASS INDEX (BMI) DOCUMENTED: ICD-10-PCS | Mod: CPTII,S$GLB,, | Performed by: INTERNAL MEDICINE

## 2022-08-26 NOTE — PATIENT INSTRUCTIONS
Start by taking miralax 1/2 cap every morning    After a 2-3 days of NO bowel movements, need to take liquid dulcolax.

## 2022-08-26 NOTE — PROGRESS NOTES
GASTROENTEROLOGY CLINIC NOTE    Reason for visit: The primary encounter diagnosis was Chronic idiopathic constipation. A diagnosis of Chronic constipation was also pertinent to this visit.  Referring provider/PCP: Eliza Alva MD    HPI:  Chelsea Rodriguez is a 67 y.o. female here today for constipation . New to me  Previously seen by dr linn in our clinic.    Ongoing symptoms of constipation. 1 BM / week. Lower abd fullness. Chronic and ongoing for years now. Hx hysterectomy. Has large and small stools.   Taking liquid dulcolax. When really backed up, takes epsom salts.   linzess didn't really help, didn't try it very long. Worried about side effects  amitiza and trulance too expensive.    Prior Endoscopy:  EGD:  2/2021 kaveh  Impression:           - Non-obstructing Schatzki ring. Dilated. 51fr                        - Small hiatal hernia.                         - Normal examined duodenum.                         - No specimens collected.     Colon:  2/2021    Impression:           - Tortuous colon.                         - Four 2 to 5 mm polyps in the ascending colon,                         removed with a cold biopsy forceps. Resected and                         retrieved.                         - External and internal hemorrhoids.   TAs, repeat 3 years (2024)    (Portions of this note were dictated using voice recognition software and may contain dictation related errors in spelling/grammar/syntax not found on text review)    Review of Systems   Constitutional: Negative for fever and malaise/fatigue.   Respiratory: Negative for cough and shortness of breath.    Cardiovascular: Negative for chest pain and palpitations.   Gastrointestinal: Positive for abdominal pain and constipation. Negative for blood in stool, nausea and vomiting.   Neurological: Negative for dizziness and headaches.       Past Medical History: has a past medical history of Bilateral knee pain, Carpal tunnel syndrome, bilateral,  Fissure in skin of foot, HTN (hypertension), Hyperlipidemia, Palpitations, Rotator cuff injury, Screening for colorectal cancer, Screening for malignant neoplasm of colon, and Statin-induced myositis.    Past Surgical History: has a past surgical history that includes supracervical abdominal hysterectomy (); Bilateral salpingoophorectomy (); Colonoscopy (N/A, 10/20/2017); Intraocular prosthesis insertion (Left, 2018); Phacoemulsification of cataract (Left, 2018); Hernia repair; Cyst Removal;  section; Phacoemulsification of cataract (Right, 2018); Intraocular prosthesis insertion (Right, 2018); Breast lumpectomy (Left, ); Breast biopsy (Left, ); Breast biopsy (Left, ); Hysterectomy; Oophorectomy; Cataract extraction w/  intraocular lens implant (Right, 2018); Cataract extraction w/  intraocular lens implant (Left, 2018); Esophagogastroduodenoscopy (N/A, 2021); Colonoscopy (N/A, 2021); and Knee Arthroplasty (Right, 3/31/2021).    Family History:family history includes Breast cancer in her paternal cousin; Cancer in her father; Cataracts in her mother; Diabetes in her brother and mother; Heart disease in her brother and mother; Hyperlipidemia in her mother; Hypertension in her mother and sister; Macular degeneration in her mother; Pancreatitis in her mother; Thyroid disease in her sister.    Allergies:   Review of patient's allergies indicates:   Allergen Reactions    Codeine Nausea And Vomiting       Social History: reports that she has never smoked. She has never used smokeless tobacco. She reports current alcohol use. She reports that she does not use drugs.    Home medications:   Current Outpatient Medications on File Prior to Visit   Medication Sig Dispense Refill    acetaminophen (TYLENOL) 650 MG TbSR Take 1 tablet (650 mg total) by mouth every 8 (eight) hours as needed (pain). 120 tablet 0    atorvastatin (LIPITOR) 80 MG tablet Take 1  "tablet (80 mg total) by mouth once daily. 90 tablet 3    coenzyme Q10 100 mg capsule Take 100 mg by mouth every evening.      diclofenac sodium (VOLTAREN) 1 % Gel       docusate sodium (COLACE) 100 MG capsule Take 1 capsule (100 mg total) by mouth 2 (two) times daily as needed for Constipation. 60 capsule 0    ergocalciferol (ERGOCALCIFEROL) 50,000 unit Cap TAKE 1 CAPSULE EVERY 7 DAYS 12 capsule 0    hydroCHLOROthiazide (MICROZIDE) 12.5 mg capsule TAKE 1 CAPSULE EVERY EVENING 90 capsule 2    irbesartan (AVAPRO) 150 MG tablet Take 1 tablet (150 mg total) by mouth once daily. 90 tablet 1    LIDOcaine-prilocaine (EMLA) cream       loratadine (CLARITIN) 10 mg tablet Take 10 mg by mouth every evening.      metoprolol succinate (TOPROL-XL) 25 MG 24 hr tablet TAKE 1 TABLET EVERY EVENING 90 tablet 3    aspirin (ECOTRIN) 81 MG EC tablet Take 1 tablet (81 mg total) by mouth 2 (two) times daily. (Patient taking differently: Take 81 mg by mouth once daily.) 60 tablet 0     No current facility-administered medications on file prior to visit.       Vital signs:  Ht 4' 10" (1.473 m)   Wt 75.6 kg (166 lb 10.7 oz)   LMP  (LMP Unknown)   BMI 34.83 kg/m²     Physical Exam  Vitals reviewed.   Constitutional:       General: She is not in acute distress.  HENT:      Head: Normocephalic and atraumatic.   Eyes:      General: No scleral icterus.     Conjunctiva/sclera: Conjunctivae normal.   Skin:     General: Skin is warm.      Coloration: Skin is not pale.      Findings: No rash.   Neurological:      Mental Status: She is oriented to person, place, and time.      Gait: Gait normal.   Psychiatric:         Mood and Affect: Mood normal.         Behavior: Behavior normal.         I have reviewed prior labs, imaging, notes from last month    Assessment:  1. Chronic idiopathic constipation    2. Chronic constipation Chronic       Plan:  Orders Placed This Encounter    FL Defecogram     Start by taking miralax 1/2 cap every " morning    After a 2-3 days of NO bowel movements, need to take liquid dulcolax.    Get defogram      RTC 3 months    Isai Seymour MD  [facility]    A total of 36minutes were spent during this encounter including reviewing records, interviewing, examining patient, and counseling / coordination of care.

## 2022-09-02 ENCOUNTER — CLINICAL SUPPORT (OUTPATIENT)
Dept: REHABILITATION | Facility: HOSPITAL | Age: 67
End: 2022-09-02
Payer: MEDICARE

## 2022-09-02 DIAGNOSIS — R60.0 EDEMA OF BOTH LOWER EXTREMITIES: ICD-10-CM

## 2022-09-02 DIAGNOSIS — I83.893 VARICOSE VEINS OF LOWER EXTREMITY WITH EDEMA, BILATERAL: Primary | ICD-10-CM

## 2022-09-02 DIAGNOSIS — I89.0 LYMPHEDEMA OF BOTH LOWER EXTREMITIES: ICD-10-CM

## 2022-09-02 DIAGNOSIS — I87.2 VENOUS STASIS DERMATITIS OF BOTH LOWER EXTREMITIES: ICD-10-CM

## 2022-09-02 PROCEDURE — 97140 MANUAL THERAPY 1/> REGIONS: CPT

## 2022-09-02 PROCEDURE — 97162 PT EVAL MOD COMPLEX 30 MIN: CPT

## 2022-09-02 PROCEDURE — 97110 THERAPEUTIC EXERCISES: CPT

## 2022-09-02 NOTE — PLAN OF CARE
[facility]  Physical Therapy Initial Evaluation    Name: Chelsea Rodriguez  [facility] Number: [id number]    Therapy Diagnosis:   Encounter Diagnoses   Name Primary?    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, bilateral Yes    Venous stasis dermatitis of both lower extremities     Edema of both lower extremities      Physician: Marcell Rico MD*    Physician Orders: PT Eval and Treat - lymphedema  Medical Diagnosis from Referral:   Diagnosis   I89.0 (ICD-10-CM) - Lymphedema of both lower extremities   Evaluation Date: 9/2/2022  Authorization Period Expiration: 4/19/23  Plan of Care Expiration: 11/25/22  Visit # / Visits authorized: 1/1    Time In: 1010a  Time Out: 1120a  Total Billable Time: 70 minutes    Precautions: Standard and R TKA, L breast CA    Subjective   Date of onset: R TKA 3/31/2021 - issues with swelling in both legs R > L for over 5 years. Moments where leg would swell up - red and swollen.  That time did not require antibiotics.   History of current condition - Chelsea reports: tries elevation - propping up.  When tired will sit down.  Does need some added support for stairs.   Not wearing compression, may use knee brace at times.   Legs will get tired and achy at times. In past had 20-30mmHg garments - were tight and hard to get on/off. Not worn recently.    Pt denies CHF, KF, DM and CA. Admits L breast CA 2010- lumpectomy, 1 SLNB negative, radiation. No swelling noted L UE.  Fluid pill- no  Blood thinner- no    Imaging: US 4/26/22  Reason for ExamPriority: Routine  Dx: Edema of both lower extremities [R60.0 (ICD-10-CM)]   Conclusion  Normal rest and exercise RAMOS bilaterally.  Normal PVR waveforms bilaterally.:   Conclusion  No evidence of lower extremity DVT or venous reflux bilaterally.   Surgery: R TKA, 2010 L breast lumpectomy  Radiation:  yes  Chemotherapy: no      Medical History:   Past Medical History:   Diagnosis Date     Bilateral knee pain     Carpal tunnel syndrome, bilateral     Fissure in skin of foot     Right small toe    HTN (hypertension)     Hyperlipidemia     Palpitations     Rotator cuff injury     right    Screening for colorectal cancer 10/20/2017    Screening for malignant neoplasm of colon 2021    Statin-induced myositis 2018       Surgical History:   Chelsea Rodriguez  has a past surgical history that includes supracervical abdominal hysterectomy (); Bilateral salpingoophorectomy (); Colonoscopy (N/A, 10/20/2017); Intraocular prosthesis insertion (Left, 2018); Phacoemulsification of cataract (Left, 2018); Hernia repair; Cyst Removal;  section; Phacoemulsification of cataract (Right, 2018); Intraocular prosthesis insertion (Right, 2018); Breast lumpectomy (Left, ); Breast biopsy (Left, ); Breast biopsy (Left, ); Hysterectomy; Oophorectomy; Cataract extraction w/  intraocular lens implant (Right, 2018); Cataract extraction w/  intraocular lens implant (Left, 2018); Esophagogastroduodenoscopy (N/A, 2021); Colonoscopy (N/A, 2021); and Knee Arthroplasty (Right, 3/31/2021).    Medications:   Chelsea has a current medication list which includes the following prescription(s): acetaminophen, aspirin, atorvastatin, coenzyme q10, diclofenac sodium, docusate sodium, ergocalciferol, hydrochlorothiazide, irbesartan, lidocaine-prilocaine, loratadine, and metoprolol succinate.    Allergies:   Review of patient's allergies indicates:   Allergen Reactions    Codeine Nausea And Vomiting      Previous Lymphedema Treatment: none for leg swelling  Prior Therapy: yes  Social History: lives with dtr, + driving   Occupation: no  Environmental barriers: 8 steps going in, knee concerns- railing and cane, manages ADls, chair in shower, manages shoes/socks   Abuse/Neglect: none noted   Nutritional status: BMI 34   Educational needs: met   Spiritual/Cultural: met   Fall  "risk: one fall - easy "lite" fall     Prior Level of Function: mod I  Current Level of Function: MOD  I  Gait: amb some limits from knees    Transfers:mod I   Bed Mobility: MOD  I     Pain and Swelling:  Current 1/10, worst 6/10, best 0/10   Location: bilateral LEs, R knee    Description: Aching and Throbbing  Aggravating Factors: Sitting and Standing  Easing Factors: elevation    Pts goals: not to have swelling, improve feet/ankles- swelling in legs  Objective     Female amb to dept slight antalgic gait, no device, no compression, socks, tennis shoes  Amount of Swelling/Location of Swelling: mild/mod to B Lower legs,ankles, less in feet, body habitus to thighs   Skin Integrity: R TKA, mild discoloration, hemosiderin staining, speckled discoloration and darkening   Palpation/Texture: min pitting R ankle, density firmness B LE    - B Stemmer Sign  - B Hattie's Sign  Circulation: intact         Posture: wfl - cueing for stance over R LE with gait     Range of Motion - LE  Arom knee, ankle sitting or supine  (R) KF 90 ++ stiffness some pain - admits ROM to 101 post therapy for TKA DF 5  (L) KF 95 DF 5    Strength: functional screen of AROM against gravity and sit to stand transfers  Cueing for quad set R LE  Limits in knee ROM B - shows arom against gravity and functional transitions     sensation: intact to lt touch B LE    Girth Measurements (in centimeters)  LANDMARK LEFT LE  9/2/22 RIGHT LE  9/2/22 DIFF   at eval   SBP + 20 62.0 cm 61.5 cm 0.5 cm   SBP + 10  54.0 cm 56.0 cm 2.0 cm   SBP 47.0 cm 47.0 cm 0 cm   10 below SBP 38.0 cm 39.5 cm 1.5 cm   20 below SBP 34.0 cm 33.5 cm 0.5 cm   30 below SBP 26.5 cm 26.0 cm 0.5 cm   35 below SBP 23.0 cm 23.0 cm 0 cm   Ankle 24.5 cm 25.5 cm 1.0 cm   Forefoot 21.0 cm 21.0 cm 0 cm     CMS Impairment/Limitation/Restriction for FOTO Survey  Therapist reviewed FOTO scores for Chelsea RICO Michael on 9/2/2022.   FOTO documents entered into [employer] - see Media section.     TREATMENT   Treatment " Time In: 1040a  Treatment Time Out: 1120a  Total Treatment time separate from Evaluation: 40 minutes    Chelsea received therapeutic exercises to develop strength, endurance, ROM, flexibility, and posture for 15 minutes including:  Aps, knee ROM, avoid dependency, avoid immobility, elevation, walking with compression, deep breathing, use of muscle pump to assist venous return  Reviewed and instructed in return to TKA protocol exercises for R LE as well as for L LE  Instructed in and performed  R HF to 90 with strap to assist- gravity assisted relaxation for KF ROM  Supine strap assisted heel slide  Sitting KF towel slide under chair  KF shift forward for Knee ROM  Cueing on gait, use of KF with functional transitions and activity    Chelsea received the following manual therapy techniques: Manual Lymphatic Drainage and as plan of care, her compression products were applied to the: B LE  for 15 minutes, including:  Education and training in compression needs.  Complete Decongestive Therapy components and management with goals and plan of care review.    Demo of Manual Lymphatic Drainage and short stretch compression bandaging.     Review of her present products she reports  Class,size/length and fit/position needs    Demo of clinic products, DME products and online offerings    Self Care/Home Management / Functional Therapeutic Activity training for 10 minutes including:  Pt was educated in potential compression needs.  Demo of products including socks, garments, and OTC products.   Discussed cost/coverage and authorization per insurance with Durable Medical Equipment(DME) provider.  Compression require orders from referring provider and coverage or purchase of products from DME or self order.      Discussed wear schedule, don/doff, wash and management of products.  Size and compression class and AM/PM needs.    Consideration for Copper socks as form of temporary compression use until securing compression needs.    Consideration for shorts/tights or capris style compression to compliment lower leg compression to support size/shape of LE.   Product information provided.   Vendor list provided.    Informed insurance coverage of compression is per DME provider and typically Medicare and Medicare group plans may not cover cost beyond pair of standard sized knee high garments.   Commitment to attendance as well as commitment to securing compression needs is critical to edema management.      Home Exercises and Patient Education Provided  Education provided:   - resume TKA program- ROM needs, gait quality and safety  - Pt was educated in lymphedema etiology and management plans.  Pt was provided with written risk reductions and precautions for managing lymphedema.      This patient is in agreement to participate in Lymphedema treatment.    Written Home Exercises Provided: Patient instructed to cont prior HEP.  Exercises were reviewed and Chelsea was able to demonstrate them prior to the end of the session.  Chelsea demonstrated good  understanding of the education provided.     See EMR under Patient Instructions for exercises provided 9/2/2022.    Assessment   Chelsea is a 67 y.o. 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     Pt prognosis is Good.   Pt will benefit from skilled outpatient Physical Therapy to address the deficits stated above and in the chart below, provide pt/family education, and to maximize pt's level of independence.     Plan of care  discussed with patient: Yes  Pt's spiritual, cultural and educational needs considered and patient is agreeable to the plan of care and goals as stated below:     Medical Necessity is demonstrated by the following  History  Co-morbidities and personal factors that may impact the plan of care Co-morbidities:   advanced age, financial considerations, high BMI, HTN, level of undertstanding of current condition, and R TKA, DJD/OA, CVI, VSD, CPAP needs    Personal Factors:   age     moderate   Examination  Body Structures and Functions, activity limitations and participation restrictions that may impact the plan of care Body Regions:   lower extremities  trunk    Body Systems:    gross symmetry  ROM  gait  edema  scar formation  skin integrity  skin color  Stasis dermatitis    Participation Restrictions:   access    Activity limitations:   Learning and applying knowledge  no deficits    General Tasks and Commands  no deficits    Communication  no deficits    Mobility  walking  Knee limitations    Self care  dressing  compression    Domestic Life  Modified per Knee    Interactions/Relationships  no deficits    Life Areas  no deficits    Community and Social Life  no deficits         moderate   Clinical Presentation evolving clinical presentation with changing clinical characteristics moderate   Decision Making/ Complexity Score: moderate     Anticipated Barriers for therapy: knee     The following goals were discussed with the patient and patient is in agreement with them as to be addressed in the treatment plan.     Short Term Goals: (6 weeks)  1. Patient will show decreased girth in B LE by up to 1 cm to allow for LE symmetry, shoe and clothing choice, and ability to apply needed compression.  (progressing, not met)   2. Patient will demonstrate 100% knowledge of lymphedema precautions and signs of infection to allow for reduced lymphedema risk, infection risk, and/or exacerbation of condition.  (progressing, not  met)  3. Patient or caregiver will perform self-bandaging techniques and/or wearing of compression garments to allow for lymphatic drainage support, skin elasticity, and reduction in shape and size of limb. (progressing, not met)  4. Patient will perform self lymph drainage techniques to areas within reach to enhance lymphatic drainage and skin condition.  (progressing, not met)  5. Patient will tolerate daily activities with multilayered bandaging to allow for lymphatic and venous support.  (progressing, not met)    Long Term Goals: (12  weeks)  1. Patient will show decreased girth in B LE by up to 2 cm  to allow for LE symmetry, shoe and clothing choice, and ability to apply needed compression daily.  (progressing, not met)  2. Patient will show reduction in density to mild or less with improved contour of limb to allow for cosmesis, LE symmetry, infection risk reduction, and clothing and compression choice.   (progressing, not met)  3. Patient to sonali/doff compression garment with daily compliance to assist in lymphedema management, skin elasticity, and tissue density.  (progressing, not met)  4. Pt to show improved postural awareness and alignment.  (progressing, not met)  5. Pt to be I and compliant with HEP to allow for increased function in affected limb.   (progressing, not met)  Plan   Plan of care Certification: 9/2/2022 to 11/25/22.    Outpatient Physical Therapy 2 times weekly for 10 weeks to include the following interventions: Patient Education, Self Care, Therapeutic Activities, and Therapeutic Exercise. Complete Decongestive Therapy- compression and home equipment needs to be addressed and assisted.    Pt may be seen by a PTA as part of the Rehab treatment team.  Plan of Care was discussed with SERGE Ness, PT

## 2022-09-08 ENCOUNTER — HOSPITAL ENCOUNTER (OUTPATIENT)
Dept: RADIOLOGY | Facility: HOSPITAL | Age: 67
Discharge: HOME | End: 2022-09-08
Attending: INTERNAL MEDICINE
Payer: MEDICARE

## 2022-09-08 DIAGNOSIS — K59.09 CHRONIC CONSTIPATION: ICD-10-CM

## 2022-09-08 DIAGNOSIS — K59.04 CHRONIC IDIOPATHIC CONSTIPATION: ICD-10-CM

## 2022-09-08 PROCEDURE — 74270 X-RAY XM COLON 1CNTRST STD: CPT | Mod: 26,,, | Performed by: RADIOLOGY

## 2022-09-08 PROCEDURE — 74270 X-RAY XM COLON 1CNTRST STD: CPT | Mod: TC

## 2022-09-08 PROCEDURE — A9698 NON-RAD CONTRAST MATERIALNOC: HCPCS | Performed by: INTERNAL MEDICINE

## 2022-09-08 PROCEDURE — 74270 FL DEFECOGRAM: ICD-10-PCS | Mod: 26,,, | Performed by: RADIOLOGY

## 2022-09-08 PROCEDURE — 25500020 PHARM REV CODE 255: Performed by: INTERNAL MEDICINE

## 2022-09-08 RX ADMIN — BARIUM SULFATE 302 G: 0.6 CREAM ORAL at 10:09

## 2022-09-08 RX ADMIN — BARIUM SULFATE 60 ML: 0.6 SUSPENSION ORAL at 10:09

## 2022-09-09 DIAGNOSIS — M62.89 PELVIC FLOOR DYSFUNCTION IN FEMALE: Primary | ICD-10-CM

## 2022-10-10 ENCOUNTER — OFFICE VISIT (OUTPATIENT)
Dept: SLEEP MEDICINE | Facility: CLINIC | Age: 67
End: 2022-10-10
Payer: MEDICARE

## 2022-10-10 VITALS
BODY MASS INDEX: 35.54 KG/M2 | WEIGHT: 169.31 LBS | SYSTOLIC BLOOD PRESSURE: 126 MMHG | HEIGHT: 58 IN | HEART RATE: 75 BPM | DIASTOLIC BLOOD PRESSURE: 66 MMHG

## 2022-10-10 DIAGNOSIS — R35.1 NOCTURIA: ICD-10-CM

## 2022-10-10 DIAGNOSIS — G47.33 OSA (OBSTRUCTIVE SLEEP APNEA): Primary | ICD-10-CM

## 2022-10-10 DIAGNOSIS — G47.10 HYPERSOMNOLENCE: ICD-10-CM

## 2022-10-10 PROCEDURE — 1101F PT FALLS ASSESS-DOCD LE1/YR: CPT | Mod: CPTII,S$GLB,, | Performed by: INTERNAL MEDICINE

## 2022-10-10 PROCEDURE — 1157F PR ADVANCE CARE PLAN OR EQUIV PRESENT IN MEDICAL RECORD: ICD-10-PCS | Mod: CPTII,S$GLB,, | Performed by: INTERNAL MEDICINE

## 2022-10-10 PROCEDURE — 99214 PR OFFICE/OUTPT VISIT, EST, LEVL IV, 30-39 MIN: ICD-10-PCS | Mod: S$GLB,,, | Performed by: INTERNAL MEDICINE

## 2022-10-10 PROCEDURE — 4010F PR ACE/ARB THEARPY RXD/TAKEN: ICD-10-PCS | Mod: CPTII,S$GLB,, | Performed by: INTERNAL MEDICINE

## 2022-10-10 PROCEDURE — 3074F PR MOST RECENT SYSTOLIC BLOOD PRESSURE < 130 MM HG: ICD-10-PCS | Mod: CPTII,S$GLB,, | Performed by: INTERNAL MEDICINE

## 2022-10-10 PROCEDURE — 3074F SYST BP LT 130 MM HG: CPT | Mod: CPTII,S$GLB,, | Performed by: INTERNAL MEDICINE

## 2022-10-10 PROCEDURE — 1159F MED LIST DOCD IN RCRD: CPT | Mod: CPTII,S$GLB,, | Performed by: INTERNAL MEDICINE

## 2022-10-10 PROCEDURE — 1157F ADVNC CARE PLAN IN RCRD: CPT | Mod: CPTII,S$GLB,, | Performed by: INTERNAL MEDICINE

## 2022-10-10 PROCEDURE — 1101F PR PT FALLS ASSESS DOC 0-1 FALLS W/OUT INJ PAST YR: ICD-10-PCS | Mod: CPTII,S$GLB,, | Performed by: INTERNAL MEDICINE

## 2022-10-10 PROCEDURE — 3288F FALL RISK ASSESSMENT DOCD: CPT | Mod: CPTII,S$GLB,, | Performed by: INTERNAL MEDICINE

## 2022-10-10 PROCEDURE — 1126F PR PAIN SEVERITY QUANTIFIED, NO PAIN PRESENT: ICD-10-PCS | Mod: CPTII,S$GLB,, | Performed by: INTERNAL MEDICINE

## 2022-10-10 PROCEDURE — 99214 OFFICE O/P EST MOD 30 MIN: CPT | Mod: S$GLB,,, | Performed by: INTERNAL MEDICINE

## 2022-10-10 PROCEDURE — 3078F DIAST BP <80 MM HG: CPT | Mod: CPTII,S$GLB,, | Performed by: INTERNAL MEDICINE

## 2022-10-10 PROCEDURE — 99999 PR PBB SHADOW E&M-EST. PATIENT-LVL III: CPT | Mod: PBBFAC,,, | Performed by: INTERNAL MEDICINE

## 2022-10-10 PROCEDURE — 3008F BODY MASS INDEX DOCD: CPT | Mod: CPTII,S$GLB,, | Performed by: INTERNAL MEDICINE

## 2022-10-10 PROCEDURE — 99999 PR PBB SHADOW E&M-EST. PATIENT-LVL III: ICD-10-PCS | Mod: PBBFAC,,, | Performed by: INTERNAL MEDICINE

## 2022-10-10 PROCEDURE — 4010F ACE/ARB THERAPY RXD/TAKEN: CPT | Mod: CPTII,S$GLB,, | Performed by: INTERNAL MEDICINE

## 2022-10-10 PROCEDURE — 3008F PR BODY MASS INDEX (BMI) DOCUMENTED: ICD-10-PCS | Mod: CPTII,S$GLB,, | Performed by: INTERNAL MEDICINE

## 2022-10-10 PROCEDURE — 1126F AMNT PAIN NOTED NONE PRSNT: CPT | Mod: CPTII,S$GLB,, | Performed by: INTERNAL MEDICINE

## 2022-10-10 PROCEDURE — 3288F PR FALLS RISK ASSESSMENT DOCUMENTED: ICD-10-PCS | Mod: CPTII,S$GLB,, | Performed by: INTERNAL MEDICINE

## 2022-10-10 PROCEDURE — 1159F PR MEDICATION LIST DOCUMENTED IN MEDICAL RECORD: ICD-10-PCS | Mod: CPTII,S$GLB,, | Performed by: INTERNAL MEDICINE

## 2022-10-10 PROCEDURE — 3078F PR MOST RECENT DIASTOLIC BLOOD PRESSURE < 80 MM HG: ICD-10-PCS | Mod: CPTII,S$GLB,, | Performed by: INTERNAL MEDICINE

## 2022-10-10 NOTE — PROGRESS NOTES
ESTABLISHED PATIENT VISIT    Chelsea Rodriguez  is a pleasant 67 y.o. female  with PMH significant for HTN, BrCa 2010,  who presented late 2021 for evaluation of snoring, found to have mild HENOK on HST  Here today for CPAP follow-up    PLAN last visit:   -continue current pressures 5-12  -discussed compliance  -trial of flonase daily    Since last visit:   Overall doing well and sleep has been good. Just some minor weight gain.Pt still has issues going to bed at regualr time. Pt will fall asleep in chair drinking coffee (no TV or reading) and then gets in bed 3-5am. Pt will then put on CPAP and has been getting close to 4 hrs. Pt still having congestion using flonase every now and then. Flonase helping. Sleep overall feels improved. Pt's main concerns are the pressure setting and if the air flow needs to be increased.    PAP history   Problems    Mask Nasal   Pressure 5-12 (feels low)   Benefit Feels like she is sleeping better.   DME HME   Machine age 2021   Download 10.10.22: 26/30 x 5.5, 5-12 (11.8), AHI 0.2, leak 8lpm, 4cwp x 5        SLEEP SCHEDULE   Bed Time Varies widely   Sleep Latency Not long (unless took a nap)   Arousals occasional   Nocturia 1-2 times per night   Back to sleep    Wake time 6:30 AM   Naps occasional   Work Worked nights x 15 years  March 2020       There were no vitals filed for this visit.  Physical Exam:    GEN:   Well-appearing  Psych:  Appropriate affect, demonstrates insight  SKIN:  No rash on the face or bridge of the nose    LABS:   Lab Results   Component Value Date    HGB 12.6 05/25/2022    CO2 30 (H) 05/25/2022       RECORDS REVIEWED PREVIOUSLY:  HST 12.29.21: AHI 5, RDI 8, def mld    6.15.2022: 12/30 x 4.9 hrs, 5-12 (6.7)    ASSESSMENT    No flowsheet data found.  PROBLEM DESCRIPTION Interval History STATUS   Mild HENOK   + snoring, very rare snoring arousals, + wintessed apneas.   HEENT: MP4, + tongue scalloping Working on getting used to CPAP  Dozing off in her chair at night  uncontrolled   Daytime Sx   + sleepiness when inactive   Occasional sleepiness when driving   ESS12 /24 on intake Not as many episodes improving   Nocturia   1-2 times per ngiht Almost none now Improved   Allergic sinusitis   Frequent congestion  Antihistamine:  none  Nasal sprays: flonase  Surgeries:   none   + mucous, coughing at night unchanged   Other issues:     PLAN     -continue auto CPAP (adjusted to 8-14) ramp 5 x auto  -flonase recommended daily   -the patient is using and benefiting from PAP therapy    RTC          The patient was given open opportunity to ask questions and/or express concerns about treatment plan.   All questions/concerns were discussed.     Two patient identifiers used prior to evaluation.

## 2022-10-13 ENCOUNTER — OFFICE VISIT (OUTPATIENT)
Dept: ORTHOPEDICS | Facility: CLINIC | Age: 67
End: 2022-10-13
Payer: MEDICARE

## 2022-10-13 ENCOUNTER — LAB VISIT (OUTPATIENT)
Dept: LAB | Facility: HOSPITAL | Age: 67
End: 2022-10-13
Attending: ORTHOPAEDIC SURGERY
Payer: MEDICARE

## 2022-10-13 VITALS — HEIGHT: 58 IN | BODY MASS INDEX: 35.59 KG/M2 | WEIGHT: 169.56 LBS

## 2022-10-13 DIAGNOSIS — Z96.651 STATUS POST TOTAL RIGHT KNEE REPLACEMENT: Primary | ICD-10-CM

## 2022-10-13 DIAGNOSIS — Z96.651 STATUS POST TOTAL RIGHT KNEE REPLACEMENT: ICD-10-CM

## 2022-10-13 LAB
CRP SERPL-MCNC: 2.6 MG/L (ref 0–8.2)
ERYTHROCYTE [SEDIMENTATION RATE] IN BLOOD BY PHOTOMETRIC METHOD: 26 MM/HR (ref 0–36)

## 2022-10-13 PROCEDURE — 1101F PR PT FALLS ASSESS DOC 0-1 FALLS W/OUT INJ PAST YR: ICD-10-PCS | Mod: CPTII,S$GLB,, | Performed by: ORTHOPAEDIC SURGERY

## 2022-10-13 PROCEDURE — 3288F PR FALLS RISK ASSESSMENT DOCUMENTED: ICD-10-PCS | Mod: CPTII,S$GLB,, | Performed by: ORTHOPAEDIC SURGERY

## 2022-10-13 PROCEDURE — 1125F PR PAIN SEVERITY QUANTIFIED, PAIN PRESENT: ICD-10-PCS | Mod: CPTII,S$GLB,, | Performed by: ORTHOPAEDIC SURGERY

## 2022-10-13 PROCEDURE — 1125F AMNT PAIN NOTED PAIN PRSNT: CPT | Mod: CPTII,S$GLB,, | Performed by: ORTHOPAEDIC SURGERY

## 2022-10-13 PROCEDURE — 99999 PR PBB SHADOW E&M-EST. PATIENT-LVL III: CPT | Mod: PBBFAC,,, | Performed by: ORTHOPAEDIC SURGERY

## 2022-10-13 PROCEDURE — 4010F PR ACE/ARB THEARPY RXD/TAKEN: ICD-10-PCS | Mod: CPTII,S$GLB,, | Performed by: ORTHOPAEDIC SURGERY

## 2022-10-13 PROCEDURE — 1157F ADVNC CARE PLAN IN RCRD: CPT | Mod: CPTII,S$GLB,, | Performed by: ORTHOPAEDIC SURGERY

## 2022-10-13 PROCEDURE — 99213 PR OFFICE/OUTPT VISIT, EST, LEVL III, 20-29 MIN: ICD-10-PCS | Mod: S$GLB,,, | Performed by: ORTHOPAEDIC SURGERY

## 2022-10-13 PROCEDURE — 3288F FALL RISK ASSESSMENT DOCD: CPT | Mod: CPTII,S$GLB,, | Performed by: ORTHOPAEDIC SURGERY

## 2022-10-13 PROCEDURE — 85652 RBC SED RATE AUTOMATED: CPT | Performed by: ORTHOPAEDIC SURGERY

## 2022-10-13 PROCEDURE — 86140 C-REACTIVE PROTEIN: CPT | Performed by: ORTHOPAEDIC SURGERY

## 2022-10-13 PROCEDURE — 1101F PT FALLS ASSESS-DOCD LE1/YR: CPT | Mod: CPTII,S$GLB,, | Performed by: ORTHOPAEDIC SURGERY

## 2022-10-13 PROCEDURE — 4010F ACE/ARB THERAPY RXD/TAKEN: CPT | Mod: CPTII,S$GLB,, | Performed by: ORTHOPAEDIC SURGERY

## 2022-10-13 PROCEDURE — 1159F PR MEDICATION LIST DOCUMENTED IN MEDICAL RECORD: ICD-10-PCS | Mod: CPTII,S$GLB,, | Performed by: ORTHOPAEDIC SURGERY

## 2022-10-13 PROCEDURE — 1159F MED LIST DOCD IN RCRD: CPT | Mod: CPTII,S$GLB,, | Performed by: ORTHOPAEDIC SURGERY

## 2022-10-13 PROCEDURE — 1157F PR ADVANCE CARE PLAN OR EQUIV PRESENT IN MEDICAL RECORD: ICD-10-PCS | Mod: CPTII,S$GLB,, | Performed by: ORTHOPAEDIC SURGERY

## 2022-10-13 PROCEDURE — 99999 PR PBB SHADOW E&M-EST. PATIENT-LVL III: ICD-10-PCS | Mod: PBBFAC,,, | Performed by: ORTHOPAEDIC SURGERY

## 2022-10-13 PROCEDURE — 36415 COLL VENOUS BLD VENIPUNCTURE: CPT | Performed by: ORTHOPAEDIC SURGERY

## 2022-10-13 PROCEDURE — 99213 OFFICE O/P EST LOW 20 MIN: CPT | Mod: S$GLB,,, | Performed by: ORTHOPAEDIC SURGERY

## 2022-10-13 RX ORDER — METHYLPREDNISOLONE 4 MG/1
TABLET ORAL
Qty: 1 EACH | Refills: 0 | Status: SHIPPED | OUTPATIENT
Start: 2022-10-13 | End: 2022-11-10

## 2022-10-13 NOTE — PROGRESS NOTES
Subjective:     HPI:   Chelsea Rodriguez is a 67 y.o. female who presents s/p R TKA 3/31/21   6 mo f/u planned 3/25/22    Symptoms unchanged since march,   -knee feels tight all the time  -medial knee pain, difficulty getting up from low sofa    Tried Rx compound cream - no change  Saw Dr Guerrero for BLE edema, dx'd venous insufficiency, PT for lymphedema has been ordered to start 10/19    Stopped taking tylenol  Using cane for long distances, stairs, not daily or in house     Objective:   Body mass index is 35.43 kg/m².  Exam:  No limp nonantalgic gait negative Trendelenburg.  0-95 degree knee range of motion 5° valgus alignment nontender palpation mediolateral patellofemoral joint lines she is nontender at the tibia she is tender at the medial femoral epicondyle in the region of the MCL origin and abductor tubercle, knee stable anterior-posterior varus valgus stresses no flexion contracture or extensor lag.  No pain with valgus stress solid endpoint    Pre-op 0-100  1 year 0-95      Imaging:  None today  Med and lat RL lines under tibia tray on XR 3/25/22, no change in alignment      Assessment:       ICD-10-CM ICD-9-CM   1. Status post total right knee replacement  Z96.651 V43.65      adductor tubercle insertional tendonitis?   TKA appears mechanically well functioning  No improvement with Rx compound cream, no hypersensitivity  BLE venous insufficiency     Plan:       Hopefully lymphedema PT will help overall symptoms    Baseline ESR and CRP today  If normal, then will try medrol dose pack for medial knee symptoms, would like to avoid CSI if possible    update:   CRP 2.6  ESR 26  Normal  Will send Rx for medrol dose pack  She should call us back with results in a couple weeks  If Sx's persist, f/u with repeat XR and ?targeted CSI at adductor tubercle    Orders Placed This Encounter   Procedures    C-Reactive Protein     Standing Status:   Future     Number of Occurrences:   1     Standing Expiration Date:    2023    Sedimentation rate     Standing Status:   Future     Number of Occurrences:   1     Standing Expiration Date:   2023       Medications Ordered This Encounter   Medications    methylPREDNISolone (MEDROL DOSEPACK) 4 mg tablet     Sig: use as directed     Dispense:  1 each     Refill:  0        Past Medical History:   Diagnosis Date    Bilateral knee pain     Carpal tunnel syndrome, bilateral     Fissure in skin of foot     Right small toe    HTN (hypertension)     Hyperlipidemia     Palpitations     Rotator cuff injury     right    Screening for colorectal cancer 10/20/2017    Screening for malignant neoplasm of colon 2021    Statin-induced myositis 2018       Past Surgical History:   Procedure Laterality Date    BILATERAL SALPINGOOPHORECTOMY  2000    BREAST BIOPSY Left 2010    malignant    BREAST BIOPSY Left     negative    BREAST LUMPECTOMY Left     CATARACT EXTRACTION W/  INTRAOCULAR LENS IMPLANT Right 2018    Dr. Oneil    CATARACT EXTRACTION W/  INTRAOCULAR LENS IMPLANT Left 2018    Dr. Oneil     SECTION      x2    COLONOSCOPY N/A 10/20/2017    Procedure: COLONOSCOPY;  Surgeon: Mitchell Danielson Jr., MD;  Location: [facility];  Service: Endoscopy;  Laterality: N/A;    COLONOSCOPY N/A 2021    Procedure: COLONOSCOPY/Suprep;  Surgeon: Malcolm Reyes MD;  Location: [facility];  Service: Endoscopy;  Laterality: N/A;    CYST REMOVAL      on back    ESOPHAGOGASTRODUODENOSCOPY N/A 2021    Procedure: EGD (ESOPHAGOGASTRODUODENOSCOPY);  Surgeon: Malcolm Reyes MD;  Location: [facility];  Service: Endoscopy;  Laterality: N/A;    HERNIA REPAIR      HYSTERECTOMY      at 25 yrs old    INTRAOCULAR PROSTHESES INSERTION Left 2018    Procedure: INSERTION, IOL PROSTHESIS;  Surgeon: Sergio Oneil MD;  Location: [address];  Service: Ophthalmology;  Laterality: Left;    INTRAOCULAR PROSTHESES INSERTION Right 2018    Procedure:  "INSERTION, IOL PROSTHESIS;  Surgeon: Sergio Oneil MD;  Location: [facility] OR 2ND FLR;  Service: Ophthalmology;  Laterality: Right;    KNEE ARTHROPLASTY Right 3/31/2021    Procedure: ARTHROPLASTY, KNEE:RIGHT:DEPUY-SIGMA ;  Surgeon: Pedro Summers III, MD;  Location: [facility];  Service: Orthopedics;  Laterality: Right;    OOPHORECTOMY      @ 45 yrs old    PHACOEMULSIFICATION OF CATARACT Left 11/6/2018    Procedure: PHACOEMULSIFICATION, CATARACT;  Surgeon: Sergio Oneil MD;  Location: [facility] OR 1ST FLR;  Service: Ophthalmology;  Laterality: Left;    PHACOEMULSIFICATION OF CATARACT Right 11/20/2018    Procedure: PHACOEMULSIFICATION, CATARACT;  Surgeon: Sergio Oneil MD;  Location: [facility] OR 2ND FLR;  Service: Ophthalmology;  Laterality: Right;    supracervical abdominal hysterectomy  1978    fibroids       Family History   Problem Relation Age of Onset    Hypertension Mother     Heart disease Mother     Diabetes Mother     Hyperlipidemia Mother     Pancreatitis Mother     Cataracts Mother     Macular degeneration Mother     Cancer Father     Breast cancer Paternal Cousin     Hypertension Sister     Thyroid disease Sister     Diabetes Brother     Heart disease Brother     Amblyopia Neg Hx     Blindness Neg Hx     Glaucoma Neg Hx     Strabismus Neg Hx     Retinal detachment Neg Hx        Social History     Socioeconomic History    Marital status: Single    Number of children: 2   Occupational History     Comment: retired   Tobacco Use    Smoking status: Never    Smokeless tobacco: Never   Substance and Sexual Activity    Alcohol use: Yes     Comment: once per month    Drug use: No    Sexual activity: Not Currently   Social History Narrative    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 hx of left lumpectomy for "breast cancer"- with 5yrs of tamoxifen use. Sees Dr. Buckley ([facility] for surveillance/MMG)        Dr. Lala " former PCP, [facility]          Social Determinants of Health     Financial Resource Strain: Low Risk     Difficulty of Paying Living Expenses: Not hard at all   Food Insecurity: No Food Insecurity    Worried About Running Out of Food in the Last Year: Never true    Ran Out of Food in the Last Year: Never true   Transportation Needs: No Transportation Needs    Lack of Transportation (Medical): No    Lack of Transportation (Non-Medical): No   Physical Activity: Inactive    Days of Exercise per Week: 0 days    Minutes of Exercise per Session: 0 min   Stress: No Stress Concern Present    Feeling of Stress : Only a little   Social Connections: Moderately Integrated    Frequency of Communication with Friends and Family: More than three times a week    Frequency of Social Gatherings with Friends and Family: More than three times a week    Attends Evangelical Services: More than 4 times per year    Active Member of Clubs or Organizations: Yes    Attends Club or Organization Meetings: More than 4 times per year    Marital Status:    Housing Stability: Low Risk     Unable to Pay for Housing in the Last Year: No    Number of Places Lived in the Last Year: 1    Unstable Housing in the Last Year: No

## 2022-10-14 ENCOUNTER — TELEPHONE (OUTPATIENT)
Dept: ORTHOPEDICS | Facility: CLINIC | Age: 67
End: 2022-10-14
Payer: MEDICARE

## 2022-10-14 RX ORDER — METHYLPREDNISOLONE 4 MG/1
TABLET ORAL
Qty: 1 EACH | Refills: 0 | Status: SHIPPED | OUTPATIENT
Start: 2022-10-14 | End: 2022-11-10

## 2022-10-14 NOTE — TELEPHONE ENCOUNTER
I called the patient today regarding the message below. I told the patient that per Dr. Summers that her CRP and ESR was normal. I told the patient to call us back if she is not getting any better in a few weeks. The patient verbalized understanding and has no further questions.               ----- Message from Pedro Summers III, MD sent at 10/14/2022  1:08 PM CDT -----  Regarding: f/u plan  Please let her know that her CRP and ESR were normal so no sign of infection  I've sent an Rx for medrol dose pack to her pharmacy in the hopes that it will help the tendonitis on the medial femur    Please ask her to call us in a couple weeks with an update  If pain persists, she can come back in for new Xrays and we can discuss a targeted steroid injection    Thank you

## 2022-10-19 ENCOUNTER — CLINICAL SUPPORT (OUTPATIENT)
Dept: REHABILITATION | Facility: HOSPITAL | Age: 67
End: 2022-10-19
Payer: MEDICARE

## 2022-10-19 DIAGNOSIS — I89.0 LYMPHEDEMA OF BOTH LOWER EXTREMITIES: Primary | ICD-10-CM

## 2022-10-19 DIAGNOSIS — I87.2 VENOUS STASIS DERMATITIS OF BOTH LOWER EXTREMITIES: ICD-10-CM

## 2022-10-19 DIAGNOSIS — R60.0 EDEMA OF BOTH LOWER EXTREMITIES: ICD-10-CM

## 2022-10-19 DIAGNOSIS — I83.893 VARICOSE VEINS OF LOWER EXTREMITY WITH EDEMA, BILATERAL: ICD-10-CM

## 2022-10-19 PROCEDURE — 97110 THERAPEUTIC EXERCISES: CPT

## 2022-10-19 PROCEDURE — 97140 MANUAL THERAPY 1/> REGIONS: CPT

## 2022-10-19 NOTE — PROGRESS NOTES
Physical Therapy Daily Treatment Note     Name: Chelsea GÓMEZ Rodriguez  Clinic Number: [id number]    Therapy Diagnosis:   Encounter Diagnoses   Name Primary?    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, bilateral     Venous stasis dermatitis of both lower extremities     Edema of both lower extremities      Physician: Marcell Rico MD*    Visit Date: 10/19/2022    Physician Orders: PT Eval and Treat - lymphedema  Medical Diagnosis from Referral:   Diagnosis   I89.0 (ICD-10-CM) - Lymphedema of both lower extremities   Evaluation Date: 9/2/2022  Authorization Period Expiration: 12/31/22  Plan of Care Expiration: 11/25/22  Visit # / Visits authorized: 2/12     Time In: 1110a  Time Out: 1210p  Total Billable Time: 60 minutes     Precautions: Standard and R TKA, L breast CA    Subjective     Pt reports: pt admits fair exercise plan for her knees- still some pain R knee despite TKA last year - MD aware and unsure of cause, using steroids to assist.  L 'bone on bone' plan is for TKA in future.  She was compliant with home compression/exercise program.  Response to previous treatment: using Mediped socks B LE and wears knee braces B LE.  Pt has some medical grade garments but they are packed away - in past difficult to get on/off.  Functional change: amb today no device,  limited ROM and pain to knees.    Pain: 3/10  Location: bilateral knees - swelling is not truly painful      Objective     Female amb to dept slight antalgic gait, no device, KH compression socks, tennis shoes  Amount of Swelling/Location of Swelling: mild/mod to B Lower legs,ankles, less in feet, body habitus to thighs   Skin Integrity: R TKA, mild discoloration, hemosiderin staining, speckled discoloration and darkening   Palpation/Texture: min pitting R ankle, density firmness B LE    - B Stemmer Sign  - B Hattie's Sign  Circulation: intact     Treatment:   Chelsea received the following manual therapy  techniques:- Manual Lymphatic Drainage were applied to the: R LE  for 45 minutes, including: MLD and short stretch compression bandaging   Mediped sock remained L LE - unclear of class or size    MANUAL LYMPHATIC DRAINAGE (MLD):    While supine with LEs elevated stimulation at terminus, along GI region, B inguinal regions, drainage of entire R LE michelle lower leg, ankle, and foot with return proximally,  Use of Aquaphor/Eucerin due to dryness.   Consider self massage to abdominal areas, B inguinal areas, thigh, and remaining LE within reach.    SEQUENTIAL COMPRESSION PUMP: not performed today  ( full leg sleeve applied to R LE  VES 5200 with default setting with distal pressures starting at 45mmHg entire LE   Simultaneous to compression supplies, compression education and training, and self management.)    MULTILAYERED BANDAGING:  issued supplies and bandaged R LE with cotton stockinette, Rosidal soft section dorsum of foot, cotton padding malleoli,  Rosidal soft rolls ankle to knee, 2-8cm  Durelast rolls foot to knee, to leave intact 12-24 hrs as tolerated, discontinue with any problems, return rolled bandages next session. Wash and wear schedules confirmed.     Chelsea received therapeutic exercises to develop strength, endurance, ROM, flexibility, and posture for 15 minutes including:   TKA protocol   QS with hold x 15  Assisted heel slide x 15  Assisted KF gravity assist in supine - manual assist x 5  Seated KH slides  seatedLAQ  Propped LE on chair for HSS + GSS 3 x 20 sec  Cueing on ROM and return to her prior HEP  Edema support while in compression for  aps, walking, avoid dependency and immobility, support of muscle pump with compression and activity.  THERAPEUTIC EXERCISES:  Continue HEP of AROM, stretching, and postural correction.     Home Exercises Provided and Patient Education Provided:  Self Care Home Management Training/Functional Therapeutic Activity x 5 minutes   Cueing on her present products Mediped  socks  States she has 20-30mmHg garments - packed away  Discussed class and needs with recommendations.    PATIENT/FAMILY Education: bandaging/compression wear schedule,  HEP,  Beginning of self massage,  Self or assisted bandaging, compression options, and Risk reduction    Written Home Exercises Provided: Patient instructed to cont prior HEP.  Home plan of management was reviewed and Chelsea was able to demonstrate understanding prior to the end of the session.  Chelsea demonstrated good  understanding of the education provided.     Assessment   Chelsea is a 67 y.o. 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     Pt continues with medial knee pain s/p R TKA with ortho following/ managing.  CVI and slight secondary lymphedema to R > L LE which is complicated by her OA/DJD and mobility.  Encouraging return to TKA HEP per protocol for ROM as well as daily compression support.      Chelsea Is progressing well towards her goals.   Pt prognosis is Good.     Pt will continue to benefit from skilled outpatient physical therapy to address the deficits listed in the problem list box on initial evaluation, provide pt/family education and to maximize pt's level of independence in the home and community environment.     Pt's spiritual, cultural and educational needs considered and pt agreeable to plan of care and goals.  Anticipated Barriers for therapy: knee      The following goals were discussed with the patient and patient is in agreement with them as  to be addressed in the treatment plan.      Short Term Goals: (6 weeks)  1. Patient will show decreased girth in B LE by up to 1 cm to allow for LE symmetry, shoe and clothing choice, and ability to apply needed compression.  (progressing, not met)   2. Patient will demonstrate 100% knowledge of lymphedema precautions and signs of infection to allow for reduced lymphedema risk, infection risk, and/or exacerbation of condition.  (progressing, not met)  3. Patient or caregiver will perform self-bandaging techniques and/or wearing of compression garments to allow for lymphatic drainage support, skin elasticity, and reduction in shape and size of limb. (progressing, not met)  4. Patient will perform self lymph drainage techniques to areas within reach to enhance lymphatic drainage and skin condition.  (progressing, not met)  5. Patient will tolerate daily activities with multilayered bandaging to allow for lymphatic and venous support.  (progressing, not met)     Long Term Goals: (12  weeks)  1. Patient will show decreased girth in B LE by up to 2 cm  to allow for LE symmetry, shoe and clothing choice, and ability to apply needed compression daily.  (progressing, not met)  2. Patient will show reduction in density to mild or less with improved contour of limb to allow for cosmesis, LE symmetry, infection risk reduction, and clothing and compression choice.   (progressing, not met)  3. Patient to sonali/doff compression garment with daily compliance to assist in lymphedema management, skin elasticity, and tissue density.  (progressing, not met)  4. Pt to show improved postural awareness and alignment.  (progressing, not met)  5. Pt to be I and compliant with HEP to allow for increased function in affected limb.   (progressing, not met)  Plan   Plan of care Certification: 9/2/2022 to 11/25/22.     Outpatient Physical Therapy 2 times weekly for 10 weeks to include the following interventions: Patient Education, Self Care,  Therapeutic Activities, and Therapeutic Exercise. Complete Decongestive Therapy- compression and home equipment needs to be addressed and assisted.     Pt may be seen by a PTA as part of the Rehab treatment team.  Plan of Care was discussed with SERGE Ness, PT

## 2022-10-21 ENCOUNTER — DOCUMENTATION ONLY (OUTPATIENT)
Dept: REHABILITATION | Facility: HOSPITAL | Age: 67
End: 2022-10-21
Payer: MEDICARE

## 2022-10-21 NOTE — PROGRESS NOTES
Pt arrived at wrong appointment time 1100a for 1000a visit and unfortunately was not able to be accommodated for Physical Therapy 10/21/22.

## 2022-10-24 ENCOUNTER — CLINICAL SUPPORT (OUTPATIENT)
Dept: REHABILITATION | Facility: HOSPITAL | Age: 67
End: 2022-10-24
Attending: INTERNAL MEDICINE
Payer: MEDICARE

## 2022-10-24 DIAGNOSIS — R27.8 OTHER LACK OF COORDINATION: ICD-10-CM

## 2022-10-24 DIAGNOSIS — M62.89 PELVIC FLOOR DYSFUNCTION IN FEMALE: ICD-10-CM

## 2022-10-24 DIAGNOSIS — M62.89 PELVIC FLOOR DYSFUNCTION: ICD-10-CM

## 2022-10-24 PROCEDURE — 97161 PT EVAL LOW COMPLEX 20 MIN: CPT | Mod: PO

## 2022-10-24 PROCEDURE — 97112 NEUROMUSCULAR REEDUCATION: CPT | Mod: PO

## 2022-10-24 PROCEDURE — 97140 MANUAL THERAPY 1/> REGIONS: CPT | Mod: PO

## 2022-10-24 NOTE — PATIENT INSTRUCTIONS
Pelvic Floor Relaxation Training Exercises:    1) Gently push out your pelvic floor.  Belly big and belly hard and then push!  Try to breathe out as you do so.  Perform 5 pushes 2-3x/day.       Home Exercise Program: 10/24/2022
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
## 2022-10-28 ENCOUNTER — TELEPHONE (OUTPATIENT)
Dept: ORTHOPEDICS | Facility: CLINIC | Age: 67
End: 2022-10-28
Payer: MEDICARE

## 2022-10-28 ENCOUNTER — CLINICAL SUPPORT (OUTPATIENT)
Dept: REHABILITATION | Facility: HOSPITAL | Age: 67
End: 2022-10-28
Payer: MEDICARE

## 2022-10-28 DIAGNOSIS — I89.0 LYMPHEDEMA OF BOTH LOWER EXTREMITIES: Primary | ICD-10-CM

## 2022-10-28 DIAGNOSIS — R60.0 EDEMA OF BOTH LOWER EXTREMITIES: ICD-10-CM

## 2022-10-28 DIAGNOSIS — I87.2 VENOUS STASIS DERMATITIS OF BOTH LOWER EXTREMITIES: ICD-10-CM

## 2022-10-28 DIAGNOSIS — I83.893 VARICOSE VEINS OF LOWER EXTREMITY WITH EDEMA, BILATERAL: ICD-10-CM

## 2022-10-28 PROCEDURE — 97140 MANUAL THERAPY 1/> REGIONS: CPT

## 2022-10-28 PROCEDURE — 97016 VASOPNEUMATIC DEVICE THERAPY: CPT

## 2022-10-28 NOTE — TELEPHONE ENCOUNTER
I called the patient today regarding her voice message. The patient said that the medrol dose pack helped with her back and helped with pressure in her knee. The patient said that she would like to discuss the next steps with Dr. Summers. The patient verbalized understanding and has no further questions.               ----- Message from Ciara Salazar sent at 10/28/2022 11:46 AM CDT -----  Regarding: MEDICATION ADVISE  Contact: Self  Pt stated the first day of taking the medication she was able to release a lot of urine. However it did not last Pt stated she is having the same pain as before as well as here the scar is Pt ask for a call back      Contact info    [phone] (home)

## 2022-10-28 NOTE — PROGRESS NOTES
"  Physical Therapy Daily Treatment Note     Name: Chelsea GÓMEZ Rodriguez  Clinic Number: [id number]    Therapy Diagnosis:   Encounter Diagnoses   Name Primary?    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, bilateral     Venous stasis dermatitis of both lower extremities     Edema of both lower extremities      Physician: Marcell Rico MD*    Visit Date: 10/28/2022    Physician Orders: PT Eval and Treat - lymphedema  Medical Diagnosis from Referral:   Diagnosis   I89.0 (ICD-10-CM) - Lymphedema of both lower extremities   Evaluation Date: 9/2/2022  Authorization Period Expiration: 12/31/22  Plan of Care Expiration: 11/25/22  Visit # / Visits authorized: 3/12     Time In: 1005a  Time Out: 1105a  Total Billable Time: 60 minutes  Pump un timed charge     Precautions: Standard and R TKA, L breast CA    Subjective     Pt reports: pt missed one visit due to wrong time and then PTA was booked out.  Pt reviewed her breast CA and low lymphedema risk.    Pt follows with orthopedics for R knee pain despite TKA>   She was compliant with home compression/exercise program.  Response to previous treatment: pt is now wearing her choice of compression daily - Mediped socks B LE.  Functional change: amb today no device, L TKA needed, R TKA some pain at times - ortho addressing  Pain: 3/10  Location: bilateral knees - L "bone on bone"  R along scar or inside knee, swelling is not truly painful      Objective     Female amb to dept slight antalgic gait, no device, KH compression socks, tennis shoes  Amount of Swelling/Location of Swelling: mild/mod to B Lower legs,ankles R > L, less in feet, body habitus to thighs   Skin Integrity: R TKA, mild discoloration, hemosiderin staining, speckled discoloration and darkening   Palpation/Texture: min to no pitting R ankle, density firmness B LE    - B Stemmer Sign  - B Hattie's Sign  Circulation: intact     Treatment:   Chelsea received the following manual " therapy techniques:- Manual Lymphatic Drainage were applied to the: R LE  for 40 minutes, including: MLD and short stretch compression bandaging   Mediped sock reapplied to L LE     MANUAL LYMPHATIC DRAINAGE (MLD):    While supine with LEs elevated stimulation at terminus, along GI region, B inguinal regions, drainage of entire R LE michelle lower leg, ankle, and foot with return proximally,  Use of Aquaphor/Eucerin due to dryness.   Consider self massage to abdominal areas, B inguinal areas, thigh, and remaining LE within reach.    SEQUENTIAL COMPRESSION PUMP: full leg sleeve applied to R LE  VES 5200 with default setting with distal pressures starting at 45mmHg entire LE   Simultaneous to compression supplies, compression education and training, and self management.    MULTILAYERED BANDAGING:  issued supplies and bandaged R LE with cotton stockinette, Rosidal soft section dorsum of foot, cotton padding malleoli,  Rosidal soft rolls ankle to knee, 2-8cm  Durelast rolls foot to knee, to leave intact 12-24 hrs as tolerated, discontinue with any problems, return rolled bandages next session. Wash and wear schedules confirmed.     Chelsea received therapeutic exercises to develop strength, endurance, ROM, flexibility, and posture for verbal review of TKA exercises per TKA protocol   Not performed today- just reviewed HEP  QS with hold   Assisted heel slide   Assisted KF gravity assist in supine   Seated KH slides  Seated LAQ  Propped LE on chair for HSS + GSS   Cueing on ROM and return to her prior HEP  Edema support while in compression for  aps, walking, avoid dependency and immobility, support of muscle pump with compression and activity.  THERAPEUTIC EXERCISES:  Continue HEP of AROM, stretching, and postural correction.     Home Exercises Provided and Patient Education Provided:  Self Care Home Management Training/Functional Therapeutic Activity x 5 minutes  Review of Breast CA lymphedema risk  B LE support with  compression socks vs garments  Active mobility while in compression    Cueing on her present products Mediped socks  Pt has 20-30mmHg garments - packed away  Discussed class and needs with recommendations.    PATIENT/FAMILY Education: bandaging/compression wear schedule,  HEP,  Beginning of self massage,  Self or assisted bandaging, compression options, and Risk reduction    Written Home Exercises Provided: Patient instructed to cont prior HEP.  Home plan of management was reviewed and Chelsea was able to demonstrate understanding prior to the end of the session.  Cehlsea demonstrated good  understanding of the education provided.     Assessment   Chelsea is a 67 y.o. 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     Additional support and reductions noted in B LE since using some form of compression support daily.  Existing TKA needs and HEP with support of orthopedics with mobility while in compression is assisting in her management. Pt has been compliant with recommendations.  Pt continues with R knee pain s/p R TKA with ortho following/ managing.      CVI with slight secondary lymphedema to R > L LE which is complicated by her OA/DJD, TKA, and mobility.  Encouraging return to TKA HEP per protocol for ROM as well as daily compression support.      Chelsea Is progressing well towards her goals.   Pt prognosis is Good.     Pt will continue to benefit from skilled outpatient physical therapy to address the deficits listed in the problem list box  on initial evaluation, provide pt/family education and to maximize pt's level of independence in the home and community environment.     Pt's spiritual, cultural and educational needs considered and pt agreeable to plan of care and goals.  Anticipated Barriers for therapy: knee      The following goals were discussed with the patient and patient is in agreement with them as to be addressed in the treatment plan.      Short Term Goals: (6 weeks)  1. Patient will show decreased girth in B LE by up to 1 cm to allow for LE symmetry, shoe and clothing choice, and ability to apply needed compression.  (progressing, not met)   2. Patient will demonstrate 100% knowledge of lymphedema precautions and signs of infection to allow for reduced lymphedema risk, infection risk, and/or exacerbation of condition.  (progressing, not met)  3. Patient or caregiver will perform self-bandaging techniques and/or wearing of compression garments to allow for lymphatic drainage support, skin elasticity, and reduction in shape and size of limb. (progressing, not met)  4. Patient will perform self lymph drainage techniques to areas within reach to enhance lymphatic drainage and skin condition.  (progressing, not met)  5. Patient will tolerate daily activities with multilayered bandaging to allow for lymphatic and venous support.  (progressing, not met)     Long Term Goals: (12  weeks)  1. Patient will show decreased girth in B LE by up to 2 cm  to allow for LE symmetry, shoe and clothing choice, and ability to apply needed compression daily.  (progressing, not met)  2. Patient will show reduction in density to mild or less with improved contour of limb to allow for cosmesis, LE symmetry, infection risk reduction, and clothing and compression choice.   (progressing, not met)  3. Patient to sonali/doff compression garment with daily compliance to assist in lymphedema management, skin elasticity, and tissue density.  (progressing, not met)  4. Pt  to show improved postural awareness and alignment.  (progressing, not met)  5. Pt to be I and compliant with HEP to allow for increased function in affected limb.   (progressing, not met)  Plan   Plan of care Certification: 9/2/2022 to 11/25/22.     Outpatient Physical Therapy 2 times weekly for 10 weeks to include the following interventions: Patient Education, Self Care, Therapeutic Activities, and Therapeutic Exercise. Complete Decongestive Therapy- compression and home equipment needs to be addressed and assisted.     Pt may be seen by a PTA as part of the Rehab treatment team.  Plan of Care was discussed with SERGE Ness, PT

## 2022-10-31 NOTE — PROGRESS NOTES
"  Physical Therapy Daily Treatment Note     Name: Chelsea Rodriguez  Clinic Number: [id number]    Therapy Diagnosis:   Encounter Diagnoses   Name Primary?    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, bilateral     Venous stasis dermatitis of both lower extremities     Edema of both lower extremities        Physician: Marcell Rico MD*    Visit Date: 11/1/2022    Physician Orders: PT Eval and Treat - lymphedema  Medical Diagnosis from Referral:   Diagnosis   I89.0 (ICD-10-CM) - Lymphedema of both lower extremities   Evaluation Date: 9/2/2022  Authorization Period Expiration: 12/31/22  Plan of Care Expiration: 11/25/22  Visit # / Visits authorized: 4/12     Time In: 10:08 am  Time Out: 11:00 am  Total Billable Time: 52 minutes  Pump un timed charge     Precautions: Standard and R TKA, L breast CA    Subjective     Pt reports: having normal amount of pain in her knees which is worse with walking. Pt stated she heard using a trampoline is good for lymphedema so she set up and used a small personal trampoline last night although had increased knee pain after - was advised walking and low impact exercise beneficial for lymphedema and should not do any exercising that causes increased knee pain . Should continue HEP per ortho therapy for knee rehab .   She was compliant with home compression/exercise program.  Response to previous treatment: Bandaging went well and she removed them on Sunday , noted improvements after removing.   Pt is now wearing her choice of compression daily - Mediped socks B LE.  Functional change: amb today no device, L TKA needed, R TKA some pain at times - ortho addressing    Pain: 7/10  Location: bilateral knees - L "bone on bone"  R along scar or inside knee, swelling is not truly painful      Objective     Female amb to dept slight antalgic gait, no device, KH compression socks, tennis shoes    Amount of Swelling/Location of Swelling: mild/mod to B " Lower legs,ankles R > L, less in feet, body habitus to thighs   Skin Integrity: R TKA, mild discoloration, hemosiderin staining, speckled discoloration and darkening   Palpation/Texture: min to no pitting R ankle, density firmness B LE    - B Stemmer Sign  - B Hattie's Sign  Circulation: intact     Treatment:   Chelsea received the following manual therapy techniques:- Manual Lymphatic Drainage were applied to the: R LE  for 40 minutes, including: MLD and short stretch compression bandaging   Mediped sock reapplied to L LE     MANUAL LYMPHATIC DRAINAGE (MLD):    While supine with LEs elevated stimulation at terminus, along GI region, B inguinal regions, drainage of entire R LE michelle lower leg, ankle, and foot with return proximally,  Use of Aquaphor/Eucerin due to dryness.   Consider self massage to abdominal areas, B inguinal areas, thigh, and remaining LE within reach.    SEQUENTIAL COMPRESSION PUMP: full leg sleeve applied to R LE for 12 minutes   VES 5200 with default setting with distal pressures starting at 45mmHg entire LE   Simultaneous to compression supplies, compression education and training, and self management.    MULTILAYERED BANDAGING:  issued supplies and bandaged R LE with cotton stockinette, Rosidal soft section dorsum of foot, cotton padding malleoli,  Rosidal soft rolls ankle to knee, 2-8cm  Durelast rolls foot to knee, to leave intact 12-24 hrs as tolerated, discontinue with any problems, return rolled bandages next session. Wash and wear schedules confirmed.     Chelsea received therapeutic exercises to develop strength, endurance, ROM, flexibility, and posture for verbal review of TKA exercises per TKA protocol   Not performed today- just reviewed HEP  QS with hold   Assisted heel slide   Assisted KF gravity assist in supine   Seated KH slides  Seated LAQ  Propped LE on chair for HSS + GSS   Cueing on ROM and return to her prior HEP  Edema support while in compression for  aps, walking, avoid  dependency and immobility, support of muscle pump with compression and activity.  THERAPEUTIC EXERCISES:  Continue HEP of AROM, stretching, and postural correction.     Home Exercises Provided and Patient Education Provided:  Self Care Home Management Training/Functional Therapeutic Activity x 5 minutes  Review of Breast CA lymphedema risk  B LE support with compression socks vs garments  Active mobility while in compression    Cueing on her present products Mediped socks  Pt has 20-30mmHg garments - packed away  Discussed class and needs with recommendations.    PATIENT/FAMILY Education: bandaging/compression wear schedule,  HEP,  Beginning of self massage,  Self or assisted bandaging, compression options, and Risk reduction    Written Home Exercises Provided: Patient instructed to cont prior HEP.  Home plan of management was reviewed and Chelsea was able to demonstrate understanding prior to the end of the session.  Chelsea demonstrated good  understanding of the education provided.     Assessment   Chelsea is a 67 y.o. 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     Pt is responding well to therapy and notes improvements following bandaging. Pt also notes feels good management of swelling with use of her compression socks . Encouraged continue daily use of knee high socks for management as well as low impact activity such as walking for improved circulation and venous return.     CVI with slight secondary lymphedema to R > L LE which is  complicated by her OA/DJD, TKA, and mobility.  Encouraging return to TKA HEP per protocol for ROM as well as daily compression support.      Chelsea Is progressing well towards her goals.   Pt prognosis is Good.     Pt will continue to benefit from skilled outpatient physical therapy to address the deficits listed in the problem list box on initial evaluation, provide pt/family education and to maximize pt's level of independence in the home and community environment.     Pt's spiritual, cultural and educational needs considered and pt agreeable to plan of care and goals.  Anticipated Barriers for therapy: knee      The following goals were discussed with the patient and patient is in agreement with them as to be addressed in the treatment plan.      Short Term Goals: (6 weeks)  1. Patient will show decreased girth in B LE by up to 1 cm to allow for LE symmetry, shoe and clothing choice, and ability to apply needed compression.  (progressing, not met)   2. Patient will demonstrate 100% knowledge of lymphedema precautions and signs of infection to allow for reduced lymphedema risk, infection risk, and/or exacerbation of condition.  (progressing, not met)  3. Patient or caregiver will perform self-bandaging techniques and/or wearing of compression garments to allow for lymphatic drainage support, skin elasticity, and reduction in shape and size of limb. (progressing, not met)  4. Patient will perform self lymph drainage techniques to areas within reach to enhance lymphatic drainage and skin condition.  (progressing, not met)  5. Patient will tolerate daily activities with multilayered bandaging to allow for lymphatic and venous support.  (progressing, not met)     Long Term Goals: (12  weeks)  1. Patient will show decreased girth in B LE by up to 2 cm  to allow for LE symmetry, shoe and clothing choice, and ability to apply needed compression daily.  (progressing, not met)  2. Patient will show reduction in density to  mild or less with improved contour of limb to allow for cosmesis, LE symmetry, infection risk reduction, and clothing and compression choice.   (progressing, not met)  3. Patient to sonali/doff compression garment with daily compliance to assist in lymphedema management, skin elasticity, and tissue density.  (progressing, not met)  4. Pt to show improved postural awareness and alignment.  (progressing, not met)  5. Pt to be I and compliant with HEP to allow for increased function in affected limb.   (progressing, not met)  Plan   Plan of care Certification: 9/2/2022 to 11/25/22.     Outpatient Physical Therapy 2 times weekly for 10 weeks to include the following interventions: Patient Education, Self Care, Therapeutic Activities, and Therapeutic Exercise. Complete Decongestive Therapy- compression and home equipment needs to be addressed and assisted.     Melvi Altman, PTA

## 2022-11-01 ENCOUNTER — CLINICAL SUPPORT (OUTPATIENT)
Dept: REHABILITATION | Facility: HOSPITAL | Age: 67
End: 2022-11-01
Payer: MEDICARE

## 2022-11-01 DIAGNOSIS — I87.2 VENOUS STASIS DERMATITIS OF BOTH LOWER EXTREMITIES: ICD-10-CM

## 2022-11-01 DIAGNOSIS — I83.893 VARICOSE VEINS OF LOWER EXTREMITY WITH EDEMA, BILATERAL: ICD-10-CM

## 2022-11-01 DIAGNOSIS — R60.0 EDEMA OF BOTH LOWER EXTREMITIES: ICD-10-CM

## 2022-11-01 DIAGNOSIS — I89.0 LYMPHEDEMA OF BOTH LOWER EXTREMITIES: Primary | ICD-10-CM

## 2022-11-01 PROCEDURE — 97140 MANUAL THERAPY 1/> REGIONS: CPT | Mod: CQ

## 2022-11-01 PROCEDURE — 97016 VASOPNEUMATIC DEVICE THERAPY: CPT | Mod: CQ

## 2022-11-04 ENCOUNTER — CLINICAL SUPPORT (OUTPATIENT)
Dept: REHABILITATION | Facility: HOSPITAL | Age: 67
End: 2022-11-04
Payer: MEDICARE

## 2022-11-04 DIAGNOSIS — I83.893 VARICOSE VEINS OF LOWER EXTREMITY WITH EDEMA, BILATERAL: ICD-10-CM

## 2022-11-04 DIAGNOSIS — I89.0 LYMPHEDEMA OF BOTH LOWER EXTREMITIES: Primary | ICD-10-CM

## 2022-11-04 DIAGNOSIS — I87.2 VENOUS STASIS DERMATITIS OF BOTH LOWER EXTREMITIES: ICD-10-CM

## 2022-11-04 DIAGNOSIS — R60.0 EDEMA OF BOTH LOWER EXTREMITIES: ICD-10-CM

## 2022-11-04 PROCEDURE — 97140 MANUAL THERAPY 1/> REGIONS: CPT

## 2022-11-04 PROCEDURE — 97016 VASOPNEUMATIC DEVICE THERAPY: CPT

## 2022-11-04 NOTE — PROGRESS NOTES
"  Physical Therapy Daily Treatment Note     Name: Chelsea Rodriguez  Clinic Number: [id number]    Therapy Diagnosis:   Encounter Diagnoses   Name Primary?    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, bilateral     Venous stasis dermatitis of both lower extremities     Edema of both lower extremities      Physician: Marcell Rico MD*    Visit Date: 11/4/2022    Physician Orders: PT Eval and Treat - lymphedema  Medical Diagnosis from Referral:   Diagnosis   I89.0 (ICD-10-CM) - Lymphedema of both lower extremities   Evaluation Date: 9/2/2022  Authorization Period Expiration: 12/31/22  Plan of Care Expiration: 11/25/22  Visit # / Visits authorized: 5/12     Time In: 1005a  Time Out: 1105a  Total Billable Time: 60 minutes  Pump un timed charge     Precautions: Standard and R TKA, L breast CA    Subjective     Pt reports: pt has been wearing compression daily to both LEs.  She was compliant with home compression/exercise program.  Response to previous treatment: pt is now wearing her choice of compression daily - Mediped socks B LE.  Functional change: amb today no device, L TKA needed, R TKA some pain at times - ortho addressing  Pain: 2/10 knee pain hasn't been as bad, exercises and trampoline  Location: bilateral knees - L "bone on bone"   Pain is less for R knee, R along scar or inside knee, swelling is not truly painful      Objective     Female amb to dept slight antalgic gait, no device, KH compression socks, tennis shoes  Amount of Swelling/Location of Swelling: mild/mod to B Lower legs,ankles R > L, less in feet, body habitus to thighs   Skin Integrity: R TKA, mild discoloration, hemosiderin staining, speckled discoloration and darkening   Palpation/Texture: min to no pitting R ankle, density firmness B LE    - B Stemmer Sign  - B Hattie's Sign  Circulation: intact   Girth Measurements (in centimeters)  LANDMARK LEFT LE  9/2/22 RIGHT LE  9/2/22 R LE  11/4/22 " Change  R DIFF   at eval   SBP + 20 62.0 cm 61.5 cm - - 0.5 cm   SBP + 10  54.0 cm 56.0 cm - - 2.0 cm   SBP 47.0 cm 47.0 cm 47.0 0 0 cm   10 below SBP 38.0 cm 39.5 cm 38.5 1.0 1.5 cm   20 below SBP 34.0 cm 33.5 cm 34.0 0.5 0.5 cm   30 below SBP 26.5 cm 26.0 cm 26.0 0 0.5 cm   35 below SBP 23.0 cm 23.0 cm 23.0 0 0 cm   Ankle 24.5 cm 25.5 cm 23.5 2.0 1.0 cm   Forefoot 21.0 cm 21.0 cm 21.0 0 0 cm      Treatment:   Chelsea received the following manual therapy techniques:- Manual Lymphatic Drainage were applied to the: R LE  for 45 minutes, including: MLD and short stretch compression bandaging   Mediped sock reapplied to L LE   Girth assessment    MANUAL LYMPHATIC DRAINAGE (MLD):    While supine with LEs elevated stimulation at terminus, along GI region, B inguinal regions, drainage of entire R LE michelle lower leg, ankle, and foot with return proximally,  Use of Aquaphor/Eucerin due to dryness.   Consider self massage to abdominal areas, B inguinal areas, thigh, and remaining LE within reach.    SEQUENTIAL COMPRESSION PUMP: full leg sleeve applied to R LE  VES 5200 with default setting with distal pressures starting at 45mmHg entire LE   Simultaneous to compression supplies, compression education and training, and self management.    MULTILAYERED BANDAGING:  issued supplies and bandaged R LE with cotton stockinette, Rosidal soft section dorsum of foot, cotton padding malleoli,  Rosidal soft rolls ankle to knee, 2-8cm  Durelast rolls foot to knee, to leave intact 12-24 hrs as tolerated, discontinue with any problems, return rolled bandages next session. Wash and wear schedules confirmed.     Chelsea received therapeutic exercises to develop strength, endurance, ROM, flexibility, and posture for verbal review of TKA exercises per TKA protocol   Not performed today- reviewed as HEP- admits some exercises and trampoline  QS with hold   Assisted heel slide   Assisted KF gravity assist in supine   Seated KH slides  Seated  LAQ  Propped LE on chair for HSS + GSS   Cueing on ROM and return to her prior HEP  Edema support while in compression for  aps, walking, avoid dependency and immobility, support of muscle pump with compression and activity.  THERAPEUTIC EXERCISES:  Continue HEP of AROM, stretching, and postural correction.     Home Exercises Provided and Patient Education Provided:  Self Care Home Management Training/Functional Therapeutic Activity x 5 minutes  products Mediped socks   Discussed pros/cons of products and ideal vs real recommendations for daily compliance  Pt has 20-30mmHg garments - packed away  Discussed class and needs with recommendations.    Consider tights/capris that would also cover knees + KH socks/garments    PATIENT/FAMILY Education: bandaging/compression wear schedule,  HEP,  Beginning of self massage,  Self or assisted bandaging, compression options, and Risk reduction    Written Home Exercises Provided: Patient instructed to cont prior HEP.  Home plan of management was reviewed and Chelsea was able to demonstrate understanding prior to the end of the session.  Chelsea demonstrated good  understanding of the education provided.     Assessment   Chelsea is a 67 y.o. 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     Her R LE shows some reductions in density and girth with little size difference comparing R to L. Pt has R TKA, L TKA needs - she should continue HEP, compression and support. Pt is compliant with her Mediped socks -  advised on  20-30mmHg class and style recommendations.   Recommend she continue ortho TKA needs and HEP per orthopedics.  Encouraged daily compression support with mobility/activity,walking for muscle pump support.   CVI with slight secondary lymphedema to R > L LE which is complicated by her OA/DJD, TKA, and mobility.    Chelsea Is progressing well towards her goals.   Pt prognosis is Good.     Pt will continue to benefit from skilled outpatient physical therapy to address the deficits listed in the problem list box on initial evaluation, provide pt/family education and to maximize pt's level of independence in the home and community environment.     Pt's spiritual, cultural and educational needs considered and pt agreeable to plan of care and goals.  Anticipated Barriers for therapy: knee      The following goals were discussed with the patient and patient is in agreement with them as to be addressed in the treatment plan.      Short Term Goals: (6 weeks)  1. Patient will show decreased girth in B LE by up to 1 cm to allow for LE symmetry, shoe and clothing choice, and ability to apply needed compression.  (met)   2. Patient will demonstrate 100% knowledge of lymphedema precautions and signs of infection to allow for reduced lymphedema risk, infection risk, and/or exacerbation of condition.  (met)  3. Patient or caregiver will perform self-bandaging techniques and/or wearing of compression garments to allow for lymphatic drainage support, skin elasticity, and reduction in shape and size of limb. ( met)  4. Patient will perform self lymph drainage techniques to areas within reach to enhance lymphatic drainage and skin condition.  (progressing, )  5. Patient will tolerate daily activities with multilayered bandaging to allow for lymphatic and venous support.  met)     Long Term Goals: (12  weeks)  1. Patient will show decreased girth in B LE by up to 2 cm  to allow for LE symmetry, shoe and clothing choice, and  ability to apply needed compression daily.  (progressing,  2. Patient will show reduction in density to mild or less with improved contour of limb to allow for cosmesis, LE symmetry, infection risk reduction, and clothing and compression choice.   (progressing)  3. Patient to sonali/doff compression garment with daily compliance to assist in lymphedema management, skin elasticity, and tissue density. met)  4. Pt to show improved postural awareness and alignment.  (progressing,   5. Pt to be I and compliant with HEP to allow for increased function in affected limb.   (progressing, - ortho TKA program  Plan   Plan of care Certification: 9/2/2022 to 11/25/22.     Outpatient Physical Therapy 2 times weekly for 10 weeks to include the following interventions: Patient Education, Self Care, Therapeutic Activities, and Therapeutic Exercise. Complete Decongestive Therapy- compression and home equipment needs to be addressed and assisted.     Pt may be seen by a PTA as part of the Rehab treatment team.  Plan of Care was discussed with SEGRE Ness, PT

## 2022-11-08 ENCOUNTER — CLINICAL SUPPORT (OUTPATIENT)
Dept: REHABILITATION | Facility: HOSPITAL | Age: 67
End: 2022-11-08
Payer: MEDICARE

## 2022-11-08 DIAGNOSIS — I83.893 VARICOSE VEINS OF LOWER EXTREMITY WITH EDEMA, BILATERAL: ICD-10-CM

## 2022-11-08 DIAGNOSIS — I87.2 VENOUS STASIS DERMATITIS OF BOTH LOWER EXTREMITIES: ICD-10-CM

## 2022-11-08 DIAGNOSIS — I89.0 LYMPHEDEMA OF BOTH LOWER EXTREMITIES: Primary | ICD-10-CM

## 2022-11-08 DIAGNOSIS — R60.0 EDEMA OF BOTH LOWER EXTREMITIES: ICD-10-CM

## 2022-11-08 PROCEDURE — 97140 MANUAL THERAPY 1/> REGIONS: CPT | Mod: CQ

## 2022-11-08 PROCEDURE — 97016 VASOPNEUMATIC DEVICE THERAPY: CPT | Mod: CQ

## 2022-11-10 ENCOUNTER — OFFICE VISIT (OUTPATIENT)
Dept: GASTROENTEROLOGY | Facility: CLINIC | Age: 67
End: 2022-11-10
Payer: MEDICARE

## 2022-11-10 VITALS — BODY MASS INDEX: 34.66 KG/M2 | WEIGHT: 165.13 LBS | HEIGHT: 58 IN

## 2022-11-10 DIAGNOSIS — M62.89 PELVIC FLOOR DYSFUNCTION IN FEMALE: ICD-10-CM

## 2022-11-10 DIAGNOSIS — K59.04 CHRONIC IDIOPATHIC CONSTIPATION: Primary | ICD-10-CM

## 2022-11-10 PROCEDURE — 3008F BODY MASS INDEX DOCD: CPT | Mod: CPTII,S$GLB,, | Performed by: INTERNAL MEDICINE

## 2022-11-10 PROCEDURE — 99213 OFFICE O/P EST LOW 20 MIN: CPT | Mod: S$GLB,,, | Performed by: INTERNAL MEDICINE

## 2022-11-10 PROCEDURE — 3288F PR FALLS RISK ASSESSMENT DOCUMENTED: ICD-10-PCS | Mod: CPTII,S$GLB,, | Performed by: INTERNAL MEDICINE

## 2022-11-10 PROCEDURE — 99213 PR OFFICE/OUTPT VISIT, EST, LEVL III, 20-29 MIN: ICD-10-PCS | Mod: S$GLB,,, | Performed by: INTERNAL MEDICINE

## 2022-11-10 PROCEDURE — 1159F PR MEDICATION LIST DOCUMENTED IN MEDICAL RECORD: ICD-10-PCS | Mod: CPTII,S$GLB,, | Performed by: INTERNAL MEDICINE

## 2022-11-10 PROCEDURE — 99999 PR PBB SHADOW E&M-EST. PATIENT-LVL III: CPT | Mod: PBBFAC,,, | Performed by: INTERNAL MEDICINE

## 2022-11-10 PROCEDURE — 1126F PR PAIN SEVERITY QUANTIFIED, NO PAIN PRESENT: ICD-10-PCS | Mod: CPTII,S$GLB,, | Performed by: INTERNAL MEDICINE

## 2022-11-10 PROCEDURE — 1126F AMNT PAIN NOTED NONE PRSNT: CPT | Mod: CPTII,S$GLB,, | Performed by: INTERNAL MEDICINE

## 2022-11-10 PROCEDURE — 4010F PR ACE/ARB THEARPY RXD/TAKEN: ICD-10-PCS | Mod: CPTII,S$GLB,, | Performed by: INTERNAL MEDICINE

## 2022-11-10 PROCEDURE — 99999 PR PBB SHADOW E&M-EST. PATIENT-LVL III: ICD-10-PCS | Mod: PBBFAC,,, | Performed by: INTERNAL MEDICINE

## 2022-11-10 PROCEDURE — 3288F FALL RISK ASSESSMENT DOCD: CPT | Mod: CPTII,S$GLB,, | Performed by: INTERNAL MEDICINE

## 2022-11-10 PROCEDURE — 1159F MED LIST DOCD IN RCRD: CPT | Mod: CPTII,S$GLB,, | Performed by: INTERNAL MEDICINE

## 2022-11-10 PROCEDURE — 1157F PR ADVANCE CARE PLAN OR EQUIV PRESENT IN MEDICAL RECORD: ICD-10-PCS | Mod: CPTII,S$GLB,, | Performed by: INTERNAL MEDICINE

## 2022-11-10 PROCEDURE — 4010F ACE/ARB THERAPY RXD/TAKEN: CPT | Mod: CPTII,S$GLB,, | Performed by: INTERNAL MEDICINE

## 2022-11-10 PROCEDURE — 3008F PR BODY MASS INDEX (BMI) DOCUMENTED: ICD-10-PCS | Mod: CPTII,S$GLB,, | Performed by: INTERNAL MEDICINE

## 2022-11-10 PROCEDURE — 1101F PR PT FALLS ASSESS DOC 0-1 FALLS W/OUT INJ PAST YR: ICD-10-PCS | Mod: CPTII,S$GLB,, | Performed by: INTERNAL MEDICINE

## 2022-11-10 PROCEDURE — 1101F PT FALLS ASSESS-DOCD LE1/YR: CPT | Mod: CPTII,S$GLB,, | Performed by: INTERNAL MEDICINE

## 2022-11-10 PROCEDURE — 1157F ADVNC CARE PLAN IN RCRD: CPT | Mod: CPTII,S$GLB,, | Performed by: INTERNAL MEDICINE

## 2022-11-10 NOTE — PROGRESS NOTES
GASTROENTEROLOGY CLINIC NOTE    Reason for visit: The primary encounter diagnosis was Chronic idiopathic constipation. A diagnosis of Pelvic floor dysfunction in female was also pertinent to this visit.  Referring provider/PCP: Eliza Alva MD    HPI:  Chelsea Rodriguez is a 67 y.o. female here today for constipation.   Previously seen by dr linn in our clinic.    Interval  Went to initial pelvic PT session and was told muscles dont relax ; Trying to do the exercises from pelvic pt.   Is trying to eat better, trying to eat more fruits etc.   Every now and then, will take aloe vera and powdered greens and miralax and make a smoothie.  But gets lazy and doesn't do as often.  Having BM maybe every day (when she does what she supposed to do and eat better).    ===============================  Initial clinic visit  Ongoing symptoms of constipation. 1 BM / week. Lower abd fullness. Chronic and ongoing for years now. Hx hysterectomy. Has large and small stools.   Taking liquid dulcolax. When really backed up, takes epsom salts.   linzess didn't really help, didn't try it very long. Worried about side effects  amitiza and trulance too expensive.    Prior Endoscopy:  EGD:  2/2021 [facility]  Impression:           - Non-obstructing Schatzki ring. Dilated. 51fr                        - Small hiatal hernia.                         - Normal examined duodenum.                         - No specimens collected.     Colon:  2/2021      Impression:           - Tortuous colon.                         - Four 2 to 5 mm polyps in the ascending colon,                         removed with a cold biopsy forceps. Resected and                         retrieved.                         - External and internal hemorrhoids.   TAs, repeat 3 years (2024)    (Portions of this note were dictated using voice recognition software and may contain dictation related errors in spelling/grammar/syntax not found on text review)    Review of Systems    Constitutional:  Negative for fever and malaise/fatigue.   Respiratory:  Negative for cough and shortness of breath.    Cardiovascular:  Negative for chest pain and palpitations.   Gastrointestinal:  Positive for abdominal pain and constipation. Negative for blood in stool, nausea and vomiting.   Neurological:  Negative for dizziness and headaches.     Past Medical History: has a past medical history of Bilateral knee pain, Carpal tunnel syndrome, bilateral, Fissure in skin of foot, HTN (hypertension), Hyperlipidemia, Palpitations, Rotator cuff injury, Screening for colorectal cancer, Screening for malignant neoplasm of colon, and Statin-induced myositis.    Past Surgical History: has a past surgical history that includes supracervical abdominal hysterectomy (); Bilateral salpingoophorectomy (); Colonoscopy (N/A, 10/20/2017); Intraocular prosthesis insertion (Left, 2018); Phacoemulsification of cataract (Left, 2018); Hernia repair; Cyst Removal;  section; Phacoemulsification of cataract (Right, 2018); Intraocular prosthesis insertion (Right, 2018); Breast lumpectomy (Left, ); Breast biopsy (Left, ); Breast biopsy (Left, ); Hysterectomy; Oophorectomy; Cataract extraction w/  intraocular lens implant (Right, 2018); Cataract extraction w/  intraocular lens implant (Left, 2018); Esophagogastroduodenoscopy (N/A, 2021); Colonoscopy (N/A, 2021); and Knee Arthroplasty (Right, 3/31/2021).    Family History:family history includes Breast cancer in her paternal cousin; Cancer in her father; Cataracts in her mother; Diabetes in her brother and mother; Heart disease in her brother and mother; Hyperlipidemia in her mother; Hypertension in her mother and sister; Macular degeneration in her mother; Pancreatitis in her mother; Thyroid disease in her sister.    Allergies:   Review of patient's allergies indicates:   Allergen Reactions    Codeine Nausea And  "Vomiting       Social History: reports that she has never smoked. She has never used smokeless tobacco. She reports current alcohol use. She reports that she does not use drugs.    Home medications:   Current Outpatient Medications on File Prior to Visit   Medication Sig Dispense Refill    acetaminophen (TYLENOL) 650 MG TbSR Take 1 tablet (650 mg total) by mouth every 8 (eight) hours as needed (pain). 120 tablet 0    aspirin (ECOTRIN) 81 MG EC tablet Take 1 tablet (81 mg total) by mouth 2 (two) times daily. (Patient taking differently: Take 81 mg by mouth once daily.) 60 tablet 0    atorvastatin (LIPITOR) 80 MG tablet Take 1 tablet (80 mg total) by mouth once daily. 90 tablet 3    coenzyme Q10 100 mg capsule Take 100 mg by mouth every evening.      docusate sodium (COLACE) 100 MG capsule Take 1 capsule (100 mg total) by mouth 2 (two) times daily as needed for Constipation. 60 capsule 0    ergocalciferol (ERGOCALCIFEROL) 50,000 unit Cap TAKE 1 CAPSULE EVERY 7 DAYS 12 capsule 0    hydroCHLOROthiazide (MICROZIDE) 12.5 mg capsule TAKE 1 CAPSULE EVERY EVENING 90 capsule 0    irbesartan (AVAPRO) 150 MG tablet TAKE 1 TABLET(150 MG) BY MOUTH EVERY DAY 90 tablet 2    loratadine (CLARITIN) 10 mg tablet Take 10 mg by mouth every evening.      metoprolol succinate (TOPROL-XL) 25 MG 24 hr tablet TAKE 1 TABLET EVERY EVENING 90 tablet 3    [DISCONTINUED] diclofenac sodium (VOLTAREN) 1 % Gel       [DISCONTINUED] LIDOcaine-prilocaine (EMLA) cream       [DISCONTINUED] methylPREDNISolone (MEDROL DOSEPACK) 4 mg tablet use as directed 1 each 0    [DISCONTINUED] methylPREDNISolone (MEDROL DOSEPACK) 4 mg tablet use as directed 1 each 0     No current facility-administered medications on file prior to visit.       Vital signs:  Ht 4' 10" (1.473 m)   Wt 74.9 kg (165 lb 2 oz)   LMP  (LMP Unknown)   BMI 34.51 kg/m²     Physical Exam  Vitals reviewed.   Constitutional:       General: She is not in acute distress.  HENT:      Head: " Normocephalic and atraumatic.   Eyes:      General: No scleral icterus.     Conjunctiva/sclera: Conjunctivae normal.   Skin:     General: Skin is warm.      Coloration: Skin is not pale.      Findings: No rash.   Neurological:      Mental Status: She is oriented to person, place, and time.      Gait: Gait normal.   Psychiatric:         Mood and Affect: Mood normal.         Behavior: Behavior normal.       I have reviewed prior labs, imaging, notes from last month    Assessment:  1. Chronic idiopathic constipation    2. Pelvic floor dysfunction in female      Doing better after starting pelvic pt    Plan:       Continue to follow with pelvic PT    Use miralax as needed.    Repeat colonoscopy 2024, we discussed this.      RTC prn    Isai Seymour MD  [facility] [place]

## 2022-11-11 ENCOUNTER — CLINICAL SUPPORT (OUTPATIENT)
Dept: REHABILITATION | Facility: HOSPITAL | Age: 67
End: 2022-11-11
Payer: MEDICARE

## 2022-11-11 DIAGNOSIS — I87.2 VENOUS STASIS DERMATITIS OF BOTH LOWER EXTREMITIES: ICD-10-CM

## 2022-11-11 DIAGNOSIS — I83.893 VARICOSE VEINS OF LOWER EXTREMITY WITH EDEMA, BILATERAL: ICD-10-CM

## 2022-11-11 DIAGNOSIS — R60.0 EDEMA OF BOTH LOWER EXTREMITIES: ICD-10-CM

## 2022-11-11 DIAGNOSIS — I89.0 LYMPHEDEMA OF BOTH LOWER EXTREMITIES: Primary | ICD-10-CM

## 2022-11-11 PROCEDURE — 97140 MANUAL THERAPY 1/> REGIONS: CPT

## 2022-11-11 PROCEDURE — 97535 SELF CARE MNGMENT TRAINING: CPT

## 2022-11-11 PROCEDURE — 97016 VASOPNEUMATIC DEVICE THERAPY: CPT

## 2022-11-11 NOTE — PROGRESS NOTES
"  Physical Therapy Daily Treatment Note/DC Summary     Name: Chelsea Rodriguez  Clinic Number: [id number]    Therapy Diagnosis:   Encounter Diagnoses   Name Primary?    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, bilateral     Venous stasis dermatitis of both lower extremities     Edema of both lower extremities      Physician: Marcell Rico MD*    Visit Date: 11/11/2022    Physician Orders: PT Eval and Treat - lymphedema  Medical Diagnosis from Referral:   Diagnosis   I89.0 (ICD-10-CM) - Lymphedema of both lower extremities   Evaluation Date: 9/2/2022  Authorization Period Expiration: 12/31/22  Plan of Care Expiration: 11/25/22  Visit # / Visits authorized: 7/12    Date of Last visit: 11/11/22  Total Visits Received: 7     Time In: 1000a  Time Out: 1110a  Total Billable Time: 70 minutes  Pump un timed charge     Precautions: Standard and R TKA, L breast CA    Subjective     Pt reports: pt she is managing well, she appreciates training and education received for self management.  She was compliant with home compression/exercise program.  Response to previous treatment: pt chooses Mediped socks and will also consider self order of JOBST 20-30mmHg- she is able to self apply and wear daily.  Pt understands plan and is in agreement with discharge.  Functional change: uses cane for stairs and longer distances, some stiffness after sitting for long periods - understands mobility and avoid long periods of dependency.  Amb no device, having pelvic floor PT, ortho following as needed for potential L TKA and s/p R TKA.  Pain: 2/10 knee pain hasn't been as bad, exercises and trampoline  Location: bilateral knees - L "bone on bone"   Pain is less for R knee, R along scar or inside knee, swelling is not truly painful      Objective     Female amb to dept slight antalgic gait, no device, KH Mediped compression socks, tennis shoes  Amount of Swelling/Location of Swelling: mild to B " Lower legs,ankles R > L, less in feet, body habitus to thighs   Skin Integrity: R TKA, mild discoloration, hemosiderin staining, speckled discoloration and darkening   Palpation/Texture: no pitting R ankle, density firmness B LE    - B Stemmer Sign  - B Hattie's Sign  Circulation: intact   Girth Measurements (in centimeters)  LANDMARK LEFT LE  9/2/22 RIGHT LE  9/2/22 R LE  11/4/22 Change  R DIFF   at eval   SBP + 20 62.0 cm 61.5 cm - - 0.5 cm   SBP + 10  54.0 cm 56.0 cm - - 2.0 cm   SBP 47.0 cm 47.0 cm 47.0 0 0 cm   10 below SBP 38.0 cm 39.5 cm 38.5 1.0 1.5 cm   20 below SBP 34.0 cm 33.5 cm 34.0 0.5 0.5 cm   30 below SBP 26.5 cm 26.0 cm 26.0 0 0.5 cm   35 below SBP 23.0 cm 23.0 cm 23.0 0 0 cm   Ankle 24.5 cm 25.5 cm 23.5 2.0 1.0 cm   Forefoot 21.0 cm 21.0 cm 21.0 0 0 cm      Treatment:   Chelsea received the following manual therapy techniques:- Manual Lymphatic Drainage were applied to the: R LE  for 45 minutes, including: MLD and short stretch compression bandaging   Mediped sock remained in place L LE     MANUAL LYMPHATIC DRAINAGE (MLD):    While supine with LEs elevated stimulation at terminus, along GI region, B inguinal regions, drainage of entire R LE michelle lower leg, ankle, and foot with return proximally,  Use of Aquaphor/Eucerin due to dryness.   Consider self massage to abdominal areas, B inguinal areas, thigh, and remaining LE within reach.    SEQUENTIAL COMPRESSION PUMP: full leg sleeve applied to R LE  VES 5200 with default setting with distal pressures starting at 45mmHg entire LE   Simultaneous to compression supplies, compression education and training, and self management.    MULTILAYERED BANDAGING:  issued supplies and bandaged R LE with cotton stockinette, Rosidal soft section dorsum of foot, cotton padding malleoli,  Rosidal soft rolls ankle to knee, 2-8cm  Durelast rolls foot to knee, to leave intact 12-24 hrs as tolerated, discontinue with any problems, return rolled bandages next session. Wash and  wear schedules confirmed.     Chelsea received therapeutic exercises to develop strength, endurance, ROM, flexibility, and posture for verbal review of TKA exercises per TKA protocol   Brief demo of HSS on chair, supine gravity assisted KF support.  HEP and TKA exercises  Cueing on ROM and return to her prior HEP  Edema support while in compression for  aps, walking, avoid dependency and immobility, support of muscle pump with compression and activity.  THERAPEUTIC EXERCISES:  Continue HEP of AROM, stretching, and postural correction.     Home Exercises Provided and Patient Education Provided:  Self Care Home Management Training/Functional Therapeutic Activity x 15 minutes  products Mediped socks - her choice of daily use  Discussed pros/cons of products and ideal vs real recommendations for daily compliance  Pt has 20-30mmHg garments - packed away- will consider ordering a new pair- dtr may assist with online purchase    Discussed class and needs with recommendations.  Reviewed wear schedules, position, fit and replacement time frames.  Consider tights/capris that would also cover knees + KH socks/garments    Demo of online choice for JOBST Relief 20-30mmHg closed toe KH petite length- options for dtr to assist in ordering.  Methods to read girth charts for sizing  Large petite length - choice black, closed toe  CompressionsaleTechTurn information provided cost ~ $35 / pr.  Pt voiced understanding of self order and choice of products.    PATIENT/FAMILY Education: bandaging/compression wear schedule,  HEP,  Beginning of self massage,  Self or assisted bandaging, compression options, and Risk reduction    Written Home Exercises Provided: Patient instructed to cont prior HEP.  Home plan of management was reviewed and Chelsea was able to demonstrate understanding prior to the end of the session.  Chelsea demonstrated good  understanding of the education provided.     Assessment   Chelsea is a 67 y.o. female referred to outpatient  Physical Therapy with a medical diagnosis of   Diagnosis   I89.0 (ICD-10-CM) - Lymphedema of both lower extremities   This patient presents s/p R TKA, CVI with stasis changes, OA/DJD knees, pending L TKA future resulting in: multifactorial venous secondary lymphedema of the  BLE mostly lower legs and ankles, increased pain, increased stiffness in the knees, as well as difficulty performing compression application, ROM limitations , compression needs, and placing the pt at higher risk of infection.     Her B LEs have benefited from daily support of her compression socks. Pt has Mediped socks and now options for new garments - medical grade products, 20-30mmHg class, and style recommendations.   Pt has process and plan to continue her TKA recovery as well as considerations for pre op L TKA exercise and edema management.    Some reductions in density and girth of R LE with it now being similar to her L LE.   Activity and mobility while in compression is supportive and ongoing HEP for TKA support.  Choice of compression and class reviewed.   Continue ortho TKA needs and HEP per orthopedics.    Chelsea Is progressing well towards her goals.   Pt prognosis is Good.     Pt's spiritual, cultural and educational needs considered and pt agreeable to plan of care and goals.  Anticipated Barriers for therapy: knee      The following goals were discussed with the patient and patient is in agreement with them as to be addressed in the treatment plan.      Short Term Goals: (6 weeks)  1. Patient will show decreased girth in B LE by up to 1 cm to allow for LE symmetry, shoe and clothing choice, and ability to apply needed compression.  (met)   2. Patient will demonstrate 100% knowledge of lymphedema precautions and signs of infection to allow for reduced lymphedema risk, infection risk, and/or exacerbation of condition.  (met)  3. Patient or caregiver will perform self-bandaging techniques and/or wearing of compression garments to  allow for lymphatic drainage support, skin elasticity, and reduction in shape and size of limb. ( met)  4. Patient will perform self lymph drainage techniques to areas within reach to enhance lymphatic drainage and skin condition.  (progressing, )  5. Patient will tolerate daily activities with multilayered bandaging to allow for lymphatic and venous support.  met)     Long Term Goals: (12  weeks)  1. Patient will show decreased girth in B LE by up to 2 cm  to allow for LE symmetry, shoe and clothing choice, and ability to apply needed compression daily.  (progressing,  2. Patient will show reduction in density to mild or less with improved contour of limb to allow for cosmesis, LE symmetry, infection risk reduction, and clothing and compression choice.  met  3. Patient to sonali/doff compression garment with daily compliance to assist in lymphedema management, skin elasticity, and tissue density. met)  4. Pt to show improved postural awareness and alignment.  (progressing,   5. Pt to be I and compliant with HEP to allow for increased function in affected limb.  met - ortho TKA program  Discharge reason: Patient has met all of his/her goals and Patient has reached the maximum rehab potential for the present time    Discharge FOTO Score: see media    PLAN   This patient is discharged from Physical Therapy      Sally Reyes, PT

## 2022-11-28 ENCOUNTER — LAB VISIT (OUTPATIENT)
Dept: LAB | Facility: HOSPITAL | Age: 67
End: 2022-11-28
Attending: INTERNAL MEDICINE
Payer: MEDICARE

## 2022-11-28 DIAGNOSIS — I10 ESSENTIAL HYPERTENSION: ICD-10-CM

## 2022-11-28 LAB
ALBUMIN SERPL BCP-MCNC: 3.8 G/DL (ref 3.5–5.2)
ALP SERPL-CCNC: 95 U/L (ref 55–135)
ALT SERPL W/O P-5'-P-CCNC: 24 U/L (ref 10–44)
ANION GAP SERPL CALC-SCNC: 11 MMOL/L (ref 8–16)
AST SERPL-CCNC: 27 U/L (ref 10–40)
BILIRUB SERPL-MCNC: 0.6 MG/DL (ref 0.1–1)
BUN SERPL-MCNC: 14 MG/DL (ref 8–23)
CALCIUM SERPL-MCNC: 9.4 MG/DL (ref 8.7–10.5)
CHLORIDE SERPL-SCNC: 106 MMOL/L (ref 95–110)
CO2 SERPL-SCNC: 25 MMOL/L (ref 23–29)
CREAT SERPL-MCNC: 0.7 MG/DL (ref 0.5–1.4)
EST. GFR  (NO RACE VARIABLE): >60 ML/MIN/1.73 M^2
GLUCOSE SERPL-MCNC: 95 MG/DL (ref 70–110)
POTASSIUM SERPL-SCNC: 3.8 MMOL/L (ref 3.5–5.1)
PROT SERPL-MCNC: 6.7 G/DL (ref 6–8.4)
SODIUM SERPL-SCNC: 142 MMOL/L (ref 136–145)

## 2022-11-28 PROCEDURE — 36415 COLL VENOUS BLD VENIPUNCTURE: CPT | Mod: PO | Performed by: INTERNAL MEDICINE

## 2022-11-28 PROCEDURE — 80053 COMPREHEN METABOLIC PANEL: CPT | Performed by: INTERNAL MEDICINE

## 2022-11-29 ENCOUNTER — OFFICE VISIT (OUTPATIENT)
Dept: ORTHOPEDICS | Facility: CLINIC | Age: 67
End: 2022-11-29
Payer: MEDICARE

## 2022-11-29 VITALS — BODY MASS INDEX: 34.38 KG/M2 | HEIGHT: 58 IN | WEIGHT: 163.81 LBS

## 2022-11-29 DIAGNOSIS — Z96.651 STATUS POST TOTAL RIGHT KNEE REPLACEMENT: Primary | ICD-10-CM

## 2022-11-29 PROCEDURE — 3288F PR FALLS RISK ASSESSMENT DOCUMENTED: ICD-10-PCS | Mod: CPTII,S$GLB,, | Performed by: ORTHOPAEDIC SURGERY

## 2022-11-29 PROCEDURE — 3008F PR BODY MASS INDEX (BMI) DOCUMENTED: ICD-10-PCS | Mod: CPTII,S$GLB,, | Performed by: ORTHOPAEDIC SURGERY

## 2022-11-29 PROCEDURE — 1101F PT FALLS ASSESS-DOCD LE1/YR: CPT | Mod: CPTII,S$GLB,, | Performed by: ORTHOPAEDIC SURGERY

## 2022-11-29 PROCEDURE — 99213 PR OFFICE/OUTPT VISIT, EST, LEVL III, 20-29 MIN: ICD-10-PCS | Mod: S$GLB,,, | Performed by: ORTHOPAEDIC SURGERY

## 2022-11-29 PROCEDURE — 1159F MED LIST DOCD IN RCRD: CPT | Mod: CPTII,S$GLB,, | Performed by: ORTHOPAEDIC SURGERY

## 2022-11-29 PROCEDURE — 3288F FALL RISK ASSESSMENT DOCD: CPT | Mod: CPTII,S$GLB,, | Performed by: ORTHOPAEDIC SURGERY

## 2022-11-29 PROCEDURE — 99999 PR PBB SHADOW E&M-EST. PATIENT-LVL III: ICD-10-PCS | Mod: PBBFAC,,, | Performed by: ORTHOPAEDIC SURGERY

## 2022-11-29 PROCEDURE — 1159F PR MEDICATION LIST DOCUMENTED IN MEDICAL RECORD: ICD-10-PCS | Mod: CPTII,S$GLB,, | Performed by: ORTHOPAEDIC SURGERY

## 2022-11-29 PROCEDURE — 99213 OFFICE O/P EST LOW 20 MIN: CPT | Mod: S$GLB,,, | Performed by: ORTHOPAEDIC SURGERY

## 2022-11-29 PROCEDURE — 1125F AMNT PAIN NOTED PAIN PRSNT: CPT | Mod: CPTII,S$GLB,, | Performed by: ORTHOPAEDIC SURGERY

## 2022-11-29 PROCEDURE — 99999 PR PBB SHADOW E&M-EST. PATIENT-LVL III: CPT | Mod: PBBFAC,,, | Performed by: ORTHOPAEDIC SURGERY

## 2022-11-29 PROCEDURE — 1101F PR PT FALLS ASSESS DOC 0-1 FALLS W/OUT INJ PAST YR: ICD-10-PCS | Mod: CPTII,S$GLB,, | Performed by: ORTHOPAEDIC SURGERY

## 2022-11-29 PROCEDURE — 1157F ADVNC CARE PLAN IN RCRD: CPT | Mod: CPTII,S$GLB,, | Performed by: ORTHOPAEDIC SURGERY

## 2022-11-29 PROCEDURE — 1157F PR ADVANCE CARE PLAN OR EQUIV PRESENT IN MEDICAL RECORD: ICD-10-PCS | Mod: CPTII,S$GLB,, | Performed by: ORTHOPAEDIC SURGERY

## 2022-11-29 PROCEDURE — 3008F BODY MASS INDEX DOCD: CPT | Mod: CPTII,S$GLB,, | Performed by: ORTHOPAEDIC SURGERY

## 2022-11-29 PROCEDURE — 4010F ACE/ARB THERAPY RXD/TAKEN: CPT | Mod: CPTII,S$GLB,, | Performed by: ORTHOPAEDIC SURGERY

## 2022-11-29 PROCEDURE — 1125F PR PAIN SEVERITY QUANTIFIED, PAIN PRESENT: ICD-10-PCS | Mod: CPTII,S$GLB,, | Performed by: ORTHOPAEDIC SURGERY

## 2022-11-29 PROCEDURE — 4010F PR ACE/ARB THEARPY RXD/TAKEN: ICD-10-PCS | Mod: CPTII,S$GLB,, | Performed by: ORTHOPAEDIC SURGERY

## 2022-11-29 NOTE — PROGRESS NOTES
"  Subjective:     HPI:   Chelsea Rodriguez is a 67 y.o. female who presents f/u R TKA 3/31/21    F/u from October:   Symptoms unchanged since march,   -knee feels tight all the time  -medial knee pain, difficulty getting up from low sofa    adductor tubercle insertional tendonitis?   TKA appears mechanically well functioning    CRP 2.6  ESR 26  Normal  sent Rx for medrol dose pack    Says dose pack "really did help" and pain at medial knee is now 90% gone    Today c/o anterior knee pain at mid incision when tries to step up more than down. She is doing up with good/down with bad    Did go to lymphedema clinic: wearing comp socks and got new exercises that are helping    Doing mini trampoline every morning - helps     Objective:   Body mass index is 34.23 kg/m².  Exam:  No limp nonantalgic gait.  Incisions well-healed.  No pain with active straight leg raise good quad strength.  0-100 degree knee range of motion good stability to anterior-posterior varus and valgus stresses no flexion contracture or extensor lag.  No effusion.  She is point tender at the junction between the distal patella and the proximal pole of the patellar tendon      Imaging:  None today      Assessment:       ICD-10-CM ICD-9-CM   1. Status post total right knee replacement  Z96.651 V43.65      adductor tubercle insertional tendonitis resolved with medrol dose pack    Today c/o tendonitis at patella/patellar tendon origin     Plan:       Gentle stretching  Tylenol  Ice/heat    Try OTC patellar strap    Try taking a break from trampoline     F/u march = 2 year XR    No orders of the defined types were placed in this encounter.            Past Medical History:   Diagnosis Date    Bilateral knee pain     Carpal tunnel syndrome, bilateral     Fissure in skin of foot     Right small toe    HTN (hypertension)     Hyperlipidemia     Palpitations     Rotator cuff injury     right    Screening for colorectal cancer 10/20/2017    Screening for malignant " neoplasm of colon 2021    Statin-induced myositis 2018       Past Surgical History:   Procedure Laterality Date    BILATERAL SALPINGOOPHORECTOMY  2000    BREAST BIOPSY Left 2010    malignant    BREAST BIOPSY Left     negative    BREAST LUMPECTOMY Left 2010    CATARACT EXTRACTION W/  INTRAOCULAR LENS IMPLANT Right 2018    Dr. Oneil    CATARACT EXTRACTION W/  INTRAOCULAR LENS IMPLANT Left 2018    Dr. Oneil     SECTION      x2    COLONOSCOPY N/A 10/20/2017    Procedure: COLONOSCOPY;  Surgeon: Mitchell Danielson Jr., MD;  Location: [facility] ENDO;  Service: Endoscopy;  Laterality: N/A;    COLONOSCOPY N/A 2021    Procedure: COLONOSCOPY/Suprep;  Surgeon: Malcolm Reyes MD;  Location: [facility];  Service: Endoscopy;  Laterality: N/A;    CYST REMOVAL      on back    ESOPHAGOGASTRODUODENOSCOPY N/A 2021    Procedure: EGD (ESOPHAGOGASTRODUODENOSCOPY);  Surgeon: Malcolm Reyes MD;  Location: [facility];  Service: Endoscopy;  Laterality: N/A;    HERNIA REPAIR      HYSTERECTOMY      at 25 yrs old    INTRAOCULAR PROSTHESES INSERTION Left 2018    Procedure: INSERTION, IOL PROSTHESIS;  Surgeon: Sergio Oneil MD;  Location: [facility] OR 1ST FLR;  Service: Ophthalmology;  Laterality: Left;    INTRAOCULAR PROSTHESES INSERTION Right 2018    Procedure: INSERTION, IOL PROSTHESIS;  Surgeon: Sergio Oneil MD;  Location: [facility] OR 2ND FLR;  Service: Ophthalmology;  Laterality: Right;    KNEE ARTHROPLASTY Right 3/31/2021    Procedure: ARTHROPLASTY, KNEE:RIGHT:DEPUY-SIGMA ;  Surgeon: Pedro Summers III, MD;  Location: [facility];  Service: Orthopedics;  Laterality: Right;    OOPHORECTOMY      @ 45 yrs old    PHACOEMULSIFICATION OF CATARACT Left 2018    Procedure: PHACOEMULSIFICATION, CATARACT;  Surgeon: Sergio Oneil MD;  Location: [facility] OR 1ST FLR;  Service: Ophthalmology;  Laterality: Left;    PHACOEMULSIFICATION OF CATARACT Right 2018    Procedure:  "PHACOEMULSIFICATION, CATARACT;  Surgeon: Sergio Oneil MD;  Location: [facility] OR [address] [place] [address];  Service: Ophthalmology;  Laterality: Right;    supracervical abdominal hysterectomy  1978    fibroids       Family History   Problem Relation Age of Onset    Hypertension Mother     Heart disease Mother     Diabetes Mother     Hyperlipidemia Mother     Pancreatitis Mother     Cataracts Mother     Macular degeneration Mother     Cancer Father     Breast cancer Paternal Cousin     Hypertension Sister     Thyroid disease Sister     Diabetes Brother     Heart disease Brother     Amblyopia Neg Hx     Blindness Neg Hx     Glaucoma Neg Hx     Strabismus Neg Hx     Retinal detachment Neg Hx        Social History     Socioeconomic History    Marital status: Single    Number of children: 2   Occupational History     Comment: retired   Tobacco Use    Smoking status: Never    Smokeless tobacco: Never   Substance and Sexual Activity    Alcohol use: Yes     Comment: once per month    Drug use: No    Sexual activity: Not Currently   Social History Narrative    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 hx of left lumpectomy for "breast cancer"- with 5yrs of tamoxifen use. Sees Dr. Buckley ([facility] for surveillance/MMG)        Dr. Lala former PCP, [facility]          Social Determinants of Health     Financial Resource Strain: Low Risk     Difficulty of Paying Living Expenses: Not hard at all   Food Insecurity: No Food Insecurity    Worried About Running Out of Food in the Last Year: Never true    Ran Out of Food in the Last Year: Never true   Transportation Needs: No Transportation Needs    Lack of Transportation (Medical): No    Lack of Transportation (Non-Medical): No   Physical Activity: Inactive    Days of Exercise per Week: 0 days    Minutes of Exercise per Session: 0 min   Stress: No Stress Concern Present    Feeling of Stress : Only a little   Social " Connections: Moderately Integrated    Frequency of Communication with Friends and Family: More than three times a week    Frequency of Social Gatherings with Friends and Family: More than three times a week    Attends Confucianism Services: More than 4 times per year    Active Member of Clubs or Organizations: Yes    Attends Club or Organization Meetings: More than 4 times per year    Marital Status:    Housing Stability: Low Risk     Unable to Pay for Housing in the Last Year: No    Number of Places Lived in the Last Year: 1    Unstable Housing in the Last Year: No

## 2022-12-01 ENCOUNTER — OFFICE VISIT (OUTPATIENT)
Dept: INTERNAL MEDICINE | Facility: CLINIC | Age: 67
End: 2022-12-01
Payer: MEDICARE

## 2022-12-01 VITALS
HEART RATE: 72 BPM | SYSTOLIC BLOOD PRESSURE: 114 MMHG | OXYGEN SATURATION: 99 % | DIASTOLIC BLOOD PRESSURE: 64 MMHG | WEIGHT: 162.5 LBS | HEIGHT: 58 IN | BODY MASS INDEX: 34.11 KG/M2

## 2022-12-01 DIAGNOSIS — I10 ESSENTIAL HYPERTENSION: ICD-10-CM

## 2022-12-01 DIAGNOSIS — E55.9 VITAMIN D DEFICIENCY: ICD-10-CM

## 2022-12-01 DIAGNOSIS — Z85.3 HISTORY OF LEFT BREAST CANCER: ICD-10-CM

## 2022-12-01 DIAGNOSIS — E78.2 MIXED HYPERLIPIDEMIA: ICD-10-CM

## 2022-12-01 DIAGNOSIS — E66.9 OBESITY (BMI 30-39.9): ICD-10-CM

## 2022-12-01 DIAGNOSIS — T14.8XXA ABRASION: ICD-10-CM

## 2022-12-01 DIAGNOSIS — G56.03 BILATERAL CARPAL TUNNEL SYNDROME: ICD-10-CM

## 2022-12-01 DIAGNOSIS — G47.33 OSA (OBSTRUCTIVE SLEEP APNEA): ICD-10-CM

## 2022-12-01 PROCEDURE — 99214 PR OFFICE/OUTPT VISIT, EST, LEVL IV, 30-39 MIN: ICD-10-PCS | Mod: S$GLB,,, | Performed by: INTERNAL MEDICINE

## 2022-12-01 PROCEDURE — 1159F PR MEDICATION LIST DOCUMENTED IN MEDICAL RECORD: ICD-10-PCS | Mod: CPTII,S$GLB,, | Performed by: INTERNAL MEDICINE

## 2022-12-01 PROCEDURE — 99999 PR PBB SHADOW E&M-EST. PATIENT-LVL IV: CPT | Mod: PBBFAC,,, | Performed by: INTERNAL MEDICINE

## 2022-12-01 PROCEDURE — 99214 OFFICE O/P EST MOD 30 MIN: CPT | Mod: S$GLB,,, | Performed by: INTERNAL MEDICINE

## 2022-12-01 PROCEDURE — 3074F PR MOST RECENT SYSTOLIC BLOOD PRESSURE < 130 MM HG: ICD-10-PCS | Mod: CPTII,S$GLB,, | Performed by: INTERNAL MEDICINE

## 2022-12-01 PROCEDURE — 1125F AMNT PAIN NOTED PAIN PRSNT: CPT | Mod: CPTII,S$GLB,, | Performed by: INTERNAL MEDICINE

## 2022-12-01 PROCEDURE — 4010F PR ACE/ARB THEARPY RXD/TAKEN: ICD-10-PCS | Mod: CPTII,S$GLB,, | Performed by: INTERNAL MEDICINE

## 2022-12-01 PROCEDURE — 99499 RISK ADDL DX/OHS AUDIT: ICD-10-PCS | Mod: HCNC,S$GLB,, | Performed by: INTERNAL MEDICINE

## 2022-12-01 PROCEDURE — 1157F ADVNC CARE PLAN IN RCRD: CPT | Mod: CPTII,S$GLB,, | Performed by: INTERNAL MEDICINE

## 2022-12-01 PROCEDURE — 1160F RVW MEDS BY RX/DR IN RCRD: CPT | Mod: CPTII,S$GLB,, | Performed by: INTERNAL MEDICINE

## 2022-12-01 PROCEDURE — 3008F BODY MASS INDEX DOCD: CPT | Mod: CPTII,S$GLB,, | Performed by: INTERNAL MEDICINE

## 2022-12-01 PROCEDURE — 99999 PR PBB SHADOW E&M-EST. PATIENT-LVL IV: ICD-10-PCS | Mod: PBBFAC,,, | Performed by: INTERNAL MEDICINE

## 2022-12-01 PROCEDURE — 3008F PR BODY MASS INDEX (BMI) DOCUMENTED: ICD-10-PCS | Mod: CPTII,S$GLB,, | Performed by: INTERNAL MEDICINE

## 2022-12-01 PROCEDURE — 1159F MED LIST DOCD IN RCRD: CPT | Mod: CPTII,S$GLB,, | Performed by: INTERNAL MEDICINE

## 2022-12-01 PROCEDURE — 3078F PR MOST RECENT DIASTOLIC BLOOD PRESSURE < 80 MM HG: ICD-10-PCS | Mod: CPTII,S$GLB,, | Performed by: INTERNAL MEDICINE

## 2022-12-01 PROCEDURE — 1101F PT FALLS ASSESS-DOCD LE1/YR: CPT | Mod: CPTII,S$GLB,, | Performed by: INTERNAL MEDICINE

## 2022-12-01 PROCEDURE — 3074F SYST BP LT 130 MM HG: CPT | Mod: CPTII,S$GLB,, | Performed by: INTERNAL MEDICINE

## 2022-12-01 PROCEDURE — 1125F PR PAIN SEVERITY QUANTIFIED, PAIN PRESENT: ICD-10-PCS | Mod: CPTII,S$GLB,, | Performed by: INTERNAL MEDICINE

## 2022-12-01 PROCEDURE — 99499 UNLISTED E&M SERVICE: CPT | Mod: HCNC,S$GLB,, | Performed by: INTERNAL MEDICINE

## 2022-12-01 PROCEDURE — 3288F PR FALLS RISK ASSESSMENT DOCUMENTED: ICD-10-PCS | Mod: CPTII,S$GLB,, | Performed by: INTERNAL MEDICINE

## 2022-12-01 PROCEDURE — 4010F ACE/ARB THERAPY RXD/TAKEN: CPT | Mod: CPTII,S$GLB,, | Performed by: INTERNAL MEDICINE

## 2022-12-01 PROCEDURE — 1160F PR REVIEW ALL MEDS BY PRESCRIBER/CLIN PHARMACIST DOCUMENTED: ICD-10-PCS | Mod: CPTII,S$GLB,, | Performed by: INTERNAL MEDICINE

## 2022-12-01 PROCEDURE — 1101F PR PT FALLS ASSESS DOC 0-1 FALLS W/OUT INJ PAST YR: ICD-10-PCS | Mod: CPTII,S$GLB,, | Performed by: INTERNAL MEDICINE

## 2022-12-01 PROCEDURE — 1157F PR ADVANCE CARE PLAN OR EQUIV PRESENT IN MEDICAL RECORD: ICD-10-PCS | Mod: CPTII,S$GLB,, | Performed by: INTERNAL MEDICINE

## 2022-12-01 PROCEDURE — 3078F DIAST BP <80 MM HG: CPT | Mod: CPTII,S$GLB,, | Performed by: INTERNAL MEDICINE

## 2022-12-01 PROCEDURE — 3288F FALL RISK ASSESSMENT DOCD: CPT | Mod: CPTII,S$GLB,, | Performed by: INTERNAL MEDICINE

## 2022-12-01 RX ORDER — AMOXICILLIN 500 MG/1
500 CAPSULE ORAL
COMMUNITY
Start: 2022-06-26

## 2022-12-01 NOTE — PROGRESS NOTES
Patient ID: Chelsea Rodriguez is a 67 y.o. female.    Chief Complaint: Hypertension    HPI Chelsea is a 67 y.o. female with hypertension, hyperlipidemia, osteoarthritis, chronic constipation, obstructive sleep apnea, lymphedema and history of breast cancer who presents for routine follow up of medical conditions. She follows routinely with sleep medicine for HENOK, cardiology for HLD, GI for chronic constipation and Orthopedics s/p knee replacement. Follows with heme-onc for monitoring after having breast cancer.   She would like to see a new orthopedic doctor for her bilateral carpal tunnel syndrome.   She recently cut her nail and this caused bleeding under and around the nail of the finger. The skin has been hard in this area since healing. Wondering if anything else should be done to treat this. No further acute complaints or concerns.   Reviewed lab results from 11/28/22.    Health Maintenance Topics with due status: Not Due       Topic Last Completion Date    TETANUS VACCINE 11/03/2017    DEXA Scan 07/13/2020    Colorectal Cancer Screening 02/05/2021    Lipid Panel 07/13/2022        Review of Systems   Skin:         See HPI   All other systems reviewed and are negative.      Objective:     Vitals:    12/01/22 1044   BP: 114/64   Pulse: 72        Physical Exam  Vitals reviewed.   Constitutional:       General: She is not in acute distress.     Appearance: Normal appearance. She is well-developed. She is not ill-appearing, toxic-appearing or diaphoretic.   HENT:      Head: Normocephalic and atraumatic.      Right Ear: External ear normal.      Left Ear: External ear normal.      Nose: Nose normal.   Eyes:      General: No scleral icterus.        Right eye: No discharge.         Left eye: No discharge.      Extraocular Movements: Extraocular movements intact.      Conjunctiva/sclera: Conjunctivae normal.   Cardiovascular:      Rate and Rhythm: Normal rate and regular rhythm.      Heart sounds: Normal heart sounds. No  murmur heard.    No friction rub. No gallop.   Pulmonary:      Effort: Pulmonary effort is normal. No respiratory distress.      Breath sounds: Normal breath sounds. No stridor. No wheezing, rhonchi or rales.   Skin:     General: Skin is warm and dry.      Comments: Thickened and slightly hyperpigmented skin on tip of index finger of left hand. No pain with palpation. No signs of infection.    Neurological:      General: No focal deficit present.      Mental Status: She is alert and oriented to person, place, and time. Mental status is at baseline.   Psychiatric:         Mood and Affect: Mood normal.         Behavior: Behavior normal.         Thought Content: Thought content normal.         Judgment: Judgment normal.       Assessment:       1. Essential hypertension Well controlled   2. Bilateral carpal tunnel syndrome Chronic   3. Mixed hyperlipidemia Chronic   4. History of left breast cancer Chronic   5. Vitamin D deficiency Chronic   6. HENOK (obstructive sleep apnea) Chronic   7. Obesity (BMI 30-39.9) Active   8. Abrasion Active       Plan:         Essential hypertension  Comments:  Cont current medication  Orders:  -     Comprehensive Metabolic Panel; Future; Expected date: 06/01/2023    Bilateral carpal tunnel syndrome  -     Ambulatory referral/consult to Orthopedics; Future; Expected date: 12/08/2022    Mixed hyperlipidemia  Comments:  Currently being managed by cardiology. She will see them again later this month in f/u  Orders:  -     Lipid Panel; Future; Expected date: 06/01/2023    History of left breast cancer  Comments:  Monitored by heme-onc. She will see them and have mammogram in January    Vitamin D deficiency  -     Vitamin D; Future; Expected date: 06/01/2023    HENOK (obstructive sleep apnea)  Comments:  Managed by sleep medicine    Obesity (BMI 30-39.9)  -     CBC Auto Differential; Future; Expected date: 06/01/2023    Abrasion  Comments:  Abrasion on finger healing well. Can moisturize daily to  soften skin. Nothing else to do. Reassurance given.       RTC 6 months for annual exam    Warning signs discussed, patient to call with any further issues or worsening of symptoms.       Parts of the above note were dictated using a voice dictation software. Please excuse any grammatical or typographical errors.

## 2022-12-05 ENCOUNTER — CLINICAL SUPPORT (OUTPATIENT)
Dept: REHABILITATION | Facility: HOSPITAL | Age: 67
End: 2022-12-05
Payer: MEDICARE

## 2022-12-05 DIAGNOSIS — R27.8 OTHER LACK OF COORDINATION: ICD-10-CM

## 2022-12-05 DIAGNOSIS — M62.89 PELVIC FLOOR DYSFUNCTION: Primary | ICD-10-CM

## 2022-12-05 PROCEDURE — 97140 MANUAL THERAPY 1/> REGIONS: CPT | Mod: PO

## 2022-12-05 PROCEDURE — 97112 NEUROMUSCULAR REEDUCATION: CPT | Mod: PO

## 2022-12-05 NOTE — PROGRESS NOTES
Pelvic Health Physical Therapy   Treatment Note     Name: Chelsea Rodriguez  Clinic Number: [id number]    Therapy Diagnosis:   Encounter Diagnoses   Name Primary?    Pelvic floor dysfunction Yes    Other lack of coordination      Physician: Isai Seymour MD    Visit Date: 2022    Physician Orders: PT Eval and Treat   Medical Diagnosis from Referral: pelvic floor dysfunction   Evaluation Date: 10/24/2022  Authorization Period Expiration: 2023  Plan of Care Expiration: 23  Progress Note Due: 22  Visit #/Visits authorized:    Cancelled Visits: 0  No Show Visits: 0  FOTO: Issued Vist #:  1/3    Time In: 1605  Time Out: 1700  Total Billable Time: 55 minutes    Precautions: Standard    Subjective     Pt reports: No issues reported.  She was compliant with home exercise program.  Response to previous treatment: no issues reported  Functional change: no changes reported     Pain: 0/10  Location: NA    Constitutional Symptoms Review: The patient denies having any constitutional symptoms.     Objective   Pt verbally consents to intrarectal treatment today.  Signed consent form already on file.     Neuromuscular Re-education to develop Coordination, Control, and Down training for 25 minutes including:   pelvic floor relaxation/bulging training. Cues for belly big and belly hard to help lengthen the pelvic floor muscle(s).   Patient was instructed in and practice diaphragmatic breathing as a method to help control pain, decrease anxiety and help w/ sleep. Pt was provided w / handout w/ instructions for practice. Practiced  with mod verbal and tactile instruction.           Manual Therapy to develop flexibility and extensibility for 30 minutes including:    Bowel massage performed to help with gut motility.  Pt edu in self-bowel massage technique to help with gut motility needed to have a comfortable BM.    Scar mobilization to  scar in all planes.  Edu on self-scar mobilization to perform at  home.    Myofascial release to intrarectal puborectalis bilaterally       Home Exercises Provided and Patient Education Provided     Education provided:   - anatomy/physiology of pelvic floor  Discussed progression of plan of care with patient; educated pt in activity modification; reviewed HEP with pt. Pt demonstrated and verbalized understanding of all instruction and was provided with a handout of HEP (see Patient Instructions).  -     Written Home Exercises Provided: .  Exercises were reviewed and Chelsea was able to demonstrate them prior to the end of the session.  Chelsea demonstrated good  understanding of the education provided.     See EMR under Patient Instructions for exercises provided 12/5/2022.    Assessment     Worked on pelvic floor muscle(s) relaxation and downtraining today using intrarectal feedback.  Also worked on pelvic floor muscle(s) bulging today to help with pushing needed for comfortable bowel movement.  Also worked on scar mobilization and bowel massage today.   Chelsea Is progressing well towards her goals.   Pt prognosis is Excellent.     Pt will continue to benefit from skilled outpatient physical therapy to address the deficits listed in the problem list box on initial evaluation, provide pt/family education and to maximize pt's level of independence in the home and community environment.     Pt's spiritual, cultural and educational needs considered and pt agreeable to plan of care and goals.     Anticipated barriers to physical therapy: none reported    Goals:   Short Term Goals: 4 weeks   Pt to demonstrate a decrease in scar restrictions to no more than mild restrictions needed to help decrease pain with functional mobility.  Pt to demonstrate proper positioning on commode with breathing techniques to decrease strain with BM to enable pt to feel empty after BM.   Pt to demonstrate independence with performing bowel massage to help with gut motility.   Pt to be able to bulge pelvic floor  which is needed for comfortable BM and complete evacuation.   Pt to demonstrate an improved score in the FOTO Bowel Constipation survey  to at least 44 to demonstrate improving bowel function with activities of daily living.             Long Term Goals: 12 weeks   Pt to be discharged with home plan for carry over after discharge.    Pt to report being able to have a BM without straining 80% of the time to demonstrate improving PF coordination.   Pt to report being able to have a spontaneous bowel movement at least once every 1-3 days to demonstrate improving gut motility and pelvic floor coordination.   Pt to demonstrate an improved score in the FOTO Bowel Constipation survey  to at least 48 to demonstrate improving bowel function with ADLs.      Plan     Plan of care Certification: 10/24/2022 to 1-16-23.     Outpatient Physical Therapy 2 times weekly for 12 weeks to include the following interventions: therapeutic exercises, therapeutic activity, neuromuscular re-education, gait training, manual therapy, modalities PRN, patient/family education, dry needling, and self care/home management    Eva Prince, PT

## 2022-12-05 NOTE — PATIENT INSTRUCTIONS
360 Breath - Inhale long, slow and deep. You should feel as if your lower ribs are expanding in all directions like the way an umbrella opens. You should feel the belly, back and sides gently expand and you may notice a relaxation in the pelvic floor.     Continue to breath like this for 2-3 minutes. Repeat 1-2 times/day.  (But some breathing is better than none so if you only have time for 3 breaths a day then that is ok.           SCAR MOBILIZATION  - Gently massage your scar in all directions both superficially along the skin and deeply.   - Massage the area AROUND your scar, as well as directly on it.   - Do not pull your scar apart with both hands - Use one hand/finger to anchor and use the other to pull along or across the scar.  - Apply only as much pressure as you can tolerate, so that you can do this again the next day.     Do for 5 minutes every day.

## 2022-12-06 ENCOUNTER — TELEPHONE (OUTPATIENT)
Dept: ORTHOPEDICS | Facility: CLINIC | Age: 67
End: 2022-12-06
Payer: MEDICARE

## 2022-12-06 DIAGNOSIS — M79.641 BILATERAL HAND PAIN: Primary | ICD-10-CM

## 2022-12-06 DIAGNOSIS — M79.642 BILATERAL HAND PAIN: Primary | ICD-10-CM

## 2022-12-06 NOTE — TELEPHONE ENCOUNTER
Spoke to Pt on 12/6/22 @ 3:38 pm to reschedule 12/23/22 @ 2:30 pm appointment to 1/6/23 @ 2:15 pm for bilateral hand x rays on the 5th floor  and 2:30 pm appointment with Jin on 7th floor

## 2022-12-12 ENCOUNTER — CLINICAL SUPPORT (OUTPATIENT)
Dept: REHABILITATION | Facility: HOSPITAL | Age: 67
End: 2022-12-12
Payer: MEDICARE

## 2022-12-12 DIAGNOSIS — R27.8 OTHER LACK OF COORDINATION: ICD-10-CM

## 2022-12-12 DIAGNOSIS — M62.89 PELVIC FLOOR DYSFUNCTION: Primary | ICD-10-CM

## 2022-12-12 PROCEDURE — 97110 THERAPEUTIC EXERCISES: CPT | Mod: KX,PO

## 2022-12-12 PROCEDURE — 97140 MANUAL THERAPY 1/> REGIONS: CPT | Mod: KX,PO

## 2022-12-12 PROCEDURE — 97112 NEUROMUSCULAR REEDUCATION: CPT | Mod: KX,PO

## 2022-12-12 NOTE — PATIENT INSTRUCTIONS
Home Exercise Program: 12/12/2022     SCAR MOBILIZATION  - Gently massage your scar in all directions both superficially along the skin and deeply.   - Massage the area AROUND your scar, as well as directly on it.   - Do not pull your scar apart with both hands - Use one hand/finger to anchor and use the other to pull along or across the scar.  - Apply only as much pressure as you can tolerate, so that you can do this again the next day.     Do for 5 minutes every day.             360 Breath - Inhale long, slow and deep. You should feel as if your lower ribs are expanding in all directions like the way an umbrella opens. You should feel the belly, back and sides gently expand and you may notice a relaxation in the pelvic floor.     Continue to breath like this for 2-4 minutes. Repeat 2-3 times/day.       H    Hip Strengthening                         Stop Topamax due to side effects, emotional lability   Continue to monitor mood once job stress changes   If anxiety and/or depression symptoms are not improving - update Dr. COMER Consider start Buspar or Wellbutrin at that time     Trial of Triamcinolone ointment for hemorrhoid irritation, do not use daily longer than 7-14 days  Apply Aquaphor ointment after eat bowel movement    Consider see GI - Dr. Wyatt for workup and management of chronic diarrhea    Consider see Dr. Rosales for evaluation for bariatric surgery

## 2022-12-12 NOTE — PROGRESS NOTES
Pelvic Health Physical Therapy   Treatment Note     Name: Chelsea Rodriguez  Clinic Number: [id number]    Therapy Diagnosis:   Encounter Diagnoses   Name Primary?    Pelvic floor dysfunction Yes    Other lack of coordination      Physician: Isai Seymour MD    Visit Date: 2022    Physician Orders: PT Eval and Treat   Medical Diagnosis from Referral: pelvic floor dysfunction   Evaluation Date: 10/24/2022  Authorization Period Expiration: 2023  Plan of Care Expiration: 23  Progress Note Due: 22  Visit #/Visits authorized:    Cancelled Visits: 0  No Show Visits: 0  FOTO: Issued Vist #:  2/3  12/5/22    Time In: 906  Time Out: 1000  Total Billable Time: 54 minutes    Precautions: Standard    Subjective     Pt reports: No issues reported.  She was compliant with home exercise program.  Response to previous treatment: no issues reported  Functional change: no changes reported     Pain: 0/10  Location: NA    Constitutional Symptoms Review: The patient denies having any constitutional symptoms.     Objective   Pt verbally consents to intrarectal treatment today.  Signed consent form already on file.     Neuromuscular Re-education to develop Coordination, Control, and Down training for 14 minutes including:   pelvic floor relaxation/bulging training. Cues for belly big and belly hard to help lengthen the pelvic floor muscle(s).   Patient was instructed in and practice diaphragmatic breathing as a method to help control pain, decrease anxiety and help w/ sleep.         Manual Therapy to develop flexibility and extensibility for 30 minutes including:    Bowel massage performed to help with gut motility.  Pt edu in self-bowel massage technique to help with gut motility needed to have a comfortable BM.    Scar mobilization to  scar in all planes.  Edu on self-scar mobilization to perform at home.    Myofascial release to intrarectal puborectalis bilaterally       Therapeutic Exercise to  develop  strength and endurance for 10 minutes including:   Posterior pelvic tilt 5 sec x 20 reps  Bridge with kegel x10 reps   Clamshells 5 sec x 10 reps bilaterally         Home Exercises Provided and Patient Education Provided     Education provided:   - anatomy/physiology of pelvic floor  Discussed progression of plan of care with patient; educated pt in activity modification; reviewed HEP with pt. Pt demonstrated and verbalized understanding of all instruction and was provided with a handout of HEP (see Patient Instructions).  -     Written Home Exercises Provided: .  Exercises were reviewed and Chelsea was able to demonstrate them prior to the end of the session.  Chelsea demonstrated good  understanding of the education provided.     See EMR under Patient Instructions for exercises provided 12/5/2022.    Assessment     Worked on pelvic floor muscle(s) relaxation and downtraining today using intrarectal feedback.  Also worked on pelvic floor muscle(s) bulging today to help with pushing needed for comfortable bowel movement.  Also worked on scar mobilization and bowel massage today. Initiated strengthening program today.        Chelsea Is progressing well towards her goals.   Pt prognosis is Excellent.     Pt will continue to benefit from skilled outpatient physical therapy to address the deficits listed in the problem list box on initial evaluation, provide pt/family education and to maximize pt's level of independence in the home and community environment.     Pt's spiritual, cultural and educational needs considered and pt agreeable to plan of care and goals.     Anticipated barriers to physical therapy: none reported    Goals:   Short Term Goals: 4 weeks   Pt to demonstrate a decrease in scar restrictions to no more than mild restrictions needed to help decrease pain with functional mobility.  Pt to demonstrate proper positioning on commode with breathing techniques to decrease strain with BM to enable pt to feel  empty after BM.   Pt to demonstrate independence with performing bowel massage to help with gut motility.   Pt to be able to bulge pelvic floor which is needed for comfortable BM and complete evacuation.   Pt to demonstrate an improved score in the FOTO Bowel Constipation survey  to at least 44 to demonstrate improving bowel function with activities of daily living.             Long Term Goals: 12 weeks   Pt to be discharged with home plan for carry over after discharge.    Pt to report being able to have a BM without straining 80% of the time to demonstrate improving PF coordination.   Pt to report being able to have a spontaneous bowel movement at least once every 1-3 days to demonstrate improving gut motility and pelvic floor coordination.   Pt to demonstrate an improved score in the FOTO Bowel Constipation survey  to at least 48 to demonstrate improving bowel function with ADLs.      Plan     Plan of care Certification: 10/24/2022 to 1-16-23.     Outpatient Physical Therapy 2 times weekly for 12 weeks to include the following interventions: therapeutic exercises, therapeutic activity, neuromuscular re-education, gait training, manual therapy, modalities PRN, patient/family education, dry needling, and self care/home management    Eva Prince, PT

## 2022-12-13 ENCOUNTER — LAB VISIT (OUTPATIENT)
Dept: LAB | Facility: HOSPITAL | Age: 67
End: 2022-12-13
Attending: STUDENT IN AN ORGANIZED HEALTH CARE EDUCATION/TRAINING PROGRAM
Payer: MEDICARE

## 2022-12-13 DIAGNOSIS — E78.2 MIXED HYPERLIPIDEMIA: ICD-10-CM

## 2022-12-13 LAB
CHOLEST SERPL-MCNC: 165 MG/DL (ref 120–199)
CHOLEST/HDLC SERPL: 3.8 {RATIO} (ref 2–5)
HDLC SERPL-MCNC: 44 MG/DL (ref 40–75)
HDLC SERPL: 26.7 % (ref 20–50)
LDLC SERPL CALC-MCNC: 104.8 MG/DL (ref 63–159)
NONHDLC SERPL-MCNC: 121 MG/DL
TRIGL SERPL-MCNC: 81 MG/DL (ref 30–150)

## 2022-12-13 PROCEDURE — 36415 COLL VENOUS BLD VENIPUNCTURE: CPT | Mod: PO | Performed by: STUDENT IN AN ORGANIZED HEALTH CARE EDUCATION/TRAINING PROGRAM

## 2022-12-13 PROCEDURE — 80061 LIPID PANEL: CPT | Performed by: STUDENT IN AN ORGANIZED HEALTH CARE EDUCATION/TRAINING PROGRAM

## 2022-12-19 ENCOUNTER — CLINICAL SUPPORT (OUTPATIENT)
Dept: REHABILITATION | Facility: HOSPITAL | Age: 67
End: 2022-12-19
Payer: MEDICARE

## 2022-12-19 DIAGNOSIS — M62.89 PELVIC FLOOR DYSFUNCTION: Primary | ICD-10-CM

## 2022-12-19 DIAGNOSIS — R27.8 OTHER LACK OF COORDINATION: ICD-10-CM

## 2022-12-19 PROCEDURE — 97110 THERAPEUTIC EXERCISES: CPT | Mod: HCNC,PO

## 2022-12-19 PROCEDURE — 97140 MANUAL THERAPY 1/> REGIONS: CPT | Mod: HCNC,PO

## 2022-12-19 NOTE — PROGRESS NOTES
Pelvic Health Physical Therapy   Treatment Note     Name: Chelsea Rodriguez  Clinic Number: [id number]    Therapy Diagnosis:   Encounter Diagnoses   Name Primary?    Pelvic floor dysfunction Yes    Other lack of coordination      Physician: Isai Seymour MD    Visit Date: 2022    Physician Orders: PT Eval and Treat   Medical Diagnosis from Referral: pelvic floor dysfunction   Evaluation Date: 10/24/2022  Authorization Period Expiration: 2023  Plan of Care Expiration: 23  Progress Note Due: 22  Visit #/Visits authorized: 3/ 12   Cancelled Visits: 0  No Show Visits: 0  FOTO: Issued Vist #:  2/3  12/5/22    Time In: 905   Time Out: 1000   Total Billable Time: 55  minutes    Precautions: Standard    Subjective     Pt reports: She reports she felt like she did not drink enough water so her bowels did not move well.  She has been trying to to decrease use of enemas and oral laxatives.    She was compliant with home exercise program.  Response to previous treatment: no issues reported  Functional change: no changes reported     Pain: 0/10  Location: NA    Constitutional Symptoms Review: The patient denies having any constitutional symptoms.     Objective   Pt verbally consents to intrarectal treatment today.  Signed consent form already on file.     Neuromuscular Re-education to develop Coordination, Control, and Down training for 00 minutes including:   pelvic floor relaxation/bulging training. Cues for belly big and belly hard to help lengthen the pelvic floor muscle(s).   Patient was instructed in and practice diaphragmatic breathing as a method to help control pain, decrease anxiety and help w/ sleep.         Manual Therapy to develop flexibility and extensibility for 40 minutes including:    Bowel massage performed to help with gut motility.  Scar mobilization to  scar in all planes.    Cupping performed today to abdominal wall tissues including scar tissue to help release fascial  restrictions.          Not performed today:  Myofascial release to intrarectal puborectalis bilaterally       Therapeutic Exercise to develop  strength and endurance for 15 minutes including:   Posterior pelvic tilt 5 sec x 20 reps  Bridge with kegel x10 reps   Clamshells 5 sec x 10 reps bilaterally   Marching with abdominal muscle(s) bracing 5 sec x 10 reps bilaterally         Home Exercises Provided and Patient Education Provided     Education provided:   - anatomy/physiology of pelvic floor  Discussed progression of plan of care with patient; educated pt in activity modification; reviewed HEP with pt. Pt demonstrated and verbalized understanding of all instruction and was provided with a handout of HEP (see Patient Instructions).  -     Written Home Exercises Provided: .  Exercises were reviewed and Chelsea was able to demonstrate them prior to the end of the session.  Chelsea demonstrated good  understanding of the education provided.     See EMR under Patient Instructions for exercises provided 12/5/2022.    Assessment     Cupping performed today to abdominal wall tissues including scar tissue to help release fascial restrictions.  Also worked on scar mobilization and bowel massage today. Continued strengthening program today.  Held intrarectal work today to focus on external soft tissue mobilization techniques.        Chelsea Is progressing well towards her goals.   Pt prognosis is Excellent.     Pt will continue to benefit from skilled outpatient physical therapy to address the deficits listed in the problem list box on initial evaluation, provide pt/family education and to maximize pt's level of independence in the home and community environment.     Pt's spiritual, cultural and educational needs considered and pt agreeable to plan of care and goals.     Anticipated barriers to physical therapy: none reported    Goals:   Short Term Goals: 4 weeks   Pt to demonstrate a decrease in scar restrictions to no more than  mild restrictions needed to help decrease pain with functional mobility.  Pt to demonstrate proper positioning on commode with breathing techniques to decrease strain with BM to enable pt to feel empty after BM.   Pt to demonstrate independence with performing bowel massage to help with gut motility.   Pt to be able to bulge pelvic floor which is needed for comfortable BM and complete evacuation.   Pt to demonstrate an improved score in the FOTO Bowel Constipation survey  to at least 44 to demonstrate improving bowel function with activities of daily living.             Long Term Goals: 12 weeks   Pt to be discharged with home plan for carry over after discharge.    Pt to report being able to have a BM without straining 80% of the time to demonstrate improving PF coordination.   Pt to report being able to have a spontaneous bowel movement at least once every 1-3 days to demonstrate improving gut motility and pelvic floor coordination.   Pt to demonstrate an improved score in the FOTO Bowel Constipation survey  to at least 48 to demonstrate improving bowel function with ADLs.      Plan     Plan of care Certification: 10/24/2022 to 1-16-23.     Outpatient Physical Therapy 2 times weekly for 12 weeks to include the following interventions: therapeutic exercises, therapeutic activity, neuromuscular re-education, gait training, manual therapy, modalities PRN, patient/family education, dry needling, and self care/home management    Eva Prince, PT

## 2022-12-20 ENCOUNTER — OFFICE VISIT (OUTPATIENT)
Dept: CARDIOLOGY | Facility: CLINIC | Age: 67
End: 2022-12-20
Payer: MEDICARE

## 2022-12-20 VITALS
BODY MASS INDEX: 35.4 KG/M2 | SYSTOLIC BLOOD PRESSURE: 122 MMHG | HEIGHT: 58 IN | DIASTOLIC BLOOD PRESSURE: 68 MMHG | HEART RATE: 84 BPM | WEIGHT: 168.63 LBS

## 2022-12-20 DIAGNOSIS — R60.0 EDEMA OF BOTH LOWER EXTREMITIES: ICD-10-CM

## 2022-12-20 DIAGNOSIS — I10 ESSENTIAL HYPERTENSION: ICD-10-CM

## 2022-12-20 DIAGNOSIS — E78.2 MIXED HYPERLIPIDEMIA: ICD-10-CM

## 2022-12-20 DIAGNOSIS — I83.893 VARICOSE VEINS OF LOWER EXTREMITY WITH EDEMA, BILATERAL: ICD-10-CM

## 2022-12-20 DIAGNOSIS — I89.0 LYMPHEDEMA OF BOTH LOWER EXTREMITIES: Primary | ICD-10-CM

## 2022-12-20 DIAGNOSIS — I87.2 VENOUS STASIS DERMATITIS OF BOTH LOWER EXTREMITIES: ICD-10-CM

## 2022-12-20 PROCEDURE — 1157F PR ADVANCE CARE PLAN OR EQUIV PRESENT IN MEDICAL RECORD: ICD-10-PCS | Mod: HCNC,CPTII,S$GLB, | Performed by: INTERNAL MEDICINE

## 2022-12-20 PROCEDURE — 3008F PR BODY MASS INDEX (BMI) DOCUMENTED: ICD-10-PCS | Mod: HCNC,CPTII,S$GLB, | Performed by: INTERNAL MEDICINE

## 2022-12-20 PROCEDURE — 4010F PR ACE/ARB THEARPY RXD/TAKEN: ICD-10-PCS | Mod: HCNC,CPTII,S$GLB, | Performed by: INTERNAL MEDICINE

## 2022-12-20 PROCEDURE — 3074F SYST BP LT 130 MM HG: CPT | Mod: HCNC,CPTII,S$GLB, | Performed by: INTERNAL MEDICINE

## 2022-12-20 PROCEDURE — 99215 OFFICE O/P EST HI 40 MIN: CPT | Mod: HCNC,S$GLB,, | Performed by: INTERNAL MEDICINE

## 2022-12-20 PROCEDURE — 1125F PR PAIN SEVERITY QUANTIFIED, PAIN PRESENT: ICD-10-PCS | Mod: HCNC,CPTII,S$GLB, | Performed by: INTERNAL MEDICINE

## 2022-12-20 PROCEDURE — 1101F PT FALLS ASSESS-DOCD LE1/YR: CPT | Mod: HCNC,CPTII,S$GLB, | Performed by: INTERNAL MEDICINE

## 2022-12-20 PROCEDURE — 1159F PR MEDICATION LIST DOCUMENTED IN MEDICAL RECORD: ICD-10-PCS | Mod: HCNC,CPTII,S$GLB, | Performed by: INTERNAL MEDICINE

## 2022-12-20 PROCEDURE — 99999 PR PBB SHADOW E&M-EST. PATIENT-LVL III: ICD-10-PCS | Mod: PBBFAC,HCNC,, | Performed by: INTERNAL MEDICINE

## 2022-12-20 PROCEDURE — 99215 PR OFFICE/OUTPT VISIT, EST, LEVL V, 40-54 MIN: ICD-10-PCS | Mod: HCNC,S$GLB,, | Performed by: INTERNAL MEDICINE

## 2022-12-20 PROCEDURE — 3288F FALL RISK ASSESSMENT DOCD: CPT | Mod: HCNC,CPTII,S$GLB, | Performed by: INTERNAL MEDICINE

## 2022-12-20 PROCEDURE — 3078F PR MOST RECENT DIASTOLIC BLOOD PRESSURE < 80 MM HG: ICD-10-PCS | Mod: HCNC,CPTII,S$GLB, | Performed by: INTERNAL MEDICINE

## 2022-12-20 PROCEDURE — 3008F BODY MASS INDEX DOCD: CPT | Mod: HCNC,CPTII,S$GLB, | Performed by: INTERNAL MEDICINE

## 2022-12-20 PROCEDURE — 3288F PR FALLS RISK ASSESSMENT DOCUMENTED: ICD-10-PCS | Mod: HCNC,CPTII,S$GLB, | Performed by: INTERNAL MEDICINE

## 2022-12-20 PROCEDURE — 3074F PR MOST RECENT SYSTOLIC BLOOD PRESSURE < 130 MM HG: ICD-10-PCS | Mod: HCNC,CPTII,S$GLB, | Performed by: INTERNAL MEDICINE

## 2022-12-20 PROCEDURE — 4010F ACE/ARB THERAPY RXD/TAKEN: CPT | Mod: HCNC,CPTII,S$GLB, | Performed by: INTERNAL MEDICINE

## 2022-12-20 PROCEDURE — 1125F AMNT PAIN NOTED PAIN PRSNT: CPT | Mod: HCNC,CPTII,S$GLB, | Performed by: INTERNAL MEDICINE

## 2022-12-20 PROCEDURE — 1159F MED LIST DOCD IN RCRD: CPT | Mod: HCNC,CPTII,S$GLB, | Performed by: INTERNAL MEDICINE

## 2022-12-20 PROCEDURE — 99999 PR PBB SHADOW E&M-EST. PATIENT-LVL III: CPT | Mod: PBBFAC,HCNC,, | Performed by: INTERNAL MEDICINE

## 2022-12-20 PROCEDURE — 3078F DIAST BP <80 MM HG: CPT | Mod: HCNC,CPTII,S$GLB, | Performed by: INTERNAL MEDICINE

## 2022-12-20 PROCEDURE — 99499 UNLISTED E&M SERVICE: CPT | Mod: HCNC,S$GLB,, | Performed by: INTERNAL MEDICINE

## 2022-12-20 PROCEDURE — 1101F PR PT FALLS ASSESS DOC 0-1 FALLS W/OUT INJ PAST YR: ICD-10-PCS | Mod: HCNC,CPTII,S$GLB, | Performed by: INTERNAL MEDICINE

## 2022-12-20 PROCEDURE — 99499 RISK ADDL DX/OHS AUDIT: ICD-10-PCS | Mod: HCNC,S$GLB,, | Performed by: INTERNAL MEDICINE

## 2022-12-20 PROCEDURE — 1157F ADVNC CARE PLAN IN RCRD: CPT | Mod: HCNC,CPTII,S$GLB, | Performed by: INTERNAL MEDICINE

## 2022-12-20 NOTE — PATIENT INSTRUCTIONS
Assessment/Plan:  Chelsea Rodriguez is a 67 y.o. female with HTN, HLD, obesity, HENOK (on CPAP), who presents for a follow up.    1. BLE Edema- Due to lymphedema. Mrs. Rodriguez reports significant improvement in leg swelling since completing lymphedema clinic therapy.  Continue lymphedema clinic techniques at home.  Limit sodium intake to 2,000 mg daily.  Limit volume intake to 1.5 liters daily.  Elevate legs when resting. .    2. HTN- Continue current medications.    3. HLD-  on 12/13/2022 vs 133 on 7/13/2022.  Continue ASA 81 mg daily and atorvastatin 80 mg daily.  Consider adding zetia next visit if LDL <70.      4. Obesity- Encourage diet, exercise and weight loss.    5. HENOK- Continue CPAP nightly.    Follow up in 6 months

## 2022-12-20 NOTE — PROGRESS NOTES
[facility]    CC: BLE edema      Patient ID: Chelsea Rodriguez is a 67 y.o. female with HTN,. HLD, obesity, HENOK (on CPAP), who presents for a follow up.  Pertinent history/events are as follows:     -Pt kindly referred by Dr. Summers for evaluation of BLE edema.    Initial clinic visit 4/19/2022: Mrs. Rodriguez reports leg swelling for several years.  States leg welling became worse following right knee replacement surgery in 3/2021.  She has no claudication or tissue loss.  Plan:  BLE Edema- Due to lymphedema and possible venous insufficiency.  Check BLE venous reflux study and RAMOS study.  Refer to lymphedema clinic.  Limit sodium intake to 2,000 mg daily.  Limit volume intake to 1.5 liters daily.  Elevate legs when resting.  HTN- Continue current medications.  HLD- Discontinue pravastatin and start atorvastatin 80 mg daily.  Continue ASA 81 mg daily.  Obesity- Encourage diet, exercise and weight loss.  HENOK- Continue CPAP nightly.     -Follow up clinic visit 7/19/2022: Mrs. Rodriguez reports feeling well. She states she missed some doses of cholesterol medicine and may have been eating more fatty foods recently. She has no other complaints.  Plan:   BLE Edema- Due to lymphedema. Lymphedema clinic on the waiting list starting early September. Venous ultrasound negative for reflux however she does have clinical features of venous insufficiency and this may be a false negative test.  Limit sodium intake to 2,000 mg daily.  Limit volume intake to 1.5 liters daily.  Elevate legs when resting. Compression stockings.  HTN- Continue current medications.  HLD-  while taking atorvastatin 80mg. Instructed to modify diet and increase exercise. Continue ASA 81 mg daily. Lipid panel prior to next visit.  Obesity- Encourage diet, exercise and weight loss.  HENOK- Continue CPAP nightly.    HPI:  Mrs. Rodriguez reports significant improvement in leg swelling since completing lymphedema clinic therapy.   on 12/13/2022 vs  133 on 2022.      Past Medical History:   Diagnosis Date    Bilateral knee pain     Carpal tunnel syndrome, bilateral     Fissure in skin of foot     Right small toe    HTN (hypertension)     Hyperlipidemia     Palpitations     Rotator cuff injury     right    Screening for colorectal cancer 10/20/2017    Screening for malignant neoplasm of colon 2021    Statin-induced myositis 2018     Past Surgical History:   Procedure Laterality Date    BILATERAL SALPINGOOPHORECTOMY  2000    BREAST BIOPSY Left 2010    malignant    BREAST BIOPSY Left 2008    negative    BREAST LUMPECTOMY Left 2010    CATARACT EXTRACTION W/  INTRAOCULAR LENS IMPLANT Right 2018    Dr. Oneil    CATARACT EXTRACTION W/  INTRAOCULAR LENS IMPLANT Left 2018    Dr. Oneil     SECTION      x2    COLONOSCOPY N/A 10/20/2017    Procedure: COLONOSCOPY;  Surgeon: Mitchell Danielson Jr., MD;  Location: [facility];  Service: Endoscopy;  Laterality: N/A;    COLONOSCOPY N/A 2021    Procedure: COLONOSCOPY/Suprep;  Surgeon: Malcolm Reyes MD;  Location: [facility];  Service: Endoscopy;  Laterality: N/A;    CYST REMOVAL      on back    ESOPHAGOGASTRODUODENOSCOPY N/A 2021    Procedure: EGD (ESOPHAGOGASTRODUODENOSCOPY);  Surgeon: Malcolm Reyes MD;  Location: [facility];  Service: Endoscopy;  Laterality: N/A;    HERNIA REPAIR      HYSTERECTOMY      at 25 yrs old    INTRAOCULAR PROSTHESES INSERTION Left 2018    Procedure: INSERTION, IOL PROSTHESIS;  Surgeon: Sergio Oneil MD;  Location: [facility] 1ST FLR;  Service: Ophthalmology;  Laterality: Left;    INTRAOCULAR PROSTHESES INSERTION Right 2018    Procedure: INSERTION, IOL PROSTHESIS;  Surgeon: Sergio Oneil MD;  Location: [facility] 2ND FLR;  Service: Ophthalmology;  Laterality: Right;    KNEE ARTHROPLASTY Right 3/31/2021    Procedure: ARTHROPLASTY, KNEE:RIGHT:DEPUY-SIGMA ;  Surgeon: Pedro Summers III, MD;  Location: [facility];  Service:  "Orthopedics;  Laterality: Right;    OOPHORECTOMY      @ 45 yrs old    PHACOEMULSIFICATION OF CATARACT Left 11/6/2018    Procedure: PHACOEMULSIFICATION, CATARACT;  Surgeon: Sergio Oneil MD;  Location: [facility] OR [address] [place] [address];  Service: Ophthalmology;  Laterality: Left;    PHACOEMULSIFICATION OF CATARACT Right 11/20/2018    Procedure: PHACOEMULSIFICATION, CATARACT;  Surgeon: Sergio Oneil MD;  Location: [facility] OR [address] [place] [address];  Service: Ophthalmology;  Laterality: Right;    supracervical abdominal hysterectomy  1978    fibroids     Social History     Socioeconomic History    Marital status: Single    Number of children: 2   Occupational History     Comment: retired   Tobacco Use    Smoking status: Never    Smokeless tobacco: Never   Substance and Sexual Activity    Alcohol use: Yes     Comment: once per month    Drug use: No    Sexual activity: Not Currently   Social History Narrative    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 hx of left lumpectomy for "breast cancer"- with 5yrs of tamoxifen use. Sees Dr. Buckley ([facility] for surveillance/MMG)        Dr. Lala former PCP, [facility]          Social Determinants of Health     Financial Resource Strain: Low Risk     Difficulty of Paying Living Expenses: Not hard at all   Food Insecurity: No Food Insecurity    Worried About Running Out of Food in the Last Year: Never true    Ran Out of Food in the Last Year: Never true   Transportation Needs: No Transportation Needs    Lack of Transportation (Medical): No    Lack of Transportation (Non-Medical): No   Physical Activity: Inactive    Days of Exercise per Week: 0 days    Minutes of Exercise per Session: 0 min   Stress: No Stress Concern Present    Feeling of Stress : Only a little   Social Connections: Moderately Integrated    Frequency of Communication with Friends and Family: More than three times a week    Frequency of Social Gatherings with " Friends and Family: More than three times a week    Attends Holiness Services: More than 4 times per year    Active Member of Clubs or Organizations: Yes    Attends Club or Organization Meetings: More than 4 times per year    Marital Status:    Housing Stability: Low Risk     Unable to Pay for Housing in the Last Year: No    Number of Places Lived in the Last Year: 1    Unstable Housing in the Last Year: No     Family History   Problem Relation Age of Onset    Hypertension Mother     Heart disease Mother     Diabetes Mother     Hyperlipidemia Mother     Pancreatitis Mother     Cataracts Mother     Macular degeneration Mother     Cancer Father     Breast cancer Paternal Cousin     Hypertension Sister     Thyroid disease Sister     Diabetes Brother     Heart disease Brother     Amblyopia Neg Hx     Blindness Neg Hx     Glaucoma Neg Hx     Strabismus Neg Hx     Retinal detachment Neg Hx        Review of patient's allergies indicates:   Allergen Reactions    Codeine Nausea And Vomiting       Medication List with Changes/Refills   Current Medications    ACETAMINOPHEN (TYLENOL) 650 MG TBSR    Take 1 tablet (650 mg total) by mouth every 8 (eight) hours as needed (pain).    AMOXICILLIN (AMOXIL) 500 MG CAPSULE    Take 500 mg by mouth as needed. Before dental work    ASPIRIN (ECOTRIN) 81 MG EC TABLET    Take 1 tablet (81 mg total) by mouth 2 (two) times daily.    ATORVASTATIN (LIPITOR) 80 MG TABLET    Take 1 tablet (80 mg total) by mouth once daily.    COENZYME Q10 100 MG CAPSULE    Take 100 mg by mouth every evening.    DOCUSATE SODIUM (COLACE) 100 MG CAPSULE    Take 1 capsule (100 mg total) by mouth 2 (two) times daily as needed for Constipation.    ERGOCALCIFEROL (ERGOCALCIFEROL) 50,000 UNIT CAP    TAKE 1 CAPSULE EVERY 7 DAYS    HYDROCHLOROTHIAZIDE (MICROZIDE) 12.5 MG CAPSULE    TAKE 1 CAPSULE EVERY EVENING    IRBESARTAN (AVAPRO) 150 MG TABLET    TAKE 1 TABLET(150 MG) BY MOUTH EVERY DAY    LORATADINE (CLARITIN) 10  "MG TABLET    Take 10 mg by mouth every evening.    METOPROLOL SUCCINATE (TOPROL-XL) 25 MG 24 HR TABLET    TAKE 1 TABLET EVERY EVENING       Review of Systems  Constitution: Denies chills, fever, and sweats.  HENT: Denies headaches or blurry vision.  Cardiovascular: Denies chest pain or irregular heart beat.  Respiratory: Denies cough or shortness of breath.  Gastrointestinal: Denies abdominal pain, nausea, or vomiting.  Musculoskeletal: Positive for leg swelling.  Neurological: Denies dizziness or focal weakness.  Psychiatric/Behavioral: Normal mental status.  Hematologic/Lymphatic: Denies bleeding problem or easy bruising/bleeding.  Skin: Denies rash or suspicious lesions    Physical Examination  /68   Pulse 84   Ht 4' 10" (1.473 m)   Wt 76.5 kg (168 lb 10.4 oz)   LMP  (LMP Unknown)   BMI 35.25 kg/m²     Constitutional: No acute distress, conversant  HEENT: Sclera anicteric, Pupils equal, round and reactive to light, extraocular motions intact, Oropharynx clear  Neck: No JVD, no carotid bruits  Cardiovascular: regular rate and rhythm, no murmur, rubs or gallops, normal S1/S2  Pulmonary: Clear to auscultation bilaterally  Abdominal: Abdomen soft, nontender, nondistended, positive bowel sounds  Extremities: BLE's with trace edema, venous stasis dermatitis, and changes consistent with lymphedema   Pulses:  Carotid pulses are 2+ on the right side, and 2+ on the left side.  Radial pulses are 2+ on the right side, and 2+ on the left side.   Femoral pulses are 2+ on the right side, and 2+ on the left side.  Popliteal pulses are 2+ on the right side, and 2+ on the left side.   Dorsalis pedis pulses are 2+ on the right side, and 2+ on the left side.   Posterior tibial pulses are 2+ on the right side, and 2+ on the left side.    Skin: No ecchymosis, erythema, or ulcers  Psych: Alert and oriented x 3, appropriate affect  Neuro: CNII-XII intact, no focal deficits    Labs:  Most Recent Data  CBC:   Lab Results "   Component Value Date    WBC 7.24 05/25/2022    HGB 12.6 05/25/2022    HCT 41.3 05/25/2022     05/25/2022    MCV 86 05/25/2022    RDW 15.1 (H) 05/25/2022     BMP:   Lab Results   Component Value Date     11/28/2022    K 3.8 11/28/2022     11/28/2022    CO2 25 11/28/2022    BUN 14 11/28/2022    CREATININE 0.7 11/28/2022    GLU 95 11/28/2022    CALCIUM 9.4 11/28/2022    PHOS 2.9 10/12/2017     LFTS;   Lab Results   Component Value Date    PROT 6.7 11/28/2022    ALBUMIN 3.8 11/28/2022    BILITOT 0.6 11/28/2022    AST 27 11/28/2022    ALKPHOS 95 11/28/2022    ALT 24 11/28/2022     COAGS:   Lab Results   Component Value Date    INR 0.9 03/23/2021     FLP:   Lab Results   Component Value Date    CHOL 165 12/13/2022    HDL 44 12/13/2022    LDLCALC 104.8 12/13/2022    TRIG 81 12/13/2022    CHOLHDL 26.7 12/13/2022     CARDIAC:   Lab Results   Component Value Date    BNP <10 05/29/2020     Imaging    RAMOS 4/26/22:  Normal rest and exercise RAMOS bilaterally.  Normal PVR waveforms bilaterally.    BLE Venous Ultrasound 4/26/22:  No evidence of lower extremity DVT or venous reflux bilaterally.    Assessment/Plan:  Chelsea Rodriguez is a 67 y.o. female with HTN, HLD, obesity, HENOK (on CPAP), who presents for a follow up.    1. BLE Edema- Due to lymphedema. Mrs. Rodriguez reports significant improvement in leg swelling since completing lymphedema clinic therapy.  Continue lymphedema clinic techniques at home.  Limit sodium intake to 2,000 mg daily.  Limit volume intake to 1.5 liters daily.  Elevate legs when resting. .    2. HTN- Continue current medications.    3. HLD-  on 12/13/2022 vs 133 on 7/13/2022.  Continue ASA 81 mg daily and atorvastatin 80 mg daily.  Consider adding zetia next visit if LDL <70.      4. Obesity- Encourage diet, exercise and weight loss.    5. HENOK- Continue CPAP nightly.    Follow up in 6 months    Total duration of face to face visit time 30 minutes.  Total time spent counseling greater  than fifty percent of total visit time.  Counseling included discussion regarding imaging findings, diagnosis, possibilities, treatment options, risks and benefits.  The patient had many questions regarding the options and long-term effects.    Marcell Rico MD, PhD  Interventional Cardiology

## 2022-12-21 NOTE — PROGRESS NOTES
Patient, Chelsea Rodriguez (MRN #[id number]), presented with a recorded BMI of 35.25 kg/m^2 and a documented comorbidity(s):  - Hypertension  - Hyperlipidemia  to which the severe obesity is a contributing factor. This is consistent with the definition of severe obesity (BMI 35.0-39.9) with comorbidity (ICD-10 E66.01, Z68.35). The patient's severe obesity was monitored, evaluated, addressed and/or treated. This addendum to the medical record is made on 12/21/2022.

## 2022-12-27 ENCOUNTER — CLINICAL SUPPORT (OUTPATIENT)
Dept: REHABILITATION | Facility: HOSPITAL | Age: 67
End: 2022-12-27
Payer: MEDICARE

## 2022-12-27 DIAGNOSIS — M62.89 PELVIC FLOOR DYSFUNCTION: Primary | ICD-10-CM

## 2022-12-27 DIAGNOSIS — R27.8 OTHER LACK OF COORDINATION: ICD-10-CM

## 2022-12-27 PROCEDURE — 97110 THERAPEUTIC EXERCISES: CPT | Mod: HCNC,PO

## 2022-12-27 PROCEDURE — 97140 MANUAL THERAPY 1/> REGIONS: CPT | Mod: HCNC,PO

## 2022-12-27 NOTE — PROGRESS NOTES
"    Pelvic Health Physical Therapy   Treatment Note     Name: Chelsea Rodriguez  Clinic Number: [id number]    Therapy Diagnosis:   Encounter Diagnoses   Name Primary?    Pelvic floor dysfunction Yes    Other lack of coordination      Physician: Isai Seymour MD    Visit Date: 2022    Physician Orders: PT Eval and Treat   Medical Diagnosis from Referral: pelvic floor dysfunction   Evaluation Date: 10/24/2022  Authorization Period Expiration: 2023  Plan of Care Expiration: 23  Progress Note Due: 22  Visit #/Visits authorized:    Cancelled Visits: 0  No Show Visits: 0  FOTO: Issued Vist #:  [id number]  12/27/22    Time In: 910 (late arrival)  Time Out: 1015  Total Billable Time: 65  minutes    Precautions: Standard    Subjective     Pt reports: "I think the cupping really worked because the next day I got the urge and had a nice bowel movement."  She reports she found her silicone cups also.      She was compliant with home exercise program.  Response to previous treatment: no issues reported  Functional change: no changes reported     Pain: 0/10  Location: NA    Constitutional Symptoms Review: The patient denies having any constitutional symptoms.     Objective   Pt verbally consents to intrarectal treatment today.  Signed consent form already on file.     Neuromuscular Re-education to develop Coordination, Control, and Down training for 00 minutes including:   pelvic floor relaxation/bulging training. Cues for belly big and belly hard to help lengthen the pelvic floor muscle(s).   Patient was instructed in and practice diaphragmatic breathing as a method to help control pain, decrease anxiety and help w/ sleep.         Manual Therapy to develop flexibility and extensibility for 45 minutes including:    Bowel massage performed to help with gut motility.  Scar mobilization to  scar in all planes.  STM to left lower quarant to help with increased muscular restriction and tightness  Cupping " performed today to abdominal wall tissues including scar tissue to help release fascial restrictions.          Not performed today:  Myofascial release to intrarectal puborectalis bilaterally       Therapeutic Exercise to develop  strength and endurance for 15 minutes including:   Posterior pelvic tilt 5 sec x 20 reps  Bridge with kegel x10 reps   Clamshells 5 sec x 10 reps bilaterally   Marching with abdominal muscle(s) bracing 5 sec x 10 reps bilaterally         Home Exercises Provided and Patient Education Provided     Education provided:   - anatomy/physiology of pelvic floor  Discussed progression of plan of care with patient; educated pt in activity modification; reviewed HEP with pt. Pt demonstrated and verbalized understanding of all instruction and was provided with a handout of HEP (see Patient Instructions).  -     Written Home Exercises Provided: .  Exercises were reviewed and Chelsea was able to demonstrate them prior to the end of the session.  Chelsea demonstrated good  understanding of the education provided.     See EMR under Patient Instructions for exercises provided 12/5/2022.    Assessment     Cupping performed today to abdominal wall tissues including scar tissue to help release fascial restrictions.  Also worked on scar mobilization and bowel massage today. Improved scar mobility noted with treatment today.  Continued strengthening program today.  Held intrarectal work today to focus on external soft tissue mobilization techniques.        Chelsea Is progressing well towards her goals.   Pt prognosis is Excellent.     Pt will continue to benefit from skilled outpatient physical therapy to address the deficits listed in the problem list box on initial evaluation, provide pt/family education and to maximize pt's level of independence in the home and community environment.     Pt's spiritual, cultural and educational needs considered and pt agreeable to plan of care and goals.     Anticipated barriers  to physical therapy: none reported    Goals:   Short Term Goals: 4 weeks   Pt to demonstrate a decrease in scar restrictions to no more than mild restrictions needed to help decrease pain with functional mobility.  Pt to demonstrate proper positioning on commode with breathing techniques to decrease strain with BM to enable pt to feel empty after BM.   Pt to demonstrate independence with performing bowel massage to help with gut motility.   Pt to be able to bulge pelvic floor which is needed for comfortable BM and complete evacuation.   Pt to demonstrate an improved score in the FOTO Bowel Constipation survey  to at least 44 to demonstrate improving bowel function with activities of daily living.             Long Term Goals: 12 weeks   Pt to be discharged with home plan for carry over after discharge.    Pt to report being able to have a BM without straining 80% of the time to demonstrate improving PF coordination.   Pt to report being able to have a spontaneous bowel movement at least once every 1-3 days to demonstrate improving gut motility and pelvic floor coordination.   Pt to demonstrate an improved score in the FOTO Bowel Constipation survey  to at least 48 to demonstrate improving bowel function with ADLs.      Plan     Plan of care Certification: 10/24/2022 to 1-16-23.     Outpatient Physical Therapy 2 times weekly for 12 weeks to include the following interventions: therapeutic exercises, therapeutic activity, neuromuscular re-education, gait training, manual therapy, modalities PRN, patient/family education, dry needling, and self care/home management    Eva Prince, PT

## 2023-01-03 ENCOUNTER — CLINICAL SUPPORT (OUTPATIENT)
Dept: REHABILITATION | Facility: HOSPITAL | Age: 68
End: 2023-01-03
Payer: MEDICARE

## 2023-01-03 DIAGNOSIS — R27.8 OTHER LACK OF COORDINATION: ICD-10-CM

## 2023-01-03 DIAGNOSIS — M62.89 PELVIC FLOOR DYSFUNCTION: Primary | ICD-10-CM

## 2023-01-03 PROCEDURE — 97112 NEUROMUSCULAR REEDUCATION: CPT | Mod: KX,HCNC,PO

## 2023-01-03 PROCEDURE — 97110 THERAPEUTIC EXERCISES: CPT | Mod: KX,HCNC,PO

## 2023-01-03 PROCEDURE — 97530 THERAPEUTIC ACTIVITIES: CPT | Mod: KX,HCNC,PO

## 2023-01-03 NOTE — PROGRESS NOTES
Pelvic Health Physical Therapy   Treatment Note     Name: Chelsea Rodriguez  Clinic Number: [id number]    Therapy Diagnosis:   Encounter Diagnoses   Name Primary?    Pelvic floor dysfunction Yes    Other lack of coordination      Physician: No ref. provider found    Visit Date: 1/3/2023    Physician Orders: PT Eval and Treat   Medical Diagnosis from Referral: pelvic floor dysfunction   Evaluation Date: 10/24/2022  Authorization Period Expiration: 2023  Plan of Care Expiration: 23  Progress Note Due: 22  Visit #/Visits authorized:    Cancelled Visits: 0  No Show Visits: 0  FOTO: Issued Vist #:  3/3  12/27/22    Time In: 905  Time Out: 1030   Total Billable Time: 85   minutes    Precautions: Standard    Subjective     Pt reports: Patient reports she got the urge that she needed to have a BM which she does not always get.  She was unable to have the BM though without assist.       She was compliant with home exercise program.  Response to previous treatment: no issues reported  Functional change: no changes reported     Pain: 0/10  Location: NA    Constitutional Symptoms Review: The patient denies having any constitutional symptoms.     Objective   Pt verbally consents to intrarectal treatment today.  Signed consent form already on file.     Neuromuscular Re-education to develop Coordination, Control, and Down training for 15 minutes including:   pelvic floor relaxation/bulging training. Cues for belly big and belly hard to help lengthen the pelvic floor muscle(s).           Manual Therapy to develop flexibility and extensibility for 00 minutes including:    Bowel massage performed to help with gut motility.  Scar mobilization to  scar in all planes.  STM to left lower quarant to help with increased muscular restriction and tightness  Cupping performed today to abdominal wall tissues including scar tissue to help release fascial restrictions.          Not performed today:  Myofascial release  to intrarectal puborectalis bilaterally       Therapeutic Exercise to develop  strength and endurance for 40 minutes including:   Posterior pelvic tilt 5 sec x 20 reps  Bridge with kegel 5x10 reps x 2 sets   Clamshells 5 sec x 10 reps bilaterally   Marching with abdominal muscle(s) bracing 5 sec x 10 reps bilaterally   Kegel edu and practice.  Increased time spent on edu on proper technique.  Pt improves with practice and verbal cues.      Therapeutic Activity to improve dynamic functional performance, coordination and education for 25 minutes. Including:    Edu on double voiding   Edu on positioning on commode with breathing technique to improve ability to relax pelvic floor and have BM with less need to strain.  Practiced technique with patient and made modifications in technique as needed.  Yassine picture of impact on positioning and breathing on colorectal angle for visual education.  Further edu patient by showing squatty potty video of impact of positioning on ability to have BM.       Home Exercises Provided and Patient Education Provided     Education provided:   - anatomy/physiology of pelvic floor  Discussed progression of plan of care with patient; educated pt in activity modification; reviewed HEP with pt. Pt demonstrated and verbalized understanding of all instruction and was provided with a handout of HEP (see Patient Instructions).  -     Written Home Exercises Provided: .  Exercises were reviewed and Chelsea was able to demonstrate them prior to the end of the session.  Chelsea demonstrated good  understanding of the education provided.     See EMR under Patient Instructions for exercises provided 12/5/2022.    Assessment     Improved soft tissue mobility noted of patient's posterior pelvic floor muscle(s) today.  Improved abilty to relax and lengthen when cued to push.  Initiated kegels edu today.  Continues hip and core strengthening.  Edu on double voiding   Edu on positioning on commode with breathing  technique to improve ability to relax pelvic floor and have BM with less need to strain.  Practiced technique with patient and made modifications in technique as needed.  Yassine picture of impact on positioning and breathing on colorectal angle for visual education.  Further edu patient by showing squatty potty video of impact of positioning on ability to have BM.       Chelsea Is progressing well towards her goals.   Pt prognosis is Excellent.     Pt will continue to benefit from skilled outpatient physical therapy to address the deficits listed in the problem list box on initial evaluation, provide pt/family education and to maximize pt's level of independence in the home and community environment.     Pt's spiritual, cultural and educational needs considered and pt agreeable to plan of care and goals.     Anticipated barriers to physical therapy: none reported    Goals:   Short Term Goals: 4 weeks   Pt to demonstrate a decrease in scar restrictions to no more than mild restrictions needed to help decrease pain with functional mobility.  Pt to demonstrate proper positioning on commode with breathing techniques to decrease strain with BM to enable pt to feel empty after BM.   Pt to demonstrate independence with performing bowel massage to help with gut motility.   Pt to be able to bulge pelvic floor which is needed for comfortable BM and complete evacuation.   Pt to demonstrate an improved score in the FOTO Bowel Constipation survey  to at least 44 to demonstrate improving bowel function with activities of daily living.             Long Term Goals: 12 weeks   Pt to be discharged with home plan for carry over after discharge.    Pt to report being able to have a BM without straining 80% of the time to demonstrate improving PF coordination.   Pt to report being able to have a spontaneous bowel movement at least once every 1-3 days to demonstrate improving gut motility and pelvic floor coordination.   Pt to demonstrate an  improved score in the FOTO Bowel Constipation survey  to at least 48 to demonstrate improving bowel function with ADLs.      Plan     Plan of care Certification: 10/24/2022 to 1-16-23.     Outpatient Physical Therapy 2 times weekly for 12 weeks to include the following interventions: therapeutic exercises, therapeutic activity, neuromuscular re-education, gait training, manual therapy, modalities PRN, patient/family education, dry needling, and self care/home management    Eva Prince, PT

## 2023-01-03 NOTE — PATIENT INSTRUCTIONS
Home Exercise Program: 01/03/2023    Kegels: Perform kegels on the exhale       Endurance Holds  Perform a long kegel (contract and LIFT the pelvic floor muscles as if you're trying to stop the stream of urine and passage of gas).    Make sure you're just using the internal muscles without holding your breath.  Let go and relax everything for 10 seconds.   Hold 5 seconds. Repeat 10 times, 2 sets per day.  (Goal is 10 seconds x10 reps)         DOUBLE VOIDING - Double voiding is a technique that may assist the bladder to empty more effectively when  urine is left in the bladder. It involves passing  urine more than once each time that you go to  the toilet. This makes sure that the bladder is  completely empty.    Here are 3 strategies you can try to fully empty your bladder.  You do not have to do all of these things every single time. Find which ones work best for you.     Check to make sure your pelvic floor is relaxed   Do a body scan - make sure your legs, buttocks, and abdominals are relaxed.  Take a couple deep, slow breaths to encourage your pelvic floor muscles to DROP (ie. Try Diaphragmatic Breathing).  Gently apply pressure over your bladder.  Change your pelvic position: lean forward, rock your pelvis forward and backward, stand up then sit back down.     Wait at least 30 seconds to 1 minute to see if a second urine stream begins.     Do not stop your urine stream or push/strain!      Home Exercise Program: 01/03/2023    Bowel Movement Mechanics  1. Sit on the toilet comfortably with legs and buttocks relaxed.  2. Put your feet on a step stool or squatty potty (8 inches tall).  3. Lean forward while keeping your back straight.  4. Keep your knees apart.  5. Exhale like you are blowing out birthday candles while you gently bear down.       Do not strain and Do not hold your breath.

## 2023-01-05 ENCOUNTER — TELEPHONE (OUTPATIENT)
Dept: ORTHOPEDICS | Facility: CLINIC | Age: 68
End: 2023-01-05
Payer: MEDICARE

## 2023-01-06 ENCOUNTER — HOSPITAL ENCOUNTER (OUTPATIENT)
Dept: RADIOLOGY | Facility: HOSPITAL | Age: 68
Discharge: HOME | End: 2023-01-06
Attending: PHYSICIAN ASSISTANT
Payer: MEDICARE

## 2023-01-06 ENCOUNTER — OFFICE VISIT (OUTPATIENT)
Dept: ORTHOPEDICS | Facility: CLINIC | Age: 68
End: 2023-01-06
Payer: MEDICARE

## 2023-01-06 DIAGNOSIS — M79.642 BILATERAL HAND PAIN: ICD-10-CM

## 2023-01-06 DIAGNOSIS — G56.03 BILATERAL CARPAL TUNNEL SYNDROME: ICD-10-CM

## 2023-01-06 DIAGNOSIS — M79.641 BILATERAL HAND PAIN: ICD-10-CM

## 2023-01-06 PROCEDURE — 73130 X-RAY EXAM OF HAND: CPT | Mod: 26,50,HCNC, | Performed by: RADIOLOGY

## 2023-01-06 PROCEDURE — 73130 X-RAY EXAM OF HAND: CPT | Mod: TC,50,HCNC,PO

## 2023-01-06 PROCEDURE — 73130 XR HAND COMPLETE 3 VIEWS BILATERAL: ICD-10-PCS | Mod: 26,50,HCNC, | Performed by: RADIOLOGY

## 2023-01-06 PROCEDURE — 99999 PR PBB SHADOW E&M-EST. PATIENT-LVL II: CPT | Mod: PBBFAC,HCNC,, | Performed by: PHYSICIAN ASSISTANT

## 2023-01-06 PROCEDURE — 3288F FALL RISK ASSESSMENT DOCD: CPT | Mod: HCNC,CPTII,S$GLB, | Performed by: PHYSICIAN ASSISTANT

## 2023-01-06 PROCEDURE — 99999 PR PBB SHADOW E&M-EST. PATIENT-LVL II: ICD-10-PCS | Mod: PBBFAC,HCNC,, | Performed by: PHYSICIAN ASSISTANT

## 2023-01-06 PROCEDURE — 1101F PR PT FALLS ASSESS DOC 0-1 FALLS W/OUT INJ PAST YR: ICD-10-PCS | Mod: HCNC,CPTII,S$GLB, | Performed by: PHYSICIAN ASSISTANT

## 2023-01-06 PROCEDURE — 1159F MED LIST DOCD IN RCRD: CPT | Mod: HCNC,CPTII,S$GLB, | Performed by: PHYSICIAN ASSISTANT

## 2023-01-06 PROCEDURE — 1157F PR ADVANCE CARE PLAN OR EQUIV PRESENT IN MEDICAL RECORD: ICD-10-PCS | Mod: HCNC,CPTII,S$GLB, | Performed by: PHYSICIAN ASSISTANT

## 2023-01-06 PROCEDURE — 1159F PR MEDICATION LIST DOCUMENTED IN MEDICAL RECORD: ICD-10-PCS | Mod: HCNC,CPTII,S$GLB, | Performed by: PHYSICIAN ASSISTANT

## 2023-01-06 PROCEDURE — 3288F PR FALLS RISK ASSESSMENT DOCUMENTED: ICD-10-PCS | Mod: HCNC,CPTII,S$GLB, | Performed by: PHYSICIAN ASSISTANT

## 2023-01-06 PROCEDURE — 99214 OFFICE O/P EST MOD 30 MIN: CPT | Mod: HCNC,S$GLB,, | Performed by: PHYSICIAN ASSISTANT

## 2023-01-06 PROCEDURE — 1101F PT FALLS ASSESS-DOCD LE1/YR: CPT | Mod: HCNC,CPTII,S$GLB, | Performed by: PHYSICIAN ASSISTANT

## 2023-01-06 PROCEDURE — 1157F ADVNC CARE PLAN IN RCRD: CPT | Mod: HCNC,CPTII,S$GLB, | Performed by: PHYSICIAN ASSISTANT

## 2023-01-06 PROCEDURE — 99214 PR OFFICE/OUTPT VISIT, EST, LEVL IV, 30-39 MIN: ICD-10-PCS | Mod: HCNC,S$GLB,, | Performed by: PHYSICIAN ASSISTANT

## 2023-01-06 NOTE — PROGRESS NOTES
[facility]  History & Physical  Orthopedics    SUBJECTIVE:      Chief Complaint: Bilateral hand numbness    Referring Provider: Eliza Alva MD Dr. Dunbar is the supervising physician for this encounter/patient    History of Present Illness:  Patient is a 67 y.o. right hand dominant female who presents today with complaints of bilateral hand numbness, right greater than left, present for about 30+ years but has become more progressive for the past year. Numbness noted at night and during the day, she has difficulty feeling temperature and has burned herself before, fine motor skills are effected. She was treated by Dr. Quarles in 2018 for bilateral carpal tunnel steroid injections which were not helpful. She has tried NSAIDs and carpal tunnel braces as well. No surgical history on the hands.     Onset of symptoms/DOI was 30+ years, progressive for 1 year.    Symptoms are aggravated by activity, at night, and during the day.    Symptoms are alleviated by rest.    Symptoms consist of numbness/tingling.    The patient rates their pain as a 3/10.    Attempted treatment(s) and/or interventions include activity modifications, rest, immobilization and steroid injection.     The patient denies any fevers, chills, N/V, D/C and presents for evaluation.       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 Oneil    CATARACT EXTRACTION W/  INTRAOCULAR LENS IMPLANT Left 2018    Dr. Oneil     SECTION      x2    COLONOSCOPY N/A 10/20/2017    Procedure: COLONOSCOPY;  Surgeon: Mitchell Danielson Jr., MD;  Location: [facility] ENDO;  Service: Endoscopy;  Laterality: N/A;    COLONOSCOPY N/A 2021    Procedure: COLONOSCOPY/Suprep;  Surgeon: Malcolm Reyes MD;  Location: [facility] ENDO;  Service: Endoscopy;  Laterality: N/A;    CYST REMOVAL      on back    ESOPHAGOGASTRODUODENOSCOPY N/A 2021    Procedure: EGD (ESOPHAGOGASTRODUODENOSCOPY);  Surgeon: Malcolm Reyes MD;  Location: [facility];  Service: Endoscopy;  Laterality: N/A;    HERNIA REPAIR      HYSTERECTOMY      at 25 yrs old    INTRAOCULAR PROSTHESES INSERTION Left 2018    Procedure: INSERTION, IOL PROSTHESIS;  Surgeon: Sergio Oneil MD;  Location: [facility] OR 1ST FLR;  Service: Ophthalmology;  Laterality: Left;    INTRAOCULAR PROSTHESES INSERTION Right 2018    Procedure: INSERTION, IOL PROSTHESIS;  Surgeon: Sergio Oneil MD;  Location: [facility] OR 2ND FLR;  Service: Ophthalmology;  Laterality: Right;    KNEE ARTHROPLASTY Right 3/31/2021    Procedure: ARTHROPLASTY, KNEE:RIGHT:DEPUY-SIGMA ;  Surgeon: Pedro Summers III, MD;  Location: [facility];  Service: Orthopedics;  Laterality: Right;    OOPHORECTOMY      @ 45 yrs old    PHACOEMULSIFICATION OF CATARACT Left 2018    Procedure: PHACOEMULSIFICATION, CATARACT;  Surgeon: Sergio Oneil MD;  Location: [facility] OR 1ST FLR;  Service: Ophthalmology;  Laterality: Left;    PHACOEMULSIFICATION OF CATARACT Right 2018    Procedure: PHACOEMULSIFICATION, CATARACT;  Surgeon: Sergio Oneil MD;  Location: [facility] OR 2ND FLR;  Service: Ophthalmology;  Laterality: Right;    supracervical abdominal hysterectomy  1978    fibroids     Review of patient's allergies indicates:   Allergen Reactions    Codeine Nausea And Vomiting     Social History     Social  "History Narrative    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 hx of left lumpectomy for "breast cancer"- with 5yrs of tamoxifen use. Sees Dr. Buckley ([facility] for surveillance/MMG)        Dr. Lala former PCP, [facility]          Family History   Problem Relation Age of Onset    Hypertension Mother     Heart disease Mother     Diabetes Mother     Hyperlipidemia Mother     Pancreatitis Mother     Cataracts Mother     Macular degeneration Mother     Cancer Father     Breast cancer Paternal Cousin     Hypertension Sister     Thyroid disease Sister     Diabetes Brother     Heart disease Brother     Amblyopia Neg Hx     Blindness Neg Hx     Glaucoma Neg Hx     Strabismus Neg Hx     Retinal detachment Neg Hx          Current Outpatient Medications:     acetaminophen (TYLENOL) 650 MG TbSR, Take 1 tablet (650 mg total) by mouth every 8 (eight) hours as needed (pain)., Disp: 120 tablet, Rfl: 0    amoxicillin (AMOXIL) 500 MG capsule, Take 500 mg by mouth as needed. Before dental work, Disp: , Rfl:     aspirin (ECOTRIN) 81 MG EC tablet, Take 1 tablet (81 mg total) by mouth 2 (two) times daily. (Patient taking differently: Take 81 mg by mouth once daily.), Disp: 60 tablet, Rfl: 0    atorvastatin (LIPITOR) 80 MG tablet, Take 1 tablet (80 mg total) by mouth once daily., Disp: 90 tablet, Rfl: 3    coenzyme Q10 100 mg capsule, Take 100 mg by mouth every evening., Disp: , Rfl:     docusate sodium (COLACE) 100 MG capsule, Take 1 capsule (100 mg total) by mouth 2 (two) times daily as needed for Constipation., Disp: 60 capsule, Rfl: 0    ergocalciferol (ERGOCALCIFEROL) 50,000 unit Cap, TAKE 1 CAPSULE EVERY 7 DAYS, Disp: 12 capsule, Rfl: 0    hydroCHLOROthiazide (MICROZIDE) 12.5 mg capsule, TAKE 1 CAPSULE EVERY EVENING, Disp: 90 capsule, Rfl: 0    irbesartan (AVAPRO) 150 MG tablet, TAKE 1 TABLET(150 MG) BY MOUTH EVERY DAY, Disp: 90 tablet, Rfl: 2    loratadine " (CLARITIN) 10 mg tablet, Take 10 mg by mouth every evening., Disp: , Rfl:     metoprolol succinate (TOPROL-XL) 25 MG 24 hr tablet, TAKE 1 TABLET EVERY EVENING, Disp: 90 tablet, Rfl: 3      Review of Systems:  Constitutional: no fever or chills  Eyes: no visual changes  ENT: no nasal congestion or sore throat  Respiratory: no cough or shortness of breath  Cardiovascular: no chest pain  Gastrointestinal: no nausea or vomiting, tolerating diet  Musculoskeletal: pain, soreness, and numbness/tingling    OBJECTIVE:      Vital Signs (Most Recent):  There were no vitals filed for this visit.  There is no height or weight on file to calculate BMI.      Physical Exam:  Constitutional: The patient appears well-developed and well-nourished. No distress.   Skin: No lesions appreciated  Head: Normocephalic and atraumatic.   Nose: Nose normal.   Ears: No deformities seen  Eyes: Conjunctivae and EOM are normal.   Neck: No tracheal deviation present.   Cardiovascular: Normal rate and intact distal pulses.    Pulmonary/Chest: Effort normal. No respiratory distress.   Abdominal: There is no guarding.   Neurological: The patient is alert.   Psychiatric: The patient has a normal mood and affect.     Bilateral Hand/Wrist Examination:    Observation/Inspection:  Swelling  none    Deformity  none  Discoloration  none     Scars   none    Atrophy  Bilateral thenar    HAND/WRIST EXAMINATION:  Finkelstein's Test   Neg  WHAT Test    Neg  Snuff box tenderness   Neg  Garcia's Test    Neg  Hook of Hamate Tenderness  Neg  CMC grind    Neg  Circumduction test   Neg    Neurovascular Exam:  Digits WWP, brisk CR < 3s throughout  NVI motor/LTS to M/R/U nerves, radial pulse 2+  Tinel's Test - Carpal Tunnel  Pos bilateral  Tinel's Test - Cubital Tunnel  Neg  Phalen's Test    Pos bilateral  Median Nerve Compression Test Pos bilateral    ROM hand full, painless    ROM wrist full, painless    ROM elbow full, painless    Abdomen not guarded  Respirations  nonlabored  Perfusion intact    Diagnostic Results:     Imaging - I independently viewed the patient's imaging as well as the radiology report.      FINDINGS:  Right hand:     No acute fractures.  Preserved bone density.  Some stable osteoarthritic spurring about the radiocarpal and 1st carpal metacarpal articulations.  Some osteoarthritic spurring also noted at the thumb IP articulation.  In association with some osteoarthritic spurring involving the 5th finger IP articulations, there is minimal ulnar angulation of the middle phalanx with respect to the proximal phalanx and minimal radial angulation of the distal phalanx with respect to the middle phalanx.  Elsewhere, preserved joint spaces.     Left hand:     No acute fractures.  Preserved bone density.  Some stable osteoarthritic spurring about the radiocarpal and 1st carpal metacarpal articulation.  Some osteoarthritic spurring about the thumb IP articulation less so a few of the finger D IP articulations.  Elsewhere, preserved joint spaces.     Impression:     As above    EMG - none    ASSESSMENT/PLAN:      67 y.o. yo female with Bilateral carpal tunnel syndrome    Plan: The patient and I had a thorough discussion today.  We discussed the working diagnosis as well as several other potential alternative diagnoses.  Treatment options were discussed, both conservative and surgical.  Conservative treatment options would include things such as activity modifications, workplace modifications, a period of rest, oral vs topical OTC and prescription anti-inflammatory medications, occupational therapy, splinting/bracing, immobilization, corticosteroid injections, and others.  Surgical options were discussed as well.     At this time, the patient would like to proceed with a trial of EMG for further evaluation. We discuss the natural course of the diagnosis, she thinks surgery would be her best option. We will follow up after EMG for results and treatment plan.    Should  the patient's symptoms worsen, persist, or fail to improve they should return for reevaluation and I would be happy to see them back anytime.           Please do not hesitate to reach out to us via email, phone, or MyChart with any questions, concerns, or feedback.

## 2023-01-09 ENCOUNTER — CLINICAL SUPPORT (OUTPATIENT)
Dept: REHABILITATION | Facility: HOSPITAL | Age: 68
End: 2023-01-09
Payer: MEDICARE

## 2023-01-09 DIAGNOSIS — R53.1 WEAKNESS: ICD-10-CM

## 2023-01-09 DIAGNOSIS — M62.89 PELVIC FLOOR DYSFUNCTION: Primary | ICD-10-CM

## 2023-01-09 PROCEDURE — 97110 THERAPEUTIC EXERCISES: CPT | Mod: HCNC,PO

## 2023-01-09 NOTE — PROGRESS NOTES
Pelvic Health Physical Therapy   Treatment Note     Name: Chelsea Rodriguez  Clinic Number: [id number]    Therapy Diagnosis:   Encounter Diagnoses   Name Primary?    Weakness     Pelvic floor dysfunction Yes       Physician: Isai Seymour MD    Visit Date: 2023    Physician Orders: PT Eval and Treat   Medical Diagnosis from Referral: pelvic floor dysfunction   Evaluation Date: 10/24/2022  Authorization Period Expiration: 2023  Plan of Care Expiration: 23  Progress Note Due: 22  Visit #/Visits authorized:    Cancelled Visits: 0  No Show Visits: 0  FOTO: Issued Vist #:  3/3  12/27/22    Time In: 905  Time Out: 1000  Total Billable Time: 55 minutes    Precautions: Standard    Subjective     Pt reports: She reports she has been using a step stool when sitting on the toilet and has had an easier time of having BM.  She reports she had the urge twice to have a BM which is normally unusual for her.       She was compliant with home exercise program.  Response to previous treatment: no issues reported  Functional change: no changes reported     Pain: 0/10  Location: NA    Constitutional Symptoms Review: The patient denies having any constitutional symptoms.     Objective   Pt verbally consents to intrarectal treatment today.  Signed consent form already on file.     Neuromuscular Re-education to develop Coordination, Control, and Down training for 15 minutes including:   pelvic floor relaxation/bulging training. Cues for belly big and belly hard to help lengthen the pelvic floor muscle(s).           Manual Therapy to develop flexibility and extensibility for 00 minutes including:    Bowel massage performed to help with gut motility.  Scar mobilization to  scar in all planes.  STM to left lower quarant to help with increased muscular restriction and tightness  Cupping performed today to abdominal wall tissues including scar tissue to help release fascial restrictions.          Not  performed today:  Myofascial release to intrarectal puborectalis bilaterally       Therapeutic Exercise to develop  strength and endurance for 55 minutes including:   Posterior pelvic tilt 5 sec x 20 reps  Bridge with kegel 5x10 reps x 2 sets   Clamshells 5 sec x 10 reps bilaterally   Marching with abdominal muscle(s) bracing 5 sec x 10 reps bilaterally x 2 sets   Bent knee fall out with kegel 5 sec x 10 reps bilaterally x 2 sets  Kegel edu and practice.  Increased time spent on edu on proper technique.  Pt improves with practice and verbal cues.      Therapeutic Activity to improve dynamic functional performance, coordination and education for 25 minutes. Including:    Edu on double voiding   Edu on positioning on commode with breathing technique to improve ability to relax pelvic floor and have BM with less need to strain.  Practiced technique with patient and made modifications in technique as needed.  Yassine picture of impact on positioning and breathing on colorectal angle for visual education.  Further edu patient by showing squatty potty video of impact of positioning on ability to have BM.       Home Exercises Provided and Patient Education Provided     Education provided:   - anatomy/physiology of pelvic floor  Discussed progression of plan of care with patient; educated pt in activity modification; reviewed HEP with pt. Pt demonstrated and verbalized understanding of all instruction and was provided with a handout of HEP (see Patient Instructions).  -     Written Home Exercises Provided: .  Exercises were reviewed and Chelsea was able to demonstrate them prior to the end of the session.  Chelsea demonstrated good  understanding of the education provided.     See EMR under Patient Instructions for exercises provided 12/5/2022.    Assessment   Focused on strengthening of hips, core and pelvic floor muscle(s) today.  Progressed exercises and resistance as able. Patient is making excellent progress towards improving  pelvic floor muscle(s) control and function with exercises.         Chelsea Is progressing well towards her goals.   Pt prognosis is Excellent.     Pt will continue to benefit from skilled outpatient physical therapy to address the deficits listed in the problem list box on initial evaluation, provide pt/family education and to maximize pt's level of independence in the home and community environment.     Pt's spiritual, cultural and educational needs considered and pt agreeable to plan of care and goals.     Anticipated barriers to physical therapy: none reported    Goals:   Short Term Goals: 4 weeks   Pt to demonstrate a decrease in scar restrictions to no more than mild restrictions needed to help decrease pain with functional mobility.  Pt to demonstrate proper positioning on commode with breathing techniques to decrease strain with BM to enable pt to feel empty after BM.   Pt to demonstrate independence with performing bowel massage to help with gut motility.   Pt to be able to bulge pelvic floor which is needed for comfortable BM and complete evacuation.   Pt to demonstrate an improved score in the FOTO Bowel Constipation survey  to at least 44 to demonstrate improving bowel function with activities of daily living.             Long Term Goals: 12 weeks   Pt to be discharged with home plan for carry over after discharge.    Pt to report being able to have a BM without straining 80% of the time to demonstrate improving PF coordination.   Pt to report being able to have a spontaneous bowel movement at least once every 1-3 days to demonstrate improving gut motility and pelvic floor coordination.   Pt to demonstrate an improved score in the FOTO Bowel Constipation survey  to at least 48 to demonstrate improving bowel function with ADLs.      Plan     Plan of care Certification: 10/24/2022 to 1-16-23.     Outpatient Physical Therapy 2 times weekly for 12 weeks to include the following interventions: therapeutic  exercises, therapeutic activity, neuromuscular re-education, gait training, manual therapy, modalities PRN, patient/family education, dry needling, and self care/home management    Eva Prince, PT

## 2023-01-13 ENCOUNTER — TELEPHONE (OUTPATIENT)
Dept: ORTHOPEDICS | Facility: CLINIC | Age: 68
End: 2023-01-13
Payer: MEDICARE

## 2023-01-13 DIAGNOSIS — G56.03 BILATERAL CARPAL TUNNEL SYNDROME: Primary | ICD-10-CM

## 2023-01-13 NOTE — TELEPHONE ENCOUNTER
----- Message from Ninfa Interiano sent at 1/13/2023 10:27 AM CST -----  Regarding: Test  Contact: Pt @ [phone]  Pt is calling because  ordered test for her hand, and still have not heard from office. Asking for a call back

## 2023-01-13 NOTE — TELEPHONE ENCOUNTER
Called and spoke with the pt. Informed pt that Neurology department will be contacting her ASAP to schedule her appointment for her EMG for bilateral CTS. Pt verbalized understanding and was thankful.

## 2023-01-17 ENCOUNTER — CLINICAL SUPPORT (OUTPATIENT)
Dept: REHABILITATION | Facility: HOSPITAL | Age: 68
End: 2023-01-17
Payer: MEDICARE

## 2023-01-17 DIAGNOSIS — R53.1 WEAKNESS: Primary | ICD-10-CM

## 2023-01-17 PROCEDURE — 97110 THERAPEUTIC EXERCISES: CPT | Mod: HCNC,PO

## 2023-01-17 NOTE — PROGRESS NOTES
Pelvic Health Physical Therapy   Treatment Note     Name: Chelsea Rodriguez  Clinic Number: [id number]    Therapy Diagnosis:   Encounter Diagnosis   Name Primary?    Weakness Yes       Physician: Isai Seymour MD    Visit Date: 2023    Physician Orders: PT Eval and Treat   Medical Diagnosis from Referral: pelvic floor dysfunction   Evaluation Date: 10/24/2022  Authorization Period Expiration: 2023  Plan of Care Expiration: 23  Progress Note Due: 22  Visit #/Visits authorized:    Cancelled Visits: 0  No Show Visits: 0  FOTO: Issued Vist #:  3/3  1/17/23    Time In: 907  Time Out: 1000  Total Billable Time: 53 minutes    Precautions: Standard    Subjective     Pt reports: She reports when she drinks water she notices her bowel movements are better.  She reports she has not had much of an urge to have a BM this past week.       She was compliant with home exercise program.  Response to previous treatment: no issues reported  Functional change: no changes reported     Pain: 0/10  Location: NA    Constitutional Symptoms Review: The patient denies having any constitutional symptoms.     Objective   Pt verbally consents to intrarectal treatment today.  Signed consent form already on file.     Neuromuscular Re-education to develop Coordination, Control, and Down training for 15 minutes including:   pelvic floor relaxation/bulging training. Cues for belly big and belly hard to help lengthen the pelvic floor muscle(s).           Manual Therapy to develop flexibility and extensibility for 00 minutes including:    Bowel massage performed to help with gut motility.  Scar mobilization to  scar in all planes.  STM to left lower quarant to help with increased muscular restriction and tightness  Cupping performed today to abdominal wall tissues including scar tissue to help release fascial restrictions.      Therapeutic Exercise to develop  strength and endurance for 53 minutes including:    Posterior pelvic tilt 5 sec x 20 reps  Bridge with kegel 5x10 reps x 2 sets   Clamshells 5 sec x 10 reps bilaterally with green theraband   Reverse clamshells green theraband 5 sec x 10 reps   Marching with abdominal muscle(s) bracing 5 sec x 10 reps bilaterally x 2 sets with green theraband   Bent knee fall out with kegel 5 sec x 10 reps bilaterally x 2 sets with green theraband   Standing hip 3-way with kegel x 10 reps each       Therapeutic Activity to improve dynamic functional performance, coordination and education for 25 minutes. Including:    Edu on double voiding   Edu on positioning on commode with breathing technique to improve ability to relax pelvic floor and have BM with less need to strain.  Practiced technique with patient and made modifications in technique as needed.  Yassine picture of impact on positioning and breathing on colorectal angle for visual education.  Further edu patient by showing squatty potty video of impact of positioning on ability to have BM.       Home Exercises Provided and Patient Education Provided     Education provided:   - anatomy/physiology of pelvic floor  Discussed progression of plan of care with patient; educated pt in activity modification; reviewed HEP with pt. Pt demonstrated and verbalized understanding of all instruction and was provided with a handout of HEP (see Patient Instructions).  -     Written Home Exercises Provided: .  Exercises were reviewed and Chelsea was able to demonstrate them prior to the end of the session.  Chelsea demonstrated good  understanding of the education provided.     See EMR under Patient Instructions for exercises provided 12/5/2022.    Assessment   Focused on strengthening of hips, core and pelvic floor muscle(s) today.  Progressed exercises and resistance as able by adding resistance bands. Patient is making excellent progress towards improving pelvic floor muscle(s) control and function with exercises.         Chelsea Is progressing  well towards her goals.   Pt prognosis is Excellent.     Pt will continue to benefit from skilled outpatient physical therapy to address the deficits listed in the problem list box on initial evaluation, provide pt/family education and to maximize pt's level of independence in the home and community environment.     Pt's spiritual, cultural and educational needs considered and pt agreeable to plan of care and goals.     Anticipated barriers to physical therapy: none reported    Goals:   Short Term Goals: 4 weeks   Pt to demonstrate a decrease in scar restrictions to no more than mild restrictions needed to help decrease pain with functional mobility. MET  Pt to demonstrate proper positioning on commode with breathing techniques to decrease strain with BM to enable pt to feel empty after BM. MET  Pt to demonstrate independence with performing bowel massage to help with gut motility. MET  Pt to be able to bulge pelvic floor which is needed for comfortable BM and complete evacuation. MET  Pt to demonstrate an improved score in the FOTO Bowel Constipation survey  to at least 44 to demonstrate improving bowel function with activities of daily living.             Long Term Goals: 12 weeks   Pt to be discharged with home plan for carry over after discharge.    Pt to report being able to have a BM without straining 80% of the time to demonstrate improving PF coordination.   Pt to report being able to have a spontaneous bowel movement at least once every 1-3 days to demonstrate improving gut motility and pelvic floor coordination.   Pt to demonstrate an improved score in the FOTO Bowel Constipation survey  to at least 48 to demonstrate improving bowel function with ADLs.      Plan     Plan of care Certification: 1-17-23 to 4-11-23     Outpatient Physical Therapy 2 times weekly for 12 weeks to include the following interventions: therapeutic exercises, therapeutic activity, neuromuscular re-education, gait training, manual  therapy, modalities PRN, patient/family education, dry needling, and self care/home management    Eva Prince, PT

## 2023-01-22 ENCOUNTER — HOSPITAL ENCOUNTER (EMERGENCY)
Facility: HOSPITAL | Age: 68
Discharge: HOME | End: 2023-01-23
Attending: EMERGENCY MEDICINE
Payer: MEDICARE

## 2023-01-22 DIAGNOSIS — M54.9 BACK PAIN: Primary | ICD-10-CM

## 2023-01-22 LAB
BASOPHILS # BLD AUTO: 0.03 K/UL (ref 0–0.2)
BASOPHILS NFR BLD: 0.4 % (ref 0–1.9)
DIFFERENTIAL METHOD: ABNORMAL
EOSINOPHIL # BLD AUTO: 0.1 K/UL (ref 0–0.5)
EOSINOPHIL NFR BLD: 1.1 % (ref 0–8)
ERYTHROCYTE [DISTWIDTH] IN BLOOD BY AUTOMATED COUNT: 15.5 % (ref 11.5–14.5)
HCT VFR BLD AUTO: 39.2 % (ref 37–48.5)
HGB BLD-MCNC: 12.3 G/DL (ref 12–16)
IMM GRANULOCYTES # BLD AUTO: 0.03 K/UL (ref 0–0.04)
IMM GRANULOCYTES NFR BLD AUTO: 0.4 % (ref 0–0.5)
LYMPHOCYTES # BLD AUTO: 1.4 K/UL (ref 1–4.8)
LYMPHOCYTES NFR BLD: 17.1 % (ref 18–48)
MCH RBC QN AUTO: 26.9 PG (ref 27–31)
MCHC RBC AUTO-ENTMCNC: 31.4 G/DL (ref 32–36)
MCV RBC AUTO: 86 FL (ref 82–98)
MONOCYTES # BLD AUTO: 0.5 K/UL (ref 0.3–1)
MONOCYTES NFR BLD: 5.8 % (ref 4–15)
NEUTROPHILS # BLD AUTO: 6.1 K/UL (ref 1.8–7.7)
NEUTROPHILS NFR BLD: 75.2 % (ref 38–73)
NRBC BLD-RTO: 0 /100 WBC
PLATELET # BLD AUTO: 269 K/UL (ref 150–450)
PMV BLD AUTO: 9.9 FL (ref 9.2–12.9)
RBC # BLD AUTO: 4.58 M/UL (ref 4–5.4)
WBC # BLD AUTO: 8.13 K/UL (ref 3.9–12.7)

## 2023-01-22 PROCEDURE — 93010 ELECTROCARDIOGRAM REPORT: CPT | Mod: HCNC,,, | Performed by: INTERNAL MEDICINE

## 2023-01-22 PROCEDURE — 96374 THER/PROPH/DIAG INJ IV PUSH: CPT | Mod: HCNC

## 2023-01-22 PROCEDURE — 93010 EKG 12-LEAD: ICD-10-PCS | Mod: HCNC,,, | Performed by: INTERNAL MEDICINE

## 2023-01-22 PROCEDURE — 63600175 PHARM REV CODE 636 W HCPCS: Mod: HCNC | Performed by: EMERGENCY MEDICINE

## 2023-01-22 PROCEDURE — 93005 ELECTROCARDIOGRAM TRACING: CPT | Mod: HCNC

## 2023-01-22 PROCEDURE — 25000003 PHARM REV CODE 250: Mod: HCNC | Performed by: EMERGENCY MEDICINE

## 2023-01-22 PROCEDURE — 99285 EMERGENCY DEPT VISIT HI MDM: CPT | Mod: 25,HCNC

## 2023-01-22 PROCEDURE — 83880 ASSAY OF NATRIURETIC PEPTIDE: CPT | Mod: HCNC | Performed by: EMERGENCY MEDICINE

## 2023-01-22 PROCEDURE — 84484 ASSAY OF TROPONIN QUANT: CPT | Mod: HCNC | Performed by: EMERGENCY MEDICINE

## 2023-01-22 PROCEDURE — 80053 COMPREHEN METABOLIC PANEL: CPT | Mod: HCNC | Performed by: EMERGENCY MEDICINE

## 2023-01-22 PROCEDURE — 85025 COMPLETE CBC W/AUTO DIFF WBC: CPT | Mod: HCNC | Performed by: EMERGENCY MEDICINE

## 2023-01-22 PROCEDURE — 96375 TX/PRO/DX INJ NEW DRUG ADDON: CPT | Mod: HCNC

## 2023-01-22 RX ORDER — ASPIRIN 325 MG
325 TABLET ORAL
Status: COMPLETED | OUTPATIENT
Start: 2023-01-22 | End: 2023-01-22

## 2023-01-22 RX ORDER — DEXAMETHASONE SODIUM PHOSPHATE 4 MG/ML
8 INJECTION, SOLUTION INTRA-ARTICULAR; INTRALESIONAL; INTRAMUSCULAR; INTRAVENOUS; SOFT TISSUE
Status: COMPLETED | OUTPATIENT
Start: 2023-01-22 | End: 2023-01-22

## 2023-01-22 RX ORDER — ORPHENADRINE CITRATE 30 MG/ML
60 INJECTION INTRAMUSCULAR; INTRAVENOUS
Status: COMPLETED | OUTPATIENT
Start: 2023-01-22 | End: 2023-01-22

## 2023-01-22 RX ADMIN — DEXAMETHASONE SODIUM PHOSPHATE 8 MG: 4 INJECTION, SOLUTION INTRA-ARTICULAR; INTRALESIONAL; INTRAMUSCULAR; INTRAVENOUS; SOFT TISSUE at 10:01

## 2023-01-22 RX ADMIN — ASPIRIN 325 MG ORAL TABLET 325 MG: 325 PILL ORAL at 10:01

## 2023-01-22 RX ADMIN — ORPHENADRINE CITRATE 60 MG: 30 INJECTION INTRAMUSCULAR; INTRAVENOUS at 10:01

## 2023-01-23 ENCOUNTER — CLINICAL SUPPORT (OUTPATIENT)
Dept: REHABILITATION | Facility: HOSPITAL | Age: 68
End: 2023-01-23
Payer: MEDICARE

## 2023-01-23 ENCOUNTER — TELEPHONE (OUTPATIENT)
Dept: ORTHOPEDICS | Facility: CLINIC | Age: 68
End: 2023-01-23
Payer: MEDICARE

## 2023-01-23 VITALS
WEIGHT: 165 LBS | RESPIRATION RATE: 19 BRPM | BODY MASS INDEX: 34.63 KG/M2 | DIASTOLIC BLOOD PRESSURE: 66 MMHG | HEIGHT: 58 IN | OXYGEN SATURATION: 96 % | TEMPERATURE: 99 F | HEART RATE: 68 BPM | SYSTOLIC BLOOD PRESSURE: 150 MMHG

## 2023-01-23 DIAGNOSIS — R53.1 WEAKNESS: Primary | ICD-10-CM

## 2023-01-23 LAB
ALBUMIN SERPL BCP-MCNC: 3.8 G/DL (ref 3.5–5.2)
ALP SERPL-CCNC: 103 U/L (ref 55–135)
ALT SERPL W/O P-5'-P-CCNC: 30 U/L (ref 10–44)
ANION GAP SERPL CALC-SCNC: 11 MMOL/L (ref 8–16)
AST SERPL-CCNC: 32 U/L (ref 10–40)
BILIRUB SERPL-MCNC: 0.3 MG/DL (ref 0.1–1)
BNP SERPL-MCNC: 15 PG/ML (ref 0–99)
BUN SERPL-MCNC: 14 MG/DL (ref 8–23)
CALCIUM SERPL-MCNC: 9.2 MG/DL (ref 8.7–10.5)
CHLORIDE SERPL-SCNC: 106 MMOL/L (ref 95–110)
CO2 SERPL-SCNC: 28 MMOL/L (ref 23–29)
CREAT SERPL-MCNC: 0.7 MG/DL (ref 0.5–1.4)
EST. GFR  (NO RACE VARIABLE): >60 ML/MIN/1.73 M^2
GLUCOSE SERPL-MCNC: 122 MG/DL (ref 70–110)
POTASSIUM SERPL-SCNC: 4.1 MMOL/L (ref 3.5–5.1)
PROT SERPL-MCNC: 7.1 G/DL (ref 6–8.4)
SODIUM SERPL-SCNC: 145 MMOL/L (ref 136–145)
TROPONIN I SERPL DL<=0.01 NG/ML-MCNC: <0.006 NG/ML (ref 0–0.03)

## 2023-01-23 PROCEDURE — 97110 THERAPEUTIC EXERCISES: CPT | Mod: HCNC,PO

## 2023-01-23 RX ORDER — ORPHENADRINE CITRATE 100 MG/1
100 TABLET, EXTENDED RELEASE ORAL 2 TIMES DAILY
Qty: 10 TABLET | Refills: 0 | Status: SHIPPED | OUTPATIENT
Start: 2023-01-23 | End: 2023-04-18

## 2023-01-23 NOTE — PROGRESS NOTES
Pelvic Health Physical Therapy   Treatment and Discharge Note     Name: Chelsea Rodriguez  Clinic Number: [id number]    Therapy Diagnosis:   Encounter Diagnosis   Name Primary?    Weakness Yes       Physician: Isai Seymour MD    Visit Date: 1/23/2023    Physician Orders: PT Eval and Treat   Medical Diagnosis from Referral: pelvic floor dysfunction   Evaluation Date: 10/24/2022  Authorization Period Expiration: 09/09/2023  Plan of Care Expiration: 1-16-23  Progress Note Due: 11-24-22  Visit #/Visits authorized: 9/ 12   Cancelled Visits: 0  No Show Visits: 0  FOTO: Issued Vist #:  3/3  1/17/23        Time In: 930  Time Out: 1030  Total Billable Time: 60 minutes    Precautions: Standard    Subjective     Pt reports: She has increased water intake and feels like it has helped her have improved stool consistency.  She feels like the breathing techniques and  positioning on the commode has really helped with her BM.      She reports she went ot the ER last night due to severe muscles spasms that impaired her ability to breath.  She reports she was gasping for breath.  She received a steroid shot and pain medicine which really helped.           She was compliant with home exercise program.  Response to previous treatment: no issues reported  Functional change: no changes reported     Pain: 0/10  Location: NA    Constitutional Symptoms Review: The patient denies having any constitutional symptoms.     Objective       Therapeutic Exercise to develop  strength and endurance for 55 minutes including:   Posterior pelvic tilt 5 sec x 20 reps  Marching with abdominal muscle(s) bracing 5 sec x 10 reps bilaterally x 2 sets   Bent knee fall out with kegel 5 sec x 10 reps bilaterally x 2 sets   Groin stretch 3x30 sec   Piriformis stretch 3x30 sec yasir  LTR 10 sec x 10 reps        Therapeutic Activity to improve dynamic functional performance, coordination and education for 5 minutes. Including:    Discharge planning review        Home Exercises Provided and Patient Education Provided     Education provided:   - anatomy/physiology of pelvic floor  Discussed progression of plan of care with patient; educated pt in activity modification; reviewed HEP with pt. Pt demonstrated and verbalized understanding of all instruction and was provided with a handout of HEP (see Patient Instructions).  -     Written Home Exercises Provided: .  Exercises were reviewed and Chelsea was able to demonstrate them prior to the end of the session.  Chelsea demonstrated good  understanding of the education provided.     See EMR under Patient Instructions for exercises provided 12/5/2022.    Assessment     Did not use resistance band today due to patient's reports of severe trunk muscle spasms yesterday.  Also added hip stretching today to help decrease strain to pelvic structures.      Chelsea has made excellent progress with participation in the POC towards improving ability to have a comfortable BM.  She has met the majority of her goals and is independent with her home program.  At this time Chelsea no longer requires skilled intervention and is appropriate for discharge.       Discharge reason: Patient has met all of his/her goals and Patient has reached the maximum rehab potential for the present time    Discharge FOTO Score:       Goals:   Short Term Goals: 4 weeks   Pt to demonstrate a decrease in scar restrictions to no more than mild restrictions needed to help decrease pain with functional mobility. MET  Pt to demonstrate proper positioning on commode with breathing techniques to decrease strain with BM to enable pt to feel empty after BM. MET  Pt to demonstrate independence with performing bowel massage to help with gut motility. MET  Pt to be able to bulge pelvic floor which is needed for comfortable BM and complete evacuation. MET  Pt to demonstrate an improved score in the FOTO Bowel Constipation survey  to at least 44 to demonstrate improving bowel  function with activities of daily living.  MET           Long Term Goals: 12 weeks   Pt to be discharged with home plan for carry over after discharge.  MET  Pt to report being able to have a BM without straining 80% of the time to demonstrate improving PF coordination. MET  Pt to report being able to have a spontaneous bowel movement at least once every 1-3 days to demonstrate improving gut motility and pelvic floor coordination. MET  Pt to demonstrate an improved score in the FOTO Bowel Constipation survey  to at least 48 to demonstrate improving bowel function with ADLs.  MET    Plan   This patient is discharged from Physical Therapy    Eva Prince, PT

## 2023-01-23 NOTE — ED PROVIDER NOTES
"Encounter Date: 2023       History     Chief Complaint   Patient presents with    Back Pain     Pt presents with "permanent back spasm" to middle of back. Pt reports taking tylenol 650 pta and is having some relief. Pt denies recent trauma/injury to back. Pt denies gu symptoms.      Presents complaining of thoracic back pain since this morning.  She denies any trauma.  She states that movement exacerbates the pain.  She is complaining of some shortness of breath hour she states this is chronic.  Denies any fevers/chills nausea/vomiting.  She denies any chest pain/pressure/tightness.  Daughter is at the bedside and seems be worried about her heart.  She denies any abdominal pain although stating that she just took a laxative which she normally does.    Review of patient's allergies indicates:   Allergen Reactions    Codeine Nausea And Vomiting     Past Medical History:   Diagnosis Date    Bilateral knee pain     Carpal tunnel syndrome, bilateral     Fissure in skin of foot     Right small toe    HTN (hypertension)     Hyperlipidemia     Palpitations     Rotator cuff injury     right    Screening for colorectal cancer 10/20/2017    Screening for malignant neoplasm of colon 2021    Statin-induced myositis 2018     Past Surgical History:   Procedure Laterality Date    BILATERAL SALPINGOOPHORECTOMY  2000    BREAST BIOPSY Left 2010    malignant    BREAST BIOPSY Left     negative    BREAST LUMPECTOMY Left 2010    CATARACT EXTRACTION W/  INTRAOCULAR LENS IMPLANT Right 2018    Dr. Oneil    CATARACT EXTRACTION W/  INTRAOCULAR LENS IMPLANT Left 2018    Dr. Oneil     SECTION      x2    COLONOSCOPY N/A 10/20/2017    Procedure: COLONOSCOPY;  Surgeon: Mitchell Danielson Jr., MD;  Location: [facility] ENDO;  Service: Endoscopy;  Laterality: N/A;    COLONOSCOPY N/A 2021    Procedure: COLONOSCOPY/Suprep;  Surgeon: Malcolm Reyes MD;  Location: [facility] ENDO;  Service: Endoscopy;  " Laterality: N/A;    CYST REMOVAL      on back    ESOPHAGOGASTRODUODENOSCOPY N/A 2/5/2021    Procedure: EGD (ESOPHAGOGASTRODUODENOSCOPY);  Surgeon: Malcolm Reyes MD;  Location: [facility] ENDO;  Service: Endoscopy;  Laterality: N/A;    HERNIA REPAIR      HYSTERECTOMY      at 25 yrs old    INTRAOCULAR PROSTHESES INSERTION Left 11/6/2018    Procedure: INSERTION, IOL PROSTHESIS;  Surgeon: Sergio Oneil MD;  Location: [facility] OR 1ST FLR;  Service: Ophthalmology;  Laterality: Left;    INTRAOCULAR PROSTHESES INSERTION Right 11/20/2018    Procedure: INSERTION, IOL PROSTHESIS;  Surgeon: Sergio Oneil MD;  Location: [facility] OR 2ND FLR;  Service: Ophthalmology;  Laterality: Right;    KNEE ARTHROPLASTY Right 3/31/2021    Procedure: ARTHROPLASTY, KNEE:RIGHT:DEPUY-SIGMA ;  Surgeon: Pedro Summers III, MD;  Location: [facility] OR;  Service: Orthopedics;  Laterality: Right;    OOPHORECTOMY      @ 45 yrs old    PHACOEMULSIFICATION OF CATARACT Left 11/6/2018    Procedure: PHACOEMULSIFICATION, CATARACT;  Surgeon: Sergio Oneil MD;  [facility];  Service: Ophthalmology;  Laterality: Left;    PHACOEMULSIFICATION OF CATARACT Right 11/20/2018    Procedure: PHACOEMULSIFICATION, CATARACT;  Surgeon: Sergio Oneil MD;  Location: [facility] OR 2ND FLR;  Service: Ophthalmology;  Laterality: Right;    supracervical abdominal hysterectomy  1978    fibroids     Family History   Problem Relation Age of Onset    Hypertension Mother     Heart disease Mother     Diabetes Mother     Hyperlipidemia Mother     Pancreatitis Mother     Cataracts Mother     Macular degeneration Mother     Cancer Father     Breast cancer Paternal Cousin     Hypertension Sister     Thyroid disease Sister     Diabetes Brother     Heart disease Brother     Amblyopia Neg Hx     Blindness Neg Hx     Glaucoma Neg Hx     Strabismus Neg Hx     Retinal detachment Neg Hx      Social History     Tobacco Use    Smoking status: Never    Smokeless tobacco:  Never   Substance Use Topics    Alcohol use: Yes     Comment: once per month    Drug use: No     Review of Systems   Musculoskeletal:  Positive for back pain.     Physical Exam     Initial Vitals [01/22/23 2123]   BP Pulse Resp Temp SpO2   (!) 159/79 75 16 98.9 °F (37.2 °C) 99 %      MAP       --         Physical Exam    Nursing note and vitals reviewed.  Constitutional: She appears well-developed and well-nourished.   HENT:   Head: Normocephalic and atraumatic.   Cardiovascular:  Normal rate and regular rhythm.           No murmur heard.  Pulmonary/Chest: Breath sounds normal. She has no wheezes.   Abdominal: Abdomen is soft. There is no abdominal tenderness.   Musculoskeletal:      Comments: There is subjective tenderness to palpation to the thoracic spine, there is no step-off deformities or crepitus noted     Neurological: She is alert and oriented to person, place, and time.   Skin: Skin is warm and dry. Capillary refill takes less than 2 seconds.   Psychiatric:   anxious       ED Course   Procedures  Labs Reviewed   CBC W/ AUTO DIFFERENTIAL - Abnormal; Notable for the following components:       Result Value    MCH 26.9 (*)     MCHC 31.4 (*)     RDW 15.5 (*)     Gran % 75.2 (*)     Lymph % 17.1 (*)     All other components within normal limits   COMPREHENSIVE METABOLIC PANEL - Abnormal; Notable for the following components:    Glucose 122 (*)     All other components within normal limits   TROPONIN I   B-TYPE NATRIURETIC PEPTIDE     EKG Readings: (Independently Interpreted)   Sinus rhythm rate of 78, left axis deviation, nonspecific ST-T wave abnormalities interpreted by me     Imaging Results              X-Ray Thoracic Spine AP And Lateral (Final result)  Result time 01/23/23 00:03:50      Final result by June Fox MD (01/23/23 00:03:50)                   Impression:      Spondylosis throughout the thoracic spine.  No compression fracture or subluxation.    Dextroscoliosis.      Electronically  signed by: June Fox  Date:    01/23/2023  Time:    00:03               Narrative:    EXAMINATION:  XR THORACIC SPINE AP LATERAL    CLINICAL HISTORY:  Dorsalgia, unspecified    TECHNIQUE:  AP and lateral views of the thoracic spine were performed.    COMPARISON:  None    FINDINGS:  There is spondylosis and dextroscoliosis of the thoracic spine.  There are no compression fractures or lytic or sclerotic osseous lesions.  Visualized ribs and mediastinal structures are unremarkable.                                       X-Ray Chest AP Portable (Final result)  Result time 01/22/23 23:59:33      Final result by June Fox MD (01/22/23 23:59:33)                   Impression:      No acute abnormality.      Electronically signed by: June Fox  Date:    01/22/2023  Time:    23:59               Narrative:    EXAMINATION:  XR CHEST AP PORTABLE    CLINICAL HISTORY:  back pain;    TECHNIQUE:  Single frontal view of the chest was performed.    COMPARISON:  01/13/2020    FINDINGS:  Cardiac silhouette is stable and nonenlarged.  The lungs appear clear.  There is no pleural effusion or pneumothorax.  Imaged osseous structures are intact.                                       Medications   aspirin tablet 325 mg (325 mg Oral Given 1/22/23 2234)   orphenadrine injection 60 mg (60 mg Intravenous Given 1/22/23 2234)   dexAMETHasone injection 8 mg (8 mg Intravenous Given 1/22/23 2234)     Medical Decision Making:   ED Management:  Patient's pain is improved and she is able to move around in the room.  Cardiac workup is unremarkable and she will be discharged.  Instructed patient to return immediately for any new or worsening symptoms and she verbalized understanding.           ED Course as of 01/23/23 0057   Mon Jan 23, 2023   0041 CBC auto differential(!)  Reviewed, nonspecific findings [CD]   0042 Comprehensive metabolic panel(!)  Reviewed, nonspecific findings [CD]   0042 Troponin I #1  Reviewed, within normal  limits [CD]   0042 BNP  Reviewed, within normal limits [CD]   0042 X-Ray Thoracic Spine AP And Lateral  Reviewed, no acute fracture dislocation or compression fracture [CD]   0043 X-Ray Chest AP Portable  Reviewed, no acute abnormality [CD]      ED Course User Index  [CD] Nayan Perez DO                 Clinical Impression:   Final diagnoses:  [M54.9] Back pain (Primary)        ED Disposition Condition    Discharge Stable          ED Prescriptions       Medication Sig Dispense Start Date End Date Auth. Provider    orphenadrine (NORFLEX) 100 mg tablet Take 1 tablet (100 mg total) by mouth 2 (two) times daily. 10 tablet 1/23/2023 -- Nayan Perez DO          Follow-up Information       Follow up With Specialties Details Why Contact Info    Eliza Alva MD Internal Medicine Schedule an appointment as soon as possible for a visit   [address] [place] [address]  [phone]               Nayan Perez DO  01/23/23 0059

## 2023-01-23 NOTE — TELEPHONE ENCOUNTER
Called and spoke with the pt. Pt informed that her EMG was scheduled on 02/16/23 and wants to schedule a f/u appointment with Jin SPEAR to discuss the results. Informed her first availability on 02/28/23 at 10.00 Am at [facility] at [facility] location on the 5th floor. Pt confirmed the available appointment and was thankful.

## 2023-01-23 NOTE — ED NOTES
No blood drawn. Lab called a second time to come draw pt. Sts they will send someone. Pt to radiology at this time.

## 2023-01-23 NOTE — ED NOTES
Resting quietly in bed with daughter at bedside. Sts pain increases with movement. Plan of care discussed. Unable to draw blood from IV start. Lab notified immediately after IV start. Sts they will send someone

## 2023-01-23 NOTE — TELEPHONE ENCOUNTER
----- Message from Seda Hobson sent at 1/23/2023  2:49 PM CST -----  Contact: pt  Pt calling in regards to having a EMG test for 2-16       Confirmed patient's contact info below:  Contact Name: Chelsea Rodriguez  Phone Number: [phone]

## 2023-01-23 NOTE — ED NOTES
Unable to draw blood from IV start. Pt not an easy stick due to PMH breast cancer. Lab called to come draw. Sts they will send someone.

## 2023-01-25 ENCOUNTER — TELEPHONE (OUTPATIENT)
Dept: FAMILY MEDICINE | Facility: CLINIC | Age: 68
End: 2023-01-25
Payer: MEDICARE

## 2023-01-25 DIAGNOSIS — M25.60 DECREASED RANGE OF MOTION: Primary | ICD-10-CM

## 2023-01-25 NOTE — TELEPHONE ENCOUNTER
Pt went to er on jan 22 for back spasms, was given rx for Norflex , but this rx is not available anymore, she has tried several pharmacies, she is requesting another med  ?

## 2023-01-25 NOTE — TELEPHONE ENCOUNTER
----- Message from Elias Hickman sent at 1/25/2023 10:04 AM CST -----  Contact: PT  Type: Requesting to speak with nurse        Who Called: PT  Regarding: PT WENT TO [facility] ER ON SUNDAY AND WAS GIVING A PRESCRIPTION BUT THE MEDICINE WAS RECALLED. SHE WOULD LIKE SOMETHING ELSE.  Would the patient rather a call back or a response via [facility]? Call back  Best Call Back Number: [phone]  Additional Information:  orphenadrine (NORFLEX) 100 mg tablet

## 2023-01-26 ENCOUNTER — IMMUNIZATION (OUTPATIENT)
Dept: INTERNAL MEDICINE | Facility: CLINIC | Age: 68
End: 2023-01-26
Payer: MEDICARE

## 2023-01-26 DIAGNOSIS — Z23 NEED FOR VACCINATION: Primary | ICD-10-CM

## 2023-01-26 PROCEDURE — 91312 COVID-19, MRNA, LNP-S, BIVALENT BOOSTER, PF, 30 MCG/0.3 ML DOSE: ICD-10-PCS | Mod: HCNC,S$GLB,, | Performed by: FAMILY MEDICINE

## 2023-01-26 PROCEDURE — 0124A COVID-19, MRNA, LNP-S, BIVALENT BOOSTER, PF, 30 MCG/0.3 ML DOSE: CPT | Mod: HCNC,PBBFAC | Performed by: FAMILY MEDICINE

## 2023-01-26 PROCEDURE — 91312 COVID-19, MRNA, LNP-S, BIVALENT BOOSTER, PF, 30 MCG/0.3 ML DOSE: CPT | Mod: HCNC,S$GLB,, | Performed by: FAMILY MEDICINE

## 2023-01-26 RX ORDER — CYCLOBENZAPRINE HCL 10 MG
10 TABLET ORAL 3 TIMES DAILY PRN
Qty: 15 TABLET | Refills: 0 | Status: SHIPPED | OUTPATIENT
Start: 2023-01-26 | End: 2023-02-05

## 2023-02-07 DIAGNOSIS — Z00.00 ENCOUNTER FOR MEDICARE ANNUAL WELLNESS EXAM: ICD-10-CM

## 2023-02-09 DIAGNOSIS — Z00.00 ENCOUNTER FOR MEDICARE ANNUAL WELLNESS EXAM: ICD-10-CM

## 2023-02-14 DIAGNOSIS — Z96.651 STATUS POST TOTAL RIGHT KNEE REPLACEMENT: Primary | ICD-10-CM

## 2023-02-16 ENCOUNTER — PROCEDURE VISIT (OUTPATIENT)
Dept: NEUROLOGY | Facility: CLINIC | Age: 68
End: 2023-02-16
Payer: MEDICARE

## 2023-02-16 DIAGNOSIS — G56.03 BILATERAL CARPAL TUNNEL SYNDROME: ICD-10-CM

## 2023-02-16 PROCEDURE — 95886 MUSC TEST DONE W/N TEST COMP: CPT | Mod: PBBFAC,HCNC | Performed by: PSYCHIATRY & NEUROLOGY

## 2023-02-16 PROCEDURE — 95913 NRV CNDJ TEST 13/> STUDIES: CPT | Mod: HCNC,S$GLB,ICN, | Performed by: PSYCHIATRY & NEUROLOGY

## 2023-02-16 PROCEDURE — 95913 PR NERVE CONDUCTION STUDY; 13 OR MORE STUDIES: ICD-10-PCS | Mod: HCNC,S$GLB,ICN, | Performed by: PSYCHIATRY & NEUROLOGY

## 2023-02-16 PROCEDURE — 95886 MUSC TEST DONE W/N TEST COMP: CPT | Mod: HCNC,S$GLB,ICN, | Performed by: PSYCHIATRY & NEUROLOGY

## 2023-02-16 PROCEDURE — 95913 NRV CNDJ TEST 13/> STUDIES: CPT | Mod: PBBFAC,HCNC | Performed by: PSYCHIATRY & NEUROLOGY

## 2023-02-16 PROCEDURE — 95886 PR EMG COMPLETE, W/ NERVE CONDUCTION STUDIES, 5+ MUSCLES: ICD-10-PCS | Mod: HCNC,S$GLB,ICN, | Performed by: PSYCHIATRY & NEUROLOGY

## 2023-02-16 NOTE — PROCEDURES
Procedures      Please see NCS/EMG procedure report    Alexander Santiago MD  [facility]

## 2023-02-28 ENCOUNTER — OFFICE VISIT (OUTPATIENT)
Dept: ORTHOPEDICS | Facility: CLINIC | Age: 68
End: 2023-02-28
Payer: MEDICARE

## 2023-02-28 DIAGNOSIS — G56.03 BILATERAL CARPAL TUNNEL SYNDROME: Primary | ICD-10-CM

## 2023-02-28 PROCEDURE — 1101F PR PT FALLS ASSESS DOC 0-1 FALLS W/OUT INJ PAST YR: ICD-10-PCS | Mod: HCNC,CPTII,S$GLB, | Performed by: PHYSICIAN ASSISTANT

## 2023-02-28 PROCEDURE — 99999 PR PBB SHADOW E&M-EST. PATIENT-LVL III: CPT | Mod: PBBFAC,HCNC,, | Performed by: PHYSICIAN ASSISTANT

## 2023-02-28 PROCEDURE — 1157F ADVNC CARE PLAN IN RCRD: CPT | Mod: HCNC,CPTII,S$GLB, | Performed by: PHYSICIAN ASSISTANT

## 2023-02-28 PROCEDURE — 3288F FALL RISK ASSESSMENT DOCD: CPT | Mod: HCNC,CPTII,S$GLB, | Performed by: PHYSICIAN ASSISTANT

## 2023-02-28 PROCEDURE — 1157F PR ADVANCE CARE PLAN OR EQUIV PRESENT IN MEDICAL RECORD: ICD-10-PCS | Mod: HCNC,CPTII,S$GLB, | Performed by: PHYSICIAN ASSISTANT

## 2023-02-28 PROCEDURE — 1126F AMNT PAIN NOTED NONE PRSNT: CPT | Mod: HCNC,CPTII,S$GLB, | Performed by: PHYSICIAN ASSISTANT

## 2023-02-28 PROCEDURE — 1101F PT FALLS ASSESS-DOCD LE1/YR: CPT | Mod: HCNC,CPTII,S$GLB, | Performed by: PHYSICIAN ASSISTANT

## 2023-02-28 PROCEDURE — 99213 PR OFFICE/OUTPT VISIT, EST, LEVL III, 20-29 MIN: ICD-10-PCS | Mod: HCNC,S$GLB,, | Performed by: PHYSICIAN ASSISTANT

## 2023-02-28 PROCEDURE — 1126F PR PAIN SEVERITY QUANTIFIED, NO PAIN PRESENT: ICD-10-PCS | Mod: HCNC,CPTII,S$GLB, | Performed by: PHYSICIAN ASSISTANT

## 2023-02-28 PROCEDURE — 3288F PR FALLS RISK ASSESSMENT DOCUMENTED: ICD-10-PCS | Mod: HCNC,CPTII,S$GLB, | Performed by: PHYSICIAN ASSISTANT

## 2023-02-28 PROCEDURE — 99213 OFFICE O/P EST LOW 20 MIN: CPT | Mod: HCNC,S$GLB,, | Performed by: PHYSICIAN ASSISTANT

## 2023-02-28 PROCEDURE — 1159F PR MEDICATION LIST DOCUMENTED IN MEDICAL RECORD: ICD-10-PCS | Mod: HCNC,CPTII,S$GLB, | Performed by: PHYSICIAN ASSISTANT

## 2023-02-28 PROCEDURE — 99999 PR PBB SHADOW E&M-EST. PATIENT-LVL III: ICD-10-PCS | Mod: PBBFAC,HCNC,, | Performed by: PHYSICIAN ASSISTANT

## 2023-02-28 PROCEDURE — 1159F MED LIST DOCD IN RCRD: CPT | Mod: HCNC,CPTII,S$GLB, | Performed by: PHYSICIAN ASSISTANT

## 2023-02-28 NOTE — PROGRESS NOTES
[facility]  History & Physical  Orthopedics    SUBJECTIVE:      Chief Complaint: Bilateral hand numbness    Referring Provider: No ref. provider found     Dr. Willis is the supervising physician for this encounter/patient    History of Present Illness:  Patient is a 68 y.o. right hand dominant female who presents today with complaints of bilateral hand numbness, right greater than left, present for about 30+ years but has become more progressive for the past year. Numbness noted at night and during the day, she has difficulty feeling temperature and has burned herself before, fine motor skills are effected. She was treated by Dr. Quarles in 2018 for bilateral carpal tunnel steroid injections which were not helpful. She has tried NSAIDs and carpal tunnel braces as well. No surgical history on the hands.     Onset of symptoms/DOI was 30+ years, progressive for 1 year.    Interval History 2/28/23: the patient returns today for EMG results. She reports that she started taking a new vitamin regimen about 2 weeks ago, and has started to see some slight improvement in her sensation of the long fingers.    Symptoms are aggravated by activity, at night, and during the day.    Symptoms are alleviated by rest.    Symptoms consist of numbness/tingling.    The patient rates their pain as a 3/10.    Attempted treatment(s) and/or interventions include activity modifications, rest, immobilization and steroid injection.     The patient denies any fevers, chills, N/V, D/C and presents for evaluation.       Past Medical History:   Diagnosis Date    Bilateral knee pain     Carpal tunnel syndrome, bilateral     Fissure in skin of foot     Right small toe    HTN (hypertension)     Hyperlipidemia     Palpitations     Rotator cuff injury     right    Screening for colorectal cancer 10/20/2017    Screening for malignant neoplasm of colon 2/5/2021    Statin-induced myositis 7/20/2018     Past Surgical History:   Procedure  Laterality Date    BILATERAL SALPINGOOPHORECTOMY  2000    BREAST BIOPSY Left 2010    malignant    BREAST BIOPSY Left 2008    negative    BREAST LUMPECTOMY Left 2010    CATARACT EXTRACTION W/  INTRAOCULAR LENS IMPLANT Right 2018    Dr. Oneil    CATARACT EXTRACTION W/  INTRAOCULAR LENS IMPLANT Left 2018    Dr. Oneil     SECTION      x2    COLONOSCOPY N/A 10/20/2017    Procedure: COLONOSCOPY;  Surgeon: Mitchell Danielson Jr., MD;  Location: [facility] ENDO;  Service: Endoscopy;  Laterality: N/A;    COLONOSCOPY N/A 2021    Procedure: COLONOSCOPY/Suprep;  Surgeon: Malcolm Reyes MD;  Location: [facility] ENDO;  Service: Endoscopy;  Laterality: N/A;    CYST REMOVAL      on back    ESOPHAGOGASTRODUODENOSCOPY N/A 2021    Procedure: EGD (ESOPHAGOGASTRODUODENOSCOPY);  Surgeon: Malcolm Reyes MD;  Location: [facility] ENDO;  Service: Endoscopy;  Laterality: N/A;    HERNIA REPAIR      HYSTERECTOMY      at 25 yrs old    INTRAOCULAR PROSTHESES INSERTION Left 2018    Procedure: INSERTION, IOL PROSTHESIS;  Surgeon: Sergio Oneil MD;  Location: [facility] OR 1ST FLR;  Service: Ophthalmology;  Laterality: Left;    INTRAOCULAR PROSTHESES INSERTION Right 2018    Procedure: INSERTION, IOL PROSTHESIS;  Surgeon: Sergio Oneil MD;  Location: [facility] OR 2ND FLR;  Service: Ophthalmology;  Laterality: Right;    KNEE ARTHROPLASTY Right 3/31/2021    Procedure: ARTHROPLASTY, KNEE:RIGHT:DEPUY-SIGMA ;  Surgeon: Pedro Summers III, MD;  Location: [facility] OR;  Service: Orthopedics;  Laterality: Right;    OOPHORECTOMY      @ 45 yrs old    PHACOEMULSIFICATION OF CATARACT Left 2018    Procedure: PHACOEMULSIFICATION, CATARACT;  Surgeon: Sergio Oneil MD;  Location: [facility] OR 1ST FLR;  Service: Ophthalmology;  Laterality: Left;    PHACOEMULSIFICATION OF CATARACT Right 2018    Procedure: PHACOEMULSIFICATION, CATARACT;  Surgeon: Sergio Oneil MD;  Location: [facility] OR 2ND FLR;  Service:  "Ophthalmology;  Laterality: Right;    supracervical abdominal hysterectomy  1978    fibroids     Review of patient's allergies indicates:   Allergen Reactions    Codeine Nausea And Vomiting     Social History     Social History Narrative    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 hx of left lumpectomy for "breast cancer"- with 5yrs of tamoxifen use. Sees Dr. Buckley ([facility] for surveillance/MMG)        Dr. Lala former PCP, [facility]          Family History   Problem Relation Age of Onset    Hypertension Mother     Heart disease Mother     Diabetes Mother     Hyperlipidemia Mother     Pancreatitis Mother     Cataracts Mother     Macular degeneration Mother     Cancer Father     Breast cancer Paternal Cousin     Hypertension Sister     Thyroid disease Sister     Diabetes Brother     Heart disease Brother     Amblyopia Neg Hx     Blindness Neg Hx     Glaucoma Neg Hx     Strabismus Neg Hx     Retinal detachment Neg Hx          Current Outpatient Medications:     acetaminophen (TYLENOL) 650 MG TbSR, Take 1 tablet (650 mg total) by mouth every 8 (eight) hours as needed (pain)., Disp: 120 tablet, Rfl: 0    amoxicillin (AMOXIL) 500 MG capsule, Take 500 mg by mouth as needed. Before dental work, Disp: , Rfl:     aspirin (ECOTRIN) 81 MG EC tablet, Take 1 tablet (81 mg total) by mouth 2 (two) times daily. (Patient taking differently: Take 81 mg by mouth once daily.), Disp: 60 tablet, Rfl: 0    atorvastatin (LIPITOR) 80 MG tablet, Take 1 tablet (80 mg total) by mouth once daily., Disp: 90 tablet, Rfl: 3    coenzyme Q10 100 mg capsule, Take 100 mg by mouth every evening., Disp: , Rfl:     docusate sodium (COLACE) 100 MG capsule, Take 1 capsule (100 mg total) by mouth 2 (two) times daily as needed for Constipation., Disp: 60 capsule, Rfl: 0    ergocalciferol (ERGOCALCIFEROL) 50,000 unit Cap, TAKE 1 CAPSULE EVERY 7 DAYS, Disp: 12 capsule, Rfl: 0    " hydroCHLOROthiazide (MICROZIDE) 12.5 mg capsule, TAKE 1 CAPSULE EVERY EVENING, Disp: 90 capsule, Rfl: 0    irbesartan (AVAPRO) 150 MG tablet, TAKE 1 TABLET(150 MG) BY MOUTH EVERY DAY, Disp: 90 tablet, Rfl: 2    loratadine (CLARITIN) 10 mg tablet, Take 10 mg by mouth every evening., Disp: , Rfl:     metoprolol succinate (TOPROL-XL) 25 MG 24 hr tablet, TAKE 1 TABLET EVERY EVENING, Disp: 90 tablet, Rfl: 3    orphenadrine (NORFLEX) 100 mg tablet, Take 1 tablet (100 mg total) by mouth 2 (two) times daily., Disp: 10 tablet, Rfl: 0      Review of Systems:  Constitutional: no fever or chills  Eyes: no visual changes  ENT: no nasal congestion or sore throat  Respiratory: no cough or shortness of breath  Cardiovascular: no chest pain  Gastrointestinal: no nausea or vomiting, tolerating diet  Musculoskeletal: pain, soreness, and numbness/tingling    OBJECTIVE:      Vital Signs (Most Recent):  There were no vitals filed for this visit.  There is no height or weight on file to calculate BMI.      Physical Exam:  Constitutional: The patient appears well-developed and well-nourished. No distress.   Skin: No lesions appreciated  Head: Normocephalic and atraumatic.   Nose: Nose normal.   Ears: No deformities seen  Eyes: Conjunctivae and EOM are normal.   Neck: No tracheal deviation present.   Cardiovascular: Normal rate and intact distal pulses.    Pulmonary/Chest: Effort normal. No respiratory distress.   Abdominal: There is no guarding.   Neurological: The patient is alert.   Psychiatric: The patient has a normal mood and affect.     Bilateral Hand/Wrist Examination:    Observation/Inspection:  Swelling  none    Deformity  none  Discoloration  none     Scars   none    Atrophy  Bilateral thenar    HAND/WRIST EXAMINATION:  Finkelstein's Test   Neg  WHAT Test    Neg  Snuff box tenderness   Neg  Garcia's Test    Neg  Hook of Hamate Tenderness  Neg  CMC grind    Neg  Circumduction test   Neg    Neurovascular Exam:  Digits WWP, brisk CR  < 3s throughout  NVI motor/LTS to M/R/U nerves, radial pulse 2+  Tinel's Test - Carpal Tunnel  Pos bilateral  Tinel's Test - Cubital Tunnel  Neg  Phalen's Test    Pos bilateral  Median Nerve Compression Test Pos bilateral    ROM hand full, painless    ROM wrist full, painless    ROM elbow full, painless    Abdomen not guarded  Respirations nonlabored  Perfusion intact    Diagnostic Results:     Imaging - I independently viewed the patient's imaging as well as the radiology report.      FINDINGS:  Right hand:     No acute fractures.  Preserved bone density.  Some stable osteoarthritic spurring about the radiocarpal and 1st carpal metacarpal articulations.  Some osteoarthritic spurring also noted at the thumb IP articulation.  In association with some osteoarthritic spurring involving the 5th finger IP articulations, there is minimal ulnar angulation of the middle phalanx with respect to the proximal phalanx and minimal radial angulation of the distal phalanx with respect to the middle phalanx.  Elsewhere, preserved joint spaces.     Left hand:     No acute fractures.  Preserved bone density.  Some stable osteoarthritic spurring about the radiocarpal and 1st carpal metacarpal articulation.  Some osteoarthritic spurring about the thumb IP articulation less so a few of the finger D IP articulations.  Elsewhere, preserved joint spaces.     Impression:     As above    EMG - 2/16/23  Impression   This is an abnormal study. There is electrophysiologic evidence of:   1. Severe bilateral carpal tunnel syndrome (median neuropathy at the wrists)with secondary denervation of the APB muscles.     ASSESSMENT/PLAN:      68 y.o. yo female with Severe Bilateral carpal tunnel syndrome    Plan: The patient and I had a thorough discussion today.  We discussed the working diagnosis as well as several other potential alternative diagnoses.  Treatment options were discussed, both conservative and surgical.  Conservative treatment options  would include things such as activity modifications, workplace modifications, a period of rest, oral vs topical OTC and prescription anti-inflammatory medications, occupational therapy, splinting/bracing, immobilization, corticosteroid injections, and others.  Surgical options were discussed as well.     At this time, the patient would like to proceed with continued conservative treatment by taking these vitamins. We discuss that this is reasonable, but should she start to see worsening of the symptoms, she should return to discuss surgery. Surgery is recommended at this time, but she would like to hold. RTC on prn basis.    Should the patient's symptoms worsen, persist, or fail to improve they should return for reevaluation and I would be happy to see them back anytime.           Please do not hesitate to reach out to us via email, phone, or MyChart with any questions, concerns, or feedback.

## 2023-02-28 NOTE — PATIENT INSTRUCTIONS
Your nerve study showed severe bilateral carpal tunnel syndrome.    As we discussed, this can get worse and become irreversible in the future. At this point you are seeing some improvement with the vitamins you are taking. Keep taking these! Keep in mind, that should you see worsening of your symptoms, come back to clinic so we can discuss surgery.

## 2023-03-07 ENCOUNTER — TELEPHONE (OUTPATIENT)
Dept: ORTHOPEDICS | Facility: CLINIC | Age: 68
End: 2023-03-07
Payer: MEDICARE

## 2023-03-07 NOTE — TELEPHONE ENCOUNTER
I called and spoke to the patient regarding the message below. The patient asked if it would be okay for her to come in for this appointment instead of a later appointment closer to her surgery date. I informed the patient that she can come in for this appointment.    The patient verbalized understanding and has no further questions.      ----- Message from Elba Hearn sent at 3/7/2023 10:09 AM CST -----  Regarding: pt advice  Contact: Pt @ [phone]  Pt is calling to make sure that she is not schedule to soon for a visit: pt is inquiring if she will be charged for this visit. Please call to advise. Thank you.

## 2023-03-13 ENCOUNTER — OFFICE VISIT (OUTPATIENT)
Dept: OTOLARYNGOLOGY | Facility: CLINIC | Age: 68
End: 2023-03-13
Payer: MEDICARE

## 2023-03-13 VITALS
HEART RATE: 75 BPM | DIASTOLIC BLOOD PRESSURE: 78 MMHG | WEIGHT: 169 LBS | SYSTOLIC BLOOD PRESSURE: 138 MMHG | BODY MASS INDEX: 35.32 KG/M2

## 2023-03-13 DIAGNOSIS — R22.1 NECK SWELLING: Primary | ICD-10-CM

## 2023-03-13 PROCEDURE — 1126F PR PAIN SEVERITY QUANTIFIED, NO PAIN PRESENT: ICD-10-PCS | Mod: HCNC,CPTII,S$GLB, | Performed by: OTOLARYNGOLOGY

## 2023-03-13 PROCEDURE — 31575 DIAGNOSTIC LARYNGOSCOPY: CPT | Mod: HCNC,S$GLB,, | Performed by: OTOLARYNGOLOGY

## 2023-03-13 PROCEDURE — 99214 OFFICE O/P EST MOD 30 MIN: CPT | Mod: 25,HCNC,S$GLB, | Performed by: OTOLARYNGOLOGY

## 2023-03-13 PROCEDURE — 31575 PR LARYNGOSCOPY, FLEXIBLE; DIAGNOSTIC: ICD-10-PCS | Mod: HCNC,S$GLB,, | Performed by: OTOLARYNGOLOGY

## 2023-03-13 PROCEDURE — 99214 PR OFFICE/OUTPT VISIT, EST, LEVL IV, 30-39 MIN: ICD-10-PCS | Mod: 25,HCNC,S$GLB, | Performed by: OTOLARYNGOLOGY

## 2023-03-13 PROCEDURE — 1159F PR MEDICATION LIST DOCUMENTED IN MEDICAL RECORD: ICD-10-PCS | Mod: HCNC,CPTII,S$GLB, | Performed by: OTOLARYNGOLOGY

## 2023-03-13 PROCEDURE — 99999 PR PBB SHADOW E&M-EST. PATIENT-LVL III: ICD-10-PCS | Mod: PBBFAC,HCNC,, | Performed by: OTOLARYNGOLOGY

## 2023-03-13 PROCEDURE — 3008F PR BODY MASS INDEX (BMI) DOCUMENTED: ICD-10-PCS | Mod: HCNC,CPTII,S$GLB, | Performed by: OTOLARYNGOLOGY

## 2023-03-13 PROCEDURE — 3078F PR MOST RECENT DIASTOLIC BLOOD PRESSURE < 80 MM HG: ICD-10-PCS | Mod: HCNC,CPTII,S$GLB, | Performed by: OTOLARYNGOLOGY

## 2023-03-13 PROCEDURE — 3075F SYST BP GE 130 - 139MM HG: CPT | Mod: HCNC,CPTII,S$GLB, | Performed by: OTOLARYNGOLOGY

## 2023-03-13 PROCEDURE — 1159F MED LIST DOCD IN RCRD: CPT | Mod: HCNC,CPTII,S$GLB, | Performed by: OTOLARYNGOLOGY

## 2023-03-13 PROCEDURE — 1157F PR ADVANCE CARE PLAN OR EQUIV PRESENT IN MEDICAL RECORD: ICD-10-PCS | Mod: HCNC,CPTII,S$GLB, | Performed by: OTOLARYNGOLOGY

## 2023-03-13 PROCEDURE — 3075F PR MOST RECENT SYSTOLIC BLOOD PRESS GE 130-139MM HG: ICD-10-PCS | Mod: HCNC,CPTII,S$GLB, | Performed by: OTOLARYNGOLOGY

## 2023-03-13 PROCEDURE — 99999 PR PBB SHADOW E&M-EST. PATIENT-LVL III: CPT | Mod: PBBFAC,HCNC,, | Performed by: OTOLARYNGOLOGY

## 2023-03-13 PROCEDURE — 1126F AMNT PAIN NOTED NONE PRSNT: CPT | Mod: HCNC,CPTII,S$GLB, | Performed by: OTOLARYNGOLOGY

## 2023-03-13 PROCEDURE — 3078F DIAST BP <80 MM HG: CPT | Mod: HCNC,CPTII,S$GLB, | Performed by: OTOLARYNGOLOGY

## 2023-03-13 PROCEDURE — 3008F BODY MASS INDEX DOCD: CPT | Mod: HCNC,CPTII,S$GLB, | Performed by: OTOLARYNGOLOGY

## 2023-03-13 PROCEDURE — 1157F ADVNC CARE PLAN IN RCRD: CPT | Mod: HCNC,CPTII,S$GLB, | Performed by: OTOLARYNGOLOGY

## 2023-03-13 NOTE — PROGRESS NOTES
Chief Complaint   Patient presents with    swelling of the neck         68 y.o. female presents with concern for left neck swelling. One week ago noted a sensation of swallowing something in her throat on the left side. Mild associated pain with swallowing at that time. Also noted that her left neck felt a little hard and swollen as well. Symptoms now much improved but still feels that there is some asymmetry on the left side.       Review of Systems     Constitutional: Negative for fatigue and unexpected weight change.   HENT: per HPI.  Eyes: Negative for visual disturbance.   Respiratory: Negative for shortness of breath, hemoptysis  Cardiovascular: Negative for chest pain and palpitations.   Genitourinary: Negative for dysuria and difficulty urinating.   Musculoskeletal: Negative for decreased ROM, back pain.   Skin: Negative for rash, sunburn, itching.   Neurological: Negative for dizziness and seizures.   Hematological: Negative for adenopathy. Does not bruise/bleed easily.   Psychiatric/Behavioral: Negative for agitation. The patient is not nervous/anxious.   Endocrine: Negative for rapid weight loss/weight gain, heat/cold intolerance.     Past Medical History:   Diagnosis Date    Bilateral knee pain     Carpal tunnel syndrome, bilateral     Fissure in skin of foot     Right small toe    HTN (hypertension)     Hyperlipidemia     Palpitations     Rotator cuff injury     right    Screening for colorectal cancer 10/20/2017    Screening for malignant neoplasm of colon 2021    Statin-induced myositis 2018       Past Surgical History:   Procedure Laterality Date    BILATERAL SALPINGOOPHORECTOMY  2000    BREAST BIOPSY Left     malignant    BREAST BIOPSY Left     negative    BREAST LUMPECTOMY Left     CATARACT EXTRACTION W/  INTRAOCULAR LENS IMPLANT Right 2018    Dr. Oneil    CATARACT EXTRACTION W/  INTRAOCULAR LENS IMPLANT Left 2018    Dr. Oneil     SECTION      x2     COLONOSCOPY N/A 10/20/2017    Procedure: COLONOSCOPY;  Surgeon: Mitchell Danielson Jr., MD;  Location: [facility] ENDO;  Service: Endoscopy;  Laterality: N/A;    COLONOSCOPY N/A 2/5/2021    Procedure: COLONOSCOPY/Suprep;  Surgeon: Malcolm Reyes MD;  Location: [facility] ENDO;  Service: Endoscopy;  Laterality: N/A;    CYST REMOVAL      on back    ESOPHAGOGASTRODUODENOSCOPY N/A 2/5/2021    Procedure: EGD (ESOPHAGOGASTRODUODENOSCOPY);  Surgeon: Malcolm Reyes MD;  Location: [facility] ENDO;  Service: Endoscopy;  Laterality: N/A;    HERNIA REPAIR      HYSTERECTOMY      at 25 yrs old    INTRAOCULAR PROSTHESES INSERTION Left 11/6/2018    Procedure: INSERTION, IOL PROSTHESIS;  Surgeon: Sergio Oneil MD;  Location: [facility] OR 1ST FLR;  Service: Ophthalmology;  Laterality: Left;    INTRAOCULAR PROSTHESES INSERTION Right 11/20/2018    Procedure: INSERTION, IOL PROSTHESIS;  Surgeon: Sergio Oneil MD;  Location: [facility] OR 2ND FLR;  Service: Ophthalmology;  Laterality: Right;    KNEE ARTHROPLASTY Right 3/31/2021    Procedure: ARTHROPLASTY, KNEE:RIGHT:DEPUY-SIGMA ;  Surgeon: Pedro Summers III, MD;  Location: [facility] OR;  Service: Orthopedics;  Laterality: Right;    OOPHORECTOMY      @ 45 yrs old    PHACOEMULSIFICATION OF CATARACT Left 11/6/2018    Procedure: PHACOEMULSIFICATION, CATARACT;  Surgeon: Sergio Oneil MD;  Location: [facility] OR 1ST FLR;  Service: Ophthalmology;  Laterality: Left;    PHACOEMULSIFICATION OF CATARACT Right 11/20/2018    Procedure: PHACOEMULSIFICATION, CATARACT;  Surgeon: Sergio Oneil MD;  Location: [facility] OR 2ND FLR;  Service: Ophthalmology;  Laterality: Right;    supracervical abdominal hysterectomy  1978    fibroids       family history includes Breast cancer in her paternal cousin; Cancer in her father; Cataracts in her mother; Diabetes in her brother and mother; Heart disease in her brother and mother; Hyperlipidemia in her mother; Hypertension in her mother and sister; Macular  degeneration in her mother; Pancreatitis in her mother; Thyroid disease in her sister.    Pt  reports that she has never smoked. She has never used smokeless tobacco. She reports current alcohol use. She reports that she does not use drugs.    Review of patient's allergies indicates:   Allergen Reactions    Codeine Nausea And Vomiting        Physical Exam    Vitals:    03/13/23 1103   BP: 138/78   Pulse: 75     Body mass index is 35.32 kg/m².    Physical Exam  Vitals and nursing note reviewed.   Constitutional:       General: She is not in acute distress.     Appearance: She is well-developed. She is not diaphoretic.   HENT:      Head: Normocephalic and atraumatic.      Right Ear: Hearing and external ear normal. No decreased hearing noted.      Left Ear: Hearing and external ear normal. No decreased hearing noted.      Nose: Nose normal.      Mouth/Throat:      Mouth: Mucous membranes are moist. No oral lesions.      Tongue: No lesions.      Palate: No mass and lesions.      Pharynx: Oropharynx is clear. Uvula midline.   Eyes:      General: Lids are normal.         Right eye: No discharge.         Left eye: No discharge.      Conjunctiva/sclera: Conjunctivae normal.      Pupils: Pupils are equal, round, and reactive to light.   Neck:      Thyroid: No thyroid mass or thyromegaly.      Trachea: Trachea and phonation normal. No tracheal tenderness or tracheal deviation.   Cardiovascular:      Heart sounds: Normal heart sounds.   Pulmonary:      Breath sounds: Normal breath sounds. No stridor.   Abdominal:      Palpations: Abdomen is soft.   Musculoskeletal:      Cervical back: Normal range of motion and neck supple. No edema or erythema.   Lymphadenopathy:      Cervical: No cervical adenopathy.   Skin:     General: Skin is warm and dry.      Coloration: Skin is not pale.      Findings: No erythema or rash.   Neurological:      Mental Status: She is alert and oriented to person, place, and time.     Procedure: Flexible  laryngoscopy  In order to fully examine the upper aerodigestive tract, including the larynx, in a patient with a hyperactive gag reflex, flexible endoscopy is required.  After explaining the procedure and obtaining verbal consent, a timeout was performed with the patient's participation according to the universal protocol. Both nasal cavities were anesthetized with 4% Xylocaine spray mixed with Simba-Synephrine. The flexible laryngoscope was inserted into the nasal cavity and advanced to visualize the nasal cavity, nasopharynx, the posterior oropharynx, hypopharynx, and the endolarynx with the above findings noted. The scope was removed and the procedure terminated. The patient tolerated this procedure well without apparent complication.      Nasopharynx - the torus is clear. There are no lesions of the posterior wall.   Oropharynx - no lesions of the tongue base. There is no obvious fullness or asymmetry.  Hypopharynx - there are no lesions of the pyriform sinuses or postcricoid region   Larynx - there are no lesions of the supraglottic or glottic larynx. Vocal fold mobility is normal with complete closure.      Assessment     1. Neck swelling          Plan  In summary, Ms. Rodriguez is a 68 year old female with recently noted left neck swelling with mild pain. Now resolved. No abnormal finding on exam or laryngoscopy today, reassurance given. May have been a short bout of sialadenitis. Discussed increased hydration, sialogogues, warm compresses and massage as needed. All questions were answered. Return to clinic if new concerns occur.

## 2023-03-14 ENCOUNTER — OFFICE VISIT (OUTPATIENT)
Dept: ORTHOPEDICS | Facility: CLINIC | Age: 68
End: 2023-03-14
Payer: MEDICARE

## 2023-03-14 ENCOUNTER — HOSPITAL ENCOUNTER (OUTPATIENT)
Dept: RADIOLOGY | Facility: HOSPITAL | Age: 68
Discharge: HOME | End: 2023-03-14
Attending: ORTHOPAEDIC SURGERY
Payer: MEDICARE

## 2023-03-14 VITALS — HEIGHT: 59 IN | WEIGHT: 153 LBS | BODY MASS INDEX: 30.84 KG/M2

## 2023-03-14 DIAGNOSIS — Z96.651 STATUS POST TOTAL RIGHT KNEE REPLACEMENT: ICD-10-CM

## 2023-03-14 DIAGNOSIS — Z96.651 STATUS POST TOTAL RIGHT KNEE REPLACEMENT: Primary | ICD-10-CM

## 2023-03-14 PROCEDURE — 3288F FALL RISK ASSESSMENT DOCD: CPT | Mod: HCNC,CPTII,S$GLB, | Performed by: ORTHOPAEDIC SURGERY

## 2023-03-14 PROCEDURE — 1125F AMNT PAIN NOTED PAIN PRSNT: CPT | Mod: HCNC,CPTII,S$GLB, | Performed by: ORTHOPAEDIC SURGERY

## 2023-03-14 PROCEDURE — 3008F PR BODY MASS INDEX (BMI) DOCUMENTED: ICD-10-PCS | Mod: HCNC,CPTII,S$GLB, | Performed by: ORTHOPAEDIC SURGERY

## 2023-03-14 PROCEDURE — 1159F MED LIST DOCD IN RCRD: CPT | Mod: HCNC,CPTII,S$GLB, | Performed by: ORTHOPAEDIC SURGERY

## 2023-03-14 PROCEDURE — 1157F ADVNC CARE PLAN IN RCRD: CPT | Mod: HCNC,CPTII,S$GLB, | Performed by: ORTHOPAEDIC SURGERY

## 2023-03-14 PROCEDURE — 1101F PT FALLS ASSESS-DOCD LE1/YR: CPT | Mod: HCNC,CPTII,S$GLB, | Performed by: ORTHOPAEDIC SURGERY

## 2023-03-14 PROCEDURE — 1157F PR ADVANCE CARE PLAN OR EQUIV PRESENT IN MEDICAL RECORD: ICD-10-PCS | Mod: HCNC,CPTII,S$GLB, | Performed by: ORTHOPAEDIC SURGERY

## 2023-03-14 PROCEDURE — 99213 PR OFFICE/OUTPT VISIT, EST, LEVL III, 20-29 MIN: ICD-10-PCS | Mod: HCNC,S$GLB,, | Performed by: ORTHOPAEDIC SURGERY

## 2023-03-14 PROCEDURE — 1101F PR PT FALLS ASSESS DOC 0-1 FALLS W/OUT INJ PAST YR: ICD-10-PCS | Mod: HCNC,CPTII,S$GLB, | Performed by: ORTHOPAEDIC SURGERY

## 2023-03-14 PROCEDURE — 3288F PR FALLS RISK ASSESSMENT DOCUMENTED: ICD-10-PCS | Mod: HCNC,CPTII,S$GLB, | Performed by: ORTHOPAEDIC SURGERY

## 2023-03-14 PROCEDURE — 3008F BODY MASS INDEX DOCD: CPT | Mod: HCNC,CPTII,S$GLB, | Performed by: ORTHOPAEDIC SURGERY

## 2023-03-14 PROCEDURE — 99213 OFFICE O/P EST LOW 20 MIN: CPT | Mod: HCNC,S$GLB,, | Performed by: ORTHOPAEDIC SURGERY

## 2023-03-14 PROCEDURE — 99999 PR PBB SHADOW E&M-EST. PATIENT-LVL III: ICD-10-PCS | Mod: PBBFAC,HCNC,, | Performed by: ORTHOPAEDIC SURGERY

## 2023-03-14 PROCEDURE — 73562 XR KNEE 3 VIEW RIGHT: ICD-10-PCS | Mod: 26,HCNC,RT, | Performed by: RADIOLOGY

## 2023-03-14 PROCEDURE — 73562 X-RAY EXAM OF KNEE 3: CPT | Mod: TC,HCNC,RT

## 2023-03-14 PROCEDURE — 73562 X-RAY EXAM OF KNEE 3: CPT | Mod: 26,HCNC,RT, | Performed by: RADIOLOGY

## 2023-03-14 PROCEDURE — 1159F PR MEDICATION LIST DOCUMENTED IN MEDICAL RECORD: ICD-10-PCS | Mod: HCNC,CPTII,S$GLB, | Performed by: ORTHOPAEDIC SURGERY

## 2023-03-14 PROCEDURE — 99999 PR PBB SHADOW E&M-EST. PATIENT-LVL III: CPT | Mod: PBBFAC,HCNC,, | Performed by: ORTHOPAEDIC SURGERY

## 2023-03-14 PROCEDURE — 1125F PR PAIN SEVERITY QUANTIFIED, PAIN PRESENT: ICD-10-PCS | Mod: HCNC,CPTII,S$GLB, | Performed by: ORTHOPAEDIC SURGERY

## 2023-03-14 NOTE — PROGRESS NOTES
"Subjective:     HPI:   Chelsea Rodriguez is a 68 y.o. female who presents for repeat eval R TKA 3/31/21    Last seen 11/29/22:    F/u from October:   Symptoms unchanged since march,   -knee feels tight all the time  -medial knee pain, difficulty getting up from low sofa     adductor tubercle insertional tendonitis?   TKA appears mechanically well functioning     CRP 2.6  ESR 26  Normal  sent Rx for medrol dose pack     Says dose pack "really did help" and pain at medial knee is now 90% gone     Today c/o anterior knee pain at mid incision when tries to step up more than down. She is doing up with good/down with bad     Did go to lymphedema clinic: wearing comp socks and got new exercises that are helping     Doing mini trampoline every morning - helps    adductor tubercle insertional tendonitis resolved with medrol dose pack  November: c/o tendonitis at patella/patellar tendon origin    Today:     Overall "a lot better" able to raise leg up   No pain at rest  Still has ant pain with activity  "You did a perfect job, my knee is beautiful"    Takes tylenol rarely PRN  Uses cane rarely if she knows there is going to be a lot of stairs "I don't let it keep me from doing what I want to do"       Objective:   Body mass index is 31.43 kg/m².  Exam:  No limp nonantalgic gait negative Trendelenburg.  0-95 or 100 degree knee flexion her preop range of motion was 0-100.  She does have some tightness and some discomfort at terminal flexion.  5 degree valgus alignment knee stable anterior-posterior varus and valgus stresses with no flexion contracture or extensor lag she is nontender palpation mediolateral patellofemoral joint lines nontender along the patellar tendon no hypersensitivity no effusion she has 5/5 quad strength      Imaging:  Well-aligned total knee replacement, no change from previous x-rays stable radiolucent lines under tray appears well fixed      Assessment:       ICD-10-CM ICD-9-CM   1. Status post total right " knee replacement  Z96.651 V43.65      Post op adductor and patellar tendonitis  Now mostly consistent with arthrofibrosis and pre-op 0-100 deg knee ROM     Plan:      They will continue with their routine care of the knee replacement and see me back for their follow-up at the routine interval.  If there are problems in the interim they will see me back sooner.    F/u 2 years for rpt R TKA XR    No orders of the defined types were placed in this encounter.            Past Medical History:   Diagnosis Date    Bilateral knee pain     Carpal tunnel syndrome, bilateral     Fissure in skin of foot     Right small toe    HTN (hypertension)     Hyperlipidemia     Palpitations     Rotator cuff injury     right    Screening for colorectal cancer 10/20/2017    Screening for malignant neoplasm of colon 2021    Statin-induced myositis 2018       Past Surgical History:   Procedure Laterality Date    BILATERAL SALPINGOOPHORECTOMY  2000    BREAST BIOPSY Left     malignant    BREAST BIOPSY Left     negative    BREAST LUMPECTOMY Left     CATARACT EXTRACTION W/  INTRAOCULAR LENS IMPLANT Right 2018    Dr. Oneil    CATARACT EXTRACTION W/  INTRAOCULAR LENS IMPLANT Left 2018    Dr. Oneil     SECTION      x2    COLONOSCOPY N/A 10/20/2017    Procedure: COLONOSCOPY;  Surgeon: Mitchell Danielson Jr., MD;  Location: [facility];  Service: Endoscopy;  Laterality: N/A;    COLONOSCOPY N/A 2021    Procedure: COLONOSCOPY/Suprep;  Surgeon: Malcolm Reyes MD;  Location: [facility];  Service: Endoscopy;  Laterality: N/A;    CYST REMOVAL      on back    ESOPHAGOGASTRODUODENOSCOPY N/A 2021    Procedure: EGD (ESOPHAGOGASTRODUODENOSCOPY);  Surgeon: Mlacolm Reyes MD;  Location: [facility];  Service: Endoscopy;  Laterality: N/A;    HERNIA REPAIR      HYSTERECTOMY      at 25 yrs old    INTRAOCULAR PROSTHESES INSERTION Left 2018    Procedure: INSERTION, IOL PROSTHESIS;  Surgeon: Sergio AHUMADA  MD Thad;  Location: [facility] OR 1ST FLR;  Service: Ophthalmology;  Laterality: Left;    INTRAOCULAR PROSTHESES INSERTION Right 11/20/2018    Procedure: INSERTION, IOL PROSTHESIS;  Surgeon: Sergio Oneil MD;  Location: [facility] OR 2ND FLR;  Service: Ophthalmology;  Laterality: Right;    KNEE ARTHROPLASTY Right 3/31/2021    Procedure: ARTHROPLASTY, KNEE:RIGHT:DEPUY-SIGMA ;  Surgeon: Pedro Summers III, MD;  Location: [facility];  Service: Orthopedics;  Laterality: Right;    OOPHORECTOMY      @ 45 yrs old    PHACOEMULSIFICATION OF CATARACT Left 11/6/2018    Procedure: PHACOEMULSIFICATION, CATARACT;  Surgeon: Sergio Oneil MD;  Location: [facility] OR [address] [place] [address];  Service: Ophthalmology;  Laterality: Left;    PHACOEMULSIFICATION OF CATARACT Right 11/20/2018    Procedure: PHACOEMULSIFICATION, CATARACT;  Surgeon: Sergio Oneil MD;  [facility];  Service: Ophthalmology;  Laterality: Right;    supracervical abdominal hysterectomy  1978    fibroids       Family History   Problem Relation Age of Onset    Hypertension Mother     Heart disease Mother     Diabetes Mother     Hyperlipidemia Mother     Pancreatitis Mother     Cataracts Mother     Macular degeneration Mother     Cancer Father     Breast cancer Paternal Cousin     Hypertension Sister     Thyroid disease Sister     Diabetes Brother     Heart disease Brother     Amblyopia Neg Hx     Blindness Neg Hx     Glaucoma Neg Hx     Strabismus Neg Hx     Retinal detachment Neg Hx        Social History     Socioeconomic History    Marital status: Single    Number of children: 2   Occupational History     Comment: retired   Tobacco Use    Smoking status: Never    Smokeless tobacco: Never   Substance and Sexual Activity    Alcohol use: Yes     Comment: once per month    Drug use: No    Sexual activity: Not Currently   Social History Narrative    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 hx of left lumpectomy for "breast cancer"- with 5yrs of tamoxifen use. Sees Dr. Buckley ([facility] for surveillance/MMG)        Dr. Lala former PCP, [facility]          Social Determinants of Health     Financial Resource Strain: Low Risk     Difficulty of Paying Living Expenses: Not hard at all   Food Insecurity: No Food Insecurity    Worried About Running Out of Food in the Last Year: Never true    Ran Out of Food in the Last Year: Never true   Transportation Needs: No Transportation Needs    Lack of Transportation (Medical): No    Lack of Transportation (Non-Medical): No   Physical Activity: Inactive    Days of Exercise per Week: 0 days    Minutes of Exercise per Session: 0 min   Stress: No Stress Concern Present    Feeling of Stress : Only a little   Social Connections: Moderately Integrated    Frequency of Communication with Friends and Family: More than three times a week    Frequency of Social Gatherings with Friends and Family: More than three times a week    Attends [employer] Services: More than 4 times per year    Active Member of Clubs or Organizations: Yes    Attends Club or Organization Meetings: More than 4 times per year    Marital Status:    Housing Stability: Low Risk     Unable to Pay for Housing in the Last Year: No    Number of Places Lived in the Last Year: 1    Unstable Housing in the Last Year: No               "

## 2023-04-17 ENCOUNTER — TELEPHONE (OUTPATIENT)
Dept: ADMINISTRATIVE | Facility: CLINIC | Age: 68
End: 2023-04-17
Payer: MEDICARE

## 2023-04-17 NOTE — TELEPHONE ENCOUNTER
Called pt, informed pt I was calling to remind pt of her in office EAWV on 4/18/23; clinic location provided to patient; pt confirmed appointment

## 2023-04-18 ENCOUNTER — OFFICE VISIT (OUTPATIENT)
Dept: FAMILY MEDICINE | Facility: CLINIC | Age: 68
End: 2023-04-18
Payer: MEDICARE

## 2023-04-18 VITALS
HEART RATE: 77 BPM | DIASTOLIC BLOOD PRESSURE: 72 MMHG | WEIGHT: 167.56 LBS | HEIGHT: 59 IN | BODY MASS INDEX: 33.78 KG/M2 | OXYGEN SATURATION: 98 % | SYSTOLIC BLOOD PRESSURE: 138 MMHG

## 2023-04-18 DIAGNOSIS — R73.9 HYPERGLYCEMIA: ICD-10-CM

## 2023-04-18 DIAGNOSIS — Z00.00 ENCOUNTER FOR MEDICARE ANNUAL WELLNESS EXAM: ICD-10-CM

## 2023-04-18 DIAGNOSIS — E78.5 HYPERLIPIDEMIA, UNSPECIFIED HYPERLIPIDEMIA TYPE: ICD-10-CM

## 2023-04-18 DIAGNOSIS — I10 ESSENTIAL HYPERTENSION: ICD-10-CM

## 2023-04-18 DIAGNOSIS — Z00.00 ENCOUNTER FOR PREVENTIVE HEALTH EXAMINATION: Primary | ICD-10-CM

## 2023-04-18 DIAGNOSIS — E66.9 OBESITY (BMI 30.0-34.9): ICD-10-CM

## 2023-04-18 DIAGNOSIS — R26.9 ABNORMALITY OF GAIT AND MOBILITY: ICD-10-CM

## 2023-04-18 PROBLEM — C50.919 MALIGNANT NEOPLASM OF FEMALE BREAST, UNSPECIFIED ESTROGEN RECEPTOR STATUS, UNSPECIFIED LATERALITY, UNSPECIFIED SITE OF BREAST: Status: RESOLVED | Noted: 2021-12-01 | Resolved: 2023-04-18

## 2023-04-18 PROBLEM — G56.00 CARPAL TUNNEL SYNDROME: Status: ACTIVE | Noted: 2023-04-18

## 2023-04-18 PROBLEM — F41.9 ANXIETY: Status: ACTIVE | Noted: 2023-04-18

## 2023-04-18 PROBLEM — Z98.890 PONV (POSTOPERATIVE NAUSEA AND VOMITING): Status: RESOLVED | Noted: 2021-03-23 | Resolved: 2023-04-18

## 2023-04-18 PROBLEM — M75.121 NONTRAUMATIC COMPLETE TEAR OF RIGHT ROTATOR CUFF: Status: RESOLVED | Noted: 2019-10-14 | Resolved: 2023-04-18

## 2023-04-18 PROBLEM — R11.2 PONV (POSTOPERATIVE NAUSEA AND VOMITING): Status: RESOLVED | Noted: 2021-03-23 | Resolved: 2023-04-18

## 2023-04-18 PROBLEM — M79.601 PAIN OF RIGHT UPPER EXTREMITY: Status: RESOLVED | Noted: 2017-10-05 | Resolved: 2023-04-18

## 2023-04-18 PROCEDURE — 1170F PR FUNCTIONAL STATUS ASSESSED: ICD-10-PCS | Mod: HCNC,CPTII,S$GLB, | Performed by: NURSE PRACTITIONER

## 2023-04-18 PROCEDURE — G0439 PPPS, SUBSEQ VISIT: HCPCS | Mod: HCNC,S$GLB,, | Performed by: NURSE PRACTITIONER

## 2023-04-18 PROCEDURE — 3075F SYST BP GE 130 - 139MM HG: CPT | Mod: HCNC,CPTII,S$GLB, | Performed by: NURSE PRACTITIONER

## 2023-04-18 PROCEDURE — 1159F MED LIST DOCD IN RCRD: CPT | Mod: HCNC,CPTII,S$GLB, | Performed by: NURSE PRACTITIONER

## 2023-04-18 PROCEDURE — 3008F BODY MASS INDEX DOCD: CPT | Mod: HCNC,CPTII,S$GLB, | Performed by: NURSE PRACTITIONER

## 2023-04-18 PROCEDURE — 1157F ADVNC CARE PLAN IN RCRD: CPT | Mod: HCNC,CPTII,S$GLB, | Performed by: NURSE PRACTITIONER

## 2023-04-18 PROCEDURE — 3078F DIAST BP <80 MM HG: CPT | Mod: HCNC,CPTII,S$GLB, | Performed by: NURSE PRACTITIONER

## 2023-04-18 PROCEDURE — 1157F PR ADVANCE CARE PLAN OR EQUIV PRESENT IN MEDICAL RECORD: ICD-10-PCS | Mod: HCNC,CPTII,S$GLB, | Performed by: NURSE PRACTITIONER

## 2023-04-18 PROCEDURE — 1159F PR MEDICATION LIST DOCUMENTED IN MEDICAL RECORD: ICD-10-PCS | Mod: HCNC,CPTII,S$GLB, | Performed by: NURSE PRACTITIONER

## 2023-04-18 PROCEDURE — 1160F RVW MEDS BY RX/DR IN RCRD: CPT | Mod: HCNC,CPTII,S$GLB, | Performed by: NURSE PRACTITIONER

## 2023-04-18 PROCEDURE — 3288F PR FALLS RISK ASSESSMENT DOCUMENTED: ICD-10-PCS | Mod: HCNC,CPTII,S$GLB, | Performed by: NURSE PRACTITIONER

## 2023-04-18 PROCEDURE — 1170F FXNL STATUS ASSESSED: CPT | Mod: HCNC,CPTII,S$GLB, | Performed by: NURSE PRACTITIONER

## 2023-04-18 PROCEDURE — 1101F PT FALLS ASSESS-DOCD LE1/YR: CPT | Mod: HCNC,CPTII,S$GLB, | Performed by: NURSE PRACTITIONER

## 2023-04-18 PROCEDURE — 3288F FALL RISK ASSESSMENT DOCD: CPT | Mod: HCNC,CPTII,S$GLB, | Performed by: NURSE PRACTITIONER

## 2023-04-18 PROCEDURE — G0439 PR MEDICARE ANNUAL WELLNESS SUBSEQUENT VISIT: ICD-10-PCS | Mod: HCNC,S$GLB,, | Performed by: NURSE PRACTITIONER

## 2023-04-18 PROCEDURE — 1160F PR REVIEW ALL MEDS BY PRESCRIBER/CLIN PHARMACIST DOCUMENTED: ICD-10-PCS | Mod: HCNC,CPTII,S$GLB, | Performed by: NURSE PRACTITIONER

## 2023-04-18 PROCEDURE — 1101F PR PT FALLS ASSESS DOC 0-1 FALLS W/OUT INJ PAST YR: ICD-10-PCS | Mod: HCNC,CPTII,S$GLB, | Performed by: NURSE PRACTITIONER

## 2023-04-18 PROCEDURE — 99999 PR PBB SHADOW E&M-EST. PATIENT-LVL IV: ICD-10-PCS | Mod: PBBFAC,HCNC,, | Performed by: NURSE PRACTITIONER

## 2023-04-18 PROCEDURE — 99999 PR PBB SHADOW E&M-EST. PATIENT-LVL IV: CPT | Mod: PBBFAC,HCNC,, | Performed by: NURSE PRACTITIONER

## 2023-04-18 PROCEDURE — 1126F PR PAIN SEVERITY QUANTIFIED, NO PAIN PRESENT: ICD-10-PCS | Mod: HCNC,CPTII,S$GLB, | Performed by: NURSE PRACTITIONER

## 2023-04-18 PROCEDURE — 3078F PR MOST RECENT DIASTOLIC BLOOD PRESSURE < 80 MM HG: ICD-10-PCS | Mod: HCNC,CPTII,S$GLB, | Performed by: NURSE PRACTITIONER

## 2023-04-18 PROCEDURE — 3008F PR BODY MASS INDEX (BMI) DOCUMENTED: ICD-10-PCS | Mod: HCNC,CPTII,S$GLB, | Performed by: NURSE PRACTITIONER

## 2023-04-18 PROCEDURE — 1126F AMNT PAIN NOTED NONE PRSNT: CPT | Mod: HCNC,CPTII,S$GLB, | Performed by: NURSE PRACTITIONER

## 2023-04-18 PROCEDURE — G9919 SCRN ND POS ND PROV OF REC: HCPCS | Mod: HCNC,CPTII,S$GLB, | Performed by: NURSE PRACTITIONER

## 2023-04-18 PROCEDURE — 3075F PR MOST RECENT SYSTOLIC BLOOD PRESS GE 130-139MM HG: ICD-10-PCS | Mod: HCNC,CPTII,S$GLB, | Performed by: NURSE PRACTITIONER

## 2023-04-18 PROCEDURE — G9919 PR SCREENING AND POSITIVE: ICD-10-PCS | Mod: HCNC,CPTII,S$GLB, | Performed by: NURSE PRACTITIONER

## 2023-04-18 NOTE — PATIENT INSTRUCTIONS
Counseling and Referral of Other Preventative  (Italic type indicates deductible and co-insurance are waived)    Patient Name: Chelsea Rodriguez  Today's Date: 4/18/2023    Health Maintenance       Date Due Completion Date    Hemoglobin A1c (Diabetic Prevention Screening) 05/05/2023 (Originally 1/8/1990) ---    DEXA Scan 07/13/2023 7/13/2020    Mammogram 01/20/2024 1/20/2023    Override on 11/16/2016: Done    Colorectal Cancer Screening 02/05/2025 2/5/2021    Override on 11/5/2012: Done    TETANUS VACCINE 11/03/2027 11/3/2017    Lipid Panel 12/13/2027 12/13/2022        Orders Placed This Encounter   Procedures    Hemoglobin A1C     The following information is provided to all patients.  This information is to help you find resources for any of the problems found today that may be affecting your health:                Living healthy guide: www.[facility].[place].[facility]      Understanding Diabetes: www.diabetes.org      Eating healthy: www.cdc.gov/healthyweight      [facility] home safety checklist: www.cdc.gov/steadi/patient.html      Agency on Aging: www.goea.[place]      Alcoholics anonymous (AA): www.aa.org      Physical Activity: www.agusto.nih.gov/aw8lyqt      Tobacco use: www.quitwithusla.org

## 2023-04-18 NOTE — PROGRESS NOTES
"  Chelsea Rodriguez presented for a  Medicare AWV and comprehensive Health Risk Assessment today. The following components were reviewed and updated:    Medical history  Family History  Social history  Allergies and Current Medications  Health Risk Assessment  Health Maintenance  Care Team     Patient screened moderate and/or high risk for one or more social determinants of health (SDOH). Patient connected to community resources through the ED Navigator.      ** See Completed Assessments for Annual Wellness Visit within the encounter summary.**         The following assessments were completed:  Living Situation  CAGE  Depression Screening  Timed Get Up and Go  Whisper Test  Cognitive Function Screening      Nutrition Screening  ADL Screening  PAQ Screening        Vitals:    04/18/23 0915   BP: 138/72   BP Location: Left arm   Patient Position: Sitting   BP Method: Large (Manual)   Pulse: 77   SpO2: 98%   Weight: 76 kg (167 lb 8.8 oz)   Height: 4' 10.5" (1.486 m)     Body mass index is 34.42 kg/m².    Physical Exam  Vitals reviewed.   Constitutional:       General: She is not in acute distress.     Appearance: Normal appearance. She is well-developed and well-groomed. She is obese.   HENT:      Head: Normocephalic.   Eyes:      General:         Right eye: No discharge.         Left eye: No discharge.   Cardiovascular:      Rate and Rhythm: Normal rate.   Pulmonary:      Effort: Pulmonary effort is normal. No respiratory distress.   Abdominal:      General: There is no distension.   Skin:     General: Skin is warm and dry.      Coloration: Skin is not pale.   Neurological:      Mental Status: She is alert and oriented to person, place, and time.      Coordination: Coordination normal.      Gait: Gait normal.   Psychiatric:         Attention and Perception: Attention normal.         Mood and Affect: Mood and affect normal.         Speech: Speech normal.         Behavior: Behavior normal. Behavior is cooperative.         " Thought Content: Thought content normal.           Diagnoses and health risks identified today and associated recommendations/orders:    1. Encounter for preventive health examination    2. Essential hypertension  Chronic; stable on medication. Follow up with PCP.    3. Hyperlipidemia, unspecified hyperlipidemia type  Chronic; stable on medication. Follow up with PCP.    4. Hyperglycemia  - Hemoglobin A1C; Future    5. Abnormality of gait and mobility  Ambulates independently; reports some difficulty with climbing stairs. Follow up with PCP.    6. Obesity (BMI 30.0-34.9)  Encouraged patient to continue to eat a low salt/low fat diet and discussed importance of engaging in physical activity at least 5x/week for a minimum of 30 min/day.    7. Encounter for Medicare annual wellness exam  - Ambulatory Referral/Consult to Enhanced Annual Wellness Visit (eAWV)      Provided Chelsea with a 5-10 year written screening schedule and personal prevention plan. Recommendations were developed using the USPSTF age appropriate recommendations. Education, counseling, and referrals were provided as needed. After Visit Summary given to patient which includes a list of additional screenings/tests needed.    Follow up for your next annual wellness visit.    Teresa Redman NP        I offered to discuss advanced care planning, including how to pick a person who would make decisions for you if you were unable to make them for yourself, called a health care power of , and what kind of decisions you might make such as use of life sustaining treatments such as ventilators and tube feeding when faced with a life limiting illness recorded on a living will that they will need to know. (How you want to be cared for as you near the end of your natural life)     X  Patient has advanced directives on file, which we reviewed, and they do not wish to make changes.

## 2023-04-21 ENCOUNTER — TELEPHONE (OUTPATIENT)
Dept: SLEEP MEDICINE | Facility: CLINIC | Age: 68
End: 2023-04-21
Payer: MEDICARE

## 2023-04-21 NOTE — TELEPHONE ENCOUNTER
----- Message from Tiana Muse sent at 4/21/2023 12:19 PM CDT -----  Regarding: CAll ABck  Name of Who is Calling: [facility]              What is the request in detail: Seven requesting a call back about follow up of CPAP supply for Patient Please assist              Can the clinic reply by [facility]: No              What [facility]: [phone]

## 2023-05-24 ENCOUNTER — LAB VISIT (OUTPATIENT)
Dept: LAB | Facility: HOSPITAL | Age: 68
End: 2023-05-24
Attending: INTERNAL MEDICINE
Payer: MEDICARE

## 2023-05-24 DIAGNOSIS — E66.9 OBESITY (BMI 30-39.9): ICD-10-CM

## 2023-05-24 DIAGNOSIS — E78.2 MIXED HYPERLIPIDEMIA: ICD-10-CM

## 2023-05-24 DIAGNOSIS — I10 ESSENTIAL HYPERTENSION: ICD-10-CM

## 2023-05-24 DIAGNOSIS — E55.9 VITAMIN D DEFICIENCY: ICD-10-CM

## 2023-05-24 DIAGNOSIS — R73.9 HYPERGLYCEMIA: ICD-10-CM

## 2023-05-24 LAB
25(OH)D3+25(OH)D2 SERPL-MCNC: 40 NG/ML (ref 30–96)
ALBUMIN SERPL BCP-MCNC: 3.9 G/DL (ref 3.5–5.2)
ALP SERPL-CCNC: 89 U/L (ref 55–135)
ALT SERPL W/O P-5'-P-CCNC: 23 U/L (ref 10–44)
ANION GAP SERPL CALC-SCNC: 9 MMOL/L (ref 8–16)
AST SERPL-CCNC: 32 U/L (ref 10–40)
BASOPHILS # BLD AUTO: 0.03 K/UL (ref 0–0.2)
BASOPHILS NFR BLD: 0.5 % (ref 0–1.9)
BILIRUB SERPL-MCNC: 0.5 MG/DL (ref 0.1–1)
BUN SERPL-MCNC: 16 MG/DL (ref 8–23)
CALCIUM SERPL-MCNC: 9.5 MG/DL (ref 8.7–10.5)
CHLORIDE SERPL-SCNC: 105 MMOL/L (ref 95–110)
CHOLEST SERPL-MCNC: 201 MG/DL (ref 120–199)
CHOLEST/HDLC SERPL: 4.8 {RATIO} (ref 2–5)
CO2 SERPL-SCNC: 28 MMOL/L (ref 23–29)
CREAT SERPL-MCNC: 0.7 MG/DL (ref 0.5–1.4)
DIFFERENTIAL METHOD: ABNORMAL
EOSINOPHIL # BLD AUTO: 0.3 K/UL (ref 0–0.5)
EOSINOPHIL NFR BLD: 4.2 % (ref 0–8)
ERYTHROCYTE [DISTWIDTH] IN BLOOD BY AUTOMATED COUNT: 15.2 % (ref 11.5–14.5)
EST. GFR  (NO RACE VARIABLE): >60 ML/MIN/1.73 M^2
ESTIMATED AVG GLUCOSE: 126 MG/DL (ref 68–131)
GLUCOSE SERPL-MCNC: 101 MG/DL (ref 70–110)
HBA1C MFR BLD: 6 % (ref 4–5.6)
HCT VFR BLD AUTO: 43 % (ref 37–48.5)
HDLC SERPL-MCNC: 42 MG/DL (ref 40–75)
HDLC SERPL: 20.9 % (ref 20–50)
HGB BLD-MCNC: 13.1 G/DL (ref 12–16)
IMM GRANULOCYTES # BLD AUTO: 0.01 K/UL (ref 0–0.04)
IMM GRANULOCYTES NFR BLD AUTO: 0.2 % (ref 0–0.5)
LDLC SERPL CALC-MCNC: 143.2 MG/DL (ref 63–159)
LYMPHOCYTES # BLD AUTO: 2.1 K/UL (ref 1–4.8)
LYMPHOCYTES NFR BLD: 35.6 % (ref 18–48)
MCH RBC QN AUTO: 25.7 PG (ref 27–31)
MCHC RBC AUTO-ENTMCNC: 30.5 G/DL (ref 32–36)
MCV RBC AUTO: 85 FL (ref 82–98)
MONOCYTES # BLD AUTO: 0.5 K/UL (ref 0.3–1)
MONOCYTES NFR BLD: 8.5 % (ref 4–15)
NEUTROPHILS # BLD AUTO: 3.1 K/UL (ref 1.8–7.7)
NEUTROPHILS NFR BLD: 51 % (ref 38–73)
NONHDLC SERPL-MCNC: 159 MG/DL
NRBC BLD-RTO: 0 /100 WBC
PLATELET # BLD AUTO: 311 K/UL (ref 150–450)
PMV BLD AUTO: 10.7 FL (ref 9.2–12.9)
POTASSIUM SERPL-SCNC: 4.2 MMOL/L (ref 3.5–5.1)
PROT SERPL-MCNC: 7 G/DL (ref 6–8.4)
RBC # BLD AUTO: 5.09 M/UL (ref 4–5.4)
SODIUM SERPL-SCNC: 142 MMOL/L (ref 136–145)
TRIGL SERPL-MCNC: 79 MG/DL (ref 30–150)
WBC # BLD AUTO: 5.98 K/UL (ref 3.9–12.7)

## 2023-05-24 PROCEDURE — 80061 LIPID PANEL: CPT | Performed by: INTERNAL MEDICINE

## 2023-05-24 PROCEDURE — 36415 COLL VENOUS BLD VENIPUNCTURE: CPT | Mod: PO | Performed by: INTERNAL MEDICINE

## 2023-05-24 PROCEDURE — 82306 VITAMIN D 25 HYDROXY: CPT | Performed by: INTERNAL MEDICINE

## 2023-05-24 PROCEDURE — 85025 COMPLETE CBC W/AUTO DIFF WBC: CPT | Performed by: INTERNAL MEDICINE

## 2023-05-24 PROCEDURE — 80053 COMPREHEN METABOLIC PANEL: CPT | Performed by: INTERNAL MEDICINE

## 2023-05-24 PROCEDURE — 83036 HEMOGLOBIN GLYCOSYLATED A1C: CPT | Performed by: NURSE PRACTITIONER

## 2023-06-01 ENCOUNTER — OFFICE VISIT (OUTPATIENT)
Dept: INTERNAL MEDICINE | Facility: CLINIC | Age: 68
End: 2023-06-01
Payer: MEDICARE

## 2023-06-01 VITALS
WEIGHT: 168.19 LBS | BODY MASS INDEX: 33.91 KG/M2 | HEART RATE: 77 BPM | OXYGEN SATURATION: 98 % | SYSTOLIC BLOOD PRESSURE: 138 MMHG | DIASTOLIC BLOOD PRESSURE: 60 MMHG | HEIGHT: 59 IN

## 2023-06-01 DIAGNOSIS — E78.5 HYPERLIPIDEMIA, UNSPECIFIED HYPERLIPIDEMIA TYPE: ICD-10-CM

## 2023-06-01 DIAGNOSIS — R73.03 PREDIABETES: ICD-10-CM

## 2023-06-01 DIAGNOSIS — E66.09 CLASS 1 OBESITY DUE TO EXCESS CALORIES WITH SERIOUS COMORBIDITY AND BODY MASS INDEX (BMI) OF 34.0 TO 34.9 IN ADULT: ICD-10-CM

## 2023-06-01 DIAGNOSIS — I10 ESSENTIAL HYPERTENSION: ICD-10-CM

## 2023-06-01 DIAGNOSIS — Z85.3 HISTORY OF LEFT BREAST CANCER: ICD-10-CM

## 2023-06-01 PROCEDURE — 1159F MED LIST DOCD IN RCRD: CPT | Mod: CPTII,S$GLB,, | Performed by: INTERNAL MEDICINE

## 2023-06-01 PROCEDURE — 1126F PR PAIN SEVERITY QUANTIFIED, NO PAIN PRESENT: ICD-10-PCS | Mod: CPTII,S$GLB,, | Performed by: INTERNAL MEDICINE

## 2023-06-01 PROCEDURE — 4010F ACE/ARB THERAPY RXD/TAKEN: CPT | Mod: CPTII,S$GLB,, | Performed by: INTERNAL MEDICINE

## 2023-06-01 PROCEDURE — 3078F DIAST BP <80 MM HG: CPT | Mod: CPTII,S$GLB,, | Performed by: INTERNAL MEDICINE

## 2023-06-01 PROCEDURE — 1160F RVW MEDS BY RX/DR IN RCRD: CPT | Mod: CPTII,S$GLB,, | Performed by: INTERNAL MEDICINE

## 2023-06-01 PROCEDURE — 99214 OFFICE O/P EST MOD 30 MIN: CPT | Mod: S$GLB,,, | Performed by: INTERNAL MEDICINE

## 2023-06-01 PROCEDURE — 1101F PR PT FALLS ASSESS DOC 0-1 FALLS W/OUT INJ PAST YR: ICD-10-PCS | Mod: CPTII,S$GLB,, | Performed by: INTERNAL MEDICINE

## 2023-06-01 PROCEDURE — 3008F BODY MASS INDEX DOCD: CPT | Mod: CPTII,S$GLB,, | Performed by: INTERNAL MEDICINE

## 2023-06-01 PROCEDURE — 1157F ADVNC CARE PLAN IN RCRD: CPT | Mod: CPTII,S$GLB,, | Performed by: INTERNAL MEDICINE

## 2023-06-01 PROCEDURE — 4010F PR ACE/ARB THEARPY RXD/TAKEN: ICD-10-PCS | Mod: CPTII,S$GLB,, | Performed by: INTERNAL MEDICINE

## 2023-06-01 PROCEDURE — 3078F PR MOST RECENT DIASTOLIC BLOOD PRESSURE < 80 MM HG: ICD-10-PCS | Mod: CPTII,S$GLB,, | Performed by: INTERNAL MEDICINE

## 2023-06-01 PROCEDURE — 1159F PR MEDICATION LIST DOCUMENTED IN MEDICAL RECORD: ICD-10-PCS | Mod: CPTII,S$GLB,, | Performed by: INTERNAL MEDICINE

## 2023-06-01 PROCEDURE — 3044F HG A1C LEVEL LT 7.0%: CPT | Mod: CPTII,S$GLB,, | Performed by: INTERNAL MEDICINE

## 2023-06-01 PROCEDURE — 3288F PR FALLS RISK ASSESSMENT DOCUMENTED: ICD-10-PCS | Mod: CPTII,S$GLB,, | Performed by: INTERNAL MEDICINE

## 2023-06-01 PROCEDURE — 1126F AMNT PAIN NOTED NONE PRSNT: CPT | Mod: CPTII,S$GLB,, | Performed by: INTERNAL MEDICINE

## 2023-06-01 PROCEDURE — 3288F FALL RISK ASSESSMENT DOCD: CPT | Mod: CPTII,S$GLB,, | Performed by: INTERNAL MEDICINE

## 2023-06-01 PROCEDURE — 3008F PR BODY MASS INDEX (BMI) DOCUMENTED: ICD-10-PCS | Mod: CPTII,S$GLB,, | Performed by: INTERNAL MEDICINE

## 2023-06-01 PROCEDURE — 99999 PR PBB SHADOW E&M-EST. PATIENT-LVL IV: CPT | Mod: PBBFAC,,, | Performed by: INTERNAL MEDICINE

## 2023-06-01 PROCEDURE — 99214 PR OFFICE/OUTPT VISIT, EST, LEVL IV, 30-39 MIN: ICD-10-PCS | Mod: S$GLB,,, | Performed by: INTERNAL MEDICINE

## 2023-06-01 PROCEDURE — 1101F PT FALLS ASSESS-DOCD LE1/YR: CPT | Mod: CPTII,S$GLB,, | Performed by: INTERNAL MEDICINE

## 2023-06-01 PROCEDURE — 3044F PR MOST RECENT HEMOGLOBIN A1C LEVEL <7.0%: ICD-10-PCS | Mod: CPTII,S$GLB,, | Performed by: INTERNAL MEDICINE

## 2023-06-01 PROCEDURE — 3075F PR MOST RECENT SYSTOLIC BLOOD PRESS GE 130-139MM HG: ICD-10-PCS | Mod: CPTII,S$GLB,, | Performed by: INTERNAL MEDICINE

## 2023-06-01 PROCEDURE — 1157F PR ADVANCE CARE PLAN OR EQUIV PRESENT IN MEDICAL RECORD: ICD-10-PCS | Mod: CPTII,S$GLB,, | Performed by: INTERNAL MEDICINE

## 2023-06-01 PROCEDURE — 99999 PR PBB SHADOW E&M-EST. PATIENT-LVL IV: ICD-10-PCS | Mod: PBBFAC,,, | Performed by: INTERNAL MEDICINE

## 2023-06-01 PROCEDURE — 3075F SYST BP GE 130 - 139MM HG: CPT | Mod: CPTII,S$GLB,, | Performed by: INTERNAL MEDICINE

## 2023-06-01 PROCEDURE — 1160F PR REVIEW ALL MEDS BY PRESCRIBER/CLIN PHARMACIST DOCUMENTED: ICD-10-PCS | Mod: CPTII,S$GLB,, | Performed by: INTERNAL MEDICINE

## 2023-06-01 NOTE — PROGRESS NOTES
Patient ID: Chelsea Rodriguez is a 68 y.o. female.    Chief Complaint: Annual Exam    HPI Chelsea is a 68 y.o. female with  hypertension, hyperlipidemia, osteoarthritis, chronic constipation, obstructive sleep apnea, bilateral carpal tunnel syndrome, lymphedema and history of breast cancer who presents for annual exam.   She reports having fast heart rate and fatigue when she 1st starts walking but after walking for a while this will resolve.  She also reports fast heart rate before she has to do public speaking.  Otherwise no acute complaints or concerns today.    Reviewed lab results from 05/24/2023 today. She has missed some doses of her statin medication.     Health Maintenance Topics with due status: Not Due       Topic Last Completion Date    TETANUS VACCINE 11/03/2017    DEXA Scan 07/13/2020    Colorectal Cancer Screening 02/05/2021    Mammogram 01/20/2023    Hemoglobin A1c (Prediabetes) 05/24/2023    Lipid Panel 05/24/2023        Review of Systems   Cardiovascular:         See HPI   All other systems reviewed and are negative.      Objective:     Vitals:    06/01/23 1110   BP: 138/60   Pulse: 77        Physical Exam  Vitals reviewed.   Constitutional:       General: She is not in acute distress.     Appearance: Normal appearance. She is well-developed. She is obese. She is not ill-appearing, toxic-appearing or diaphoretic.   HENT:      Head: Normocephalic and atraumatic.      Right Ear: Tympanic membrane, ear canal and external ear normal. There is no impacted cerumen.      Left Ear: Tympanic membrane, ear canal and external ear normal. There is no impacted cerumen.      Nose: Nose normal.   Eyes:      General: No scleral icterus.        Right eye: No discharge.         Left eye: No discharge.      Extraocular Movements: Extraocular movements intact.      Conjunctiva/sclera: Conjunctivae normal.   Neck:      Thyroid: No thyromegaly.      Trachea: No tracheal deviation.   Cardiovascular:      Rate and Rhythm:  Normal rate and regular rhythm.      Pulses: Normal pulses.      Heart sounds: Normal heart sounds. No murmur heard.    No friction rub. No gallop.   Pulmonary:      Effort: Pulmonary effort is normal. No respiratory distress.      Breath sounds: Normal breath sounds. No stridor. No wheezing, rhonchi or rales.   Chest:      Chest wall: No tenderness.   Abdominal:      General: Bowel sounds are normal. There is no distension.      Palpations: Abdomen is soft. There is no mass.      Tenderness: There is no abdominal tenderness. There is no guarding or rebound.      Hernia: No hernia is present.   Musculoskeletal:         General: No tenderness or deformity.      Cervical back: Neck supple.      Right lower leg: No edema.      Left lower leg: No edema.   Lymphadenopathy:      Cervical: No cervical adenopathy.   Skin:     General: Skin is warm and dry.      Findings: No erythema or rash.   Neurological:      General: No focal deficit present.      Mental Status: She is alert and oriented to person, place, and time. Mental status is at baseline.   Psychiatric:         Mood and Affect: Mood normal.         Behavior: Behavior normal.         Thought Content: Thought content normal.         Judgment: Judgment normal.       Assessment:       1. Essential hypertension Well controlled   2. Prediabetes Active   3. Hyperlipidemia, unspecified hyperlipidemia type Sub-optimally controlled   4. History of left breast cancer Chronic   5. Class 1 obesity due to excess calories with serious comorbidity and body mass index (BMI) of 34.0 to 34.9 in adult Active       Plan:     Discussed with patient that fast heart rate and fatigue at the beginning of exercise is normal when deconditioned.  I recommend she exercise regularly and build her stamina.  We also discussed that it is normal to become nervous and have fast heart rate before public speaking.  Reassurance given.    Essential hypertension  Comments:  Continue current  medication    Prediabetes  Comments:  She declines metformin today;  prefers lifestyle change alone.  Recommend low carb/low sugar diet and exercise.  Orders:  -     Comprehensive Metabolic Panel; Future; Expected date: 12/01/2023  -     Hemoglobin A1C; Future; Expected date: 12/01/2023    Hyperlipidemia, unspecified hyperlipidemia type  Comments:  Advised patient not to miss any doses of statin. Cont statin medication  Orders:  -     Lipid Panel; Future; Expected date: 06/01/2023    History of left breast cancer    Class 1 obesity due to excess calories with serious comorbidity and body mass index (BMI) of 34.0 to 34.9 in adult  Comments:  Encouraged low carb/low sugar diet and regular exercise.        RTC 6 months     Warning signs discussed, patient to call with any further issues or worsening of symptoms.       Parts of the above note were dictated using a voice dictation software. Please excuse any grammatical or typographical errors.

## 2023-06-07 ENCOUNTER — OFFICE VISIT (OUTPATIENT)
Dept: OTOLARYNGOLOGY | Facility: CLINIC | Age: 68
End: 2023-06-07
Payer: MEDICARE

## 2023-06-07 DIAGNOSIS — H61.23 BILATERAL IMPACTED CERUMEN: Primary | ICD-10-CM

## 2023-06-07 PROCEDURE — 99999 PR PBB SHADOW E&M-EST. PATIENT-LVL II: CPT | Mod: PBBFAC,,, | Performed by: OTOLARYNGOLOGY

## 2023-06-07 PROCEDURE — 1101F PT FALLS ASSESS-DOCD LE1/YR: CPT | Mod: CPTII,S$GLB,, | Performed by: OTOLARYNGOLOGY

## 2023-06-07 PROCEDURE — 1101F PR PT FALLS ASSESS DOC 0-1 FALLS W/OUT INJ PAST YR: ICD-10-PCS | Mod: CPTII,S$GLB,, | Performed by: OTOLARYNGOLOGY

## 2023-06-07 PROCEDURE — 99999 PR PBB SHADOW E&M-EST. PATIENT-LVL II: ICD-10-PCS | Mod: PBBFAC,,, | Performed by: OTOLARYNGOLOGY

## 2023-06-07 PROCEDURE — 99213 OFFICE O/P EST LOW 20 MIN: CPT | Mod: 25,S$GLB,, | Performed by: OTOLARYNGOLOGY

## 2023-06-07 PROCEDURE — 1159F MED LIST DOCD IN RCRD: CPT | Mod: CPTII,S$GLB,, | Performed by: OTOLARYNGOLOGY

## 2023-06-07 PROCEDURE — 99213 PR OFFICE/OUTPT VISIT, EST, LEVL III, 20-29 MIN: ICD-10-PCS | Mod: 25,S$GLB,, | Performed by: OTOLARYNGOLOGY

## 2023-06-07 PROCEDURE — 3288F PR FALLS RISK ASSESSMENT DOCUMENTED: ICD-10-PCS | Mod: CPTII,S$GLB,, | Performed by: OTOLARYNGOLOGY

## 2023-06-07 PROCEDURE — 1159F PR MEDICATION LIST DOCUMENTED IN MEDICAL RECORD: ICD-10-PCS | Mod: CPTII,S$GLB,, | Performed by: OTOLARYNGOLOGY

## 2023-06-07 PROCEDURE — 4010F ACE/ARB THERAPY RXD/TAKEN: CPT | Mod: CPTII,S$GLB,, | Performed by: OTOLARYNGOLOGY

## 2023-06-07 PROCEDURE — 69210 PR REMOVAL IMPACTED CERUMEN REQUIRING INSTRUMENTATION, UNILATERAL: ICD-10-PCS | Mod: S$GLB,,, | Performed by: OTOLARYNGOLOGY

## 2023-06-07 PROCEDURE — 3044F HG A1C LEVEL LT 7.0%: CPT | Mod: CPTII,S$GLB,, | Performed by: OTOLARYNGOLOGY

## 2023-06-07 PROCEDURE — 1157F PR ADVANCE CARE PLAN OR EQUIV PRESENT IN MEDICAL RECORD: ICD-10-PCS | Mod: CPTII,S$GLB,, | Performed by: OTOLARYNGOLOGY

## 2023-06-07 PROCEDURE — 3044F PR MOST RECENT HEMOGLOBIN A1C LEVEL <7.0%: ICD-10-PCS | Mod: CPTII,S$GLB,, | Performed by: OTOLARYNGOLOGY

## 2023-06-07 PROCEDURE — 69210 REMOVE IMPACTED EAR WAX UNI: CPT | Mod: S$GLB,,, | Performed by: OTOLARYNGOLOGY

## 2023-06-07 PROCEDURE — 1157F ADVNC CARE PLAN IN RCRD: CPT | Mod: CPTII,S$GLB,, | Performed by: OTOLARYNGOLOGY

## 2023-06-07 PROCEDURE — 3288F FALL RISK ASSESSMENT DOCD: CPT | Mod: CPTII,S$GLB,, | Performed by: OTOLARYNGOLOGY

## 2023-06-07 PROCEDURE — 4010F PR ACE/ARB THEARPY RXD/TAKEN: ICD-10-PCS | Mod: CPTII,S$GLB,, | Performed by: OTOLARYNGOLOGY

## 2023-06-07 NOTE — PROGRESS NOTES
Otology/Neurotology History and Physical     CC: cerumen    HPI:  Chelsea Rodriguez is a 68 y.o. female who returns for cerumen impaction. Gets cleaning annually. Uses flushes at home for cerumen control. No other complaints.       Past Medical History  She has a past medical history of Bilateral knee pain, Carpal tunnel syndrome, bilateral, Fissure in skin of foot, HTN (hypertension), Hyperlipidemia, Palpitations, Rotator cuff injury, Screening for colorectal cancer, Screening for malignant neoplasm of colon, and Statin-induced myositis.    Past Surgical History  She has a past surgical history that includes supracervical abdominal hysterectomy (); Bilateral salpingoophorectomy (); Colonoscopy (N/A, 10/20/2017); Intraocular prosthesis insertion (Left, 2018); Phacoemulsification of cataract (Left, 2018); Hernia repair; Cyst Removal;  section; Phacoemulsification of cataract (Right, 2018); Intraocular prosthesis insertion (Right, 2018); Breast lumpectomy (Left, ); Breast biopsy (Left, ); Breast biopsy (Left, ); Hysterectomy; Oophorectomy; Cataract extraction w/  intraocular lens implant (Right, 2018); Cataract extraction w/  intraocular lens implant (Left, 2018); Esophagogastroduodenoscopy (N/A, 2021); Colonoscopy (N/A, 2021); and Knee Arthroplasty (Right, 3/31/2021).    Family History  Her family history includes Breast cancer in her paternal cousin; Cancer in her brother and father; Cataracts in her mother; Diabetes in her brother and mother; Heart disease in her brother and mother; Hyperlipidemia in her daughter and mother; Hypertension in her mother and sister; Macular degeneration in her mother; No Known Problems in her son; Pancreatitis in her mother; Thyroid disease in her sister.    Social History  She reports that she has never smoked. She has never used smokeless tobacco. She reports current alcohol use. She reports that she does not use  drugs.    Allergies  She is allergic to codeine.    Medications  She has a current medication list which includes the following prescription(s): acetaminophen, amoxicillin, atorvastatin, coenzyme q10, docusate sodium, ergocalciferol, hydrochlorothiazide, irbesartan, loratadine, metoprolol succinate, and aspirin.    Review of Systems       Objective:     LMP  (LMP Unknown)    Physical Exam  Constitutional: Well appearing/communicating. Voice clear. NAD.  Eyes: EOM I Bilaterally  Head/Face: Normocephalic.  House Brackmann I Bilaterally.  Right Ear: See microscopy  Left Ear: See microscopy  Nose: No gross nasal septal deviation. Inferior Turbinates 2+ bilaterally. No septal perforation. No masses/lesions. External nasal skin without masses/lesions.  Oral Cavity: Gingiva/lips WNL. Oral Tongue mobile. Hard Palate WNL.   Oropharynx: Tonsillar fossa/pharyngeal wall without lesions. Posterior oropharynx WNL.  Soft palate without masses. Midline uvula.   Neck/Lymphatic: No LAD I-VI bilaterally.  No thyromegaly.  No masses noted on exam.  Neuro/Psychiatric: AOx3.  Normal mood and affect.   Respiratory: Normal respiratory effort, no stridor/stertor, no retractions noted.      Procedure  Ear Microscopy:    After verbal consent was obtained, the patient was laid supine in the small procedure room. A microscope was used to examine the ear. Instrumentation and suction were used to remove any cerumen, debris or blood from the EAC. Findings below.     Soft moderate cerumen bilaterally, removed with suction. TM intact without ME pathology b/l.     Data Reviewed           Assessment:     1. Bilateral impacted cerumen         Plan:   RTC in 1 year for cleaning

## 2023-06-08 RX ORDER — METOPROLOL SUCCINATE 25 MG/1
TABLET, EXTENDED RELEASE ORAL
Qty: 90 TABLET | Refills: 3 | Status: SHIPPED | OUTPATIENT
Start: 2023-06-08

## 2023-06-08 NOTE — TELEPHONE ENCOUNTER
Refill Decision Note      Refill Decision Note   Chelsea Rodriguez  is requesting a refill authorization.  Brief Assessment and Rationale for Refill:  Approve     Medication Therapy Plan:         Pharmacist review requested: Yes   Extended chart review required: Yes   Comments:     Note composed:7:25 AM 06/08/2023             Appointments     Last Visit   6/1/2023 Eliza Alva MD   Next Visit   12/1/2023 Eliza Alva MD

## 2023-06-08 NOTE — TELEPHONE ENCOUNTER
No care due was identified.  [facility] Embedded Care Due Messages. Reference number: [id number].   6/07/2023 7:27:31 PM CDT

## 2023-06-08 NOTE — TELEPHONE ENCOUNTER
Refill Routing Note   Medication(s) are not appropriate for processing by [facility] for the following reason(s):      Drug-disease interaction    [facility] action(s):  Defer Labs due     Medication Therapy Plan: Drug-Disease: metoprolol succinate and Venous stasis dermatitis      Appointments  past 12m or future 3m with PCP    Date Provider   Last Visit   6/1/2023 Eliza Alva MD   Next Visit   12/1/2023 Eliza Alva MD   ED visits in past 90 days: 0        Note composed:7:32 PM 06/07/2023

## 2023-06-12 ENCOUNTER — OFFICE VISIT (OUTPATIENT)
Dept: OPHTHALMOLOGY | Facility: CLINIC | Age: 68
End: 2023-06-12
Payer: MEDICARE

## 2023-06-12 DIAGNOSIS — H43.813 VITREOUS DETACHMENT OF BOTH EYES: ICD-10-CM

## 2023-06-12 DIAGNOSIS — Z96.1 PSEUDOPHAKIA: ICD-10-CM

## 2023-06-12 DIAGNOSIS — H04.123 DRY EYE SYNDROME OF BOTH EYES: ICD-10-CM

## 2023-06-12 DIAGNOSIS — H26.493 PCO (POSTERIOR CAPSULAR OPACIFICATION), BILATERAL: Primary | ICD-10-CM

## 2023-06-12 DIAGNOSIS — I10 ESSENTIAL HYPERTENSION: ICD-10-CM

## 2023-06-12 PROCEDURE — 3044F PR MOST RECENT HEMOGLOBIN A1C LEVEL <7.0%: ICD-10-PCS | Mod: CPTII,S$GLB,, | Performed by: OPHTHALMOLOGY

## 2023-06-12 PROCEDURE — 92014 COMPRE OPH EXAM EST PT 1/>: CPT | Mod: S$GLB,,, | Performed by: OPHTHALMOLOGY

## 2023-06-12 PROCEDURE — 1160F RVW MEDS BY RX/DR IN RCRD: CPT | Mod: CPTII,S$GLB,, | Performed by: OPHTHALMOLOGY

## 2023-06-12 PROCEDURE — 92014 PR EYE EXAM, EST PATIENT,COMPREHESV: ICD-10-PCS | Mod: S$GLB,,, | Performed by: OPHTHALMOLOGY

## 2023-06-12 PROCEDURE — 4010F PR ACE/ARB THEARPY RXD/TAKEN: ICD-10-PCS | Mod: CPTII,S$GLB,, | Performed by: OPHTHALMOLOGY

## 2023-06-12 PROCEDURE — 1159F PR MEDICATION LIST DOCUMENTED IN MEDICAL RECORD: ICD-10-PCS | Mod: CPTII,S$GLB,, | Performed by: OPHTHALMOLOGY

## 2023-06-12 PROCEDURE — 1157F PR ADVANCE CARE PLAN OR EQUIV PRESENT IN MEDICAL RECORD: ICD-10-PCS | Mod: CPTII,S$GLB,, | Performed by: OPHTHALMOLOGY

## 2023-06-12 PROCEDURE — 3044F HG A1C LEVEL LT 7.0%: CPT | Mod: CPTII,S$GLB,, | Performed by: OPHTHALMOLOGY

## 2023-06-12 PROCEDURE — 1157F ADVNC CARE PLAN IN RCRD: CPT | Mod: CPTII,S$GLB,, | Performed by: OPHTHALMOLOGY

## 2023-06-12 PROCEDURE — 4010F ACE/ARB THERAPY RXD/TAKEN: CPT | Mod: CPTII,S$GLB,, | Performed by: OPHTHALMOLOGY

## 2023-06-12 PROCEDURE — 99999 PR PBB SHADOW E&M-EST. PATIENT-LVL II: ICD-10-PCS | Mod: PBBFAC,,, | Performed by: OPHTHALMOLOGY

## 2023-06-12 PROCEDURE — 1159F MED LIST DOCD IN RCRD: CPT | Mod: CPTII,S$GLB,, | Performed by: OPHTHALMOLOGY

## 2023-06-12 PROCEDURE — 1160F PR REVIEW ALL MEDS BY PRESCRIBER/CLIN PHARMACIST DOCUMENTED: ICD-10-PCS | Mod: CPTII,S$GLB,, | Performed by: OPHTHALMOLOGY

## 2023-06-12 PROCEDURE — 99999 PR PBB SHADOW E&M-EST. PATIENT-LVL II: CPT | Mod: PBBFAC,,, | Performed by: OPHTHALMOLOGY

## 2023-06-12 NOTE — PROGRESS NOTES
Subjective:       Patient ID: Chelsea Rodriguez is a 68 y.o. female.    Chief Complaint: Annual Exam (Chelsea Rodriguez is a 67 y/o female)    HPI     Annual Exam     Additional comments: Chelsea Rodriguez is a 67 y/o female           Comments    Pt here for annual eye exam   Pt state floaters OU   Itching OU   Wear OTC readers +2.75 readers             Last edited by Naldo Holt on 6/12/2023 11:09 AM.             Assessment:       1. PCO (posterior capsular opacification), bilateral    2. Dry eye syndrome of both eyes    3. Vitreous detachment of both eyes    4. Essential hypertension    5. Pseudophakia        Plan:       Visually significant  PCO OD- Pt. Wants Laser.     Early PCO OS- Not Visually Significant.   ARTIE-Doing well.  PVD's-Stable.  HTN-No retinopathy OU.      AT's.  Control HTN.  RTC for YAG CAP OD.

## 2023-06-14 ENCOUNTER — OFFICE VISIT (OUTPATIENT)
Dept: URGENT CARE | Facility: CLINIC | Age: 68
End: 2023-06-14
Payer: MEDICARE

## 2023-06-14 VITALS
HEART RATE: 84 BPM | SYSTOLIC BLOOD PRESSURE: 150 MMHG | RESPIRATION RATE: 20 BRPM | WEIGHT: 168 LBS | BODY MASS INDEX: 33.87 KG/M2 | HEIGHT: 59 IN | TEMPERATURE: 99 F | DIASTOLIC BLOOD PRESSURE: 79 MMHG | OXYGEN SATURATION: 98 %

## 2023-06-14 DIAGNOSIS — M19.90 OSTEOARTHRITIS, UNSPECIFIED OSTEOARTHRITIS TYPE, UNSPECIFIED SITE: ICD-10-CM

## 2023-06-14 DIAGNOSIS — M79.89 SWELLING OF HAND, UNSPECIFIED LATERALITY: Primary | ICD-10-CM

## 2023-06-14 PROCEDURE — 99213 OFFICE O/P EST LOW 20 MIN: CPT | Mod: S$GLB,,, | Performed by: FAMILY MEDICINE

## 2023-06-14 PROCEDURE — 73130 X-RAY EXAM OF HAND: CPT | Mod: FY,LT,S$GLB, | Performed by: RADIOLOGY

## 2023-06-14 PROCEDURE — 99213 PR OFFICE/OUTPT VISIT, EST, LEVL III, 20-29 MIN: ICD-10-PCS | Mod: S$GLB,,, | Performed by: FAMILY MEDICINE

## 2023-06-14 PROCEDURE — 73130 XR HAND COMPLETE 3 VIEW LEFT: ICD-10-PCS | Mod: FY,LT,S$GLB, | Performed by: RADIOLOGY

## 2023-06-14 RX ORDER — DICLOFENAC SODIUM 25 MG/1
25 TABLET, DELAYED RELEASE ORAL 2 TIMES DAILY PRN
Qty: 20 TABLET | Refills: 0 | Status: SHIPPED | OUTPATIENT
Start: 2023-06-14

## 2023-06-14 NOTE — PROGRESS NOTES
Subjective:      Patient ID: Chelsea Rodriguez is a 68 y.o. female.    Vitals:  vitals were not taken for this visit.     Chief Complaint: Hand Pain    68 year old female presents today with left hand pain, left 3rd and 4th digit pain, edema, redness. Normal ROM. No known injury. Hx of Arthritis and Carpal Tunnel. Symptoms started 06/11/223. Treatments at home include nothing.     Hand Pain   Incident onset: 06/11/2023. There was no injury mechanism. The pain is at a severity of 0/10. The patient is experiencing no pain. Pertinent negatives include no chest pain, muscle weakness, numbness or tingling. The symptoms are aggravated by lifting (moving hand). She has tried nothing for the symptoms.     Cardiovascular:  Negative for chest pain.   Skin:  Negative for erythema.   Neurological:  Negative for numbness.    Objective:     Physical Exam   Constitutional: She is oriented to person, place, and time. No distress. normal  HENT:   Head: Normocephalic and atraumatic.   Ears:   Right Ear: External ear normal.   Left Ear: External ear normal.   Nose: No rhinorrhea or congestion.   Mouth/Throat: Mucous membranes are moist. No posterior oropharyngeal erythema. Oropharynx is clear.   Eyes: Conjunctivae are normal. Pupils are equal, round, and reactive to light. Extraocular movement intact   Neck: Neck supple.   Cardiovascular: Normal heart sounds and normal pulses.   Pulmonary/Chest: Effort normal and breath sounds normal. No stridor. No respiratory distress.   Abdominal: Normal appearance and bowel sounds are normal. Soft. flat abdomen   Musculoskeletal:         General: Swelling, tenderness and deformity present.      Right wrist: She exhibits normal range of motion, no swelling and no deformity.      Left wrist: She exhibits normal range of motion, no swelling and no deformity.      Right hand: She exhibits deformity. She exhibits normal range of motion and no swelling. Normal strength noted.      Left hand: She exhibits  decreased range of motion, tenderness, deformity and swelling. Decreased strength noted.   Neurological: no focal deficit. She is alert, oriented to person, place, and time and at baseline.   Skin: Skin is warm and dry. Capillary refill takes less than 2 seconds. No erythema   Psychiatric: Her behavior is normal. Mood, judgment and thought content normal.   Nursing note and vitals reviewed.    Assessment:     Plan:   1. Swelling of hand, unspecified laterality  - X-Ray Hand 3 view Left; Future  - diclofenac (VOLTAREN) 25 MG TbEC; Take 1 tablet (25 mg total) by mouth 2 (two) times daily as needed.  Dispense: 20 tablet; Refill: 0    2. Osteoarthritis, unspecified osteoarthritis type, unspecified site  - diclofenac (VOLTAREN) 25 MG TbEC; Take 1 tablet (25 mg total) by mouth 2 (two) times daily as needed.  Dispense: 20 tablet; Refill: 0  - Ambulatory referral/consult to Rheumatology   All results discussed with pt prior to discharge from clinic

## 2023-06-26 ENCOUNTER — OFFICE VISIT (OUTPATIENT)
Dept: OPHTHALMOLOGY | Facility: CLINIC | Age: 68
End: 2023-06-26
Payer: MEDICARE

## 2023-06-26 DIAGNOSIS — H43.813 VITREOUS DETACHMENT OF BOTH EYES: ICD-10-CM

## 2023-06-26 DIAGNOSIS — Z96.1 PSEUDOPHAKIA: ICD-10-CM

## 2023-06-26 DIAGNOSIS — H26.493 PCO (POSTERIOR CAPSULAR OPACIFICATION), BILATERAL: Primary | ICD-10-CM

## 2023-06-26 DIAGNOSIS — H04.123 DRY EYE SYNDROME OF BOTH EYES: ICD-10-CM

## 2023-06-26 PROCEDURE — 99999 PR PBB SHADOW E&M-EST. PATIENT-LVL III: CPT | Mod: PBBFAC,,, | Performed by: OPHTHALMOLOGY

## 2023-06-26 PROCEDURE — 66821 AFTER CATARACT LASER SURGERY: CPT | Mod: RT,S$GLB,, | Performed by: OPHTHALMOLOGY

## 2023-06-26 PROCEDURE — 99499 UNLISTED E&M SERVICE: CPT | Mod: S$GLB,,, | Performed by: OPHTHALMOLOGY

## 2023-06-26 PROCEDURE — 66821 PR DISCISSION,2ND CATARACT,LASER: ICD-10-PCS | Mod: RT,S$GLB,, | Performed by: OPHTHALMOLOGY

## 2023-06-26 PROCEDURE — 99499 NO LOS: ICD-10-PCS | Mod: S$GLB,,, | Performed by: OPHTHALMOLOGY

## 2023-06-26 PROCEDURE — 99999 PR PBB SHADOW E&M-EST. PATIENT-LVL III: ICD-10-PCS | Mod: PBBFAC,,, | Performed by: OPHTHALMOLOGY

## 2023-06-27 ENCOUNTER — TELEPHONE (OUTPATIENT)
Dept: ORTHOPEDICS | Facility: CLINIC | Age: 68
End: 2023-06-27
Payer: MEDICARE

## 2023-06-27 NOTE — PROGRESS NOTES
Subjective:       Patient ID: Chelsea Rodriguez is a 68 y.o. female.    Chief Complaint: PCO OU, PC IOL OU, Dry Eye, vitreous detachment OU, and HTN retinopathy OU    HPI    AFUA 06/23 Patient is here for a YAG Cap OD today.  Patient hasn't noticed   any vision changes since the last exam.  Last edited by Rita Boyle on 6/26/2023  3:08 PM.             Assessment:       1. PCO (posterior capsular opacification), bilateral    2. Dry eye syndrome of both eyes    3. Vitreous detachment of both eyes    4. Pseudophakia        Plan:       Visually significant  PCO OD- Pt is here for Laser.     Early PCO OS- Not Visually Significant.   ARTIE-Stable.  PVD OD-Stable.      YAG CAP right eye (OD) today. TE=   96 mj, Avg. 1.6 mj, 60 pulses.   PF taper OD.  AT's.  RTC 2-3 wks.    YAG laser Capsulotomy Procedure Note:  Informed consent was obtained and correct eye was verified with patient.   One drop of topical Proparacaine 1% was instilled.  YAG laser applied to posterior capsule in cruciate pattern.  One drop of Apraclonidine 0.5% and 1 drop of Pred Acetate 1% applied to eye after laser.  Pt tolerated procedure well. No complications.  Follow up in 2-3 weeks.

## 2023-06-27 NOTE — TELEPHONE ENCOUNTER
----- Message from Lupe Salazar sent at 6/27/2023 12:26 PM CDT -----  Type:  Appointment Request    Name of Caller:SKINNY FULTON [[id number]]  When is the first available appointment?No access  Symptoms:referral  Would the patient rather a call back or a response via [email or web address]? Call back  Best Call Back Number:[phone]  Additional Information: Patient indicates that she has a referral for ortho from Urgent Care. Patient has not heard from dept as she was advised that  she would hear from them. Patient has an active referral for osteopetrosis. Patient would like soonest appointment available if possible. Please call patient with more information and further assistance.   We spoke   Referral is to Rheumatology  not orthopedics per Suma Willis MA  ....  she forwarded the  request to them    I called & informed the patient

## 2023-07-14 ENCOUNTER — OFFICE VISIT (OUTPATIENT)
Dept: ORTHOPEDICS | Facility: CLINIC | Age: 68
End: 2023-07-14
Payer: MEDICARE

## 2023-07-14 DIAGNOSIS — S63.659A SAGITTAL BAND RUPTURE AT METACARPOPHALANGEAL JOINT, INITIAL ENCOUNTER: Primary | ICD-10-CM

## 2023-07-14 PROCEDURE — 1157F PR ADVANCE CARE PLAN OR EQUIV PRESENT IN MEDICAL RECORD: ICD-10-PCS | Mod: HCNC,CPTII,S$GLB, | Performed by: PHYSICIAN ASSISTANT

## 2023-07-14 PROCEDURE — 4010F ACE/ARB THERAPY RXD/TAKEN: CPT | Mod: HCNC,CPTII,S$GLB, | Performed by: PHYSICIAN ASSISTANT

## 2023-07-14 PROCEDURE — 1159F MED LIST DOCD IN RCRD: CPT | Mod: HCNC,CPTII,S$GLB, | Performed by: PHYSICIAN ASSISTANT

## 2023-07-14 PROCEDURE — 3044F PR MOST RECENT HEMOGLOBIN A1C LEVEL <7.0%: ICD-10-PCS | Mod: HCNC,CPTII,S$GLB, | Performed by: PHYSICIAN ASSISTANT

## 2023-07-14 PROCEDURE — 99999 PR PBB SHADOW E&M-EST. PATIENT-LVL III: ICD-10-PCS | Mod: PBBFAC,HCNC,, | Performed by: PHYSICIAN ASSISTANT

## 2023-07-14 PROCEDURE — 1159F PR MEDICATION LIST DOCUMENTED IN MEDICAL RECORD: ICD-10-PCS | Mod: HCNC,CPTII,S$GLB, | Performed by: PHYSICIAN ASSISTANT

## 2023-07-14 PROCEDURE — 3044F HG A1C LEVEL LT 7.0%: CPT | Mod: HCNC,CPTII,S$GLB, | Performed by: PHYSICIAN ASSISTANT

## 2023-07-14 PROCEDURE — 1157F ADVNC CARE PLAN IN RCRD: CPT | Mod: HCNC,CPTII,S$GLB, | Performed by: PHYSICIAN ASSISTANT

## 2023-07-14 PROCEDURE — 99999 PR PBB SHADOW E&M-EST. PATIENT-LVL III: CPT | Mod: PBBFAC,HCNC,, | Performed by: PHYSICIAN ASSISTANT

## 2023-07-14 PROCEDURE — 3288F FALL RISK ASSESSMENT DOCD: CPT | Mod: HCNC,CPTII,S$GLB, | Performed by: PHYSICIAN ASSISTANT

## 2023-07-14 PROCEDURE — 99213 PR OFFICE/OUTPT VISIT, EST, LEVL III, 20-29 MIN: ICD-10-PCS | Mod: HCNC,S$GLB,, | Performed by: PHYSICIAN ASSISTANT

## 2023-07-14 PROCEDURE — 1125F AMNT PAIN NOTED PAIN PRSNT: CPT | Mod: HCNC,CPTII,S$GLB, | Performed by: PHYSICIAN ASSISTANT

## 2023-07-14 PROCEDURE — 4010F PR ACE/ARB THEARPY RXD/TAKEN: ICD-10-PCS | Mod: HCNC,CPTII,S$GLB, | Performed by: PHYSICIAN ASSISTANT

## 2023-07-14 PROCEDURE — 99213 OFFICE O/P EST LOW 20 MIN: CPT | Mod: HCNC,S$GLB,, | Performed by: PHYSICIAN ASSISTANT

## 2023-07-14 PROCEDURE — 1101F PT FALLS ASSESS-DOCD LE1/YR: CPT | Mod: HCNC,CPTII,S$GLB, | Performed by: PHYSICIAN ASSISTANT

## 2023-07-14 PROCEDURE — 1101F PR PT FALLS ASSESS DOC 0-1 FALLS W/OUT INJ PAST YR: ICD-10-PCS | Mod: HCNC,CPTII,S$GLB, | Performed by: PHYSICIAN ASSISTANT

## 2023-07-14 PROCEDURE — 1125F PR PAIN SEVERITY QUANTIFIED, PAIN PRESENT: ICD-10-PCS | Mod: HCNC,CPTII,S$GLB, | Performed by: PHYSICIAN ASSISTANT

## 2023-07-14 PROCEDURE — 3288F PR FALLS RISK ASSESSMENT DOCUMENTED: ICD-10-PCS | Mod: HCNC,CPTII,S$GLB, | Performed by: PHYSICIAN ASSISTANT

## 2023-07-14 NOTE — PROGRESS NOTES
[facility]  History & Physical  Orthopedics    SUBJECTIVE:      Chief Complaint: left ring finger    Referring Provider: No ref. provider found     Dr. Willis is the supervising physician for this encounter/patient    History of Present Illness:  Patient is a 68 y.o. right hand dominant female who presents today with complaints of left ring finger injury occurred on 23 when the finger was lightly stretched/ulnar deviated when it got caught on something. She felt pain/swelling the next day. Urgent care visit on 23 with negative xrays. She reports the finger feels unstable. No treatments for this.    I have previously seen her for severe bilateral carpal tunnel syndrome. She has insisted on treating this conservatively.     Onset of symptoms/DOI was 5 weeks.    Symptoms are aggravated by activity and movement.    Symptoms are alleviated by rest.    Symptoms consist of pain and instability .    The patient rates their pain as a 6/10.    Attempted treatment(s) and/or interventions include activity modifications, rest.     The patient denies any fevers, chills, N/V, D/C and presents for evaluation.       Past Medical History:   Diagnosis Date    Bilateral knee pain     Carpal tunnel syndrome, bilateral     Fissure in skin of foot     Right small toe    HTN (hypertension)     Hyperlipidemia     Palpitations     Rotator cuff injury     right    Screening for colorectal cancer 10/20/2017    Screening for malignant neoplasm of colon 2021    Statin-induced myositis 2018     Past Surgical History:   Procedure Laterality Date    BILATERAL SALPINGOOPHORECTOMY  2000    BREAST BIOPSY Left     malignant    BREAST BIOPSY Left     negative    BREAST LUMPECTOMY Left     CATARACT EXTRACTION W/  INTRAOCULAR LENS IMPLANT Right 2018    Dr. Oneil    CATARACT EXTRACTION W/  INTRAOCULAR LENS IMPLANT Left 2018    Dr. Oneil     SECTION      x2    COLONOSCOPY N/A  10/20/2017    Procedure: COLONOSCOPY;  Surgeon: Mitchell Danielson Jr., MD;  Location: [facility] ENDO;  Service: Endoscopy;  Laterality: N/A;    COLONOSCOPY N/A 2/5/2021    Procedure: COLONOSCOPY/Suprep;  Surgeon: Malcolm Reyes MD;  Location: [facility] ENDO;  Service: Endoscopy;  Laterality: N/A;    CYST REMOVAL      on back    ESOPHAGOGASTRODUODENOSCOPY N/A 2/5/2021    Procedure: EGD (ESOPHAGOGASTRODUODENOSCOPY);  Surgeon: Malcolm Reyes MD;  Location: [facility] ENDO;  Service: Endoscopy;  Laterality: N/A;    HERNIA REPAIR      HYSTERECTOMY      at 25 yrs old    INTRAOCULAR PROSTHESES INSERTION Left 11/6/2018    Procedure: INSERTION, IOL PROSTHESIS;  Surgeon: Sergio Oneil MD;  Location: [facility] OR 1ST FLR;  Service: Ophthalmology;  Laterality: Left;    INTRAOCULAR PROSTHESES INSERTION Right 11/20/2018    Procedure: INSERTION, IOL PROSTHESIS;  Surgeon: Sergio Oneil MD;  Location: [facility] OR 2ND FLR;  Service: Ophthalmology;  Laterality: Right;    KNEE ARTHROPLASTY Right 3/31/2021    Procedure: ARTHROPLASTY, KNEE:RIGHT:DEPUY-SIGMA ;  Surgeon: Pedro Summers III, MD;  Location: [facility] OR;  Service: Orthopedics;  Laterality: Right;    OOPHORECTOMY      @ 45 yrs old    PHACOEMULSIFICATION OF CATARACT Left 11/6/2018    Procedure: PHACOEMULSIFICATION, CATARACT;  Surgeon: Sergio Oneil MD;  Location: [facility] OR 1ST FLR;  Service: Ophthalmology;  Laterality: Left;    PHACOEMULSIFICATION OF CATARACT Right 11/20/2018    Procedure: PHACOEMULSIFICATION, CATARACT;  Surgeon: Sergio Oneil MD;  Location: [facility] OR 2ND FLR;  Service: Ophthalmology;  Laterality: Right;    supracervical abdominal hysterectomy  1978    fibroids     Review of patient's allergies indicates:   Allergen Reactions    Codeine Nausea And Vomiting     Social History     Social History Narrative    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 hx of left lumpectomy  "for "breast cancer"- with 5yrs of tamoxifen use. Sees Dr. Buckley ([facility] for surveillance/MMG)        Dr. Lala former PCP, [facility]          Family History   Problem Relation Age of Onset    Hypertension Mother     Heart disease Mother     Diabetes Mother     Hyperlipidemia Mother     Pancreatitis Mother     Cataracts Mother     Macular degeneration Mother     Cancer Father     Hypertension Sister     Thyroid disease Sister     Cancer Brother         prostate CA    Diabetes Brother     Heart disease Brother     Hyperlipidemia Daughter     No Known Problems Son     Breast cancer Paternal Cousin     Amblyopia Neg Hx     Blindness Neg Hx     Glaucoma Neg Hx     Strabismus Neg Hx     Retinal detachment Neg Hx          Current Outpatient Medications:     acetaminophen (TYLENOL) 650 MG TbSR, Take 1 tablet (650 mg total) by mouth every 8 (eight) hours as needed (pain)., Disp: 120 tablet, Rfl: 0    amoxicillin (AMOXIL) 500 MG capsule, Take 500 mg by mouth as needed. Before dental work, Disp: , Rfl:     aspirin (ECOTRIN) 81 MG EC tablet, Take 1 tablet (81 mg total) by mouth 2 (two) times daily. (Patient taking differently: Take 81 mg by mouth once daily.), Disp: 60 tablet, Rfl: 0    atorvastatin (LIPITOR) 80 MG tablet, Take 1 tablet (80 mg total) by mouth once daily., Disp: 90 tablet, Rfl: 3    coenzyme Q10 100 mg capsule, Take 100 mg by mouth every evening., Disp: , Rfl:     diclofenac (VOLTAREN) 25 MG TbEC, Take 1 tablet (25 mg total) by mouth 2 (two) times daily as needed., Disp: 20 tablet, Rfl: 0    docusate sodium (COLACE) 100 MG capsule, Take 1 capsule (100 mg total) by mouth 2 (two) times daily as needed for Constipation., Disp: 60 capsule, Rfl: 0    hydroCHLOROthiazide (MICROZIDE) 12.5 mg capsule, TAKE 1 CAPSULE EVERY EVENING, Disp: 90 capsule, Rfl: 0    irbesartan (AVAPRO) 150 MG tablet, TAKE 1 TABLET(150 MG) BY MOUTH EVERY DAY, Disp: 90 tablet, Rfl: 2    loratadine (CLARITIN) 10 mg tablet, Take 10 mg by mouth every " evening., Disp: , Rfl:     metoprolol succinate (TOPROL-XL) 25 MG 24 hr tablet, TAKE 1 TABLET EVERY EVENING, Disp: 90 tablet, Rfl: 3      Review of Systems:  Constitutional: no fever or chills  Eyes: no visual changes  ENT: no nasal congestion or sore throat  Respiratory: no cough or shortness of breath  Cardiovascular: no chest pain  Gastrointestinal: no nausea or vomiting, tolerating diet  Musculoskeletal: pain and soreness    OBJECTIVE:      Vital Signs (Most Recent):  There were no vitals filed for this visit.  There is no height or weight on file to calculate BMI.      Physical Exam:  Constitutional: The patient appears well-developed and well-nourished. No distress.   Skin: No lesions appreciated  Head: Normocephalic and atraumatic.   Nose: Nose normal.   Ears: No deformities seen  Eyes: Conjunctivae and EOM are normal.   Neck: No tracheal deviation present.   Cardiovascular: Normal rate and intact distal pulses.    Pulmonary/Chest: Effort normal. No respiratory distress.   Abdominal: There is no guarding.   Neurological: The patient is alert.   Psychiatric: The patient has a normal mood and affect.     Left Hand/Wrist Examination:    Observation/Inspection:  Swelling  Mild edema present to dorsal ring MCP    Deformity  none  Discoloration  none     Scars   none    Atrophy  none    HAND/WRIST EXAMINATION:  Finkelstein's Test   Neg  WHAT Test    Neg  Snuff box tenderness   Neg  Garcia's Test    Neg  Hook of Hamate Tenderness  Neg  CMC grind    Neg  Circumduction test   Neg  TTP to the radial boarder of the ring MCP    Neurovascular Exam:  Digits WWP, brisk CR < 3s throughout  NVI motor/LTS to M/R/U nerves, radial pulse 2+  Tinel's Test - Carpal Tunnel  Neg  Tinel's Test - Cubital Tunnel  Neg  Phalen's Test    Neg  Median Nerve Compression Test Neg    ROM hand full, painless    ROM wrist full, painless    ROM elbow full, painless    Abdomen not guarded  Respirations nonlabored  Perfusion intact    Diagnostic  Results:     Imaging - I independently viewed the patient's imaging as well as the radiology report.      FINDINGS:  Mild DJD.  No acute fracture or dislocation.  No bone destruction identified.     Impression:     No significant changes      ASSESSMENT/PLAN:      68 y.o. yo female with Left ring sagittal band rupture    Plan: The patient and I had a thorough discussion today.  We discussed the working diagnosis as well as several other potential alternative diagnoses.  Treatment options were discussed, both conservative and surgical.  Conservative treatment options would include things such as activity modifications, workplace modifications, a period of rest, oral vs topical OTC and prescription anti-inflammatory medications, occupational therapy, splinting/bracing, immobilization, corticosteroid injections, and others.  Surgical options were discussed as well.     At this time, the patient would like to proceed with a trial of OT for custom orthosis and rehab. We discuss voltaren gel massage and ice/heat. If no improvement with therapy, RTC for surgical discussion.    Should the patient's symptoms worsen, persist, or fail to improve they should return for reevaluation and I would be happy to see them back anytime.           Please do not hesitate to reach out to us via email, phone, or MyChart with any questions, concerns, or feedback.

## 2023-07-15 ENCOUNTER — OFFICE VISIT (OUTPATIENT)
Dept: URGENT CARE | Facility: CLINIC | Age: 68
End: 2023-07-15
Payer: MEDICARE

## 2023-07-15 VITALS
OXYGEN SATURATION: 97 % | DIASTOLIC BLOOD PRESSURE: 79 MMHG | SYSTOLIC BLOOD PRESSURE: 125 MMHG | RESPIRATION RATE: 18 BRPM | HEART RATE: 71 BPM | BODY MASS INDEX: 33.87 KG/M2 | TEMPERATURE: 99 F | WEIGHT: 168 LBS | HEIGHT: 59 IN

## 2023-07-15 DIAGNOSIS — R09.81 NASAL CONGESTION: Primary | ICD-10-CM

## 2023-07-15 DIAGNOSIS — J06.9 URI, ACUTE: ICD-10-CM

## 2023-07-15 LAB
CTP QC/QA: YES
SARS-COV-2 AG RESP QL IA.RAPID: NEGATIVE

## 2023-07-15 PROCEDURE — 87811 SARS-COV-2 COVID19 W/OPTIC: CPT | Mod: QW,S$GLB,, | Performed by: FAMILY MEDICINE

## 2023-07-15 PROCEDURE — 99213 OFFICE O/P EST LOW 20 MIN: CPT | Mod: S$GLB,,, | Performed by: FAMILY MEDICINE

## 2023-07-15 PROCEDURE — 87811 SARS CORONAVIRUS 2 ANTIGEN POCT, MANUAL READ: ICD-10-PCS | Mod: QW,S$GLB,, | Performed by: FAMILY MEDICINE

## 2023-07-15 PROCEDURE — 99213 PR OFFICE/OUTPT VISIT, EST, LEVL III, 20-29 MIN: ICD-10-PCS | Mod: S$GLB,,, | Performed by: FAMILY MEDICINE

## 2023-07-15 RX ORDER — LORATADINE 10 MG/1
10 TABLET ORAL DAILY
Qty: 30 TABLET | Refills: 2 | Status: SHIPPED | OUTPATIENT
Start: 2023-07-15 | End: 2024-07-14

## 2023-07-15 RX ORDER — FLUTICASONE PROPIONATE 50 MCG
1 SPRAY, SUSPENSION (ML) NASAL DAILY
Qty: 18 G | Refills: 3 | Status: SHIPPED | OUTPATIENT
Start: 2023-07-15 | End: 2023-08-14

## 2023-07-15 NOTE — PROGRESS NOTES
"Subjective:      Patient ID: Chelsea Rodriguez is a 68 y.o. female.    Vitals:  height is 4' 10.5" (1.486 m) and weight is 76.2 kg (168 lb). Her oral temperature is 98.5 °F (36.9 °C). Her blood pressure is 125/79 and her pulse is 71. Her respiration is 18 and oxygen saturation is 97%.     Chief Complaint: Sinus Problem    C/o sinus congestion and sore throat and slight body ache x 3 days, no fever or chills  Exposed to friends with ? Covid x 5 days prior      Sinus Problem  This is a new problem. The current episode started today. The problem has been gradually worsening since onset. There has been no fever. She is experiencing no pain. Associated symptoms include congestion and sinus pressure. Pertinent negatives include no coughing, diaphoresis, ear pain, headaches, hoarse voice, neck pain, shortness of breath, sneezing, sore throat or swollen glands. Past treatments include nothing.     Constitution: Negative for sweating.   HENT:  Positive for congestion and sinus pressure. Negative for ear pain and sore throat.    Neck: Negative for neck pain.   Respiratory:  Negative for cough and shortness of breath.    Allergic/Immunologic: Negative for sneezing.   Neurological:  Negative for headaches.    Objective:     Physical Exam   Constitutional: She is oriented to person, place, and time. She appears well-developed. She is cooperative.   HENT:   Head: Normocephalic and atraumatic.   Ears:   Right Ear: Hearing, tympanic membrane, external ear and ear canal normal.   Left Ear: Hearing, tympanic membrane, external ear and ear canal normal.   Nose: Nose normal. No mucosal edema or nasal deformity. No epistaxis. Right sinus exhibits no maxillary sinus tenderness and no frontal sinus tenderness. Left sinus exhibits no maxillary sinus tenderness and no frontal sinus tenderness.   Mouth/Throat: Uvula is midline, oropharynx is clear and moist and mucous membranes are normal. Mucous membranes are moist. No trismus in the jaw. " Dr Carmen Davila office called to req that Dr Liza Yeung review the following labs ASAP Chromograpin A elevated to 541  Metanephrines slight elevated at 9  25 hour catecholamine fractionation results Thursday, results scanned in epic, pt is est of the office. Normal dentition. No uvula swelling. Oropharynx is clear.   Eyes: Conjunctivae and lids are normal. Pupils are equal, round, and reactive to light. Extraocular movement intact   Neck: Trachea normal and phonation normal. Neck supple.   Cardiovascular: Normal rate, regular rhythm, normal heart sounds and normal pulses.   Pulmonary/Chest: Effort normal and breath sounds normal.   Abdominal: Normal appearance and bowel sounds are normal. Soft.   Musculoskeletal: Normal range of motion.         General: Normal range of motion.   Neurological: She is alert and oriented to person, place, and time. She exhibits normal muscle tone.   Skin: Skin is warm, dry and intact.   Psychiatric: Her speech is normal and behavior is normal. Judgment and thought content normal.   Nursing note and vitals reviewed.    Assessment:     1. Nasal congestion    2. URI, acute        Plan:       Nasal congestion  -     SARS Coronavirus 2 Antigen, POCT Manual Read    URI, acute           Results for orders placed or performed in visit on 07/15/23   SARS Coronavirus 2 Antigen, POCT Manual Read   Result Value Ref Range    SARS Coronavirus 2 Antigen Negative Negative     Acceptable Yes             Pt advised to gargle with warm salt water q 4 hours  Tylenol alternate with Ibuprofen 2 po q 6 hours prn  Clraitin one daily

## 2023-07-17 ENCOUNTER — OFFICE VISIT (OUTPATIENT)
Dept: OPHTHALMOLOGY | Facility: CLINIC | Age: 68
End: 2023-07-17
Payer: MEDICARE

## 2023-07-17 DIAGNOSIS — Z96.1 PSEUDOPHAKIA: ICD-10-CM

## 2023-07-17 DIAGNOSIS — H26.492 PCO (POSTERIOR CAPSULAR OPACIFICATION), LEFT: ICD-10-CM

## 2023-07-17 DIAGNOSIS — H04.123 DRY EYE SYNDROME OF BOTH EYES: ICD-10-CM

## 2023-07-17 DIAGNOSIS — Z98.890 POST-OPERATIVE STATE: Primary | ICD-10-CM

## 2023-07-17 PROCEDURE — 1160F RVW MEDS BY RX/DR IN RCRD: CPT | Mod: HCNC,CPTII,S$GLB, | Performed by: OPHTHALMOLOGY

## 2023-07-17 PROCEDURE — 99999 PR PBB SHADOW E&M-EST. PATIENT-LVL III: ICD-10-PCS | Mod: PBBFAC,HCNC,, | Performed by: OPHTHALMOLOGY

## 2023-07-17 PROCEDURE — 99024 PR POST-OP FOLLOW-UP VISIT: ICD-10-PCS | Mod: HCNC,S$GLB,, | Performed by: OPHTHALMOLOGY

## 2023-07-17 PROCEDURE — 1126F PR PAIN SEVERITY QUANTIFIED, NO PAIN PRESENT: ICD-10-PCS | Mod: HCNC,CPTII,S$GLB, | Performed by: OPHTHALMOLOGY

## 2023-07-17 PROCEDURE — 1101F PT FALLS ASSESS-DOCD LE1/YR: CPT | Mod: HCNC,CPTII,S$GLB, | Performed by: OPHTHALMOLOGY

## 2023-07-17 PROCEDURE — 4010F PR ACE/ARB THEARPY RXD/TAKEN: ICD-10-PCS | Mod: HCNC,CPTII,S$GLB, | Performed by: OPHTHALMOLOGY

## 2023-07-17 PROCEDURE — 1157F ADVNC CARE PLAN IN RCRD: CPT | Mod: HCNC,CPTII,S$GLB, | Performed by: OPHTHALMOLOGY

## 2023-07-17 PROCEDURE — 1101F PR PT FALLS ASSESS DOC 0-1 FALLS W/OUT INJ PAST YR: ICD-10-PCS | Mod: HCNC,CPTII,S$GLB, | Performed by: OPHTHALMOLOGY

## 2023-07-17 PROCEDURE — 3044F HG A1C LEVEL LT 7.0%: CPT | Mod: HCNC,CPTII,S$GLB, | Performed by: OPHTHALMOLOGY

## 2023-07-17 PROCEDURE — 3044F PR MOST RECENT HEMOGLOBIN A1C LEVEL <7.0%: ICD-10-PCS | Mod: HCNC,CPTII,S$GLB, | Performed by: OPHTHALMOLOGY

## 2023-07-17 PROCEDURE — 4010F ACE/ARB THERAPY RXD/TAKEN: CPT | Mod: HCNC,CPTII,S$GLB, | Performed by: OPHTHALMOLOGY

## 2023-07-17 PROCEDURE — 1160F PR REVIEW ALL MEDS BY PRESCRIBER/CLIN PHARMACIST DOCUMENTED: ICD-10-PCS | Mod: HCNC,CPTII,S$GLB, | Performed by: OPHTHALMOLOGY

## 2023-07-17 PROCEDURE — 1157F PR ADVANCE CARE PLAN OR EQUIV PRESENT IN MEDICAL RECORD: ICD-10-PCS | Mod: HCNC,CPTII,S$GLB, | Performed by: OPHTHALMOLOGY

## 2023-07-17 PROCEDURE — 1159F PR MEDICATION LIST DOCUMENTED IN MEDICAL RECORD: ICD-10-PCS | Mod: HCNC,CPTII,S$GLB, | Performed by: OPHTHALMOLOGY

## 2023-07-17 PROCEDURE — 99024 POSTOP FOLLOW-UP VISIT: CPT | Mod: HCNC,S$GLB,, | Performed by: OPHTHALMOLOGY

## 2023-07-17 PROCEDURE — 1159F MED LIST DOCD IN RCRD: CPT | Mod: HCNC,CPTII,S$GLB, | Performed by: OPHTHALMOLOGY

## 2023-07-17 PROCEDURE — 3288F FALL RISK ASSESSMENT DOCD: CPT | Mod: HCNC,CPTII,S$GLB, | Performed by: OPHTHALMOLOGY

## 2023-07-17 PROCEDURE — 3288F PR FALLS RISK ASSESSMENT DOCUMENTED: ICD-10-PCS | Mod: HCNC,CPTII,S$GLB, | Performed by: OPHTHALMOLOGY

## 2023-07-17 PROCEDURE — 1126F AMNT PAIN NOTED NONE PRSNT: CPT | Mod: HCNC,CPTII,S$GLB, | Performed by: OPHTHALMOLOGY

## 2023-07-17 PROCEDURE — 99999 PR PBB SHADOW E&M-EST. PATIENT-LVL III: CPT | Mod: PBBFAC,HCNC,, | Performed by: OPHTHALMOLOGY

## 2023-07-17 NOTE — PROGRESS NOTES
Subjective:       Patient ID: Chelsea Rodriguez is a 68 y.o. female.    Chief Complaint: No chief complaint on file.    HPI    DLS  06/26/2023     CC: pt states  I can see better .     Eye pain  Blurred Va  Diplopia  --Flashes/++Floaters  Headaches    Eye Meds: AT's OU prn       POHx  1. PCO OU   S/P CE IOL 11/20/2018 OD   S/P phaco w/IOL OS - 11/6/18     2. ARTIE OU   3. Vitreous Det. OU      2.  YAG CAP OD--        s/p  YAG Laser OU   Last edited by Dayami Gray MA on 7/17/2023  2:55 PM.             Assessment:       1. Post-operative state    2. PCO (posterior capsular opacification), left    3. Dry eye syndrome of both eyes    4. Pseudophakia        Plan:       S/p YAG CAP OD-Doing well.   Early PCO OS- Not Visually Significant.     ARTIE-Stable.      AT's.  RTC 6 mos.

## 2023-07-17 NOTE — TELEPHONE ENCOUNTER
No care due was identified.  [facility] Embedded Care Due Messages. Reference number: [id number].   7/17/2023 3:04:17 PM CDT

## 2023-07-18 ENCOUNTER — OFFICE VISIT (OUTPATIENT)
Dept: URGENT CARE | Facility: CLINIC | Age: 68
End: 2023-07-18
Payer: MEDICARE

## 2023-07-18 ENCOUNTER — CLINICAL SUPPORT (OUTPATIENT)
Dept: REHABILITATION | Facility: HOSPITAL | Age: 68
End: 2023-07-18
Payer: MEDICARE

## 2023-07-18 VITALS
OXYGEN SATURATION: 97 % | WEIGHT: 168 LBS | BODY MASS INDEX: 35.26 KG/M2 | HEIGHT: 58 IN | TEMPERATURE: 98 F | SYSTOLIC BLOOD PRESSURE: 138 MMHG | DIASTOLIC BLOOD PRESSURE: 85 MMHG | HEART RATE: 66 BPM | RESPIRATION RATE: 18 BRPM

## 2023-07-18 DIAGNOSIS — M25.542 PAIN INVOLVING JOINT OF FINGER OF LEFT HAND: ICD-10-CM

## 2023-07-18 DIAGNOSIS — R05.9 COUGH, UNSPECIFIED TYPE: Primary | ICD-10-CM

## 2023-07-18 DIAGNOSIS — B30.9 VIRAL CONJUNCTIVITIS: ICD-10-CM

## 2023-07-18 DIAGNOSIS — M62.81 MUSCLE WEAKNESS: ICD-10-CM

## 2023-07-18 DIAGNOSIS — S63.659A SAGITTAL BAND RUPTURE AT METACARPOPHALANGEAL JOINT, INITIAL ENCOUNTER: ICD-10-CM

## 2023-07-18 DIAGNOSIS — J06.9 VIRAL URI: ICD-10-CM

## 2023-07-18 LAB
CTP QC/QA: YES
SARS-COV-2 AG RESP QL IA.RAPID: NEGATIVE

## 2023-07-18 PROCEDURE — 87811 SARS CORONAVIRUS 2 ANTIGEN POCT, MANUAL READ: ICD-10-PCS | Mod: QW,S$GLB,, | Performed by: NURSE PRACTITIONER

## 2023-07-18 PROCEDURE — 99213 OFFICE O/P EST LOW 20 MIN: CPT | Mod: S$GLB,,, | Performed by: NURSE PRACTITIONER

## 2023-07-18 PROCEDURE — 99213 PR OFFICE/OUTPT VISIT, EST, LEVL III, 20-29 MIN: ICD-10-PCS | Mod: S$GLB,,, | Performed by: NURSE PRACTITIONER

## 2023-07-18 PROCEDURE — 87811 SARS-COV-2 COVID19 W/OPTIC: CPT | Mod: QW,S$GLB,, | Performed by: NURSE PRACTITIONER

## 2023-07-18 PROCEDURE — 97165 OT EVAL LOW COMPLEX 30 MIN: CPT | Mod: HCNC,PN

## 2023-07-18 PROCEDURE — 97110 THERAPEUTIC EXERCISES: CPT | Mod: HCNC,PN

## 2023-07-18 RX ORDER — IRBESARTAN 150 MG/1
TABLET ORAL
Qty: 90 TABLET | Refills: 3 | Status: SHIPPED | OUTPATIENT
Start: 2023-07-18

## 2023-07-18 NOTE — PROGRESS NOTES
"Subjective:      Patient ID: Chelsea Rodriguez is a 68 y.o. female.    Vitals:  height is 4' 10" (1.473 m) and weight is 76.2 kg (168 lb). Her oral temperature is 98.1 °F (36.7 °C). Her blood pressure is 138/85 and her pulse is 66. Her respiration is 18 and oxygen saturation is 97%.     Chief Complaint: Eye Problem (Eye drainage and fatigue)      Pt is a 69 yo female presenting with congestion, fatigue, myalgia, and bilateral eye redness/drainage/itching.  Onset of symptoms was3 days ago. Pt states that her daughter tested positive for covid.    Eye Problem   Both eyes are affected. This is a new problem. The problem has been gradually worsening. There was no injury mechanism. The pain is at a severity of 0/10. The patient is experiencing no pain. There is No known exposure to pink eye. She Does not wear contacts. Associated symptoms include an eye discharge, eye redness and itching. Pertinent negatives include no blurred vision, double vision, fever, foreign body sensation, nausea or vomiting. She has tried eye drops for the symptoms. The treatment provided no relief.     Constitution: Negative for chills, fatigue and fever.   HENT:  Positive for congestion. Negative for ear pain, sinus pain and sore throat.    Cardiovascular:  Negative for chest pain.   Eyes:  Positive for eye discharge, eye itching and eye redness. Negative for double vision and blurred vision.   Respiratory:  Negative for cough, sputum production and shortness of breath.    Gastrointestinal:  Negative for nausea, vomiting and diarrhea.   Musculoskeletal:  Negative for muscle ache.   Neurological:  Negative for headaches.    Objective:     Physical Exam   Constitutional: She is oriented to person, place, and time.   HENT:   Head: Normocephalic.   Ears:   Right Ear: Hearing and external ear normal. No no drainage, swelling or tenderness. Tympanic membrane is not erythematous, not retracted and not bulging. No middle ear effusion.   Left Ear: Hearing " and external ear normal. No no drainage, swelling or tenderness. Tympanic membrane is not erythematous, not retracted and not bulging.  No middle ear effusion.   Nose: Nose normal. No mucosal edema, rhinorrhea or purulent discharge. Right sinus exhibits no maxillary sinus tenderness and no frontal sinus tenderness. Left sinus exhibits no maxillary sinus tenderness and no frontal sinus tenderness.   Mouth/Throat: Uvula is midline, oropharynx is clear and moist and mucous membranes are normal. No uvula swelling. No oropharyngeal exudate, posterior oropharyngeal edema or posterior oropharyngeal erythema.   Cardiovascular: Normal rate and regular rhythm.   Pulmonary/Chest: Effort normal and breath sounds normal.   Neurological: She is alert and oriented to person, place, and time.   Skin: Skin is warm and dry.   Nursing note and vitals reviewed.    Assessment:     1. Cough, unspecified type    2. Viral conjunctivitis    3. Viral URI        Plan:       Cough, unspecified type  -     SARS Coronavirus 2 Antigen, POCT Manual Read    Viral conjunctivitis    Viral URI      Patient Instructions   Cough, unspecified type  -     SARS Coronavirus 2 Antigen, POCT Manual Read  Results for orders placed or performed in visit on 07/18/23   SARS Coronavirus 2 Antigen, POCT Manual Read   Result Value Ref Range    SARS Coronavirus 2 Antigen Negative Negative     Acceptable Yes        You have tested negative for COVID today.  The recommendation is if you have Covid-like symptoms within the first 7 days of symptom onset and test negative today to test again with at least  in 48 hours between each test.       Viral Conjunctivitis     Infections are often caused by viruses and antibiotics wont help. Most of the time, pink eye will go away on its own without treatment after a few days.    You may use OTC lubricant eye drops if it helps with symptoms.         Viral URI (upper respiratory infection):    Your symptoms are viral  "in nature.  Viral upper respiratory infections typically run their course in 7-14 days.     - Rest at home.     - Drink plenty of fluids so you won't get dehydrated.    - you can take plain Mucinex (guaifenesin) twice a day (or as directed) to help loosen mucous.     Avoid taking Decongestants such as Sudafed, pseudoephedrine, phenylephrine or meds that say "cold," "sinus" or "-D".      - Cough recommendations:      Dextromethorphan (DM) is a cough suppressant over the counter (ie. mucinex DM, robitussin, delsym; dayquil/nyquil has DM as well.).      Warm tea with honey can help with cough. Honey is a natural cough suppressant.    -Sore throat recommendations: Warm fluids, warm salt water gargles, throat lozenges, tea, honey, soup, or drinking something cold or frozen.  Throat lozenges or sprays help reduce pain. Gargling with warm saltwater (1/4 teaspoon of salt in 1/2 cup of warm water) or an OTC anesthetic gargle may be useful for irritation.    - Fever/Pain recommendations:      Alternate Tylenol or Ibuprofen as directed for fever/pain.   Take ibuprofen 600-800 mg every 6-8 hours for pain and inflammation. Do not take ibuprofen if you have a history of GI bleeding, kidney disease, or if you take blood thinners.    Tylenol/acetaminophen 650-1000 mg every 6-8 hours for added pain relief.  Avoid tylenol if you have a history of liver disease.     When to seek medical advice  Call your healthcare provider right away if any of these occur:  Fever that is poorly controlled with OTC fever reducing medication  New or worsening ear pain, sinus pain, or headache  Stiff neck  You can't swallow liquids or you can't open your mouth wide because of throat pain  Signs of dehydration. These include very dark urine or no urine, sunken eyes, and dizziness.  Trouble breathing or noisy breathing  Muffled voice  Rash     If your symptoms worsen or fail to improve you should go to Emergency Department.                           "

## 2023-07-18 NOTE — PROGRESS NOTES
See POC located in treatment section.    Epidermal Autograft Text: The defect edges were debeveled with a #15 scalpel blade.  Given the location of the defect, shape of the defect and the proximity to free margins an epidermal autograft was deemed most appropriate.  Using a sterile surgical marker, the primary defect shape was transferred to the donor site. The epidermal graft was then harvested.  The skin graft was then placed in the primary defect and oriented appropriately.

## 2023-07-18 NOTE — PATIENT INSTRUCTIONS
"Cough, unspecified type  -     SARS Coronavirus 2 Antigen, POCT Manual Read  Results for orders placed or performed in visit on 07/18/23   SARS Coronavirus 2 Antigen, POCT Manual Read   Result Value Ref Range    SARS Coronavirus 2 Antigen Negative Negative     Acceptable Yes        You have tested negative for COVID today.  The recommendation is if you have Covid-like symptoms within the first 7 days of symptom onset and test negative today to test again with at least  in 48 hours between each test.       Viral Conjunctivitis     Infections are often caused by viruses and antibiotics wont help. Most of the time, pink eye will go away on its own without treatment after a few days.    You may use OTC lubricant eye drops if it helps with symptoms.         Viral URI (upper respiratory infection):    Your symptoms are viral in nature.  Viral upper respiratory infections typically run their course in 7-14 days.     - Rest at home.     - Drink plenty of fluids so you won't get dehydrated.    - you can take plain Mucinex (guaifenesin) twice a day (or as directed) to help loosen mucous.     Avoid taking Decongestants such as Sudafed, pseudoephedrine, phenylephrine or meds that say "cold," "sinus" or "-D".      - Cough recommendations:      Dextromethorphan (DM) is a cough suppressant over the counter (ie. mucinex DM, robitussin, delsym; dayquil/nyquil has DM as well.).      Warm tea with honey can help with cough. Honey is a natural cough suppressant.    -Sore throat recommendations: Warm fluids, warm salt water gargles, throat lozenges, tea, honey, soup, or drinking something cold or frozen.  Throat lozenges or sprays help reduce pain. Gargling with warm saltwater (1/4 teaspoon of salt in 1/2 cup of warm water) or an OTC anesthetic gargle may be useful for irritation.    - Fever/Pain recommendations:      Alternate Tylenol or Ibuprofen as directed for fever/pain.   Take ibuprofen 600-800 mg every 6-8 hours " for pain and inflammation. Do not take ibuprofen if you have a history of GI bleeding, kidney disease, or if you take blood thinners.    Tylenol/acetaminophen 650-1000 mg every 6-8 hours for added pain relief.  Avoid tylenol if you have a history of liver disease.     When to seek medical advice  Call your healthcare provider right away if any of these occur:  Fever that is poorly controlled with OTC fever reducing medication  New or worsening ear pain, sinus pain, or headache  Stiff neck  You can't swallow liquids or you can't open your mouth wide because of throat pain  Signs of dehydration. These include very dark urine or no urine, sunken eyes, and dizziness.  Trouble breathing or noisy breathing  Muffled voice  Rash     If your symptoms worsen or fail to improve you should go to Emergency Department.

## 2023-07-18 NOTE — TELEPHONE ENCOUNTER
Refill Decision Note   Chelseashanelle Rodriguez  is requesting a refill authorization.  Brief Assessment and Rationale for Refill:  Approve     Medication Therapy Plan:       Medication Reconciliation Completed: No   Comments:     No Care Gaps recommended.     Note composed:8:48 AM 07/18/2023

## 2023-07-18 NOTE — PATIENT INSTRUCTIONS
Home Exercise Program  Perform the following exercises 3-4x a day     1) Tendon Glides   10x3 sec each      2) Finger Extension    3) DIP Flexion    4) PIP Flexion    5) Isolated PIP Flexion    6) Towel Grab          Eva Quezada OT

## 2023-07-18 NOTE — PLAN OF CARE
"[facility]  Occupational Therapy Initial Evaluation     Name: Chelsea Rodriguez  Clinic Number: [id number]    Therapy Diagnosis:   Encounter Diagnoses   Name Primary?    Sagittal band rupture at metacarpophalangeal joint, initial encounter     Pain involving joint of finger of left hand     Muscle weakness      Physician: Russo-Digeorge, Jamie L*    Physician Orders: Eval and Treat  Medical Diagnosis: S63.659A (ICD-10-CM) - Sagittal band rupture at metacarpophalangeal joint, initial encounter   Date of Injury: 6/1/2023  Evaluation Date: 7/18/2023  Insurance Authorization Period Expiration: 07/13/2024   Plan of Care Certification Period: 10/13/2023  Date of Return to MD: n/a  Visit # / Visits authorized: 1 / 1  FOTO: 1/3    Precautions:  Standard    Time In: 11:35 am  Time Out: 12:10 pm  Total Appointment Time (timed & untimed codes): 35 minutes    Subjective     Date of Onset: 6/1/2023    History of Current Condition/Mechanism of Injury: Chelsea reports: her LRF got caught on something, not painful at first, but realized there was pain about 2 days later and went to urgent care a week later, no results in xray    Involved Side: left  5/10 at best  8/10 at worst  Location: dorsal L RF and radial side of the RF  Description: Aching  Aggravating Factors: moving it, getting caught on something   Easing Factors: topical medication    Occupation:    Working presently: unemployed but performs tasks at home   Duties: home care duties    Functional Limitations/Social History:    Previous functional status includes: Independent with all ADLs.     Current Functional Status   Home/Living environment: lives with their family      Limitation of Functional Status as follows:   ADLs/IADLs:     - Feeding: ind    - Bathing: ind    - Dressing/Grooming: ind    - Home Management: ind with pain    - Driving: ind     Leisure: n/a    Patient's Goals for Therapy: get stronger, want it to be "normal" again    Past Medical " History/Physical Systems Review:   Chelsea Rodriguez  has a past medical history of Bilateral knee pain, Carpal tunnel syndrome, bilateral, Fissure in skin of foot, HTN (hypertension), Hyperlipidemia, Palpitations, Rotator cuff injury, Screening for colorectal cancer, Screening for malignant neoplasm of colon, and Statin-induced myositis.    Chelsea Rodriguez  has a past surgical history that includes supracervical abdominal hysterectomy (); Bilateral salpingoophorectomy (); Colonoscopy (N/A, 10/20/2017); Intraocular prosthesis insertion (Left, 2018); Phacoemulsification of cataract (Left, 2018); Hernia repair; Cyst Removal;  section; Phacoemulsification of cataract (Right, 2018); Intraocular prosthesis insertion (Right, 2018); Breast lumpectomy (Left, ); Breast biopsy (Left, ); Breast biopsy (Left, ); Hysterectomy; Oophorectomy; Cataract extraction w/  intraocular lens implant (Right, 2018); Cataract extraction w/  intraocular lens implant (Left, 2018); Esophagogastroduodenoscopy (N/A, 2021); Colonoscopy (N/A, 2021); Knee Arthroplasty (Right, 2021); and Refractive surgery.    Chelsea has a current medication list which includes the following prescription(s): acetaminophen, amoxicillin, aspirin, atorvastatin, coenzyme q10, diclofenac, docusate sodium, fluticasone propionate, hydrochlorothiazide, irbesartan, loratadine, loratadine, and metoprolol succinate.    Review of patient's allergies indicates:   Allergen Reactions    Codeine Nausea And Vomiting        Objective     Observation/Appearance: slight ulnar deviation of the LRF, pain along the dorsal MPJ of the LRF    Edema. Measured in centimeters.   2023      Left Right     Wrist Crease       Distal Palmar Crease           Elbow and Wrist ROM. Measured in degrees.   2023      Left Right     Supination/Pronation       Wrist Ext/Flex       Wrist RD/UD         Hand  ROM. Measured in degrees.   7/18/2023 7/18/2023      Left Right            Index: MP                   PIP                      DIP              Long:  MP 95 90                 115                DIP 90 90            Ring:   MP 80 80                 110                DIP 75 65       COLLINS: 255     Small:  MP                   PIP                   DIP              Thumb: MP                    IP           Rad ABD           Pal ABD              Opposition          Strength (Dynamometer) and Pinch Strength (Pinch Gauge)  Measured in pounds.   7/18/2023 7/18/2023      Left Right     Rung II 30 40     Lu Pinch 16 15     3pt Pinch 6 7       Sensation: Not formally tetsted  -none along the LRF  -positive tinel's due to CTS in both hands      CMS Impairment/Limitation/Restriction for FOTO hand Survey    Therapist reviewed FOTO scores for Chelsea Rodriguez on 7/18/2023.   FOTO documents entered into [employer] - see Media section.    Limitation Score: 43%         Treatment     Total Treatment time (time-based codes) separate from Evaluation: 20 minutes    Chelsea received the following supervised modalities after being cleared for contradictions :     Chelsea received the following manual therapy techniques: n/a    Chelsea performed therapeutic exercises for 15 minutes including:  -TGEs   -joint blocking   -towel scrunches  -isolated flexion    -mainor loop provided to patient to align ligaments from ulnar deviation     Patient Education and Home Exercises      Education provided:   -role of OT, goals for OT, scheduling/cancellations, insurance limitations with patient.  -Additional Education provided: mainor loop instructions, pain management, HEP provided    Written Home Exercises Provided: yes.  Exercises were reviewed and Chelsea was able to demonstrate them prior to the end of the session.    Chelsea demonstrated good  understanding of the education provided.     Pt was advised to perform these exercises free of pain, and  "to stop performing them if pain occurs.    See EMR under Patient Instructions for exercises provided 7/18/2023.    Assessment     Chelsea Rodriguez is a 68 y.o. female referred to outpatient occupational therapy and presents with a medical diagnosis of Left ring radial sagittal band rupture. She notes that an incident occurred at the beginning of June when her LRF got caught on something. Initially, she felt no pain but went to urgent care a week later after she began to feel pain along the LRF. No fracture deficits noted on the xray which was taken about a week later (6/14/2023).   Upon assessment, she demos good ROM of the affected digit, but notes pain along the dorsal MPJ and along the MC bone. She notes some associated pain of the LLF as well. She demos a slight ulnar deviation of the digit due to the radial sagital band rupture. Patient tolerates P/AA/AROM exs to per protocol to increase functional and available ROM of LRF. Patient demos increase in ROM post exs and reports a "pulling" sensation but no true pain. TGEs and digit ROM added to patient HEP.   I provide patient with mainor straps for L LF/RF for central alignment and proper healing. I ed patient on proper donning/doffing and wear schedule of the mainor straps. She verbalizes understanding in all ed provided today.   I instruct patient on specific HEP to promote carryover of ROM gains. Patient provided handouts and written instructions of all exs performed today. Patient would benefit from continued skilled OT services. Future tx sessions to focus on strengthening, promoting pain-free ROM, and pain management.   Continue POC and progress as tolerated per protocol.      Assessment of Occupational Performance   Performance    Physical:  Joint Mobility  Joint Stability  Muscle Power/Strength  Muscle Endurance   Strength  Gross Motor Coordination  Fine Motor Coordination  Proprioception Functions  Pain    Cognitive:  No Deficits    Psychosocial:    No " Deficits     Following medical record review it is determined that pt will benefit from occupational therapy services in order to maximize pain free and/or functional use of left RF. The following goals were discussed with the patient and patient is in agreement with them as to be addressed in the treatment plan. The patient's rehab potential is Excellent.     Anticipated barriers to occupational therapy: none    Plan of care discussed with patient: Yes  Patient's spiritual, cultural and educational needs considered and patient is agreeable to the plan of care and goals as stated below:     Medical Necessity is demonstrated by the following  Occupational Profile/History  Co-morbidities and personal factors that may impact the plan of care [x] LOW: Brief chart review  [] MODERATE: Expanded chart review   [] HIGH: Extensive chart review    Moderate / High Support Documentation: n/a     Examination  Performance deficits relating to physical, cognitive or psychosocial skills that result in activity limitations and/or participation restrictions  [x] LOW: addressing 1-3 Performance deficits  [] MODERATE: 3-5 Performance deficits  [] HIGH: 5+ Performance deficits (please support below)    Moderate / High Support Documentation: n/a     Treatment Options [x] LOW: Limited options  [] MODERATE: Several options  [] HIGH: Multiple options      Decision Making/ Complexity Score: low       The following goals were discussed with the patient and patient is in agreement with them as to be addressed in the treatment plan.     Goals:   The following goals were discussed with the patient and patient is in agreement with them as to be addressed in the treatment plan.   Short term Goals:  1) Initiate HEP  2) Pt will reduce pain to less than 4/10 by 4 weeks.  3) Pt will increase functional  strength by 5# in order to A in opening containers for med management or home management tasks by 4 weeks.   4) Patient will be able to achieve  less than or equal to 35% on the FOTO, demonstrating overall improved functional ability with upper extremity. (Self-care category)    Long Term Goals:  Goals to be met by discharge:  1) Independent with HEP  2) Pt will decrease pain to trace or none while completing light home management tasks or work related tasks by d/c.   3) Patient will be able to achieve less than or equal to 20% on the FOTO, demonstrating overall improved functional ability with upper extremity.  (Self-care category)      Plan     Certification Period/Plan of care expiration: 7/18/2023 to 10/13/2023.    Outpatient Occupational Therapy 2 times weekly for 8 weeks to include the following interventions: Paraffin, Fluidotherapy, Manual therapy/joint mobilizations, Modalities for pain management, US 3 mhz, Therapeutic exercises/activities., Strengthening, Orthotic Fabrication/Fit/Training, Joint Protection, and Energy Conservation.    Eva Quezada, OT

## 2023-07-19 NOTE — PROGRESS NOTES
"  Occupational Therapy Daily Treatment Note     Name: Chelsea Rodriguez  Clinic Number: [id number]    Therapy Diagnosis: No diagnosis found.  Physician: Russo-Digeorge, Jamie L*    Visit Date: 7/20/2023  Physician Orders: Eval and Treat  Medical Diagnosis: S63.659A (ICD-10-CM) - Sagittal band rupture at metacarpophalangeal joint, initial encounter   Date of Injury: 6/1/2023  Evaluation Date: 7/18/2023  Insurance Authorization Period Expiration: 07/13/2024   Plan of Care Certification Period: 10/13/2023  Date of Return to MD: n/a  Visit # / Visits authorized: 2 / 20  FOTO: 1/3     Precautions:  Standard    Time In: 8:00 am  Time Out: 8:45 am  Total Billable Time: 45 minutes      Subjective     Pt reports: "I felt my finger after the exercises more than before like it had been working."  she was compliant with home exercise program given last session.   Response to previous treatment:decrease in pain  Functional change: Able to perform B/IADLs with decreased pain     Pain: 2/10  Location: left RF    Objective     Observation/Appearance: slight ulnar deviation of the LRF, pain along the dorsal MPJ of the LRF     Edema. Measured in centimeters.    7/18/2023 7/18/2023         Left Right       Wrist Crease           Distal Palmar Crease                 Elbow and Wrist ROM. Measured in degrees.    7/18/2023 7/18/2023         Left Right       Supination/Pronation           Wrist Ext/Flex           Wrist RD/UD              Hand ROM. Measured in degrees.    7/18/2023 7/18/2023         Left Right                   Index: MP                       PIP                          DIP                       Long:  MP 95 90                   115                  DIP 90 90                   Ring:   MP 80 80                   110                  DIP 75 65           COLLINS: 255       Small:  MP                       PIP                       DIP                       Thumb: MP                        IP               Rad ABD         "       Pal ABD                  Opposition               Strength (Dynamometer) and Pinch Strength (Pinch Gauge)  Measured in pounds.    7/18/2023 7/18/2023         Left Right       Rung II 30 40       Lu Pinch 16 15       3pt Pinch 6 7          Sensation: Not formally tetsted  -none along the LRF  -positive tinel's due to CTS in both hands        CMS Impairment/Limitation/Restriction for FOTO hand Survey     Therapist reviewed FOTO scores for Chelsea Rodriguez on 7/18/2023.   FOTO documents entered into [employer] - see Media section.     Limitation Score: 43%         Treatment      Chelsea received the following supervised modalities after being cleared for contradictions for 10 minutes:   -Patient tolerates MHP x 10 min in order to decrease pain and increase soft tissue extensibility in preparation for ROM, concurrent with assessment of deficits      Chelsea received the following manual therapy techniques for 10 minutes:   -I provide gentle IASTM to L RF/LF in order to increase soft tissue extensibility, decrease pain, and tenderness/hypersensitivity in the stated area, and promote scar tissue remodeling.     -also along dorsal MC bones     Chelsea received therapeutic exercises for 15 minutes including:  -therapist A full flex and intrinsic stretch  -TGE's c ADD foam squeeze  -resisted finger ABD/ADD    Chelsea participated in dynamic functional therapeutic activities to improve functional performance for 15 minutes, including:  -Pink Tputty   -   -intrinsic scrape   -ADD   -finger drag   -finger extension    Home Exercises and Education Provided     Education provided:   - Continue current HEP  - Progress towards goals     Written Home Exercises Provided: Patient instructed to cont prior HEP.  Exercises were reviewed and Chelsea was able to demonstrate them prior to the end of the session.  Chelsea demonstrated good  understanding of the HEP provided.   .   See EMR under Patient Instructions for exercises provided  "7/20/2023.        Assessment     Upon arrival to tx sesion today, patient states that her finger has decreased in pain and is able to perform all of her B/IADLs without any limitations. She reports compliance with mainor strapping and states that it has been beneficial. Patient tolerates P/AA/AROM exs to per protocol to increase functional and available ROM of LRF. Patient maurice reports that she has no pain and her finger is beginning to feel "normal". I instruct on light Tputty PREs for LRF/LF functional strengthening in order to improve ease with gripping and endurance with daily household and work related tasks. She demos a slight ulnar deviation with putty , but it is not painful or limiting. She reports that her finger feels great following strengthening exs today. Tputty strengthening added to HEP. I advise patient to perform strengthening program over the next week. At next tx session, if LTGs and max potential for OT services have been met, I recommend patient transition to an independent HEP. Patient agrees with this plan. I review and discuss HEP and patient demonstrates and verbalizes understanding and independence with all information presented.   Continue POC and progress as tolerated per protocol.      Chelsea is progressing well towards her goals and there are no updates to goals at this time. Pt prognosis is Excellent.     Pt will continue to benefit from skilled outpatient occupational therapy to address the deficits listed in the problem list on initial evaluation provide pt/family education and to maximize pt's level of independence in the home and community environment.     Anticipated barriers to occupational therapy: none    Pt's spiritual, cultural and educational needs considered and pt agreeable to plan of care and goals.    Goals:  The following goals were discussed with the patient and patient is in agreement with them as to be addressed in the treatment plan.   Short term Goals:  1) " Initiate HEP  2) Pt will reduce pain to less than 4/10 by 4 weeks.  3) Pt will increase functional  strength by 5# in order to A in opening containers for med management or home management tasks by 4 weeks.   4) Patient will be able to achieve less than or equal to 35% on the FOTO, demonstrating overall improved functional ability with upper extremity. (Self-care category)     Long Term Goals:  Goals to be met by discharge:  1) Independent with HEP  2) Pt will decrease pain to trace or none while completing light home management tasks or work related tasks by d/c.   3) Patient will be able to achieve less than or equal to 20% on the FOTO, demonstrating overall improved functional ability with upper extremity.  (Self-care category)       Plan   Continue skilled OT POC and progress per protocol as tolerated.    Updates/Grading for next session: Potential D/C      Eva Quezada, OT

## 2023-07-20 ENCOUNTER — CLINICAL SUPPORT (OUTPATIENT)
Dept: REHABILITATION | Facility: HOSPITAL | Age: 68
End: 2023-07-20
Payer: MEDICARE

## 2023-07-20 DIAGNOSIS — M62.81 MUSCLE WEAKNESS: ICD-10-CM

## 2023-07-20 DIAGNOSIS — M25.542 PAIN INVOLVING JOINT OF FINGER OF LEFT HAND: Primary | ICD-10-CM

## 2023-07-20 PROCEDURE — 97530 THERAPEUTIC ACTIVITIES: CPT | Mod: HCNC,PN

## 2023-07-20 PROCEDURE — 97110 THERAPEUTIC EXERCISES: CPT | Mod: HCNC,PN

## 2023-07-20 PROCEDURE — 97140 MANUAL THERAPY 1/> REGIONS: CPT | Mod: HCNC,PN

## 2023-07-26 NOTE — PROGRESS NOTES
"    Occupational Therapy Daily Treatment Note     Name: Chelsea Rodriguez  Clinic Number: [id number]    Therapy Diagnosis:   Encounter Diagnoses   Name Primary?    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   Response to previous treatment:decrease in pain  Functional change: Able to perform B/IADLs with decreased pain     Pain: 0/10  Location: left RF    Objective     Observation/Appearance: slight ulnar deviation of the LRF, pain along the dorsal MPJ of the LRF     Edema. Measured in centimeters.    7/18/2023 7/18/2023         Left Right       Wrist Crease           Distal Palmar Crease                 Elbow and Wrist ROM. Measured in degrees.    7/18/2023 7/18/2023         Left Right       Supination/Pronation           Wrist Ext/Flex           Wrist RD/UD              Hand ROM. Measured in degrees.    7/18/2023 7/18/2023 7/27/2023       Left Right  Left                  Index: MP                       PIP                          DIP                       Long:  MP 95 90 90                  115  115                DIP 90 90  90                 Ring:   MP 80 80 90                  110 110                 DIP 75 65 80          COLLINS: 255       Small:  MP                       PIP                       DIP         "               Thumb: MP                        IP               Rad ABD               Pal ABD                  Opposition               Strength (Dynamometer) and Pinch Strength (Pinch Gauge)  Measured in pounds.    7/18/2023 7/18/2023 7/27/2023        Left Right  Left     Rung II 30 40  50     Lu Pinch 16 15  16     3pt Pinch 6 7 7         Sensation: Not formally tetsted  -none along the LRF  -positive tinel's due to CTS in both hands        CMS Impairment/Limitation/Restriction for FOTO hand Survey     Therapist reviewed FOTO scores for Chelsea Rodriguez on 7/18/2023.   FOTO documents entered into [facility] [employer] - see Media section.     Limitation Score: 43%         Treatment      Chelsea received the following supervised modalities after being cleared for contradictions for 10 minutes:   -Patient tolerates MHP x 10 min in order to decrease pain and increase soft tissue extensibility in preparation for ROM, concurrent with assessment of deficits      Chelsea received the following manual therapy techniques for 10 minutes:   -I provide gentle IASTM to L RF/LF in order to increase soft tissue extensibility, decrease pain, and tenderness/hypersensitivity in the stated area, and promote scar tissue remodeling.     -also along dorsal MC bones     Chelsea received therapeutic exercises for 15 minutes including:   -therapist A full flex and intrinsic stretch  -TGE's c ADD foam squeeze  -resisted finger ABD/ADD  -intrinsic hook with dowel   -joint blocking (+HEP)    Chelsea participated in dynamic functional therapeutic activities to improve functional performance for 13 minutes, including:   -Pink Tputty   -   -intrinsic grab   -intrinsic hook    Home Exercises and Education Provided     Education provided:   - Continue current HEP  - Progress towards goals     Written Home Exercises Provided: Patient instructed to cont prior HEP.  Exercises were reviewed and Chelsea was able to demonstrate them prior to the end of the session.  Chelsea  demonstrated good  understanding of the HEP provided.   .   See EMR under Patient Instructions for exercises provided 7/20/2023.        Assessment   Upon arrival to tx session today, patient states that her finger has been feeling great. Minimal pain only occurs when moving her finger a certain way. She continues to wear her mainor strap throughout the day/night. She has noticed reduced redial deviation since use of the mainor strap. Patient tolerates P/AA/AROM exs to per protocol to increase functional and available ROM of LRF. Patient demos increase in ROM post exs and re  ports that she has no pain with any ROM exs. Upon assessment, patient has also increased  strength in the affected UE. She reports independence in all B/IADLs with no c/o pain of difficulty. Patient has nearly met all LTGs and nearly met max potential for OT sevices. I advice patient to continue independent ROM/Strengthening HEP and to call in 4 weeks if any complaints arise. Patient's episode of care will be left open for the time being. In 4 weeks, if all LTGs and max potential for OT services have been met, patient will D/C from OT. Patient agrees with this plan.     Chelsea is progressing well towards her goals and there are no updates to goals at this time. Pt prognosis is Excellent.     Pt will continue to benefit from skilled outpatient occupational therapy to address the deficits listed in the problem list on initial evaluation provide pt/family education and to maximize pt's level of independence in the home and community environment.     Anticipated barriers to occupational therapy: none    Pt's spiritual, cultural and educational needs considered and pt agreeable to plan of care and goals.    Goals:  The following goals were discussed with the patient and patient is in agreement with them as to be addressed in the treatment plan.   Short term Goals:  1) Initiate HEP Met, 7/27/2023  2) Pt will reduce pain to less than 4/10 by 4 weeks.  Met, 7/27/2023  3) Pt will increase functional  strength by 5# in order to A in opening containers for med management or home management tasks by 4 weeks. Met, 7/27/2023  4) Patient will be able to achieve less than or equal to 35% on the FOTO, demonstrating overall improved functional ability with upper extremity. (Self-care category)     Long Term Goals:  Goals to be met by discharge:  1) Independent with HEP  2) Pt will decrease pain to trace or none while completing light home management tasks or work related tasks by d/c. Met, 7/27/2023  3) Patient will be able to achieve less than or equal to 20% on the FOTO, demonstrating overall improved functional ability with upper extremity.  (Self-care category)       Plan   Continue skilled OT POC and progress per protocol as tolerated.    Updates/Grading for next session: Potential D/C      Eva Quezada OT

## 2023-07-27 ENCOUNTER — CLINICAL SUPPORT (OUTPATIENT)
Dept: REHABILITATION | Facility: HOSPITAL | Age: 68
End: 2023-07-27
Payer: MEDICARE

## 2023-07-27 DIAGNOSIS — M62.81 MUSCLE WEAKNESS: ICD-10-CM

## 2023-07-27 DIAGNOSIS — M25.542 PAIN INVOLVING JOINT OF FINGER OF LEFT HAND: Primary | ICD-10-CM

## 2023-07-27 PROCEDURE — 97530 THERAPEUTIC ACTIVITIES: CPT | Mod: HCNC,PN

## 2023-07-27 PROCEDURE — 97140 MANUAL THERAPY 1/> REGIONS: CPT | Mod: HCNC,PN

## 2023-07-27 PROCEDURE — 97110 THERAPEUTIC EXERCISES: CPT | Mod: HCNC,PN

## 2023-08-24 RX ORDER — ATORVASTATIN CALCIUM 80 MG/1
80 TABLET, FILM COATED ORAL DAILY
Qty: 90 TABLET | Refills: 3 | Status: SHIPPED | OUTPATIENT
Start: 2023-08-24

## 2023-09-18 ENCOUNTER — TELEPHONE (OUTPATIENT)
Dept: INTERNAL MEDICINE | Facility: CLINIC | Age: 68
End: 2023-09-18
Payer: MEDICARE

## 2023-09-18 DIAGNOSIS — R53.83 FATIGUE, UNSPECIFIED TYPE: Primary | ICD-10-CM

## 2023-09-18 NOTE — TELEPHONE ENCOUNTER
----- Message from Eliza Alva MD sent at 9/18/2023  2:07 PM CDT -----  What's the indication? She has medicare so I cannot order it without an appropriate diagnosis.   Thanks    ----- Message -----  From: Pk Ardon MA  Sent: 9/18/2023   1:18 PM CDT  To: Eliza Alva MD    Hey if you put in the orders for the labs I'll link them to that other appointment she has      ----- Message -----  From: Padmini Lugo  Sent: 9/18/2023  12:29 PM CDT  To: Nida Kay Staff    Type:  Needs Medical Advice    Who Called: pt    Would the patient rather a call back or a response via [facility]? call  Best Call Back Number: [phone]  Additional Information: pt requesting to have extensive thyroid test submitted with appointment on 10/2             Counseling Text: I reviewed the side effect in detail. Patient should get monthly blood tests, not donate blood, not drive at night if vision affected, and not share medication. Headache Monitoring: I recommended monitoring the headaches for now. There is no evidence of increased intracranial pressure. They were instructed to call if the headaches are worsening. Calculate Months Of Therapy Based On Documented Dosages (Will Hide Months Of Therapy Question)?: No Are Labs Available For Review?: Yes Female Pregnancy Counseling Text: Female patients should also be on two forms of birth control while taking this medication and for one month after their last dose. Hypercholesterolemia Monitoring: I explained this is common when taking isotretinoin. We will monitor closely. Depression Treatment: Will refer to primary care.  We discussed this at length with patient and mother and decided to discontinue isotretinoin at this time. Xerosis Normal Treatment: I recommended application of Cetaphil or CeraVe numerous times a day going to bed to all dry areas. Retinoid Dermatitis Normal Treatment: I recommended more frequent application of Cetaphil or CeraVe to the areas of dermatitis. Nosebleeds Normal Treatment: I explained this is common when taking isotretinoin. I recommended saline mist in each nostril multiple times a day. If this worsens they will contact us. Xerosis Aggressive Treatment: I recommended application of Cetaphil or CeraVe numerous times a day going to bed to all dry areas. I also prescribed a topical steroid for twice daily use. Weight Units: pounds Retinoid Dermatitis Aggressive Treatment: I recommended more frequent application of Cetaphil or CeraVe to the areas of dermatitis. I also prescribed a topical steroid for twice daily use until the dermatitis resolves. Use Therapeutic Ranged Or Therapeutic Target: please select Range or Target Any Nosebleeds?: Yes - Normal Treatment Patient Weight (Optional But Required For Cumulative Dose-Numbers And Decimals Only): 115 Kilograms Preamble Statement (Weight Entered In Details Tab): Reported Weight in kilograms: Lower Range (In Mg/Kg): 120 Pounds Preamble Statement (Weight Entered In Details Tab): Reported Weight in pounds: Months Of Therapy Completed: 1 Cheilitis Aggressive Treatment: I recommended application of Vaseline or Aquaphor numerous times a day (as often as every hour) and before going to bed. I also prescribed a topical steroid for twice daily use. Upper Range (In Mg/Kg): 150 Female Completion Statement: After discussing her treatment course we decided to discontinue isotretinoin therapy at this time. I explained that she would need to continue her birth control methods for at least one month after the last dosage. She should also get a pregnancy test one month after the last dose. She shouldn't donate blood for one month after the last dose. She should call with any new symptoms of depression. Xerosis Normal Treatment: I recommended application of Cetaphil or CeraVe numerous times a day and before going to bed to all dry areas. Myalgia Monitoring: I explained this is common when taking isotretinoin. If this worsens they will contact us. Cheilitis Normal Treatment: I recommended application of Vaseline or Aquaphor numerous times a day (as often as every hour) and before going to bed. Male Completion Statement: After discussing his treatment course we decided to discontinue isotretinoin therapy at this time. He shouldn't donate blood for one month after the last dose. He should call with any new symptoms of depression. Target Cumulative Dosage (In Mg/Kg): 135 Xerosis Aggressive Treatment: I recommended application of Cetaphil or CeraVe numerous times a day and before going to bed to all dry areas. I also prescribed a topical steroid for twice daily use. Myalgia Treatment: I explained this is common when taking isotretinoin. If this worsens they will contact us. They may try OTC ibuprofen. Dosing Month 1 (Required For Cumulative Dosing): 20mg BID Retinoid Dermatitis Normal Treatment: I recommended more frequent application of moisturizers. Detail Level: Zone What Is The Patient's Gender: Male Next Month's Dosage: Continue Current Dosage Any Depression?: Yes - Treatment

## 2023-09-21 DIAGNOSIS — Z78.0 MENOPAUSE: ICD-10-CM

## 2023-09-26 ENCOUNTER — PATIENT MESSAGE (OUTPATIENT)
Dept: ADMINISTRATIVE | Facility: HOSPITAL | Age: 68
End: 2023-09-26
Payer: MEDICARE

## 2023-09-28 ENCOUNTER — HOSPITAL ENCOUNTER (OUTPATIENT)
Dept: RADIOLOGY | Facility: HOSPITAL | Age: 68
Discharge: HOME | End: 2023-09-28
Attending: INTERNAL MEDICINE
Payer: MEDICARE

## 2023-09-28 DIAGNOSIS — Z78.0 MENOPAUSE: ICD-10-CM

## 2023-09-28 PROCEDURE — 77080 DXA BONE DENSITY AXIAL: CPT | Mod: 26,,, | Performed by: INTERNAL MEDICINE

## 2023-09-28 PROCEDURE — 77080 DXA BONE DENSITY AXIAL SKELETON 1 OR MORE SITES: ICD-10-PCS | Mod: 26,,, | Performed by: INTERNAL MEDICINE

## 2023-09-28 PROCEDURE — 77080 DXA BONE DENSITY AXIAL: CPT | Mod: TC

## 2023-10-02 ENCOUNTER — LAB VISIT (OUTPATIENT)
Dept: LAB | Facility: HOSPITAL | Age: 68
End: 2023-10-02
Attending: INTERNAL MEDICINE
Payer: MEDICARE

## 2023-10-02 DIAGNOSIS — E78.5 HYPERLIPIDEMIA, UNSPECIFIED HYPERLIPIDEMIA TYPE: ICD-10-CM

## 2023-10-02 DIAGNOSIS — R53.83 FATIGUE, UNSPECIFIED TYPE: ICD-10-CM

## 2023-10-02 LAB
CHOLEST SERPL-MCNC: 192 MG/DL (ref 120–199)
CHOLEST/HDLC SERPL: 4.6 {RATIO} (ref 2–5)
HDLC SERPL-MCNC: 42 MG/DL (ref 40–75)
HDLC SERPL: 21.9 % (ref 20–50)
LDLC SERPL CALC-MCNC: 133 MG/DL (ref 63–159)
NONHDLC SERPL-MCNC: 150 MG/DL
T4 FREE SERPL-MCNC: 1.02 NG/DL (ref 0.71–1.51)
THYROPEROXIDASE IGG SERPL-ACNC: <6 IU/ML
TRIGL SERPL-MCNC: 85 MG/DL (ref 30–150)
TSH SERPL DL<=0.005 MIU/L-ACNC: 1.3 UIU/ML (ref 0.4–4)

## 2023-10-02 PROCEDURE — 83520 IMMUNOASSAY QUANT NOS NONAB: CPT | Mod: HCNC | Performed by: INTERNAL MEDICINE

## 2023-10-02 PROCEDURE — 86376 MICROSOMAL ANTIBODY EACH: CPT | Mod: HCNC | Performed by: INTERNAL MEDICINE

## 2023-10-02 PROCEDURE — 84443 ASSAY THYROID STIM HORMONE: CPT | Mod: HCNC | Performed by: INTERNAL MEDICINE

## 2023-10-02 PROCEDURE — 84439 ASSAY OF FREE THYROXINE: CPT | Mod: HCNC | Performed by: INTERNAL MEDICINE

## 2023-10-02 PROCEDURE — 80061 LIPID PANEL: CPT | Mod: HCNC | Performed by: INTERNAL MEDICINE

## 2023-10-03 LAB — TSH RECEP AB SER-ACNC: <1.1 IU/L (ref 0–1.75)

## 2023-10-06 ENCOUNTER — OFFICE VISIT (OUTPATIENT)
Dept: CARDIOLOGY | Facility: CLINIC | Age: 68
End: 2023-10-06
Payer: MEDICARE

## 2023-10-06 VITALS
HEIGHT: 58 IN | BODY MASS INDEX: 35.49 KG/M2 | HEART RATE: 84 BPM | DIASTOLIC BLOOD PRESSURE: 82 MMHG | SYSTOLIC BLOOD PRESSURE: 162 MMHG | WEIGHT: 169.06 LBS

## 2023-10-06 DIAGNOSIS — I87.2 VENOUS STASIS DERMATITIS, UNSPECIFIED LATERALITY: ICD-10-CM

## 2023-10-06 DIAGNOSIS — I83.893 VARICOSE VEINS OF LOWER EXTREMITY WITH EDEMA, BILATERAL: ICD-10-CM

## 2023-10-06 DIAGNOSIS — G47.33 OSA (OBSTRUCTIVE SLEEP APNEA): ICD-10-CM

## 2023-10-06 DIAGNOSIS — E66.01 SEVERE OBESITY: ICD-10-CM

## 2023-10-06 DIAGNOSIS — E78.2 MIXED HYPERLIPIDEMIA: ICD-10-CM

## 2023-10-06 DIAGNOSIS — E66.01 MORBID (SEVERE) OBESITY DUE TO EXCESS CALORIES: ICD-10-CM

## 2023-10-06 DIAGNOSIS — I10 ESSENTIAL HYPERTENSION: ICD-10-CM

## 2023-10-06 DIAGNOSIS — I89.0 LYMPHEDEMA OF BOTH LOWER EXTREMITIES: Primary | ICD-10-CM

## 2023-10-06 DIAGNOSIS — Z90.710 ACQUIRED ABSENCE OF BOTH CERVIX AND UTERUS: ICD-10-CM

## 2023-10-06 PROCEDURE — 99215 OFFICE O/P EST HI 40 MIN: CPT | Mod: HCNC,S$GLB,, | Performed by: INTERNAL MEDICINE

## 2023-10-06 PROCEDURE — 3044F HG A1C LEVEL LT 7.0%: CPT | Mod: HCNC,CPTII,S$GLB, | Performed by: INTERNAL MEDICINE

## 2023-10-06 PROCEDURE — 1101F PT FALLS ASSESS-DOCD LE1/YR: CPT | Mod: HCNC,CPTII,S$GLB, | Performed by: INTERNAL MEDICINE

## 2023-10-06 PROCEDURE — 1101F PR PT FALLS ASSESS DOC 0-1 FALLS W/OUT INJ PAST YR: ICD-10-PCS | Mod: HCNC,CPTII,S$GLB, | Performed by: INTERNAL MEDICINE

## 2023-10-06 PROCEDURE — 3044F PR MOST RECENT HEMOGLOBIN A1C LEVEL <7.0%: ICD-10-PCS | Mod: HCNC,CPTII,S$GLB, | Performed by: INTERNAL MEDICINE

## 2023-10-06 PROCEDURE — 1159F PR MEDICATION LIST DOCUMENTED IN MEDICAL RECORD: ICD-10-PCS | Mod: HCNC,CPTII,S$GLB, | Performed by: INTERNAL MEDICINE

## 2023-10-06 PROCEDURE — 3288F PR FALLS RISK ASSESSMENT DOCUMENTED: ICD-10-PCS | Mod: HCNC,CPTII,S$GLB, | Performed by: INTERNAL MEDICINE

## 2023-10-06 PROCEDURE — 4010F PR ACE/ARB THEARPY RXD/TAKEN: ICD-10-PCS | Mod: HCNC,CPTII,S$GLB, | Performed by: INTERNAL MEDICINE

## 2023-10-06 PROCEDURE — 1125F AMNT PAIN NOTED PAIN PRSNT: CPT | Mod: HCNC,CPTII,S$GLB, | Performed by: INTERNAL MEDICINE

## 2023-10-06 PROCEDURE — 1157F PR ADVANCE CARE PLAN OR EQUIV PRESENT IN MEDICAL RECORD: ICD-10-PCS | Mod: HCNC,CPTII,S$GLB, | Performed by: INTERNAL MEDICINE

## 2023-10-06 PROCEDURE — 99999 PR PBB SHADOW E&M-EST. PATIENT-LVL IV: ICD-10-PCS | Mod: PBBFAC,HCNC,, | Performed by: INTERNAL MEDICINE

## 2023-10-06 PROCEDURE — 3077F SYST BP >= 140 MM HG: CPT | Mod: HCNC,CPTII,S$GLB, | Performed by: INTERNAL MEDICINE

## 2023-10-06 PROCEDURE — 3008F PR BODY MASS INDEX (BMI) DOCUMENTED: ICD-10-PCS | Mod: HCNC,CPTII,S$GLB, | Performed by: INTERNAL MEDICINE

## 2023-10-06 PROCEDURE — 1159F MED LIST DOCD IN RCRD: CPT | Mod: HCNC,CPTII,S$GLB, | Performed by: INTERNAL MEDICINE

## 2023-10-06 PROCEDURE — 3077F PR MOST RECENT SYSTOLIC BLOOD PRESSURE >= 140 MM HG: ICD-10-PCS | Mod: HCNC,CPTII,S$GLB, | Performed by: INTERNAL MEDICINE

## 2023-10-06 PROCEDURE — 99999 PR PBB SHADOW E&M-EST. PATIENT-LVL IV: CPT | Mod: PBBFAC,HCNC,, | Performed by: INTERNAL MEDICINE

## 2023-10-06 PROCEDURE — 1157F ADVNC CARE PLAN IN RCRD: CPT | Mod: HCNC,CPTII,S$GLB, | Performed by: INTERNAL MEDICINE

## 2023-10-06 PROCEDURE — 1125F PR PAIN SEVERITY QUANTIFIED, PAIN PRESENT: ICD-10-PCS | Mod: HCNC,CPTII,S$GLB, | Performed by: INTERNAL MEDICINE

## 2023-10-06 PROCEDURE — 4010F ACE/ARB THERAPY RXD/TAKEN: CPT | Mod: HCNC,CPTII,S$GLB, | Performed by: INTERNAL MEDICINE

## 2023-10-06 PROCEDURE — 3079F PR MOST RECENT DIASTOLIC BLOOD PRESSURE 80-89 MM HG: ICD-10-PCS | Mod: HCNC,CPTII,S$GLB, | Performed by: INTERNAL MEDICINE

## 2023-10-06 PROCEDURE — 3288F FALL RISK ASSESSMENT DOCD: CPT | Mod: HCNC,CPTII,S$GLB, | Performed by: INTERNAL MEDICINE

## 2023-10-06 PROCEDURE — 3008F BODY MASS INDEX DOCD: CPT | Mod: HCNC,CPTII,S$GLB, | Performed by: INTERNAL MEDICINE

## 2023-10-06 PROCEDURE — 3079F DIAST BP 80-89 MM HG: CPT | Mod: HCNC,CPTII,S$GLB, | Performed by: INTERNAL MEDICINE

## 2023-10-06 PROCEDURE — 99215 PR OFFICE/OUTPT VISIT, EST, LEVL V, 40-54 MIN: ICD-10-PCS | Mod: HCNC,S$GLB,, | Performed by: INTERNAL MEDICINE

## 2023-10-06 NOTE — PROGRESS NOTES
[facility]    CC: BLE edema      Patient ID: Chelsea Rodriguez is a 68 y.o. female with HTN,. HLD, obesity, HENOK (on CPAP), who presents for a follow up.  Pertinent history/events are as follows:     -Pt kindly referred by Dr. Summers for evaluation of BLE edema.    Initial clinic visit 4/19/2022: Mrs. Rodriguez reports leg swelling for several years.  States leg welling became worse following right knee replacement surgery in 3/2021.  She has no claudication or tissue loss.  Plan:  BLE Edema- Due to lymphedema and possible venous insufficiency.  Check BLE venous reflux study and RAMOS study.  Refer to lymphedema clinic.  Limit sodium intake to 2,000 mg daily.  Limit volume intake to 1.5 liters daily.  Elevate legs when resting.  HTN- Continue current medications.  HLD- Discontinue pravastatin and start atorvastatin 80 mg daily.  Continue ASA 81 mg daily.  Obesity- Encourage diet, exercise and weight loss.  HENOK- Continue CPAP nightly.     -Follow up clinic visit 7/19/2022: Mrs. Rodriguez reports feeling well. She states she missed some doses of cholesterol medicine and may have been eating more fatty foods recently. She has no other complaints.  Plan:   BLE Edema- Due to lymphedema. Lymphedema clinic on the waiting list starting early September. Venous ultrasound negative for reflux however she does have clinical features of venous insufficiency and this may be a false negative test.  Limit sodium intake to 2,000 mg daily.  Limit volume intake to 1.5 liters daily.  Elevate legs when resting. Compression stockings.  HTN- Continue current medications.  HLD-  while taking atorvastatin 80mg. Instructed to modify diet and increase exercise. Continue ASA 81 mg daily. Lipid panel prior to next visit.  Obesity- Encourage diet, exercise and weight loss.  HENOK- Continue CPAP nightly.    -12/20/2022 clinic visit: Mrs. Rodriguez reports significant improvement in leg swelling since completing lymphedema clinic therapy.    on 2022 vs 133 on 2022.   Plan:   BLE Edema- Due to lymphedema. Mrs. Rodriguez reports significant improvement in leg swelling since completing lymphedema clinic therapy.  Continue lymphedema clinic techniques at home.  Limit sodium intake to 2,000 mg daily.  Limit volume intake to 1.5 liters daily.  Elevate legs when resting. .  HTN- Continue current medications.  HLD-  on 2022 vs 133 on 2022.  Continue ASA 81 mg daily and atorvastatin 80 mg daily.  Consider adding zetia next visit if LDL <70.    Obesity- Encourage diet, exercise and weight loss.  HENOK- Continue CPAP nightly.    HPI:  Mrs. Rodriguez reports no chest pain or SOB.  States leg swelling hs been well controlled.   on 10/2/2023.        Past Medical History:   Diagnosis Date    Bilateral knee pain     Carpal tunnel syndrome, bilateral     Fissure in skin of foot     Right small toe    HTN (hypertension)     Hyperlipidemia     Palpitations     Rotator cuff injury     right    Screening for colorectal cancer 10/20/2017    Screening for malignant neoplasm of colon 2021    Statin-induced myositis 2018     Past Surgical History:   Procedure Laterality Date    BILATERAL SALPINGOOPHORECTOMY  2000    BREAST BIOPSY Left 2010    malignant    BREAST BIOPSY Left     negative    BREAST LUMPECTOMY Left 2010    CATARACT EXTRACTION W/  INTRAOCULAR LENS IMPLANT Right 2018    Dr. Oneil    CATARACT EXTRACTION W/  INTRAOCULAR LENS IMPLANT Left 2018    Dr. Oneil     SECTION      x2    COLONOSCOPY N/A 10/20/2017    Procedure: COLONOSCOPY;  Surgeon: Mitchell Danielson Jr., MD;  Location: [facility] ENDO;  Service: Endoscopy;  Laterality: N/A;    COLONOSCOPY N/A 2021    Procedure: COLONOSCOPY/Suprep;  Surgeon: Malcolm Reyes MD;  Location: [facility] ENDO;  Service: Endoscopy;  Laterality: N/A;    CYST REMOVAL      on back    ESOPHAGOGASTRODUODENOSCOPY N/A 2021    Procedure: EGD (ESOPHAGOGASTRODUODENOSCOPY);  " Surgeon: Malcolm Reyes MD;  Location: [facility] ENDO;  Service: Endoscopy;  Laterality: N/A;    HERNIA REPAIR      HYSTERECTOMY      at 25 yrs old    INTRAOCULAR PROSTHESES INSERTION Left 11/06/2018    Procedure: INSERTION, IOL PROSTHESIS;  Surgeon: Sergio Oneil MD;  Location: [facility] OR 1ST FLR;  Service: Ophthalmology;  Laterality: Left;    INTRAOCULAR PROSTHESES INSERTION Right 11/20/2018    Procedure: INSERTION, IOL PROSTHESIS;  Surgeon: Sergio Oneil MD;  Location: [facility] OR 2ND FLR;  Service: Ophthalmology;  Laterality: Right;    KNEE ARTHROPLASTY Right 03/31/2021    Procedure: ARTHROPLASTY, KNEE:RIGHT:DEPUY-SIGMA ;  Surgeon: Pedro Summers III, MD;  Location: [facility] OR;  Service: Orthopedics;  Laterality: Right;    OOPHORECTOMY      @ 45 yrs old    PHACOEMULSIFICATION OF CATARACT Left 11/06/2018    Procedure: PHACOEMULSIFICATION, CATARACT;  Surgeon: Sergio Oneil MD;  [facility];  Service: Ophthalmology;  Laterality: Left;    PHACOEMULSIFICATION OF CATARACT Right 11/20/2018    Procedure: PHACOEMULSIFICATION, CATARACT;  Surgeon: Sergio Oneil MD;  Location: [facility] OR 2ND FLR;  Service: Ophthalmology;  Laterality: Right;    REFRACTIVE SURGERY      2023    supracervical abdominal hysterectomy  1978    fibroids     Social History     Socioeconomic History    Marital status: Single    Number of children: 2   Occupational History     Comment: retired   Tobacco Use    Smoking status: Never    Smokeless tobacco: Never   Substance and Sexual Activity    Alcohol use: Yes     Comment: once per month    Drug use: No    Sexual activity: Not Currently   Social History Narrative    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 hx of left lumpectomy for "breast cancer"- with 5yrs of tamoxifen use. Sees Dr. Buckley ([facility] for surveillance/MMG)        Dr. Llaa former PCP, [facility]          Social Determinants " of Health     Financial Resource Strain: Low Risk  (4/18/2023)    Overall Financial Resource Strain (CARDIA)     Difficulty of Paying Living Expenses: Not hard at all   Food Insecurity: No Food Insecurity (4/18/2023)    Hunger Vital Sign     Worried About Running Out of Food in the Last Year: Never true     Ran Out of Food in the Last Year: Never true   Transportation Needs: No Transportation Needs (4/18/2023)    PRAPARE - Transportation     Lack of Transportation (Medical): No     Lack of Transportation (Non-Medical): No   Physical Activity: Inactive (4/18/2023)    Exercise Vital Sign     Days of Exercise per Week: 0 days     Minutes of Exercise per Session: 0 min   Stress: No Stress Concern Present (4/18/2023)    [employer] - Occupational Stress Questionnaire     Feeling of Stress : Only a little   Social Connections: Moderately Integrated (4/18/2023)    Social Connection and Isolation Panel [NHANES]     Frequency of Communication with Friends and Family: More than three times a week     Frequency of Social Gatherings with Friends and Family: More than three times a week     Attends Tenriism Services: More than 4 times per year     Active Member of Clubs or Organizations: Yes     Attends Club or Organization Meetings: More than 4 times per year     Marital Status:    Housing Stability: Low Risk  (4/18/2023)    Housing Stability Vital Sign     Unable to Pay for Housing in the Last Year: No     Number of Places Lived in the Last Year: 1     Unstable Housing in the Last Year: No     Family History   Problem Relation Age of Onset    Hypertension Mother     Heart disease Mother     Diabetes Mother     Hyperlipidemia Mother     Pancreatitis Mother     Cataracts Mother     Macular degeneration Mother     Cancer Father     Hypertension Sister     Thyroid disease Sister     Cancer Brother         prostate CA    Diabetes Brother     Heart disease Brother     Hyperlipidemia Daughter      No Known Problems Son     Breast cancer Paternal Cousin     Amblyopia Neg Hx     Blindness Neg Hx     Glaucoma Neg Hx     Strabismus Neg Hx     Retinal detachment Neg Hx        Review of patient's allergies indicates:   Allergen Reactions    Codeine Nausea And Vomiting       Medication List with Changes/Refills   Current Medications    ACETAMINOPHEN (TYLENOL) 650 MG TBSR    Take 1 tablet (650 mg total) by mouth every 8 (eight) hours as needed (pain).    AMOXICILLIN (AMOXIL) 500 MG CAPSULE    Take 500 mg by mouth as needed. Before dental work    ASPIRIN (ECOTRIN) 81 MG EC TABLET    Take 1 tablet (81 mg total) by mouth 2 (two) times daily.    ATORVASTATIN (LIPITOR) 80 MG TABLET    TAKE 1 TABLET (80 MG TOTAL) BY MOUTH ONCE DAILY.    COENZYME Q10 100 MG CAPSULE    Take 100 mg by mouth every evening.    DICLOFENAC (VOLTAREN) 25 MG TBEC    Take 1 tablet (25 mg total) by mouth 2 (two) times daily as needed.    DOCUSATE SODIUM (COLACE) 100 MG CAPSULE    Take 1 capsule (100 mg total) by mouth 2 (two) times daily as needed for Constipation.    HYDROCHLOROTHIAZIDE (MICROZIDE) 12.5 MG CAPSULE    TAKE 1 CAPSULE EVERY EVENING    IRBESARTAN (AVAPRO) 150 MG TABLET    TAKE 1 TABLET ONE TIME DAILY    LORATADINE (CLARITIN) 10 MG TABLET    Take 1 tablet (10 mg total) by mouth once daily.    METOPROLOL SUCCINATE (TOPROL-XL) 25 MG 24 HR TABLET    TAKE 1 TABLET EVERY EVENING   Discontinued Medications    LORATADINE (CLARITIN) 10 MG TABLET    Take 10 mg by mouth every evening.       Review of Systems  Constitution: Denies chills, fever, and sweats.  HENT: Denies headaches or blurry vision.  Cardiovascular: Denies chest pain or irregular heart beat.  Respiratory: Denies cough or shortness of breath.  Gastrointestinal: Denies abdominal pain, nausea, or vomiting.  Musculoskeletal: Positive for leg swelling.  Neurological: Denies dizziness or focal weakness.  Psychiatric/Behavioral: Normal mental status.  Hematologic/Lymphatic: Denies bleeding  "problem or easy bruising/bleeding.  Skin: Denies rash or suspicious lesions    Physical Examination  BP (!) 162/82   Pulse 84   Ht 4' 10" (1.473 m)   Wt 76.7 kg (169 lb 1.5 oz)   LMP  (LMP Unknown)   BMI 35.34 kg/m²     Constitutional: No acute distress, conversant  HEENT: Sclera anicteric, Pupils equal, round and reactive to light, extraocular motions intact, Oropharynx clear  Neck: No JVD, no carotid bruits  Cardiovascular: regular rate and rhythm, no murmur, rubs or gallops, normal S1/S2  Pulmonary: Clear to auscultation bilaterally  Abdominal: Abdomen soft, nontender, nondistended, positive bowel sounds  Extremities: BLE's with trace edema, venous stasis dermatitis, and changes consistent with lymphedema   Pulses:  Carotid pulses are 2+ on the right side, and 2+ on the left side.  Radial pulses are 2+ on the right side, and 2+ on the left side.   Femoral pulses are 2+ on the right side, and 2+ on the left side.  Popliteal pulses are 2+ on the right side, and 2+ on the left side.   Dorsalis pedis pulses are 2+ on the right side, and 2+ on the left side.   Posterior tibial pulses are 2+ on the right side, and 2+ on the left side.    Skin: No ecchymosis, erythema, or ulcers  Psych: Alert and oriented x 3, appropriate affect  Neuro: CNII-XII intact, no focal deficits    Labs:  Most Recent Data  CBC:   Lab Results   Component Value Date    WBC 5.98 05/24/2023    HGB 13.1 05/24/2023    HCT 43.0 05/24/2023     05/24/2023    MCV 85 05/24/2023    RDW 15.2 (H) 05/24/2023     BMP:   Lab Results   Component Value Date     05/24/2023    K 4.2 05/24/2023     05/24/2023    CO2 28 05/24/2023    BUN 16 05/24/2023    CREATININE 0.7 05/24/2023     05/24/2023    CALCIUM 9.5 05/24/2023    PHOS 2.9 10/12/2017     LFTS;   Lab Results   Component Value Date    PROT 7.0 05/24/2023    ALBUMIN 3.9 05/24/2023    BILITOT 0.5 05/24/2023    AST 32 05/24/2023    ALKPHOS 89 05/24/2023    ALT 23 05/24/2023 "     COAGS:   Lab Results   Component Value Date    INR 0.9 03/23/2021     FLP:   Lab Results   Component Value Date    CHOL 192 10/02/2023    HDL 42 10/02/2023    LDLCALC 133.0 10/02/2023    TRIG 85 10/02/2023    CHOLHDL 21.9 10/02/2023     CARDIAC:   Lab Results   Component Value Date    TROPONINI <0.006 01/22/2023    BNP 15 01/22/2023     Imaging    RAMOS 4/26/22:  Normal rest and exercise RAMOS bilaterally.  Normal PVR waveforms bilaterally.    BLE Venous Ultrasound 4/26/22:  No evidence of lower extremity DVT or venous reflux bilaterally.    Assessment/Plan:  Chelsea Rodriguez is a 68 y.o. female with HTN, HLD, obesity, HENOK (on CPAP), who presents for a follow up.    1. BLE Edema- Due to lymphedema. Edema is stable.  Continue lymphedema clinic techniques at home.  Limit sodium intake to 2,000 mg daily.  Limit volume intake to 1.5 liters daily.  Elevate legs when resting. .    2. HTN- Continue current medications.    3. HLD-  on 10/2/2023.  Start zetia 10 mg daily.  Continue ASA 81 mg daily and atorvastatin 80 mg daily.        4. Obesity- Refer to Bariatric Medicine for evaluation.  Encourage diet, exercise and weight loss.    5. HENOK- Continue CPAP nightly.    Follow up in 4 months    Total duration of face to face visit time 30 minutes.  Total time spent counseling greater than fifty percent of total visit time.  Counseling included discussion regarding imaging findings, diagnosis, possibilities, treatment options, risks and benefits.  The patient had many questions regarding the options and long-term effects.    Marcell Rico MD, PhD  Interventional Cardiology

## 2023-10-06 NOTE — PATIENT INSTRUCTIONS
Assessment/Plan:  Chelsea Rodriguez is a 68 y.o. female with HTN, HLD, obesity, HENOK (on CPAP), who presents for a follow up.    1. BLE Edema- Due to lymphedema. Edema is stable.  Continue lymphedema clinic techniques at home.  Limit sodium intake to 2,000 mg daily.  Limit volume intake to 1.5 liters daily.  Elevate legs when resting. .    2. HTN- Continue current medications.    3. HLD-  on 10/2/2023.  Start zetia 10 mg daily.  Continue ASA 81 mg daily and atorvastatin 80 mg daily.        4. Obesity- Refer to Bariatric Medicine for evaluation.  Encourage diet, exercise and weight loss.    5. HENOK- Continue CPAP nightly.    Follow up in 4 months

## 2023-10-10 RX ORDER — EZETIMIBE 10 MG/1
10 TABLET ORAL DAILY
Qty: 90 TABLET | Refills: 3 | Status: SHIPPED | OUTPATIENT
Start: 2023-10-10 | End: 2024-10-09

## 2023-10-18 ENCOUNTER — TELEPHONE (OUTPATIENT)
Dept: INTERNAL MEDICINE | Facility: CLINIC | Age: 68
End: 2023-10-18
Payer: MEDICARE

## 2023-10-18 NOTE — TELEPHONE ENCOUNTER
----- Message from Eliza Alva MD sent at 10/18/2023  7:35 AM CDT -----  She can come next Wednesday (we only have 12 that day). thanks  ----- Message -----  From: Pk Ardon MA  Sent: 10/17/2023   4:29 PM CDT  To: Eliza Alva MD    Overbook or next available?  ----- Message -----  From: Nathaly Kirkland  Sent: 10/17/2023   3:15 PM CDT  To: Nida Kay Staff    Type:  Sooner Apoointment Request    Caller is requesting a sooner appointment.  Caller declined first available appointment listed below.  Caller will not accept being placed on the waitlist and is requesting a message be sent to doctor.  Name of Caller:pt  When is the first available appointment?12/1  Symptoms:pt feels a lump by her thyroid needs to get seen sooner  Would the patient rather a call back or a response via [facility]? call  Best Call Back [phone]   Additional Information:

## 2023-10-23 ENCOUNTER — PATIENT MESSAGE (OUTPATIENT)
Dept: ADMINISTRATIVE | Facility: HOSPITAL | Age: 68
End: 2023-10-23
Payer: MEDICARE

## 2023-10-26 ENCOUNTER — TELEPHONE (OUTPATIENT)
Dept: INTERNAL MEDICINE | Facility: CLINIC | Age: 68
End: 2023-10-26
Payer: MEDICARE

## 2023-10-26 ENCOUNTER — PATIENT MESSAGE (OUTPATIENT)
Dept: ADMINISTRATIVE | Facility: HOSPITAL | Age: 68
End: 2023-10-26
Payer: MEDICARE

## 2023-10-26 NOTE — TELEPHONE ENCOUNTER
----- Message from Eliza Alva MD sent at 10/25/2023  5:13 PM CDT -----  Regarding: RE: BP  They do look good. She should continue current medications. Thanks  ----- Message -----  From: Pk Ardon MA  Sent: 10/25/2023   2:41 PM CDT  To: Eliza Alva MD  Subject: BP                                               They all look fine I think  ----- Message -----  From: Michell Garcia  Sent: 10/25/2023   2:35 PM CDT  To: Nida Kay Staff    Type:  Needs Medical Advice    Who Called: pt  Would the patient rather a call back or a response via [facility]? call  Best Call Back Number: [phone]  Additional Information: pt states she couldn't find her pressure reading to put in, informing office of pressure readings 10/24/23 109/72 HR 78 @ 10 AM , 134/81 HR 69 @ 8 PM, 8:30 / 78 HR 81,  10/25/23 1:40 / 83 HR 82

## 2023-10-30 ENCOUNTER — HOSPITAL ENCOUNTER (OUTPATIENT)
Dept: RADIOLOGY | Facility: HOSPITAL | Age: 68
Discharge: HOME | End: 2023-10-30
Attending: INTERNAL MEDICINE
Payer: MEDICARE

## 2023-10-30 ENCOUNTER — OFFICE VISIT (OUTPATIENT)
Dept: INTERNAL MEDICINE | Facility: CLINIC | Age: 68
End: 2023-10-30
Payer: MEDICARE

## 2023-10-30 VITALS
DIASTOLIC BLOOD PRESSURE: 70 MMHG | HEART RATE: 74 BPM | OXYGEN SATURATION: 97 % | HEIGHT: 58 IN | SYSTOLIC BLOOD PRESSURE: 114 MMHG | BODY MASS INDEX: 34.72 KG/M2 | WEIGHT: 165.38 LBS

## 2023-10-30 DIAGNOSIS — H61.23 BILATERAL IMPACTED CERUMEN: ICD-10-CM

## 2023-10-30 DIAGNOSIS — M54.2 NECK PAIN ON LEFT SIDE: ICD-10-CM

## 2023-10-30 DIAGNOSIS — E04.1 THYROID NODULE: Primary | ICD-10-CM

## 2023-10-30 PROCEDURE — 1157F ADVNC CARE PLAN IN RCRD: CPT | Mod: HCNC,CPTII,S$GLB, | Performed by: INTERNAL MEDICINE

## 2023-10-30 PROCEDURE — 1126F PR PAIN SEVERITY QUANTIFIED, NO PAIN PRESENT: ICD-10-PCS | Mod: HCNC,CPTII,S$GLB, | Performed by: INTERNAL MEDICINE

## 2023-10-30 PROCEDURE — 3008F PR BODY MASS INDEX (BMI) DOCUMENTED: ICD-10-PCS | Mod: HCNC,CPTII,S$GLB, | Performed by: INTERNAL MEDICINE

## 2023-10-30 PROCEDURE — 76536 US EXAM OF HEAD AND NECK: CPT | Mod: TC,HCNC

## 2023-10-30 PROCEDURE — 3008F BODY MASS INDEX DOCD: CPT | Mod: HCNC,CPTII,S$GLB, | Performed by: INTERNAL MEDICINE

## 2023-10-30 PROCEDURE — 99214 PR OFFICE/OUTPT VISIT, EST, LEVL IV, 30-39 MIN: ICD-10-PCS | Mod: HCNC,S$GLB,, | Performed by: INTERNAL MEDICINE

## 2023-10-30 PROCEDURE — 99214 OFFICE O/P EST MOD 30 MIN: CPT | Mod: HCNC,S$GLB,, | Performed by: INTERNAL MEDICINE

## 2023-10-30 PROCEDURE — 1101F PT FALLS ASSESS-DOCD LE1/YR: CPT | Mod: HCNC,CPTII,S$GLB, | Performed by: INTERNAL MEDICINE

## 2023-10-30 PROCEDURE — 3074F SYST BP LT 130 MM HG: CPT | Mod: HCNC,CPTII,S$GLB, | Performed by: INTERNAL MEDICINE

## 2023-10-30 PROCEDURE — 1157F PR ADVANCE CARE PLAN OR EQUIV PRESENT IN MEDICAL RECORD: ICD-10-PCS | Mod: HCNC,CPTII,S$GLB, | Performed by: INTERNAL MEDICINE

## 2023-10-30 PROCEDURE — 1126F AMNT PAIN NOTED NONE PRSNT: CPT | Mod: HCNC,CPTII,S$GLB, | Performed by: INTERNAL MEDICINE

## 2023-10-30 PROCEDURE — 3074F PR MOST RECENT SYSTOLIC BLOOD PRESSURE < 130 MM HG: ICD-10-PCS | Mod: HCNC,CPTII,S$GLB, | Performed by: INTERNAL MEDICINE

## 2023-10-30 PROCEDURE — 1159F PR MEDICATION LIST DOCUMENTED IN MEDICAL RECORD: ICD-10-PCS | Mod: HCNC,CPTII,S$GLB, | Performed by: INTERNAL MEDICINE

## 2023-10-30 PROCEDURE — 1160F PR REVIEW ALL MEDS BY PRESCRIBER/CLIN PHARMACIST DOCUMENTED: ICD-10-PCS | Mod: HCNC,CPTII,S$GLB, | Performed by: INTERNAL MEDICINE

## 2023-10-30 PROCEDURE — 3288F PR FALLS RISK ASSESSMENT DOCUMENTED: ICD-10-PCS | Mod: HCNC,CPTII,S$GLB, | Performed by: INTERNAL MEDICINE

## 2023-10-30 PROCEDURE — 3044F PR MOST RECENT HEMOGLOBIN A1C LEVEL <7.0%: ICD-10-PCS | Mod: HCNC,CPTII,S$GLB, | Performed by: INTERNAL MEDICINE

## 2023-10-30 PROCEDURE — 4010F PR ACE/ARB THEARPY RXD/TAKEN: ICD-10-PCS | Mod: HCNC,CPTII,S$GLB, | Performed by: INTERNAL MEDICINE

## 2023-10-30 PROCEDURE — 99999 PR PBB SHADOW E&M-EST. PATIENT-LVL V: CPT | Mod: PBBFAC,HCNC,, | Performed by: INTERNAL MEDICINE

## 2023-10-30 PROCEDURE — 3044F HG A1C LEVEL LT 7.0%: CPT | Mod: HCNC,CPTII,S$GLB, | Performed by: INTERNAL MEDICINE

## 2023-10-30 PROCEDURE — 1159F MED LIST DOCD IN RCRD: CPT | Mod: HCNC,CPTII,S$GLB, | Performed by: INTERNAL MEDICINE

## 2023-10-30 PROCEDURE — 76536 US SOFT TISSUE HEAD NECK THYROID: ICD-10-PCS | Mod: 26,HCNC,, | Performed by: INTERNAL MEDICINE

## 2023-10-30 PROCEDURE — 1160F RVW MEDS BY RX/DR IN RCRD: CPT | Mod: HCNC,CPTII,S$GLB, | Performed by: INTERNAL MEDICINE

## 2023-10-30 PROCEDURE — 3078F PR MOST RECENT DIASTOLIC BLOOD PRESSURE < 80 MM HG: ICD-10-PCS | Mod: HCNC,CPTII,S$GLB, | Performed by: INTERNAL MEDICINE

## 2023-10-30 PROCEDURE — 1101F PR PT FALLS ASSESS DOC 0-1 FALLS W/OUT INJ PAST YR: ICD-10-PCS | Mod: HCNC,CPTII,S$GLB, | Performed by: INTERNAL MEDICINE

## 2023-10-30 PROCEDURE — 3078F DIAST BP <80 MM HG: CPT | Mod: HCNC,CPTII,S$GLB, | Performed by: INTERNAL MEDICINE

## 2023-10-30 PROCEDURE — 4010F ACE/ARB THERAPY RXD/TAKEN: CPT | Mod: HCNC,CPTII,S$GLB, | Performed by: INTERNAL MEDICINE

## 2023-10-30 PROCEDURE — 99999 PR PBB SHADOW E&M-EST. PATIENT-LVL V: ICD-10-PCS | Mod: PBBFAC,HCNC,, | Performed by: INTERNAL MEDICINE

## 2023-10-30 PROCEDURE — 3288F FALL RISK ASSESSMENT DOCD: CPT | Mod: HCNC,CPTII,S$GLB, | Performed by: INTERNAL MEDICINE

## 2023-10-30 PROCEDURE — 76536 US EXAM OF HEAD AND NECK: CPT | Mod: 26,HCNC,, | Performed by: INTERNAL MEDICINE

## 2023-10-30 RX ORDER — FLUTICASONE PROPIONATE 50 MCG
1 SPRAY, SUSPENSION (ML) NASAL DAILY
COMMUNITY
Start: 2023-10-23 | End: 2024-02-15 | Stop reason: SDUPTHER

## 2023-10-30 NOTE — PATIENT INSTRUCTIONS
If you have neck pain again, use a heating pad, stretching and massage for pain relief.   Ok to use topical products like icy hot, voltaren gel or lidocaine gel. Ok to use tylenol and ibuprofen if needed.

## 2023-10-30 NOTE — PROGRESS NOTES
Patient ID: Chelsea Rodriguez is a 68 y.o. female.    Chief Complaint: Follow-up    HPI Chelsea is a 68 y.o. female with chief complaint of lump. Located on left side neck. Onset was 2 weeks ago. Associated with symptoms of pain and swelling. Although pain has resolved and she is not hurting today. Constant problem in duration. Nothing making it better. She is concerned this may be related to her thyroid gland. Her brother  from thyroid cancer recently.     She also complains of ear itching. Located in both ears. No associated symptoms. Usually does ear wash with ENT.     Review of Systems   HENT:          See HPI   Musculoskeletal:         See HPI   All other systems reviewed and are negative.        Objective:     Vitals:    10/30/23 0943   BP: 114/70   Pulse: 74        Physical Exam  Vitals reviewed.   Constitutional:       General: She is not in acute distress.     Appearance: Normal appearance. She is well-developed. She is obese. She is not ill-appearing, toxic-appearing or diaphoretic.   HENT:      Head: Normocephalic and atraumatic.      Right Ear: External ear normal. There is impacted cerumen.      Left Ear: External ear normal. There is impacted cerumen.      Nose: Nose normal.   Eyes:      General: No scleral icterus.        Right eye: No discharge.         Left eye: No discharge.      Extraocular Movements: Extraocular movements intact.      Conjunctiva/sclera: Conjunctivae normal.   Cardiovascular:      Rate and Rhythm: Normal rate and regular rhythm.      Heart sounds: Normal heart sounds. No murmur heard.     No friction rub. No gallop.   Pulmonary:      Effort: Pulmonary effort is normal. No respiratory distress.      Breath sounds: Normal breath sounds. No stridor. No wheezing, rhonchi or rales.   Chest:          Comments: Area of soft tissue edema as shown. No erythema or ecchymosis. No pain on palpation. No thyroid abnormalities noted. Full ROM of neck intact actively but with worsening pain with  left side flexion   Skin:     General: Skin is warm and dry.   Neurological:      General: No focal deficit present.      Mental Status: She is alert and oriented to person, place, and time. Mental status is at baseline.   Psychiatric:         Mood and Affect: Mood normal.         Behavior: Behavior normal.         Thought Content: Thought content normal.         Judgment: Judgment normal.         Assessment:       1. Neck pain on left side Active   2. Bilateral impacted cerumen Active       Plan:         Neck pain on left side  Comments:  Suspect this is a pulled or strained muscle. See AVS  Orders:  -     US Soft Tissue Head Neck Thyroid; Future; Expected date: 10/30/2023    Bilateral impacted cerumen  Comments:  Bilateral ear wash performed by TIM Willis. Pt reported improvement in symptom after wash  Orders:  -     Ear wax removal        RTC PRN    Warning signs discussed, patient to call with any further issues or worsening of symptoms.       Parts of the above note were dictated using a voice dictation software. Please excuse any grammatical or typographical errors.

## 2023-10-31 ENCOUNTER — HOSPITAL ENCOUNTER (OUTPATIENT)
Dept: RADIOLOGY | Facility: HOSPITAL | Age: 68
Discharge: HOME | End: 2023-10-31
Attending: STUDENT IN AN ORGANIZED HEALTH CARE EDUCATION/TRAINING PROGRAM
Payer: MEDICARE

## 2023-10-31 ENCOUNTER — OFFICE VISIT (OUTPATIENT)
Dept: RHEUMATOLOGY | Facility: CLINIC | Age: 68
End: 2023-10-31
Payer: MEDICARE

## 2023-10-31 VITALS
BODY MASS INDEX: 34.63 KG/M2 | SYSTOLIC BLOOD PRESSURE: 124 MMHG | DIASTOLIC BLOOD PRESSURE: 77 MMHG | WEIGHT: 165 LBS | HEIGHT: 58 IN | HEART RATE: 74 BPM

## 2023-10-31 DIAGNOSIS — M25.511 BILATERAL SHOULDER PAIN, UNSPECIFIED CHRONICITY: ICD-10-CM

## 2023-10-31 DIAGNOSIS — M19.90 OSTEOARTHRITIS, UNSPECIFIED OSTEOARTHRITIS TYPE, UNSPECIFIED SITE: Primary | ICD-10-CM

## 2023-10-31 DIAGNOSIS — M25.512 BILATERAL SHOULDER PAIN, UNSPECIFIED CHRONICITY: ICD-10-CM

## 2023-10-31 DIAGNOSIS — M75.121 NONTRAUMATIC COMPLETE TEAR OF RIGHT ROTATOR CUFF: ICD-10-CM

## 2023-10-31 DIAGNOSIS — G56.03 BILATERAL CARPAL TUNNEL SYNDROME: ICD-10-CM

## 2023-10-31 DIAGNOSIS — S46.811D TEAR OF RIGHT INFRASPINATUS TENDON, SUBSEQUENT ENCOUNTER: ICD-10-CM

## 2023-10-31 PROCEDURE — 1157F PR ADVANCE CARE PLAN OR EQUIV PRESENT IN MEDICAL RECORD: ICD-10-PCS | Mod: HCNC,CPTII,GC,S$GLB | Performed by: STUDENT IN AN ORGANIZED HEALTH CARE EDUCATION/TRAINING PROGRAM

## 2023-10-31 PROCEDURE — 1125F PR PAIN SEVERITY QUANTIFIED, PAIN PRESENT: ICD-10-PCS | Mod: HCNC,CPTII,GC,S$GLB | Performed by: STUDENT IN AN ORGANIZED HEALTH CARE EDUCATION/TRAINING PROGRAM

## 2023-10-31 PROCEDURE — 3044F PR MOST RECENT HEMOGLOBIN A1C LEVEL <7.0%: ICD-10-PCS | Mod: HCNC,CPTII,GC,S$GLB | Performed by: STUDENT IN AN ORGANIZED HEALTH CARE EDUCATION/TRAINING PROGRAM

## 2023-10-31 PROCEDURE — 73030 X-RAY EXAM OF SHOULDER: CPT | Mod: 26,50,HCNC, | Performed by: RADIOLOGY

## 2023-10-31 PROCEDURE — 1157F ADVNC CARE PLAN IN RCRD: CPT | Mod: HCNC,CPTII,GC,S$GLB | Performed by: STUDENT IN AN ORGANIZED HEALTH CARE EDUCATION/TRAINING PROGRAM

## 2023-10-31 PROCEDURE — 73030 X-RAY EXAM OF SHOULDER: CPT | Mod: TC,50,HCNC

## 2023-10-31 PROCEDURE — 3078F PR MOST RECENT DIASTOLIC BLOOD PRESSURE < 80 MM HG: ICD-10-PCS | Mod: HCNC,CPTII,GC,S$GLB | Performed by: STUDENT IN AN ORGANIZED HEALTH CARE EDUCATION/TRAINING PROGRAM

## 2023-10-31 PROCEDURE — 3074F PR MOST RECENT SYSTOLIC BLOOD PRESSURE < 130 MM HG: ICD-10-PCS | Mod: HCNC,CPTII,GC,S$GLB | Performed by: STUDENT IN AN ORGANIZED HEALTH CARE EDUCATION/TRAINING PROGRAM

## 2023-10-31 PROCEDURE — 3008F BODY MASS INDEX DOCD: CPT | Mod: HCNC,CPTII,GC,S$GLB | Performed by: STUDENT IN AN ORGANIZED HEALTH CARE EDUCATION/TRAINING PROGRAM

## 2023-10-31 PROCEDURE — 3078F DIAST BP <80 MM HG: CPT | Mod: HCNC,CPTII,GC,S$GLB | Performed by: STUDENT IN AN ORGANIZED HEALTH CARE EDUCATION/TRAINING PROGRAM

## 2023-10-31 PROCEDURE — 99205 PR OFFICE/OUTPT VISIT, NEW, LEVL V, 60-74 MIN: ICD-10-PCS | Mod: HCNC,GC,S$GLB, | Performed by: STUDENT IN AN ORGANIZED HEALTH CARE EDUCATION/TRAINING PROGRAM

## 2023-10-31 PROCEDURE — 1160F PR REVIEW ALL MEDS BY PRESCRIBER/CLIN PHARMACIST DOCUMENTED: ICD-10-PCS | Mod: HCNC,CPTII,GC,S$GLB | Performed by: STUDENT IN AN ORGANIZED HEALTH CARE EDUCATION/TRAINING PROGRAM

## 2023-10-31 PROCEDURE — 1125F AMNT PAIN NOTED PAIN PRSNT: CPT | Mod: HCNC,CPTII,GC,S$GLB | Performed by: STUDENT IN AN ORGANIZED HEALTH CARE EDUCATION/TRAINING PROGRAM

## 2023-10-31 PROCEDURE — 99205 OFFICE O/P NEW HI 60 MIN: CPT | Mod: HCNC,GC,S$GLB, | Performed by: STUDENT IN AN ORGANIZED HEALTH CARE EDUCATION/TRAINING PROGRAM

## 2023-10-31 PROCEDURE — 1160F RVW MEDS BY RX/DR IN RCRD: CPT | Mod: HCNC,CPTII,GC,S$GLB | Performed by: STUDENT IN AN ORGANIZED HEALTH CARE EDUCATION/TRAINING PROGRAM

## 2023-10-31 PROCEDURE — 3044F HG A1C LEVEL LT 7.0%: CPT | Mod: HCNC,CPTII,GC,S$GLB | Performed by: STUDENT IN AN ORGANIZED HEALTH CARE EDUCATION/TRAINING PROGRAM

## 2023-10-31 PROCEDURE — 4010F ACE/ARB THERAPY RXD/TAKEN: CPT | Mod: HCNC,CPTII,GC,S$GLB | Performed by: STUDENT IN AN ORGANIZED HEALTH CARE EDUCATION/TRAINING PROGRAM

## 2023-10-31 PROCEDURE — 3074F SYST BP LT 130 MM HG: CPT | Mod: HCNC,CPTII,GC,S$GLB | Performed by: STUDENT IN AN ORGANIZED HEALTH CARE EDUCATION/TRAINING PROGRAM

## 2023-10-31 PROCEDURE — 73030 XR SHOULDER COMPLETE 2 OR MORE VIEWS BILATERAL: ICD-10-PCS | Mod: 26,50,HCNC, | Performed by: RADIOLOGY

## 2023-10-31 PROCEDURE — 99999 PR PBB SHADOW E&M-EST. PATIENT-LVL V: CPT | Mod: PBBFAC,HCNC,GC, | Performed by: STUDENT IN AN ORGANIZED HEALTH CARE EDUCATION/TRAINING PROGRAM

## 2023-10-31 PROCEDURE — 1159F PR MEDICATION LIST DOCUMENTED IN MEDICAL RECORD: ICD-10-PCS | Mod: HCNC,CPTII,GC,S$GLB | Performed by: STUDENT IN AN ORGANIZED HEALTH CARE EDUCATION/TRAINING PROGRAM

## 2023-10-31 PROCEDURE — 1159F MED LIST DOCD IN RCRD: CPT | Mod: HCNC,CPTII,GC,S$GLB | Performed by: STUDENT IN AN ORGANIZED HEALTH CARE EDUCATION/TRAINING PROGRAM

## 2023-10-31 PROCEDURE — 3008F PR BODY MASS INDEX (BMI) DOCUMENTED: ICD-10-PCS | Mod: HCNC,CPTII,GC,S$GLB | Performed by: STUDENT IN AN ORGANIZED HEALTH CARE EDUCATION/TRAINING PROGRAM

## 2023-10-31 PROCEDURE — 4010F PR ACE/ARB THEARPY RXD/TAKEN: ICD-10-PCS | Mod: HCNC,CPTII,GC,S$GLB | Performed by: STUDENT IN AN ORGANIZED HEALTH CARE EDUCATION/TRAINING PROGRAM

## 2023-10-31 PROCEDURE — 99999 PR PBB SHADOW E&M-EST. PATIENT-LVL V: ICD-10-PCS | Mod: PBBFAC,HCNC,GC, | Performed by: STUDENT IN AN ORGANIZED HEALTH CARE EDUCATION/TRAINING PROGRAM

## 2023-10-31 NOTE — PROGRESS NOTES
I have personally reviewed the history, confirmed exam findings, and discussed assessment and plan with fellow.;      Complete right rotator cuff tear since 2019, exam shoulders with full AROM and normal strength on resisted abd//ext rotation + Speed's right, + empty can left, bilateral + Neer and H-K  Bilateral rotator cuff tendinitis  OA hands michelle thumb cmc and ip  Severe medial TF OA left knee  S/p right TKA      Update shoulder x-rays  Ref to PT  Diclofenac gel to affected joints  Mediterranean diet  RTC 3 months

## 2023-10-31 NOTE — PATIENT INSTRUCTIONS
- Recommend Tylenol up to 3000 mg per day, Voltaren gel on affected areas, paraffin wax baths for moist heat to ease pain and stiffness, Tumeric 6533-7554 mg daily, Osteo Bi-flex triple strength   - Recommend maintaining a normal weight, regular exercise and joint and muscle strengthening are the best things to help stabilize arthritis

## 2023-10-31 NOTE — PROGRESS NOTES
Subjective:      Patient ID: Chelsea Rodriguez is a 68 y.o. female.    Chief Complaint: Disease Management    Initial Presentation  Chelsea Rodriguez is a 68 y.o. F with a past medical history below who presents today for evaluation. She was referred by Dr. Moreno for OA. She was evaluated in June and had reported hand pain however pain was a 0/10 at that time. XR of the hands showed mild DJD. She has been using Voltaren gel on her knees but it was not helping much. She follows with Orthopedics. She had a R TKA and has severe OA of bilateral knees, follows with Dr. Summers. She was also seen by Dr. Quarles in the past for R chronic rotator cuff tear.    She states that when she sits still she does not experience pain. With movement, she has pain in the shoulders, knees, toes (hammer toe), neck, hands (related to carpal tunnel). She doesn't noticed any swelling of the joints. She does not currently take anything regularly for her pain.    She reports neuropathy in her hands. She had an EMG in February 2023 which showed severe bilateral carpal tunnel syndrome (median neuropathy at the wrists)with secondary denervation of the APB muscles. She was seen by Orthopedics Dr. Russo-Digeorge who recommended surgery but the patient decided to proceed with conservative treatments at that time.     She walks for 30 min, 5 days per week. She has exercise equipment at home that she uses. She tries to eat a lot of vegetables and fruits.     Rheumatology ROS  (-) fevers, chills (-) weight loss, (-) fatigue, (+) morning stiffness (10 min),  (+) arthralgias, (-) arthritis, (-) headaches, (-)  vision changes or loss of vision, (-) hx of red eyes including uveitis, iritis, scleritis or episcleritis, (-)  photophobia, (-) dry eyes, (-) dry mouth, (-) rash, (-) photosensitivity, (-) alopecia, (-) mucosal ulcers, (-) Raynaud's  phenomenon, (-) SQ nodules, (-) sense of skin tightening in hands, face or torso, (-)  hx pleurisy, (-) sharp  chest pains that increase with deep breath, (-) lung fibrosis, (-) hemoptysis, (-) hx of DVT or PE (-) chest pains, (-) shortness of breath, (-) hx pericarditis (-) abdominal pain, (-) nausea, (-) vomiting, (-) diarrhea, (-) constipation, (-) melena,  (-) bloody diarrhea, (-) UC/Crohns, (-) dysphagia, (-) GERD/Reflux, (-) hematuria, proteinuria, (-) renal failure, (-) focal weakness, (-) trouble combing hair or (-) getting out of chairs,  (-) hx of low WBC, low platelets, anemia, (-) hx of pregnancy losses/pre term deliveries/pregnancy complications, (-) genital ulcers    History  Medical:  Active Problem List with Overview Notes    Diagnosis Date Noted    Morbid (severe) obesity due to excess calories 10/06/2023    Severe obesity 10/06/2023    Pain involving joint of finger of left hand 07/18/2023    Muscle weakness 07/18/2023    Osteoarthritis 06/14/2023    Swelling of hand 06/14/2023    Anxiety 04/18/2023    Pelvic floor dysfunction 10/24/2022    Other lack of coordination 10/24/2022    Venous stasis dermatitis 04/19/2022    Lymphedema of both lower extremities 04/19/2022    Primary osteoarthritis of right knee 03/31/2021    HENOK (obstructive sleep apnea) 03/23/2021    Non-cardiac chest pain 03/23/2021    H/O tooth extraction 03/23/2021    Vitamin D deficiency 12/08/2020    Hyperlipidemia 12/08/2020    Obesity (BMI 30.0-34.9) 12/08/2020    Varicose veins of lower extremity with edema, bilateral 11/19/2019    PCO (posterior capsular opacification), bilateral 08/26/2019    Pseudophakia 08/26/2019    Senile nuclear sclerosis 11/06/2018    Refractive error 09/24/2018    Vitreous detachment of both eyes 09/24/2018    Dry eye syndrome of both eyes 09/24/2018    Cortical cataract of both eyes 09/24/2018    Bilateral carpal tunnel syndrome 04/10/2018    Essential hypertension 03/19/2018    History of adenomatous polyp of colon 09/25/2017    History of left breast cancer 08/02/2017    Acquired absence of both cervix and  uterus 2013     Note: SUPRACERVICAL, needs paps       Surgical:  Past Surgical History:   Procedure Laterality Date    BILATERAL SALPINGOOPHORECTOMY  2000    BREAST BIOPSY Left 2010    malignant    BREAST BIOPSY Left     negative    BREAST LUMPECTOMY Left     CATARACT EXTRACTION W/  INTRAOCULAR LENS IMPLANT Right 2018    Dr. Oneil    CATARACT EXTRACTION W/  INTRAOCULAR LENS IMPLANT Left 2018    Dr. Oneil     SECTION      x2    COLONOSCOPY N/A 10/20/2017    Procedure: COLONOSCOPY;  Surgeon: Mitchell Danielson Jr., MD;  Location: [facility] ENDO;  Service: Endoscopy;  Laterality: N/A;    COLONOSCOPY N/A 2021    Procedure: COLONOSCOPY/Suprep;  Surgeon: Malcolm Reyes MD;  Location: [facility];  Service: Endoscopy;  Laterality: N/A;    CYST REMOVAL      on back    ESOPHAGOGASTRODUODENOSCOPY N/A 2021    Procedure: EGD (ESOPHAGOGASTRODUODENOSCOPY);  Surgeon: Malcolm Reyes MD;  Location: [facility];  Service: Endoscopy;  Laterality: N/A;    HERNIA REPAIR      HYSTERECTOMY      at 25 yrs old    INTRAOCULAR PROSTHESES INSERTION Left 2018    Procedure: INSERTION, IOL PROSTHESIS;  Surgeon: Sergio Oneil MD;  Location: [facility] OR 1ST FLR;  Service: Ophthalmology;  Laterality: Left;    INTRAOCULAR PROSTHESES INSERTION Right 2018    Procedure: INSERTION, IOL PROSTHESIS;  Surgeon: Sergio Oneil MD;  Location: [facility] OR 2ND FLR;  Service: Ophthalmology;  Laterality: Right;    KNEE ARTHROPLASTY Right 2021    Procedure: ARTHROPLASTY, KNEE:RIGHT:DEPUY-SIGMA ;  Surgeon: Pedro Summers III, MD;  Location: [facility];  Service: Orthopedics;  Laterality: Right;    OOPHORECTOMY      @ 45 yrs old    PHACOEMULSIFICATION OF CATARACT Left 2018    Procedure: PHACOEMULSIFICATION, CATARACT;  Surgeon: Sergio Oneil MD;  Location: [facility] OR 1ST FLR;  Service: Ophthalmology;  Laterality: Left;    PHACOEMULSIFICATION OF CATARACT Right 2018     Procedure: PHACOEMULSIFICATION, CATARACT;  Surgeon: Sergio Oneil MD;  Location: [facility] OR [address] [place] [address];  Service: Ophthalmology;  Laterality: Right;    REFRACTIVE SURGERY      2023    supracervical abdominal hysterectomy  1978    fibroids     Social: denies alcohol or tobacco use, previously work in [place] at [employer] but is now retired   Family: denies family history of autoimmune conditions   Medication:  Current Outpatient Medications   Medication Sig Dispense Refill    acetaminophen (TYLENOL) 650 MG TbSR Take 1 tablet (650 mg total) by mouth every 8 (eight) hours as needed (pain). 120 tablet 0    amoxicillin (AMOXIL) 500 MG capsule Take 500 mg by mouth as needed. Before dental work      atorvastatin (LIPITOR) 80 MG tablet TAKE 1 TABLET (80 MG TOTAL) BY MOUTH ONCE DAILY. 90 tablet 3    coenzyme Q10 100 mg capsule Take 100 mg by mouth every evening.      diclofenac (VOLTAREN) 25 MG TbEC Take 1 tablet (25 mg total) by mouth 2 (two) times daily as needed. 20 tablet 0    docusate sodium (COLACE) 100 MG capsule Take 1 capsule (100 mg total) by mouth 2 (two) times daily as needed for Constipation. 60 capsule 0    ezetimibe (ZETIA) 10 mg tablet Take 1 tablet (10 mg total) by mouth once daily. 90 tablet 3    fluticasone propionate (FLONASE) 50 mcg/actuation nasal spray 1 spray by Each Nostril route.      hydroCHLOROthiazide (MICROZIDE) 12.5 mg capsule TAKE 1 CAPSULE EVERY EVENING 90 capsule 0    irbesartan (AVAPRO) 150 MG tablet TAKE 1 TABLET ONE TIME DAILY 90 tablet 3    loratadine (CLARITIN) 10 mg tablet Take 1 tablet (10 mg total) by mouth once daily. 30 tablet 2    metoprolol succinate (TOPROL-XL) 25 MG 24 hr tablet TAKE 1 TABLET EVERY EVENING 90 tablet 3    aspirin (ECOTRIN) 81 MG EC tablet Take 1 tablet (81 mg total) by mouth 2 (two) times daily. (Patient taking differently: Take 81 mg by mouth.) 60 tablet 0     No current facility-administered medications for this visit.  "      Imaging/Procedures  XR Hand L (6/14/23):  FINDINGS:  Mild DJD.  No acute fracture or dislocation. No bone destruction identified.  Impression:  No significant changes    Rheumatologic labs  Component      Latest Ref Rng 5/24/2023   WBC      3.90 - 12.70 K/uL 5.98    RBC      4.00 - 5.40 M/uL 5.09    Hemoglobin      12.0 - 16.0 g/dL 13.1    Hematocrit      37.0 - 48.5 % 43.0    MCV      82 - 98 fL 85    MCH      27.0 - 31.0 pg 25.7 (L)    MCHC      32.0 - 36.0 g/dL 30.5 (L)    RDW      11.5 - 14.5 % 15.2 (H)    Platelet Count      150 - 450 K/uL 311       Component      Latest Ref Rng 5/24/2023   Sodium      136 - 145 mmol/L 142    Potassium      3.5 - 5.1 mmol/L 4.2    Chloride      95 - 110 mmol/L 105    CO2      23 - 29 mmol/L 28    Glucose      70 - 110 mg/dL 101    BUN      8 - 23 mg/dL 16    Creatinine      0.5 - 1.4 mg/dL 0.7    Calcium      8.7 - 10.5 mg/dL 9.5    PROTEIN TOTAL      6.0 - 8.4 g/dL 7.0    Albumin      3.5 - 5.2 g/dL 3.9    BILIRUBIN TOTAL      0.1 - 1.0 mg/dL 0.5    ALP      55 - 135 U/L 89    AST      10 - 40 U/L 32    ALT      10 - 44 U/L 23    Anion Gap      8 - 16 mmol/L 9    eGFR      >60 mL/min/1.73 m^2 >60.0      Component      Latest Ref Rng 10/13/2022   CRP      0.0 - 8.2 mg/L 2.6    Sed Rate      0 - 36 mm/Hr 26      Rheumatologic medications history  Voltaren gel     Objective:   /77   Pulse 74   Ht 4' 10" (1.473 m)   Wt 74.8 kg (165 lb)   LMP  (LMP Unknown)   BMI 34.49 kg/m²   Physical Exam   Constitutional: No distress.   HENT:   Mouth/Throat: Mucous membranes are moist.   Eyes: Conjunctivae are normal.   Cardiovascular: Normal rate, regular rhythm, normal heart sounds and normal pulses.   Pulmonary/Chest: Effort normal and breath sounds normal.   Abdominal: Soft. Bowel sounds are normal.   Musculoskeletal:         General: No swelling or tenderness. Normal range of motion.      Cervical back: Normal range of motion. No rigidity or tenderness.      Comments: " Bilateral thenar atrophy   Neurological: She is alert. She displays no weakness. Gait normal.   Skin: Skin is warm.       Right Side Rheumatological Exam     Shoulder Exam   Tenderness Location: no tenderness    Range of Motion   Active abduction:  normal   Passive abduction:  normal   Extension:  normal   Forward Flexion:  normal   Forward Elevation: normal  Adduction: normal    Left Side Rheumatological Exam     Shoulder Exam   Tenderness Location: no tenderness    Range of Motion   Active abduction:  normal   Passive abduction:  normal   Extension:  normal   Forward Flexion:  normal   Forward Elevation: normal  Adduction: normal      +Neer on R  +Starr/Kennedry bilateral      10/31/2023   Tender (JONES-28) 0 / 28    Swollen (JONES-28) 0 / 28    Provider Global --   Patient Global --   ESR --   CRP --   JONES-28 (ESR) --   JONES-28 (CRP) --   CDAI Score --     Assessment:     1. Osteoarthritis, unspecified osteoarthritis type, unspecified site    2. Bilateral carpal tunnel syndrome    3. Tear of right infraspinatus tendon, subsequent encounter    4. Bilateral shoulder pain, unspecified chronicity    5. Nontraumatic complete tear of right rotator cuff      Plan:     Problem List Items Addressed This Visit          Neuro    Bilateral carpal tunnel syndrome       Orthopedic    Osteoarthritis - Primary     Other Visit Diagnoses       Tear of right infraspinatus tendon, subsequent encounter        Relevant Orders    Ambulatory referral/consult to Physical/Occupational Therapy    X-Ray Shoulder 2 or more views Bilat    Bilateral shoulder pain, unspecified chronicity        Relevant Orders    X-Ray Shoulder 2 or more views Bilat    Nontraumatic complete tear of right rotator cuff        Relevant Orders    Ambulatory referral/consult to Physical/Occupational Therapy          Chelsea GÓMEZ Michael is a 68 y.o. F with a past medical history of OA, complete R rotator cuff tear, R TKA who presents today for evaluation for OA.  "    Osteoarthritis- Explained to the patient that they have osteoarthritis, also synonymous with degenerative arthritis.  I explained to them, that this type of arthritis, the cartilage, which is a rubbery material that protect the joints, wears away, which can lead to joint pain, and bone spurs.  I told patient, this is the type of arthritis everyone gets, at some point in their life, and the severity can be mild to severe depending on the patient. "Wear and tear", previous joint/tendon/ligament injuries, extra weight, genetics, all play a role in osteoarthritis. I explained to the patient, that there are no medications that can reverse cartilage loss. I explained that maintaining a normal weight, exercise, joint and muscle strengthening, are the best things to help stabilize arthritis.      Plan:  - Recommend Tylenol up to 3000 mg per day, Voltaren gel on affected areas, paraffin wax baths for moist heat to ease pain and stiffness, Tumeric 0836-9675 mg daily   - Recommend maintaining a normal weight, regular exercise and joint and muscle strengthening are the best things to help stabilize arthritis   - Provided patient with information on Mediterranean diet  - Referral to PT placed   - Obtain XR bilateral shoulders   - Recommended carpal tunnel release given severe carpal tunnel bilaterally, she will reach out to Orthopedics Hand regarding next steps     This patient was examined with Dr. Ny. Plan discussed with the patient. Return to clinic as needed.    Elaine Kraus MD  Rheumatology Fellow, PGY4      "

## 2023-11-02 ENCOUNTER — OFFICE VISIT (OUTPATIENT)
Dept: OBSTETRICS AND GYNECOLOGY | Facility: CLINIC | Age: 68
End: 2023-11-02
Payer: MEDICARE

## 2023-11-02 ENCOUNTER — TELEPHONE (OUTPATIENT)
Dept: INTERNAL MEDICINE | Facility: CLINIC | Age: 68
End: 2023-11-02
Payer: MEDICARE

## 2023-11-02 VITALS
WEIGHT: 163.13 LBS | SYSTOLIC BLOOD PRESSURE: 137 MMHG | BODY MASS INDEX: 34.09 KG/M2 | DIASTOLIC BLOOD PRESSURE: 78 MMHG

## 2023-11-02 DIAGNOSIS — Z01.419 WELL WOMAN EXAM WITH ROUTINE GYNECOLOGICAL EXAM: Primary | ICD-10-CM

## 2023-11-02 PROCEDURE — 1157F PR ADVANCE CARE PLAN OR EQUIV PRESENT IN MEDICAL RECORD: ICD-10-PCS | Mod: HCNC,CPTII,S$GLB, | Performed by: STUDENT IN AN ORGANIZED HEALTH CARE EDUCATION/TRAINING PROGRAM

## 2023-11-02 PROCEDURE — G0101 PR CA SCREEN;PELVIC/BREAST EXAM: ICD-10-PCS | Mod: HCNC,GZ,S$GLB, | Performed by: STUDENT IN AN ORGANIZED HEALTH CARE EDUCATION/TRAINING PROGRAM

## 2023-11-02 PROCEDURE — 3078F PR MOST RECENT DIASTOLIC BLOOD PRESSURE < 80 MM HG: ICD-10-PCS | Mod: HCNC,CPTII,S$GLB, | Performed by: STUDENT IN AN ORGANIZED HEALTH CARE EDUCATION/TRAINING PROGRAM

## 2023-11-02 PROCEDURE — G0101 CA SCREEN;PELVIC/BREAST EXAM: HCPCS | Mod: HCNC,GZ,S$GLB, | Performed by: STUDENT IN AN ORGANIZED HEALTH CARE EDUCATION/TRAINING PROGRAM

## 2023-11-02 PROCEDURE — 3044F PR MOST RECENT HEMOGLOBIN A1C LEVEL <7.0%: ICD-10-PCS | Mod: HCNC,CPTII,S$GLB, | Performed by: STUDENT IN AN ORGANIZED HEALTH CARE EDUCATION/TRAINING PROGRAM

## 2023-11-02 PROCEDURE — 99999 PR PBB SHADOW E&M-EST. PATIENT-LVL III: ICD-10-PCS | Mod: PBBFAC,HCNC,, | Performed by: STUDENT IN AN ORGANIZED HEALTH CARE EDUCATION/TRAINING PROGRAM

## 2023-11-02 PROCEDURE — 3075F SYST BP GE 130 - 139MM HG: CPT | Mod: HCNC,CPTII,S$GLB, | Performed by: STUDENT IN AN ORGANIZED HEALTH CARE EDUCATION/TRAINING PROGRAM

## 2023-11-02 PROCEDURE — 88175 CYTOPATH C/V AUTO FLUID REDO: CPT | Mod: HCNC | Performed by: STUDENT IN AN ORGANIZED HEALTH CARE EDUCATION/TRAINING PROGRAM

## 2023-11-02 PROCEDURE — 1159F MED LIST DOCD IN RCRD: CPT | Mod: HCNC,CPTII,S$GLB, | Performed by: STUDENT IN AN ORGANIZED HEALTH CARE EDUCATION/TRAINING PROGRAM

## 2023-11-02 PROCEDURE — 1159F PR MEDICATION LIST DOCUMENTED IN MEDICAL RECORD: ICD-10-PCS | Mod: HCNC,CPTII,S$GLB, | Performed by: STUDENT IN AN ORGANIZED HEALTH CARE EDUCATION/TRAINING PROGRAM

## 2023-11-02 PROCEDURE — 4010F ACE/ARB THERAPY RXD/TAKEN: CPT | Mod: HCNC,CPTII,S$GLB, | Performed by: STUDENT IN AN ORGANIZED HEALTH CARE EDUCATION/TRAINING PROGRAM

## 2023-11-02 PROCEDURE — 3078F DIAST BP <80 MM HG: CPT | Mod: HCNC,CPTII,S$GLB, | Performed by: STUDENT IN AN ORGANIZED HEALTH CARE EDUCATION/TRAINING PROGRAM

## 2023-11-02 PROCEDURE — 4010F PR ACE/ARB THEARPY RXD/TAKEN: ICD-10-PCS | Mod: HCNC,CPTII,S$GLB, | Performed by: STUDENT IN AN ORGANIZED HEALTH CARE EDUCATION/TRAINING PROGRAM

## 2023-11-02 PROCEDURE — 3044F HG A1C LEVEL LT 7.0%: CPT | Mod: HCNC,CPTII,S$GLB, | Performed by: STUDENT IN AN ORGANIZED HEALTH CARE EDUCATION/TRAINING PROGRAM

## 2023-11-02 PROCEDURE — 87624 HPV HI-RISK TYP POOLED RSLT: CPT | Mod: HCNC | Performed by: STUDENT IN AN ORGANIZED HEALTH CARE EDUCATION/TRAINING PROGRAM

## 2023-11-02 PROCEDURE — 3075F PR MOST RECENT SYSTOLIC BLOOD PRESS GE 130-139MM HG: ICD-10-PCS | Mod: HCNC,CPTII,S$GLB, | Performed by: STUDENT IN AN ORGANIZED HEALTH CARE EDUCATION/TRAINING PROGRAM

## 2023-11-02 PROCEDURE — 1157F ADVNC CARE PLAN IN RCRD: CPT | Mod: HCNC,CPTII,S$GLB, | Performed by: STUDENT IN AN ORGANIZED HEALTH CARE EDUCATION/TRAINING PROGRAM

## 2023-11-02 PROCEDURE — 99999 PR PBB SHADOW E&M-EST. PATIENT-LVL III: CPT | Mod: PBBFAC,HCNC,, | Performed by: STUDENT IN AN ORGANIZED HEALTH CARE EDUCATION/TRAINING PROGRAM

## 2023-11-02 NOTE — PROGRESS NOTES
CC: Well woman exam    HPI:  Chelsea Rodriguez is a 68 y.o. female  presents for a well woman exam.  She has no issues, problems, or complaints. Would like to do pap smear since she still has cervix after hyst for fibroids.       Patient history:   Past Medical History:   Diagnosis Date    Bilateral knee pain     Carpal tunnel syndrome, bilateral     Fissure in skin of foot     Right small toe    HTN (hypertension)     Hyperlipidemia     Palpitations     Rotator cuff injury     right    Screening for colorectal cancer 10/20/2017    Screening for malignant neoplasm of colon 2021    Statin-induced myositis 2018     Past Surgical History:   Procedure Laterality Date    BILATERAL SALPINGOOPHORECTOMY  2000    BREAST BIOPSY Left 2010    malignant    BREAST BIOPSY Left     negative    BREAST LUMPECTOMY Left     CATARACT EXTRACTION W/  INTRAOCULAR LENS IMPLANT Right 2018    Dr. Oneil    CATARACT EXTRACTION W/  INTRAOCULAR LENS IMPLANT Left 2018    Dr. Oneil     SECTION      x2    COLONOSCOPY N/A 10/20/2017    Procedure: COLONOSCOPY;  Surgeon: Mitchell Danielson Jr., MD;  Location: [facility];  Service: Endoscopy;  Laterality: N/A;    COLONOSCOPY N/A 2021    Procedure: COLONOSCOPY/Suprep;  Surgeon: Malcolm Reyes MD;  Location: [facility];  Service: Endoscopy;  Laterality: N/A;    CYST REMOVAL      on back    ESOPHAGOGASTRODUODENOSCOPY N/A 2021    Procedure: EGD (ESOPHAGOGASTRODUODENOSCOPY);  Surgeon: Malcolm Reyes MD;  Location: [facility];  Service: Endoscopy;  Laterality: N/A;    HERNIA REPAIR      HYSTERECTOMY      at 25 yrs old    INTRAOCULAR PROSTHESES INSERTION Left 2018    Procedure: INSERTION, IOL PROSTHESIS;  Surgeon: Sergio Oneil MD;  Location: [address];  Service: Ophthalmology;  Laterality: Left;    INTRAOCULAR PROSTHESES INSERTION Right 2018    Procedure: INSERTION, IOL PROSTHESIS;  Surgeon: Sergio Oneil MD;   Location: [facility] OR 2ND FLR;  Service: Ophthalmology;  Laterality: Right;    KNEE ARTHROPLASTY Right 2021    Procedure: ARTHROPLASTY, KNEE:RIGHT:DEPUY-SIGMA ;  Surgeon: Pedro Summers III, MD;  Location: [facility];  Service: Orthopedics;  Laterality: Right;    OOPHORECTOMY      @ 45 yrs old    PHACOEMULSIFICATION OF CATARACT Left 2018    Procedure: PHACOEMULSIFICATION, CATARACT;  Surgeon: Sergio Oneil MD;  Location: [facility] 1ST FLR;  Service: Ophthalmology;  Laterality: Left;    PHACOEMULSIFICATION OF CATARACT Right 2018    Procedure: PHACOEMULSIFICATION, CATARACT;  Surgeon: Sergio Oneil MD;  Location: [facility] OR 2ND [facility];  Service: Ophthalmology;  Laterality: Right;    REFRACTIVE SURGERY          supracervical abdominal hysterectomy      fibroids     OB History    Para Term  AB Living   4 2 2     2   SAB IAB Ectopic Multiple Live Births           1      # Outcome Date GA Lbr Stephan/2nd Weight Sex Delivery Anes PTL Lv   4 Term            3 Term            2      F CS-Unspec      1      M CS-Unspec   CHAY       GYN  Menopausal: Yes  History of abnormal paps: DENIES  Abnormal or postmenopausal bleeding: DENIES  History of abnormal mammograms: left breast partial mastectomy     Family history of breast or ovarian cancer: DENIES  Any breast masses, pain, skin changes, or nipple discharge: DENIES  Possible recent STD exposure: denies  Contraception: N/A    Pap: No result found, Done today  Mammogram: BIRADs 2 ([facility] in care everywhere) 2023      Family History   Problem Relation Age of Onset    Hypertension Mother     Heart disease Mother     Diabetes Mother     Hyperlipidemia Mother     Pancreatitis Mother     Cataracts Mother     Macular degeneration Mother     Cancer Father     Hypertension Sister     Thyroid disease Sister     Cancer Brother         prostate CA    Diabetes Brother     Heart disease Brother     Hyperlipidemia Daughter     No Known  Problems Son     Breast cancer Paternal Cousin     Amblyopia Neg Hx     Blindness Neg Hx     Glaucoma Neg Hx     Strabismus Neg Hx     Retinal detachment Neg Hx      Social History     Tobacco Use    Smoking status: Never    Smokeless tobacco: Never   Substance Use Topics    Alcohol use: Yes     Comment: once per month    Drug use: No     Allergies: Codeine    Current Outpatient Medications:     acetaminophen (TYLENOL) 650 MG TbSR, Take 1 tablet (650 mg total) by mouth every 8 (eight) hours as needed (pain)., Disp: 120 tablet, Rfl: 0    amoxicillin (AMOXIL) 500 MG capsule, Take 500 mg by mouth as needed. Before dental work, Disp: , Rfl:     atorvastatin (LIPITOR) 80 MG tablet, TAKE 1 TABLET (80 MG TOTAL) BY MOUTH ONCE DAILY., Disp: 90 tablet, Rfl: 3    coenzyme Q10 100 mg capsule, Take 100 mg by mouth every evening., Disp: , Rfl:     diclofenac (VOLTAREN) 25 MG TbEC, Take 1 tablet (25 mg total) by mouth 2 (two) times daily as needed., Disp: 20 tablet, Rfl: 0    docusate sodium (COLACE) 100 MG capsule, Take 1 capsule (100 mg total) by mouth 2 (two) times daily as needed for Constipation., Disp: 60 capsule, Rfl: 0    ezetimibe (ZETIA) 10 mg tablet, Take 1 tablet (10 mg total) by mouth once daily., Disp: 90 tablet, Rfl: 3    fluticasone propionate (FLONASE) 50 mcg/actuation nasal spray, 1 spray by Each Nostril route., Disp: , Rfl:     hydroCHLOROthiazide (MICROZIDE) 12.5 mg capsule, TAKE 1 CAPSULE EVERY EVENING, Disp: 90 capsule, Rfl: 0    irbesartan (AVAPRO) 150 MG tablet, TAKE 1 TABLET ONE TIME DAILY, Disp: 90 tablet, Rfl: 3    loratadine (CLARITIN) 10 mg tablet, Take 1 tablet (10 mg total) by mouth once daily., Disp: 30 tablet, Rfl: 2    metoprolol succinate (TOPROL-XL) 25 MG 24 hr tablet, TAKE 1 TABLET EVERY EVENING, Disp: 90 tablet, Rfl: 3    aspirin (ECOTRIN) 81 MG EC tablet, Take 1 tablet (81 mg total) by mouth 2 (two) times daily. (Patient taking differently: Take 81 mg by mouth.), Disp: 60 tablet, Rfl: 0        ROS:  GENERAL: Denies weight gain or weight loss. Feeling well overall.   SKIN: Denies rash or lesions.   HEAD: Denies head injury or headache.   NODES: Denies enlarged lymph nodes.   CHEST: Denies chest pain or shortness of breath.   CARDIOVASCULAR: Denies palpitations or left sided chest pain.   ABDOMEN: No abdominal pain, constipation, diarrhea, nausea, vomiting or rectal bleeding.   URINARY: No frequency, dysuria, hematuria, or burning on urination.  REPRODUCTIVE: See HPI.   BREASTS: The patient performs breast self-examination and denies pain, lumps, or nipple discharge.   HEMATOLOGIC: No easy bruisability or excessive bleeding.  MUSCULOSKELETAL: Denies joint pain or swelling.   NEUROLOGIC: Denies syncope or weakness.   PSYCHIATRIC: Denies depression, anxiety or mood swings.    Objective:   /78   Wt 74 kg (163 lb 1.6 oz)   LMP  (LMP Unknown)   BMI 34.09 kg/m²       Physical Exam:  APPEARANCE: Well nourished, well developed, in no acute distress.  AFFECT: WNL, alert and oriented x 3  SKIN: No acne or hirsutism  NECK: Neck symmetric without masses or thyromegaly  NODES: No inguinal, cervical, axillary, or femoral lymph node enlargement  CHEST: Good respiratory effect  ABDOMEN: Soft.  No tenderness or masses.  No hepatosplenomegaly.  No hernias.  BREASTS: Symmetrical, no skin changes or visible lesions.  No palpable masses, nipple discharge bilaterally. Left skin scars well healed  PELVIC: Normal external genitalia without lesions.  Normal hair distribution.  Small perineal body, normal urethral meatus.  Vagina atrophic without lesions or discharge.  Cervix atrophic/small, pink, without lesions, discharge or tenderness.  No significant cystocele or rectocele.  Bimanual exam shows uterus to be normal size, regular, mobile and nontender.  Adnexa without masses or tenderness.   EXTREMITIES: No edema.    ASSESSMENT AND PLAN  1. Well woman exam with routine gynecological exam  Liquid-Based Pap Smear,  Screening    HPV High Risk Genotypes, PCR          Annual exam  Breast and pelvic exam: wnl  Patient counseled on ASCCP guidelines for cervical cytology screening  Cervical screening: done today per patient requset  Patient counseled on current recommendations for breast cancer screening  Mammogram screening: up to date, due 2024 jan  STD testing: not requested today   Osteoporosis screening 9/2023 up to date  Tobacco cessation counseling n/a    She was counseled to follow up with her PCP for other routine health maintenance      Follow up in about 1 year (around 11/2/2024).      Kristy Khanna MD  OBGYN [facility]

## 2023-11-06 ENCOUNTER — TELEPHONE (OUTPATIENT)
Dept: SURGERY | Facility: CLINIC | Age: 68
End: 2023-11-06
Payer: MEDICARE

## 2023-11-07 LAB
HPV HR 12 DNA SPEC QL NAA+PROBE: NEGATIVE
HPV16 AG SPEC QL: NEGATIVE
HPV18 DNA SPEC QL NAA+PROBE: NEGATIVE

## 2023-11-08 DIAGNOSIS — I10 ESSENTIAL HYPERTENSION: ICD-10-CM

## 2023-11-08 RX ORDER — HYDROCHLOROTHIAZIDE 12.5 MG/1
CAPSULE ORAL
Qty: 90 CAPSULE | Refills: 1 | Status: SHIPPED | OUTPATIENT
Start: 2023-11-08

## 2023-11-08 NOTE — TELEPHONE ENCOUNTER
No care due was identified.  [facility] Embedded Care Due Messages. Reference number: [id number].   11/08/2023 11:29:06 AM CST

## 2023-11-08 NOTE — TELEPHONE ENCOUNTER
Refill Decision Note   Chelsea Rodriguez  is requesting a refill authorization.  Brief Assessment and Rationale for Refill:  Approve     Medication Therapy Plan:         Comments:     Note composed:4:47 PM 11/08/2023

## 2023-11-09 ENCOUNTER — TELEPHONE (OUTPATIENT)
Dept: BARIATRICS | Facility: CLINIC | Age: 68
End: 2023-11-09
Payer: MEDICARE

## 2023-11-09 LAB
FINAL PATHOLOGIC DIAGNOSIS: NORMAL
Lab: NORMAL

## 2023-11-10 PROBLEM — E04.2 MULTIPLE THYROID NODULES: Status: ACTIVE | Noted: 2023-11-10

## 2023-11-10 NOTE — PROGRESS NOTES
Subjective:      Patient ID: Chelsea oRdriguez is referred by Eliza Alva MD     Chief Complaint:  Thyroid nodules    History of Present Illness    Chelsea Rodriguez is a 68 y.o. female who presents for evaluation of thyroid nodules.    Thyroid nodules    Patient gives history of thyroid cancer in her brother.  Multiple family members with thyroid nodules, benign pathology.  She noted a lump in her neck and was evaluated for it.     US Thyroid 10/2023:     FINDINGS:  The right thyroid lobe measures 4.6 x 2.8 x 2.7 cm.  The isthmus measures 0.3 cm in thickness.  The left thyroid lobe measures 4.0 x 0.9 x 0.9 cm.  Total thyroid volume equals 20 cc, enlarged.  Homogeneous background parenchymal echotexture.  Thyroid vascularity is within normal limits.     There are thyroid nodules as follows:     3.5 x 2.8 x 2.2 cm almost entirely solid hyperechoic nodule in the right midpole.  TI-RADS 3.  0.4 x 0.3 x 0.2 cm mixed solid and cystic isoechoic nodule in the left midpole.  TI-RADS 2.  1.0 x 0.6 x 0.6 cm cystic nodule in the left lower pole.  TI-RADS 1.  No cervical lymphadenopathy in the visualized neck.     Impression:  Multinodular goiter.  The 3.5 cm TI-RADS 3 nodule in the right mid pole meets criteria for FNA.    Prior US thyroid:  Denies    Prior FNA biopsy:  Denies    Thyroid function test:  Normal    Head and neck radiation: Lumpectomy and XRT for breast cancer - 2010.    Neck symptoms: Occasional difficulty with swallowing food.    Family history of thyroid cancer: Brother  Family history of thyroid disease: Mother, sister, niece had thyroidectomy, benign pathology.       Lab Results   Component Value Date    TSH 1.296 10/02/2023    TSH 1.212 07/19/2018    FREET4 1.02 10/02/2023     ROS:   As above    Objective:     BP (!) 144/81 (BP Location: Left arm, Patient Position: Sitting, BP Method: Large (Automatic))   Pulse 86   Wt 75.5 kg (166 lb 7.2 oz)   LMP  (LMP Unknown)   SpO2 100%   BMI 34.79 kg/m²     Body  mass index is 34.79 kg/m².      Physical Exam  Constitutional:       General: She is not in acute distress.     Appearance: She is not ill-appearing.   HENT:      Head: Normocephalic.   Eyes:      Conjunctiva/sclera: Conjunctivae normal.   Neck:      Thyroid: No thyromegaly.   Cardiovascular:      Rate and Rhythm: Normal rate.   Pulmonary:      Effort: Pulmonary effort is normal.   Musculoskeletal:      Cervical back: Neck supple.   Skin:     General: Skin is warm and dry.   Neurological:      Mental Status: She is alert and oriented to person, place, and time.         Lab Review:   Lab Results   Component Value Date    HGBA1C 6.0 (H) 05/24/2023     Lab Results   Component Value Date    CHOL 192 10/02/2023    HDL 42 10/02/2023    LDLCALC 133.0 10/02/2023    TRIG 85 10/02/2023    CHOLHDL 21.9 10/02/2023     Lab Results   Component Value Date     05/24/2023    K 4.2 05/24/2023     05/24/2023    CO2 28 05/24/2023     05/24/2023    BUN 16 05/24/2023    CREATININE 0.7 05/24/2023    CALCIUM 9.5 05/24/2023    PROT 7.0 05/24/2023    ALBUMIN 3.9 05/24/2023    BILITOT 0.5 05/24/2023    ALKPHOS 89 05/24/2023    AST 32 05/24/2023    ALT 23 05/24/2023    ANIONGAP 9 05/24/2023    EGFRNORACEVR >60.0 05/24/2023    TSH 1.296 10/02/2023          Vit D, 25-Hydroxy   Date Value Ref Range Status   05/24/2023 40 30 - 96 ng/mL Final     Comment:     Vitamin D deficiency.........<10 ng/mL                              Vitamin D insufficiency......10-29 ng/mL       Vitamin D sufficiency........> or equal to 30 ng/mL  Vitamin D toxicity............>100 ng/mL         Assessment and Plan     Problem List Items Addressed This Visit          Endocrine    Multiple thyroid nodules       US Thyroid 10/2023:  3.5 cm TI-RADS 3 nodule in the right mid pole meets criteria for FNA.  Two additional nodules on the left 1 cm, TI-RADS 1 and 0.4 cm TI-RADS 2.    Patient has occasional difficulty with swallowing food.  Clinically and  biochemically euthyroid.    Discussed referral for FNA biopsy and possible results of the biopsy.  Further management to be decided after the FNA biopsy.             Relevant Orders    US FNA Thyroid, 1st Lesion    Thyroid nodule        Steffany RUELAS Rai, MD

## 2023-11-13 ENCOUNTER — OFFICE VISIT (OUTPATIENT)
Dept: ENDOCRINOLOGY | Facility: CLINIC | Age: 68
End: 2023-11-13
Payer: MEDICARE

## 2023-11-13 VITALS
OXYGEN SATURATION: 100 % | SYSTOLIC BLOOD PRESSURE: 144 MMHG | DIASTOLIC BLOOD PRESSURE: 81 MMHG | BODY MASS INDEX: 34.79 KG/M2 | WEIGHT: 166.44 LBS | HEART RATE: 86 BPM

## 2023-11-13 DIAGNOSIS — E04.2 MULTIPLE THYROID NODULES: ICD-10-CM

## 2023-11-13 DIAGNOSIS — E04.1 THYROID NODULE: ICD-10-CM

## 2023-11-13 PROCEDURE — 3044F HG A1C LEVEL LT 7.0%: CPT | Mod: CPTII,S$GLB,, | Performed by: INTERNAL MEDICINE

## 2023-11-13 PROCEDURE — 3288F PR FALLS RISK ASSESSMENT DOCUMENTED: ICD-10-PCS | Mod: CPTII,S$GLB,, | Performed by: INTERNAL MEDICINE

## 2023-11-13 PROCEDURE — 3079F PR MOST RECENT DIASTOLIC BLOOD PRESSURE 80-89 MM HG: ICD-10-PCS | Mod: CPTII,S$GLB,, | Performed by: INTERNAL MEDICINE

## 2023-11-13 PROCEDURE — 99204 OFFICE O/P NEW MOD 45 MIN: CPT | Mod: S$GLB,,, | Performed by: INTERNAL MEDICINE

## 2023-11-13 PROCEDURE — 3288F FALL RISK ASSESSMENT DOCD: CPT | Mod: CPTII,S$GLB,, | Performed by: INTERNAL MEDICINE

## 2023-11-13 PROCEDURE — 4010F ACE/ARB THERAPY RXD/TAKEN: CPT | Mod: CPTII,S$GLB,, | Performed by: INTERNAL MEDICINE

## 2023-11-13 PROCEDURE — 4010F PR ACE/ARB THEARPY RXD/TAKEN: ICD-10-PCS | Mod: CPTII,S$GLB,, | Performed by: INTERNAL MEDICINE

## 2023-11-13 PROCEDURE — 1159F MED LIST DOCD IN RCRD: CPT | Mod: CPTII,S$GLB,, | Performed by: INTERNAL MEDICINE

## 2023-11-13 PROCEDURE — 99999 PR PBB SHADOW E&M-EST. PATIENT-LVL IV: ICD-10-PCS | Mod: PBBFAC,,, | Performed by: INTERNAL MEDICINE

## 2023-11-13 PROCEDURE — 1160F RVW MEDS BY RX/DR IN RCRD: CPT | Mod: CPTII,S$GLB,, | Performed by: INTERNAL MEDICINE

## 2023-11-13 PROCEDURE — 99999 PR PBB SHADOW E&M-EST. PATIENT-LVL IV: CPT | Mod: PBBFAC,,, | Performed by: INTERNAL MEDICINE

## 2023-11-13 PROCEDURE — 1157F ADVNC CARE PLAN IN RCRD: CPT | Mod: CPTII,S$GLB,, | Performed by: INTERNAL MEDICINE

## 2023-11-13 PROCEDURE — 3077F SYST BP >= 140 MM HG: CPT | Mod: CPTII,S$GLB,, | Performed by: INTERNAL MEDICINE

## 2023-11-13 PROCEDURE — 1125F PR PAIN SEVERITY QUANTIFIED, PAIN PRESENT: ICD-10-PCS | Mod: CPTII,S$GLB,, | Performed by: INTERNAL MEDICINE

## 2023-11-13 PROCEDURE — 1101F PT FALLS ASSESS-DOCD LE1/YR: CPT | Mod: CPTII,S$GLB,, | Performed by: INTERNAL MEDICINE

## 2023-11-13 PROCEDURE — 1125F AMNT PAIN NOTED PAIN PRSNT: CPT | Mod: CPTII,S$GLB,, | Performed by: INTERNAL MEDICINE

## 2023-11-13 PROCEDURE — 99204 PR OFFICE/OUTPT VISIT, NEW, LEVL IV, 45-59 MIN: ICD-10-PCS | Mod: S$GLB,,, | Performed by: INTERNAL MEDICINE

## 2023-11-13 PROCEDURE — 3008F PR BODY MASS INDEX (BMI) DOCUMENTED: ICD-10-PCS | Mod: CPTII,S$GLB,, | Performed by: INTERNAL MEDICINE

## 2023-11-13 PROCEDURE — 1101F PR PT FALLS ASSESS DOC 0-1 FALLS W/OUT INJ PAST YR: ICD-10-PCS | Mod: CPTII,S$GLB,, | Performed by: INTERNAL MEDICINE

## 2023-11-13 PROCEDURE — 1159F PR MEDICATION LIST DOCUMENTED IN MEDICAL RECORD: ICD-10-PCS | Mod: CPTII,S$GLB,, | Performed by: INTERNAL MEDICINE

## 2023-11-13 PROCEDURE — 3077F PR MOST RECENT SYSTOLIC BLOOD PRESSURE >= 140 MM HG: ICD-10-PCS | Mod: CPTII,S$GLB,, | Performed by: INTERNAL MEDICINE

## 2023-11-13 PROCEDURE — 3079F DIAST BP 80-89 MM HG: CPT | Mod: CPTII,S$GLB,, | Performed by: INTERNAL MEDICINE

## 2023-11-13 PROCEDURE — 3044F PR MOST RECENT HEMOGLOBIN A1C LEVEL <7.0%: ICD-10-PCS | Mod: CPTII,S$GLB,, | Performed by: INTERNAL MEDICINE

## 2023-11-13 PROCEDURE — 1160F PR REVIEW ALL MEDS BY PRESCRIBER/CLIN PHARMACIST DOCUMENTED: ICD-10-PCS | Mod: CPTII,S$GLB,, | Performed by: INTERNAL MEDICINE

## 2023-11-13 PROCEDURE — 3008F BODY MASS INDEX DOCD: CPT | Mod: CPTII,S$GLB,, | Performed by: INTERNAL MEDICINE

## 2023-11-13 PROCEDURE — 1157F PR ADVANCE CARE PLAN OR EQUIV PRESENT IN MEDICAL RECORD: ICD-10-PCS | Mod: CPTII,S$GLB,, | Performed by: INTERNAL MEDICINE

## 2023-11-13 NOTE — ASSESSMENT & PLAN NOTE
US Thyroid 10/2023:  3.5 cm TI-RADS 3 nodule in the right mid pole meets criteria for FNA.  Two additional nodules on the left 1 cm, TI-RADS 1 and 0.4 cm TI-RADS 2.    Patient has occasional difficulty with swallowing food.  Clinically and biochemically euthyroid.    Discussed referral for FNA biopsy and possible results of the biopsy.  Further management to be decided after the FNA biopsy.

## 2023-11-29 ENCOUNTER — TELEPHONE (OUTPATIENT)
Dept: CARDIOLOGY | Facility: CLINIC | Age: 68
End: 2023-11-29
Payer: MEDICARE

## 2023-11-29 NOTE — TELEPHONE ENCOUNTER
Patient asking if she has heart failure as this was something mentioned by one of the personnel from her insurance company. Please advise.

## 2023-12-05 ENCOUNTER — CLINICAL SUPPORT (OUTPATIENT)
Dept: REHABILITATION | Facility: HOSPITAL | Age: 68
End: 2023-12-05
Payer: MEDICARE

## 2023-12-05 DIAGNOSIS — M75.121 NONTRAUMATIC COMPLETE TEAR OF RIGHT ROTATOR CUFF: ICD-10-CM

## 2023-12-05 DIAGNOSIS — S46.811D TEAR OF RIGHT INFRASPINATUS TENDON, SUBSEQUENT ENCOUNTER: ICD-10-CM

## 2023-12-05 DIAGNOSIS — M25.561 BILATERAL CHRONIC KNEE PAIN: Primary | ICD-10-CM

## 2023-12-05 DIAGNOSIS — M25.60 DECREASED MOBILITY OF JOINT: ICD-10-CM

## 2023-12-05 DIAGNOSIS — M25.562 BILATERAL CHRONIC KNEE PAIN: Primary | ICD-10-CM

## 2023-12-05 DIAGNOSIS — M62.81 MUSCLE WEAKNESS OF LOWER EXTREMITY: ICD-10-CM

## 2023-12-05 DIAGNOSIS — G89.29 BILATERAL CHRONIC KNEE PAIN: Primary | ICD-10-CM

## 2023-12-05 PROCEDURE — 97161 PT EVAL LOW COMPLEX 20 MIN: CPT

## 2023-12-05 NOTE — PROGRESS NOTES
"[facility]   Physical Therapy Initial Evaluation      Name: Chelsea Rodriguez  Clinic Number: [id number]    Therapy Diagnosis:   Encounter Diagnoses   Name Primary?    Tear of right infraspinatus tendon, subsequent encounter     Nontraumatic complete tear of right rotator cuff     Bilateral chronic knee pain Yes    Decreased mobility of joint     Muscle weakness of lower extremity         Physician: Elaine Kraus MD    Physician Orders: PT Eval and Treat   Medical Diagnosis from Referral:   S46.811D (ICD-10-CM) - Tear of right infraspinatus tendon, subsequent encounter   M75.121 (ICD-10-CM) - Nontraumatic complete tear of right rotator cuff     Evaluation Date: 12/5/2023  Authorization Period Expiration: [id number]  Plan of Care Expiration: 2/5/24  Progress Note Due: 1/5/2024  Date of Surgery: None  Visit # / Visits authorized: 1/ 1   FOTO: 1/ 3  Precautions: Standard   Time In: 11:05 am  Time Out: 12:00 am  Total Billable Time: 55 minutes    Subjective   Date of onset: Late 2021  History of current condition - Chelsea reports: that despite the referral for her shoulders, she would like to work on her knees. She underwent a knee replacement on the right knee in March of 2021. She reports that she felt that the right knee pain began to worsen in 4-5 months after her surgery when she notes she felt a sensation of "spaghetti snapping" on her right leg. She has been relying more on her left knee which is now "breaking down."  Falls: Yes (fell outside, not sure how it happened)  Imaging: Radiographs: Total knee arthroplasty is in place on the right in good position and alignment.  No evidence of joint effusion.   Bilateral View from 2020: Four views bilateral.     Right: There is moderate DJD and a varus deformity.  There is a joint effusion.  Left: There is moderate DJD and a varus deformity.  There is a joint effusion.    Prior Therapy: Yes  Social History:  Lives with their daughter  Occupation: " "Retired  Prior Level of Function: Independent  Current Level of Function: Walks without the cane in her home, and will walk with her cane occasionally in the community,    Pain:  Current 0/10, worst "Stepping up onto stair stops her due to pain"/10, best 0/10   Location: Right knee    Description: Aching and Dull  Aggravating Factors: Walking and Stair Navigation, Stepping Up onto Stool  Easing Factors:  Movement (non-weightbearing), "Scratching the Knee" , C[employer] and [employer]    Patients goals: To be able to step up onto her step stool and to navigate her stairs with less pain     Medical History:   Past Medical History:   Diagnosis Date    Bilateral knee pain     Carpal tunnel syndrome, bilateral     Fissure in skin of foot     Right small toe    HTN (hypertension)     Hyperlipidemia     Multiple thyroid nodules 11/10/2023    Palpitations     Rotator cuff injury     right    Screening for colorectal cancer 10/20/2017    Screening for malignant neoplasm of colon 2021    Statin-induced myositis 2018       Surgical History:   Chelsea Rodriguez  has a past surgical history that includes supracervical abdominal hysterectomy (); Bilateral salpingoophorectomy (); Colonoscopy (N/A, 10/20/2017); Intraocular prosthesis insertion (Left, 2018); Phacoemulsification of cataract (Left, 2018); Hernia repair; Cyst Removal;  section; Phacoemulsification of cataract (Right, 2018); Intraocular prosthesis insertion (Right, 2018); Breast lumpectomy (Left, ); Breast biopsy (Left, ); Breast biopsy (Left, ); Hysterectomy; Oophorectomy; Cataract extraction w/  intraocular lens implant (Right, 2018); Cataract extraction w/  intraocular lens implant (Left, 2018); Esophagogastroduodenoscopy (N/A, 2021); Colonoscopy (N/A, 2021); Knee Arthroplasty (Right, 2021); and Refractive surgery.    Medications:   Chelsea has a current medication list which includes " "the following prescription(s): acetaminophen, amoxicillin, aspirin, atorvastatin, coenzyme q10, diclofenac, docusate sodium, ezetimibe, fluticasone propionate, hydrochlorothiazide, irbesartan, loratadine, and metoprolol succinate.    Allergies:   Review of patient's allergies indicates:   Allergen Reactions    Codeine Nausea And Vomiting        Objective    Ambulation: Chelsea ambulates with non-antalgic gait, utilizing an SPC in the clinic and refraining from using during the examination; and ambulate with decreased stride length albeit achieving terminal knee extension and knee flexion for foot clearance.  Stair Navigation: Patient demonstrates left circumduction of the hip to ascend step, utilizing a non-reciprocal gait pattern with right leg trailing. During descending, she turns at a 45 degree angle to eccentrically lower quadriceps.  Sit to Stand: Widened base of support with significant forward trunk lean to avoid use of quadriceps or gluteal muscles.    Knee Active Range of Motion   Right Left   Flexion 80 degrees 80 degrees   Extension 0 degrees 0 degrees     Knee Passive Range of Motion   Right  Left End-Feel   Flexion 85 degrees 85 degrees Bone-on-bone   Extension 0 degrees 0 degrees      Joint Mobility  Tibiofemoral A/P [place] Hypomobilie   Tibiofemoral P/A [place] Hypomobile   Patellofemoral Joint Mobility Limitation in superior and inferior gliding     Manual Muscle Testing   Right Left   Knee Flexion (uniplanar) 4+/5 4+/5   Knee Extension  3-/5 (unable to fully extend in a seated position): "give" of knee joint 4+/5   Ankle Dorsiflexion 5/5 5/5   Hip External Rotation 4+/5 (short lever): 3-/5 long lever 4+/5 (short lever): 3-/5 long lever   Hip Abduction 3/5 3/5   Hip Extension (prone) Unable to raise off table Unable to raise off table       Intake Outcome Measure for FOTO Knee Survey    Therapist reviewed FOTO scores for Chelsea Rodriguez on 12/5/2023.   FOTO report - see Media section or FOTO account " "episode details.    Intake Score: See FOTO%       Treatment     Total Treatment time (time-based codes) separate from Evaluation: 5 minutes   Chelsea received the treatments listed below:      therapeutic exercises to develop strength, endurance, and ROM for 5 minutes including:  Beata, 3x8, 3" hold    Patient Education and Home Exercises     Education provided:   - -Findings of evaluation and examination, and affect of these on plan for treatment  -Prognosis and expectations  -Role of PT and team-centered care for patient  -Home exercise program and expectations of therapy    Written Home Exercises Provided: yes. Exercises were reviewed and Chelsea was able to demonstrate them prior to the end of the session.  Chelsea demonstrated good  understanding of the education provided. See EMR under Patient Instructions for exercises provided during therapy sessions.    Assessment     Chelsea is a 68 y.o. female referred to outpatient Physical Therapy with a medical diagnosis of   S46.811D (ICD-10-CM) - Tear of right infraspinatus tendon, subsequent encounter   M75.121 (ICD-10-CM) - Nontraumatic complete tear of right rotator cuff   Patient presents with limitations in stair navigation, squatting, bending, and sitting to standing transfer due to marked limitation in quadriceps and hip extensor strength, decreased active and passive range of motion bilaterally and decrease joint accessory mobility. Chelsea will benefit from a customized physical therapy plan of care  to address the aforementioned impairments in an effort to improve human function and quality of life.      Patient prognosis is Guarded.   Patient will benefit from skilled outpatient Physical Therapy to address the deficits stated above and in the chart below, provide patient /family education, and to maximize patientt's level of independence.     Plan of care discussed with patient: Yes  Patient's spiritual, cultural and educational needs considered and patient is " agreeable to the plan of care and goals as stated below:     Anticipated Barriers for therapy: Chronicity, History of Previous Surgeries    Medical Necessity is demonstrated by the following  History  Co-morbidities and personal factors that may impact the plan of care [] LOW: no personal factors / co-morbidities  [x] MODERATE: 1-2 personal factors / co-morbidities  [] HIGH: 3+ personal factors / co-morbidities    Moderate / High Support Documentation:   Co-morbidities affecting plan of care: See Medical History    Personal Factors:   age     Examination  Body Structures and Functions, activity limitations and participation restrictions that may impact the plan of care [x] LOW: addressing 1-2 elements  [] MODERATE: 3+ elements  [] HIGH: 4+ elements (please support below)    Moderate / High Support Documentation: None     Clinical Presentation [x] LOW: stable  [] MODERATE: Evolving  [] HIGH: Unstable     Decision Making/ Complexity Score: low       Goals:  Short Term Goals: 2 weeks   1.) Patient will demonstrate independence in compliance and technique of home exercise program provided as per teach-back method of assessment.  2.) Patient will achieve 0-90 degrees of knee flexion to demonstrate progressing range of motion for improved functional mobility.  3.) Patient will ambulate for 200 feet with LRAD with supervision-level assistance and normalized gait pattern.  4.) Patient will demonstrate good quality quadriceps set with the ability to complete 10x straight-leg raises without quadriceps lag on right side.     Long Term Goals: 6 weeks   1.) Patient will demonstrate independence in compliance and technique of home exercise program provided as per teach-back method of assessment.  2.) Patient will achieve 0-100 degrees of knee flexion to demonstrate progressing range of motion for improved functional mobility.  3.) Patient will ambulate for 200 feet without any assistive device with supervision-level assistance  and normalized gait pattern.  4.) Patient will demonstrate good quality quadriceps set with the ability to complete 3x10 straight-leg raises without quadriceps lag.   5.) Patient will demonstrate <14 seconds as per TUG Test to demonstrate improved functional mobility and decreased fall risk.  6.) Patient will demonstrate 5xSTS Test in <16 seconds  to demonstrate improved functional mobility and decreased fall risk.  7.) Patient will note <10% disability as per FOTO score.    Plan     Plan of care Certification: 12/5/2023 to 2/5/2024.    Outpatient Physical Therapy 2 times weekly for 6 weeks to include the following interventions: Gait Training, Manual Therapy, Moist Heat/ Ice, Neuromuscular Re-ed, Patient Education, Self Care, Therapeutic Activities, and Therapeutic Exercise.     Preethi Patel PT DPT  Board Certified in Orthopedic Physical Therapy          Physician's Signature: _________________________________________ Date: ________________

## 2023-12-06 NOTE — PROGRESS NOTES
[facility]   Physical Therapy Treatment Note      Name: Chelsea Rodriguez  Clinic Number: [id number]    Therapy Diagnosis: No diagnosis found.  Physician: Elaine Kraus MD    Visit Date: 12/7/2023    Physician Orders: PT Eval and Treat   Medical Diagnosis from Referral:   S46.811D (ICD-10-CM) - Tear of right infraspinatus tendon, subsequent encounter   M75.121 (ICD-10-CM) - Nontraumatic complete tear of right rotator cuff      Evaluation Date: 12/5/2023  Authorization Period Expiration: [id number]  Plan of Care Expiration: 2/5/24  Progress Note Due: 1/5/2024  Date of Surgery: None  Visit # / Visits authorized: 1/ 1; 1/12   FOTO: 1/ 3  Precautions: Standard     PTA Visit #: 1/5     Time In: 10:15 AM   Time Out: 11:00 AM   Total Billable Time: 45 minutes    Subjective     Pt reports: more discomfort throughout right knee more than left knee, however, patient with most discomfort with walking or standing.  She was compliant with home exercise program.  Response to previous treatment: initial eval   Functional change: none     Pain: 5/10  Location: bilateral knee      Objective      Objective Measures updated at progress report unless specified.     Treatment     Chelsea received the treatments listed below:      therapeutic exercises to develop strength, endurance, and ROM for 45 minutes including:    Scifit 5 minute   Standing heel raises 2 x 10   Standing hip abduction 2 x 10 each   Standing hip extension 2 x 10 each   Seated long arch quad 2 x 10 each   Supine Quad Sets 2 x 10 each   Supine short arch quads 2 x 10   Supine straight leg raises 2 x 10 each   Supine bend knee fallout green thera band 2 x 10   Bridges green thera band 2 x 10     Patient Education and Home Exercises       Education provided:   - -Findings of evaluation and examination, and affect of these on plan for treatment  -Prognosis and expectations  -Role of PT and team-centered care for patient  -Home exercise program and  expectations of therapy     Written Home Exercises Provided: yes. Exercises were reviewed and Chelsea was able to demonstrate them prior to the end of the session.  Chelsea demonstrated good  understanding of the education provided. See EMR under Patient Instructions for exercises provided during therapy sessions.    Assessment     Patient with increased pain throughout right knee throughout long arch quads, however, patient able to complete long arch quads with shorter range of montion to help decrease pain on right lower extremity. Patient able to complete right lower extremity short arch quads with full range of montion with some pain at end range. Patient noted decreased pain throughout bilateral knees throughout towards the end of treatment session. Patient also informed Physical Therapist Assistant increased muscle fatigue throughout straight leg raises, however, with alternating lower extremities patient was able to complete full reps.     Chelsea Is progressing well towards her goals.   Pt prognosis is Good.     Pt will continue to benefit from skilled outpatient physical therapy to address the deficits listed in the problem list box on initial evaluation, provide pt/family education and to maximize pt's level of independence in the home and community environment.     Pt's spiritual, cultural and educational needs considered and pt agreeable to plan of care and goals.     Anticipated barriers to physical therapy: Chronicity, History of Previous Surgeries     Goals:     Short Term Goals: 2 weeks   1.) Patient will demonstrate independence in compliance and technique of home exercise program provided as per teach-back method of assessment.  2.) Patient will achieve 0-90 degrees of knee flexion to demonstrate progressing range of motion for improved functional mobility.  3.) Patient will ambulate for 200 feet with LRAD with supervision-level assistance and normalized gait pattern.  4.) Patient will demonstrate good  quality quadriceps set with the ability to complete 10x straight-leg raises without quadriceps lag on right side.      Long Term Goals: 6 weeks   1.) Patient will demonstrate independence in compliance and technique of home exercise program provided as per teach-back method of assessment.  2.) Patient will achieve 0-100 degrees of knee flexion to demonstrate progressing range of motion for improved functional mobility.  3.) Patient will ambulate for 200 feet without any assistive device with supervision-level assistance and normalized gait pattern.  4.) Patient will demonstrate good quality quadriceps set with the ability to complete 3x10 straight-leg raises without quadriceps lag.   5.) Patient will demonstrate <14 seconds as per TUG Test to demonstrate improved functional mobility and decreased fall risk.  6.) Patient will demonstrate 5xSTS Test in <16 seconds  to demonstrate improved functional mobility and decreased fall risk.  7.) Patient will note <10% disability as per FOTO score.    Plan     Continue with Physical Therapist Plan of Care.     Jani Dutta, PTA

## 2023-12-06 NOTE — PLAN OF CARE
"[facility]   Physical Therapy Initial Evaluation      Name: Chelsea Rodriguez  Clinic Number: [id number]    Therapy Diagnosis:   Encounter Diagnoses   Name Primary?    Tear of right infraspinatus tendon, subsequent encounter     Nontraumatic complete tear of right rotator cuff     Bilateral chronic knee pain Yes    Decreased mobility of joint     Muscle weakness of lower extremity         Physician: Elaine Kraus MD    Physician Orders: PT Eval and Treat   Medical Diagnosis from Referral:   S46.811D (ICD-10-CM) - Tear of right infraspinatus tendon, subsequent encounter   M75.121 (ICD-10-CM) - Nontraumatic complete tear of right rotator cuff     Evaluation Date: 12/5/2023  Authorization Period Expiration: [id number]  Plan of Care Expiration: 2/5/24  Progress Note Due: 1/5/2024  Date of Surgery: None  Visit # / Visits authorized: 1/ 1   FOTO: 1/ 3  Precautions: Standard   Time In: 11:05 am  Time Out: 12:00 am  Total Billable Time: 55 minutes    Subjective   Date of onset: Late 2021  History of current condition - Chelsea reports: that despite the referral for her shoulders, she would like to work on her knees. She underwent a knee replacement on the right knee in March of 2021. She reports that she felt that the right knee pain began to worsen in 4-5 months after her surgery when she notes she felt a sensation of "spaghetti snapping" on her right leg. She has been relying more on her left knee which is now "breaking down."  Falls: Yes (fell outside, not sure how it happened)  Imaging: Radiographs: Total knee arthroplasty is in place on the right in good position and alignment.  No evidence of joint effusion.   Bilateral View from 2020: Four views bilateral.     Right: There is moderate DJD and a varus deformity.  There is a joint effusion.  Left: There is moderate DJD and a varus deformity.  There is a joint effusion.    Prior Therapy: Yes  Social History:  Lives with their daughter  Occupation: " "Retired  Prior Level of Function: Independent  Current Level of Function: Walks without the cane in her home, and will walk with her cane occasionally in the community,    Pain:  Current 0/10, worst "Stepping up onto stair stops her due to pain"/10, best 0/10   Location: Right knee    Description: Aching and Dull  Aggravating Factors: Walking and Stair Navigation, Stepping Up onto Stool  Easing Factors: Movement (non-weightbearing), "Scratching the Knee", [employer] and [employer] [employer]    Patients goals: To be able to step up onto her step stool and to navigate her stairs with less pain     Medical History:   Past Medical History:   Diagnosis Date    Bilateral knee pain     Carpal tunnel syndrome, bilateral     Fissure in skin of foot     Right small toe    HTN (hypertension)     Hyperlipidemia     Multiple thyroid nodules 11/10/2023    Palpitations     Rotator cuff injury     right    Screening for colorectal cancer 10/20/2017    Screening for malignant neoplasm of colon 2021    Statin-induced myositis 2018       Surgical History:   Chelsea Rodriguez  has a past surgical history that includes supracervical abdominal hysterectomy (); Bilateral salpingoophorectomy (); Colonoscopy (N/A, 10/20/2017); Intraocular prosthesis insertion (Left, 2018); Phacoemulsification of cataract (Left, 2018); Hernia repair; Cyst Removal;  section; Phacoemulsification of cataract (Right, 2018); Intraocular prosthesis insertion (Right, 2018); Breast lumpectomy (Left, ); Breast biopsy (Left, ); Breast biopsy (Left, ); Hysterectomy; Oophorectomy; Cataract extraction w/  intraocular lens implant (Right, 2018); Cataract extraction w/  intraocular lens implant (Left, 2018); Esophagogastroduodenoscopy (N/A, 2021); Colonoscopy (N/A, 2021); Knee Arthroplasty (Right, 2021); and Refractive surgery.    Medications:   Chelsea has a current medication list which includes the " "following prescription(s): acetaminophen, amoxicillin, aspirin, atorvastatin, coenzyme q10, diclofenac, docusate sodium, ezetimibe, fluticasone propionate, hydrochlorothiazide, irbesartan, loratadine, and metoprolol succinate.    Allergies:   Review of patient's allergies indicates:   Allergen Reactions    Codeine Nausea And Vomiting        Objective    Ambulation: Chelsea ambulates with non-antalgic gait, utilizing an SPC in the clinic and refraining from using during the examination; and ambulate with decreased stride length albeit achieving terminal knee extension and knee flexion for foot clearance.  Stair Navigation: Patient demonstrates left circumduction of the hip to ascend step, utilizing a non-reciprocal gait pattern with right leg trailing. During descending, she turns at a 45 degree angle to eccentrically lower quadriceps.  Sit to Stand: Widened base of support with significant forward trunk lean to avoid use of quadriceps or gluteal muscles.    Knee Active Range of Motion   Right Left   Flexion 80 degrees 80 degrees   Extension 0 degrees 0 degrees     Knee Passive Range of Motion   Right  Left End-Feel   Flexion 85 degrees 85 degrees Bone-on-bone   Extension 0 degrees 0 degrees      Joint Mobility  Tibiofemoral A/P Riley Hypomobilie   Tibiofemoral P/A Glide Hypomobile   Patellofemoral Joint Mobility Limitation in superior and inferior gliding     Manual Muscle Testing   Right Left   Knee Flexion (uniplanar) 4+/5 4+/5   Knee Extension  3-/5 (unable to fully extend in a seated position): "give" of knee joint 4+/5   Ankle Dorsiflexion 5/5 5/5   Hip External Rotation 4+/5 (short lever): 3-/5 long lever 4+/5 (short lever): 3-/5 long lever   Hip Abduction 3/5 3/5   Hip Extension (prone) Unable to raise off table Unable to raise off table       Intake Outcome Measure for FOTO Knee Survey    Therapist reviewed FOTO scores for Chelsea Rodriguez on 12/5/2023.   FOTO report - see Media section or FOTO account episode " "details.    Intake Score: See FOTO%       Treatment     Total Treatment time (time-based codes) separate from Evaluation: 5 minutes   Chelsea received the treatments listed below:      therapeutic exercises to develop strength, endurance, and ROM for 5 minutes including:  Beata, 3x8, 3" hold    Patient Education and Home Exercises     Education provided:   - -Findings of evaluation and examination, and affect of these on plan for treatment  -Prognosis and expectations  -Role of PT and team-centered care for patient  -Home exercise program and expectations of therapy    Written Home Exercises Provided: yes. Exercises were reviewed and Chelsea was able to demonstrate them prior to the end of the session.  Chelsea demonstrated good  understanding of the education provided. See EMR under Patient Instructions for exercises provided during therapy sessions.    Assessment     Chelsea is a 68 y.o. female referred to outpatient Physical Therapy with a medical diagnosis of   S46.811D (ICD-10-CM) - Tear of right infraspinatus tendon, subsequent encounter   M75.121 (ICD-10-CM) - Nontraumatic complete tear of right rotator cuff   Patient presents with limitations in stair navigation, squatting, bending, and sitting to standing transfer due to marked limitation in quadriceps and hip extensor strength, decreased active and passive range of motion bilaterally and decrease joint accessory mobility. Chelsea will benefit from a customized physical therapy plan of care  to address the aforementioned impairments in an effort to improve human function and quality of life.      Patient prognosis is Guarded.   Patient will benefit from skilled outpatient Physical Therapy to address the deficits stated above and in the chart below, provide patient /family education, and to maximize patientt's level of independence.     Plan of care discussed with patient: Yes  Patient's spiritual, cultural and educational needs considered and patient is agreeable " to the plan of care and goals as stated below:     Anticipated Barriers for therapy: Chronicity, History of Previous Surgeries    Medical Necessity is demonstrated by the following  History  Co-morbidities and personal factors that may impact the plan of care [] LOW: no personal factors / co-morbidities  [x] MODERATE: 1-2 personal factors / co-morbidities  [] HIGH: 3+ personal factors / co-morbidities    Moderate / High Support Documentation:   Co-morbidities affecting plan of care: See Medical History    Personal Factors:   age     Examination  Body Structures and Functions, activity limitations and participation restrictions that may impact the plan of care [x] LOW: addressing 1-2 elements  [] MODERATE: 3+ elements  [] HIGH: 4+ elements (please support below)    Moderate / High Support Documentation: None     Clinical Presentation [x] LOW: stable  [] MODERATE: Evolving  [] HIGH: Unstable     Decision Making/ Complexity Score: low       Goals:  Short Term Goals: 2 weeks   1.) Patient will demonstrate independence in compliance and technique of home exercise program provided as per teach-back method of assessment.  2.) Patient will achieve 0-90 degrees of knee flexion to demonstrate progressing range of motion for improved functional mobility.  3.) Patient will ambulate for 200 feet with LRAD with supervision-level assistance and normalized gait pattern.  4.) Patient will demonstrate good quality quadriceps set with the ability to complete 10x straight-leg raises without quadriceps lag on right side.     Long Term Goals: 6 weeks   1.) Patient will demonstrate independence in compliance and technique of home exercise program provided as per teach-back method of assessment.  2.) Patient will achieve 0-100 degrees of knee flexion to demonstrate progressing range of motion for improved functional mobility.  3.) Patient will ambulate for 200 feet without any assistive device with supervision-level assistance and  normalized gait pattern.  4.) Patient will demonstrate good quality quadriceps set with the ability to complete 3x10 straight-leg raises without quadriceps lag.   5.) Patient will demonstrate <14 seconds as per TUG Test to demonstrate improved functional mobility and decreased fall risk.  6.) Patient will demonstrate 5xSTS Test in <16 seconds  to demonstrate improved functional mobility and decreased fall risk.  7.) Patient will note <10% disability as per FOTO score.    Plan     Plan of care Certification: 12/5/2023 to 2/5/2024.    Outpatient Physical Therapy 2 times weekly for 6 weeks to include the following interventions: Gait Training, Manual Therapy, Moist Heat/ Ice, Neuromuscular Re-ed, Patient Education, Self Care, Therapeutic Activities, and Therapeutic Exercise.     Preethi Patel PT DPT  Board Certified in Orthopedic Physical Therapy          Physician's Signature: _________________________________________ Date: ________________

## 2023-12-07 ENCOUNTER — CLINICAL SUPPORT (OUTPATIENT)
Dept: REHABILITATION | Facility: HOSPITAL | Age: 68
End: 2023-12-07
Payer: MEDICARE

## 2023-12-07 DIAGNOSIS — M62.81 MUSCLE WEAKNESS OF LOWER EXTREMITY: ICD-10-CM

## 2023-12-07 DIAGNOSIS — M25.561 BILATERAL CHRONIC KNEE PAIN: ICD-10-CM

## 2023-12-07 DIAGNOSIS — M25.562 BILATERAL CHRONIC KNEE PAIN: ICD-10-CM

## 2023-12-07 DIAGNOSIS — G89.29 BILATERAL CHRONIC KNEE PAIN: ICD-10-CM

## 2023-12-07 DIAGNOSIS — M75.121 NONTRAUMATIC COMPLETE TEAR OF RIGHT ROTATOR CUFF: ICD-10-CM

## 2023-12-07 DIAGNOSIS — S46.811D TEAR OF RIGHT INFRASPINATUS TENDON, SUBSEQUENT ENCOUNTER: Primary | ICD-10-CM

## 2023-12-07 DIAGNOSIS — M25.60 DECREASED MOBILITY OF JOINT: ICD-10-CM

## 2023-12-07 PROCEDURE — 97110 THERAPEUTIC EXERCISES: CPT | Mod: CQ

## 2023-12-12 ENCOUNTER — CLINICAL SUPPORT (OUTPATIENT)
Dept: REHABILITATION | Facility: HOSPITAL | Age: 68
End: 2023-12-12
Payer: MEDICARE

## 2023-12-12 DIAGNOSIS — G89.29 BILATERAL CHRONIC KNEE PAIN: Primary | ICD-10-CM

## 2023-12-12 DIAGNOSIS — M25.561 BILATERAL CHRONIC KNEE PAIN: Primary | ICD-10-CM

## 2023-12-12 DIAGNOSIS — M62.81 MUSCLE WEAKNESS OF LOWER EXTREMITY: ICD-10-CM

## 2023-12-12 DIAGNOSIS — M25.60 DECREASED MOBILITY OF JOINT: ICD-10-CM

## 2023-12-12 DIAGNOSIS — M25.562 BILATERAL CHRONIC KNEE PAIN: Primary | ICD-10-CM

## 2023-12-12 PROCEDURE — 97110 THERAPEUTIC EXERCISES: CPT

## 2023-12-12 PROCEDURE — 97140 MANUAL THERAPY 1/> REGIONS: CPT

## 2023-12-12 NOTE — PROGRESS NOTES
[facility]   Physical Therapy Treatment Note      Name: Chelsea Rodriguez  [facility] Number: [id number]    Therapy Diagnosis:   Encounter Diagnoses   Name Primary?    Bilateral chronic knee pain Yes    Decreased mobility of joint     Muscle weakness of lower extremity      Physician: Elaine Kraus MD    Visit Date: 12/12/2023    Physician Orders: PT Eval and Treat   Medical Diagnosis from Referral:   S46.811D (ICD-10-CM) - Tear of right infraspinatus tendon, subsequent encounter   M75.121 (ICD-10-CM) - Nontraumatic complete tear of right rotator cuff   Evaluation Date: 12/5/2023  Authorization Period Expiration: 10/[id number]  Plan of Care Expiration: 2/5/24  Progress Note Due: 1/5/2024  Date of Surgery: None  Visit # / Visits authorized: 1/ 1; 2/12   FOTO: 1/ 3  Precautions: Standard   PTA Visit #: 1/5     Time In: 9:05 am  Time Out: 10:00 am  Total Billable Time: 30 minutes one on one  Subjective   Pt reports: she feels that the exercises are getting easier.  She was compliant with home exercise program.  Response to previous treatment: initial eval   Functional change: none   Pain: 5/10  Location: Bilateral knee    Objective    Objective Measures updated at progress report unless specified.   Treatment   **PT one-on-one with patient for 30 minutes with PT extender utilized for remainder of PT session**    Chelsea received the treatments listed below:      therapeutic exercises to develop strength, endurance, and ROM for 45 minutes including:  NuStep, Level 3 8 minute   Supine Short Arch Quads 2 x 12  Bridges, 3x8  Straight Leg Raises 2 x 10 each   Supine Quad Sets 2 x 10 each   Straight Leg Raises 2 x 10 each   Clamshells, 3x8,   Shuttle Double Leg Squat, 1 pink, 1 black, 2 minutes    Manual Therapy for 10 minutes:  Patellofemoral Mobilizations, all planes  Tibiofemoral Anterior to Posterior Glide, bilaterally      Patient Education and Home Exercises       Education provided:   - -Findings of  evaluation and examination, and affect of these on plan for treatment  -Prognosis and expectations  -Role of PT and team-centered care for patient  -Home exercise program and expectations of therapy     Written Home Exercises Provided: yes. Exercises were reviewed and Chelsea was able to demonstrate them prior to the end of the session.  Chelsea demonstrated good  understanding of the education provided. See EMR under Patient Instructions for exercises provided during therapy sessions.    Assessment   Chelsea reaches 90 degrees of knee flexion today, passively. She is more challenged with quadriceps activation and strength on the right but is able to complete straight-leg raise with slight quad lag that improves with repetition.    Chelsea Is progressing well towards her goals.   Pt prognosis is Good.     Pt will continue to benefit from skilled outpatient physical therapy to address the deficits listed in the problem list box on initial evaluation, provide pt/family education and to maximize pt's level of independence in the home and community environment.     Pt's spiritual, cultural and educational needs considered and pt agreeable to plan of care and goals.     Anticipated barriers to physical therapy: Chronicity, History of Previous Surgeries     Goals:     Short Term Goals: 2 weeks   1.) Patient will demonstrate independence in compliance and technique of home exercise program provided as per teach-back method of assessment.  2.) Patient will achieve 0-90 degrees of knee flexion to demonstrate progressing range of motion for improved functional mobility.  3.) Patient will ambulate for 200 feet with LRAD with supervision-level assistance and normalized gait pattern.  4.) Patient will demonstrate good quality quadriceps set with the ability to complete 10x straight-leg raises without quadriceps lag on right side.      Long Term Goals: 6 weeks   1.) Patient will demonstrate independence in compliance and technique of  home exercise program provided as per teach-back method of assessment.  2.) Patient will achieve 0-100 degrees of knee flexion to demonstrate progressing range of motion for improved functional mobility.  3.) Patient will ambulate for 200 feet without any assistive device with supervision-level assistance and normalized gait pattern.  4.) Patient will demonstrate good quality quadriceps set with the ability to complete 3x10 straight-leg raises without quadriceps lag.   5.) Patient will demonstrate <14 seconds as per TUG Test to demonstrate improved functional mobility and decreased fall risk.  6.) Patient will demonstrate 5xSTS Test in <16 seconds  to demonstrate improved functional mobility and decreased fall risk.  7.) Patient will note <10% disability as per FOTO score.    Plan     Continue with Physical Therapist Plan of Care.     Preethi Patel, PT DPT  Board Certified in Orthopedic Physical Therapy

## 2023-12-14 ENCOUNTER — CLINICAL SUPPORT (OUTPATIENT)
Dept: REHABILITATION | Facility: HOSPITAL | Age: 68
End: 2023-12-14
Payer: MEDICARE

## 2023-12-14 DIAGNOSIS — M25.561 BILATERAL CHRONIC KNEE PAIN: Primary | ICD-10-CM

## 2023-12-14 DIAGNOSIS — G89.29 BILATERAL CHRONIC KNEE PAIN: Primary | ICD-10-CM

## 2023-12-14 DIAGNOSIS — M25.562 BILATERAL CHRONIC KNEE PAIN: Primary | ICD-10-CM

## 2023-12-14 DIAGNOSIS — M62.81 MUSCLE WEAKNESS OF LOWER EXTREMITY: ICD-10-CM

## 2023-12-14 DIAGNOSIS — M25.60 DECREASED MOBILITY OF JOINT: ICD-10-CM

## 2023-12-14 PROCEDURE — 97530 THERAPEUTIC ACTIVITIES: CPT

## 2023-12-14 PROCEDURE — 97140 MANUAL THERAPY 1/> REGIONS: CPT

## 2023-12-14 NOTE — PROGRESS NOTES
[facility]   Physical Therapy Treatment Note      Name: Chelsea Rodriguez  [facility] Number: [id number]    Therapy Diagnosis:   Encounter Diagnoses   Name Primary?    Bilateral chronic knee pain Yes    Decreased mobility of joint     Muscle weakness of lower extremity      Physician: Elaine Kraus MD    Visit Date: 12/14/2023    Physician Orders: PT Eval and Treat   Medical Diagnosis from Referral:   S46.811D (ICD-10-CM) - Tear of right infraspinatus tendon, subsequent encounter   M75.121 (ICD-10-CM) - Nontraumatic complete tear of right rotator cuff   Evaluation Date: 12/5/2023  Authorization Period Expiration: 10/[id number]  Plan of Care Expiration: 2/5/24  Progress Note Due: 1/5/2024  Date of Surgery: None  Visit # / Visits authorized: 1/ 1; 3/12   FOTO: 1/ 3  Precautions: Standard   PTA Visit #: 1/5   Time In: 1:00 pm  Time Out: 2:00 pm  Total Billable Time: 30 minutes one on one  Subjective   Pt reports: she feels like the right knee is more swollen and moving less than before.  She was compliant with home exercise program.  Response to previous treatment: initial eval   Functional change: none   Pain: 5/10  Location: Bilateral knee    Objective       Right Left   Knee Flexion 95 degrees (upon beginning of therapy)    Knee Extension 0 degrees        Treatment   **PT one-on-one with patient for 30 minutes with PT extender utilized for remainder of PT session**    Chelsea received the treatments listed below:      therapeutic exercises to develop strength, endurance, and ROM for 36 minutes including:  Low-Load Long-Duration Knee Flexion Stretch, 3 minutes at ~95 degrees  Upright Bike, half-revolutions, 8 minutes  Bridges, 2x10, blue band  Straight Leg Raises 2 x 10 each, no additional resistance  Clamshells, 3x8, yellow band  Shuttle Double Leg Squat, 75# (25# loaded from the bottom), 3x8  Shuttle Staggered Stance, right foot and left foot forward, 2x10, 50#      Not today:  Supine Short Arch  "Quads 2 x 12    Manual Therapy for 10 minutes:  Inferior Patellofemoral Mobilizations + Knee Flexion, all planes  Tibiofemoral Anterior to Posterior Glide + Tibiofemoral I.R., bilaterally    Chelsea participated in dynamic functional therapeutic activities to improve functional performance for 14  minutes, including:  Sitting to Standing, 22" surface, Hip-Hinging with Dowel Arnoldo, 15x with assistance in concentric, and independent in eccentric  Sitting to Standing, without Dowel Arnoldo, 5x, 22" surface (progressing to 20" surface)      Patient Education and Home Exercises       Education provided:   - -Findings of evaluation and examination, and affect of these on plan for treatment  -Prognosis and expectations  -Role of PT and team-centered care for patient  -Home exercise program and expectations of therapy     Written Home Exercises Provided: yes. Exercises were reviewed and Chelsea was able to demonstrate them prior to the end of the session.  Chelsea demonstrated good  understanding of the education provided. See EMR under Patient Instructions for exercises provided during therapy sessions.    Assessment   Chelsea demonstrates 95 degrees of flexion that improves to nearly 100 degrees after prolonged stretching and manual therapy. She is noted to have some patellar maltracking on the right knee during double leg press. She struggles with sitting to standing that improves with repetition and she can progress to 20" surface with greater muscle performance at the end of session    Chelsea Is progressing well towards her goals.   Pt prognosis is Good.     Pt will continue to benefit from skilled outpatient physical therapy to address the deficits listed in the problem list box on initial evaluation, provide pt/family education and to maximize pt's level of independence in the home and community environment.     Pt's spiritual, cultural and educational needs considered and pt agreeable to plan of care and goals.     Anticipated " barriers to physical therapy: Chronicity, History of Previous Surgeries     Goals:     Short Term Goals: 2 weeks   1.) Patient will demonstrate independence in compliance and technique of home exercise program provided as per teach-back method of assessment.  2.) Patient will achieve 0-90 degrees of knee flexion to demonstrate progressing range of motion for improved functional mobility.  3.) Patient will ambulate for 200 feet with LRAD with supervision-level assistance and normalized gait pattern.  4.) Patient will demonstrate good quality quadriceps set with the ability to complete 10x straight-leg raises without quadriceps lag on right side.      Long Term Goals: 6 weeks   1.) Patient will demonstrate independence in compliance and technique of home exercise program provided as per teach-back method of assessment.  2.) Patient will achieve 0-100 degrees of knee flexion to demonstrate progressing range of motion for improved functional mobility.  3.) Patient will ambulate for 200 feet without any assistive device with supervision-level assistance and normalized gait pattern.  4.) Patient will demonstrate good quality quadriceps set with the ability to complete 3x10 straight-leg raises without quadriceps lag.   5.) Patient will demonstrate <14 seconds as per TUG Test to demonstrate improved functional mobility and decreased fall risk.  6.) Patient will demonstrate 5xSTS Test in <16 seconds  to demonstrate improved functional mobility and decreased fall risk.  7.) Patient will note <10% disability as per FOTO score.    Plan     Continue with Physical Therapist Plan of Care.     Preethi Patel, PT DPT  Board Certified in Orthopedic Physical Therapy

## 2023-12-19 ENCOUNTER — CLINICAL SUPPORT (OUTPATIENT)
Dept: REHABILITATION | Facility: HOSPITAL | Age: 68
End: 2023-12-19
Payer: MEDICARE

## 2023-12-19 DIAGNOSIS — M25.562 BILATERAL CHRONIC KNEE PAIN: Primary | ICD-10-CM

## 2023-12-19 DIAGNOSIS — M62.81 MUSCLE WEAKNESS OF LOWER EXTREMITY: ICD-10-CM

## 2023-12-19 DIAGNOSIS — G89.29 BILATERAL CHRONIC KNEE PAIN: Primary | ICD-10-CM

## 2023-12-19 DIAGNOSIS — M25.60 DECREASED MOBILITY OF JOINT: ICD-10-CM

## 2023-12-19 DIAGNOSIS — M25.561 BILATERAL CHRONIC KNEE PAIN: Primary | ICD-10-CM

## 2023-12-19 PROCEDURE — 97110 THERAPEUTIC EXERCISES: CPT

## 2023-12-19 PROCEDURE — 97530 THERAPEUTIC ACTIVITIES: CPT

## 2023-12-19 PROCEDURE — 97140 MANUAL THERAPY 1/> REGIONS: CPT

## 2023-12-19 NOTE — PROGRESS NOTES
[facility]   Physical Therapy Treatment Note      Name: Chelsea Rodriguez  Clinic Number: [id number]    Therapy Diagnosis:   Encounter Diagnoses   Name Primary?    Bilateral chronic knee pain Yes    Decreased mobility of joint     Muscle weakness of lower extremity      Physician: Elaine Kraus MD  Visit Date: 12/19/2023  Physician Orders: PT Eval and Treat   Medical Diagnosis from Referral:   S46.811D (ICD-10-CM) - Tear of right infraspinatus tendon, subsequent encounter   M75.121 (ICD-10-CM) - Nontraumatic complete tear of right rotator cuff   Evaluation Date: 12/5/2023  Authorization Period Expiration: 10/[id number]  Plan of Care Expiration: 2/5/24  Progress Note Due: 1/5/2024  Date of Surgery: None  Visit # / Visits authorized: 1/ 1; 4/12   FOTO: 1/ 3  Precautions: Standard   PTA Visit #: 1/5   Time In:  10:07 am  Time Out: 11:00 am  Total Billable Time: 53 minutes one on one  Subjective   Pt reports: she has not been feeling well, as she is sick with a cold, and has not slept well in two days. Chelsea reports that her right knee isn't feeling too bad, but her left knee is hurting today.  She was not compliant with home exercise program.  Response to previous treatment: initial eval   Functional change: none   Pain: 5/10  Location: Bilateral knee    Objective       Right Left   Knee Flexion 95 degrees (upon beginning of therapy) - 98 degrees post-manual 85 degrees  (upon beginning of therapy)- 90 degrees post-manual   Knee Extension 0 degrees 0 degrees     Treatment   **PT one-on-one with patient for 53 minutes with PT extender utilized for remainder of PT session**    Chelsea received the treatments listed below:    therapeutic exercises to develop strength, endurance, and ROM for 17 minutes including:  Low-Load Long Duration Stretch, 3 minutes each leg at end-range flexion  Bridges, 2x12, blue band  Straight Leg Raises 2 x 10 each, 1# cuff weight  Clamshells, 3x8, red band  Shuttle Staggered  "Stance, right foot and left foot forward, 2x10, 50#    Manual Therapy for 12 minutes:  Inferior Patellofemoral Mobilizations + End-Range Knee Flexion, all planes  Tibiofemoral Anterior to Posterior Glide + Tibiofemoral I.R., bilaterally    Chelsea participated in dynamic functional therapeutic activities to improve functional performance for 24  minutes, including:  Hip Hinging with Dowel Arnoldo, seated only, 1 minute duration  Sitting to Standing, 18" surface, Hip-Hinging with Dowel Arnoldo, 3x5 with assistance in concentric, and independent in eccentric  Step-Up, 2" step, 2x12, bilateral hand-hold assistance as needed  Step-Up, 4" step, x12, bilateral hand-hold assistance as needed, assistance from contralateral plantarflexion    Patient Education and Home Exercises       Education provided:   - -Findings of evaluation and examination, and affect of these on plan for treatment  -Prognosis and expectations  -Role of PT and team-centered care for patient  -Home exercise program and expectations of therapy     Written Home Exercises Provided: yes. Exercises were reviewed and Chelsea was able to demonstrate them prior to the end of the session.  Chelsea demonstrated good  understanding of the education provided. See EMR under Patient Instructions for exercises provided during therapy sessions.    Assessment   Chelsea demonstrates 95 degrees of right knee flexion and 90 degrees of left knee flexion, maintaining full terminal knee extension. Both ranges improved by ~5 degrees after manual therapy and long-duration stretching under low load to improve flexion range of motion. Isolated and functional strengthening was progressed this visit to improve functional mobility. She was able to rise from sitting to standing from 18" surface, with increased challenge into eccentric lowering. She was able to progress to 4" forward step-up with bilateral hand-hold assistance today.    Chelsea Is progressing well towards her goals.   Pt prognosis is " Good.     Pt will continue to benefit from skilled outpatient physical therapy to address the deficits listed in the problem list box on initial evaluation, provide pt/family education and to maximize pt's level of independence in the home and community environment.     Pt's spiritual, cultural and educational needs considered and pt agreeable to plan of care and goals.     Anticipated barriers to physical therapy: Chronicity, History of Previous Surgeries     Goals:     Short Term Goals: 2 weeks   1.) Patient will demonstrate independence in compliance and technique of home exercise program provided as per teach-back method of assessment.  2.) Patient will achieve 0-90 degrees of knee flexion to demonstrate progressing range of motion for improved functional mobility.  3.) Patient will ambulate for 200 feet with LRAD with supervision-level assistance and normalized gait pattern.  4.) Patient will demonstrate good quality quadriceps set with the ability to complete 10x straight-leg raises without quadriceps lag on right side.      Long Term Goals: 6 weeks   1.) Patient will demonstrate independence in compliance and technique of home exercise program provided as per teach-back method of assessment.  2.) Patient will achieve 0-100 degrees of knee flexion to demonstrate progressing range of motion for improved functional mobility.  3.) Patient will ambulate for 200 feet without any assistive device with supervision-level assistance and normalized gait pattern.  4.) Patient will demonstrate good quality quadriceps set with the ability to complete 3x10 straight-leg raises without quadriceps lag.   5.) Patient will demonstrate <14 seconds as per TUG Test to demonstrate improved functional mobility and decreased fall risk.  6.) Patient will demonstrate 5xSTS Test in <16 seconds  to demonstrate improved functional mobility and decreased fall risk.  7.) Patient will note <10% disability as per FOTO score.    Plan      Continue with Physical Therapist Plan of Care.     Preethi Patel, PT DPT  Board Certified in Orthopedic Physical Therapy

## 2023-12-22 ENCOUNTER — TELEPHONE (OUTPATIENT)
Dept: INTERNAL MEDICINE | Facility: CLINIC | Age: 68
End: 2023-12-22
Payer: MEDICARE

## 2023-12-22 ENCOUNTER — OFFICE VISIT (OUTPATIENT)
Dept: URGENT CARE | Facility: CLINIC | Age: 68
End: 2023-12-22
Payer: MEDICARE

## 2023-12-22 VITALS
RESPIRATION RATE: 18 BRPM | DIASTOLIC BLOOD PRESSURE: 76 MMHG | WEIGHT: 166.44 LBS | OXYGEN SATURATION: 97 % | HEIGHT: 58 IN | TEMPERATURE: 98 F | SYSTOLIC BLOOD PRESSURE: 138 MMHG | HEART RATE: 75 BPM | BODY MASS INDEX: 34.94 KG/M2

## 2023-12-22 DIAGNOSIS — R05.8 POST-VIRAL COUGH SYNDROME: Primary | ICD-10-CM

## 2023-12-22 DIAGNOSIS — R05.9 COUGH, UNSPECIFIED TYPE: ICD-10-CM

## 2023-12-22 DIAGNOSIS — U07.1 COVID-19 VIRUS DETECTED: ICD-10-CM

## 2023-12-22 LAB
CTP QC/QA: YES
SARS-COV-2 AG RESP QL IA.RAPID: POSITIVE

## 2023-12-22 PROCEDURE — 87811 SARS-COV-2 COVID19 W/OPTIC: CPT | Mod: QW,S$GLB,, | Performed by: FAMILY MEDICINE

## 2023-12-22 PROCEDURE — 99213 PR OFFICE/OUTPT VISIT, EST, LEVL III, 20-29 MIN: ICD-10-PCS | Mod: S$GLB,,, | Performed by: FAMILY MEDICINE

## 2023-12-22 PROCEDURE — 99213 OFFICE O/P EST LOW 20 MIN: CPT | Mod: S$GLB,,, | Performed by: FAMILY MEDICINE

## 2023-12-22 PROCEDURE — 87811 SARS CORONAVIRUS 2 ANTIGEN POCT, MANUAL READ: ICD-10-PCS | Mod: QW,S$GLB,, | Performed by: FAMILY MEDICINE

## 2023-12-22 RX ORDER — FLUTICASONE PROPIONATE 50 MCG
1 SPRAY, SUSPENSION (ML) NASAL DAILY
Qty: 16 G | Refills: 0 | Status: SHIPPED | OUTPATIENT
Start: 2023-12-22 | End: 2024-02-15

## 2023-12-22 RX ORDER — AZELASTINE 1 MG/ML
1 SPRAY, METERED NASAL 2 TIMES DAILY
Qty: 30 ML | Refills: 0 | Status: SHIPPED | OUTPATIENT
Start: 2023-12-22 | End: 2024-02-15

## 2023-12-22 RX ORDER — GUAIFENESIN 600 MG/1
1200 TABLET, EXTENDED RELEASE ORAL 2 TIMES DAILY
Qty: 40 TABLET | Refills: 0 | Status: SHIPPED | OUTPATIENT
Start: 2023-12-22 | End: 2024-01-01

## 2023-12-22 RX ORDER — BENZONATATE 200 MG/1
200 CAPSULE ORAL 3 TIMES DAILY PRN
Qty: 30 CAPSULE | Refills: 0 | Status: SHIPPED | OUTPATIENT
Start: 2023-12-22 | End: 2024-01-01

## 2023-12-22 RX ORDER — CETIRIZINE HYDROCHLORIDE 10 MG/1
10 TABLET ORAL DAILY
Qty: 30 TABLET | Refills: 0 | Status: SHIPPED | OUTPATIENT
Start: 2023-12-22 | End: 2024-03-21

## 2023-12-22 NOTE — TELEPHONE ENCOUNTER
----- Message from Yaquelin Martinez sent at 12/22/2023 11:07 AM CST -----  Type:  Needs Medical Advice    Who Called: Pt  Symptoms (please be specific): cold or flu cough Dry  nasal congestion    How long has patient had these symptoms:  1 week   Pharmacy name and phone #:  [facility] [address] AT [facility] [place] OF [address]  Would the patient rather a call back or a response via [facility]? call  Best Call Back Number: [phone]  Additional Information: please call if pt needs to be seen

## 2023-12-23 NOTE — PATIENT INSTRUCTIONS
General Discharge Instructions   PLEASE READ YOUR DISCHARGE INSTRUCTIONS ENTIRELY AS IT CONTAINS IMPORTANT INFORMATION.  If you were prescribed a narcotic or controlled medication, do not drive or operate heavy equipment or machinery while taking these medications.  If you were prescribed antibiotics, please take them to completion.  You must understand that you've received an Urgent Care treatment only and that you may be released before all your medical problems are known or treated. You, the patient, will arrange for follow up care as instructed.    OVER THE COUNTER RECOMMENDATIONS/SUGGESTIONS.    Make sure to stay well hydrated.    Use Nasal Saline to mechanically move any post nasal drip from your eustachian tube or from the back of your throat.    Use warm salt water gargles to ease your throat pain. Warm salt water gargles as needed for sore throat- 1/2 tsp salt to 1 cup warm water, gargle as desired.    Use an antihistamine such as Claritin, Zyrtec or Allegra to dry you out.    Use pseudoephedrine (behind the counter) to decongest. Pseudoephedrine 30 mg up to 240 mg /day. It can raise your blood pressure and give you palpitations.    Use mucinex (guaifenesin) to break up mucous up to 2400mg/day to loosen any mucous.    The mucinex DM pill has a cough suppressant that can be sedating. It can be used at night to stop the tickle at the back of your throat.    You can use Mucinex D (it has guaifenesin and a high dose of pseudoephedrine) in the mornings to help decongest.    Use Afrin in each nare for no longer than 3 days, as it is addictive. It can also dry out your mucous membranes and cause elevated blood pressure. This is especially useful if you are flying.    Use Flonase 1-2 sprays/nostril per day. It is a local acting steroid nasal spray, if you develop a bloody nose, stop using the medication immediately.    Sometimes Nyquil at night is beneficial to help you get some rest, however it is sedating and it  does have an antihistamine, and tylenol.    Honey is a natural cough suppressant that can be used.    Tylenol up to 4,000 mg a day is safe for short periods and can be used for body aches, pain, and fever. However in high doses and prolonged use it can cause liver irritation.    Ibuprofen is a non-steroidal anti-inflammatory that can be used for body aches, pain, and fever.However it can also cause stomach irritation if over used.     Follow up with your PCP or specialty clinic as instructed in the next 2-3 days if not improved or as needed. You can call [phone] to schedule an appointment with appropriate provider.      If you condition worsens, we recommend that you receive another evaluation at the emergency room immediately or contact your primary medical clinic's after hours call service to discuss your concerns.      Please return here or go to the Emergency Department for any concerns or worsening condition.   You can also call [phone] to schedule an appointment with the appropriate provider.    Please return here or go to the Emergency Department for any concerns or worsening of condition.    Thank you for choosing [facility]!    Our goal in the Urgent Care is to always provide outstanding medical care. You may receive a survey by mail or e-mail in the next week regarding your experience today. We would greatly appreciate you completing and returning the survey. Your feedback provides us with a way to recognize our staff who provide very good care, and it helps us learn how to improve when your experience was below our aspiration of excellence.      We appreciate you trusting us with your medical care. We hope you feel better soon. We will be happy to take care of you for all of your future medical needs.    Sincerely,    EMMA Luna  Cough   If your condition worsens or fails to improve we recommend that you receive another evaluation at the ER immediately or contact your PCP  "to discuss your concerns or return here. You must understand that you've received an urgent care treatment only and that you may be released before all your medical problems are known or treated. You the patient will arrange for follwp care as instructed. .  Rest and fluids are important  Can use honey with brenna to soothe your throat  Take prescription cough meds (pills) as prescribed; take prescription cough syrup at night as needed for cough.  Do not take both the prescribed cough pills and syrup at the same time or within 6 hours of each other.  Do not take the cough syrup with any other sedative medication as it can can cause drowsiness. Do not operate any heavy machinery, drink or drive while taking the cough syrup.   -  Flonase (fluticasone) is a nasal spray which is available over the counter and may help with your symptoms.   -  If you have hypertension avoid using the "D" which is the decongestant.  Instead you can use Coricidin HBP for cold and cough symptoms.    -  If you just have drainage you can take plain Zyrtec, Claritin or Allegra   -  Tylenol or ibuprofen can also be used as directed for pain unless you have an allergy to them or medical condition such as stomach ulcers, kidney or liver disease or blood thinners etc for which you should not be taking these type of medications.   Please follow up with your primary care doctor or specialist in the next 48-72hrs as needed and if no improvement  If you  smoke, please stop smoking.    "

## 2023-12-23 NOTE — PROGRESS NOTES
"Subjective:      Patient ID: Chelsea Rodriguez is a 68 y.o. female.    Vitals:  height is 4' 10" (1.473 m) and weight is 75.5 kg (166 lb 7.2 oz). Her oral temperature is 98.2 °F (36.8 °C). Her blood pressure is 138/76 and her pulse is 75. Her respiration is 18 and oxygen saturation is 97%.     Chief Complaint: Cough    Pt arrives for sever cough,congestion and weakness. Symptoms started 6 days ago. At home tx included robtiussin, with mild relief.  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 She says she has some congestion. She says she feels better now than she did earlier in the week, she had fatigue earlier in the week that resolved. No fever or chills. No cp or SOB. No GI related symptoms, including, N/v/D or constipation. No anosmia or ageusia.      Cough  This is a new problem. The current episode started in the past 7 days. The problem has been gradually worsening. The problem occurs constantly. Cough characteristics: yellow mucous. Associated symptoms include nasal congestion. Pertinent negatives include no chest pain, chills, ear congestion, ear pain, fever, headaches, heartburn, hemoptysis, myalgias, postnasal drip, rash, rhinorrhea, sore throat, shortness of breath, sweats, weight loss or wheezing. Nothing aggravates the symptoms. Treatments tried: robitussin. The treatment provided moderate relief. There is no history of asthma, bronchiectasis, bronchitis, COPD, emphysema, environmental allergies or pneumonia. "       Constitution: Negative for activity change, appetite change, chills, sweating, fatigue, fever, unexpected weight change and generalized weakness.   HENT:  Negative for ear pain, postnasal drip and sore throat.    Cardiovascular:  Negative for chest pain.   Respiratory:  Positive for cough. Negative for chest tightness, sputum production, bloody sputum, COPD, shortness of breath, stridor, wheezing and asthma.    Gastrointestinal:  Negative for heartburn.   Musculoskeletal:  Negative for muscle ache.   Skin:  Negative for rash.   Allergic/Immunologic: Negative for environmental allergies and asthma.   Neurological:  Negative for headaches.      Objective:     Physical Exam   Constitutional: She is oriented to person, place, and time. She appears well-developed.  Non-toxic appearance. She does not appear ill. No distress.      Comments:Very pleasant     HENT:   Head: Normocephalic and atraumatic.   Ears:   Right Ear: External ear normal.   Left Ear: External ear normal.   Nose: Nose normal.   Mouth/Throat: Oropharynx is clear and moist.   Eyes: Conjunctivae, EOM and lids are normal. Pupils are equal, round, and reactive to light.   Neck: Trachea normal and phonation normal. Neck supple.   Pulmonary/Chest: Effort normal and breath sounds normal.   Musculoskeletal: Normal range of motion.         General: Normal range of motion.   Neurological: She is alert and oriented to person, place, and time.   Skin: Skin is warm, dry, intact and not diaphoretic.   Psychiatric: Her speech is normal and behavior is normal. Judgment and thought content normal.   Nursing note and vitals reviewed.      Assessment:     1. Post-viral cough syndrome    2. Cough, unspecified type      Results for orders placed or performed in visit on 12/22/23   SARS Coronavirus 2 Antigen, POCT Manual Read   Result Value Ref Range    SARS Coronavirus 2 Antigen Positive (A) Negative     Acceptable Yes       Plan:     Cv pos, pt  improving, still has lingering ough, otc meds reviewed, lungs ctab, vss    Discussed results/diagnosis/plan with patient in clinic. Strict precautions given to patient to monitor for worsening signs and symptoms. Advised to follow up with PCP or specialist.    Explained side effects of medications prescribed with patient and informed him/her to discontinue use if he/she has any side effects and to inform UC or PCP if this occurs. All questions answered. Strict ED verses clinic return precautions stressed and given in depth. Advised if symptoms worsens of fail to improve he/she should go to the Emergency Room. Discharge and follow-up instructions given verbally/printed with the patient who expressed understanding and willingness to comply with my recommendations. Patient voiced understanding and in agreement with current treatment plan. Patient exits the exam room in no acute distress. Conversant and engaged during discharge discussion, verbalized understanding.      Post-viral cough syndrome  -     guaiFENesin (MUCINEX) 600 mg 12 hr tablet; Take 2 tablets (1,200 mg total) by mouth 2 (two) times daily. for 10 days  Dispense: 40 tablet; Refill: 0  -     cetirizine (ZYRTEC) 10 MG tablet; Take 1 tablet (10 mg total) by mouth once daily.  Dispense: 30 tablet; Refill: 0  -     fluticasone propionate (FLONASE) 50 mcg/actuation nasal spray; 1 spray (50 mcg total) by Each Nostril route once daily.  Dispense: 16 g; Refill: 0  -     benzonatate (TESSALON) 200 MG capsule; Take 1 capsule (200 mg total) by mouth 3 (three) times daily as needed.  Dispense: 30 capsule; Refill: 0  -     azelastine (ASTELIN) 137 mcg (0.1 %) nasal spray; 1 spray (137 mcg total) by Nasal route 2 (two) times daily.  Dispense: 30 mL; Refill: 0    Cough, unspecified type  -     SARS Coronavirus 2 Antigen, POCT Manual Read             Additional MDM:     Heart Failure Score:   COPD = No        Patient Instructions   General Discharge Instructions   PLEASE  READ YOUR DISCHARGE INSTRUCTIONS ENTIRELY AS IT CONTAINS IMPORTANT INFORMATION.  If you were prescribed a narcotic or controlled medication, do not drive or operate heavy equipment or machinery while taking these medications.  If you were prescribed antibiotics, please take them to completion.  You must understand that you've received an Urgent Care treatment only and that you may be released before all your medical problems are known or treated. You, the patient, will arrange for follow up care as instructed.    OVER THE COUNTER RECOMMENDATIONS/SUGGESTIONS.    Make sure to stay well hydrated.    Use Nasal Saline to mechanically move any post nasal drip from your eustachian tube or from the back of your throat.    Use warm salt water gargles to ease your throat pain. Warm salt water gargles as needed for sore throat- 1/2 tsp salt to 1 cup warm water, gargle as desired.    Use an antihistamine such as Claritin, Zyrtec or Allegra to dry you out.    Use pseudoephedrine (behind the counter) to decongest. Pseudoephedrine 30 mg up to 240 mg /day. It can raise your blood pressure and give you palpitations.    Use mucinex (guaifenesin) to break up mucous up to 2400mg/day to loosen any mucous.    The mucinex DM pill has a cough suppressant that can be sedating. It can be used at night to stop the tickle at the back of your throat.    You can use Mucinex D (it has guaifenesin and a high dose of pseudoephedrine) in the mornings to help decongest.    Use Afrin in each nare for no longer than 3 days, as it is addictive. It can also dry out your mucous membranes and cause elevated blood pressure. This is especially useful if you are flying.    Use Flonase 1-2 sprays/nostril per day. It is a local acting steroid nasal spray, if you develop a bloody nose, stop using the medication immediately.    Sometimes Nyquil at night is beneficial to help you get some rest, however it is sedating and it does have an antihistamine, and  tylenol.    Honey is a natural cough suppressant that can be used.    Tylenol up to 4,000 mg a day is safe for short periods and can be used for body aches, pain, and fever. However in high doses and prolonged use it can cause liver irritation.    Ibuprofen is a non-steroidal anti-inflammatory that can be used for body aches, pain, and fever.However it can also cause stomach irritation if over used.     Follow up with your PCP or specialty clinic as instructed in the next 2-3 days if not improved or as needed. You can call [phone] to schedule an appointment with appropriate provider.      If you condition worsens, we recommend that you receive another evaluation at the emergency room immediately or contact your primary medical clinic's after hours call service to discuss your concerns.      Please return here or go to the Emergency Department for any concerns or worsening condition.   You can also call [phone] to schedule an appointment with the appropriate provider.    Please return here or go to the Emergency Department for any concerns or worsening of condition.    Thank you for choosing [facility]!    Our goal in the Urgent Care is to always provide outstanding medical care. You may receive a survey by mail or e-mail in the next week regarding your experience today. We would greatly appreciate you completing and returning the survey. Your feedback provides us with a way to recognize our staff who provide very good care, and it helps us learn how to improve when your experience was below our aspiration of excellence.      We appreciate you trusting us with your medical care. We hope you feel better soon. We will be happy to take care of you for all of your future medical needs.    Sincerely,    EMMA Luna  Cough   If your condition worsens or fails to improve we recommend that you receive another evaluation at the ER immediately or contact your PCP to discuss your concerns or  "return here. You must understand that you've received an urgent care treatment only and that you may be released before all your medical problems are known or treated. You the patient will arrange for follouwp care as instructed. .  Rest and fluids are important  Can use honey with brenna to soothe your throat  Take prescription cough meds (pills) as prescribed; take prescription cough syrup at night as needed for cough.  Do not take both the prescribed cough pills and syrup at the same time or within 6 hours of each other.  Do not take the cough syrup with any other sedative medication as it can can cause drowsiness. Do not operate any heavy machinery, drink or drive while taking the cough syrup.   -  Flonase (fluticasone) is a nasal spray which is available over the counter and may help with your symptoms.   -  If you have hypertension avoid using the "D" which is the decongestant.  Instead you can use Coricidin HBP for cold and cough symptoms.    -  If you just have drainage you can take plain Zyrtec, Claritin or Allegra   -  Tylenol or ibuprofen can also be used as directed for pain unless you have an allergy to them or medical condition such as stomach ulcers, kidney or liver disease or blood thinners etc for which you should not be taking these type of medications.   Please follow up with your primary care doctor or specialist in the next 48-72hrs as needed and if no improvement  If you  smoke, please stop smoking.          "

## 2023-12-28 ENCOUNTER — CLINICAL SUPPORT (OUTPATIENT)
Dept: REHABILITATION | Facility: HOSPITAL | Age: 68
End: 2023-12-28
Payer: MEDICARE

## 2023-12-28 DIAGNOSIS — G89.29 BILATERAL CHRONIC KNEE PAIN: Primary | ICD-10-CM

## 2023-12-28 DIAGNOSIS — M25.60 DECREASED MOBILITY OF JOINT: ICD-10-CM

## 2023-12-28 DIAGNOSIS — M25.561 BILATERAL CHRONIC KNEE PAIN: Primary | ICD-10-CM

## 2023-12-28 DIAGNOSIS — M25.562 BILATERAL CHRONIC KNEE PAIN: Primary | ICD-10-CM

## 2023-12-28 DIAGNOSIS — M62.81 MUSCLE WEAKNESS OF LOWER EXTREMITY: ICD-10-CM

## 2023-12-28 PROCEDURE — 97112 NEUROMUSCULAR REEDUCATION: CPT

## 2023-12-28 PROCEDURE — 97110 THERAPEUTIC EXERCISES: CPT

## 2023-12-28 NOTE — PROGRESS NOTES
[facility]   Physical Therapy Treatment Note and Progress Note     Name: Chelsea Rodriguez  Clinic Number: [id number]    Therapy Diagnosis:   Encounter Diagnoses   Name Primary?    Bilateral chronic knee pain Yes    Decreased mobility of joint     Muscle weakness of lower extremity        Physician: Elaine Kraus MD  Visit Date: 12/28/2023  Physician Orders: PT Eval and Treat   Medical Diagnosis from Referral:   S46.811D (ICD-10-CM) - Tear of right infraspinatus tendon, subsequent encounter   M75.121 (ICD-10-CM) - Nontraumatic complete tear of right rotator cuff   Evaluation Date: 12/5/2023  Authorization Period Expiration: 10/903212  Plan of Care Expiration: 2/5/24  Progress Note Due: 1/28/2023  Most Recent Progress Note: 12/28/2023  Date of Surgery: None  Visit # / Visits authorized: 1/ 1; 6/12   FOTO: 1/ 3  Precautions: Standard   PTA Visit #: 1/5   Time In:  10:05 am  Time Out: 11:00 am  Total Billable Time: 30 minutes one on one  Subjective   Pt reports: she reports that she is now able to get up from a chair and up onto the stool much easier. She is recovering from COVID and was not as compliant with her therapy as before.  She was not compliant with home exercise program.  Response to previous treatment: initial eval   Functional change: none   Pain: 5/10  Location: Bilateral knee    Objective       Right Left   Knee Flexion 98 degrees (upon beginning of therapy) - 98 degrees post-manual 98 degrees  (upon beginning of therapy)- 90 degrees post-manual   Knee Extension 0 degrees 0 degrees     Quadricep Set: Fair on the right, with continued quadriceps lag upon raising. Fair on the left with continued quadriceps lag upon raising  Treatment   **PT one-on-one with patient for 30 minutes with PT extender utilized for remainder of PT session**    Chelsea received the treatments listed below:    therapeutic exercises to develop strength, endurance, and ROM for 38 minutes including:  Objective  "measures taken (see above)  Upright Bike, 8 minutes for range of motion   Bridges, 2x12, 5" blue band  Straight Leg Raises 2 x 10 each,  Clamshells, 2x12, red band  Shuttle Double Leg Stance + 10" hold into flexion, 62.5#, 4 minutes    Manual Therapy for 12 minutes:  Inferior Patellofemoral Mobilizations + End-Range Knee Flexion, all planes  Tibiofemoral Anterior to Posterior Glide + Tibiofemoral I.R., bilaterally    Chelsea participated in dynamic functional therapeutic activities to improve functional performance for 17 minutes, including:  Hip Hinging with Dowel Arnoldo, seated only, 1 minute duration  Sitting to Standing, 18" surface, Hip-Hinging with Dowel Arnoldo, 3x5 with assistance in concentric, and independent in eccentric    Not today  Step-Up, 2" step, 2x12, bilateral hand-hold assistance as needed  Step-Up, 4" step, x12, bilateral hand-hold assistance as needed, assistance from contralateral plantarflexion    Patient Education and Home Exercises       Education provided:   - -Findings of evaluation and examination, and affect of these on plan for treatment  -Prognosis and expectations  -Role of PT and team-centered care for patient  -Home exercise program and expectations of therapy     Written Home Exercises Provided: yes. Exercises were reviewed and Chelsea was able to demonstrate them prior to the end of the session.  Chelsea demonstrated good  understanding of the education provided. See EMR under Patient Instructions for exercises provided during therapy sessions.    Assessment   Chelsea demonstrates 98 degrees of right knee flexion and 98 degrees of left knee flexion, maintaining full terminal knee extension. She is able to achieve 10 repetitions of straight-leg raise, albeit with continued quadriceps lag upon raising lower extremity. Care was taken to progress today due to recent COVID infection status. She continues to be challenged in sitting to standing without significant trunk flexion, as well as decreased " quadriceps strength, and gluteal strength as well as knee flexion range of motion. Chelsea will continue to benefit from a customized physical therapy plan of care  to address the aforementioned impairments in an effort to improve human function and quality of life.      Chelsea Is progressing well towards her goals.   Pt prognosis is Good.     Pt will continue to benefit from skilled outpatient physical therapy to address the deficits listed in the problem list box on initial evaluation, provide pt/family education and to maximize pt's level of independence in the home and community environment.     Pt's spiritual, cultural and educational needs considered and pt agreeable to plan of care and goals.     Anticipated barriers to physical therapy: Chronicity, History of Previous Surgeries     Goals:     Short Term Goals: 2 weeks   1.) Patient will demonstrate independence in compliance and technique of home exercise program provided as per teach-back method of assessment. Ongoing  2.) Patient will achieve 0-90 degrees of knee flexion to demonstrate progressing range of motion for improved functional mobility. Met  3.) Patient will ambulate for 200 feet with LRAD with supervision-level assistance and normalized gait pattern.  Ongoing  4.) Patient will demonstrate good quality quadriceps set with the ability to complete 10x straight-leg raises without quadriceps lag on right side.  Ongoing     Long Term Goals: 6 weeks   1.) Patient will demonstrate independence in compliance and technique of home exercise program provided as per teach-back method of assessment. Ongoing  2.) Patient will achieve 0-100 degrees of knee flexion to demonstrate progressing range of motion for improved functional mobility. Ongoing  3.) Patient will ambulate for 200 feet without any assistive device with supervision-level assistance and normalized gait pattern. Ongoing  4.) Patient will demonstrate good quality quadriceps set with the ability to  complete 3x10 straight-leg raises without quadriceps lag.  Ongoing  5.) Patient will demonstrate <14 seconds as per TUG Test to demonstrate improved functional mobility and decreased fall risk. Ongoing  6.) Patient will demonstrate 5xSTS Test in <16 seconds  to demonstrate improved functional mobility and decreased fall risk. Ongoing  7.) Patient will note <10% disability as per FOTO score. Ongoing    Plan     Continue with Physical Therapist Plan of Care.     Preethi Patel PT DPT  Board Certified in Orthopedic Physical Therapy

## 2023-12-28 NOTE — PLAN OF CARE
[facility]   Physical Therapy Treatment Note and Progress Note     Name: Chelsea Rodriguez  Clinic Number: [id number]    Therapy Diagnosis:   Encounter Diagnoses   Name Primary?    Bilateral chronic knee pain Yes    Decreased mobility of joint     Muscle weakness of lower extremity        Physician: Elaine Kraus MD  Visit Date: 12/28/2023  Physician Orders: PT Eval and Treat   Medical Diagnosis from Referral:   S46.811D (ICD-10-CM) - Tear of right infraspinatus tendon, subsequent encounter   M75.121 (ICD-10-CM) - Nontraumatic complete tear of right rotator cuff   Evaluation Date: 12/5/2023  Authorization Period Expiration: 10/[id number]  Plan of Care Expiration: 2/5/24  Progress Note Due: 1/28/2023  Most Recent Progress Note: 12/28/2023  Date of Surgery: None  Visit # / Visits authorized: 1/ 1; 6/12   FOTO: 1/ 3  Precautions: Standard   PTA Visit #: 1/5   Time In:  10:05 am  Time Out: 11:00 am  Total Billable Time: 30 minutes one on one  Subjective   Pt reports: she reports that she is now able to get up from a chair and up onto the stool much easier. She is recovering from COVID and was not as compliant with her therapy as before.  She was not compliant with home exercise program.  Response to previous treatment: initial eval   Functional change: none   Pain: 5/10  Location: Bilateral knee    Objective       Right Left   Knee Flexion 98 degrees (upon beginning of therapy) - 98 degrees post-manual 98 degrees  (upon beginning of therapy)- 90 degrees post-manual   Knee Extension 0 degrees 0 degrees     Quadricep Set: Fair on the right, with continued quadriceps lag upon raising. Fair on the left with continued quadriceps lag upon raising  Treatment   **PT one-on-one with patient for 30 minutes with PT extender utilized for remainder of PT session**    Chelsea received the treatments listed below:    therapeutic exercises to develop strength, endurance, and ROM for 38 minutes including:  Objective  "measures taken (see above)  Upright Bike, 8 minutes for range of motion   Bridges, 2x12, 5" blue band  Straight Leg Raises 2 x 10 each,  Clamshells, 2x12, red band  Shuttle Double Leg Stance + 10" hold into flexion, 62.5#, 4 minutes    Manual Therapy for 12 minutes:  Inferior Patellofemoral Mobilizations + End-Range Knee Flexion, all planes  Tibiofemoral Anterior to Posterior Glide + Tibiofemoral I.R., bilaterally    Chelsea participated in dynamic functional therapeutic activities to improve functional performance for 17 minutes, including:  Hip Hinging with Dowel Arnoldo, seated only, 1 minute duration  Sitting to Standing, 18" surface, Hip-Hinging with Dowel Arnoldo, 3x5 with assistance in concentric, and independent in eccentric    Not today  Step-Up, 2" step, 2x12, bilateral hand-hold assistance as needed  Step-Up, 4" step, x12, bilateral hand-hold assistance as needed, assistance from contralateral plantarflexion    Patient Education and Home Exercises       Education provided:   - -Findings of evaluation and examination, and affect of these on plan for treatment  -Prognosis and expectations  -Role of PT and team-centered care for patient  -Home exercise program and expectations of therapy     Written Home Exercises Provided: yes. Exercises were reviewed and Chelsea was able to demonstrate them prior to the end of the session.  Chelsea demonstrated good  understanding of the education provided. See EMR under Patient Instructions for exercises provided during therapy sessions.    Assessment   Chelsea demonstrates 98 degrees of right knee flexion and 98 degrees of left knee flexion, maintaining full terminal knee extension. She is able to achieve 10 repetitions of straight-leg raise, albeit with continued quadriceps lag upon raising lower extremity. Care was taken to progress today due to recent COVID infection status. She continues to be challenged in sitting to standing without significant trunk flexion, as well as decreased " quadriceps strength, and gluteal strength as well as knee flexion range of motion. Chelsea will continue to benefit from a customized physical therapy plan of care  to address the aforementioned impairments in an effort to improve human function and quality of life.      Chelsea Is progressing well towards her goals.   Pt prognosis is Good.     Pt will continue to benefit from skilled outpatient physical therapy to address the deficits listed in the problem list box on initial evaluation, provide pt/family education and to maximize pt's level of independence in the home and community environment.     Pt's spiritual, cultural and educational needs considered and pt agreeable to plan of care and goals.     Anticipated barriers to physical therapy: Chronicity, History of Previous Surgeries     Goals:     Short Term Goals: 2 weeks   1.) Patient will demonstrate independence in compliance and technique of home exercise program provided as per teach-back method of assessment. Ongoing  2.) Patient will achieve 0-90 degrees of knee flexion to demonstrate progressing range of motion for improved functional mobility. Met  3.) Patient will ambulate for 200 feet with LRAD with supervision-level assistance and normalized gait pattern.  Ongoing  4.) Patient will demonstrate good quality quadriceps set with the ability to complete 10x straight-leg raises without quadriceps lag on right side.  Ongoing     Long Term Goals: 6 weeks   1.) Patient will demonstrate independence in compliance and technique of home exercise program provided as per teach-back method of assessment. Ongoing  2.) Patient will achieve 0-100 degrees of knee flexion to demonstrate progressing range of motion for improved functional mobility. Ongoing  3.) Patient will ambulate for 200 feet without any assistive device with supervision-level assistance and normalized gait pattern. Ongoing  4.) Patient will demonstrate good quality quadriceps set with the ability to  complete 3x10 straight-leg raises without quadriceps lag.  Ongoing  5.) Patient will demonstrate <14 seconds as per TUG Test to demonstrate improved functional mobility and decreased fall risk. Ongoing  6.) Patient will demonstrate 5xSTS Test in <16 seconds  to demonstrate improved functional mobility and decreased fall risk. Ongoing  7.) Patient will note <10% disability as per FOTO score. Ongoing    Plan     Continue with Physical Therapist Plan of Care.     Preethi Patel PT DPT  Board Certified in Orthopedic Physical Therapy

## 2023-12-28 NOTE — PLAN OF CARE
[facility]   Physical Therapy Treatment Note and Progress Note     Name: Chelsea Rodriguez  Clinic Number: [id number]    Therapy Diagnosis:   Encounter Diagnoses   Name Primary?    Bilateral chronic knee pain Yes    Decreased mobility of joint     Muscle weakness of lower extremity        Physician: Elaine Kraus MD  Visit Date: 12/28/2023  Physician Orders: PT Eval and Treat   Medical Diagnosis from Referral:   S46.811D (ICD-10-CM) - Tear of right infraspinatus tendon, subsequent encounter   M75.121 (ICD-10-CM) - Nontraumatic complete tear of right rotator cuff   Evaluation Date: 12/5/2023  Authorization Period Expiration: 10/157848  Plan of Care Expiration: 2/5/24  Progress Note Due: 1/28/2023  Most Recent Progress Note: 12/28/2023  Date of Surgery: None  Visit # / Visits authorized: 1/ 1; 6/12   FOTO: 1/ 3  Precautions: Standard   PTA Visit #: 1/5   Time In:  10:05 am  Time Out: 11:00 am  Total Billable Time: 55 minutes one on one  Subjective   Pt reports: she reports that she is now able to get up from a chair and up onto the stool much easier. She is recovering from COVID and was not as compliant with her therapy as before.  She was not compliant with home exercise program.  Response to previous treatment: initial eval   Functional change: none   Pain: 5/10  Location: Bilateral knee    Objective       Right Left   Knee Flexion 98 degrees (upon beginning of therapy) - 98 degrees post-manual 98 degrees  (upon beginning of therapy)- 90 degrees post-manual   Knee Extension 0 degrees 0 degrees     Quadricep Set: Fair on the right, with continued quadriceps lag upon raising. *** on the left with ***  Treatment   **PT one-on-one with patient for 53 minutes with PT extender utilized for remainder of PT session**    Chelsea received the treatments listed below:    therapeutic exercises to develop strength, endurance, and ROM for 17 minutes including:  Low-Load Long Duration Stretch, 3 minutes  "each leg at end-range flexion  Bridges, 2x12, 5" blue band  Straight Leg Raises 2 x 10 each,  Clamshells, 2x12, red band  Shuttle Double Leg Stance + 10" hold into flexion, 62.5#, 4 minutes    Manual Therapy for 12 minutes:  Inferior Patellofemoral Mobilizations + End-Range Knee Flexion, all planes  Tibiofemoral Anterior to Posterior Glide + Tibiofemoral I.R., bilaterally    Chelsea participated in dynamic functional therapeutic activities to improve functional performance for 24  minutes, including:  Hip Hinging with Dowel Arnoldo, seated only, 1 minute duration  Sitting to Standing, 18" surface, Hip-Hinging with Dowel Arnoldo, 3x5 with assistance in concentric, and independent in eccentric  Step-Up, 2" step, 2x12, bilateral hand-hold assistance as needed  Step-Up, 4" step, x12, bilateral hand-hold assistance as needed, assistance from contralateral plantarflexion    Patient Education and Home Exercises       Education provided:   - -Findings of evaluation and examination, and affect of these on plan for treatment  -Prognosis and expectations  -Role of PT and team-centered care for patient  -Home exercise program and expectations of therapy     Written Home Exercises Provided: yes. Exercises were reviewed and Chelsea was able to demonstrate them prior to the end of the session.  Chelsea demonstrated good  understanding of the education provided. See EMR under Patient Instructions for exercises provided during therapy sessions.    Assessment   Chelsea demonstrates 98 degrees of right knee flexion and 98 degrees of left knee flexion, maintaining full terminal knee extension. She is able to achieve 10 repetitions of straight-leg raise, albeit with continued quadriceps lag upon raising lower extremity. Care was taken to progress today due to recent COVID infection status. She continues to be challenged in sitting to standing without significant trunk flexion, as well as decreased quadriceps strength, and gluteal strength as well as " knee flexion range of motion. Chelsea will continue to benefit from a customized physical therapy plan of care  to address the aforementioned impairments in an effort to improve human function and quality of life.      Chelsea Is progressing well towards her goals.   Pt prognosis is Good.     Pt will continue to benefit from skilled outpatient physical therapy to address the deficits listed in the problem list box on initial evaluation, provide pt/family education and to maximize pt's level of independence in the home and community environment.     Pt's spiritual, cultural and educational needs considered and pt agreeable to plan of care and goals.     Anticipated barriers to physical therapy: Chronicity, History of Previous Surgeries     Goals:     Short Term Goals: 2 weeks   1.) Patient will demonstrate independence in compliance and technique of home exercise program provided as per teach-back method of assessment. Ongoing  2.) Patient will achieve 0-90 degrees of knee flexion to demonstrate progressing range of motion for improved functional mobility. Met  3.) Patient will ambulate for 200 feet with LRAD with supervision-level assistance and normalized gait pattern.  Ongoing  4.) Patient will demonstrate good quality quadriceps set with the ability to complete 10x straight-leg raises without quadriceps lag on right side.  Ongoing     Long Term Goals: 6 weeks   1.) Patient will demonstrate independence in compliance and technique of home exercise program provided as per teach-back method of assessment. Ongoing  2.) Patient will achieve 0-100 degrees of knee flexion to demonstrate progressing range of motion for improved functional mobility. Ongoing  3.) Patient will ambulate for 200 feet without any assistive device with supervision-level assistance and normalized gait pattern. Ongoing  4.) Patient will demonstrate good quality quadriceps set with the ability to complete 3x10 straight-leg raises without quadriceps  lag.  Ongoing  5.) Patient will demonstrate <14 seconds as per TUG Test to demonstrate improved functional mobility and decreased fall risk. Ongoing  6.) Patient will demonstrate 5xSTS Test in <16 seconds  to demonstrate improved functional mobility and decreased fall risk. Ongoing  7.) Patient will note <10% disability as per FOTO score. Ongoing    Plan     Continue with Physical Therapist Plan of Care.     Preethi Patel, PT DPT  Board Certified in Orthopedic Physical Therapy

## 2024-01-02 ENCOUNTER — CLINICAL SUPPORT (OUTPATIENT)
Dept: REHABILITATION | Facility: HOSPITAL | Age: 69
End: 2024-01-02
Payer: MEDICARE

## 2024-01-02 DIAGNOSIS — M25.561 BILATERAL CHRONIC KNEE PAIN: Primary | ICD-10-CM

## 2024-01-02 DIAGNOSIS — G89.29 BILATERAL CHRONIC KNEE PAIN: Primary | ICD-10-CM

## 2024-01-02 DIAGNOSIS — M25.60 DECREASED MOBILITY OF JOINT: ICD-10-CM

## 2024-01-02 DIAGNOSIS — M25.562 BILATERAL CHRONIC KNEE PAIN: Primary | ICD-10-CM

## 2024-01-02 DIAGNOSIS — M62.81 MUSCLE WEAKNESS OF LOWER EXTREMITY: ICD-10-CM

## 2024-01-02 PROCEDURE — 97110 THERAPEUTIC EXERCISES: CPT

## 2024-01-02 PROCEDURE — 97140 MANUAL THERAPY 1/> REGIONS: CPT

## 2024-01-02 NOTE — PROGRESS NOTES
[facility]   Physical Therapy Treatment Note and Progress Note     Name: Chelsea Rodriguez  Clinic Number: [id number]    Therapy Diagnosis:   Encounter Diagnoses   Name Primary?    Bilateral chronic knee pain Yes    Decreased mobility of joint     Muscle weakness of lower extremity        Physician: Elaine Kraus MD  Visit Date: 1/2/2024  Physician Orders: PT Eval and Treat   Medical Diagnosis from Referral:   S46.811D (ICD-10-CM) - Tear of right infraspinatus tendon, subsequent encounter   M75.121 (ICD-10-CM) - Nontraumatic complete tear of right rotator cuff   Evaluation Date: 12/5/2023  Authorization Period Expiration: 10/145705  Plan of Care Expiration: 2/5/24  Progress Note Due: 1/28/2023  Most Recent Progress Note: 12/28/2023  Date of Surgery: None  Visit # / Visits authorized: 1/ 1; 7/12   FOTO: 1/ 3  Precautions: Standard   PTA Visit #: 1/5   Time In:  9:00 am  Time Out: 10:00 am  Total Billable Time: 30 minutes one on one  Subjective   Pt reports: today her knees are feeling pretty good today, but they were feeling very sore after standing for a prolonged period of time, and she needed to stop and rest.  She was not compliant with home exercise program.  Response to previous treatment: initial eval   Functional change: none   Pain: 5/10  Location: Bilateral knee    Objective       Right Left   Knee Flexion 98 degrees (upon beginning of therapy) - 98 degrees post-manual 98 degrees  (upon beginning of therapy)- 90 degrees post-manual   Knee Extension 0 degrees 0 degrees     Quadricep Set: Fair on the right, with continued quadriceps lag upon raising. Fair on the left with continued quadriceps lag upon raising  Treatment   **PT one-on-one with patient for 30 minutes with PT extender utilized for remainder of PT session**    Chelsea received the treatments listed below:    therapeutic exercises to develop strength, endurance, and ROM for 46 minutes including:  Objective measures taken  "(see above)  NuStep, 9 minutes for range of motion   Bridges, 2x12, 5" blue band  Straight Leg Raises with 1# cuff weight 3 x 10 each, + LLLD Stretch on Opposite Knee into Flexion  Clamshells, 3x8, red band  Shuttle Double Leg Press + 10" hold into flexion, 62.5#, 3 minutes  Shuttle Single Leg Press, 37.5#, 2 minutes bilaterally (12.5# on right leg)    Manual Therapy for 14 minutes:  Inferior Patellofemoral Mobilizations + End-Range Knee Flexion, all planes  Tibiofemoral Anterior to Posterior Glide + Tibiofemoral I.R., bilaterally    Chelsea participated in dynamic functional therapeutic activities to improve functional performance for 00 minutes, including:  Hip Hinging with Dowel Arnoldo, seated only, 1 minute duration  Sitting to Standing, 18" surface, Hip-Hinging with Dowel Arnoldo, 3x5 with assistance in concentric, and independent in eccentric    Not today  Step-Up, 2" step, 2x12, bilateral hand-hold assistance as needed  Step-Up, 4" step, x12, bilateral hand-hold assistance as needed, assistance from contralateral plantarflexion    Patient Education and Home Exercises       Education provided:   - -Findings of evaluation and examination, and affect of these on plan for treatment  -Prognosis and expectations  -Role of PT and team-centered care for patient  -Home exercise program and expectations of therapy     Written Home Exercises Provided: yes. Exercises were reviewed and Chelsea was able to demonstrate them prior to the end of the session.  Chelsea demonstrated good  understanding of the education provided. See EMR under Patient Instructions for exercises provided during therapy sessions.    Assessment   Chelsea demonstrates 100 degrees of knee flexion bilaterally, this visit, with improved ability to perform straight-leg raise bilaterally with additional 1# cuff weight, with increased challenge on right leg with straight leg raise, and Shuttle Single Leg Press, requiring only 12.5# on right leg for challenge.      Chelsea " Is progressing well towards her goals.   Pt prognosis is Good.     Pt will continue to benefit from skilled outpatient physical therapy to address the deficits listed in the problem list box on initial evaluation, provide pt/family education and to maximize pt's level of independence in the home and community environment.     Pt's spiritual, cultural and educational needs considered and pt agreeable to plan of care and goals.     Anticipated barriers to physical therapy: Chronicity, History of Previous Surgeries     Goals:     Short Term Goals: 2 weeks   1.) Patient will demonstrate independence in compliance and technique of home exercise program provided as per teach-back method of assessment. Ongoing  2.) Patient will achieve 0-90 degrees of knee flexion to demonstrate progressing range of motion for improved functional mobility. Met  3.) Patient will ambulate for 200 feet with LRAD with supervision-level assistance and normalized gait pattern.  Ongoing  4.) Patient will demonstrate good quality quadriceps set with the ability to complete 10x straight-leg raises without quadriceps lag on right side.  Ongoing     Long Term Goals: 6 weeks   1.) Patient will demonstrate independence in compliance and technique of home exercise program provided as per teach-back method of assessment. Ongoing  2.) Patient will achieve 0-100 degrees of knee flexion to demonstrate progressing range of motion for improved functional mobility. Ongoing  3.) Patient will ambulate for 200 feet without any assistive device with supervision-level assistance and normalized gait pattern. Ongoing  4.) Patient will demonstrate good quality quadriceps set with the ability to complete 3x10 straight-leg raises without quadriceps lag.  Ongoing  5.) Patient will demonstrate <14 seconds as per TUG Test to demonstrate improved functional mobility and decreased fall risk. Ongoing  6.) Patient will demonstrate 5xSTS Test in <16 seconds  to demonstrate  improved functional mobility and decreased fall risk. Ongoing  7.) Patient will note <10% disability as per FOTO score. Ongoing    Plan     Continue with Physical Therapist Plan of Care.     Preethi Patel, PT DPT  Board Certified in Orthopedic Physical Therapy

## 2024-01-05 ENCOUNTER — HOSPITAL ENCOUNTER (OUTPATIENT)
Dept: ENDOCRINOLOGY | Facility: CLINIC | Age: 69
Discharge: HOME | End: 2024-01-05
Attending: INTERNAL MEDICINE
Payer: MEDICARE

## 2024-01-05 DIAGNOSIS — E04.2 MULTIPLE THYROID NODULES: ICD-10-CM

## 2024-01-05 PROCEDURE — 88173 CYTOPATH EVAL FNA REPORT: CPT | Mod: 26,HCNC,, | Performed by: STUDENT IN AN ORGANIZED HEALTH CARE EDUCATION/TRAINING PROGRAM

## 2024-01-05 PROCEDURE — 88173 CYTOPATH EVAL FNA REPORT: CPT | Mod: HCNC | Performed by: STUDENT IN AN ORGANIZED HEALTH CARE EDUCATION/TRAINING PROGRAM

## 2024-01-05 PROCEDURE — 10005 FNA BX W/US GDN 1ST LES: CPT | Mod: HCNC,S$GLB,, | Performed by: INTERNAL MEDICINE

## 2024-01-10 DIAGNOSIS — E04.1 THYROID NODULE: Primary | ICD-10-CM

## 2024-01-10 LAB
FINAL PATHOLOGIC DIAGNOSIS: NORMAL
Lab: NORMAL
MICROSCOPIC EXAM: NORMAL

## 2024-01-11 ENCOUNTER — OFFICE VISIT (OUTPATIENT)
Dept: BARIATRICS | Facility: CLINIC | Age: 69
End: 2024-01-11
Payer: MEDICARE

## 2024-01-11 VITALS
SYSTOLIC BLOOD PRESSURE: 128 MMHG | HEIGHT: 58 IN | DIASTOLIC BLOOD PRESSURE: 70 MMHG | WEIGHT: 163.63 LBS | BODY MASS INDEX: 34.35 KG/M2 | OXYGEN SATURATION: 98 % | HEART RATE: 72 BPM

## 2024-01-11 DIAGNOSIS — G47.33 OSA (OBSTRUCTIVE SLEEP APNEA): ICD-10-CM

## 2024-01-11 DIAGNOSIS — R73.03 PREDIABETES: ICD-10-CM

## 2024-01-11 DIAGNOSIS — Z85.3 HISTORY OF LEFT BREAST CANCER: ICD-10-CM

## 2024-01-11 DIAGNOSIS — Z71.3 ENCOUNTER FOR WEIGHT LOSS COUNSELING: ICD-10-CM

## 2024-01-11 DIAGNOSIS — I10 ESSENTIAL HYPERTENSION: ICD-10-CM

## 2024-01-11 DIAGNOSIS — I89.0 LYMPHEDEMA OF BOTH LOWER EXTREMITIES: ICD-10-CM

## 2024-01-11 DIAGNOSIS — E66.09 CLASS 1 OBESITY DUE TO EXCESS CALORIES WITH SERIOUS COMORBIDITY AND BODY MASS INDEX (BMI) OF 34.0 TO 34.9 IN ADULT: Primary | ICD-10-CM

## 2024-01-11 DIAGNOSIS — E78.2 MIXED HYPERLIPIDEMIA: ICD-10-CM

## 2024-01-11 PROCEDURE — 99204 OFFICE O/P NEW MOD 45 MIN: CPT | Mod: HCNC,S$GLB,, | Performed by: STUDENT IN AN ORGANIZED HEALTH CARE EDUCATION/TRAINING PROGRAM

## 2024-01-11 PROCEDURE — 1126F AMNT PAIN NOTED NONE PRSNT: CPT | Mod: HCNC,CPTII,S$GLB, | Performed by: STUDENT IN AN ORGANIZED HEALTH CARE EDUCATION/TRAINING PROGRAM

## 2024-01-11 PROCEDURE — 4010F ACE/ARB THERAPY RXD/TAKEN: CPT | Mod: HCNC,CPTII,S$GLB, | Performed by: STUDENT IN AN ORGANIZED HEALTH CARE EDUCATION/TRAINING PROGRAM

## 2024-01-11 PROCEDURE — 3074F SYST BP LT 130 MM HG: CPT | Mod: HCNC,CPTII,S$GLB, | Performed by: STUDENT IN AN ORGANIZED HEALTH CARE EDUCATION/TRAINING PROGRAM

## 2024-01-11 PROCEDURE — 1160F RVW MEDS BY RX/DR IN RCRD: CPT | Mod: HCNC,CPTII,S$GLB, | Performed by: STUDENT IN AN ORGANIZED HEALTH CARE EDUCATION/TRAINING PROGRAM

## 2024-01-11 PROCEDURE — 1157F ADVNC CARE PLAN IN RCRD: CPT | Mod: HCNC,CPTII,S$GLB, | Performed by: STUDENT IN AN ORGANIZED HEALTH CARE EDUCATION/TRAINING PROGRAM

## 2024-01-11 PROCEDURE — 1159F MED LIST DOCD IN RCRD: CPT | Mod: HCNC,CPTII,S$GLB, | Performed by: STUDENT IN AN ORGANIZED HEALTH CARE EDUCATION/TRAINING PROGRAM

## 2024-01-11 PROCEDURE — 99999 PR PBB SHADOW E&M-EST. PATIENT-LVL V: CPT | Mod: PBBFAC,HCNC,, | Performed by: STUDENT IN AN ORGANIZED HEALTH CARE EDUCATION/TRAINING PROGRAM

## 2024-01-11 PROCEDURE — 3008F BODY MASS INDEX DOCD: CPT | Mod: HCNC,CPTII,S$GLB, | Performed by: STUDENT IN AN ORGANIZED HEALTH CARE EDUCATION/TRAINING PROGRAM

## 2024-01-11 PROCEDURE — 3288F FALL RISK ASSESSMENT DOCD: CPT | Mod: HCNC,CPTII,S$GLB, | Performed by: STUDENT IN AN ORGANIZED HEALTH CARE EDUCATION/TRAINING PROGRAM

## 2024-01-11 PROCEDURE — 3078F DIAST BP <80 MM HG: CPT | Mod: HCNC,CPTII,S$GLB, | Performed by: STUDENT IN AN ORGANIZED HEALTH CARE EDUCATION/TRAINING PROGRAM

## 2024-01-11 PROCEDURE — 1101F PT FALLS ASSESS-DOCD LE1/YR: CPT | Mod: HCNC,CPTII,S$GLB, | Performed by: STUDENT IN AN ORGANIZED HEALTH CARE EDUCATION/TRAINING PROGRAM

## 2024-01-11 RX ORDER — METFORMIN HYDROCHLORIDE 500 MG/1
500 TABLET, EXTENDED RELEASE ORAL
Qty: 90 TABLET | Refills: 0 | Status: SHIPPED | OUTPATIENT
Start: 2024-01-11 | End: 2024-04-10

## 2024-01-11 NOTE — PATIENT INSTRUCTIONS
Copyright © 2011, [facility]. For more information about The Healthy Eating Plate, please see The Nutrition Source, Department of Nutrition, [employer], www.thenutritionsource.org, and [employer], www.health.[place].edu.      Meal Planning & Grocery Shopping    Meal planning builds the foundation for healthy eating. When you have structured ideas for healthy meals and foods available at home to prepare those meals, weight control becomes easier.  If only healthy foods are available at home, then you will be much more likely to eat healthy foods. And you will be less likely to go to a restaurant or  a fast food meal, which tend to be unhealthy and higher in calories than meals prepared at home.      Take 5-10 minutes each week to plan meals for the next 7 days.  Make a grocery list based on the meal plan.    Grocery Shopping Tips:  Shop on a full stomach.  Schedule your shopping for times when you are most motivated and able to be disciplined about your purchases. For example, after a stressful day at work it may be difficult to make the healthiest choices. Shopping at other times, such as early in the morning or after dinner, may be easier.  Focus your shopping on the outside aisles of the store, which tend to contain more fresh foods and lower calorie foods. The inside aisles tend to have more processed foods.  Stick to your list. Avoid buying unhealthy items just because they are on sale.   Compare nutrition labels to check the number of calories and percentage of fat.      What to buy:    Vegetables  Fresh vegetables  Frozen vegetables with no sauce or added salt  Canned vegetables with no sauce or added salt    Protein  Lean meats, such as chicken and turkey  Limit red meats, such as beef to no more than 1x/week  Limit processed meats, such as cold cuts, roberts, sausage, and hot dogs. Look for brands that have no nitrites and are minimally  processed. Consider turkey sausage or turkey roberts.  Fish and Shellfish  Eggs  Dried beans  Canned beans (reduced sodium)    Fat  Use healthy oils, such as olive oil or canola oil, for cooking, salad dressings, etc.  Unflavored nuts and seeds  Nut butters (no added sugar)    Dairy  Yogurt (no sugar added)  Cheese  Low-fat milk  Unsweetened nondairy milks (almond milk, soy milk, etc)    Fruit  Fresh Fruit  Frozen fruit with no added sugar  Canned fruit with no added sugar  Dried fruit with no added sugar  100% fruit juice    Whole Grains  Single ingredient grains, such as oats, quinoa, brown rice  Whole-wheat pasta  Sprouted whole-grain bread    What to avoid:  - Avoid fried foods  - Avoid foods with added sugar  - Avoid sugar-sweetened beverages  - Avoid ultra-processed foods      SEATED RESISTANCE BAND EXERCISES    If you do not have a resistance band, or do not feel comfortable using a resistance band, these exercises can also be done holding a light hand weight or water bottle.  If you are just starting to exercise, you may want to go through the motions without any weights or resistance till you become comfortable with the movements.    Do each of the movements shown 10 times (10 repetitions). You can repeat the exercises a second or  third time as well for greater benefit. The amount of tension on the resistance bands should be adjusted so  you can complete one set of 10 repetitions with effort. Increase the tension every few weeks. Do these  exercises 2 or 3 times a week.    * If an exercise hurts your back or joints, stop doing that particular exercise, but keep doing all the others *      CHEST EXERCISE        Start Position:   Sit tall, with feet shoulder width apart and feet in front of knees.   Belly pulled in.   Place the band around your upper back, grasp band in each hand, knuckles (rings) facing front. Arms  should be bent so that knuckles are in front of elbows   Slide shoulder blades down your  back and slightly together (as if making a V).   Relax your neck.    To Perform This Exercise:   Press hands forward to lengthen arms using chest muscles (not arms!).   Dont arch your back!)   Return to start position and repeat 10 times.   Breathe!        BACK EXERCISE        Start Position:   Sit tall, with feet shoulder width apart and feet in front of knees.   Belly pulled in.   Grasp band in each hand and raise overhead. Arms should be slightly wider than shoulder width and no  slack in the band.   Slide shoulder blades down your back and slightly together (as if making a V).   Relax your neck.    To Perform This Exercise:   Open arms pulling down towards chest using upper back muscles (not arms!).   Squeeze through shoulder blades at the bottom of the movement (dont arch your back!).   Return to start position and repeat 10 times.   Breathe!        SHOULDER EXERCISE        Start Position:   Sit tall, with feet shoulder width apart and feet in front of knees.   Belly pulled in.   Sit on band so that you can grasp band in one hand with tension on the band, but not so much tension that  you cannot straighten the arm. It may take a few tries to find the right amount of tension.   Slide shoulder blades down your back and slightly together (as if making a V).   Relax your neck.    To Perform This Exercise:   Press fist to the ceiling, slightly in front of the body.   SLOWLY return to start position and repeat 10 times.   Switch sides and repeat on the other side.   Breathe!        TRICEPS EXERCISE        Start Position:   Sit tall, with feet shoulder width apart and feet in front of knees.   Belly pulled in.   Sit on one end of the band. Grasp other end of band in one hand.   Point elbow directly toward the ceiling (if this is difficult, you may support the upper arm with the opposite  hand)   Be sure there is no slack in the band in the starting position.   Slide shoulder blades down your back and slightly  together (as if making a V).   Relax your neck.    To Perform This Exercise:   Extend your arm up to the ceiling, as shown.   Squeeze through shoulder blades at the bottom of the movement (dont arch your back!).   SLOWLY return to start position and repeat 10 times.   Repeat on the other side.   Breathe!        BICEP EXERCISE        Start Position:   Sit tall, with feet shoulder width apart and feet in front of knees.   Belly pulled in.   Place center of exercise band under one foot and step the end of the band under the other foot.   Grasp each end of the band with one hand. Make sure there is no slack between your foot and the hand  that holds the band.   Hold your elbows to your sides.   Pull abdominals in, lift chest, press shoulders down and back.    To Perform This Exercise:   As you curl up, keep your wrist from changing position in relation to your forearm and your arm stable  from the shoulder to the elbow.   Bend and straighten your elbow in a slow and controlled movement. Repeat this motion 10 times.   Repeat on the other side.   Breathe!      Physical Activity    Physical activity improves your health and helps with weight control, especially weight loss maintenance.      When starting to incorporate an exercise routine into your day, it is helpful to set specific goals.  For example, I will walk at moderate intensity from 12:30-12:45pm on Monday, Wednesday, and Friday.  Schedule your exercise time into your calendar.  Think of any potential barriers that may come up and prevent you from exercising.  Develop solutions or back-up plans for when these barriers may arise.    Walking is an ideal activity to start with if you do not currently have an exercise routine. You can make the activity more fun by doing it with a friend or listening to music or a book on tape while you do it. If you don't enjoy walking, think of other activities you like, such as bike riding or swimming.  Other alternatives  include group fitness classes or exercise at home using DVDs, Apps, etc.    If you are new to exercising, then start with 10 minutes 3-4 days per week.  After two weeks, increase to 15 minutes 3-4 days per week.  If you already exercise more than this, continue at your higher level, or increase by 10-15 minutes if able.         Aerobic Activity  Aerobic Activity gets you breathing harder and your heart beating faster.  Eventually, you should work up to 150 - 300 minutes of moderate-intensity aerobic activity every week or 75 - 150 minutes of vigorous-intensity aerobic activity every week.  An easy way to gauge the intensity of your activity is with the Talk Test.  During moderate activity, you should be able to talk, but not sing without having to pause for a breath.  During vigorous activity, you shouldn't be able to say more than a few words without pausing for a breath.  You should not push yourself until you are completely out of breath, tired, or sore.    Examples of Aerobic Activity:  Walking  Running  Swimming  Biking  Playing sports like tennis or basketball  Dancing  Jumping Rope      Strength Training  It is also recommended that you perform muscle-strengthening activities at least 2 days per week.  Be sure to include activities that work all major muscle groups (legs, arms, abdomen, back, buttocks, chest, and shoulders)    Examples of Strength Training  Lifting weights  Working with resistance band  Using your body weight for resistance (squats, push-ups, sit-ups)  [place]ella  Yoga      https://health.gov/moveyourway/activity-planner/      Remember to keep a record of your activity to track your progress.

## 2024-01-11 NOTE — PROGRESS NOTES
Subjective     Patient ID: Chelsea Rodriguez is a 69 y.o. female.    Chief Complaint: Consult and Obesity    Patient presents for treatment of obesity.   Referred by Dr. Rico, was concerned with cholesterol  Weighted about 130 lbs prior to COVID    Co-morbidities   HTN  HLD  HENOK  Prediabetes  Lymphedema, BLE  H/o breast cancer    Weight History  Lowest adult weight: 125 lbs  Highest adult weight: 170 lbs      Current Physical Activity  Walking but not consistently    Current Eating Habits  Breakfast - coffee, was drinking half & half or whole milk; eggs, biscuit, skips sometimes  Often eats 1 meals per day around 2pm - may be leftovers, breakfast food (eggs, pancakes, breakfast meat), greens, rice with mushrooms, onions, spinach, gravy  Snacks - cheese, cookies, peanut butter, fruit, candy  Beverages - coffee, milk, lemonade sweetened with xylitol     Medical Weight Loss  1/11/2024: 163.6 lbs, BMI 34.8, BFP 47.2%, BFM 77.2 lbs, SMM 47 lbs, BMR 1216 kcal          Review of Systems   Constitutional:  Negative for chills and fever.   Respiratory:  Negative for shortness of breath.    Cardiovascular:  Negative for chest pain and palpitations.   Gastrointestinal:  Negative for abdominal pain, nausea and vomiting.   Neurological:  Negative for dizziness and light-headedness.   Psychiatric/Behavioral:  The patient is not nervous/anxious.           Objective    Latest Reference Range & Units 05/24/23 08:39 10/02/23 08:49   WBC 3.90 - 12.70 K/uL 5.98    RBC 4.00 - 5.40 M/uL 5.09    Hemoglobin 12.0 - 16.0 g/dL 13.1    Hematocrit 37.0 - 48.5 % 43.0    MCV 82 - 98 fL 85    MCH 27.0 - 31.0 pg 25.7 (L)    MCHC 32.0 - 36.0 g/dL 30.5 (L)    RDW 11.5 - 14.5 % 15.2 (H)    Platelet Count 150 - 450 K/uL 311    MPV 9.2 - 12.9 fL 10.7    Gran % 38.0 - 73.0 % 51.0    Lymph % 18.0 - 48.0 % 35.6    Mono % 4.0 - 15.0 % 8.5    Eosinophil % 0.0 - 8.0 % 4.2    Basophil % 0.0 - 1.9 % 0.5    Immature Granulocytes 0.0 - 0.5 % 0.2    Gran # (ANC)  1.8 - 7.7 K/uL 3.1    Lymph # 1.0 - 4.8 K/uL 2.1    Mono # 0.3 - 1.0 K/uL 0.5    Eos # 0.0 - 0.5 K/uL 0.3    Baso # 0.00 - 0.20 K/uL 0.03    Immature Grans (Abs) 0.00 - 0.04 K/uL 0.01    nRBC 0 /100 WBC 0    Differential Method  Automated    Sodium 136 - 145 mmol/L 142    Potassium 3.5 - 5.1 mmol/L 4.2    Chloride 95 - 110 mmol/L 105    CO2 23 - 29 mmol/L 28    Anion Gap 8 - 16 mmol/L 9    BUN 8 - 23 mg/dL 16    Creatinine 0.5 - 1.4 mg/dL 0.7    eGFR >60 mL/min/1.73 m^2 >60.0    Glucose 70 - 110 mg/dL 101    Calcium 8.7 - 10.5 mg/dL 9.5    ALP 55 - 135 U/L 89    PROTEIN TOTAL 6.0 - 8.4 g/dL 7.0    Albumin 3.5 - 5.2 g/dL 3.9    BILIRUBIN TOTAL 0.1 - 1.0 mg/dL 0.5    AST 10 - 40 U/L 32    ALT 10 - 44 U/L 23    Cholesterol Total 120 - 199 mg/dL 201 (H) 192   HDL 40 - 75 mg/dL 42 42   HDL/Cholesterol Ratio 20.0 - 50.0 % 20.9 21.9   Non-HDL Cholesterol mg/dL 159 150   Total Cholesterol/HDL Ratio 2.0 - 5.0  4.8 4.6   Triglycerides 30 - 150 mg/dL 79 85   LDL Cholesterol 63.0 - 159.0 mg/dL 143.2 133.0   Vit D, 25-Hydroxy 30 - 96 ng/mL 40    Hemoglobin A1C External 4.0 - 5.6 % 6.0 (H)    Estimated Avg Glucose 68 - 131 mg/dL 126    TSH 0.400 - 4.000 uIU/mL  1.296   Free T4 0.71 - 1.51 ng/dL  1.02   Thyrotropin Receptor Ab 0.00 - 1.75 IU/L  <1.10   Thyroperoxidase Antibodies <6.0 IU/mL  <6.0   (L): Data is abnormally low  (H): Data is abnormally high    Vitals:    01/11/24 1038   BP: 128/70   Pulse: 72       Physical Exam  Vitals reviewed.   Constitutional:       General: She is not in acute distress.     Appearance: Normal appearance. She is obese. She is not ill-appearing, toxic-appearing or diaphoretic.   HENT:      Head: Normocephalic and atraumatic.   Cardiovascular:      Rate and Rhythm: Normal rate.   Pulmonary:      Effort: Pulmonary effort is normal. No respiratory distress.   Skin:     General: Skin is warm and dry.   Neurological:      Mental Status: She is alert and oriented to person, place, and time.             Assessment and Plan     1. Class 1 obesity due to excess calories with serious comorbidity and body mass index (BMI) of 34.0 to 34.9 in adult  -     Ambulatory referral/consult to Bariatric Medicine  -     metFORMIN (GLUCOPHAGE-XR) 500 MG ER 24hr tablet; Take 1 tablet (500 mg total) by mouth daily with breakfast.  Dispense: 90 tablet; Refill: 0    2. Essential hypertension    3. Mixed hyperlipidemia    4. History of left breast cancer    5. HENOK (obstructive sleep apnea)    6. Lymphedema of both lower extremities    7. Prediabetes  -     metFORMIN (GLUCOPHAGE-XR) 500 MG ER 24hr tablet; Take 1 tablet (500 mg total) by mouth daily with breakfast.  Dispense: 90 tablet; Refill: 0    8. Encounter for weight loss counseling        - Log all food and beverage intake with a daily calorie goal of 1000 calories per day    - Walk 15-30 minutes 3-5x/week    - Resistance band exercises given in AVS

## 2024-01-16 NOTE — PROGRESS NOTES
"[facility]   Physical Therapy Treatment Note      Name: Chelsea Rodriguez  Clinic Number: [id number]    Therapy Diagnosis:   Encounter Diagnoses   Name Primary?    Bilateral chronic knee pain Yes    Decreased mobility of joint     Muscle weakness of lower extremity      Physician: Elaine Kraus MD    Visit Date: 1/17/2024    Evaluation Date: 12/5/2023  Authorization Period Expiration: 10/238563  Plan of Care Expiration: 2/5/24  Progress Note Due: 1/28/2023  Most Recent Progress Note: 12/28/2023  Date of Surgery: None  Visit # / Visits authorized: 1/ 1; 7/12; 2/15   FOTO: 1/ 3  Precautions: Standard      PTA Visit #: 1/5     Time In: 11:08 AM   Time Out: 11:45 AM   Total Billable Time: 38 minutes    Subjective     Pt reports: some discomfort throughout bilateral knee upon entering Physical Therapy treatment, however, no more than last treatment session.     She was not compliant with home exercise program.  Response to previous treatment: initial eval   Functional change: none     Pain: 5/10  Location: Bilateral knee      Objective      Objective Measures updated at progress report unless specified.     Treatment     Chelsea received the treatments listed below:        therapeutic exercises to develop strength, endurance, and ROM for 23 minutes including:    NuStep, 6 minutes for range of motion   Shuttle Double Leg Press + 10" hold into flexion, 62.5# (2 black 1 red) , 3 minutes  Shuttle Single Leg Press, 37.5# ( left lower extremity 1 black 1 red/ right lower extremity 1 red), 2 minutes bilaterally (12.5# on right leg)  Bridges, 2 x 12, 5" blue band  Straight Leg Raises with 1# cuff weight 3 x 10 each, + LLLD Stretch on Opposite Knee into Flexion    Not performed today   Елена, 3x8, red band    Manual Therapy for 00 minutes:  Inferior Patellofemoral Mobilizations + End-Range Knee Flexion, all planes  Tibiofemoral Anterior to Posterior Glide + Tibiofemoral I.R., bilaterally     Chelsea " "participated in dynamic functional therapeutic activities to improve functional performance for 15 minutes, including:    Sitting to Standing, 18" surface, Hip-Hinging with Dowel Arnoldo, 3 x 5 with assistance in concentric, and independent in eccentric  Step-Up, 2" step, 2 x 12, bilateral hand-hold assistance as needed  Step-Up, 4" step, x 12, bilateral hand-hold assistance as needed, assistance from contralateral plantarflexion    NOT PERFORMED TODAY   Hip Hinging with Dowel Arnoldo, seated only, 1 minute duration    Patient Education and Home Exercises       Education provided:   -Findings of evaluation and examination, and affect of these on plan for treatment  -Prognosis and expectations  -Role of PT and team-centered care for patient  -Home exercise program and expectations of therapy     Written Home Exercises Provided: yes. Exercises were reviewed and Chelsea was able to demonstrate them prior to the end of the session.  Chelsea demonstrated good  understanding of the education provided. See EMR under Patient Instructions for exercises provided during therapy sessions.    Assessment     Patient with slight increased discomfort throughout right lower extremity throughout double and single shuttle today. Patient able to complete right lower extremity single leg shuttle with decreased resistance due to patient with decreased discomfort throughout lower extremity. Physical Therapist with some difficulty throughout steps due to patient with discomfort and weakness throughout bilateral lower extremities. Physical Therapist Assistant instructed patient for her to continue to work on sit to stands outside of Physical Therapy treatment, to help increased strengthen throughout bilateral lower extremities. Patient verbally understood and agreed. Physical Therapist Assistant also instructed to patient toe taps on a book laying on the ground to help increase hip flexor activity and strength, patient also verbally understood and " agreed.     Chelsea Is progressing well towards her goals.   Pt prognosis is Good.     Pt will continue to benefit from skilled outpatient physical therapy to address the deficits listed in the problem list box on initial evaluation, provide pt/family education and to maximize pt's level of independence in the home and community environment.     Pt's spiritual, cultural and educational needs considered and pt agreeable to plan of care and goals.     Anticipated barriers to physical therapy: Chronicity, History of Previous Surgeries      Goals:      Short Term Goals: 2 weeks   1.) Patient will demonstrate independence in compliance and technique of home exercise program provided as per teach-back method of assessment. Ongoing  2.) Patient will achieve 0-90 degrees of knee flexion to demonstrate progressing range of motion for improved functional mobility. Met  3.) Patient will ambulate for 200 feet with LRAD with supervision-level assistance and normalized gait pattern.  Ongoing  4.) Patient will demonstrate good quality quadriceps set with the ability to complete 10x straight-leg raises without quadriceps lag on right side.  Ongoing     Long Term Goals: 6 weeks   1.) Patient will demonstrate independence in compliance and technique of home exercise program provided as per teach-back method of assessment. Ongoing  2.) Patient will achieve 0-100 degrees of knee flexion to demonstrate progressing range of motion for improved functional mobility. Ongoing  3.) Patient will ambulate for 200 feet without any assistive device with supervision-level assistance and normalized gait pattern. Ongoing  4.) Patient will demonstrate good quality quadriceps set with the ability to complete 3x10 straight-leg raises without quadriceps lag.  Ongoing  5.) Patient will demonstrate <14 seconds as per TUG Test to demonstrate improved functional mobility and decreased fall risk. Ongoing  6.) Patient will demonstrate 5xSTS Test in <16 seconds   to demonstrate improved functional mobility and decreased fall risk. Ongoing  7.) Patient will note <10% disability as per FOTO score. Ongoing    Plan     Continue with Physical Therapist Plan of Care.     Jani Dutta, PTA

## 2024-01-17 ENCOUNTER — CLINICAL SUPPORT (OUTPATIENT)
Dept: REHABILITATION | Facility: HOSPITAL | Age: 69
End: 2024-01-17
Payer: MEDICARE

## 2024-01-17 ENCOUNTER — TELEPHONE (OUTPATIENT)
Dept: ORTHOPEDICS | Facility: CLINIC | Age: 69
End: 2024-01-17
Payer: MEDICARE

## 2024-01-17 DIAGNOSIS — M25.60 DECREASED MOBILITY OF JOINT: ICD-10-CM

## 2024-01-17 DIAGNOSIS — M25.562 BILATERAL CHRONIC KNEE PAIN: Primary | ICD-10-CM

## 2024-01-17 DIAGNOSIS — M25.561 BILATERAL CHRONIC KNEE PAIN: Primary | ICD-10-CM

## 2024-01-17 DIAGNOSIS — G89.29 BILATERAL CHRONIC KNEE PAIN: Primary | ICD-10-CM

## 2024-01-17 DIAGNOSIS — M62.81 MUSCLE WEAKNESS OF LOWER EXTREMITY: ICD-10-CM

## 2024-01-17 PROCEDURE — 97530 THERAPEUTIC ACTIVITIES: CPT | Mod: CQ

## 2024-01-17 PROCEDURE — 97110 THERAPEUTIC EXERCISES: CPT | Mod: CQ

## 2024-01-19 ENCOUNTER — TELEPHONE (OUTPATIENT)
Dept: ORTHOPEDICS | Facility: CLINIC | Age: 69
End: 2024-01-19
Payer: MEDICARE

## 2024-01-19 ENCOUNTER — DOCUMENTATION ONLY (OUTPATIENT)
Dept: REHABILITATION | Facility: HOSPITAL | Age: 69
End: 2024-01-19
Payer: MEDICARE

## 2024-01-19 ENCOUNTER — CLINICAL SUPPORT (OUTPATIENT)
Dept: REHABILITATION | Facility: HOSPITAL | Age: 69
End: 2024-01-19
Payer: MEDICARE

## 2024-01-19 DIAGNOSIS — M25.561 BILATERAL CHRONIC KNEE PAIN: Primary | ICD-10-CM

## 2024-01-19 DIAGNOSIS — M25.562 BILATERAL CHRONIC KNEE PAIN: Primary | ICD-10-CM

## 2024-01-19 DIAGNOSIS — M62.81 MUSCLE WEAKNESS OF LOWER EXTREMITY: ICD-10-CM

## 2024-01-19 DIAGNOSIS — M25.60 DECREASED MOBILITY OF JOINT: ICD-10-CM

## 2024-01-19 DIAGNOSIS — G89.29 BILATERAL CHRONIC KNEE PAIN: Primary | ICD-10-CM

## 2024-01-19 PROCEDURE — 97140 MANUAL THERAPY 1/> REGIONS: CPT

## 2024-01-19 PROCEDURE — 97530 THERAPEUTIC ACTIVITIES: CPT

## 2024-01-19 NOTE — PROGRESS NOTES
"[facility]   Physical Therapy Treatment  and Progress Note      Name: Chelsea Rodriguez  Clinic Number: [id number]    Therapy Diagnosis:   Encounter Diagnoses   Name Primary?    Bilateral chronic knee pain Yes    Decreased mobility of joint     Muscle weakness of lower extremity      Physician: Elaine Kraus MD  Visit Date: 1/19/2024  Evaluation Date: 12/5/2023  Authorization Period Expiration: 10/[id number]  Plan of Care Expiration: 2/5/24  Progress Note Due: 2/19/2023  Most Recent Progress Note: 1/19/2023  Date of Surgery: None  Visit # / Visits authorized: 1/ 1; 8/12; 3/15   FOTO: 1/ 3  Precautions: Standard    PTA Visit #: 1/5   Time In: 9:05 am  Time Out: 10:00 am  Total Billable Time: 25 minutes  Subjective   Pt reports: she feels like she is able to get up out of a chair much easier. Her knee will still begin to hurt the more she moves, but is not in any pain during rest.  She was not compliant with home exercise program.  Response to previous treatment: initial eval   Functional change: none   Pain: 5/10  Location: Bilateral knee    Objective       Right Left   Knee Flexion 95 degrees 101 degrees   Knee Extension 0 degrees 0 degrees     Endurance Assessment:   Evaluation   Five Time Sit to Stand 16 seconds completed with no arms     Age Range Men Women   60-64 < 14  < 12   65-69 < 12  < 11   70-74 < 12 < 10   75-79 < 11 < 10   80-84 < 10 < 9   85-89 < 8  < 8   90-94 < 7  < 4      Treatment     Chelsea received the treatments listed below:        therapeutic exercises to develop strength, endurance, and ROM for 37 minutes including:  NuStep, 8 minutes for range of motion, [place], Level 4   Shuttle Double Leg Press + 10" hold into flexion, 62.5# (2 black 1 red) , 3 minutes  Shuttle Single Leg Press, 37.5# ( left lower extremity 1 black 1 red/ right lower extremity 1 red), 2 minutes bilaterally (12.5# on right leg)  Bridges, 2 x 12, 5" blue band  Long Arc Quad with 3# cuff weight 2x10, " "(strap assistance with right side)    Manual Therapy for 10 minutes:  Inferior Patellofemoral Mobilizations + End-Range Knee Flexion, all planes  Tibiofemoral Anterior to Posterior Glide + Tibiofemoral I.R., bilaterally     Chelsea participated in dynamic functional therapeutic activities to improve functional performance for 8 minutes, including:  Sitting to Standing, 18" surface, Hip-Hinging with 5# DB, 3 x 5 with assistance in concentric, and independent in eccentric    NOT PERFORMED TODAY   Hip Hinging with Dowel Arnoldo, seated only, 1 minute duration    Patient Education and Home Exercises       Education provided:   -Findings of evaluation and examination, and affect of these on plan for treatment  -Prognosis and expectations  -Role of PT and team-centered care for patient  -Home exercise program and expectations of therapy     Written Home Exercises Provided: yes. Exercises were reviewed and Chelsea was able to demonstrate them prior to the end of the session.  Chelsea demonstrated good  understanding of the education provided. See EMR under Patient Instructions for exercises provided during therapy sessions.    Assessment   Patient arrives today with Five Time Sit to Stand at 16 seconds without hand-hold assistance, a great improvement from her initial visit, as she was unable to rise from her table more than one occasion. Knee flexion on the right remains at 95 degrees with left knee reaching 100 degrees. Right knee continues to be more limited in active extension in long-arc quadriceps. Despite progress made, Chelsea continues to be limited in range of motion, strength and functional limitation and Cheslea will continue to  benefit from a customized physical therapy plan of care  to address the aforementioned impairments in an effort to improve human function and quality of life.      Chelsea Is progressing well towards her goals.   Pt prognosis is Good.     Pt will continue to benefit from skilled outpatient physical " therapy to address the deficits listed in the problem list box on initial evaluation, provide pt/family education and to maximize pt's level of independence in the home and community environment.     Pt's spiritual, cultural and educational needs considered and pt agreeable to plan of care and goals.     Anticipated barriers to physical therapy: Chronicity, History of Previous Surgeries      Goals:      Short Term Goals: 2 weeks   1.) Patient will demonstrate independence in compliance and technique of home exercise program provided as per teach-back method of assessment. Ongoing  2.) Patient will achieve 0-90 degrees of knee flexion to demonstrate progressing range of motion for improved functional mobility. Met  3.) Patient will ambulate for 200 feet with LRAD with supervision-level assistance and normalized gait pattern.  Ongoing  4.) Patient will demonstrate good quality quadriceps set with the ability to complete 10x straight-leg raises without quadriceps lag on right side.  Ongoing     Long Term Goals: 6 weeks   1.) Patient will demonstrate independence in compliance and technique of home exercise program provided as per teach-back method of assessment. Ongoing  2.) Patient will achieve 0-100 degrees of knee flexion to demonstrate progressing range of motion for improved functional mobility. Ongoing on  right; Met on left  3.) Patient will ambulate for 200 feet without any assistive device with supervision-level assistance and normalized gait pattern. Ongoing  4.) Patient will demonstrate good quality quadriceps set with the ability to complete 3x10 straight-leg raises without quadriceps lag.  Ongoing  5.) Patient will demonstrate <14 seconds as per TUG Test to demonstrate improved functional mobility and decreased fall risk. Ongoing  6.) Patient will demonstrate 5xSTS Test in <16 seconds  to demonstrate improved functional mobility and decreased fall risk. Ongoing  7.) Patient will note <10% disability as per  FOTO score. Ongoing    Plan     Continue with Physical Therapist Plan of Care.     Preethi Patel, PT DPT  Board Certified in Orthopedic Physical Therapy

## 2024-01-19 NOTE — TELEPHONE ENCOUNTER
Pt called hotline, scheduled one year f/u appt as requested per pt. Pt pleased and verbalized understanding.

## 2024-01-19 NOTE — PROGRESS NOTES
PT/PTA met face to face to discuss pt's treatment plan and progress towards established goals. Pt will be seen by a physical therapist minimally every 6th visit or every 30 days.    Jani Dutta PTA

## 2024-01-19 NOTE — PLAN OF CARE
"[facility]   Physical Therapy Treatment  and Progress Note      Name: Chelsea Rodriguez  Clinic Number: [id number]    Therapy Diagnosis:   Encounter Diagnoses   Name Primary?    Bilateral chronic knee pain Yes    Decreased mobility of joint     Muscle weakness of lower extremity      Physician: Elaine Kraus MD  Visit Date: 1/19/2024  Evaluation Date: 12/5/2023  Authorization Period Expiration: 10/073059  Plan of Care Expiration: 2/5/24  Progress Note Due: 2/19/2023  Most Recent Progress Note: 1/19/2023  Date of Surgery: None  Visit # / Visits authorized: 1/ 1; 8/12; 3/15   FOTO: 1/ 3  Precautions: Standard    PTA Visit #: 1/5   Time In: 9:05 am  Time Out: 10:00 am  Total Billable Time: 25 minutes  Subjective   Pt reports: she feels like she is able to get up out of a chair much easier. Her knee will still begin to hurt the more she moves, but is not in any pain during rest.  She was not compliant with home exercise program.  Response to previous treatment: initial eval   Functional change: none   Pain: 5/10  Location: Bilateral knee    Objective       Right Left   Knee Flexion 95 degrees 101 degrees   Knee Extension 0 degrees 0 degrees     Endurance Assessment:   Evaluation   Five Time Sit to Stand 16 seconds completed with no arms     Age Range Men Women   60-64 < 14  < 12   65-69 < 12  < 11   70-74 < 12 < 10   75-79 < 11 < 10   80-84 < 10 < 9   85-89 < 8  < 8   90-94 < 7  < 4      Treatment     Chelsea received the treatments listed below:        therapeutic exercises to develop strength, endurance, and ROM for 37 minutes including:  NuStep, 8 minutes for range of motion, [place], Level 4   Shuttle Double Leg Press + 10" hold into flexion, 62.5# (2 black 1 red) , 3 minutes  Shuttle Single Leg Press, 37.5# ( left lower extremity 1 black 1 red/ right lower extremity 1 red), 2 minutes bilaterally (12.5# on right leg)  Bridges, 2 x 12, 5" blue band  Long Arc Quad with 3# cuff weight 2x10, " "(strap assistance with right side)    Manual Therapy for 10 minutes:  Inferior Patellofemoral Mobilizations + End-Range Knee Flexion, all planes  Tibiofemoral Anterior to Posterior Glide + Tibiofemoral I.R., bilaterally     Chelsea participated in dynamic functional therapeutic activities to improve functional performance for 8 minutes, including:  Sitting to Standing, 18" surface, Hip-Hinging with 5# DB, 3 x 5 with assistance in concentric, and independent in eccentric    NOT PERFORMED TODAY   Hip Hinging with Dowel Arnoldo, seated only, 1 minute duration    Patient Education and Home Exercises       Education provided:   -Findings of evaluation and examination, and affect of these on plan for treatment  -Prognosis and expectations  -Role of PT and team-centered care for patient  -Home exercise program and expectations of therapy     Written Home Exercises Provided: yes. Exercises were reviewed and Chelsea was able to demonstrate them prior to the end of the session.  Chelsea demonstrated good  understanding of the education provided. See EMR under Patient Instructions for exercises provided during therapy sessions.    Assessment   Patient arrives today with Five Time Sit to Stand at 16 seconds without hand-hold assistance, a great improvement from her initial visit, as she was unable to rise from her table more than one occasion. Knee flexion on the right remains at 95 degrees with left knee reaching 100 degrees. Right knee continues to be more limited in active extension in long-arc quadriceps. Despite progress made, Chelsea continues to be limited in range of motion, strength and functional limitation and Chelsea will continue to  benefit from a customized physical therapy plan of care  to address the aforementioned impairments in an effort to improve human function and quality of life.      Chelsea Is progressing well towards her goals.   Pt prognosis is Good.     Pt will continue to benefit from skilled outpatient physical " therapy to address the deficits listed in the problem list box on initial evaluation, provide pt/family education and to maximize pt's level of independence in the home and community environment.     Pt's spiritual, cultural and educational needs considered and pt agreeable to plan of care and goals.     Anticipated barriers to physical therapy: Chronicity, History of Previous Surgeries      Goals:      Short Term Goals: 2 weeks   1.) Patient will demonstrate independence in compliance and technique of home exercise program provided as per teach-back method of assessment. Ongoing  2.) Patient will achieve 0-90 degrees of knee flexion to demonstrate progressing range of motion for improved functional mobility. Met  3.) Patient will ambulate for 200 feet with LRAD with supervision-level assistance and normalized gait pattern.  Ongoing  4.) Patient will demonstrate good quality quadriceps set with the ability to complete 10x straight-leg raises without quadriceps lag on right side.  Ongoing     Long Term Goals: 6 weeks   1.) Patient will demonstrate independence in compliance and technique of home exercise program provided as per teach-back method of assessment. Ongoing  2.) Patient will achieve 0-100 degrees of knee flexion to demonstrate progressing range of motion for improved functional mobility. Ongoing on  right; Met on left  3.) Patient will ambulate for 200 feet without any assistive device with supervision-level assistance and normalized gait pattern. Ongoing  4.) Patient will demonstrate good quality quadriceps set with the ability to complete 3x10 straight-leg raises without quadriceps lag.  Ongoing  5.) Patient will demonstrate <14 seconds as per TUG Test to demonstrate improved functional mobility and decreased fall risk. Ongoing  6.) Patient will demonstrate 5xSTS Test in <16 seconds  to demonstrate improved functional mobility and decreased fall risk. Ongoing  7.) Patient will note <10% disability as per  FOTO score. Ongoing    Plan     Continue with Physical Therapist Plan of Care.     Preethi Patel, PT DPT  Board Certified in Orthopedic Physical Therapy

## 2024-01-19 NOTE — PROGRESS NOTES
"[facility]   Physical Therapy Treatment Note      Name: Chelsea Rodriguez  Clinic Number: [id number]    Therapy Diagnosis:   Encounter Diagnosis   Name Primary?    Bilateral chronic knee pain Yes     Physician: Elaine Kraus MD    Visit Date: 1/22/2024    Evaluation Date: 12/5/2023  Authorization Period Expiration: 10/[id number]  Physician Orders: PT Eval and Treat   Medical Diagnosis from Referral: Sciatica, unspecified laterality [M54.30]   Evaluation Date: 1/18/2024  Authorization Period Expiration: 12/28/2024  Plan of Care Expiration: 4/18/2024  Progress Note Due: 2/18/2024  Date of Surgery: N/a  Visit # / Visits authorized: 1/ 1   FOTO: 1/ 3     Precautions: Standard   Date of Surgery: None  Visit # / Visits authorized: 1/ 1; 7/12; 3 /15   FOTO: 1/ 3  Precautions: Standard      PTA Visit #: 1/5     Time In: 9:14 AM   Time Out: 10:00  AM   Total Billable Time: 46 minutes    Subjective     Pt denies any discomfort throughout knees upon entering Physical Therapy treatment.     She was not compliant with home exercise program.  Response to previous treatment: initial eval   Functional change: none     Pain: 5/10  Location: Bilateral knee      Objective      Objective Measures updated at progress report unless specified.     Treatment     Chelsea received the treatments listed below:        therapeutic exercises to develop strength, endurance, and ROM for 31 minutes including:    NuStep, 6 minutes for range of motion   Shuttle Double Leg Press + 10" hold into flexion, 62.5# (2 black 1 red) , 3 minutes  Shuttle Single Leg Press, 37.5# ( left lower extremity 1 black 1 red/ right lower extremity 1 red), 2 minutes bilaterally (12.5# on right leg)  Bridges, 2 x 12, 5" blue band  Straight Leg Raises with 1# cuff weight 3 x 10 each, + LLLD Stretch on Opposite Knee into Flexion  Sidelying Clamshells 2 x 10 2 second hold     Manual Therapy for 00 minutes:  Inferior Patellofemoral Mobilizations + End-Range " "Knee Flexion, all planes  Tibiofemoral Anterior to Posterior Glide + Tibiofemoral I.R., bilaterally     Chelsea participated in dynamic functional therapeutic activities to improve functional performance for 15 minutes, including:    Sitting to Standing, 18" surface, Hip-Hinging with Dowel Arnoldo, 3 x 5 with assistance in concentric, and independent in eccentric  Step-Up, 2" step, 2 x 12, no hand-hold assistance   Step-Up, 4" step, x 12, intermittent assistance from contralateral plantarflexion  Hip Hinging with Dowel Arnoldo, seated only, 1 minute duration    Patient Education and Home Exercises       Education provided:   -Findings of evaluation and examination, and affect of these on plan for treatment  -Prognosis and expectations  -Role of PT and team-centered care for patient  -Home exercise program and expectations of therapy     Written Home Exercises Provided: yes. Exercises were reviewed and Chelsea was able to demonstrate them prior to the end of the session.  Chelsea demonstrated good  understanding of the education provided. See EMR under Patient Instructions for exercises provided during therapy sessions.    Assessment     Patient continues to complete therapeutic exercise without reproduced pain throughout or after treatment session, however, patient with muscle fatigue throughout session. Patient with most fatigue throughout sit to stands, however, patient with improved technique since performing hip hinge with pole from chair. Patient continue to progress patient as tolerated to continue to help overall strength and function.     Chelsea Is progressing well towards her goals.   Pt prognosis is Good.     Pt will continue to benefit from skilled outpatient physical therapy to address the deficits listed in the problem list box on initial evaluation, provide pt/family education and to maximize pt's level of independence in the home and community environment.     Pt's spiritual, cultural and educational needs " considered and pt agreeable to plan of care and goals.     Anticipated barriers to physical therapy: Chronicity, History of Previous Surgeries      Goals:      Short Term Goals: 2 weeks   1.) Patient will demonstrate independence in compliance and technique of home exercise program provided as per teach-back method of assessment. Ongoing  2.) Patient will achieve 0-90 degrees of knee flexion to demonstrate progressing range of motion for improved functional mobility. Met  3.) Patient will ambulate for 200 feet with LRAD with supervision-level assistance and normalized gait pattern.  Ongoing  4.) Patient will demonstrate good quality quadriceps set with the ability to complete 10x straight-leg raises without quadriceps lag on right side.  Ongoing     Long Term Goals: 6 weeks   1.) Patient will demonstrate independence in compliance and technique of home exercise program provided as per teach-back method of assessment. Ongoing  2.) Patient will achieve 0-100 degrees of knee flexion to demonstrate progressing range of motion for improved functional mobility. Ongoing  3.) Patient will ambulate for 200 feet without any assistive device with supervision-level assistance and normalized gait pattern. Ongoing  4.) Patient will demonstrate good quality quadriceps set with the ability to complete 3x10 straight-leg raises without quadriceps lag.  Ongoing  5.) Patient will demonstrate <14 seconds as per TUG Test to demonstrate improved functional mobility and decreased fall risk. Ongoing  6.) Patient will demonstrate 5xSTS Test in <16 seconds  to demonstrate improved functional mobility and decreased fall risk. Ongoing  7.) Patient will note <10% disability as per FOTO score. Ongoing    Plan     Continue with Physical Therapist Plan of Care.     Jani Dutta, PTA

## 2024-01-20 ENCOUNTER — HOSPITAL ENCOUNTER (EMERGENCY)
Facility: HOSPITAL | Age: 69
Discharge: HOME | End: 2024-01-20
Attending: STUDENT IN AN ORGANIZED HEALTH CARE EDUCATION/TRAINING PROGRAM
Payer: MEDICARE

## 2024-01-20 VITALS
WEIGHT: 163 LBS | TEMPERATURE: 99 F | BODY MASS INDEX: 34.66 KG/M2 | DIASTOLIC BLOOD PRESSURE: 86 MMHG | RESPIRATION RATE: 16 BRPM | SYSTOLIC BLOOD PRESSURE: 192 MMHG | OXYGEN SATURATION: 98 % | HEART RATE: 92 BPM

## 2024-01-20 DIAGNOSIS — M25.512 CHRONIC LEFT SHOULDER PAIN: Primary | ICD-10-CM

## 2024-01-20 DIAGNOSIS — I10 HYPERTENSION, UNSPECIFIED TYPE: ICD-10-CM

## 2024-01-20 DIAGNOSIS — G89.29 CHRONIC LEFT SHOULDER PAIN: Primary | ICD-10-CM

## 2024-01-20 PROCEDURE — 99281 EMR DPT VST MAYX REQ PHY/QHP: CPT | Mod: HCNC

## 2024-01-20 NOTE — DISCHARGE INSTRUCTIONS
If you do have left shoulder pain you can take ibuprofen and apply ice for 30 minutes at a time.  Follow-up with your primary care team to recheck your blood pressure    Return to the emergency department if you do have redness/hardness to the skin that does not go away, fever/chills, chest pain, vision changes, weakness, or for any other concerning symptoms

## 2024-01-20 NOTE — ED PROVIDER NOTES
"Encounter Date: 2024       History     Chief Complaint   Patient presents with    Shoulder Pain     Yesterday had painful, darkened lump to L shoulder, rubbed it and it moved to a different area, now us gone.      69 y.o. female with HTN, HLD, history of thyroid nodules presents for left arm pain.  Patient reports 2 days ago she had an episode of focal pain to the lateral aspect of the left arm.  She also reports an associated "lump".  She reports a lump resolved and she had a subsequent lump further up around the lateral part of the shoulder which also resolved prior to arrival today.  She reports no significant pain at this time.  At the time she reports the lump felt warm to touch, however there is no warmth or redness at this time.  She denies any injuries, procedures to the affected extremity, fever, chest pain    The history is provided by the patient and medical records.     Review of patient's allergies indicates:   Allergen Reactions    Codeine Nausea And Vomiting     Past Medical History:   Diagnosis Date    Bilateral knee pain     Carpal tunnel syndrome, bilateral     Fissure in skin of foot     Right small toe    HTN (hypertension)     Hyperlipidemia     Multiple thyroid nodules 11/10/2023    Palpitations     Rotator cuff injury     right    Screening for colorectal cancer 10/20/2017    Screening for malignant neoplasm of colon 2021    Statin-induced myositis 2018     Past Surgical History:   Procedure Laterality Date    BILATERAL SALPINGOOPHORECTOMY  2000    BREAST BIOPSY Left     malignant    BREAST BIOPSY Left     negative    BREAST LUMPECTOMY Left     CATARACT EXTRACTION W/  INTRAOCULAR LENS IMPLANT Right 2018    Dr. Oneil    CATARACT EXTRACTION W/  INTRAOCULAR LENS IMPLANT Left 2018    Dr. Oneil     SECTION      x2    COLONOSCOPY N/A 10/20/2017    Procedure: COLONOSCOPY;  Surgeon: Mitchell Danielson Jr., MD;  Location: [facility];  Service: " Endoscopy;  Laterality: N/A;    COLONOSCOPY N/A 02/05/2021    Procedure: COLONOSCOPY/Suprep;  Surgeon: Malcolm Reyes MD;  Location: [facility] ENDO;  Service: Endoscopy;  Laterality: N/A;    CYST REMOVAL      on back    ESOPHAGOGASTRODUODENOSCOPY N/A 02/05/2021    Procedure: EGD (ESOPHAGOGASTRODUODENOSCOPY);  Surgeon: Malcolm Reyes MD;  Location: [facility] ENDO;  Service: Endoscopy;  Laterality: N/A;    HERNIA REPAIR      HYSTERECTOMY      at 25 yrs old    INTRAOCULAR PROSTHESES INSERTION Left 11/06/2018    Procedure: INSERTION, IOL PROSTHESIS;  Surgeon: Sergio Oneil MD;  Location: [facility] OR 1ST FLR;  Service: Ophthalmology;  Laterality: Left;    INTRAOCULAR PROSTHESES INSERTION Right 11/20/2018    Procedure: INSERTION, IOL PROSTHESIS;  Surgeon: Sergio Oneil MD;  Location: [facility] OR 2ND FLR;  Service: Ophthalmology;  Laterality: Right;    KNEE ARTHROPLASTY Right 03/31/2021    Procedure: ARTHROPLASTY, KNEE:RIGHT:DEPUY-SIGMA ;  Surgeon: Pedro Summers III, MD;  Location: [facility] OR;  Service: Orthopedics;  Laterality: Right;    OOPHORECTOMY      @ 45 yrs old    PHACOEMULSIFICATION OF CATARACT Left 11/06/2018    Procedure: PHACOEMULSIFICATION, CATARACT;  Surgeon: Sergio Oneil MD;  Location: [facility] OR 1ST FLR;  Service: Ophthalmology;  Laterality: Left;    PHACOEMULSIFICATION OF CATARACT Right 11/20/2018    Procedure: PHACOEMULSIFICATION, CATARACT;  Surgeon: Sergio Oneil MD;  Location: [facility] OR 2ND FLR;  Service: Ophthalmology;  Laterality: Right;    REFRACTIVE SURGERY      2023    supracervical abdominal hysterectomy  1978    fibroids     Family History   Problem Relation Age of Onset    Hypertension Mother     Heart disease Mother     Diabetes Mother     Hyperlipidemia Mother     Pancreatitis Mother     Cataracts Mother     Macular degeneration Mother     Cancer Father     Hypertension Sister     Thyroid disease Sister     Cancer Brother         prostate CA    Diabetes Brother     Heart  disease Brother     Hyperlipidemia Daughter     No Known Problems Son     Breast cancer Paternal Cousin     Amblyopia Neg Hx     Blindness Neg Hx     Glaucoma Neg Hx     Strabismus Neg Hx     Retinal detachment Neg Hx      Social History     Tobacco Use    Smoking status: Never    Smokeless tobacco: Never   Substance Use Topics    Alcohol use: Yes     Comment: once per month    Drug use: No     Review of Systems   Reason unable to perform ROS: See HPI for relevant ROS.       Physical Exam     Initial Vitals [01/20/24 1621]   BP Pulse Resp Temp SpO2   (!) 217/87 87 18 99 °F (37.2 °C) 99 %      MAP       --         Physical Exam    Nursing note and vitals reviewed.  Constitutional:   Alert, normal work of breathing, no acute distress   Eyes: Conjunctivae are normal. No scleral icterus.   Cardiovascular:  Normal rate, regular rhythm and intact distal pulses.           Pulmonary/Chest: Breath sounds normal. No stridor. No respiratory distress.   Musculoskeletal:      Comments: RUE: Normal ROM of wrist and digits, no bony tenderness or deformity. No open wounds or skin changes.  No erythema or warmth  LUE: Normal ROM of wrist and digits, no bony tenderness or deformity. No open wounds or skin changes.  No erythema or warmth       Neurological: She is alert.   Skin: Skin is warm and dry.         ED Course   Procedures  Labs Reviewed - No data to display       Imaging Results    None          Medications - No data to display  Medical Decision Making  69 y.o. female with HTN, HLD, history of thyroid nodules presents for left arm pain  Differentials include muscle spasm, tender cuff pain, less likely lymph node, doubt cellulitis or abscess  Patient with small focal lump to the left arm with associated pain and warmth at the time but resolved spontaneously within a day, unclear what this was but suspect possible muscle spasm, patient does have known left rotator cuff pain in the past, no significant pain at time of exam,  range of motion intact, doubt severe rotator cuff injury  Presentation overall inconsistent with significant infection or abscess.  Patient and family are also concerned about a blood clot, location of pain/swelling is inconsistent with a DVT, additionally patient has no risk factors for an upper extremity DVT, no procedures to the affected arm, no pain or tenderness along the veins and no arm swelling.  Patient with significant HTN at time of evaluation, she reports normal blood pressures at home, no evidence of end-organ damage, recommended recheck with PCP               ED Course as of 01/20/24 2311   Sat Jan 20, 2024   1747 BP(!): 192/86 [OK]      ED Course User Index  [OK] Jose Wade MD                           Clinical Impression:  Final diagnoses:  [M25.512, G89.29] Chronic left shoulder pain (Primary)  [I10] Hypertension, unspecified type          ED Disposition Condition    Discharge Stable          ED Prescriptions    None       Follow-up Information    None          Jose Wade MD  01/20/24 2311

## 2024-01-20 NOTE — ED NOTES
LOC: The patient is awake and alert; oriented x 3 and speaking appropriately.  APPEARANCE: Patient resting comfortably, patient is clean and well groomed  SKIN: warm and dry, normal skin turgor & moist mucus membranes, skin intact, no breakdown noted.  MUSCULOSKELETAL: Patient moving all extremities well, no obvious swelling or deformities noted.C/O pain and swelling  under skin on eft arm twice in the last two days- resolved now.  RESPIRATORY: Airway is open and patent,  respirations are spontaneous, normal effort and rate  CARDIAC: Patient has a normal rate, no peripheral edema noted, capillary refill < 3 seconds; No complaints of chest pain   ABDOMEN: Soft and non tender to palpation, no distention noted.

## 2024-01-22 ENCOUNTER — CLINICAL SUPPORT (OUTPATIENT)
Dept: REHABILITATION | Facility: HOSPITAL | Age: 69
End: 2024-01-22
Payer: MEDICARE

## 2024-01-22 DIAGNOSIS — M25.561 BILATERAL CHRONIC KNEE PAIN: Primary | ICD-10-CM

## 2024-01-22 DIAGNOSIS — M25.562 BILATERAL CHRONIC KNEE PAIN: Primary | ICD-10-CM

## 2024-01-22 DIAGNOSIS — G89.29 BILATERAL CHRONIC KNEE PAIN: Primary | ICD-10-CM

## 2024-01-22 PROCEDURE — 97110 THERAPEUTIC EXERCISES: CPT | Mod: CQ

## 2024-01-22 PROCEDURE — 97530 THERAPEUTIC ACTIVITIES: CPT | Mod: CQ

## 2024-01-23 NOTE — PROGRESS NOTES
"[facility]   Physical Therapy Treatment Note      Name: Chelsea Rodriguez  Clinic Number: [id number]    Therapy Diagnosis:   No diagnosis found.    Physician: Elaine Kraus MD    Visit Date: 1/24/2024    Evaluation Date: 12/5/2023  Authorization Period Expiration: 10/071643  Physician Orders: PT Eval and Treat   Medical Diagnosis from Referral: Sciatica, unspecified laterality [M54.30]   Evaluation Date: 1/18/2024  Authorization Period Expiration: 12/28/2024  Plan of Care Expiration: 4/18/2024  Progress Note Due: 2/18/2024  Date of Surgery: N/a  Visit # / Visits authorized: 1/ 1   FOTO: 1/ 3     Precautions: Standard   Date of Surgery: None  Visit # / Visits authorized: 1/ 1; 7/12; 5/15   FOTO: 1/ 3  Precautions: Standard      PTA Visit #: 2/5     Time In: 9:05 AM   Time Out: 10:58 AM   Total Billable Time: 53 minutes    Subjective     Pt reports more discomfort throughout left lower extremity to than right lower extremity, however, patient with some decreased discomfort throughout bilateral lower extremity with walking around the house and through the parking lot.  Patient informed Physical Therapist Assistant today patient went to emergency room on Saturday due to patient with tightness throughout left upper arm. Patient thought she was suffering from blood clots, however, MD cleared patient and informed patient she was having muscle spasms throughout arm.     She was not compliant with home exercise program.  Response to previous treatment: initial eval   Functional change: none     Pain: 5/10  Location: Bilateral knee      Objective      Objective Measures updated at progress report unless specified.     Treatment     Chelsea received the treatments listed below:      therapeutic exercises to develop strength, endurance, and ROM for 30 minutes including:    NuStep, 6 minutes for range of motion   Shuttle Double Leg Press + 10" hold into flexion, 62.5# (2 black 1 red) , 3 minutes  Shuttle " "Single Leg Press, 37.5# ( left lower extremity 1 black 1 red/ right lower extremity 1 red), 2 minutes bilaterally (12.5# on right leg)  Bridges, 2 x 12, 5" blue band  Straight Leg Raises with 1# cuff weight 3 x 10 each, + LLLD Stretch on Opposite Knee into Flexion  Sidelying Clamshells 2 x 10 2 second hold     Manual Therapy for 08 minutes:  Inferior Patellofemoral Mobilizations + End-Range Knee Flexion, all planes  Tibiofemoral Anterior to Posterior Glide + Tibiofemoral I.R., bilaterally     Chelsea participated in dynamic functional therapeutic activities to improve functional performance for 15 minutes, including:    Sitting to Standing, 18" surface, Hip-Hinging with Dowel Arnoldo, 2 x 10  with assistance in concentric, and independent in eccentric  Step-Up, 2" step, 2 x 12, no hand-hold assistance   Step-Up, 4" step, x 12, intermittent assistance from contralateral plantarflexion  Hip Hinging with Dowel Arnoldo, seated only, 1 minute duration    Patient Education and Home Exercises       Education provided:   -Findings of evaluation and examination, and affect of these on plan for treatment  -Prognosis and expectations  -Role of PT and team-centered care for patient  -Home exercise program and expectations of therapy     Written Home Exercises Provided: yes. Exercises were reviewed and Chelsea was able to demonstrate them prior to the end of the session.  Chelsea demonstrated good  understanding of the education provided. See EMR under Patient Instructions for exercises provided during therapy sessions.    Assessment     Patient noted decreased extension throughout left lower extremity throughout stair climbs today and slight more increased discomfort, however, patient was able to complete full reps. Patient required rest breaks throughout supine straight leg raises due to patient with increased weakness and fatigue throughout lower extremity. Patient still with some tightness throughout Tibiofemoral Anterior to Posterior " Glide and inferior patella mobs, however, patient able to tolerate treatment. Will continue to progress patient as tolerated.     Chelsea Is progressing well towards her goals.   Pt prognosis is Good.     Pt will continue to benefit from skilled outpatient physical therapy to address the deficits listed in the problem list box on initial evaluation, provide pt/family education and to maximize pt's level of independence in the home and community environment.     Pt's spiritual, cultural and educational needs considered and pt agreeable to plan of care and goals.     Anticipated barriers to physical therapy: Chronicity, History of Previous Surgeries      Goals:      Short Term Goals: 2 weeks   1.) Patient will demonstrate independence in compliance and technique of home exercise program provided as per teach-back method of assessment. Ongoing  2.) Patient will achieve 0-90 degrees of knee flexion to demonstrate progressing range of motion for improved functional mobility. Met  3.) Patient will ambulate for 200 feet with LRAD with supervision-level assistance and normalized gait pattern.  Ongoing  4.) Patient will demonstrate good quality quadriceps set with the ability to complete 10x straight-leg raises without quadriceps lag on right side.  Ongoing     Long Term Goals: 6 weeks   1.) Patient will demonstrate independence in compliance and technique of home exercise program provided as per teach-back method of assessment. Ongoing  2.) Patient will achieve 0-100 degrees of knee flexion to demonstrate progressing range of motion for improved functional mobility. Ongoing  3.) Patient will ambulate for 200 feet without any assistive device with supervision-level assistance and normalized gait pattern. Ongoing  4.) Patient will demonstrate good quality quadriceps set with the ability to complete 3x10 straight-leg raises without quadriceps lag.  Ongoing  5.) Patient will demonstrate <14 seconds as per TUG Test to demonstrate  improved functional mobility and decreased fall risk. Ongoing  6.) Patient will demonstrate 5xSTS Test in <16 seconds  to demonstrate improved functional mobility and decreased fall risk. Ongoing  7.) Patient will note <10% disability as per FOTO score. Ongoing    Plan     Continue with Physical Therapist Plan of Care.     Jani Dutta, PTA      None

## 2024-01-24 ENCOUNTER — CLINICAL SUPPORT (OUTPATIENT)
Dept: REHABILITATION | Facility: HOSPITAL | Age: 69
End: 2024-01-24
Payer: MEDICARE

## 2024-01-24 DIAGNOSIS — M25.561 BILATERAL CHRONIC KNEE PAIN: Primary | ICD-10-CM

## 2024-01-24 DIAGNOSIS — M25.562 BILATERAL CHRONIC KNEE PAIN: Primary | ICD-10-CM

## 2024-01-24 DIAGNOSIS — G89.29 BILATERAL CHRONIC KNEE PAIN: Primary | ICD-10-CM

## 2024-01-24 PROCEDURE — 97140 MANUAL THERAPY 1/> REGIONS: CPT | Mod: CQ

## 2024-01-24 PROCEDURE — 97110 THERAPEUTIC EXERCISES: CPT | Mod: CQ

## 2024-01-29 ENCOUNTER — LAB VISIT (OUTPATIENT)
Dept: LAB | Facility: HOSPITAL | Age: 69
End: 2024-01-29
Attending: INTERNAL MEDICINE
Payer: MEDICARE

## 2024-01-29 ENCOUNTER — CLINICAL SUPPORT (OUTPATIENT)
Dept: REHABILITATION | Facility: HOSPITAL | Age: 69
End: 2024-01-29
Payer: MEDICARE

## 2024-01-29 DIAGNOSIS — R73.03 PREDIABETES: ICD-10-CM

## 2024-01-29 DIAGNOSIS — M25.562 BILATERAL CHRONIC KNEE PAIN: Primary | ICD-10-CM

## 2024-01-29 DIAGNOSIS — M25.561 BILATERAL CHRONIC KNEE PAIN: Primary | ICD-10-CM

## 2024-01-29 DIAGNOSIS — G89.29 BILATERAL CHRONIC KNEE PAIN: Primary | ICD-10-CM

## 2024-01-29 LAB
ALBUMIN SERPL BCP-MCNC: 4 G/DL (ref 3.5–5.2)
ALP SERPL-CCNC: 79 U/L (ref 55–135)
ALT SERPL W/O P-5'-P-CCNC: 24 U/L (ref 10–44)
ANION GAP SERPL CALC-SCNC: 8 MMOL/L (ref 8–16)
AST SERPL-CCNC: 33 U/L (ref 10–40)
BILIRUB SERPL-MCNC: 0.8 MG/DL (ref 0.1–1)
BUN SERPL-MCNC: 17 MG/DL (ref 8–23)
CALCIUM SERPL-MCNC: 10.1 MG/DL (ref 8.7–10.5)
CHLORIDE SERPL-SCNC: 101 MMOL/L (ref 95–110)
CO2 SERPL-SCNC: 29 MMOL/L (ref 23–29)
CREAT SERPL-MCNC: 0.7 MG/DL (ref 0.5–1.4)
EST. GFR  (NO RACE VARIABLE): >60 ML/MIN/1.73 M^2
ESTIMATED AVG GLUCOSE: 131 MG/DL (ref 68–131)
GLUCOSE SERPL-MCNC: 90 MG/DL (ref 70–110)
HBA1C MFR BLD: 6.2 % (ref 4–5.6)
POTASSIUM SERPL-SCNC: 4.2 MMOL/L (ref 3.5–5.1)
PROT SERPL-MCNC: 7.2 G/DL (ref 6–8.4)
SODIUM SERPL-SCNC: 138 MMOL/L (ref 136–145)

## 2024-01-29 PROCEDURE — 97530 THERAPEUTIC ACTIVITIES: CPT | Mod: CQ

## 2024-01-29 PROCEDURE — 80053 COMPREHEN METABOLIC PANEL: CPT | Mod: HCNC | Performed by: INTERNAL MEDICINE

## 2024-01-29 PROCEDURE — 36415 COLL VENOUS BLD VENIPUNCTURE: CPT | Mod: HCNC,PO | Performed by: INTERNAL MEDICINE

## 2024-01-29 PROCEDURE — 83036 HEMOGLOBIN GLYCOSYLATED A1C: CPT | Mod: HCNC | Performed by: INTERNAL MEDICINE

## 2024-01-29 PROCEDURE — 97110 THERAPEUTIC EXERCISES: CPT | Mod: CQ

## 2024-01-29 NOTE — PROGRESS NOTES
"[facility]   Physical Therapy Treatment Note      Name: Chelsea Rodriguez  Clinic Number: [id number]    Therapy Diagnosis:   No diagnosis found.    Physician: Elaine Kraus MD    Visit Date: 1/29/2024    Physician: Elaine Kraus MD  Visit Date: 1/19/2024  Evaluation Date: 12/5/2023  Authorization Period Expiration: 10/106602  Plan of Care Expiration: 2/5/24  Progress Note Due: 2/19/2023  Most Recent Progress Note: 1/19/2023  Date of Surgery: None  Visit # / Visits authorized: 1/ 1; 8/12; 6/15   FOTO: 1/ 3  Precautions: Standard 3/5     Time In: 11:00 AM   Time Out: 12:00 PM   Total Billable Time: 60 minutes    Subjective     Pt reports still reports more pain throughout right knee than left knee upon entering Physical Therapy treatment.     She was not compliant with home exercise program.  Response to previous treatment: initial eval   Functional change: none     Pain: 3/10  Location: Bilateral knee      Objective      Objective Measures updated at progress report unless specified.     Treatment     Chelsea received the treatments listed below:      therapeutic exercises to develop strength, endurance, and ROM for 30 minutes including:    NuStep, 6 minutes for range of motion   Shuttle Double Leg Press + 10" hold into flexion, 62.5# (2 black 1 red) , 3 minutes  Shuttle Single Leg Press, 37.5# ( left lower extremity 1 black 1 red/ right lower extremity 1 red), 2 minutes bilaterally (12.5# on right leg)  Bridges, 2 x 12, 5" blue band  Straight Leg Raises with 1# cuff weight 3 x 10 each, + LLLD Stretch on Opposite Knee into Flexion  Sidelying Clamshells 2 x 10 2 second hold     Manual Therapy for 08 minutes:  Inferior Patellofemoral Mobilizations + End-Range Knee Flexion, all planes  Tibiofemoral Anterior to Posterior Glide + Tibiofemoral I.R., bilaterally     Chelsea participated in dynamic functional therapeutic activities to improve functional performance for 15 minutes, " "including:    Sitting to Standing, 18" surface, Hip-Hinging with Dowel Arnoldo, 2 x 10  with assistance in concentric, and independent in eccentric  Step-Up, 2" step, 2 x 12, no hand-hold assistance   Step-Up, 4" step, x 12, intermittent assistance from contralateral plantarflexion  Hip Hinging with Dowel Arnoldo, seated only, 1 minute duration    Patient Education and Home Exercises       Education provided:   -Findings of evaluation and examination, and affect of these on plan for treatment  -Prognosis and expectations  -Role of PT and team-centered care for patient  -Home exercise program and expectations of therapy     Written Home Exercises Provided: yes. Exercises were reviewed and Chelsea was able to demonstrate them prior to the end of the session.  Chelsea demonstrated good  understanding of the education provided. See EMR under Patient Instructions for exercises provided during therapy sessions.    Assessment     Patient continues to complete therapeutic exercise with minimal reports of discomfort throughout treatment. Patient with most discomfort throughout shuttle single leg right leg press, however, patient able to complete reps with continued decreased resistance with right vs left. Patient continues to require at least 1 hand held assist throughout 4 inch stair climbs due to patient unable to complete task due to patient with discomfort and weakness throughout task with upper extremity support. Patient with improvements throughout sit to stands throughout therapeutic exercise, however, patient still with muscle and physical fatigue throughout task.     Chelsea Is progressing well towards her goals.   Pt prognosis is Good.     Pt will continue to benefit from skilled outpatient physical therapy to address the deficits listed in the problem list box on initial evaluation, provide pt/family education and to maximize pt's level of independence in the home and community environment.     Pt's spiritual, cultural and " educational needs considered and pt agreeable to plan of care and goals.     Anticipated barriers to physical therapy: Chronicity, History of Previous Surgeries      Goals:      Short Term Goals: 2 weeks   1.) Patient will demonstrate independence in compliance and technique of home exercise program provided as per teach-back method of assessment. Ongoing  2.) Patient will achieve 0-90 degrees of knee flexion to demonstrate progressing range of motion for improved functional mobility. Met  3.) Patient will ambulate for 200 feet with LRAD with supervision-level assistance and normalized gait pattern.  Ongoing  4.) Patient will demonstrate good quality quadriceps set with the ability to complete 10x straight-leg raises without quadriceps lag on right side.  Ongoing     Long Term Goals: 6 weeks   1.) Patient will demonstrate independence in compliance and technique of home exercise program provided as per teach-back method of assessment. Ongoing  2.) Patient will achieve 0-100 degrees of knee flexion to demonstrate progressing range of motion for improved functional mobility. Ongoing  3.) Patient will ambulate for 200 feet without any assistive device with supervision-level assistance and normalized gait pattern. Ongoing  4.) Patient will demonstrate good quality quadriceps set with the ability to complete 3x10 straight-leg raises without quadriceps lag.  Ongoing  5.) Patient will demonstrate <14 seconds as per TUG Test to demonstrate improved functional mobility and decreased fall risk. Ongoing  6.) Patient will demonstrate 5xSTS Test in <16 seconds  to demonstrate improved functional mobility and decreased fall risk. Ongoing  7.) Patient will note <10% disability as per FOTO score. Ongoing    Plan     Continue with Physical Therapist Plan of Care.     Jani Dutta, PTA

## 2024-01-31 ENCOUNTER — CLINICAL SUPPORT (OUTPATIENT)
Dept: REHABILITATION | Facility: HOSPITAL | Age: 69
End: 2024-01-31
Payer: MEDICARE

## 2024-01-31 DIAGNOSIS — M25.60 DECREASED MOBILITY OF JOINT: ICD-10-CM

## 2024-01-31 DIAGNOSIS — M62.81 MUSCLE WEAKNESS OF LOWER EXTREMITY: ICD-10-CM

## 2024-01-31 DIAGNOSIS — G89.29 BILATERAL CHRONIC KNEE PAIN: Primary | ICD-10-CM

## 2024-01-31 DIAGNOSIS — M25.561 BILATERAL CHRONIC KNEE PAIN: Primary | ICD-10-CM

## 2024-01-31 DIAGNOSIS — M25.562 BILATERAL CHRONIC KNEE PAIN: Primary | ICD-10-CM

## 2024-01-31 PROCEDURE — 97112 NEUROMUSCULAR REEDUCATION: CPT

## 2024-01-31 PROCEDURE — 97110 THERAPEUTIC EXERCISES: CPT

## 2024-01-31 NOTE — PROGRESS NOTES
[facility]   Physical Therapy Treatment Note and Update to Plan of Care     Name: Chelsea Rodriguez  Clinic Number: [id number]    Therapy Diagnosis:   Encounter Diagnoses   Name Primary?    Bilateral chronic knee pain Yes    Decreased mobility of joint     Muscle weakness of lower extremity        Physician: Elaine Kraus MD    Visit Date: 1/31/2024    Physician: Elaine Kraus MD  Visit Date: 1/19/2024  Evaluation Date: 12/5/2023  Authorization Period Expiration: 10/380565  Plan of Care Expiration: 2/5/24 **Update to 2/29/2024  Progress Note Due: 2/19/2023  Most Recent Progress Note: 1/19/2023  Date of Surgery: None  Visit # / Visits authorized: 7/15  FOTO: 1/ 3  Precautions: Standard  Time In: 9:10 am  Time Out: 10:00 am  Total Billable Time: 50 minutes (24 one on one)  Subjective   Pt reports she is feeling good, and is able to get up and down off of a chair with greater ease and less pain.  She was not compliant with home exercise program.  Response to previous treatment: initial eval   Functional change: none   Pain: 3/10  Location: Bilateral knee    Objective    Balance Assessment:     Evaluation   Timed Up and Go 11 seconds  <10 seconds = Normal  >14 seconds = High Risk of Falls  <20 seconds = Independent for basic transfers, can go out alone  <30 seconds = Cannot go alone, requires gait aid     Table: Population Norms for TUG    Age  Average TUG    60 - 69 years  7.9 (+/- 0.9) seconds    70 - 79 years  7.7 (+/- 2.3) seconds    80 - 89 years  No device:11.0+/-2.2  With device:19.9+/-6.4seconds     years No device:14.7+/-7.9  With device:19.9+/-2.5seconds      Endurance Assessment:   Evaluation   Five Time Sit to Stand 12 seconds completed with no arms      Right Left   Knee Flexion 100 degrees 96 degrees   Knee Extension 0 degrees 0 degrees     Treatment     Chelsea received the treatments listed below:      therapeutic exercises to develop strength, endurance, and ROM for 38  "minutes including:  NuStep, 6 minutes for range of motion   Shuttle Double Leg Press + 10" hold into flexion, 62.5# (2 black 1 red) , 3 minutes  Shuttle Single Leg Press, 37.5# ( left lower extremity 1 black 1 red/ right lower extremity 1 red), 2 minutes bilaterally (12.5# on right leg)  Bridges,staggered stance, 2 x 12, 5" blue band  Straight Leg Raises with 2# cuff weight 2 x 10 each  Objective measures taken (see above)    Manual Therapy for 00 minutes:    Chelsea participated in dynamic functional therapeutic activities to improve functional performance for 12 minutes, including:  Sitting to Standing, 18" surface + 2" riser, Hip-Hinging with Dowel Arnoldo, 2 x 10  with assistance in concentric, and independent in eccentric  Step-Up, 4" step, right side, x 12, unilateral hand hold    Patient Education and Home Exercises       Education provided:   -Findings of evaluation and examination, and affect of these on plan for treatment  -Prognosis and expectations  -Role of PT and team-centered care for patient  -Home exercise program and expectations of therapy     Written Home Exercises Provided: yes. Exercises were reviewed and Chelsea was able to demonstrate them prior to the end of the session.  Chelsea demonstrated good  understanding of the education provided. See EMR under Patient Instructions for exercises provided during therapy sessions.    Assessment   Chelsea has undergone a total of 13 visits in the care of her bilateral knee pain with mobility and strength deficits. Knee flexion mobility is at 96 degrees on the left, and 100 on the right. She is able to rise from a chair five times without any use of upper extremities, albeit demonstrates excessive forward trunk lean during eccentric lowering. She is also challenged with stepping up onto 6" step with the right foot, with no issues on the left foot, and unable to eccentrically lower onto a step with the right knee. Chelsea will benefit from a customized physical therapy " plan of care  to address the aforementioned impairments in an effort to improve human function and quality of life.    Chelsea Is progressing well towards her goals.   Pt prognosis is Good.     Pt will continue to benefit from skilled outpatient physical therapy to address the deficits listed in the problem list box on initial evaluation, provide pt/family education and to maximize pt's level of independence in the home and community environment.     Pt's spiritual, cultural and educational needs considered and pt agreeable to plan of care and goals.     Anticipated barriers to physical therapy: Chronicity, History of Previous Surgeries      Goals:      Short Term Goals: 2 weeks   1.) Patient will demonstrate independence in compliance and technique of home exercise program provided as per teach-back method of assessment. Ongoing  2.) Patient will achieve 0-90 degrees of knee flexion to demonstrate progressing range of motion for improved functional mobility. Met  3.) Patient will ambulate for 200 feet with LRAD with supervision-level assistance and normalized gait pattern.  Ongoing  4.) Patient will demonstrate good quality quadriceps set with the ability to complete 10x straight-leg raises without quadriceps lag on right side.  Ongoing     Long Term Goals: 6 weeks   1.) Patient will demonstrate independence in compliance and technique of home exercise program provided as per teach-back method of assessment. Ongoing  2.) Patient will achieve 0-100 degrees of knee flexion to demonstrate progressing range of motion for improved functional mobility. Met on the right, ongoing on the left  3.) Patient will ambulate for 200 feet without any assistive device with supervision-level assistance and normalized gait pattern. Met  4.) Patient will demonstrate good quality quadriceps set with the ability to complete 3x10 straight-leg raises without quadriceps lag.  Ongoing  5.) Patient will demonstrate <14 seconds as per TUG Test  to demonstrate improved functional mobility and decreased fall risk. Met  6.) Patient will demonstrate 5xSTS Test in <16 seconds  to demonstrate improved functional mobility and decreased fall risk. Met  7.) Patient will note <10% disability as per FOTO score. Ongoing    Plan     Continue with Physical Therapist Plan of Care.  **Update to 2/29/2024    Preethi Patel, PT DPT  Board Certified in Orthopedic Physical Therapy

## 2024-02-02 ENCOUNTER — OFFICE VISIT (OUTPATIENT)
Dept: INTERNAL MEDICINE | Facility: CLINIC | Age: 69
End: 2024-02-02
Payer: MEDICARE

## 2024-02-02 ENCOUNTER — PATIENT OUTREACH (OUTPATIENT)
Dept: ADMINISTRATIVE | Facility: OTHER | Age: 69
End: 2024-02-02
Payer: MEDICARE

## 2024-02-02 VITALS
BODY MASS INDEX: 34.61 KG/M2 | DIASTOLIC BLOOD PRESSURE: 64 MMHG | OXYGEN SATURATION: 98 % | WEIGHT: 164.88 LBS | HEART RATE: 68 BPM | SYSTOLIC BLOOD PRESSURE: 106 MMHG | HEIGHT: 58 IN

## 2024-02-02 DIAGNOSIS — E78.2 MIXED HYPERLIPIDEMIA: ICD-10-CM

## 2024-02-02 DIAGNOSIS — E04.2 MULTIPLE THYROID NODULES: ICD-10-CM

## 2024-02-02 DIAGNOSIS — E66.01 SEVERE OBESITY: ICD-10-CM

## 2024-02-02 DIAGNOSIS — I10 ESSENTIAL HYPERTENSION: ICD-10-CM

## 2024-02-02 DIAGNOSIS — Z85.3 HISTORY OF LEFT BREAST CANCER: ICD-10-CM

## 2024-02-02 DIAGNOSIS — S16.1XXA STRAIN OF NECK MUSCLE, INITIAL ENCOUNTER: ICD-10-CM

## 2024-02-02 DIAGNOSIS — R73.03 PREDIABETES: ICD-10-CM

## 2024-02-02 DIAGNOSIS — Z12.11 ENCOUNTER FOR SCREENING COLONOSCOPY: ICD-10-CM

## 2024-02-02 DIAGNOSIS — E55.9 VITAMIN D INSUFFICIENCY: ICD-10-CM

## 2024-02-02 DIAGNOSIS — Z12.31 SCREENING MAMMOGRAM FOR BREAST CANCER: ICD-10-CM

## 2024-02-02 PROCEDURE — 99999 PR PBB SHADOW E&M-EST. PATIENT-LVL V: CPT | Mod: PBBFAC,HCNC,, | Performed by: INTERNAL MEDICINE

## 2024-02-02 PROCEDURE — 1157F ADVNC CARE PLAN IN RCRD: CPT | Mod: HCNC,CPTII,S$GLB, | Performed by: INTERNAL MEDICINE

## 2024-02-02 PROCEDURE — 3008F BODY MASS INDEX DOCD: CPT | Mod: HCNC,CPTII,S$GLB, | Performed by: INTERNAL MEDICINE

## 2024-02-02 PROCEDURE — 3044F HG A1C LEVEL LT 7.0%: CPT | Mod: HCNC,CPTII,S$GLB, | Performed by: INTERNAL MEDICINE

## 2024-02-02 PROCEDURE — 3074F SYST BP LT 130 MM HG: CPT | Mod: HCNC,CPTII,S$GLB, | Performed by: INTERNAL MEDICINE

## 2024-02-02 PROCEDURE — 1126F AMNT PAIN NOTED NONE PRSNT: CPT | Mod: HCNC,CPTII,S$GLB, | Performed by: INTERNAL MEDICINE

## 2024-02-02 PROCEDURE — 1160F RVW MEDS BY RX/DR IN RCRD: CPT | Mod: HCNC,CPTII,S$GLB, | Performed by: INTERNAL MEDICINE

## 2024-02-02 PROCEDURE — 3288F FALL RISK ASSESSMENT DOCD: CPT | Mod: HCNC,CPTII,S$GLB, | Performed by: INTERNAL MEDICINE

## 2024-02-02 PROCEDURE — 1159F MED LIST DOCD IN RCRD: CPT | Mod: HCNC,CPTII,S$GLB, | Performed by: INTERNAL MEDICINE

## 2024-02-02 PROCEDURE — 4010F ACE/ARB THERAPY RXD/TAKEN: CPT | Mod: HCNC,CPTII,S$GLB, | Performed by: INTERNAL MEDICINE

## 2024-02-02 PROCEDURE — 99214 OFFICE O/P EST MOD 30 MIN: CPT | Mod: HCNC,S$GLB,, | Performed by: INTERNAL MEDICINE

## 2024-02-02 PROCEDURE — 3078F DIAST BP <80 MM HG: CPT | Mod: HCNC,CPTII,S$GLB, | Performed by: INTERNAL MEDICINE

## 2024-02-02 PROCEDURE — 1101F PT FALLS ASSESS-DOCD LE1/YR: CPT | Mod: HCNC,CPTII,S$GLB, | Performed by: INTERNAL MEDICINE

## 2024-02-02 NOTE — PROGRESS NOTES
Patient ID: Chelsea Rodriguez is a 69 y.o. female.    Chief Complaint: Hypertension    HPI Chelsea is a 69 y.o. female with  hypertension, hyperlipidemia, osteoarthritis, chronic constipation, obstructive sleep apnea, bilateral carpal tunnel syndrome, lymphedema, thyroid nodules and history of breast cancer  who presents for routine follow up of medical conditions.     She complains of pain located on left and right sides of neck, worse on left side. Went to ER for this pain and a palpable lump on left side neck about one month ago. Diagnosed as having shoulder pain.     Reviewed lab results from 1/29/24. Endocrinology prescribed metformin but she has not yet started the medication. She does plan to start it though.     Health Maintenance Topics with due status: Not Due       Topic Last Completion Date    TETANUS VACCINE 11/03/2017    Colorectal Cancer Screening 02/05/2021    DEXA Scan 09/28/2023    Lipid Panel 10/02/2023    Hemoglobin A1c (Prediabetes) 01/29/2024      Review of Systems   Musculoskeletal:         See HPI   All other systems reviewed and are negative.     Objective:     Vitals:    02/02/24 1134   BP: 106/64   Pulse: 68        Physical Exam  Vitals reviewed.   Constitutional:       General: She is not in acute distress.     Appearance: Normal appearance. She is well-developed. She is obese. She is not ill-appearing, toxic-appearing or diaphoretic.   HENT:      Head: Normocephalic and atraumatic.      Right Ear: External ear normal.      Left Ear: External ear normal.      Nose: Nose normal.   Eyes:      General: No scleral icterus.        Right eye: No discharge.         Left eye: No discharge.      Extraocular Movements: Extraocular movements intact.      Conjunctiva/sclera: Conjunctivae normal.   Neck:        Comments: Area of pain as shown in the diagram.  Full range of motion of neck intact actively.  Patient did report worsening of pain with flexion to the affected side.  Cardiovascular:      Rate and  Rhythm: Normal rate and regular rhythm.      Heart sounds: Normal heart sounds. No murmur heard.     No friction rub. No gallop.   Pulmonary:      Effort: Pulmonary effort is normal. No respiratory distress.      Breath sounds: Normal breath sounds. No stridor. No wheezing, rhonchi or rales.   Skin:     General: Skin is warm and dry.   Neurological:      General: No focal deficit present.      Mental Status: She is alert and oriented to person, place, and time. Mental status is at baseline.   Psychiatric:         Mood and Affect: Mood normal.         Behavior: Behavior normal.         Thought Content: Thought content normal.         Judgment: Judgment normal.         Assessment:       1. Essential hypertension Well controlled   2. Encounter for screening colonoscopy    3. Screening mammogram for breast cancer    4. Severe obesity Chronic   5. History of left breast cancer Chronic   6. Multiple thyroid nodules Chronic   7. Prediabetes Chronic   8. Mixed hyperlipidemia Chronic   9. Vitamin D insufficiency Chronic   10. Strain of neck muscle, initial encounter Active       Plan:         Essential hypertension  Comments:  continue current medication  Orders:  -     Comprehensive Metabolic Panel; Future; Expected date: 06/01/2024    Encounter for screening colonoscopy  -     Ambulatory referral/consult to Endo Procedure ; Future; Expected date: 02/03/2024    Screening mammogram for breast cancer  -     Mammo Digital Screening Bilat; Future; Expected date: 02/02/2024    Severe obesity  -     CBC Auto Differential; Future; Expected date: 06/01/2024    History of left breast cancer  Comments:  continue to follow with heme-onc    Multiple thyroid nodules  Comments:  continue to follow with endocrinology  Orders:  -     TSH; Future; Expected date: 06/01/2024    Prediabetes  Comments:  start metformin. Continue to monitor  Orders:  -     Hemoglobin A1C; Future; Expected date: 06/01/2024    Mixed  hyperlipidemia  Comments:  continue statin  Orders:  -     Lipid Panel; Future; Expected date: 06/01/2024    Vitamin D insufficiency  Comments:  continue to monitor  Orders:  -     Vitamin D; Future; Expected date: 06/01/2024    Strain of neck muscle, initial encounter  Comments:  Use heating pad, stretching, massage, topical med, tylenol, voltaren PRN pain. If no relief, alert me for PT referral        RTC 6 months     Warning signs discussed, patient to call with any further issues or worsening of symptoms.       Parts of the above note were dictated using a voice dictation software. Please excuse any grammatical or typographical errors.

## 2024-02-05 ENCOUNTER — CLINICAL SUPPORT (OUTPATIENT)
Dept: REHABILITATION | Facility: HOSPITAL | Age: 69
End: 2024-02-05
Payer: MEDICARE

## 2024-02-05 DIAGNOSIS — M25.561 BILATERAL CHRONIC KNEE PAIN: Primary | ICD-10-CM

## 2024-02-05 DIAGNOSIS — M25.60 DECREASED MOBILITY OF JOINT: ICD-10-CM

## 2024-02-05 DIAGNOSIS — M62.81 MUSCLE WEAKNESS OF LOWER EXTREMITY: ICD-10-CM

## 2024-02-05 DIAGNOSIS — G89.29 BILATERAL CHRONIC KNEE PAIN: Primary | ICD-10-CM

## 2024-02-05 DIAGNOSIS — M25.562 BILATERAL CHRONIC KNEE PAIN: Primary | ICD-10-CM

## 2024-02-05 PROCEDURE — 97140 MANUAL THERAPY 1/> REGIONS: CPT

## 2024-02-05 PROCEDURE — 97110 THERAPEUTIC EXERCISES: CPT

## 2024-02-05 NOTE — PROGRESS NOTES
"[facility]   Physical Therapy Treatment Note and Update to Plan of Care     Name: Chelsea Rodriguez  Clinic Number: [id number]    Therapy Diagnosis:   Encounter Diagnoses   Name Primary?    Bilateral chronic knee pain Yes    Decreased mobility of joint     Muscle weakness of lower extremity      Physician: Elaine Kraus MD    Visit Date: 2/5/2024    Physician: Elaine Kraus MD  Visit Date: 1/19/2024  Evaluation Date: 12/5/2023  Authorization Period Expiration: 10/[id number]  Plan of Care Expiration: 2/5/24 **Update to 2/29/2024  Progress Note Due: 2/19/2023  Most Recent Progress Note: 1/19/2023  Date of Surgery: None  Visit # / Visits authorized: 8/15  FOTO: 1/ 3  Precautions: Standard  Time In: 1:00 pm  Time Out: 2:00 pm  Total Billable Time: 60 minutes (60 minutes one on one)  Subjective   Pt reports feeling a "clicking" in her right knee every time she attempts to go up the stairs or step-up, which is painful in weightbearing, and non-painful in open-chained weightbearing, but continues to occur. She states that once the clicking happens, it's like the knee "unlocks" and she is able to complete stepping up again.  She was not compliant with home exercise program.  Response to previous treatment: initial eval   Functional change: none   Pain: 3/10  Location: Bilateral knee    Objective       Right Left   Knee Flexion 95 degrees 95 degrees   Knee Extension 0 degrees 0 degrees       Treatment     Chelsea received the treatments listed below:      therapeutic exercises to develop strength, endurance, and ROM for 52 minutes including:  NuStep, 6 minutes for range of motion   Long-Arc Quad (attempted independently, modified to strap-assistance) 5x  Knee Extension Isometric at 60 degrees, 5x (attempted but not pursued 2/2 pain)  Double Leg Press Isometric 10x6"" 200#  Double Leg Press, 40#, 3x8  Single Leg Press, 20# on the left; 10# on the right (unable to pursue)  Shuttle Single Leg Press, " "37.5# on the right, 2x 1 minute duration  Bridges,staggered stance, 2 x 12, 5" blue band  Straight Leg Raises with 2# cuff weight 2 x 10 each  Objective measures taken (see above)    Manual Therapy for 8 minutes:  Patellofemoral Inferior Gliding (R)  Posterior Tibiofemoral Gliding + Internal Rotation    Chelsea participated in dynamic functional therapeutic activities to improve functional performance for 00 minutes, including:  Sitting to Standing, 18" surface + 2" riser, Hip-Hinging with Dowel Arnoldo, 2 x 10  with assistance in concentric, and independent in eccentric  Step-Up, 4" step, right side, x 12, unilateral hand hold    Patient Education and Home Exercises       Education provided:   -Findings of evaluation and examination, and affect of these on plan for treatment  -Prognosis and expectations  -Role of PT and team-centered care for patient  -Home exercise program and expectations of therapy     Written Home Exercises Provided: yes. Exercises were reviewed and Chelsea was able to demonstrate them prior to the end of the session.  Chelsea demonstrated good  understanding of the education provided. See EMR under Patient Instructions for exercises provided during therapy sessions.    Assessment   Chelsea demonstrates palpable and audible "clicking" of the knee during step-up on 4" and 6" step, however it does not occur while stepping up on 2" step. During double leg press, palpation noted that the clicking is seemingly coming from the patellofemoral joint. Manual guidance for tracking was not helpful in ceasing the clicking. She was able to progress to 50# on Single Leg Shuttle Press with the right leg and noted improved walking pattern. She was also able to complete a seated long arch quad at the completion of the session that was difficulty for Chelsea at the beginning of the session.    Chelsea Is progressing well towards her goals.   Pt prognosis is Good.     Pt will continue to benefit from skilled outpatient physical " therapy to address the deficits listed in the problem list box on initial evaluation, provide pt/family education and to maximize pt's level of independence in the home and community environment.     Pt's spiritual, cultural and educational needs considered and pt agreeable to plan of care and goals.     Anticipated barriers to physical therapy: Chronicity, History of Previous Surgeries      Goals:      Short Term Goals: 2 weeks   1.) Patient will demonstrate independence in compliance and technique of home exercise program provided as per teach-back method of assessment. Ongoing  2.) Patient will achieve 0-90 degrees of knee flexion to demonstrate progressing range of motion for improved functional mobility. Met  3.) Patient will ambulate for 200 feet with LRAD with supervision-level assistance and normalized gait pattern.  Ongoing  4.) Patient will demonstrate good quality quadriceps set with the ability to complete 10x straight-leg raises without quadriceps lag on right side.  Ongoing     Long Term Goals: 6 weeks   1.) Patient will demonstrate independence in compliance and technique of home exercise program provided as per teach-back method of assessment. Ongoing  2.) Patient will achieve 0-100 degrees of knee flexion to demonstrate progressing range of motion for improved functional mobility. Met on the right, ongoing on the left  3.) Patient will ambulate for 200 feet without any assistive device with supervision-level assistance and normalized gait pattern. Met  4.) Patient will demonstrate good quality quadriceps set with the ability to complete 3x10 straight-leg raises without quadriceps lag.  Ongoing  5.) Patient will demonstrate <14 seconds as per TUG Test to demonstrate improved functional mobility and decreased fall risk. Met  6.) Patient will demonstrate 5xSTS Test in <16 seconds  to demonstrate improved functional mobility and decreased fall risk. Met  7.) Patient will note <10% disability as per FOTO  score. Ongoing    Plan     Continue with Physical Therapist Plan of Care.  **Update to 2/29/2024    Preethi Patel, PT DPT  Board Certified in Orthopedic Physical Therapy

## 2024-02-07 ENCOUNTER — CLINICAL SUPPORT (OUTPATIENT)
Dept: REHABILITATION | Facility: HOSPITAL | Age: 69
End: 2024-02-07
Payer: MEDICARE

## 2024-02-07 DIAGNOSIS — M62.81 MUSCLE WEAKNESS OF LOWER EXTREMITY: ICD-10-CM

## 2024-02-07 DIAGNOSIS — M25.60 DECREASED MOBILITY OF JOINT: ICD-10-CM

## 2024-02-07 DIAGNOSIS — M25.561 BILATERAL CHRONIC KNEE PAIN: Primary | ICD-10-CM

## 2024-02-07 DIAGNOSIS — M25.562 BILATERAL CHRONIC KNEE PAIN: Primary | ICD-10-CM

## 2024-02-07 DIAGNOSIS — G89.29 BILATERAL CHRONIC KNEE PAIN: Primary | ICD-10-CM

## 2024-02-07 PROCEDURE — 97110 THERAPEUTIC EXERCISES: CPT

## 2024-02-07 PROCEDURE — 97530 THERAPEUTIC ACTIVITIES: CPT

## 2024-02-08 ENCOUNTER — LAB VISIT (OUTPATIENT)
Dept: LAB | Facility: HOSPITAL | Age: 69
End: 2024-02-08
Attending: INTERNAL MEDICINE
Payer: MEDICARE

## 2024-02-08 ENCOUNTER — PATIENT MESSAGE (OUTPATIENT)
Dept: ADMINISTRATIVE | Facility: OTHER | Age: 69
End: 2024-02-08
Payer: MEDICARE

## 2024-02-08 DIAGNOSIS — E78.2 MIXED HYPERLIPIDEMIA: ICD-10-CM

## 2024-02-08 LAB
ALBUMIN SERPL BCP-MCNC: 3.9 G/DL (ref 3.5–5.2)
ALP SERPL-CCNC: 80 U/L (ref 55–135)
ALT SERPL W/O P-5'-P-CCNC: 23 U/L (ref 10–44)
ANION GAP SERPL CALC-SCNC: 9 MMOL/L (ref 8–16)
AST SERPL-CCNC: 33 U/L (ref 10–40)
BILIRUB SERPL-MCNC: 0.8 MG/DL (ref 0.1–1)
BUN SERPL-MCNC: 13 MG/DL (ref 8–23)
CALCIUM SERPL-MCNC: 9.8 MG/DL (ref 8.7–10.5)
CHLORIDE SERPL-SCNC: 103 MMOL/L (ref 95–110)
CHOLEST SERPL-MCNC: 146 MG/DL (ref 120–199)
CHOLEST/HDLC SERPL: 3.1 {RATIO} (ref 2–5)
CO2 SERPL-SCNC: 29 MMOL/L (ref 23–29)
CREAT SERPL-MCNC: 0.7 MG/DL (ref 0.5–1.4)
EST. GFR  (NO RACE VARIABLE): >60 ML/MIN/1.73 M^2
GLUCOSE SERPL-MCNC: 90 MG/DL (ref 70–110)
HDLC SERPL-MCNC: 47 MG/DL (ref 40–75)
HDLC SERPL: 32.2 % (ref 20–50)
LDLC SERPL CALC-MCNC: 83.6 MG/DL (ref 63–159)
NONHDLC SERPL-MCNC: 99 MG/DL
POTASSIUM SERPL-SCNC: 4.4 MMOL/L (ref 3.5–5.1)
PROT SERPL-MCNC: 7.1 G/DL (ref 6–8.4)
SODIUM SERPL-SCNC: 141 MMOL/L (ref 136–145)
TRIGL SERPL-MCNC: 77 MG/DL (ref 30–150)

## 2024-02-08 PROCEDURE — 36415 COLL VENOUS BLD VENIPUNCTURE: CPT | Mod: HCNC,PO | Performed by: INTERNAL MEDICINE

## 2024-02-08 PROCEDURE — 80061 LIPID PANEL: CPT | Mod: HCNC | Performed by: INTERNAL MEDICINE

## 2024-02-08 PROCEDURE — 80053 COMPREHEN METABOLIC PANEL: CPT | Mod: HCNC | Performed by: INTERNAL MEDICINE

## 2024-02-08 NOTE — PROGRESS NOTES
CHW - Outreach Attempt    Community Health Worker left a In Basket message for 1st attempt to contact patient regarding: SDOH  Community Health Worker to attempt to contact patient on: 2/26  CHW - Initial Contact    This Community Health Worker completed verified updated the Social Determinant of Health questionnaire with patient via telephone today.    Pt identified barriers of most importance are: Food Assistance   Referrals to community agencies completed with patient/caregiver consent outside of [facility] include:  [place] [employer]- SNAP Program  Referrals were put through [facility] - no: none  Support and Services: none  Other information discussed the patient needs / wants help with: Pt stated that she applied for SNAP Food Program back in December 2023 and she has been checking her email and has not received any information regarding her application. CHW informed pt that an application only is active for 30 days and she may have to reapply, CHW encouraged pt to login into her online portal to check her application status. Pt also stated that she never received a SNAP Food Card. CHW encouraged pt to cb if another application is needed, pt voiced her understanding.   Follow up required: Yes, CHW will continue to f/u  Follow-up Outreach - Due: 3/4/2024

## 2024-02-15 ENCOUNTER — OFFICE VISIT (OUTPATIENT)
Dept: CARDIOLOGY | Facility: CLINIC | Age: 69
End: 2024-02-15
Payer: MEDICARE

## 2024-02-15 VITALS
WEIGHT: 161.94 LBS | HEART RATE: 72 BPM | DIASTOLIC BLOOD PRESSURE: 70 MMHG | SYSTOLIC BLOOD PRESSURE: 122 MMHG | BODY MASS INDEX: 33.99 KG/M2 | HEIGHT: 58 IN

## 2024-02-15 DIAGNOSIS — I83.893 VARICOSE VEINS OF LOWER EXTREMITY WITH EDEMA, BILATERAL: ICD-10-CM

## 2024-02-15 DIAGNOSIS — Z85.3 HISTORY OF LEFT BREAST CANCER: ICD-10-CM

## 2024-02-15 DIAGNOSIS — I89.0 LYMPHEDEMA OF BOTH LOWER EXTREMITIES: Primary | ICD-10-CM

## 2024-02-15 DIAGNOSIS — E66.9 OBESITY (BMI 30.0-34.9): ICD-10-CM

## 2024-02-15 DIAGNOSIS — G47.33 OSA (OBSTRUCTIVE SLEEP APNEA): ICD-10-CM

## 2024-02-15 DIAGNOSIS — E78.2 MIXED HYPERLIPIDEMIA: ICD-10-CM

## 2024-02-15 DIAGNOSIS — I10 ESSENTIAL HYPERTENSION: ICD-10-CM

## 2024-02-15 DIAGNOSIS — I87.2 VENOUS STASIS DERMATITIS OF BOTH LOWER EXTREMITIES: ICD-10-CM

## 2024-02-15 PROCEDURE — 99999 PR PBB SHADOW E&M-EST. PATIENT-LVL III: CPT | Mod: PBBFAC,HCNC,, | Performed by: INTERNAL MEDICINE

## 2024-02-15 PROCEDURE — 3044F HG A1C LEVEL LT 7.0%: CPT | Mod: HCNC,CPTII,S$GLB, | Performed by: INTERNAL MEDICINE

## 2024-02-15 PROCEDURE — 3078F DIAST BP <80 MM HG: CPT | Mod: HCNC,CPTII,S$GLB, | Performed by: INTERNAL MEDICINE

## 2024-02-15 PROCEDURE — 1159F MED LIST DOCD IN RCRD: CPT | Mod: HCNC,CPTII,S$GLB, | Performed by: INTERNAL MEDICINE

## 2024-02-15 PROCEDURE — 1157F ADVNC CARE PLAN IN RCRD: CPT | Mod: HCNC,CPTII,S$GLB, | Performed by: INTERNAL MEDICINE

## 2024-02-15 PROCEDURE — 99215 OFFICE O/P EST HI 40 MIN: CPT | Mod: HCNC,S$GLB,, | Performed by: INTERNAL MEDICINE

## 2024-02-15 PROCEDURE — 3008F BODY MASS INDEX DOCD: CPT | Mod: HCNC,CPTII,S$GLB, | Performed by: INTERNAL MEDICINE

## 2024-02-15 PROCEDURE — 1126F AMNT PAIN NOTED NONE PRSNT: CPT | Mod: HCNC,CPTII,S$GLB, | Performed by: INTERNAL MEDICINE

## 2024-02-15 PROCEDURE — 1101F PT FALLS ASSESS-DOCD LE1/YR: CPT | Mod: HCNC,CPTII,S$GLB, | Performed by: INTERNAL MEDICINE

## 2024-02-15 PROCEDURE — 3074F SYST BP LT 130 MM HG: CPT | Mod: HCNC,CPTII,S$GLB, | Performed by: INTERNAL MEDICINE

## 2024-02-15 PROCEDURE — 3288F FALL RISK ASSESSMENT DOCD: CPT | Mod: HCNC,CPTII,S$GLB, | Performed by: INTERNAL MEDICINE

## 2024-02-15 PROCEDURE — 4010F ACE/ARB THERAPY RXD/TAKEN: CPT | Mod: HCNC,CPTII,S$GLB, | Performed by: INTERNAL MEDICINE

## 2024-02-15 RX ORDER — AZELASTINE 1 MG/ML
1 SPRAY, METERED NASAL 2 TIMES DAILY
Status: ON HOLD | COMMUNITY
End: 2024-04-30 | Stop reason: HOSPADM

## 2024-02-15 NOTE — PATIENT INSTRUCTIONS
Assessment/Plan:  Chelsea Rodriguez is a 69 y.o. female with HTN, HLD, obesity, HENOK (on CPAP), who presents for a follow up.    1. BLE Edema- Due to lymphedema. Edema is stable.  Continue lymphedema clinic techniques at home.  Limit sodium intake to 2,000 mg daily.  Limit volume intake to 1.5 liters daily.  Elevate legs when resting. .    2. HTN- Continue current medications.    3. HLD- LDL 84 on 2/8/2024.  Continue zetia 10 mg daily, ASA 81 mg daily, and atorvastatin 80 mg daily.        4. Obesity- Pt referred to Bariatric Medicine for evaluation.  She has lost 8 pounds since clinic visit on 10/6/2023.  Encourage diet, exercise and weight loss.    5. HENOK- Continue CPAP nightly.    Follow up in 6 months with lipids and cmp prior

## 2024-02-15 NOTE — PROGRESS NOTES
[facility]    CC: BLE edema      Patient ID: Chelsea Rodriguez is a 69 y.o. female with HTN,. HLD, obesity, HENOK (on CPAP), who presents for a follow up.  Pertinent history/events are as follows:     -Pt kindly referred by Dr. Summers for evaluation of BLE edema.    Initial clinic visit 4/19/2022: Mrs. Rodriguez reports leg swelling for several years.  States leg welling became worse following right knee replacement surgery in 3/2021.  She has no claudication or tissue loss.  Plan:  BLE Edema- Due to lymphedema and possible venous insufficiency.  Check BLE venous reflux study and RAMOS study.  Refer to lymphedema clinic.  Limit sodium intake to 2,000 mg daily.  Limit volume intake to 1.5 liters daily.  Elevate legs when resting.  HTN- Continue current medications.  HLD- Discontinue pravastatin and start atorvastatin 80 mg daily.  Continue ASA 81 mg daily.  Obesity- Encourage diet, exercise and weight loss.  HENOK- Continue CPAP nightly.     -Follow up clinic visit 7/19/2022: Mrs. Rodriguez reports feeling well. She states she missed some doses of cholesterol medicine and may have been eating more fatty foods recently. She has no other complaints.  Plan:   BLE Edema- Due to lymphedema. Lymphedema clinic on the waiting list starting early September. Venous ultrasound negative for reflux however she does have clinical features of venous insufficiency and this may be a false negative test.  Limit sodium intake to 2,000 mg daily.  Limit volume intake to 1.5 liters daily.  Elevate legs when resting. Compression stockings.  HTN- Continue current medications.  HLD-  while taking atorvastatin 80mg. Instructed to modify diet and increase exercise. Continue ASA 81 mg daily. Lipid panel prior to next visit.  Obesity- Encourage diet, exercise and weight loss.  HENOK- Continue CPAP nightly.    -12/20/2022 clinic visit: Mrs. Rodriguez reports significant improvement in leg swelling since completing lymphedema clinic therapy.    on 12/13/2022 vs 133 on 7/13/2022.   Plan:   BLE Edema- Due to lymphedema. Mrs. Rodriguez reports significant improvement in leg swelling since completing lymphedema clinic therapy.  Continue lymphedema clinic techniques at home.  Limit sodium intake to 2,000 mg daily.  Limit volume intake to 1.5 liters daily.  Elevate legs when resting. .  HTN- Continue current medications.  HLD-  on 12/13/2022 vs 133 on 7/13/2022.  Continue ASA 81 mg daily and atorvastatin 80 mg daily.  Consider adding zetia next visit if LDL <70.    Obesity- Encourage diet, exercise and weight loss.  HENOK- Continue CPAP nightly.    -10/6/2023 clinic visit: Mrs. Rodriguez reports no chest pain or SOB. States leg swelling hs been well controlled.   on 10/2/2023.   Plan:   BLE Edema- Due to lymphedema. Edema is stable. Continue lymphedema clinic techniques at home. Limit sodium intake to 2,000 mg daily. Limit volume intake to 1.5 liters daily. Elevate legs when resting. .  HTN- Continue current medications.  HLD-  on 10/2/2023.  Start zetia 10 mg daily.  Continue ASA 81 mg daily and atorvastatin 80 mg daily.      Obesity- Refer to Bariatric Medicine for evaluation.  Encourage diet, exercise and weight loss.  HENOK- Continue CPAP nightly.    HPI:  Mrs. Rodriguez reports doing well with no chest pain, SOB, TIA symptoms or syncope.  States leg swelling remains well controlled.  She has lost 8 pounds since clinic visit on 10/6/2023.  LDL 84 on 2/8/2024.          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 Oneil    CATARACT EXTRACTION W/  INTRAOCULAR LENS IMPLANT Left 2018    Dr. Oneil     SECTION      x2    COLONOSCOPY N/A 10/20/2017    Procedure: COLONOSCOPY;  Surgeon: Mitchell Danielson Jr., MD;  Location: [facility] ENDO;  Service: Endoscopy;  Laterality: N/A;    COLONOSCOPY N/A 2021    Procedure: COLONOSCOPY/Suprep;  Surgeon: Malcolm Reyes MD;  Location: [facility] ENDO;  Service: Endoscopy;  Laterality: N/A;    CYST REMOVAL      on back    ESOPHAGOGASTRODUODENOSCOPY N/A 2021    Procedure: EGD (ESOPHAGOGASTRODUODENOSCOPY);  Surgeon: Malcolm Reyes MD;  Location: [facility] ENDO;  Service: Endoscopy;  Laterality: N/A;    HERNIA REPAIR      HYSTERECTOMY      at 25 yrs old    INTRAOCULAR PROSTHESES INSERTION Left 2018    Procedure: INSERTION, IOL PROSTHESIS;  Surgeon: Sergio Oneil MD;  Location: [facility] OR 1ST FLR;  Service: Ophthalmology;  Laterality: Left;    INTRAOCULAR PROSTHESES INSERTION Right 2018    Procedure: INSERTION, IOL PROSTHESIS;  Surgeon: Sergio Oneil MD;  Location: [facility] OR 2ND FLR;  Service: Ophthalmology;  Laterality: Right;    KNEE ARTHROPLASTY Right 2021    Procedure: ARTHROPLASTY, KNEE:RIGHT:DEPUY-SIGMA ;  Surgeon: Pedro Summers III, MD;  Location: [facility] OR;  Service: Orthopedics;  Laterality: Right;    OOPHORECTOMY      @ 45 yrs old    PHACOEMULSIFICATION OF CATARACT Left 2018    Procedure: PHACOEMULSIFICATION, CATARACT;  Surgeon: Sergio Oneil MD;  Location: [facility] OR 1ST FLR;  Service: Ophthalmology;  Laterality: Left;    PHACOEMULSIFICATION OF CATARACT Right 2018    Procedure: PHACOEMULSIFICATION, CATARACT;  Surgeon: Sergio Oneil MD;  Location: [facility] OR 2ND FLR;  Service: Ophthalmology;   "Laterality: Right;    REFRACTIVE SURGERY      2023    supracervical abdominal hysterectomy  1978    fibroids     Social History     Socioeconomic History    Marital status: Single    Number of children: 2   Occupational History     Comment: retired   Tobacco Use    Smoking status: Never    Smokeless tobacco: Never   Substance and Sexual Activity    Alcohol use: Yes     Comment: once per month    Drug use: No    Sexual activity: Not Currently   Social History Narrative    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 hx of left lumpectomy for "breast cancer"- with 5yrs of tamoxifen use. Sees Dr. Buckley ([facility] for surveillance/MMG)        Dr. Laal former PCP, [facility]          Social Determinants of Health     Financial Resource Strain: Low Risk  (1/4/2024)    Overall Financial Resource Strain (CARDIA)     Difficulty of Paying Living Expenses: Not very hard   Food Insecurity: Food Insecurity Present (1/4/2024)    Hunger Vital Sign     Worried About Running Out of Food in the Last Year: Sometimes true     Ran Out of Food in the Last Year: Sometimes true   Transportation Needs: No Transportation Needs (1/4/2024)    PRAPARE - Transportation     Lack of Transportation (Medical): No     Lack of Transportation (Non-Medical): No   Physical Activity: Insufficiently Active (1/4/2024)    Exercise Vital Sign     Days of Exercise per Week: 2 days     Minutes of Exercise per Session: 20 min   Stress: No Stress Concern Present (1/4/2024)    [employer] [place] [employer] - Occupational Stress Questionnaire     Feeling of Stress : Not at all   Social Connections: Moderately Integrated (1/4/2024)    Social Connection and Isolation Panel [NHANES]     Frequency of Communication with Friends and Family: More than three times a week     Frequency of Social Gatherings with Friends and Family: Twice a week     Attends Worship Services: More than 4 times " per year     Active Member of Clubs or Organizations: Yes     Attends Club or Organization Meetings: More than 4 times per year     Marital Status:    Housing Stability: Unknown (1/4/2024)    Housing Stability Vital Sign     Unable to Pay for Housing in the Last Year: Patient declined     Number of Places Lived in the Last Year: 1     Unstable Housing in the Last Year: No     Family History   Problem Relation Age of Onset    Hypertension Mother     Heart disease Mother     Diabetes Mother     Hyperlipidemia Mother     Pancreatitis Mother     Cataracts Mother     Macular degeneration Mother     Cancer Father     Hypertension Sister     Thyroid disease Sister     Cancer Brother         prostate CA    Diabetes Brother     Heart disease Brother     Hyperlipidemia Daughter     No Known Problems Son     Breast cancer Paternal Cousin     Amblyopia Neg Hx     Blindness Neg Hx     Glaucoma Neg Hx     Strabismus Neg Hx     Retinal detachment Neg Hx        Review of patient's allergies indicates:   Allergen Reactions    Codeine Nausea And Vomiting       Medication List with Changes/Refills   Current Medications    ACETAMINOPHEN (TYLENOL) 650 MG TBSR    Take 1 tablet (650 mg total) by mouth every 8 (eight) hours as needed (pain).    AMOXICILLIN (AMOXIL) 500 MG CAPSULE    Take 500 mg by mouth as needed. Before dental work    ASPIRIN (ECOTRIN) 81 MG EC TABLET    Take 1 tablet (81 mg total) by mouth 2 (two) times daily.    ATORVASTATIN (LIPITOR) 80 MG TABLET    TAKE 1 TABLET (80 MG TOTAL) BY MOUTH ONCE DAILY.    AZELASTINE (ASTELIN) 137 MCG (0.1 %) NASAL SPRAY    1 spray (137 mcg total) by Nasal route 2 (two) times daily.    CETIRIZINE (ZYRTEC) 10 MG TABLET    Take 1 tablet (10 mg total) by mouth once daily.    COENZYME Q10 100 MG CAPSULE    Take 100 mg by mouth every evening.    DICLOFENAC (VOLTAREN) 25 MG TBEC    Take 1 tablet (25 mg total) by mouth 2 (two) times daily as needed.    DOCUSATE SODIUM (COLACE) 100 MG  CAPSULE    Take 1 capsule (100 mg total) by mouth 2 (two) times daily as needed for Constipation.    EZETIMIBE (ZETIA) 10 MG TABLET    Take 1 tablet (10 mg total) by mouth once daily.    FLUTICASONE PROPIONATE (FLONASE) 50 MCG/ACTUATION NASAL SPRAY    1 spray by Each Nostril route.    FLUTICASONE PROPIONATE (FLONASE) 50 MCG/ACTUATION NASAL SPRAY    1 spray (50 mcg total) by Each Nostril route once daily.    HYDROCHLOROTHIAZIDE (MICROZIDE) 12.5 MG CAPSULE    TAKE 1 CAPSULE EVERY EVENING    IRBESARTAN (AVAPRO) 150 MG TABLET    TAKE 1 TABLET ONE TIME DAILY    LORATADINE (CLARITIN) 10 MG TABLET    Take 1 tablet (10 mg total) by mouth once daily.    METFORMIN (GLUCOPHAGE-XR) 500 MG ER 24HR TABLET    Take 1 tablet (500 mg total) by mouth daily with breakfast.    METOPROLOL SUCCINATE (TOPROL-XL) 25 MG 24 HR TABLET    TAKE 1 TABLET EVERY EVENING       Review of Systems  Constitution: Denies chills, fever, and sweats.  HENT: Denies headaches or blurry vision.  Cardiovascular: Denies chest pain or irregular heart beat.  Respiratory: Denies cough or shortness of breath.  Gastrointestinal: Denies abdominal pain, nausea, or vomiting.  Musculoskeletal: Positive for leg swelling.  Neurological: Denies dizziness or focal weakness.  Psychiatric/Behavioral: Normal mental status.  Hematologic/Lymphatic: Denies bleeding problem or easy bruising/bleeding.  Skin: Denies rash or suspicious lesions    Physical Examination  LMP  (LMP Unknown)     Constitutional: No acute distress, conversant  HEENT: Sclera anicteric, Pupils equal, round and reactive to light, extraocular motions intact, Oropharynx clear  Neck: No JVD, no carotid bruits  Cardiovascular: regular rate and rhythm, no murmur, rubs or gallops, normal S1/S2  Pulmonary: Clear to auscultation bilaterally  Abdominal: Abdomen soft, nontender, nondistended, positive bowel sounds  Extremities: BLE's with trace edema, venous stasis dermatitis, and changes consistent with lymphedema    Pulses:  Carotid pulses are 2+ on the right side, and 2+ on the left side.  Radial pulses are 2+ on the right side, and 2+ on the left side.   Femoral pulses are 2+ on the right side, and 2+ on the left side.  Popliteal pulses are 2+ on the right side, and 2+ on the left side.   Dorsalis pedis pulses are 2+ on the right side, and 2+ on the left side.   Posterior tibial pulses are 2+ on the right side, and 2+ on the left side.    Skin: No ecchymosis, erythema, or ulcers  Psych: Alert and oriented x 3, appropriate affect  Neuro: CNII-XII intact, no focal deficits    Labs:  Most Recent Data  CBC:   Lab Results   Component Value Date    WBC 5.98 05/24/2023    HGB 13.1 05/24/2023    HCT 43.0 05/24/2023     05/24/2023    MCV 85 05/24/2023    RDW 15.2 (H) 05/24/2023     BMP:   Lab Results   Component Value Date     02/08/2024    K 4.4 02/08/2024     02/08/2024    CO2 29 02/08/2024    BUN 13 02/08/2024    CREATININE 0.7 02/08/2024    GLU 90 02/08/2024    CALCIUM 9.8 02/08/2024    PHOS 2.9 10/12/2017     LFTS;   Lab Results   Component Value Date    PROT 7.1 02/08/2024    ALBUMIN 3.9 02/08/2024    BILITOT 0.8 02/08/2024    AST 33 02/08/2024    ALKPHOS 80 02/08/2024    ALT 23 02/08/2024     COAGS:   Lab Results   Component Value Date    INR 0.9 03/23/2021     FLP:   Lab Results   Component Value Date    CHOL 146 02/08/2024    HDL 47 02/08/2024    LDLCALC 83.6 02/08/2024    TRIG 77 02/08/2024    CHOLHDL 32.2 02/08/2024     CARDIAC:   Lab Results   Component Value Date    TROPONINI <0.006 01/22/2023    BNP 15 01/22/2023     Imaging    RAMOS 4/26/22:  Normal rest and exercise RAMOS bilaterally.  Normal PVR waveforms bilaterally.    BLE Venous Ultrasound 4/26/22:  No evidence of lower extremity DVT or venous reflux bilaterally.    Assessment/Plan:  Chelsea Rodriguez is a 69 y.o. female with HTN, HLD, obesity, HENOK (on CPAP), who presents for a follow up.    1. BLE Edema- Due to lymphedema. Edema is stable.  Continue  lymphedema clinic techniques at home.  Limit sodium intake to 2,000 mg daily.  Limit volume intake to 1.5 liters daily.  Elevate legs when resting. .    2. HTN- Continue current medications.    3. HLD- LDL 84 on 2/8/2024.  Continue zetia 10 mg daily, ASA 81 mg daily, and atorvastatin 80 mg daily.        4. Obesity- Pt referred to Bariatric Medicine for evaluation.  She has lost 8 pounds since clinic visit on 10/6/2023.  Encourage diet, exercise and weight loss.    5. HENOK- Continue CPAP nightly.    Follow up in 6 months with lipids and cmp prior     Total duration of face to face visit time 30 minutes.  Total time spent counseling greater than fifty percent of total visit time.  Counseling included discussion regarding imaging findings, diagnosis, possibilities, treatment options, risks and benefits.  The patient had many questions regarding the options and long-term effects.    Marcell Rico MD, PhD  Interventional Cardiology

## 2024-02-28 ENCOUNTER — PATIENT OUTREACH (OUTPATIENT)
Dept: ADMINISTRATIVE | Facility: OTHER | Age: 69
End: 2024-02-28
Payer: MEDICARE

## 2024-02-28 NOTE — PROGRESS NOTES
CHW - Follow Up    This Community Health Worker completed a follow up visit with patient via telephone today.  Pt/Caregiver reported: Pt stated that she has not had the chance to check her SNAP portal as of yet but she plans to do so. Pt has had other obligations to take care of first  Community Health Worker provided: CHW voiced understanding  Follow up required: Yes, CHW will continue to f/u  Follow-up Outreach - Due: 3/13/2024

## 2024-03-04 ENCOUNTER — CLINICAL SUPPORT (OUTPATIENT)
Dept: REHABILITATION | Facility: HOSPITAL | Age: 69
End: 2024-03-04
Payer: MEDICARE

## 2024-03-04 DIAGNOSIS — M25.562 BILATERAL CHRONIC KNEE PAIN: Primary | ICD-10-CM

## 2024-03-04 DIAGNOSIS — M25.60 DECREASED MOBILITY OF JOINT: ICD-10-CM

## 2024-03-04 DIAGNOSIS — M62.81 MUSCLE WEAKNESS OF LOWER EXTREMITY: ICD-10-CM

## 2024-03-04 DIAGNOSIS — M25.561 BILATERAL CHRONIC KNEE PAIN: Primary | ICD-10-CM

## 2024-03-04 DIAGNOSIS — G89.29 BILATERAL CHRONIC KNEE PAIN: Primary | ICD-10-CM

## 2024-03-04 PROCEDURE — 97110 THERAPEUTIC EXERCISES: CPT | Mod: CQ

## 2024-03-04 PROCEDURE — 97530 THERAPEUTIC ACTIVITIES: CPT | Mod: CQ

## 2024-03-04 NOTE — PROGRESS NOTES
"[facility]   Physical Therapy Treatment Note      Name: Chelsea Rodriguez  Clinic Number: [id number]    Therapy Diagnosis:   Encounter Diagnoses   Name Primary?    Bilateral chronic knee pain Yes    Decreased mobility of joint     Muscle weakness of lower extremity      Physician: Elaine Kraus MD    Visit Date: 3/4/2024    Evaluation Date: 12/5/2023  Authorization Period Expiration: [id number]  Plan of Care Expiration: 2/29/2024  Progress Note Due: 3/5/2023  Most Recent Progress Note: 1/19/2023  Date of Surgery: None  Visit # / Visits authorized: 9/15  FOTO: 1/ 3  Precautions: Standard     PTA Visit #: 1/5     Time In: 11:00 AM   Time Out: 12:00 PM   Total Billable Time: 60 minutes    Subjective     Pt reports falling on Feb. 15 th. Patient stated, " I got tangled up in a trap and I twisted my left knee than fell on the ground. I have been using a cane since then because my left knee feels like it wants to give up on me."  She was not compliant with home exercise program.    Response to previous treatment: initial eval   Functional change: none     Pain: 4/10  Location: Bilateral knee      Objective      Objective Measures updated at progress report unless specified.     Treatment     Chelsea received the treatments listed below:      therapeutic exercises to develop strength, endurance, and ROM for 43 minutes including:  NuStep, 8 minutes for range of motion   Bridges,staggered stance, 2 x 12, 5" blue band  Straight Leg Raises with 2# cuff weight 2 x 10 each  Long-Arc Quad (attempted independently, modified to strap-assistance) 5x  Knee Extension Isometric at 60 degrees, 5x (attempted but not pursued 2/2 pain)  Double Leg Press Isometric 10x6" 200#  Double Leg Press, 40#, 3x8  Single Leg Press, 20# on the left; 10# on the right (unable to pursue)  Shuttle Single Leg Press, 37.5# on the right, 2x 1 minute duration     Manual Therapy for 00 minutes:  Patellofemoral Inferior Gliding " "(R)  Posterior Tibiofemoral Gliding + Internal Rotation     Chelsea participated in dynamic functional therapeutic activities to improve functional performance for 10  minutes, including:  Sitting to Standing, 18" surface + 2" riser, Hip-Hinging with Dowel Arnoldo, 2 x 10  with assistance in concentric, and independent in eccentric  Step-Up, 4" step, right side, x 12, unilateral hand hold    Patient Education and Home Exercises       Education provided:   -Findings of evaluation and examination, and affect of these on plan for treatment  -Prognosis and expectations  -Role of PT and team-centered care for patient  -Home exercise program and expectations of therapy     Written Home Exercises Provided: yes. Exercises were reviewed and Chelsea was able to demonstrate them prior to the end of the session.  Chelsea demonstrated good  understanding of the education provided. See EMR under Patient Instructions for exercises provided during therapy sessions    Assessment     Physical Therapist Assistant assessed knee after scifit warm up, patient without tenderness throughout palpation and patient without reproduced pain throughout knee flexion and extension. Patient also without increased or reproduced pain anterior or posterior glides. Patient was able to complete full treatment session today with minimal reports of increased discomfort. Patient with most discomfort with seated long arch quads on right and single leg leg press, however, patient able to complete shuttle leg press after performing isometrics on precor leg press. Patient also noticed she was able to perform long arch quads on right lower extremity still with some discomfort, however, not as much as before performed percor leg press isometrics. Physical Therapist assessed patient before leaving Physical Therapy treatment today and found no significant concerns with patient's most recent fall. Patient is due for follow up with MD in a couple of weeks.     Chelsea Is " progressing well towards her goals.   Pt prognosis is Good.     Pt will continue to benefit from skilled outpatient physical therapy to address the deficits listed in the problem list box on initial evaluation, provide pt/family education and to maximize pt's level of independence in the home and community environment.     Pt's spiritual, cultural and educational needs considered and pt agreeable to plan of care and goals.     Anticipated barriers to physical therapy: Chronicity, History of Previous Surgeries      Goals:      Short Term Goals: 2 weeks   1.) Patient will demonstrate independence in compliance and technique of home exercise program provided as per teach-back method of assessment. Ongoing  2.) Patient will achieve 0-90 degrees of knee flexion to demonstrate progressing range of motion for improved functional mobility. Met  3.) Patient will ambulate for 200 feet with LRAD with supervision-level assistance and normalized gait pattern.  Ongoing  4.) Patient will demonstrate good quality quadriceps set with the ability to complete 10x straight-leg raises without quadriceps lag on right side.  Ongoing     Long Term Goals: 6 weeks   1.) Patient will demonstrate independence in compliance and technique of home exercise program provided as per teach-back method of assessment. Ongoing  2.) Patient will achieve 0-100 degrees of knee flexion to demonstrate progressing range of motion for improved functional mobility. Met on the right, ongoing on the left  3.) Patient will ambulate for 200 feet without any assistive device with supervision-level assistance and normalized gait pattern. Met  4.) Patient will demonstrate good quality quadriceps set with the ability to complete 3x10 straight-leg raises without quadriceps lag.  Ongoing  5.) Patient will demonstrate <14 seconds as per TUG Test to demonstrate improved functional mobility and decreased fall risk. Met  6.) Patient will demonstrate 5xSTS Test in <16 seconds   to demonstrate improved functional mobility and decreased fall risk. Met  7.) Patient will note <10% disability as per FOTO score. Ongoing      Plan     Continue with Physical Therapist Plan of Care.     Jani Dutta, PTA

## 2024-03-06 DIAGNOSIS — Z96.651 STATUS POST TOTAL RIGHT KNEE REPLACEMENT: Primary | ICD-10-CM

## 2024-03-06 DIAGNOSIS — M25.562 ACUTE PAIN OF LEFT KNEE: ICD-10-CM

## 2024-03-11 ENCOUNTER — CLINICAL SUPPORT (OUTPATIENT)
Dept: REHABILITATION | Facility: HOSPITAL | Age: 69
End: 2024-03-11
Payer: MEDICARE

## 2024-03-11 DIAGNOSIS — M25.60 DECREASED MOBILITY OF JOINT: ICD-10-CM

## 2024-03-11 DIAGNOSIS — M62.81 MUSCLE WEAKNESS OF LOWER EXTREMITY: ICD-10-CM

## 2024-03-11 DIAGNOSIS — M25.561 BILATERAL CHRONIC KNEE PAIN: Primary | ICD-10-CM

## 2024-03-11 DIAGNOSIS — G89.29 BILATERAL CHRONIC KNEE PAIN: Primary | ICD-10-CM

## 2024-03-11 DIAGNOSIS — M25.562 BILATERAL CHRONIC KNEE PAIN: Primary | ICD-10-CM

## 2024-03-11 PROCEDURE — 97110 THERAPEUTIC EXERCISES: CPT

## 2024-03-11 NOTE — PLAN OF CARE
"[facility]   Physical Therapy Treatment and Progress Note      Name: Chelsea Rodriguez  Clinic Number: [id number]    Therapy Diagnosis:   Encounter Diagnoses   Name Primary?    Bilateral chronic knee pain Yes    Decreased mobility of joint     Muscle weakness of lower extremity      Physician: Elaine Kraus MD  Visit Date: 3/11/2024  Evaluation Date: 12/5/2023  Authorization Period Expiration: [id number]  Plan of Care Expiration: 2/29/2024 **Update to 4/11/2024  Progress Note Due: 4/11/2024  Most Recent Progress Note: 3/11/2024  Date of Surgery: None  Visit # / Visits authorized: 10/15  FOTO: 1/ 3  Precautions: Standard   PTA Visit #: 1/5   Time In: 11:10 am  Time Out: 12:00 pmm   Total Billable Time: 25 minutes  Subjective   Pt reports she feels that she is becoming stronger, as she was not able to reciprocally ascend the stairs before coming to therapy. She continues to have the "clicking" in her right knee that feels like it gets "caught," and then has to release when she is performing knee extension, either in open chained or closed-chained.  She was not compliant with home exercise program.  Response to previous treatment: No adverse effect.   Functional change: none   Pain: 4/10  Location: Bilateral knee    Objective       Right Left   Knee Flexion 98 degrees 99 degrees   Knee Extension 0 degrees 0 degrees     Stair Navigation: Chelsea ascends the stairs reciprocally, with a slow and cautious gait pattern and bilateral use of upper extremities on bilateral hand-rails this visit. During descending of 4-6" steps, the patient is unable to descend while weightbearing on the right leg, and must descend using only the left leg for eccentric quadriceps control.    Treatment     Chelsea received the treatments listed below:      therapeutic exercises to develop strength, endurance, and ROM for 50 minutes including:  NuStep, 8 minutes for range of motion   Bridges,staggered stance, 2 x 12, 5" blue " "band  Clamshells, red band, 3x8 each side  Double Leg Press, 50#, 3x10  Single Leg Press, eccentric, 20# on the left and right; could only move through partial range of motion this visit  Objective measures taken (see above)    Manual Therapy for 00 minutes:  Patellofemoral Inferior Gliding (R)  Posterior Tibiofemoral Gliding + Internal Rotation     Chelsea participated in dynamic functional therapeutic activities to improve functional performance for 00  minutes, including:  Sitting to Standing, 18" surface + 2" riser, Hip-Hinging with Dowel Arnoldo, 2 x 10  with assistance in concentric, and independent in eccentric  Step-Up, 4" step, right side, x 12, unilateral hand hold    Patient Education and Home Exercises       Education provided:   -Findings of evaluation and examination, and affect of these on plan for treatment  -Prognosis and expectations  -Role of PT and team-centered care for patient  -Home exercise program and expectations of therapy     Written Home Exercises Provided: yes. Exercises were reviewed and Chelsea was able to demonstrate them prior to the end of the session.  Chelsea demonstrated good  understanding of the education provided. See EMR under Patient Instructions for exercises provided during therapy sessions    Assessment   Chelsea has undergone a total of 17 visits in the care of her bilateral knee pain with mobility and muscle performance deficits. At this time, range of motion remains minimally changed from initial evaluation, and this is likely reaching maximal ability for progression. Chelsea ascends the stairs reciprocally, with a slow and cautious gait pattern and bilateral use of upper extremities on bilateral hand-rails this visit, a task that she has been unable to achieve in the past. During descending of 4-6" steps, the patient is unable to descend while weightbearing on the right leg, and must descend using only the left leg for eccentric quadriceps control. Chelsea may continue benefit from " a customized physical therapy plan of care  to address the aforementioned impairments in an effort to improve human function and quality of life.      Chelsea Is progressing well towards her goals.   Pt prognosis is Good.     Pt will continue to benefit from skilled outpatient physical therapy to address the deficits listed in the problem list box on initial evaluation, provide pt/family education and to maximize pt's level of independence in the home and community environment.     Pt's spiritual, cultural and educational needs considered and pt agreeable to plan of care and goals.     Anticipated barriers to physical therapy: Chronicity, History of Previous Surgeries      Goals:      Short Term Goals: 2 weeks   1.) Patient will demonstrate independence in compliance and technique of home exercise program provided as per teach-back method of assessment. Ongoing  2.) Patient will achieve 0-90 degrees of knee flexion to demonstrate progressing range of motion for improved functional mobility. Met  3.) Patient will ambulate for 200 feet with LRAD with supervision-level assistance and normalized gait pattern.  Ongoing  4.) Patient will demonstrate good quality quadriceps set with the ability to complete 10x straight-leg raises without quadriceps lag on right side.  Ongoing     Long Term Goals: 6 weeks   1.) Patient will demonstrate independence in compliance and technique of home exercise program provided as per teach-back method of assessment. Ongoing  2.) Patient will achieve 0-100 degrees of knee flexion to demonstrate progressing range of motion for improved functional mobility. Met on the right, ongoing on the left  3.) Patient will ambulate for 200 feet without any assistive device with supervision-level assistance and normalized gait pattern. Met  4.) Patient will demonstrate good quality quadriceps set with the ability to complete 3x10 straight-leg raises without quadriceps lag.  Ongoing  5.) Patient will  demonstrate <14 seconds as per TUG Test to demonstrate improved functional mobility and decreased fall risk. Met  6.) Patient will demonstrate 5xSTS Test in <16 seconds  to demonstrate improved functional mobility and decreased fall risk. Met  7.) Patient will note <10% disability as per FOTO score. Ongoing      Plan     Continue with Physical Therapist Plan of Care.     Preethi Patel, PT DPT  Board Certified in Orthopedic Physical Therapy

## 2024-03-11 NOTE — PROGRESS NOTES
"[facility]   Physical Therapy Treatment and Progress Note      Name: Chelsea Rodriguez  Clinic Number: [id number]    Therapy Diagnosis:   Encounter Diagnoses   Name Primary?    Bilateral chronic knee pain Yes    Decreased mobility of joint     Muscle weakness of lower extremity      Physician: Elaine Kraus MD  Visit Date: 3/11/2024  Evaluation Date: 12/5/2023  Authorization Period Expiration: 10/[id number]  Plan of Care Expiration: 2/29/2024 **Update to 4/11/2024  Progress Note Due: 4/11/2024  Most Recent Progress Note: 3/11/2024  Date of Surgery: None  Visit # / Visits authorized: 10/15  FOTO: 1/ 3  Precautions: Standard   PTA Visit #: 1/5   Time In: 11:10 am  Time Out: 12:00 pmm   Total Billable Time: 25 minutes  Subjective   Pt reports she feels that she is becoming stronger, as she was not able to reciprocally ascend the stairs before coming to therapy. She continues to have the "clicking" in her right knee that feels like it gets "caught," and then has to release when she is performing knee extension, either in open chained or closed-chained.  She was not compliant with home exercise program.  Response to previous treatment: No adverse effect.   Functional change: none   Pain: 4/10  Location: Bilateral knee    Objective       Right Left   Knee Flexion 98 degrees 99 degrees   Knee Extension 0 degrees 0 degrees     Stair Navigation: Chelsea ascends the stairs reciprocally, with a slow and cautious gait pattern and bilateral use of upper extremities on bilateral hand-rails this visit. During descending of 4-6" steps, the patient is unable to descend while weightbearing on the right leg, and must descend using only the left leg for eccentric quadriceps control.    Treatment     Chelsea received the treatments listed below:      therapeutic exercises to develop strength, endurance, and ROM for 50 minutes including:  NuStep, 8 minutes for range of motion   Bridges,staggered stance, 2 x 12, 5" blue " "band  Clamshells, red band, 3x8 each side  Double Leg Press, 50#, 3x10  Single Leg Press, eccentric, 20# on the left and right; could only move through partial range of motion this visit  Objective measures taken (see above)    Manual Therapy for 00 minutes:  Patellofemoral Inferior Gliding (R)  Posterior Tibiofemoral Gliding + Internal Rotation     Chelsea participated in dynamic functional therapeutic activities to improve functional performance for 00  minutes, including:  Sitting to Standing, 18" surface + 2" riser, Hip-Hinging with Dowel Arnoldo, 2 x 10  with assistance in concentric, and independent in eccentric  Step-Up, 4" step, right side, x 12, unilateral hand hold    Patient Education and Home Exercises       Education provided:   -Findings of evaluation and examination, and affect of these on plan for treatment  -Prognosis and expectations  -Role of PT and team-centered care for patient  -Home exercise program and expectations of therapy     Written Home Exercises Provided: yes. Exercises were reviewed and Chelsea was able to demonstrate them prior to the end of the session.  Chelsea demonstrated good  understanding of the education provided. See EMR under Patient Instructions for exercises provided during therapy sessions    Assessment   Chelsea has undergone a total of 17 visits in the care of her bilateral knee pain with mobility and muscle performance deficits. At this time, range of motion remains minimally changed from initial evaluation, and this is likely reaching maximal ability for progression. Chelsea ascends the stairs reciprocally, with a slow and cautious gait pattern and bilateral use of upper extremities on bilateral hand-rails this visit, a task that she has been unable to achieve in the past. During descending of 4-6" steps, the patient is unable to descend while weightbearing on the right leg, and must descend using only the left leg for eccentric quadriceps control. Chelsea may continue benefit from " a customized physical therapy plan of care  to address the aforementioned impairments in an effort to improve human function and quality of life.      Chelsea Is progressing well towards her goals.   Pt prognosis is Good.     Pt will continue to benefit from skilled outpatient physical therapy to address the deficits listed in the problem list box on initial evaluation, provide pt/family education and to maximize pt's level of independence in the home and community environment.     Pt's spiritual, cultural and educational needs considered and pt agreeable to plan of care and goals.     Anticipated barriers to physical therapy: Chronicity, History of Previous Surgeries      Goals:      Short Term Goals: 2 weeks   1.) Patient will demonstrate independence in compliance and technique of home exercise program provided as per teach-back method of assessment. Ongoing  2.) Patient will achieve 0-90 degrees of knee flexion to demonstrate progressing range of motion for improved functional mobility. Met  3.) Patient will ambulate for 200 feet with LRAD with supervision-level assistance and normalized gait pattern.  Ongoing  4.) Patient will demonstrate good quality quadriceps set with the ability to complete 10x straight-leg raises without quadriceps lag on right side.  Ongoing     Long Term Goals: 6 weeks   1.) Patient will demonstrate independence in compliance and technique of home exercise program provided as per teach-back method of assessment. Ongoing  2.) Patient will achieve 0-100 degrees of knee flexion to demonstrate progressing range of motion for improved functional mobility. Met on the right, ongoing on the left  3.) Patient will ambulate for 200 feet without any assistive device with supervision-level assistance and normalized gait pattern. Met  4.) Patient will demonstrate good quality quadriceps set with the ability to complete 3x10 straight-leg raises without quadriceps lag.  Ongoing  5.) Patient will  demonstrate <14 seconds as per TUG Test to demonstrate improved functional mobility and decreased fall risk. Met  6.) Patient will demonstrate 5xSTS Test in <16 seconds  to demonstrate improved functional mobility and decreased fall risk. Met  7.) Patient will note <10% disability as per FOTO score. Ongoing      Plan     Continue with Physical Therapist Plan of Care.     Preethi Patel, PT DPT  Board Certified in Orthopedic Physical Therapy

## 2024-03-12 ENCOUNTER — HOSPITAL ENCOUNTER (OUTPATIENT)
Dept: RADIOLOGY | Facility: HOSPITAL | Age: 69
Discharge: HOME | End: 2024-03-12
Attending: ORTHOPAEDIC SURGERY
Payer: MEDICARE

## 2024-03-12 ENCOUNTER — OFFICE VISIT (OUTPATIENT)
Dept: ORTHOPEDICS | Facility: CLINIC | Age: 69
End: 2024-03-12
Payer: MEDICARE

## 2024-03-12 VITALS — BODY MASS INDEX: 33.58 KG/M2 | HEIGHT: 58 IN | WEIGHT: 160 LBS

## 2024-03-12 DIAGNOSIS — M17.12 PRIMARY OSTEOARTHRITIS OF LEFT KNEE: ICD-10-CM

## 2024-03-12 DIAGNOSIS — Z96.651 STATUS POST TOTAL RIGHT KNEE REPLACEMENT: Primary | ICD-10-CM

## 2024-03-12 DIAGNOSIS — Z96.651 STATUS POST TOTAL RIGHT KNEE REPLACEMENT: ICD-10-CM

## 2024-03-12 DIAGNOSIS — M25.562 ACUTE PAIN OF LEFT KNEE: ICD-10-CM

## 2024-03-12 PROCEDURE — 99999 PR PBB SHADOW E&M-EST. PATIENT-LVL III: CPT | Mod: PBBFAC,HCNC,, | Performed by: ORTHOPAEDIC SURGERY

## 2024-03-12 PROCEDURE — 1101F PT FALLS ASSESS-DOCD LE1/YR: CPT | Mod: HCNC,CPTII,S$GLB, | Performed by: ORTHOPAEDIC SURGERY

## 2024-03-12 PROCEDURE — 3288F FALL RISK ASSESSMENT DOCD: CPT | Mod: HCNC,CPTII,S$GLB, | Performed by: ORTHOPAEDIC SURGERY

## 2024-03-12 PROCEDURE — 1159F MED LIST DOCD IN RCRD: CPT | Mod: HCNC,CPTII,S$GLB, | Performed by: ORTHOPAEDIC SURGERY

## 2024-03-12 PROCEDURE — 99214 OFFICE O/P EST MOD 30 MIN: CPT | Mod: HCNC,S$GLB,, | Performed by: ORTHOPAEDIC SURGERY

## 2024-03-12 PROCEDURE — 4010F ACE/ARB THERAPY RXD/TAKEN: CPT | Mod: HCNC,CPTII,S$GLB, | Performed by: ORTHOPAEDIC SURGERY

## 2024-03-12 PROCEDURE — 3008F BODY MASS INDEX DOCD: CPT | Mod: HCNC,CPTII,S$GLB, | Performed by: ORTHOPAEDIC SURGERY

## 2024-03-12 PROCEDURE — 73564 X-RAY EXAM KNEE 4 OR MORE: CPT | Mod: 26,50,HCNC, | Performed by: RADIOLOGY

## 2024-03-12 PROCEDURE — 3044F HG A1C LEVEL LT 7.0%: CPT | Mod: HCNC,CPTII,S$GLB, | Performed by: ORTHOPAEDIC SURGERY

## 2024-03-12 PROCEDURE — 1126F AMNT PAIN NOTED NONE PRSNT: CPT | Mod: HCNC,CPTII,S$GLB, | Performed by: ORTHOPAEDIC SURGERY

## 2024-03-12 PROCEDURE — 73564 X-RAY EXAM KNEE 4 OR MORE: CPT | Mod: TC,50,HCNC

## 2024-03-12 PROCEDURE — 1157F ADVNC CARE PLAN IN RCRD: CPT | Mod: HCNC,CPTII,S$GLB, | Performed by: ORTHOPAEDIC SURGERY

## 2024-03-12 RX ORDER — METHYLPREDNISOLONE 4 MG/1
TABLET ORAL
Qty: 1 EACH | Refills: 0 | Status: SHIPPED | OUTPATIENT
Start: 2024-03-12 | End: 2024-03-25

## 2024-03-12 NOTE — PROGRESS NOTES
Subjective:     HPI:   Chelsea Rodriguez is a 69 y.o. female who presents for L knee and R TKA eval    R TKA 3/31/21 sigma    History of Present Illness  The patient presents for evaluation of left knee pain.    She is just about 3 years out from her right knee replacement which was 03/31/2021 and she is also here for her left knee. She fell on 02/15/2024. She was putting grocery weight near the porch and when she turned around, her foot got caught in the torque and she twisted and ever since she fell. She fell on her right side, but neither of her knees touched the ground. She was able to get up and walk, but since then, the left knee has been hurting from the medial knee across to the front. She took Advil. She is not on any medications from the rheumatologist. She told them everything was hurting from her shoulders to her knees, so they sent her to therapy for her rotator cuff. Her knees hurt worse than the shoulders. She has been doing bilateral knee therapy from 10/2023 until now. She uses a cane for her left knee. Her knee gives out on her. Her right knee is doing better because they have been strengthening it more. It never stopped hurting more than 3 months. If she tries to raise the knee up from a sitting position, she can not extend it. It locks until it clicks. When it clicks, it releases the pressure and she is able to raise it up. She is also able to walk up the stairs. With flexion to extension, she feels like something is getting stuck in there. She is not taking any medication for pain in her right knee. If she works her right knee with her left foot, it will click, but it does not hurt. Walking is not too much of a problem. She denies any numbness or tingling. She uses Essent oil, which does help with the pain.      Oct 23: referred to PT by rheum for shoulder therapy but PT on their own changed the focus to knee PT and has been doing B knee PT from October 2023 until now    R knee was just painful to  flex/extending  Then started clicking with PT       Objective:   Body mass index is 33.44 kg/m².  Exam:    Physical Exam  She is walking well. No limp, nonantalgic gait. Negative Trendelenburg. She has no groin pain with active straight leg raise and no pain with active or passive range of motion of her hip joints. Distally, she is neurovascularly intact. For the left knee, 0 to 100 degree knee range of motion, 3 degrees varus alignment which does correct. She is tender to palpation predominantly in the medial joint line with moderate effusion. Knee is stable to anterior and posterior varus and valgus stresses without flexion contracture or extension lag. On the right knee, incision is well healed. There are no concerning signs for infection. No significant effusion. 0 to 100 degree knee range of motion. 5 degrees valgus alignment. Knee is stable to anterior, posterior varus and valgus stresses without flexion contracture or Stinchfield lag. However, she does have some pain with a resisted straight leg raise across the front of the knee. It feels like the knee is getting stuck going from flexion to extension and back. - can feel audible clunk     Again, she does have 5 out of 5 knee extension strength and no extensor lag, but somewhat limited by pain. She was able to flex to 100 degrees, same thing when going into extension.        Imaging:    Results  Imaging  Right knee x-ray from 2023 was reviewed with the patient.    KNEE L ARTHRITIS     Indication:  Left knee pain  Exam Ordered: Radiographs of the left knee include a standing anteroposterior view, a standing posterioanterior view, a lateral view in full flexion, and a sunrise view  Details of Examination: Exam shows evidence of joint space narrowing, osteophyte formation, and subchondral sclerosis, all consistent with degenerative arthritis of the knee.  No other significant findings are noted.  Impression:  Degenerative Arthritis, Left Knee    L knee Klg4  varus arthritis, severe, bone on bone    R TKA no change from 2022  Stable RL lines tibia      Assessment/Plan:       ICD-10-CM ICD-9-CM   1. Status post total right knee replacement  Z96.651 V43.65   2. Primary osteoarthritis of left knee  M17.12 715.16        L knee varus OA, exacerbated by fall 1 month ago  R TKA, ?patellar clunk, 3ish months of PT - helping    Assessment/Plan:     Assessment & Plan  1. Left knee arthritis.  She has stiffness, soreness, and swelling of the left knee. She fell a month ago, it irritated and flared it up. Mechanically, the knee looks okay in terms of implants. The alignment looks good. Once it gets moving, the motion and stability is good, but I think there is some irritation in the soft tissues in the front of the knee. We discussed anti-inflammatory medications, injections, physical therapy, bracing, and using a cane or a walker. I will prescribe a Medrol Dosepak. If those things do not work, we will consider a knee replacement.    2. Right knee patellar clunk.   We will stop the therapy and let everything settle down for a couple of weeks. Rx medrol dose pack. If the right knee keeps clunking, I will get her in with one of my partners to talk about scoping the knee to shave out that scar tissue.    Follow-up  The patient will follow up in 2 weeks.  -L knee CSI   -R knee: baseline CRP/ESR, ref for scope      No orders of the defined types were placed in this encounter.      This note was generated with the assistance of ambient listening technology. Verbal consent was obtained by the patient and accompanying visitor(s) for the recording of patient appointment to facilitate this note. I attest to having reviewed and edited the generated note for accuracy, though some syntax or spelling errors may persist. Please contact the author of this note for any clarification.            Past Medical History:   Diagnosis Date    Bilateral knee pain     Carpal tunnel syndrome, bilateral      Fissure in skin of foot     Right small toe    HTN (hypertension)     Hyperlipidemia     Multiple thyroid nodules 11/10/2023    Palpitations     Rotator cuff injury     right    Screening for colorectal cancer 10/20/2017    Screening for malignant neoplasm of colon 2021    Statin-induced myositis 2018       Past Surgical History:   Procedure Laterality Date    BILATERAL SALPINGOOPHORECTOMY  2000    BREAST BIOPSY Left 2010    malignant    BREAST BIOPSY Left     negative    BREAST LUMPECTOMY Left 2010    CATARACT EXTRACTION W/  INTRAOCULAR LENS IMPLANT Right 2018    Dr. Oneil    CATARACT EXTRACTION W/  INTRAOCULAR LENS IMPLANT Left 2018    Dr. Oneil     SECTION      x2    COLONOSCOPY N/A 10/20/2017    Procedure: COLONOSCOPY;  Surgeon: Mitchell Danielson Jr., MD;  Location: [facility];  Service: Endoscopy;  Laterality: N/A;    COLONOSCOPY N/A 2021    Procedure: COLONOSCOPY/Suprep;  Surgeon: Malcolm Reyes MD;  Location: [facility];  Service: Endoscopy;  Laterality: N/A;    CYST REMOVAL      on back    ESOPHAGOGASTRODUODENOSCOPY N/A 2021    Procedure: EGD (ESOPHAGOGASTRODUODENOSCOPY);  Surgeon: Malcolm Reyes MD;  Location: [facility];  Service: Endoscopy;  Laterality: N/A;    HERNIA REPAIR      HYSTERECTOMY      at 25 yrs old    INTRAOCULAR PROSTHESES INSERTION Left 2018    Procedure: INSERTION, IOL PROSTHESIS;  Surgeon: Sergio Oneil MD;  Location: [facility] 1ST FLR;  Service: Ophthalmology;  Laterality: Left;    INTRAOCULAR PROSTHESES INSERTION Right 2018    Procedure: INSERTION, IOL PROSTHESIS;  Surgeon: Sergio Oneil MD;  Location: [facility] OR 2ND FLR;  Service: Ophthalmology;  Laterality: Right;    KNEE ARTHROPLASTY Right 2021    Procedure: ARTHROPLASTY, KNEE:RIGHT:DEPUY-SIGMA ;  Surgeon: Pedro Summers III, MD;  Location: [facility];  Service: Orthopedics;  Laterality: Right;    OOPHORECTOMY      @ 45 yrs old     "PHACOEMULSIFICATION OF CATARACT Left 11/06/2018    Procedure: PHACOEMULSIFICATION, CATARACT;  Surgeon: Sergio Oneil MD;  Location: [facility] OR [address] [place] [address];  Service: Ophthalmology;  Laterality: Left;    PHACOEMULSIFICATION OF CATARACT Right 11/20/2018    Procedure: PHACOEMULSIFICATION, CATARACT;  Surgeon: Sergio Oneil MD;  Location: [facility] OR [address] [place] [address];  Service: Ophthalmology;  Laterality: Right;    REFRACTIVE SURGERY      2023    supracervical abdominal hysterectomy  1978    fibroids       Family History   Problem Relation Age of Onset    Hypertension Mother     Heart disease Mother     Diabetes Mother     Hyperlipidemia Mother     Pancreatitis Mother     Cataracts Mother     Macular degeneration Mother     Cancer Father     Hypertension Sister     Thyroid disease Sister     Cancer Brother         prostate CA    Diabetes Brother     Heart disease Brother     Hyperlipidemia Daughter     No Known Problems Son     Breast cancer Paternal Cousin     Amblyopia Neg Hx     Blindness Neg Hx     Glaucoma Neg Hx     Strabismus Neg Hx     Retinal detachment Neg Hx        Social History     Socioeconomic History    Marital status: Single    Number of children: 2   Occupational History     Comment: retired   Tobacco Use    Smoking status: Never    Smokeless tobacco: Never   Substance and Sexual Activity    Alcohol use: Yes     Comment: once per month    Drug use: No    Sexual activity: Not Currently   Social History Narrative    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 hx of left lumpectomy for "breast cancer"- with 5yrs of tamoxifen use. Sees Dr. Buckley ([facility] for surveillance/MMG)        Dr. Lala former PCP, [facility]          Social Determinants of Health     Financial Resource Strain: Low Risk  (1/4/2024)    Overall Financial Resource Strain (CARDIA)     Difficulty of Paying Living Expenses: Not very hard   Food Insecurity: Food " Insecurity Present (2/26/2024)    Hunger Vital Sign     Worried About Running Out of Food in the Last Year: Often true     Ran Out of Food in the Last Year: Often true   Transportation Needs: No Transportation Needs (1/4/2024)    PRAPARE - Transportation     Lack of Transportation (Medical): No     Lack of Transportation (Non-Medical): No   Physical Activity: Insufficiently Active (1/4/2024)    Exercise Vital Sign     Days of Exercise per Week: 2 days     Minutes of Exercise per Session: 20 min   Stress: No Stress Concern Present (1/4/2024)    [employer] [place] [employer] - Occupational Stress Questionnaire     Feeling of Stress : Not at all   Social Connections: Moderately Integrated (1/4/2024)    Social Connection and Isolation Panel [NHANES]     Frequency of Communication with Friends and Family: More than three times a week     Frequency of Social Gatherings with Friends and Family: Twice a week     Attends Baptism Services: More than 4 times per year     Active Member of Clubs or Organizations: Yes     Attends Club or Organization Meetings: More than 4 times per year     Marital Status:    Housing Stability: Low Risk  (2/26/2024)    Housing Stability Vital Sign     Unable to Pay for Housing in the Last Year: No     Number of Places Lived in the Last Year: 1     Unstable Housing in the Last Year: No

## 2024-03-15 ENCOUNTER — TELEPHONE (OUTPATIENT)
Dept: ENDOSCOPY | Facility: HOSPITAL | Age: 69
End: 2024-03-15

## 2024-03-15 ENCOUNTER — CLINICAL SUPPORT (OUTPATIENT)
Dept: ENDOSCOPY | Facility: HOSPITAL | Age: 69
End: 2024-03-15
Attending: INTERNAL MEDICINE
Payer: MEDICARE

## 2024-03-15 DIAGNOSIS — Z12.11 ENCOUNTER FOR SCREENING COLONOSCOPY: ICD-10-CM

## 2024-03-15 DIAGNOSIS — Z86.010 HISTORY OF COLON POLYPS: Primary | ICD-10-CM

## 2024-03-15 NOTE — TELEPHONE ENCOUNTER
Spoke to patient to schedule procedure(s) Colonoscopy       Physician to perform procedure(s) Dr. ARMOND William  Date of Procedure (s) 6/14/24  Arrival Time 8:00 AM  Time of Procedure(s) 9:00 AM   Location of Procedure(s) [facility] 2nd Floor  Type of Rx Prep sent to patient: Miralax  Instructions provided to patient via [facility]    Patient was informed on the following information and verbalized understanding. Screening questionnaire reviewed with patient and complete. If procedure requires anesthesia, a responsible adult needs to be present to accompany the patient home, patient cannot drive after receiving anesthesia. Appointment details are tentative, especially check-in time. Patient will receive a prep-op call 7 days prior to confirm check-in time for procedure. If applicable the patient should contact their pharmacy to verify Rx for procedure prep is ready for pick-up. Patient was advised to call the scheduling department at [phone] if pharmacy states no Rx is available. Patient was advised to call the endoscopy scheduling department if any questions or concerns arise.      [facility] Scheduling Department

## 2024-03-21 ENCOUNTER — OFFICE VISIT (OUTPATIENT)
Dept: BARIATRICS | Facility: CLINIC | Age: 69
End: 2024-03-21
Payer: MEDICARE

## 2024-03-21 VITALS
OXYGEN SATURATION: 98 % | WEIGHT: 159 LBS | BODY MASS INDEX: 33.23 KG/M2 | SYSTOLIC BLOOD PRESSURE: 110 MMHG | DIASTOLIC BLOOD PRESSURE: 60 MMHG | HEART RATE: 71 BPM

## 2024-03-21 DIAGNOSIS — E66.09 CLASS 1 OBESITY DUE TO EXCESS CALORIES WITH SERIOUS COMORBIDITY AND BODY MASS INDEX (BMI) OF 33.0 TO 33.9 IN ADULT: Primary | ICD-10-CM

## 2024-03-21 DIAGNOSIS — Z71.3 ENCOUNTER FOR WEIGHT LOSS COUNSELING: ICD-10-CM

## 2024-03-21 DIAGNOSIS — G47.33 OSA (OBSTRUCTIVE SLEEP APNEA): ICD-10-CM

## 2024-03-21 DIAGNOSIS — Z85.3 HISTORY OF LEFT BREAST CANCER: ICD-10-CM

## 2024-03-21 DIAGNOSIS — E78.2 MIXED HYPERLIPIDEMIA: ICD-10-CM

## 2024-03-21 DIAGNOSIS — R73.03 PREDIABETES: ICD-10-CM

## 2024-03-21 DIAGNOSIS — I10 ESSENTIAL HYPERTENSION: ICD-10-CM

## 2024-03-21 PROBLEM — E66.01 MORBID (SEVERE) OBESITY DUE TO EXCESS CALORIES: Status: RESOLVED | Noted: 2023-10-06 | Resolved: 2024-03-21

## 2024-03-21 PROBLEM — E66.01 SEVERE OBESITY: Status: RESOLVED | Noted: 2023-10-06 | Resolved: 2024-03-21

## 2024-03-21 PROBLEM — R07.89 NON-CARDIAC CHEST PAIN: Status: RESOLVED | Noted: 2021-03-23 | Resolved: 2024-03-21

## 2024-03-21 PROCEDURE — 99999 PR PBB SHADOW E&M-EST. PATIENT-LVL III: CPT | Mod: PBBFAC,HCNC,, | Performed by: STUDENT IN AN ORGANIZED HEALTH CARE EDUCATION/TRAINING PROGRAM

## 2024-03-21 PROCEDURE — 1101F PT FALLS ASSESS-DOCD LE1/YR: CPT | Mod: HCNC,CPTII,S$GLB, | Performed by: STUDENT IN AN ORGANIZED HEALTH CARE EDUCATION/TRAINING PROGRAM

## 2024-03-21 PROCEDURE — 3078F DIAST BP <80 MM HG: CPT | Mod: HCNC,CPTII,S$GLB, | Performed by: STUDENT IN AN ORGANIZED HEALTH CARE EDUCATION/TRAINING PROGRAM

## 2024-03-21 PROCEDURE — 1159F MED LIST DOCD IN RCRD: CPT | Mod: HCNC,CPTII,S$GLB, | Performed by: STUDENT IN AN ORGANIZED HEALTH CARE EDUCATION/TRAINING PROGRAM

## 2024-03-21 PROCEDURE — 3008F BODY MASS INDEX DOCD: CPT | Mod: HCNC,CPTII,S$GLB, | Performed by: STUDENT IN AN ORGANIZED HEALTH CARE EDUCATION/TRAINING PROGRAM

## 2024-03-21 PROCEDURE — 1125F AMNT PAIN NOTED PAIN PRSNT: CPT | Mod: HCNC,CPTII,S$GLB, | Performed by: STUDENT IN AN ORGANIZED HEALTH CARE EDUCATION/TRAINING PROGRAM

## 2024-03-21 PROCEDURE — 1157F ADVNC CARE PLAN IN RCRD: CPT | Mod: HCNC,CPTII,S$GLB, | Performed by: STUDENT IN AN ORGANIZED HEALTH CARE EDUCATION/TRAINING PROGRAM

## 2024-03-21 PROCEDURE — 3044F HG A1C LEVEL LT 7.0%: CPT | Mod: HCNC,CPTII,S$GLB, | Performed by: STUDENT IN AN ORGANIZED HEALTH CARE EDUCATION/TRAINING PROGRAM

## 2024-03-21 PROCEDURE — 99213 OFFICE O/P EST LOW 20 MIN: CPT | Mod: HCNC,S$GLB,, | Performed by: STUDENT IN AN ORGANIZED HEALTH CARE EDUCATION/TRAINING PROGRAM

## 2024-03-21 PROCEDURE — 3288F FALL RISK ASSESSMENT DOCD: CPT | Mod: HCNC,CPTII,S$GLB, | Performed by: STUDENT IN AN ORGANIZED HEALTH CARE EDUCATION/TRAINING PROGRAM

## 2024-03-21 PROCEDURE — 4010F ACE/ARB THERAPY RXD/TAKEN: CPT | Mod: HCNC,CPTII,S$GLB, | Performed by: STUDENT IN AN ORGANIZED HEALTH CARE EDUCATION/TRAINING PROGRAM

## 2024-03-21 PROCEDURE — 3074F SYST BP LT 130 MM HG: CPT | Mod: HCNC,CPTII,S$GLB, | Performed by: STUDENT IN AN ORGANIZED HEALTH CARE EDUCATION/TRAINING PROGRAM

## 2024-03-21 PROCEDURE — 1160F RVW MEDS BY RX/DR IN RCRD: CPT | Mod: HCNC,CPTII,S$GLB, | Performed by: STUDENT IN AN ORGANIZED HEALTH CARE EDUCATION/TRAINING PROGRAM

## 2024-03-21 RX ORDER — FLUTICASONE PROPIONATE 50 MCG
1 SPRAY, SUSPENSION (ML) NASAL
COMMUNITY
Start: 2024-03-19 | End: 2024-05-18

## 2024-03-21 RX ORDER — METFORMIN HYDROCHLORIDE 500 MG/1
500 TABLET, EXTENDED RELEASE ORAL
Qty: 90 TABLET | Refills: 0 | Status: SHIPPED | OUTPATIENT
Start: 2024-03-21 | End: 2024-06-18 | Stop reason: SDUPTHER

## 2024-03-21 NOTE — PROGRESS NOTES
Chelsea Rodriguez presented for a  Medicare AWV and comprehensive Health Risk Assessment today. The following components were reviewed and updated:    Medical history  Family History  Social history  Allergies and Current Medications  Health Risk Assessment  Health Maintenance  Care Team         ** See Completed Assessments for Annual Wellness Visit within the encounter summary.**         The following assessments were completed:  Living Situation  CAGE  Depression Screening  Timed Get Up and Go  Whisper Test  Cognitive Function Screening      Nutrition Screening  ADL Screening  PAQ Screening      Review for Opioid Screening: Pt does not have Rx for Opioids      Review for Substance Use Disorders: Patient does not use substance        Current Outpatient Medications:     acetaminophen (TYLENOL) 650 MG TbSR, Take 1 tablet (650 mg total) by mouth every 8 (eight) hours as needed (pain)., Disp: 120 tablet, Rfl: 0    amoxicillin (AMOXIL) 500 MG capsule, Take 500 mg by mouth as needed. Before dental work, Disp: , Rfl:     aspirin (ECOTRIN) 81 MG EC tablet, Take 1 tablet (81 mg total) by mouth 2 (two) times daily. (Patient taking differently: Take 81 mg by mouth.), Disp: 60 tablet, Rfl: 0    atorvastatin (LIPITOR) 80 MG tablet, TAKE 1 TABLET (80 MG TOTAL) BY MOUTH ONCE DAILY., Disp: 90 tablet, Rfl: 3    azelastine (ASTELIN) 137 mcg (0.1 %) nasal spray, 1 spray by Nasal route 2 (two) times daily., Disp: , Rfl:     coenzyme Q10 100 mg capsule, Take 100 mg by mouth every evening., Disp: , Rfl:     diclofenac (VOLTAREN) 25 MG TbEC, Take 1 tablet (25 mg total) by mouth 2 (two) times daily as needed., Disp: 20 tablet, Rfl: 0    docusate sodium (COLACE) 100 MG capsule, Take 1 capsule (100 mg total) by mouth 2 (two) times daily as needed for Constipation., Disp: 60 capsule, Rfl: 0    ezetimibe (ZETIA) 10 mg tablet, Take 1 tablet (10 mg total) by mouth once daily., Disp: 90 tablet, Rfl: 3    fluticasone propionate (FLONASE) 50  "mcg/actuation nasal spray, 1 spray by Each Nostril route., Disp: , Rfl:     hydroCHLOROthiazide (MICROZIDE) 12.5 mg capsule, TAKE 1 CAPSULE EVERY EVENING, Disp: 90 capsule, Rfl: 1    irbesartan (AVAPRO) 150 MG tablet, TAKE 1 TABLET ONE TIME DAILY, Disp: 90 tablet, Rfl: 3    Lactobacillus acidophilus (PROBIOTIC ACIDOPHILUS ORAL), Take by mouth., Disp: , Rfl:     loratadine (CLARITIN) 10 mg tablet, Take 1 tablet (10 mg total) by mouth once daily., Disp: 30 tablet, Rfl: 2    metFORMIN (GLUCOPHAGE-XR) 500 MG ER 24hr tablet, Take 1 tablet (500 mg total) by mouth daily with breakfast., Disp: 90 tablet, Rfl: 0    metoprolol succinate (TOPROL-XL) 25 MG 24 hr tablet, TAKE 1 TABLET EVERY EVENING, Disp: 90 tablet, Rfl: 3    multivitamin (THERAGRAN) per tablet, Take 1 tablet by mouth once daily., Disp: , Rfl:     omega-3 fatty acids/fish oil (FISH OIL-OMEGA-3 FATTY ACIDS) 300-1,000 mg capsule, Take by mouth once daily., Disp: , Rfl:        Vitals:    03/25/24 1003   BP: 138/76   Pulse: 91   SpO2: 97%   Weight: 72.4 kg (159 lb 9.8 oz)   Height: 4' 10" (1.473 m)   PainSc: 0-No pain      Physical Exam  Vitals reviewed.   Constitutional:       General: She is not in acute distress.     Appearance: Normal appearance. She is not ill-appearing.   HENT:      Head: Normocephalic and atraumatic.      Mouth/Throat:      Mouth: Mucous membranes are moist.   Eyes:      General: No scleral icterus.        Right eye: No discharge.         Left eye: No discharge.      Extraocular Movements: Extraocular movements intact.      Conjunctiva/sclera: Conjunctivae normal.   Cardiovascular:      Rate and Rhythm: Normal rate.   Pulmonary:      Effort: Pulmonary effort is normal.   Musculoskeletal:      Cervical back: Normal range of motion.      Right lower leg: No edema.      Left lower leg: No edema.      Comments: Altered gait, walks with cane   Skin:     General: Skin is warm and dry.      Findings: No rash.   Neurological:      Mental Status: She " is alert and oriented to person, place, and time.   Psychiatric:         Mood and Affect: Mood normal.         Behavior: Behavior normal. Behavior is cooperative.         Cognition and Memory: Cognition and memory normal.               Diagnoses and health risks identified today and associated recommendations/orders:    1. Encounter for preventive health examination  - Chart reviewed. Problem list updated. Discussed current medical diagnosis, current medications, medical/surgical/family/social history; updated provider list; documented vital signs; identified any cognitive impairment; and updated risk factor list. Addressed any outstanding health maintenance. Provided patient with personalized health advice. Continue to follow up with PCP and any specialists.     2. Bilateral carpal tunnel syndrome  Chronic; stable on current treatment plan; follow up with PCP  - follow up with ortho    3. Anxiety  Chronic; stable on current treatment plan; follow up with PCP    4. Nuclear senile cataract, unspecified laterality  Chronic; stable on current treatment plan; follow up with PCP  - follow up with ophthalmology     5. HENOK (obstructive sleep apnea)  Chronic; stable on current treatment plan; follow up with PCP      6. Lymphedema of both lower extremities  Chronic; stable on current treatment plan; follow up with PCP      7. Vitamin D deficiency  Chronic; stable on current treatment plan; follow up with PCP    8. Prediabetes  Chronic; stable on current treatment plan; follow up with PCP      9. Other hyperlipidemia  Chronic; stable on current treatment plan; follow up with PCP  - taking statin    10. Essential hypertension  Chronic; stable on current treatment plan; follow up with PCP  - taking HCTZ, irbesartan, and metoprolol    11. Abnormality of gait and mobility  Chronic; stable on current treatment plan; follow up with PCP  - walks with cane    12. BMI 33.0-33.9,adult  - Recommendation for healthy diet and increasing  exercise as tolerated with goal of 150min/week . Recommend weight loss        Provided Chelsea with a 5-10 year written screening schedule and personal prevention plan. Recommendations were developed using the USPSTF age appropriate recommendations. Education, counseling, and referrals were provided as needed. After Visit Summary printed and given to patient which includes a list of additional screenings\tests needed.    Follow up in about 1 year (around 3/25/2025) for your next annual wellness visit.    Va Palm, FNP-C    Advance Care Planning     I offered to discuss advanced care planning, including how to pick a person who would make decisions for you if you were unable to make them for yourself, called a health care power of , and what kind of decisions you might make such as use of life sustaining treatments such as ventilators and tube feeding when faced with a life limiting illness recorded on a living will that they will need to know. (How you want to be cared for as you near the end of your natural life)     X  Patient has advanced directives on file, which we reviewed, and they do not wish to make changes.

## 2024-03-21 NOTE — PROGRESS NOTES
Subjective     Patient ID: Chelsea Rodriguez is a 69 y.o. female.    Chief Complaint: Follow-up, Obesity, and Weight Check    Patient presents for treatment of obesity.   Referred by Dr. Rico, was concerned with cholesterol  Weighted about 130 lbs prior to COVID    Co-morbidities   HTN  HLD  HENOK  Prediabetes  Lymphedema, BLE  H/o breast cancer    Weight History  Lowest adult weight: 125 lbs  Highest adult weight: 170 lbs      Current Physical Activity  Walking but not consistently    Current Eating Habits  Breakfast - coffee, was drinking half & half or whole milk; eggs, biscuit, skips sometimes  Often eats 1 meals per day around 2pm - may be leftovers, breakfast food (eggs, pancakes, breakfast meat), greens, rice with mushrooms, onions, spinach, gravy  Snacks - cheese, cookies, peanut butter, fruit, candy  Beverages - coffee, milk, lemonade sweetened with xylitol     Medical Weight Loss  1/11/2024: 163.6 lbs, BMI 34.8, BFP 47.2%, BFM 77.2 lbs, SMM 47 lbs, BMR 1216 kcal  3/21/2024: 159 lbs, BMI 33.2, BFP 47%, BFM 74.8 lbs, SMM 45.6 lbs, BMR 1196 kcal          Review of Systems   Constitutional:  Negative for chills and fever.   Respiratory:  Negative for shortness of breath.    Cardiovascular:  Negative for chest pain and palpitations.   Gastrointestinal:  Negative for abdominal pain, nausea and vomiting.   Neurological:  Negative for dizziness and light-headedness.   Psychiatric/Behavioral:  The patient is not nervous/anxious.           Objective    Latest Reference Range & Units 05/24/23 08:39 10/02/23 08:49   WBC 3.90 - 12.70 K/uL 5.98    RBC 4.00 - 5.40 M/uL 5.09    Hemoglobin 12.0 - 16.0 g/dL 13.1    Hematocrit 37.0 - 48.5 % 43.0    MCV 82 - 98 fL 85    MCH 27.0 - 31.0 pg 25.7 (L)    MCHC 32.0 - 36.0 g/dL 30.5 (L)    RDW 11.5 - 14.5 % 15.2 (H)    Platelet Count 150 - 450 K/uL 311    MPV 9.2 - 12.9 fL 10.7    Gran % 38.0 - 73.0 % 51.0    Lymph % 18.0 - 48.0 % 35.6    Mono % 4.0 - 15.0 % 8.5    Eosinophil % 0.0 -  8.0 % 4.2    Basophil % 0.0 - 1.9 % 0.5    Immature Granulocytes 0.0 - 0.5 % 0.2    Gran # (ANC) 1.8 - 7.7 K/uL 3.1    Lymph # 1.0 - 4.8 K/uL 2.1    Mono # 0.3 - 1.0 K/uL 0.5    Eos # 0.0 - 0.5 K/uL 0.3    Baso # 0.00 - 0.20 K/uL 0.03    Immature Grans (Abs) 0.00 - 0.04 K/uL 0.01    nRBC 0 /100 WBC 0    Differential Method  Automated    Sodium 136 - 145 mmol/L 142    Potassium 3.5 - 5.1 mmol/L 4.2    Chloride 95 - 110 mmol/L 105    CO2 23 - 29 mmol/L 28    Anion Gap 8 - 16 mmol/L 9    BUN 8 - 23 mg/dL 16    Creatinine 0.5 - 1.4 mg/dL 0.7    eGFR >60 mL/min/1.73 m^2 >60.0    Glucose 70 - 110 mg/dL 101    Calcium 8.7 - 10.5 mg/dL 9.5    ALP 55 - 135 U/L 89    PROTEIN TOTAL 6.0 - 8.4 g/dL 7.0    Albumin 3.5 - 5.2 g/dL 3.9    BILIRUBIN TOTAL 0.1 - 1.0 mg/dL 0.5    AST 10 - 40 U/L 32    ALT 10 - 44 U/L 23    Cholesterol Total 120 - 199 mg/dL 201 (H) 192   HDL 40 - 75 mg/dL 42 42   HDL/Cholesterol Ratio 20.0 - 50.0 % 20.9 21.9   Non-HDL Cholesterol mg/dL 159 150   Total Cholesterol/HDL Ratio 2.0 - 5.0  4.8 4.6   Triglycerides 30 - 150 mg/dL 79 85   LDL Cholesterol 63.0 - 159.0 mg/dL 143.2 133.0   Vit D, 25-Hydroxy 30 - 96 ng/mL 40    Hemoglobin A1C External 4.0 - 5.6 % 6.0 (H)    Estimated Avg Glucose 68 - 131 mg/dL 126    TSH 0.400 - 4.000 uIU/mL  1.296   Free T4 0.71 - 1.51 ng/dL  1.02   Thyrotropin Receptor Ab 0.00 - 1.75 IU/L  <1.10   Thyroperoxidase Antibodies <6.0 IU/mL  <6.0   (L): Data is abnormally low  (H): Data is abnormally high    Vitals:    03/21/24 1012   BP: 110/60   Pulse: 71       Physical Exam  Vitals reviewed.   Constitutional:       General: She is not in acute distress.     Appearance: Normal appearance. She is obese. She is not ill-appearing, toxic-appearing or diaphoretic.   HENT:      Head: Normocephalic and atraumatic.   Cardiovascular:      Rate and Rhythm: Normal rate.   Pulmonary:      Effort: Pulmonary effort is normal. No respiratory distress.   Skin:     General: Skin is warm and dry.    Neurological:      Mental Status: She is alert and oriented to person, place, and time.            Assessment and Plan     1. Class 1 obesity due to excess calories with serious comorbidity and body mass index (BMI) of 33.0 to 33.9 in adult    2. Essential hypertension    3. Mixed hyperlipidemia    4. HENOK (obstructive sleep apnea)    5. History of left breast cancer    6. Prediabetes    7. Encounter for weight loss counseling          - Log all food and beverage intake with a daily calorie goal of 1000 calories per day    - Walk 15-30 minutes 3-5x/week    - Resistance band exercises given in AVS

## 2024-03-25 ENCOUNTER — OFFICE VISIT (OUTPATIENT)
Dept: FAMILY MEDICINE | Facility: CLINIC | Age: 69
End: 2024-03-25
Payer: MEDICARE

## 2024-03-25 VITALS
WEIGHT: 159.63 LBS | HEIGHT: 58 IN | DIASTOLIC BLOOD PRESSURE: 76 MMHG | BODY MASS INDEX: 33.51 KG/M2 | OXYGEN SATURATION: 97 % | HEART RATE: 91 BPM | SYSTOLIC BLOOD PRESSURE: 138 MMHG

## 2024-03-25 DIAGNOSIS — E55.9 VITAMIN D DEFICIENCY: ICD-10-CM

## 2024-03-25 DIAGNOSIS — F41.9 ANXIETY: ICD-10-CM

## 2024-03-25 DIAGNOSIS — I89.0 LYMPHEDEMA OF BOTH LOWER EXTREMITIES: ICD-10-CM

## 2024-03-25 DIAGNOSIS — I10 ESSENTIAL HYPERTENSION: ICD-10-CM

## 2024-03-25 DIAGNOSIS — G56.03 BILATERAL CARPAL TUNNEL SYNDROME: ICD-10-CM

## 2024-03-25 DIAGNOSIS — H25.10 NUCLEAR SENILE CATARACT, UNSPECIFIED LATERALITY: ICD-10-CM

## 2024-03-25 DIAGNOSIS — R73.03 PREDIABETES: ICD-10-CM

## 2024-03-25 DIAGNOSIS — G47.33 OSA (OBSTRUCTIVE SLEEP APNEA): ICD-10-CM

## 2024-03-25 DIAGNOSIS — Z00.00 ENCOUNTER FOR PREVENTIVE HEALTH EXAMINATION: Primary | ICD-10-CM

## 2024-03-25 DIAGNOSIS — E78.49 OTHER HYPERLIPIDEMIA: ICD-10-CM

## 2024-03-25 DIAGNOSIS — R26.9 ABNORMALITY OF GAIT AND MOBILITY: ICD-10-CM

## 2024-03-25 PROCEDURE — 1160F RVW MEDS BY RX/DR IN RCRD: CPT | Mod: HCNC,CPTII,S$GLB, | Performed by: NURSE PRACTITIONER

## 2024-03-25 PROCEDURE — G9919 SCRN ND POS ND PROV OF REC: HCPCS | Mod: HCNC,CPTII,S$GLB, | Performed by: NURSE PRACTITIONER

## 2024-03-25 PROCEDURE — 1126F AMNT PAIN NOTED NONE PRSNT: CPT | Mod: HCNC,CPTII,S$GLB, | Performed by: NURSE PRACTITIONER

## 2024-03-25 PROCEDURE — G0439 PPPS, SUBSEQ VISIT: HCPCS | Mod: HCNC,S$GLB,, | Performed by: NURSE PRACTITIONER

## 2024-03-25 PROCEDURE — 1101F PT FALLS ASSESS-DOCD LE1/YR: CPT | Mod: HCNC,CPTII,S$GLB, | Performed by: NURSE PRACTITIONER

## 2024-03-25 PROCEDURE — 3044F HG A1C LEVEL LT 7.0%: CPT | Mod: HCNC,CPTII,S$GLB, | Performed by: NURSE PRACTITIONER

## 2024-03-25 PROCEDURE — 1157F ADVNC CARE PLAN IN RCRD: CPT | Mod: HCNC,CPTII,S$GLB, | Performed by: NURSE PRACTITIONER

## 2024-03-25 PROCEDURE — 3288F FALL RISK ASSESSMENT DOCD: CPT | Mod: HCNC,CPTII,S$GLB, | Performed by: NURSE PRACTITIONER

## 2024-03-25 PROCEDURE — 1170F FXNL STATUS ASSESSED: CPT | Mod: HCNC,CPTII,S$GLB, | Performed by: NURSE PRACTITIONER

## 2024-03-25 PROCEDURE — 3075F SYST BP GE 130 - 139MM HG: CPT | Mod: HCNC,CPTII,S$GLB, | Performed by: NURSE PRACTITIONER

## 2024-03-25 PROCEDURE — 4010F ACE/ARB THERAPY RXD/TAKEN: CPT | Mod: HCNC,CPTII,S$GLB, | Performed by: NURSE PRACTITIONER

## 2024-03-25 PROCEDURE — 1159F MED LIST DOCD IN RCRD: CPT | Mod: HCNC,CPTII,S$GLB, | Performed by: NURSE PRACTITIONER

## 2024-03-25 PROCEDURE — 3078F DIAST BP <80 MM HG: CPT | Mod: HCNC,CPTII,S$GLB, | Performed by: NURSE PRACTITIONER

## 2024-03-25 PROCEDURE — 99999 PR PBB SHADOW E&M-EST. PATIENT-LVL IV: CPT | Mod: PBBFAC,HCNC,, | Performed by: NURSE PRACTITIONER

## 2024-03-25 RX ORDER — MULTIVITAMIN
1 TABLET ORAL DAILY
COMMUNITY

## 2024-03-25 RX ORDER — AMOXICILLIN 500 MG
CAPSULE ORAL DAILY
COMMUNITY

## 2024-03-25 NOTE — PATIENT INSTRUCTIONS
Counseling and Referral of Other Preventative  (Italic type indicates deductible and co-insurance are waived)    Patient Name: Chelsea Rodriguez  Today's Date: 3/25/2024    Health Maintenance       Date Due Completion Date    COVID-19 Vaccine (6 - 2023-24 season) 03/28/2024 (Originally 9/1/2023) 1/26/2023    RSV Vaccine (Age 60+ and Pregnant patients) (1 - 1-dose 60+ series) 03/28/2024 (Originally 1/8/2015) ---    Hemoglobin A1c (Prediabetes) 01/29/2025 1/29/2024    Colorectal Cancer Screening 02/05/2025 2/5/2021    Override on 11/5/2012: Done    Mammogram 02/05/2025 2/5/2024    Override on 11/16/2016: Done    DEXA Scan 09/28/2027 9/28/2023    TETANUS VACCINE 11/03/2027 11/3/2017    Lipid Panel 02/08/2029 2/8/2024        No orders of the defined types were placed in this encounter.    The following information is provided to all patients.  This information is to help you find resources for any of the problems found today that may be affecting your health:                  Living healthy guide: www.[facility].[place].gov      Understanding Diabetes: www.diabetes.org      Eating healthy: www.cdc.gov/healthyweight      CDC home safety checklist: www.cdc.gov/steadi/patient.html      Agency on Aging: www.goea.[place].[facility]      Alcoholics anonymous (AA): www.aa.org      Physical Activity: www.agusto.nih.gov/vr0vbxl      Tobacco use: www.quitwithusla.org

## 2024-03-26 ENCOUNTER — OFFICE VISIT (OUTPATIENT)
Dept: ORTHOPEDICS | Facility: CLINIC | Age: 69
End: 2024-03-26
Payer: MEDICARE

## 2024-03-26 VITALS — HEIGHT: 58 IN | WEIGHT: 158 LBS | BODY MASS INDEX: 33.17 KG/M2

## 2024-03-26 DIAGNOSIS — Z96.651 PRESENCE OF RIGHT ARTIFICIAL KNEE JOINT: ICD-10-CM

## 2024-03-26 DIAGNOSIS — Z96.651 STATUS POST TOTAL RIGHT KNEE REPLACEMENT: Primary | ICD-10-CM

## 2024-03-26 DIAGNOSIS — M17.12 PRIMARY OSTEOARTHRITIS OF LEFT KNEE: ICD-10-CM

## 2024-03-26 PROCEDURE — 99212 OFFICE O/P EST SF 10 MIN: CPT | Mod: 25,HCNC,S$GLB, | Performed by: ORTHOPAEDIC SURGERY

## 2024-03-26 PROCEDURE — 1101F PT FALLS ASSESS-DOCD LE1/YR: CPT | Mod: HCNC,CPTII,S$GLB, | Performed by: ORTHOPAEDIC SURGERY

## 2024-03-26 PROCEDURE — 1157F ADVNC CARE PLAN IN RCRD: CPT | Mod: HCNC,CPTII,S$GLB, | Performed by: ORTHOPAEDIC SURGERY

## 2024-03-26 PROCEDURE — 3008F BODY MASS INDEX DOCD: CPT | Mod: HCNC,CPTII,S$GLB, | Performed by: ORTHOPAEDIC SURGERY

## 2024-03-26 PROCEDURE — 3044F HG A1C LEVEL LT 7.0%: CPT | Mod: HCNC,CPTII,S$GLB, | Performed by: ORTHOPAEDIC SURGERY

## 2024-03-26 PROCEDURE — 3288F FALL RISK ASSESSMENT DOCD: CPT | Mod: HCNC,CPTII,S$GLB, | Performed by: ORTHOPAEDIC SURGERY

## 2024-03-26 PROCEDURE — 4010F ACE/ARB THERAPY RXD/TAKEN: CPT | Mod: HCNC,CPTII,S$GLB, | Performed by: ORTHOPAEDIC SURGERY

## 2024-03-26 PROCEDURE — 1125F AMNT PAIN NOTED PAIN PRSNT: CPT | Mod: HCNC,CPTII,S$GLB, | Performed by: ORTHOPAEDIC SURGERY

## 2024-03-26 PROCEDURE — 99999 PR PBB SHADOW E&M-EST. PATIENT-LVL III: CPT | Mod: PBBFAC,HCNC,, | Performed by: ORTHOPAEDIC SURGERY

## 2024-03-26 PROCEDURE — 20610 DRAIN/INJ JOINT/BURSA W/O US: CPT | Mod: HCNC,LT,S$GLB, | Performed by: ORTHOPAEDIC SURGERY

## 2024-03-26 PROCEDURE — 1159F MED LIST DOCD IN RCRD: CPT | Mod: HCNC,CPTII,S$GLB, | Performed by: ORTHOPAEDIC SURGERY

## 2024-03-26 RX ORDER — TRIAMCINOLONE ACETONIDE 40 MG/ML
40 INJECTION, SUSPENSION INTRA-ARTICULAR; INTRAMUSCULAR
Status: DISCONTINUED | OUTPATIENT
Start: 2024-03-26 | End: 2024-03-26 | Stop reason: HOSPADM

## 2024-03-26 RX ADMIN — TRIAMCINOLONE ACETONIDE 40 MG: 40 INJECTION, SUSPENSION INTRA-ARTICULAR; INTRAMUSCULAR at 03:03

## 2024-03-26 NOTE — PROGRESS NOTES
Subjective:     HPI:   Chelsea Rodriguez is a 69 y.o. female who presents for repeat eval B knees    History of Present Illness  The patient presents for a 2-week follow-up from 03/12/2024.    L knee OA flared after fall It is slowly getting better, but he is still having some arthritic symptoms.     The steroid dose pack helped minimally for her right knee. Persistent PF mechanical symptoms - right knee gets stuck going from flexion to extension.       Objective:   Body mass index is 33.02 kg/m².  Exam:    Physical Exam      R knee exam unchanged, persistent patellar clunk flexion to extension     Imaging:    Results  Imaging  Right knee x-rays were reviewed with the patient.  None today      Assessment/Plan:       ICD-10-CM ICD-9-CM   1. Status post total right knee replacement  Z96.651 V43.65   2. Presence of right artificial knee joint  Z96.651 V43.65   3. Primary osteoarthritis of left knee  M17.12 715.16        L knee varus OA, exacerbated by fall 1 month ago  R TKA, patellar clunk    Assessment/Plan:     Assessment & Plan  1. Two-week follow-up from 03/12/2024.  He has patellar clunk in the right knee. I will do a steroid injection into the left knee today. I will order an MRI of the right knee.    L knee CSI today  F/u 3 months    R knee:   Patellar clunk +/- inf-lat plica  MARS MRI  Baseline CRP/ESR  Ref to Dr Celaya for consideration of scope debridement for clunk peripatellar and +/- any plicas inf/lat   -messaged dr celaya team        Orders Placed This Encounter   Procedures    Large Joint Aspiration/Injection: L knee     This order was created via procedure documentation    MRI Knee Without Contrast Right     MARS MRI R knee (with metal suppression) s/p R TKA eval for patellar clunk     Standing Status:   Future     Standing Expiration Date:   3/26/2025     Order Specific Question:   Does the patient have or ever had a pacemaker or a defibrillator (Note: Some facilities may not be able to schedule an MRI  for patients with pacemakers and defibrillators. You should contact your local radiology dept to determine if this is the case.)?     Answer:   No     Order Specific Question:   Does the patient have an aneurysm or surgical clip, pump, nerve/brain stimulator, middle/inner ear prosthesis, or other metal implant or foreign object (bullet, shrapnel)? If they have a card related to their implant, ask them to bring it. Issues related to the implant may cause the MRI to be delayed.     Answer:   Yes     Comments:   TKA = MRI compatible     Order Specific Question:   Is the patient claustrophobic?     Answer:   No     Order Specific Question:   Will the patient require sedation?     Answer:   No     Order Specific Question:   May the Radiologist modify the order per protocol to meet the clinical needs of the patient?     Answer:   Yes     Order Specific Question:   Is this part of a Research Study?     Answer:   No     Order Specific Question:   Does the patient have on a skin patch for medication with aluminized backing?     Answer:   No    C-Reactive Protein     Standing Status:   Future     Standing Expiration Date:   5/26/2025    Sedimentation rate     Standing Status:   Future     Standing Expiration Date:   5/26/2025       This note was generated with the assistance of ambient listening technology. Verbal consent was obtained by the patient and accompanying visitor(s) for the recording of patient appointment to facilitate this note. I attest to having reviewed and edited the generated note for accuracy, though some syntax or spelling errors may persist. Please contact the author of this note for any clarification.            Past Medical History:   Diagnosis Date    Bilateral knee pain     Carpal tunnel syndrome, bilateral     Fissure in skin of foot     Right small toe    HTN (hypertension)     Hyperlipidemia     Multiple thyroid nodules 11/10/2023    Palpitations     Rotator cuff injury     right    Screening for  colorectal cancer 10/20/2017    Screening for malignant neoplasm of colon 2021    Statin-induced myositis 2018       Past Surgical History:   Procedure Laterality Date    BILATERAL SALPINGOOPHORECTOMY  2000    BREAST BIOPSY Left 2010    malignant    BREAST BIOPSY Left     negative    BREAST LUMPECTOMY Left     CATARACT EXTRACTION W/  INTRAOCULAR LENS IMPLANT Right 2018    Dr. Oneil    CATARACT EXTRACTION W/  INTRAOCULAR LENS IMPLANT Left 2018    Dr. Oneil     SECTION      x2    COLONOSCOPY N/A 10/20/2017    Procedure: COLONOSCOPY;  Surgeon: Mitchell Danielson Jr., MD;  Location: [facility];  Service: Endoscopy;  Laterality: N/A;    COLONOSCOPY N/A 2021    Procedure: COLONOSCOPY/Suprep;  Surgeon: Malcolm Reyes MD;  Location: [facility];  Service: Endoscopy;  Laterality: N/A;    CYST REMOVAL      on back    ESOPHAGOGASTRODUODENOSCOPY N/A 2021    Procedure: EGD (ESOPHAGOGASTRODUODENOSCOPY);  Surgeon: Malcolm Reyes MD;  Location: [facility];  Service: Endoscopy;  Laterality: N/A;    HERNIA REPAIR      HYSTERECTOMY      at 25 yrs old    INTRAOCULAR PROSTHESES INSERTION Left 2018    Procedure: INSERTION, IOL PROSTHESIS;  Surgeon: Sergio Oneil MD;  Location: [address];  Service: Ophthalmology;  Laterality: Left;    INTRAOCULAR PROSTHESES INSERTION Right 2018    Procedure: INSERTION, IOL PROSTHESIS;  Surgeon: Sergio Oneil MD;  Location: [facility] OR 2ND FLR;  Service: Ophthalmology;  Laterality: Right;    KNEE ARTHROPLASTY Right 2021    Procedure: ARTHROPLASTY, KNEE:RIGHT:DEPUY-SIGMA ;  Surgeon: Pedro Summers III, MD;  Location: [facility];  Service: Orthopedics;  Laterality: Right;    OOPHORECTOMY      @ 45 yrs old    PHACOEMULSIFICATION OF CATARACT Left 2018    Procedure: PHACOEMULSIFICATION, CATARACT;  Surgeon: Sergio Oneil MD;  Location: [facility];  Service: Ophthalmology;  Laterality: Left;     "PHACOEMULSIFICATION OF CATARACT Right 11/20/2018    Procedure: PHACOEMULSIFICATION, CATARACT;  Surgeon: Sergio Oneil MD;  Location: [facility] OR [address] [place] [address];  Service: Ophthalmology;  Laterality: Right;    REFRACTIVE SURGERY      2023    supracervical abdominal hysterectomy  1978    fibroids       Family History   Problem Relation Age of Onset    Hypertension Mother     Heart disease Mother     Diabetes Mother     Hyperlipidemia Mother     Pancreatitis Mother     Cataracts Mother     Macular degeneration Mother     Cancer Father     Prostate cancer Father     Hypertension Sister     Thyroid disease Sister     Diabetes Brother     Diabetes Brother     Cancer Brother     Heart disease Brother     Prostate cancer Brother     Thyroid cancer Brother     Hyperlipidemia Daughter     Hypertension Son     Breast cancer Paternal Cousin     Amblyopia Neg Hx     Blindness Neg Hx     Glaucoma Neg Hx     Strabismus Neg Hx     Retinal detachment Neg Hx        Social History     Socioeconomic History    Marital status: Single    Number of children: 2   Occupational History     Comment: retired   Tobacco Use    Smoking status: Never    Smokeless tobacco: Never   Substance and Sexual Activity    Alcohol use: Yes     Comment: once per month    Drug use: Never    Sexual activity: Not Currently   Social History Narrative    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 hx of left lumpectomy for "breast cancer"- with 5yrs of tamoxifen use. Sees Dr. Buckley ([facility] for surveillance/MMG)        Dr. Lala former PCP, [facility]          Social Determinants of Health     Financial Resource Strain: Medium Risk (3/25/2024)    Overall Financial Resource Strain (CARDIA)     Difficulty of Paying Living Expenses: Somewhat hard   Food Insecurity: Food Insecurity Present (3/25/2024)    Hunger Vital Sign     Worried About Running Out of Food in the Last Year: Often true     Ran " Out of Food in the Last Year: Often true   Transportation Needs: No Transportation Needs (3/25/2024)    PRAPARE - Transportation     Lack of Transportation (Medical): No     Lack of Transportation (Non-Medical): No   Physical Activity: Insufficiently Active (3/25/2024)    Exercise Vital Sign     Days of Exercise per Week: 2 days     Minutes of Exercise per Session: 20 min   Stress: No Stress Concern Present (3/25/2024)    [employer] [place] [employer] - Occupational Stress Questionnaire     Feeling of Stress : Not at all   Social Connections: Moderately Integrated (3/25/2024)    Social Connection and Isolation Panel [NHANES]     Frequency of Communication with Friends and Family: More than three times a week     Frequency of Social Gatherings with Friends and Family: Twice a week     Attends Sabianism Services: More than 4 times per year     Active Member of Clubs or Organizations: Yes     Attends Club or Organization Meetings: More than 4 times per year     Marital Status:    Housing Stability: Low Risk  (3/25/2024)    Housing Stability Vital Sign     Unable to Pay for Housing in the Last Year: No     Number of Places Lived in the Last Year: 1     Unstable Housing in the Last Year: No

## 2024-03-26 NOTE — PROCEDURES
Large Joint Aspiration/Injection: L knee    Date/Time: 3/26/2024 3:20 PM    Performed by: Pedro Summers III, MD  Authorized by: Pedro Summers III, MD    Consent Done?:  Yes (Verbal)  Indications:  Pain  Timeout: prior to procedure the correct patient, procedure, and site was verified    Prep: patient was prepped and draped in usual sterile fashion      Local anesthesia used?: Yes    Local anesthetic:  Lidocaine 1% without epinephrine  Anesthetic total (ml):  5      Details:  Needle Size:  21 G  Location:  Knee  Site:  L knee  Medications:  40 mg triamcinolone acetonide 40 mg/mL  Patient tolerance:  Patient tolerated the procedure well with no immediate complications

## 2024-03-27 ENCOUNTER — LAB VISIT (OUTPATIENT)
Dept: LAB | Facility: HOSPITAL | Age: 69
End: 2024-03-27
Attending: ORTHOPAEDIC SURGERY
Payer: MEDICARE

## 2024-03-27 DIAGNOSIS — Z96.651 STATUS POST TOTAL RIGHT KNEE REPLACEMENT: ICD-10-CM

## 2024-03-27 LAB
CRP SERPL-MCNC: 2.1 MG/L (ref 0–8.2)
ERYTHROCYTE [SEDIMENTATION RATE] IN BLOOD BY PHOTOMETRIC METHOD: 25 MM/HR (ref 0–36)

## 2024-03-27 PROCEDURE — 85652 RBC SED RATE AUTOMATED: CPT | Mod: HCNC | Performed by: ORTHOPAEDIC SURGERY

## 2024-03-27 PROCEDURE — 36415 COLL VENOUS BLD VENIPUNCTURE: CPT | Mod: HCNC,PO | Performed by: ORTHOPAEDIC SURGERY

## 2024-03-27 PROCEDURE — 86140 C-REACTIVE PROTEIN: CPT | Mod: HCNC | Performed by: ORTHOPAEDIC SURGERY

## 2024-03-28 ENCOUNTER — PATIENT MESSAGE (OUTPATIENT)
Dept: ADMINISTRATIVE | Facility: OTHER | Age: 69
End: 2024-03-28
Payer: MEDICARE

## 2024-04-03 ENCOUNTER — OFFICE VISIT (OUTPATIENT)
Dept: URGENT CARE | Facility: CLINIC | Age: 69
End: 2024-04-03
Payer: MEDICARE

## 2024-04-03 VITALS
BODY MASS INDEX: 32.85 KG/M2 | HEART RATE: 76 BPM | HEIGHT: 58 IN | TEMPERATURE: 98 F | SYSTOLIC BLOOD PRESSURE: 142 MMHG | RESPIRATION RATE: 20 BRPM | DIASTOLIC BLOOD PRESSURE: 77 MMHG | OXYGEN SATURATION: 97 % | WEIGHT: 156.5 LBS

## 2024-04-03 DIAGNOSIS — H60.501 ACUTE OTITIS EXTERNA OF RIGHT EAR, UNSPECIFIED TYPE: Primary | ICD-10-CM

## 2024-04-03 PROCEDURE — 99213 OFFICE O/P EST LOW 20 MIN: CPT | Mod: S$GLB,,,

## 2024-04-03 RX ORDER — NEOMYCIN SULFATE, POLYMYXIN B SULFATE AND HYDROCORTISONE 10; 3.5; 1 MG/ML; MG/ML; [USP'U]/ML
3 SUSPENSION/ DROPS AURICULAR (OTIC) 4 TIMES DAILY
Qty: 10 ML | Refills: 0 | Status: SHIPPED | OUTPATIENT
Start: 2024-04-03 | End: 2024-04-10

## 2024-04-03 NOTE — PROGRESS NOTES
"Subjective:      Patient ID: Chelsea Rodriguez is a 69 y.o. female.    Vitals:  height is 4' 10" (1.473 m) and weight is 71 kg (156 lb 8.4 oz). Her oral temperature is 98.3 °F (36.8 °C). Her blood pressure is 142/77 (abnormal) and her pulse is 76. Her respiration is 20 and oxygen saturation is 97%.     Chief Complaint: Otalgia    Pt present with RT ear aches started 2 days ago. Pt states 1 week ago she rinse her ear by herself at home.     Provider note starts below:  Patient presents to clinic for evaluation of right ear pain and drainage that started 2 days ago. She has been applying swimmers ear drops with no improvement of symptoms. Of note, patient irrigated ears about 1 week ago with water and hydrogen peroxide. Denies fever, chills, hearing loss, headache, nausea, vomiting, dizziness.     Otalgia   There is pain in the right ear. This is a new problem. The current episode started in the past 7 days. The problem occurs constantly. The problem has been gradually worsening. There has been no fever. The pain is at a severity of 7/10. The pain is severe. Associated symptoms include ear discharge (R). Pertinent negatives include no abdominal pain, coughing, headaches, hearing loss, neck pain, rash, sore throat or vomiting. She has tried nothing for the symptoms.     Constitution: Negative for chills and fever.   HENT:  Positive for ear pain (R) and ear discharge (R). Negative for foreign body in ear, tinnitus, hearing loss, dental problem, congestion and sore throat.    Neck: Negative for neck pain and neck stiffness.   Cardiovascular:  Negative for chest pain and palpitations.   Eyes:  Negative for eye itching and eye pain.   Respiratory:  Negative for cough and shortness of breath.    Gastrointestinal:  Negative for abdominal pain, nausea and vomiting.   Musculoskeletal:  Negative for muscle cramps and muscle ache.   Skin:  Negative for pale and rash.   Neurological:  Negative for dizziness, history of vertigo, " light-headedness, loss of balance and headaches.      Objective:     Physical Exam   Constitutional: She is oriented to person, place, and time.  Non-toxic appearance. She does not appear ill. No distress.   HENT:   Head: Normocephalic and atraumatic.   Ears:   Right Ear: There is drainage, swelling and tenderness. Tympanic membrane is not erythematous. A middle ear effusion is present.   Left Ear: Tympanic membrane, external ear and ear canal normal. Tympanic membrane is not erythematous.   Nose: Nose normal.   Mouth/Throat: Mucous membranes are moist. Oropharynx is clear.   Eyes: Conjunctivae are normal. Extraocular movement intact   Neck: Neck supple.   Cardiovascular: Normal rate, regular rhythm, normal heart sounds and normal pulses.   Pulmonary/Chest: Effort normal and breath sounds normal.   Abdominal: Normal appearance.   Musculoskeletal: Normal range of motion.         General: Normal range of motion.   Neurological: She is alert, oriented to person, place, and time and at baseline.   Skin: Skin is warm and dry.   Psychiatric: Her behavior is normal. Mood normal.   Nursing note and vitals reviewed.      Assessment:     1. Acute otitis externa of right ear, unspecified type      Plan:     Acute otitis externa of right ear, unspecified type  -     neomycin-polymyxin-hydrocortisone (CORTISPORIN) 3.5-10,000-1 mg/mL-unit/mL-% otic suspension; Place 3 drops into the right ear 4 (four) times daily. for 7 days  Dispense: 10 mL; Refill: 0      Patient Instructions   Please use antibiotic drops as prescribed.  Take your drugs as ordered by your doctor.  Sit or lie down with your head to the side and the ear that needs the ear drops pointing up.  Pull on your ear to straighten the ear canal.  Gently pull the ear up and toward the back of the head to straighten it. If you are giving the ear drops to a child under 3 years of age, gently pull the earlobe down and toward the back of the head.  Put the correct number of  drops in your ear.  Gently press the small skin flap over the ear to help the drops run into the ear.  Continue to sit or lie with your head to the side for 5 minutes.  Your doctor may want you to put a small cotton plug in your ear to help keep the drops in place.  Keep the inside of your ear dry while it heals. You can protect your ear when you shower with a petroleum jelly-coated cotton ball. Avoid swimming for 7 to 10 days.  Do not use in-ear head phones (ear buds) or hearing aids while your ear heals.  Heat may help ease your ear pain. If your doctor tells you to use heat, put a heating pad or hot water bottle on your ear for no more than 20 minutes at a time. Never go to sleep with a heating pad on as this can cause burns.    Please remember that you have received care at an urgent care today. Urgent cares are not emergency rooms and are not equipped to handle life threatening emergencies and cannot rule in or out certain medical conditions and you may be released before all of your medical problems are known or treated. Please arrange follow up with your primary care physician or speciality clinic  within 2-5 days if your signs and symptoms have not resolved or worsen. Patient can call our Referral Hotline at [phone] to make an appointment.    Please return here or go to the Emergency Department for any concerns or worsening of condition.Patient was educated on signs/symptoms that would warrant emergent medical attention. Patient verbalized understanding.  Your symptoms are not better within 2 days after starting treatment.  Your ear starts to bleed or drain pus.  You have a fever of 100.4°F (38°C)

## 2024-04-04 NOTE — PATIENT INSTRUCTIONS
Please use antibiotic drops as prescribed.  Take your drugs as ordered by your doctor.  Sit or lie down with your head to the side and the ear that needs the ear drops pointing up.  Pull on your ear to straighten the ear canal.  Gently pull the ear up and toward the back of the head to straighten it. If you are giving the ear drops to a child under 3 years of age, gently pull the earlobe down and toward the back of the head.  Put the correct number of drops in your ear.  Gently press the small skin flap over the ear to help the drops run into the ear.  Continue to sit or lie with your head to the side for 5 minutes.  Your doctor may want you to put a small cotton plug in your ear to help keep the drops in place.  Keep the inside of your ear dry while it heals. You can protect your ear when you shower with a petroleum jelly-coated cotton ball. Avoid swimming for 7 to 10 days.  Do not use in-ear head phones (ear buds) or hearing aids while your ear heals.  Heat may help ease your ear pain. If your doctor tells you to use heat, put a heating pad or hot water bottle on your ear for no more than 20 minutes at a time. Never go to sleep with a heating pad on as this can cause burns.    Please remember that you have received care at an urgent care today. Urgent cares are not emergency rooms and are not equipped to handle life threatening emergencies and cannot rule in or out certain medical conditions and you may be released before all of your medical problems are known or treated. Please arrange follow up with your primary care physician or speciality clinic  within 2-5 days if your signs and symptoms have not resolved or worsen. Patient can call our Referral Hotline at [phone] to make an appointment.    Please return here or go to the Emergency Department for any concerns or worsening of condition.Patient was educated on signs/symptoms that would warrant emergent medical attention. Patient verbalized  understanding.  Your symptoms are not better within 2 days after starting treatment.  Your ear starts to bleed or drain pus.  You have a fever of 100.4°F (38°C)

## 2024-04-05 ENCOUNTER — PATIENT OUTREACH (OUTPATIENT)
Dept: ADMINISTRATIVE | Facility: OTHER | Age: 69
End: 2024-04-05
Payer: MEDICARE

## 2024-04-05 NOTE — PROGRESS NOTES
CHW - Case Closure    This Community Health Worker spoke to patient via telephone today.   Pt/Caregiver reported: Pt stated that she has not yet checked her SNAP account as of yet. CHW encouraged pt to cb once she is ready to apply for the SNAP Program. Case Closed pt will cb when she is ready.  Pt/Caregiver denied any additional needs at this time and agrees with episode closure at this time.  Provided patient with Community Health Worker's contact information and encouraged him/her to contact this Community Health Worker if additional needs arise.

## 2024-04-09 ENCOUNTER — CLINICAL SUPPORT (OUTPATIENT)
Dept: OTOLARYNGOLOGY | Facility: CLINIC | Age: 69
End: 2024-04-09
Payer: MEDICARE

## 2024-04-09 ENCOUNTER — OFFICE VISIT (OUTPATIENT)
Dept: OTOLARYNGOLOGY | Facility: CLINIC | Age: 69
End: 2024-04-09
Payer: MEDICARE

## 2024-04-09 VITALS
SYSTOLIC BLOOD PRESSURE: 137 MMHG | BODY MASS INDEX: 33.54 KG/M2 | DIASTOLIC BLOOD PRESSURE: 81 MMHG | WEIGHT: 160.5 LBS | HEART RATE: 74 BPM

## 2024-04-09 DIAGNOSIS — H93.11 TINNITUS, RIGHT: ICD-10-CM

## 2024-04-09 DIAGNOSIS — H61.22 IMPACTED CERUMEN OF LEFT EAR: ICD-10-CM

## 2024-04-09 DIAGNOSIS — H90.71 MIXED CONDUCTIVE AND SENSORINEURAL HEARING LOSS OF RIGHT EAR WITH UNRESTRICTED HEARING OF LEFT EAR: ICD-10-CM

## 2024-04-09 DIAGNOSIS — H92.01 RIGHT EAR PAIN: Primary | ICD-10-CM

## 2024-04-09 DIAGNOSIS — H60.501 ACUTE OTITIS EXTERNA OF RIGHT EAR, UNSPECIFIED TYPE: Primary | ICD-10-CM

## 2024-04-09 PROCEDURE — 87186 SC STD MICRODIL/AGAR DIL: CPT | Mod: HCNC | Performed by: NURSE PRACTITIONER

## 2024-04-09 PROCEDURE — 3075F SYST BP GE 130 - 139MM HG: CPT | Mod: HCNC,CPTII,S$GLB, | Performed by: NURSE PRACTITIONER

## 2024-04-09 PROCEDURE — 87077 CULTURE AEROBIC IDENTIFY: CPT | Mod: HCNC | Performed by: NURSE PRACTITIONER

## 2024-04-09 PROCEDURE — 1160F RVW MEDS BY RX/DR IN RCRD: CPT | Mod: HCNC,CPTII,S$GLB, | Performed by: NURSE PRACTITIONER

## 2024-04-09 PROCEDURE — 99999 PR PBB SHADOW E&M-EST. PATIENT-LVL IV: CPT | Mod: PBBFAC,HCNC,, | Performed by: NURSE PRACTITIONER

## 2024-04-09 PROCEDURE — 3008F BODY MASS INDEX DOCD: CPT | Mod: HCNC,CPTII,S$GLB, | Performed by: NURSE PRACTITIONER

## 2024-04-09 PROCEDURE — 87070 CULTURE OTHR SPECIMN AEROBIC: CPT | Mod: HCNC | Performed by: NURSE PRACTITIONER

## 2024-04-09 PROCEDURE — 97597 DBRDMT OPN WND 1ST 20 CM/<: CPT | Mod: HCNC,S$GLB,, | Performed by: NURSE PRACTITIONER

## 2024-04-09 PROCEDURE — 92557 COMPREHENSIVE HEARING TEST: CPT | Mod: HCNC,S$GLB,,

## 2024-04-09 PROCEDURE — 4010F ACE/ARB THERAPY RXD/TAKEN: CPT | Mod: HCNC,CPTII,S$GLB, | Performed by: NURSE PRACTITIONER

## 2024-04-09 PROCEDURE — 1159F MED LIST DOCD IN RCRD: CPT | Mod: HCNC,CPTII,S$GLB, | Performed by: NURSE PRACTITIONER

## 2024-04-09 PROCEDURE — 99214 OFFICE O/P EST MOD 30 MIN: CPT | Mod: 25,HCNC,S$GLB, | Performed by: NURSE PRACTITIONER

## 2024-04-09 PROCEDURE — 1101F PT FALLS ASSESS-DOCD LE1/YR: CPT | Mod: HCNC,CPTII,S$GLB, | Performed by: NURSE PRACTITIONER

## 2024-04-09 PROCEDURE — 99999 PR PBB SHADOW E&M-EST. PATIENT-LVL I: CPT | Mod: PBBFAC,HCNC,,

## 2024-04-09 PROCEDURE — 3044F HG A1C LEVEL LT 7.0%: CPT | Mod: HCNC,CPTII,S$GLB, | Performed by: NURSE PRACTITIONER

## 2024-04-09 PROCEDURE — 1157F ADVNC CARE PLAN IN RCRD: CPT | Mod: HCNC,CPTII,S$GLB, | Performed by: NURSE PRACTITIONER

## 2024-04-09 PROCEDURE — 3288F FALL RISK ASSESSMENT DOCD: CPT | Mod: HCNC,CPTII,S$GLB, | Performed by: NURSE PRACTITIONER

## 2024-04-09 PROCEDURE — 92567 TYMPANOMETRY: CPT | Mod: HCNC,S$GLB,,

## 2024-04-09 PROCEDURE — 69210 REMOVE IMPACTED EAR WAX UNI: CPT | Mod: 59,HCNC,S$GLB, | Performed by: NURSE PRACTITIONER

## 2024-04-09 PROCEDURE — 3079F DIAST BP 80-89 MM HG: CPT | Mod: HCNC,CPTII,S$GLB, | Performed by: NURSE PRACTITIONER

## 2024-04-09 PROCEDURE — 1126F AMNT PAIN NOTED NONE PRSNT: CPT | Mod: HCNC,CPTII,S$GLB, | Performed by: NURSE PRACTITIONER

## 2024-04-09 RX ORDER — CLOTRIMAZOLE 1 G/ML
SOLUTION TOPICAL
Qty: 30 ML | Refills: 0 | Status: SHIPPED | OUTPATIENT
Start: 2024-04-09 | End: 2024-04-24

## 2024-04-09 NOTE — PROCEDURES
Debridement    Date/Time: 4/9/2024 9:20 AM    Performed by: Veronika Coombs NP  Authorized by: Veronika Coombs NP    Consent Done?:  Yes (Verbal)  Local anesthesia used?: No      Debridement - 1st Wound - General Location: right ear.       Length (cm):  1       Area (sq cm):  1       Width (cm):  1       Percent Debrided (%):  100       Depth (cm):  1       Total Area Debrided (sq cm):  1    Depth of debridement:  Epidermis/Dermis    Devitalized tissue debrided:  Exudate    Instruments:  Other (suction)  Bleeding:  None  Patient tolerance:  Patient tolerated the procedure well with no immediate complications  Specimen Collected: Specimen sent to microbiology  Ear Cerumen Removal    Date/Time: 4/9/2024 9:20 AM    Performed by: Veronika Coombs NP  Authorized by: Veronika Coombs NP    Consent Done?:  Yes (Verbal)    Local anesthetic:  None  Location details:  Left ear  Cerumen  Removal Results:  Cerumen completely removed  Patient tolerance:  Patient tolerated the procedure well with no immediate complications

## 2024-04-09 NOTE — PROGRESS NOTES
Chelsea Rodriguez, a 69 y.o. female was seen today in the clinic for an audiologic evaluation. The patient's primary complaint was right ear pain.  She also reports experiencing tinnitus in the right ear. Ms. Rodriguez denies dizziness.  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    Pure tone testing revealed normal slopping to mild mixed hearing loss in the right ear and normal hearing in the left ear. Speech reception thresholds were obtained at 25 dBHL in the right ear and 5 dBHL in the left ear. Speech discrimination scores were 100% in the right ear and 100% in the left ear. Tympanometry revealed Type B tympanogram in the right ear and Type A tympanogram in the left ear.    Recommendations:  Otologic evaluation  Follow up audiologic evaluation at ENT follow up  Hearing protection in noise

## 2024-04-09 NOTE — PROGRESS NOTES
"    Chief Complaint   Patient presents with    Ear Problem    Otalgia   .     HPI: Chelsea Rodriguez is 69 y.o. female who is self-referred for evaluation of right ear pain.  She had itchy right ear on 3/26/24 and she flushed her ear. A couple days after, she was having ear pain and swelling. She went to the urgent care on 4/3/2024 and was rx'ed cortisporin. The ear drops eased the pain. She reports decreased hearing in her right ear.       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 hx of left lumpectomy for "breast cancer"- with 5yrs of tamoxifen use. Sees Dr. Buckley ([facility] for surveillance/MMG)        Dr. Lala former PCP, [facility]          Social Determinants of Health     Financial Resource Strain: Medium Risk (3/25/2024)    Overall Financial Resource Strain (CARDIA)     Difficulty of Paying Living Expenses: Somewhat hard   Food Insecurity: Food Insecurity Present (3/25/2024)    Hunger Vital Sign     Worried About Running Out of Food in the Last Year: Often true     Ran Out of Food in the Last Year: Often true   Transportation Needs: No Transportation Needs (3/25/2024) "    PRAPARE - Transportation     Lack of Transportation (Medical): No     Lack of Transportation (Non-Medical): No   Physical Activity: Insufficiently Active (3/25/2024)    Exercise Vital Sign     Days of Exercise per Week: 2 days     Minutes of Exercise per Session: 20 min   Stress: No Stress Concern Present (3/25/2024)    [place] [employer] - Occupational Stress Questionnaire     Feeling of Stress : Not at all   Social Connections: Moderately Integrated (3/25/2024)    Social Connection and Isolation Panel [NHANES]     Frequency of Communication with Friends and Family: More than three times a week     Frequency of Social Gatherings with Friends and Family: Twice a week     Attends Hoahaoism Services: More than 4 times per year     Active Member of Clubs or Organizations: Yes     Attends Club or Organization Meetings: More than 4 times per year     Marital Status:    Housing Stability: Low Risk  (3/25/2024)    Housing Stability Vital Sign     Unable to Pay for Housing in the Last Year: No     Number of Places Lived in the Last Year: 1     Unstable Housing in the Last Year: No     Past Surgical History:   Procedure Laterality Date    BILATERAL SALPINGOOPHORECTOMY  2000    BREAST BIOPSY Left 2010    malignant    BREAST BIOPSY Left     negative    BREAST LUMPECTOMY Left     CATARACT EXTRACTION W/  INTRAOCULAR LENS IMPLANT Right 2018    Dr. Oneil    CATARACT EXTRACTION W/  INTRAOCULAR LENS IMPLANT Left 2018    Dr. Oneil     SECTION      x2    COLONOSCOPY N/A 10/20/2017    Procedure: COLONOSCOPY;  Surgeon: Mitchell Danielson Jr., MD;  Location: [facility] ENDO;  Service: Endoscopy;  Laterality: N/A;    COLONOSCOPY N/A 2021    Procedure: COLONOSCOPY/Suprep;  Surgeon: Malcolm Reyes MD;  Location: [facility] ENDO;  Service: Endoscopy;  Laterality: N/A;    CYST REMOVAL      on back    ESOPHAGOGASTRODUODENOSCOPY N/A 2021    Procedure: EGD  (ESOPHAGOGASTRODUODENOSCOPY);  Surgeon: Malcolm Reyes MD;  Location: [facility] ENDO;  Service: Endoscopy;  Laterality: N/A;    HERNIA REPAIR      HYSTERECTOMY      at 25 yrs old    INTRAOCULAR PROSTHESES INSERTION Left 11/06/2018    Procedure: INSERTION, IOL PROSTHESIS;  Surgeon: Sergio Oneil MD;  Location: [facility] OR 1ST FLR;  Service: Ophthalmology;  Laterality: Left;    INTRAOCULAR PROSTHESES INSERTION Right 11/20/2018    Procedure: INSERTION, IOL PROSTHESIS;  Surgeon: Sergio Oneil MD;  Location: [facility] OR 2ND FLR;  Service: Ophthalmology;  Laterality: Right;    KNEE ARTHROPLASTY Right 03/31/2021    Procedure: ARTHROPLASTY, KNEE:RIGHT:DEPUY-SIGMA ;  Surgeon: Pedro Summers III, MD;  Location: [facility] OR;  Service: Orthopedics;  Laterality: Right;    OOPHORECTOMY      @ 45 yrs old    PHACOEMULSIFICATION OF CATARACT Left 11/06/2018    Procedure: PHACOEMULSIFICATION, CATARACT;  Surgeon: Sergio Oneil MD;  Location: [facility] OR 1ST FLR;  Service: Ophthalmology;  Laterality: Left;    PHACOEMULSIFICATION OF CATARACT Right 11/20/2018    Procedure: PHACOEMULSIFICATION, CATARACT;  Surgeon: Sergio Oneil MD;  Location: [facility] OR 2ND FLR;  Service: Ophthalmology;  Laterality: Right;    REFRACTIVE SURGERY      2023    supracervical abdominal hysterectomy  1978    fibroids     Family History   Problem Relation Age of Onset    Hypertension Mother     Heart disease Mother     Diabetes Mother     Hyperlipidemia Mother     Pancreatitis Mother     Cataracts Mother     Macular degeneration Mother     Cancer Father     Prostate cancer Father     Hypertension Sister     Thyroid disease Sister     Diabetes Brother     Diabetes Brother     Cancer Brother     Heart disease Brother     Prostate cancer Brother     Thyroid cancer Brother     Hyperlipidemia Daughter     Hypertension Son     Breast cancer Paternal Cousin     Amblyopia Neg Hx     Blindness Neg Hx     Glaucoma Neg Hx     Strabismus Neg Hx     Retinal  detachment Neg Hx            Review of Systems  General: negative for chills, fever or weight loss  Psychological: negative for mood changes or depression  Ophthalmic: negative for blurry vision, photophobia or eye pain  ENT: see HPI  Respiratory: no cough, shortness of breath, or wheezing  Cardiovascular: no chest pain or dyspnea on exertion  Gastrointestinal: no abdominal pain, change in bowel habits, or black/ bloody stools  Musculoskeletal: negative for gait disturbance or muscular weakness  Neurological: no syncope or seizures; no ataxia  Dermatological: negative for puritis,  rash and jaundice  Hematologic/lymphatic: no easy bruising, no new lumps or bumps      Physical Exam:    Vitals:    04/09/24 0853   BP: 137/81   Pulse: 74       Constitutional: Well appearing / communicating without difficutly.  NAD.  Eyes: EOM I Bilaterally  Head/Face: Normocephalic.  Negative paranasal sinus pressure/tenderness.  Salivary glands WNL.  House Brackmann I Bilaterally.    Right Ear: Auricle normal appearance. External Auditory Canal thick purulent drainage and moderate edema. TM w/o masses/lesions/perforations.   Left Ear: Auricle normal appearance. External Auditory Canal with minimal cerumen impaction. EAC within normal limits no lesions or masses,TM w/o masses/lesions/perforations. TM mobility noted.  Nose: No gross nasal septal deviation. Inferior Turbinates 3+ bilaterally. No septal perforation. No masses/lesions. External nasal skin appears normal without masses/lesions.  Oral Cavity: Gingiva/lips within normal limits.  Dentition/gingiva healthy appearing. Mucus membranes moist. Floor of mouth soft, no masses palpated. Oral Tongue mobile. Hard Palate appears normal.    Oropharynx: Base of tongue appears normal. No masses/lesions noted. Tonsillar fossa/pharyngeal wall without lesions. Posterior oropharynx WNL.  Soft palate without masses. Midline uvula.   Neck/Lymphatic: No LAD I-VI bilaterally.  No thyromegaly.  No  masses noted on exam.    Debridement under microscopy     Date/Time: 4/9/2024 9:20 AM     Performed by: Veronika Coombs NP  Authorized by: Veronika Coombs NP    Consent Done?:  Yes (Verbal)  Local anesthesia used?: No       Debridement - 1st Wound - General Location: right ear.       Length (cm):  1       Area (sq cm):  1       Width (cm):  1       Percent Debrided (%):  100       Depth (cm):  1       Total Area Debrided (sq cm):  1    Depth of debridement:  Epidermis/Dermis    Devitalized tissue debrided:  Exudate    Instruments:  Other (suction)  Bleeding:  None  Patient tolerance:  Patient tolerated the procedure well with no immediate complications  Specimen Collected: Specimen sent to microbiology      Ear Cerumen Removal under microscopy     Date/Time: 4/9/2024 9:20 AM     Performed by: Veronika Coombs NP  Authorized by: Veronika Coombs NP    Consent Done?:  Yes (Verbal)     Local anesthetic:  None  Location details:  Left ear  Cerumen  Removal Results:  Cerumen completely removed  Patient tolerance:  Patient tolerated the procedure well with no immediate complications      Diagnostic testing reviewed:    Audiogram interpreted personally by me and discussed in detail with the patient today.   Pure tone testing revealed normal slopping to mild mixed hearing loss in the right ear and normal hearing in the left ear. Speech reception thresholds were obtained at 25 dBHL in the right ear and 5 dBHL in the left ear. Speech discrimination scores were 100% in the right ear and 100% in the left ear. Tympanometry revealed Type B tympanogram in the right ear and Type A tympanogram in the left ear.         Assessment:    ICD-10-CM ICD-9-CM    1. Acute otitis externa of right ear, unspecified type  H60.501 380.10 Aerobic culture      Debridement      2. Impacted cerumen of left ear  H61.22 380.4 Ear Cerumen Removal      3. Mixed conductive and sensorineural hearing loss of right ear with unrestricted hearing of left  ear  H90.71 389.21         The primary encounter diagnosis was Acute otitis externa of right ear, unspecified type. Diagnoses of Impacted cerumen of left ear and Mixed conductive and sensorineural hearing loss of right ear with unrestricted hearing of left ear were also pertinent to this visit.      Plan:  Orders Placed This Encounter   Procedures    Debridement    Ear Cerumen Removal    Aerobic culture     Cerumen impaction:  Removed under microscopy today without difficulty.  I would recommend the use of a wax softening drop, either over the counter Debrox or mineral oil, on a weekly basis.  I also instructed the patient to avoid Qtips.    -ordered right ear culture  -start on clotrimazole 3-4 drops in right ear bid x 14 days  -continue on Cortisporin pending ear culture  -follow up 1 week. If otitis externa is cleared, will repeat audiogram.       Veronika Coombs NP      Answers submitted by the patient for this visit:  Review of Symptoms Questionnaire  (Submitted on 4/6/2024)  Fatigue (Tiredness)?: Yes  fever: Yes  hearing loss: Yes  Ear infection(s)?: Yes  ear discharge: Yes  ear pain: Yes  sinus pressure : Yes  postnasal drip: Yes  Voice Change?: Yes  None of these : Yes  Sleep Apnea?: Yes  Foot swelling?: Yes  constipation: Yes  None of these: Yes  Muscle aches / pain?: Yes  neck pain: Yes  None of these : Yes  None of these: Yes  Cold all of the time? : Yes  None of these: Yes  None of these: Yes  decreased concentration: Yes

## 2024-04-11 RX ORDER — BLOOD-GLUCOSE METER
EACH MISCELLANEOUS
Refills: 0 | OUTPATIENT
Start: 2024-04-11

## 2024-04-11 RX ORDER — ISOPROPYL ALCOHOL 70 ML/100ML
SWAB TOPICAL
Refills: 0 | OUTPATIENT
Start: 2024-04-11

## 2024-04-11 RX ORDER — DEXTROSE 4 G
TABLET,CHEWABLE ORAL
Refills: 0 | OUTPATIENT
Start: 2024-04-11

## 2024-04-11 RX ORDER — LANCETS 33 GAUGE
EACH MISCELLANEOUS
Refills: 0 | OUTPATIENT
Start: 2024-04-11

## 2024-04-11 NOTE — TELEPHONE ENCOUNTER
Refill Decision Note   Chelsea Rodriguez  is requesting a refill authorization.  Brief Assessment and Rationale for Refill:  Quick Discontinue     Medication Therapy Plan: Pharmacy is requesting new scripts for the following medications without required information, (sig/ frequency/qty/etc)     Medication Reconciliation Completed: No   Comments:     No Care Gaps recommended.     Note composed:8:11 AM 04/11/2024

## 2024-04-11 NOTE — TELEPHONE ENCOUNTER
No care due was identified.  [facility] Embedded Care Due Messages. Reference number: [id number].   4/11/2024 7:11:30 AM CDT

## 2024-04-12 ENCOUNTER — OFFICE VISIT (OUTPATIENT)
Dept: OPTOMETRY | Facility: CLINIC | Age: 69
End: 2024-04-12
Payer: COMMERCIAL

## 2024-04-12 ENCOUNTER — PATIENT MESSAGE (OUTPATIENT)
Dept: OTOLARYNGOLOGY | Facility: CLINIC | Age: 69
End: 2024-04-12
Payer: MEDICARE

## 2024-04-12 DIAGNOSIS — R73.03 PREDIABETES: ICD-10-CM

## 2024-04-12 DIAGNOSIS — Z13.5 GLAUCOMA SCREENING: ICD-10-CM

## 2024-04-12 DIAGNOSIS — H52.4 PRESBYOPIA: Primary | ICD-10-CM

## 2024-04-12 LAB — BACTERIA SPEC AEROBE CULT: ABNORMAL

## 2024-04-12 PROCEDURE — 92015 DETERMINE REFRACTIVE STATE: CPT | Mod: S$GLB,,, | Performed by: OPTOMETRIST

## 2024-04-12 PROCEDURE — 99999 PR PBB SHADOW E&M-EST. PATIENT-LVL III: CPT | Mod: PBBFAC,,, | Performed by: OPTOMETRIST

## 2024-04-12 PROCEDURE — 92014 COMPRE OPH EXAM EST PT 1/>: CPT | Mod: S$GLB,,, | Performed by: OPTOMETRIST

## 2024-04-12 RX ORDER — OFLOXACIN 3 MG/ML
3 SOLUTION AURICULAR (OTIC) 2 TIMES DAILY
Qty: 5 ML | Refills: 0 | Status: SHIPPED | OUTPATIENT
Start: 2024-04-12 | End: 2024-04-22

## 2024-04-12 NOTE — PROGRESS NOTES
HPI    DLS: 7/17/2023, Dr. Oneil    Pt presents today for routine eye exam. Pt reports stable vision. Reports   increase floaters. Denies eye pain and flashes. Using Refresh gtts.    Hemoglobin A1C       Date                     Value               Ref Range             Status                01/29/2024               6.2 (H)             4.0 - 5.6 %           Final                   05/24/2023               6.0 (H)             4.0 - 5.6 %           Final                Last edited by Niharika Navarrete MA on 4/12/2024  2:02 PM.            Assessment /Plan     For exam results, see Encounter Report.    Presbyopia    Glaucoma screening    Prediabetes      Sp pciol/YAG OU--pt happy w otc readers  Pre-DM- WITHOUT RETINOPATHY.  Advised yearly DFE    PLAN:    Rtc 1 yr

## 2024-04-12 NOTE — TELEPHONE ENCOUNTER
I spoke with the patient and reviewed ear culture results with her. 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 I will change otic drop to ofloxacin. She verbally understood.

## 2024-04-16 ENCOUNTER — OFFICE VISIT (OUTPATIENT)
Dept: OTOLARYNGOLOGY | Facility: CLINIC | Age: 69
End: 2024-04-16
Payer: MEDICARE

## 2024-04-16 ENCOUNTER — CLINICAL SUPPORT (OUTPATIENT)
Dept: OTOLARYNGOLOGY | Facility: CLINIC | Age: 69
End: 2024-04-16
Payer: MEDICARE

## 2024-04-16 VITALS — HEART RATE: 80 BPM | SYSTOLIC BLOOD PRESSURE: 116 MMHG | DIASTOLIC BLOOD PRESSURE: 74 MMHG

## 2024-04-16 DIAGNOSIS — H93.11 TINNITUS, RIGHT: ICD-10-CM

## 2024-04-16 DIAGNOSIS — H90.41 SENSORINEURAL HEARING LOSS (SNHL) OF RIGHT EAR WITH UNRESTRICTED HEARING OF LEFT EAR: ICD-10-CM

## 2024-04-16 DIAGNOSIS — H60.91 OTITIS EXTERNA OF RIGHT EAR, UNSPECIFIED CHRONICITY, UNSPECIFIED TYPE: Primary | ICD-10-CM

## 2024-04-16 DIAGNOSIS — H60.501 ACUTE OTITIS EXTERNA OF RIGHT EAR, UNSPECIFIED TYPE: Primary | ICD-10-CM

## 2024-04-16 PROCEDURE — 1159F MED LIST DOCD IN RCRD: CPT | Mod: HCNC,CPTII,S$GLB, | Performed by: NURSE PRACTITIONER

## 2024-04-16 PROCEDURE — 99213 OFFICE O/P EST LOW 20 MIN: CPT | Mod: HCNC,S$GLB,, | Performed by: NURSE PRACTITIONER

## 2024-04-16 PROCEDURE — 1101F PT FALLS ASSESS-DOCD LE1/YR: CPT | Mod: HCNC,CPTII,S$GLB, | Performed by: NURSE PRACTITIONER

## 2024-04-16 PROCEDURE — 3288F FALL RISK ASSESSMENT DOCD: CPT | Mod: HCNC,CPTII,S$GLB, | Performed by: NURSE PRACTITIONER

## 2024-04-16 PROCEDURE — 3078F DIAST BP <80 MM HG: CPT | Mod: HCNC,CPTII,S$GLB, | Performed by: NURSE PRACTITIONER

## 2024-04-16 PROCEDURE — 92555 SPEECH THRESHOLD AUDIOMETRY: CPT | Mod: 52,HCNC,S$GLB,

## 2024-04-16 PROCEDURE — 4010F ACE/ARB THERAPY RXD/TAKEN: CPT | Mod: HCNC,CPTII,S$GLB, | Performed by: NURSE PRACTITIONER

## 2024-04-16 PROCEDURE — 1126F AMNT PAIN NOTED NONE PRSNT: CPT | Mod: HCNC,CPTII,S$GLB, | Performed by: NURSE PRACTITIONER

## 2024-04-16 PROCEDURE — 3074F SYST BP LT 130 MM HG: CPT | Mod: HCNC,CPTII,S$GLB, | Performed by: NURSE PRACTITIONER

## 2024-04-16 PROCEDURE — 92567 TYMPANOMETRY: CPT | Mod: HCNC,S$GLB,,

## 2024-04-16 PROCEDURE — 1157F ADVNC CARE PLAN IN RCRD: CPT | Mod: HCNC,CPTII,S$GLB, | Performed by: NURSE PRACTITIONER

## 2024-04-16 PROCEDURE — 3044F HG A1C LEVEL LT 7.0%: CPT | Mod: HCNC,CPTII,S$GLB, | Performed by: NURSE PRACTITIONER

## 2024-04-16 PROCEDURE — 99999 PR PBB SHADOW E&M-EST. PATIENT-LVL III: CPT | Mod: PBBFAC,HCNC,, | Performed by: NURSE PRACTITIONER

## 2024-04-16 PROCEDURE — 1160F RVW MEDS BY RX/DR IN RCRD: CPT | Mod: HCNC,CPTII,S$GLB, | Performed by: NURSE PRACTITIONER

## 2024-04-16 PROCEDURE — 92552 PURE TONE AUDIOMETRY AIR: CPT | Mod: 52,HCNC,S$GLB,

## 2024-04-16 NOTE — PROGRESS NOTES
"    Chief Complaint   Patient presents with    Follow-up     Otitis media    Hearing Loss   .     HPI 4/9/2024: Chelsea Rodriguez is 69 y.o. female who is self-referred for evaluation of right ear pain.  She had itchy right ear on 3/26/24 and she flushed her ear. A couple days after, she was having ear pain and swelling. She went to the urgent care on 4/3/2024 and was rx'ed cortisporin. The ear drops eased the pain. She reports decreased hearing in her right ear.     Interval HPI 4/16/2024:  Follow up visit. She is here today with her daughter. She reports improvement with her ear pain and hearing loss.  Right ear culture on 4/9/2024 was positive for pseudomonas aeruginosa and switched to ofloxacin ear drops. She noticed improvement after started on the ofloxacin. She reports of tinnitus prior to the ear infection but since the it has been more constant.     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 hx of left lumpectomy for "breast cancer"- with 5yrs of tamoxifen use. Sees Dr. Buckley ([facility] for surveillance/MMG)        Dr. Lala former PCP, [facility] " Determinants of Health     Financial Resource Strain: Medium Risk (3/25/2024)    Overall Financial Resource Strain (CARDIA)     Difficulty of Paying Living Expenses: Somewhat hard   Food Insecurity: Food Insecurity Present (3/25/2024)    Hunger Vital Sign     Worried About Running Out of Food in the Last Year: Often true     Ran Out of Food in the Last Year: Often true   Transportation Needs: No Transportation Needs (3/25/2024)    PRAPARE - Transportation     Lack of Transportation (Medical): No     Lack of Transportation (Non-Medical): No   Physical Activity: Insufficiently Active (3/25/2024)    Exercise Vital Sign     Days of Exercise per Week: 2 days     Minutes of Exercise per Session: 20 min   Stress: No Stress Concern Present (3/25/2024)    [employer] [place] [employer] - Occupational Stress Questionnaire     Feeling of Stress : Not at all   Social Connections: Moderately Integrated (3/25/2024)    Social Connection and Isolation Panel [NHANES]     Frequency of Communication with Friends and Family: More than three times a week     Frequency of Social Gatherings with Friends and Family: Twice a week     Attends Yazidism Services: More than 4 times per year     Active Member of Clubs or Organizations: Yes     Attends Club or Organization Meetings: More than 4 times per year     Marital Status:    Housing Stability: Low Risk  (3/25/2024)    Housing Stability Vital Sign     Unable to Pay for Housing in the Last Year: No     Number of Places Lived in the Last Year: 1     Unstable Housing in the Last Year: No     Past Surgical History:   Procedure Laterality Date    BILATERAL SALPINGOOPHORECTOMY  2000    BREAST BIOPSY Left     malignant    BREAST BIOPSY Left     negative    BREAST LUMPECTOMY Left     CATARACT EXTRACTION W/  INTRAOCULAR LENS IMPLANT Right 2018    Dr. Oneil    CATARACT EXTRACTION W/  INTRAOCULAR LENS IMPLANT Left 2018    Dr. Oneil      SECTION      x2    COLONOSCOPY N/A 10/20/2017    Procedure: COLONOSCOPY;  Surgeon: Mitchell Danielson Jr., MD;  Location: [facility] ENDO;  Service: Endoscopy;  Laterality: N/A;    COLONOSCOPY N/A 02/05/2021    Procedure: COLONOSCOPY/Suprep;  Surgeon: Malcolm Reyes MD;  Location: [facility] ENDO;  Service: Endoscopy;  Laterality: N/A;    CYST REMOVAL      on back    ESOPHAGOGASTRODUODENOSCOPY N/A 02/05/2021    Procedure: EGD (ESOPHAGOGASTRODUODENOSCOPY);  Surgeon: Malcolm Reyes MD;  Location: [facility] ENDO;  Service: Endoscopy;  Laterality: N/A;    HERNIA REPAIR      HYSTERECTOMY      at 25 yrs old    INTRAOCULAR PROSTHESES INSERTION Left 11/06/2018    Procedure: INSERTION, IOL PROSTHESIS;  Surgeon: Sergio Oneil MD;  Location: [facility] OR 1ST FLR;  Service: Ophthalmology;  Laterality: Left;    INTRAOCULAR PROSTHESES INSERTION Right 11/20/2018    Procedure: INSERTION, IOL PROSTHESIS;  Surgeon: Sergio Oneil MD;  Location: [facility] OR 2ND FLR;  Service: Ophthalmology;  Laterality: Right;    KNEE ARTHROPLASTY Right 03/31/2021    Procedure: ARTHROPLASTY, KNEE:RIGHT:DEPUY-SIGMA ;  Surgeon: Pedro Summers III, MD;  Location: [facility] OR;  Service: Orthopedics;  Laterality: Right;    OOPHORECTOMY      @ 45 yrs old    PHACOEMULSIFICATION OF CATARACT Left 11/06/2018    Procedure: PHACOEMULSIFICATION, CATARACT;  Surgeon: Sergio Oneil MD;  Location: [facility] OR 1ST FLR;  Service: Ophthalmology;  Laterality: Left;    PHACOEMULSIFICATION OF CATARACT Right 11/20/2018    Procedure: PHACOEMULSIFICATION, CATARACT;  Surgeon: Sergio Oneil MD;  Location: [facility] OR 2ND FLR;  Service: Ophthalmology;  Laterality: Right;    REFRACTIVE SURGERY      2023    supracervical abdominal hysterectomy  1978    fibroids     Family History   Problem Relation Name Age of Onset    Hypertension Mother      Heart disease Mother      Diabetes Mother      Hyperlipidemia Mother      Pancreatitis Mother      Cataracts Mother      Macular  degeneration Mother      Cancer Father      Prostate cancer Father      Hypertension Sister x1     Thyroid disease Sister x1     Diabetes Brother x1     Diabetes Brother x2     Cancer Brother x2     Heart disease Brother x2     Prostate cancer Brother x2     Thyroid cancer Brother x2     Hyperlipidemia Daughter x1     Hypertension Son x1     Breast cancer Paternal Cousin      Amblyopia Neg Hx      Blindness Neg Hx      Glaucoma Neg Hx      Strabismus Neg Hx      Retinal detachment Neg Hx             Review of Systems  General: negative for chills, fever or weight loss  Psychological: negative for mood changes or depression  Ophthalmic: negative for blurry vision, photophobia or eye pain  ENT: see HPI  Respiratory: no cough, shortness of breath, or wheezing  Cardiovascular: no chest pain or dyspnea on exertion  Gastrointestinal: no abdominal pain, change in bowel habits, or black/ bloody stools  Musculoskeletal: negative for gait disturbance or muscular weakness  Neurological: no syncope or seizures; no ataxia  Dermatological: negative for puritis,  rash and jaundice  Hematologic/lymphatic: no easy bruising, no new lumps or bumps      Physical Exam:    Vitals:    04/16/24 1515   BP: 116/74   Pulse: 80         Constitutional: Well appearing / communicating without difficutly.  NAD.  Eyes: EOM I Bilaterally  Head/Face: Normocephalic.  Negative paranasal sinus pressure/tenderness.  Salivary glands WNL.  House Brackmann I Bilaterally.    Right Ear: Auricle normal appearance. External Auditory Canal within normal limits no lesions or masses, no drainage, edema or erythema. TM w/o masses/lesions/perforations.   Left Ear: Auricle normal appearance. External Auditory Canal within normal limits no lesions or masses,TM w/o masses/lesions/perforations. TM mobility noted.  Nose: No gross nasal septal deviation. Inferior Turbinates 3+ bilaterally. No septal perforation. No masses/lesions. External nasal skin appears normal  without masses/lesions.  Oral Cavity: Gingiva/lips within normal limits.  Dentition/gingiva healthy appearing. Mucus membranes moist. Floor of mouth soft, no masses palpated. Oral Tongue mobile. Hard Palate appears normal.    Oropharynx: Base of tongue appears normal. No masses/lesions noted. Tonsillar fossa/pharyngeal wall without lesions. Posterior oropharynx WNL.  Soft palate without masses. Midline uvula.   Neck/Lymphatic: No LAD I-VI bilaterally.  No thyromegaly.  No masses noted on exam.      Diagnostic testing reviewed:  Audiogram interpreted personally by me and discussed in detail with the patient today.   Pure tone testing revealed normal sloping to mild sensorineural hearing loss in the right ear. A speech reception threshold of 10 dBHL was obtained in the right ear.  Tympanometry revealed Type As tympanograms in both ears with compliance of 0.17 ml in the right  ear and 0.18 ml in the left ear.             Assessment:    ICD-10-CM ICD-9-CM    1. Acute otitis externa of right ear, unspecified type  H60.501 380.10       2. Sensorineural hearing loss (SNHL) of right ear with unrestricted hearing of left ear - mild  H90.41 389.15           The primary encounter diagnosis was Acute otitis externa of right ear, unspecified type. A diagnosis of Sensorineural hearing loss (SNHL) of right ear with unrestricted hearing of left ear - mild was also pertinent to this visit.      Plan:  No orders of the defined types were placed in this encounter.    Todays audiogram indicates significant improvement in hearing thresholds of the right ear.   -continue finishing ofloxacin drops. She has 5 days left  - recommended repeat audiogram in 6 months. She knows to notify if there's any acute changes.   -follow up in 6 months     Veronika Coombs NP          Answers submitted by the patient for this visit:  Review of Symptoms Questionnaire  (Submitted on 4/15/2024)  None of these: Yes  hearing loss: Yes  Ear infection(s)?:  Yes  ear pain: Yes  Tooth/Dental Problems?: Yes  None of these : Yes  None of these: Yes  None of these : Yes  constipation: Yes  heartburn: Yes  Acid Reflux?: Yes  Urinating too frequently?: Yes  Muscle aches / pain?: Yes  neck pain: Yes  None of these : Yes  None of these: Yes  Cold all of the time? : Yes  None of these: Yes  None of these: Yes  None of these: Yes

## 2024-04-16 NOTE — PROGRESS NOTES
Chelsea Rodriguez, a 69 y.o. female was seen today in the clinic for follow up audiologic evaluation after treatment of otitis externa by ENT provider on 4-9-24. Ms. Rodriguez notes improvement in ear status including hearing perception.     HPI 4-9-24:  Chelsea Rodriguez, a 69 y.o. female was seen today in the clinic for an audiologic evaluation. The patient's primary complaint was right ear pain.  She also reports experiencing tinnitus in the right ear. Ms. Rodriguez denies dizziness.  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    Pure tone testing revealed normal slopping to mild mixed hearing loss in the right ear and normal hearing in the left ear. Speech reception thresholds were obtained at 25 dBHL in the right ear and 5 dBHL in the left ear. Speech discrimination scores were 100% in the right ear and 100% in the left ear. Tympanometry revealed Type B tympanogram in the right ear and Type A tympanogram in the left ear.    Pure tone testing revealed normal sloping to mild sensorineural hearing loss in the right ear. A speech reception threshold of 10 dBHL was obtained in the right ear.  Tympanometry revealed Type As tympanograms in both ears with compliance of 0.17 ml in the right  ear and 0.18 ml in the left ear.    Todays results indicate significant improvement in hearing thresholds of the right ear; however an asymmetry remains with the right ear being the worse ear.    Recommendations:  Otologic evaluation  Annual audiologic evaluation/as needed  Hearing protection in noise  Hearing aid consultation when/if patient is ready to consider amplification in the right ear.

## 2024-04-18 NOTE — PROGRESS NOTES
NEW PATIENT    HISTORY OF PRESENT ILLNESS   69 y.o. Female with a history of right knee pain who presents for 2nd opinion concerning her right knee. She had a right total knee replacement with Dr. Summers in . She states that the right knee gets stuck and feels stiff.    She states she did well for 3 months after surgery and then started developed symptoms in the anterior aspect of the knee.  She has difficulty when she flexes and extends her knee.  She feels a click in the front of the knee.  After it pops she starts to feel better.  This has been going on over the past 2-1/2 years.  She has at a point now that she wants something done.    + mechanical symptoms, - instability            PAST MEDICAL HISTORY    Past Medical History:   Diagnosis Date    Bilateral knee pain     Carpal tunnel syndrome, bilateral     Fissure in skin of foot     Right small toe    HTN (hypertension)     Hyperlipidemia     Multiple thyroid nodules 11/10/2023    Palpitations     Rotator cuff injury     right    Screening for colorectal cancer 10/20/2017    Screening for malignant neoplasm of colon 2021    Statin-induced myositis 2018       PAST SURGICAL HISTORY     Past Surgical History:   Procedure Laterality Date    BILATERAL SALPINGOOPHORECTOMY  2000    BREAST BIOPSY Left 2010    malignant    BREAST BIOPSY Left 2008    negative    BREAST LUMPECTOMY Left 2010    CATARACT EXTRACTION W/  INTRAOCULAR LENS IMPLANT Right 2018    Dr. Oneil    CATARACT EXTRACTION W/  INTRAOCULAR LENS IMPLANT Left 2018    Dr. Oneil     SECTION      x2    COLONOSCOPY N/A 10/20/2017    Procedure: COLONOSCOPY;  Surgeon: Mitchell Danielson Jr., MD;  Location: [facility];  Service: Endoscopy;  Laterality: N/A;    COLONOSCOPY N/A 2021    Procedure: COLONOSCOPY/Suprep;  Surgeon: Malcolm Reyes MD;  Location: [facility];  Service: Endoscopy;  Laterality: N/A;    CYST REMOVAL      on back    ESOPHAGOGASTRODUODENOSCOPY N/A  02/05/2021    Procedure: EGD (ESOPHAGOGASTRODUODENOSCOPY);  Surgeon: Malcolm Reyes MD;  Location: [facility] ENDO;  Service: Endoscopy;  Laterality: N/A;    HERNIA REPAIR      HYSTERECTOMY      at 25 yrs old    INTRAOCULAR PROSTHESES INSERTION Left 11/06/2018    Procedure: INSERTION, IOL PROSTHESIS;  Surgeon: Sergio Oneil MD;  Location: [facility] OR 1ST FLR;  Service: Ophthalmology;  Laterality: Left;    INTRAOCULAR PROSTHESES INSERTION Right 11/20/2018    Procedure: INSERTION, IOL PROSTHESIS;  Surgeon: Sergio Oneil MD;  Location: [facility] OR 2ND FLR;  Service: Ophthalmology;  Laterality: Right;    KNEE ARTHROPLASTY Right 03/31/2021    Procedure: ARTHROPLASTY, KNEE:RIGHT:DEPUY-SIGMA ;  Surgeon: Pedro Summers III, MD;  Location: [facility] OR;  Service: Orthopedics;  Laterality: Right;    OOPHORECTOMY      @ 45 yrs old    PHACOEMULSIFICATION OF CATARACT Left 11/06/2018    Procedure: PHACOEMULSIFICATION, CATARACT;  Surgeon: Sergio Oneil MD;  [facility];  Service: Ophthalmology;  Laterality: Left;    PHACOEMULSIFICATION OF CATARACT Right 11/20/2018    Procedure: PHACOEMULSIFICATION, CATARACT;  Surgeon: Sergio Oneil MD;  Location: [facility] OR 2ND FLR;  Service: Ophthalmology;  Laterality: Right;    REFRACTIVE SURGERY      2023    supracervical abdominal hysterectomy  1978    fibroids       FAMILY HISTORY    Family History   Problem Relation Name Age of Onset    Hypertension Mother      Heart disease Mother      Diabetes Mother      Hyperlipidemia Mother      Pancreatitis Mother      Cataracts Mother      Macular degeneration Mother      Cancer Father      Prostate cancer Father      Hypertension Sister x1     Thyroid disease Sister x1     Diabetes Brother x1     Diabetes Brother x2     Cancer Brother x2     Heart disease Brother x2     Prostate cancer Brother x2     Thyroid cancer Brother x2     Hyperlipidemia Daughter x1     Hypertension Son x1     Breast cancer Paternal Cousin       "Amblyopia Neg Hx      Blindness Neg Hx      Glaucoma Neg Hx      Strabismus Neg Hx      Retinal detachment Neg Hx         SOCIAL HISTORY    Social History     Socioeconomic History    Marital status: Single    Number of children: 2   Occupational History     Comment: retired   Tobacco Use    Smoking status: Never    Smokeless tobacco: Never   Substance and Sexual Activity    Alcohol use: Yes     Comment: once per month    Drug use: Never    Sexual activity: Not Currently   Social History Narrative    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 hx of left lumpectomy for "breast cancer"- with 5yrs of tamoxifen use. Sees Dr. Buckley ([facility] for surveillance/MMG)        Dr. Lala former PCP, [facility]          Social Determinants of Health     Financial Resource Strain: Medium Risk (3/25/2024)    Overall Financial Resource Strain (CARDIA)     Difficulty of Paying Living Expenses: Somewhat hard   Food Insecurity: Food Insecurity Present (3/25/2024)    Hunger Vital Sign     Worried About Running Out of Food in the Last Year: Often true     Ran Out of Food in the Last Year: Often true   Transportation Needs: No Transportation Needs (3/25/2024)    PRAPARE - Transportation     Lack of Transportation (Medical): No     Lack of Transportation (Non-Medical): No   Physical Activity: Insufficiently Active (3/25/2024)    Exercise Vital Sign     Days of Exercise per Week: 2 days     Minutes of Exercise per Session: 20 min   Stress: No Stress Concern Present (3/25/2024)    [employer] [place] [employer] - Occupational Stress Questionnaire     Feeling of Stress : Not at all   Social Connections: Moderately Integrated (3/25/2024)    Social Connection and Isolation Panel [NHANES]     Frequency of Communication with Friends and Family: More than three times a week     Frequency of Social Gatherings with Friends and Family: Twice a week     Attends " Alevism Services: More than 4 times per year     Active Member of Clubs or Organizations: Yes     Attends Club or Organization Meetings: More than 4 times per year     Marital Status:    Housing Stability: Low Risk  (3/25/2024)    Housing Stability Vital Sign     Unable to Pay for Housing in the Last Year: No     Number of Places Lived in the Last Year: 1     Unstable Housing in the Last Year: No       MEDICATIONS      Current Outpatient Medications:     acetaminophen (TYLENOL) 650 MG TbSR, Take 1 tablet (650 mg total) by mouth every 8 (eight) hours as needed (pain)., Disp: 120 tablet, Rfl: 0    amoxicillin (AMOXIL) 500 MG capsule, Take 500 mg by mouth as needed. Before dental work, Disp: , Rfl:     atorvastatin (LIPITOR) 80 MG tablet, TAKE 1 TABLET (80 MG TOTAL) BY MOUTH ONCE DAILY., Disp: 90 tablet, Rfl: 3    azelastine (ASTELIN) 137 mcg (0.1 %) nasal spray, 1 spray by Nasal route 2 (two) times daily., Disp: , Rfl:     clotrimazole (LOTRIMIN) 1 % Soln, Apply 3 to 4 drops to the right ear twice daily for 14 days, Disp: 30 mL, Rfl: 0    coenzyme Q10 100 mg capsule, Take 100 mg by mouth every evening., Disp: , Rfl:     diclofenac (VOLTAREN) 25 MG TbEC, Take 1 tablet (25 mg total) by mouth 2 (two) times daily as needed., Disp: 20 tablet, Rfl: 0    docusate sodium (COLACE) 100 MG capsule, Take 1 capsule (100 mg total) by mouth 2 (two) times daily as needed for Constipation., Disp: 60 capsule, Rfl: 0    ezetimibe (ZETIA) 10 mg tablet, Take 1 tablet (10 mg total) by mouth once daily., Disp: 90 tablet, Rfl: 3    fluticasone propionate (FLONASE) 50 mcg/actuation nasal spray, 1 spray by Each Nostril route., Disp: , Rfl:     hydroCHLOROthiazide (MICROZIDE) 12.5 mg capsule, TAKE 1 CAPSULE EVERY EVENING, Disp: 90 capsule, Rfl: 1    irbesartan (AVAPRO) 150 MG tablet, TAKE 1 TABLET ONE TIME DAILY, Disp: 90 tablet, Rfl: 3    Lactobacillus acidophilus (PROBIOTIC ACIDOPHILUS ORAL), Take by mouth., Disp: , Rfl:      loratadine (CLARITIN) 10 mg tablet, Take 1 tablet (10 mg total) by mouth once daily., Disp: 30 tablet, Rfl: 2    metFORMIN (GLUCOPHAGE-XR) 500 MG ER 24hr tablet, Take 1 tablet (500 mg total) by mouth daily with breakfast., Disp: 90 tablet, Rfl: 0    metoprolol succinate (TOPROL-XL) 25 MG 24 hr tablet, TAKE 1 TABLET EVERY EVENING, Disp: 90 tablet, Rfl: 3    multivitamin (THERAGRAN) per tablet, Take 1 tablet by mouth once daily., Disp: , Rfl:     ofloxacin (FLOXIN) 0.3 % otic solution, Place 3 drops into the right ear 2 (two) times daily. for 10 days, Disp: 5 mL, Rfl: 0    omega-3 fatty acids/fish oil (FISH OIL-OMEGA-3 FATTY ACIDS) 300-1,000 mg capsule, Take by mouth once daily., Disp: , Rfl:     aspirin (ECOTRIN) 81 MG EC tablet, Take 1 tablet (81 mg total) by mouth 2 (two) times daily. (Patient taking differently: Take 81 mg by mouth.), Disp: 60 tablet, Rfl: 0    ALLERGIES     Review of patient's allergies indicates:   Allergen Reactions    Codeine Nausea And Vomiting         REVIEW OF SYSTEMS   Constitution: Negative. Negative for chills, fever and night sweats.   HENT: Negative for congestion and headaches.    Eyes: Negative for blurred vision, left vision loss and right vision loss.   Cardiovascular: Negative for chest pain and syncope.   Respiratory: Negative for cough and shortness of breath.    Endocrine: Negative for polydipsia, polyphagia and polyuria.   Hematologic/Lymphatic: Negative for bleeding problem. Does not bruise/bleed easily.   Skin: Negative for dry skin, itching and rash.   Musculoskeletal: Negative for falls. Positive for right knee pain.  Gastrointestinal: Negative for abdominal pain and bowel incontinence.   Genitourinary: Negative for bladder incontinence and nocturia.   Neurological: Negative for disturbances in coordination, loss of balance and seizures.   Psychiatric/Behavioral: Negative for depression. The patient does not have insomnia.    Allergic/Immunologic: Negative for hives and  "persistent infections.     PHYSICAL EXAMINATION    Vitals: /76   Pulse 69   Ht 4' 10" (1.473 m)   Wt 71.1 kg (156 lb 12 oz)   LMP  (LMP Unknown)   BMI 32.76 kg/m²     General: The patient appears active and healthy with no apparent physical problems.  No disturbance of mood or affect is demonstrated. Alert and Oriented.    HEENT: Eyes normal, pupils equally round, nose normal.    Resp: Equal and symmetrical chest rises. No wheezing    CV: Regular rate    Neck: Supple; nonpainful range of motion.    Extremities: no cyanosis, clubbing, edema, or diffuse swelling.  Palpable pulses, good capillary refill of the digits.  No coolness, discoloration, edema or obvious varicosities.    Skin: no lesions noted.    Lymphatic: no detected adenopathy in the upper or lower extremities.    Neurologic: normal mental status, normal reflexes, normal gait and balance.  Patient is alert and oriented to person, place and time.  No flaccidity or spasticity is noted.  No motor or sensory deficits are noted.  Light touch is intact    Orthopaedic: KNEE EXAM - RIGHT    Inspection:   Normal skin color and appearance with no ecchymosis and no effusion. Postoperative scarring  with well-healed midline incision    ROM:   Passive range of motion of 0-90 degrees.  She has difficulty getting extension from 45-0 secondary to an audible click and pain      Palpation:   There is no tenderness along medial joint line, medial plica, medial collateral ligament, pes bursa, lateral joint line, iliotibial band, lateral collateral ligament, popliteal fossa, patellar tendon, or quadriceps tendon.    Stability:  Grossly stable     No instability with varus or valgus stress at 0° or 30°. Negative dial  test at 30° and 90°.    Tests:   Pain with patellar mobilization    Motor:   Quadriceps strength is 5/5 and hamstrings strength is 5/5. Hamstrings show no tightness.      Neuro:   Distal neurovascular status is normal    Vascular: Negative Homans and no " palpable popliteal cords. 2+ pedal pulse with brisk cap refill    Gait Normal      IMAGING    MRI Knee Without Contrast Right  Narrative: EXAMINATION:  MRI KNEE WITHOUT CONTRAST RIGHT    CLINICAL HISTORY:  Knee replacement, soft-tissue abnormality suspected;MARS MRI R knee (with metal suppression) s/p R TKA eval for patellar clunk;    TECHNIQUE:  Routine multisequence multiplanar right MRI knee MARS metal reduction protocol performed without IV contrast.    COMPARISON:  Radiograph 03/12/2024    FINDINGS:  Extensor Mechanism: Intact.  No discrete soft tissue nodule/fibrosis identified at the quadriceps tendon insertion, noting the suprapatellar joint space is partially obscured by artifact.  No prepatellar fluid collections/bursitis.    Bone prosthesis interface: Intact.  No evidence of osteolysis.  No fractures, marrow replacement process, or evidence of osteomyelitis.    The muscle signal within normal limits.  No fatty atrophy or strain. No masses or fluid collections.  Impression: Prior right knee total arthroplasty.    No discrete cause for patellar clunk identified, noting the suprapatellar joint space is partially obscured by artifact from surgical hardware.    Electronically signed by: Renzo Van MD  Date:    04/19/2024  Time:    13:14        IMPRESSION       ICD-10-CM ICD-9-CM   1. S/P total knee replacement, right  Z96.651 V43.65   2. Arthrofibrosis of total knee arthroplasty, initial encounter  T84.82XA 996.47       MEDICATIONS PRESCRIBED      None    RECOMMENDATIONS     Surgical versus non-surgical options discussed today; Right knee arthroscopy with synovectomy, lysis of adhesions and anterior interval slide   The patient elected to proceed with operative intervention after all risks, benefits, and alternatives were discussed with the patient.  The risks of surgery include bleeding, scar formation, injuries to nerves, arteries and veins, need for additional surgeries, blood clots, infections, inability  to return to the same level of the performance and the risk of anesthesia.   She will require pre-operative clearance from her PCP   RTC for pre-op with Alan Oviedo PA-C      All questions were answered, pt will contact us for questions or concerns in the interim.

## 2024-04-19 ENCOUNTER — HOSPITAL ENCOUNTER (OUTPATIENT)
Dept: RADIOLOGY | Facility: HOSPITAL | Age: 69
Discharge: HOME | End: 2024-04-19
Attending: ORTHOPAEDIC SURGERY
Payer: MEDICARE

## 2024-04-19 DIAGNOSIS — Z96.651 PRESENCE OF RIGHT ARTIFICIAL KNEE JOINT: ICD-10-CM

## 2024-04-19 PROCEDURE — 73721 MRI JNT OF LWR EXTRE W/O DYE: CPT | Mod: TC,HCNC,RT

## 2024-04-19 PROCEDURE — 73721 MRI JNT OF LWR EXTRE W/O DYE: CPT | Mod: 26,HCNC,RT, | Performed by: RADIOLOGY

## 2024-04-22 ENCOUNTER — OFFICE VISIT (OUTPATIENT)
Dept: SPORTS MEDICINE | Facility: CLINIC | Age: 69
End: 2024-04-22
Payer: MEDICARE

## 2024-04-22 VITALS
HEIGHT: 58 IN | BODY MASS INDEX: 32.9 KG/M2 | SYSTOLIC BLOOD PRESSURE: 132 MMHG | DIASTOLIC BLOOD PRESSURE: 76 MMHG | WEIGHT: 156.75 LBS | HEART RATE: 69 BPM

## 2024-04-22 DIAGNOSIS — Z96.651 S/P TOTAL KNEE REPLACEMENT, RIGHT: ICD-10-CM

## 2024-04-22 DIAGNOSIS — T84.82XA ARTHROFIBROSIS OF TOTAL KNEE ARTHROPLASTY, INITIAL ENCOUNTER: Primary | ICD-10-CM

## 2024-04-22 DIAGNOSIS — T84.82XA ARTHROFIBROSIS OF TOTAL KNEE ARTHROPLASTY, INITIAL ENCOUNTER: ICD-10-CM

## 2024-04-22 DIAGNOSIS — Z96.651 S/P TOTAL KNEE REPLACEMENT, RIGHT: Primary | ICD-10-CM

## 2024-04-22 PROCEDURE — 1157F ADVNC CARE PLAN IN RCRD: CPT | Mod: HCNC,CPTII,S$GLB, | Performed by: ORTHOPAEDIC SURGERY

## 2024-04-22 PROCEDURE — 1101F PT FALLS ASSESS-DOCD LE1/YR: CPT | Mod: HCNC,CPTII,S$GLB, | Performed by: ORTHOPAEDIC SURGERY

## 2024-04-22 PROCEDURE — 4010F ACE/ARB THERAPY RXD/TAKEN: CPT | Mod: HCNC,CPTII,S$GLB, | Performed by: ORTHOPAEDIC SURGERY

## 2024-04-22 PROCEDURE — 99999 PR PBB SHADOW E&M-EST. PATIENT-LVL IV: CPT | Mod: PBBFAC,HCNC,, | Performed by: ORTHOPAEDIC SURGERY

## 2024-04-22 PROCEDURE — 3008F BODY MASS INDEX DOCD: CPT | Mod: HCNC,CPTII,S$GLB, | Performed by: ORTHOPAEDIC SURGERY

## 2024-04-22 PROCEDURE — 1159F MED LIST DOCD IN RCRD: CPT | Mod: HCNC,CPTII,S$GLB, | Performed by: ORTHOPAEDIC SURGERY

## 2024-04-22 PROCEDURE — 99204 OFFICE O/P NEW MOD 45 MIN: CPT | Mod: HCNC,S$GLB,, | Performed by: ORTHOPAEDIC SURGERY

## 2024-04-22 PROCEDURE — 3075F SYST BP GE 130 - 139MM HG: CPT | Mod: HCNC,CPTII,S$GLB, | Performed by: ORTHOPAEDIC SURGERY

## 2024-04-22 PROCEDURE — 1125F AMNT PAIN NOTED PAIN PRSNT: CPT | Mod: HCNC,CPTII,S$GLB, | Performed by: ORTHOPAEDIC SURGERY

## 2024-04-22 PROCEDURE — 3078F DIAST BP <80 MM HG: CPT | Mod: HCNC,CPTII,S$GLB, | Performed by: ORTHOPAEDIC SURGERY

## 2024-04-22 PROCEDURE — 3288F FALL RISK ASSESSMENT DOCD: CPT | Mod: HCNC,CPTII,S$GLB, | Performed by: ORTHOPAEDIC SURGERY

## 2024-04-22 PROCEDURE — 3044F HG A1C LEVEL LT 7.0%: CPT | Mod: HCNC,CPTII,S$GLB, | Performed by: ORTHOPAEDIC SURGERY

## 2024-04-23 ENCOUNTER — TELEPHONE (OUTPATIENT)
Dept: INTERNAL MEDICINE | Facility: CLINIC | Age: 69
End: 2024-04-23
Payer: MEDICARE

## 2024-04-23 NOTE — TELEPHONE ENCOUNTER
----- Message from Eliza Alva MD sent at 4/23/2024  8:07 AM CDT -----  Regarding: FW: Surgery Clearance  Please find her an appt for pre-op clearance. Thanks  ----- Message -----  From: Arcenio Robertson MA  Sent: 4/22/2024   4:38 PM CDT  To: [facility] Staff  Subject: Surgery Clearance                                Good Afternoon,    Ms. Rodriguez is scheduled for right knee arthroscopy with Dr. Sumner on 4/30/2024.    Medical clearance is indicated prior to this. The anesthesia team will require explicit documentation regarding clearance status and perioperative medical recommendations. The medical clearance is good for 30 days and will need to be within this window of the scheduled surgery date.     Thank you for your help & let me know if I can assist in any way!        Arcenio Robertson MA  Medical Assistant - Dr. Ryanne Sumner  [facility] [place]

## 2024-04-24 ENCOUNTER — LAB VISIT (OUTPATIENT)
Dept: LAB | Facility: HOSPITAL | Age: 69
End: 2024-04-24
Attending: INTERNAL MEDICINE
Payer: MEDICARE

## 2024-04-24 ENCOUNTER — PATIENT OUTREACH (OUTPATIENT)
Dept: FAMILY MEDICINE | Facility: CLINIC | Age: 69
End: 2024-04-24
Payer: MEDICARE

## 2024-04-24 ENCOUNTER — OFFICE VISIT (OUTPATIENT)
Dept: SPORTS MEDICINE | Facility: CLINIC | Age: 69
End: 2024-04-24
Payer: MEDICARE

## 2024-04-24 ENCOUNTER — OFFICE VISIT (OUTPATIENT)
Dept: INTERNAL MEDICINE | Facility: CLINIC | Age: 69
End: 2024-04-24
Payer: MEDICARE

## 2024-04-24 ENCOUNTER — TELEPHONE (OUTPATIENT)
Dept: INTERNAL MEDICINE | Facility: CLINIC | Age: 69
End: 2024-04-24
Payer: MEDICARE

## 2024-04-24 VITALS
BODY MASS INDEX: 33.23 KG/M2 | SYSTOLIC BLOOD PRESSURE: 146 MMHG | HEART RATE: 69 BPM | OXYGEN SATURATION: 99 % | DIASTOLIC BLOOD PRESSURE: 70 MMHG | WEIGHT: 158.31 LBS | HEIGHT: 58 IN

## 2024-04-24 VITALS
HEART RATE: 78 BPM | BODY MASS INDEX: 33.42 KG/M2 | SYSTOLIC BLOOD PRESSURE: 131 MMHG | DIASTOLIC BLOOD PRESSURE: 80 MMHG | WEIGHT: 159.19 LBS | HEIGHT: 58 IN

## 2024-04-24 DIAGNOSIS — Z85.3 HISTORY OF LEFT BREAST CANCER: ICD-10-CM

## 2024-04-24 DIAGNOSIS — T84.82XA ARTHROFIBROSIS OF TOTAL KNEE ARTHROPLASTY, INITIAL ENCOUNTER: Primary | ICD-10-CM

## 2024-04-24 DIAGNOSIS — G47.33 OSA (OBSTRUCTIVE SLEEP APNEA): ICD-10-CM

## 2024-04-24 DIAGNOSIS — Z01.818 PREOPERATIVE EXAMINATION: ICD-10-CM

## 2024-04-24 DIAGNOSIS — Z96.651 S/P TOTAL KNEE REPLACEMENT, RIGHT: ICD-10-CM

## 2024-04-24 DIAGNOSIS — E04.1 THYROID NODULE: ICD-10-CM

## 2024-04-24 DIAGNOSIS — I10 ESSENTIAL HYPERTENSION: ICD-10-CM

## 2024-04-24 DIAGNOSIS — E78.2 MIXED HYPERLIPIDEMIA: ICD-10-CM

## 2024-04-24 DIAGNOSIS — Z01.818 PREOPERATIVE EXAMINATION: Primary | ICD-10-CM

## 2024-04-24 LAB
ALBUMIN SERPL BCP-MCNC: 3.8 G/DL (ref 3.5–5.2)
ALP SERPL-CCNC: 79 U/L (ref 55–135)
ALT SERPL W/O P-5'-P-CCNC: 19 U/L (ref 10–44)
ANION GAP SERPL CALC-SCNC: 11 MMOL/L (ref 8–16)
AST SERPL-CCNC: 30 U/L (ref 10–40)
BASOPHILS # BLD AUTO: 0.03 K/UL (ref 0–0.2)
BASOPHILS NFR BLD: 0.5 % (ref 0–1.9)
BILIRUB SERPL-MCNC: 0.6 MG/DL (ref 0.1–1)
BUN SERPL-MCNC: 15 MG/DL (ref 8–23)
CALCIUM SERPL-MCNC: 9.9 MG/DL (ref 8.7–10.5)
CHLORIDE SERPL-SCNC: 104 MMOL/L (ref 95–110)
CO2 SERPL-SCNC: 26 MMOL/L (ref 23–29)
CREAT SERPL-MCNC: 0.7 MG/DL (ref 0.5–1.4)
DIFFERENTIAL METHOD BLD: ABNORMAL
EOSINOPHIL # BLD AUTO: 0.2 K/UL (ref 0–0.5)
EOSINOPHIL NFR BLD: 2.5 % (ref 0–8)
ERYTHROCYTE [DISTWIDTH] IN BLOOD BY AUTOMATED COUNT: 15.9 % (ref 11.5–14.5)
EST. GFR  (NO RACE VARIABLE): >60 ML/MIN/1.73 M^2
GLUCOSE SERPL-MCNC: 84 MG/DL (ref 70–110)
HCT VFR BLD AUTO: 43.4 % (ref 37–48.5)
HGB BLD-MCNC: 13.2 G/DL (ref 12–16)
IMM GRANULOCYTES # BLD AUTO: 0.02 K/UL (ref 0–0.04)
IMM GRANULOCYTES NFR BLD AUTO: 0.3 % (ref 0–0.5)
LYMPHOCYTES # BLD AUTO: 1.9 K/UL (ref 1–4.8)
LYMPHOCYTES NFR BLD: 30.5 % (ref 18–48)
MCH RBC QN AUTO: 26.8 PG (ref 27–31)
MCHC RBC AUTO-ENTMCNC: 30.4 G/DL (ref 32–36)
MCV RBC AUTO: 88 FL (ref 82–98)
MONOCYTES # BLD AUTO: 0.7 K/UL (ref 0.3–1)
MONOCYTES NFR BLD: 11 % (ref 4–15)
NEUTROPHILS # BLD AUTO: 3.4 K/UL (ref 1.8–7.7)
NEUTROPHILS NFR BLD: 55.2 % (ref 38–73)
NRBC BLD-RTO: 0 /100 WBC
PLATELET # BLD AUTO: 330 K/UL (ref 150–450)
PMV BLD AUTO: 10.9 FL (ref 9.2–12.9)
POTASSIUM SERPL-SCNC: 4.6 MMOL/L (ref 3.5–5.1)
PROT SERPL-MCNC: 6.9 G/DL (ref 6–8.4)
RBC # BLD AUTO: 4.92 M/UL (ref 4–5.4)
SODIUM SERPL-SCNC: 141 MMOL/L (ref 136–145)
WBC # BLD AUTO: 6.09 K/UL (ref 3.9–12.7)

## 2024-04-24 PROCEDURE — 93005 ELECTROCARDIOGRAM TRACING: CPT | Mod: HCNC,S$GLB,, | Performed by: INTERNAL MEDICINE

## 2024-04-24 PROCEDURE — 3077F SYST BP >= 140 MM HG: CPT | Mod: HCNC,CPTII,S$GLB, | Performed by: INTERNAL MEDICINE

## 2024-04-24 PROCEDURE — 99999 PR PBB SHADOW E&M-EST. PATIENT-LVL IV: CPT | Mod: PBBFAC,HCNC,, | Performed by: PHYSICIAN ASSISTANT

## 2024-04-24 PROCEDURE — 3288F FALL RISK ASSESSMENT DOCD: CPT | Mod: HCNC,CPTII,S$GLB, | Performed by: INTERNAL MEDICINE

## 2024-04-24 PROCEDURE — 85025 COMPLETE CBC W/AUTO DIFF WBC: CPT | Mod: HCNC | Performed by: INTERNAL MEDICINE

## 2024-04-24 PROCEDURE — 99999 PR PBB SHADOW E&M-EST. PATIENT-LVL IV: CPT | Mod: PBBFAC,HCNC,, | Performed by: INTERNAL MEDICINE

## 2024-04-24 PROCEDURE — 1160F RVW MEDS BY RX/DR IN RCRD: CPT | Mod: HCNC,CPTII,S$GLB, | Performed by: INTERNAL MEDICINE

## 2024-04-24 PROCEDURE — 1126F AMNT PAIN NOTED NONE PRSNT: CPT | Mod: HCNC,CPTII,S$GLB, | Performed by: INTERNAL MEDICINE

## 2024-04-24 PROCEDURE — 93010 ELECTROCARDIOGRAM REPORT: CPT | Mod: HCNC,S$GLB,, | Performed by: INTERNAL MEDICINE

## 2024-04-24 PROCEDURE — 99499 UNLISTED E&M SERVICE: CPT | Mod: HCNC,S$GLB,, | Performed by: PHYSICIAN ASSISTANT

## 2024-04-24 PROCEDURE — 1157F ADVNC CARE PLAN IN RCRD: CPT | Mod: HCNC,CPTII,S$GLB, | Performed by: INTERNAL MEDICINE

## 2024-04-24 PROCEDURE — 3008F BODY MASS INDEX DOCD: CPT | Mod: HCNC,CPTII,S$GLB, | Performed by: INTERNAL MEDICINE

## 2024-04-24 PROCEDURE — 99214 OFFICE O/P EST MOD 30 MIN: CPT | Mod: HCNC,S$GLB,, | Performed by: INTERNAL MEDICINE

## 2024-04-24 PROCEDURE — 4010F ACE/ARB THERAPY RXD/TAKEN: CPT | Mod: HCNC,CPTII,S$GLB, | Performed by: INTERNAL MEDICINE

## 2024-04-24 PROCEDURE — 1159F MED LIST DOCD IN RCRD: CPT | Mod: HCNC,CPTII,S$GLB, | Performed by: INTERNAL MEDICINE

## 2024-04-24 PROCEDURE — 36415 COLL VENOUS BLD VENIPUNCTURE: CPT | Mod: HCNC,PO | Performed by: INTERNAL MEDICINE

## 2024-04-24 PROCEDURE — 80053 COMPREHEN METABOLIC PANEL: CPT | Mod: HCNC | Performed by: INTERNAL MEDICINE

## 2024-04-24 PROCEDURE — 3044F HG A1C LEVEL LT 7.0%: CPT | Mod: HCNC,CPTII,S$GLB, | Performed by: INTERNAL MEDICINE

## 2024-04-24 PROCEDURE — 1101F PT FALLS ASSESS-DOCD LE1/YR: CPT | Mod: HCNC,CPTII,S$GLB, | Performed by: INTERNAL MEDICINE

## 2024-04-24 PROCEDURE — 3078F DIAST BP <80 MM HG: CPT | Mod: HCNC,CPTII,S$GLB, | Performed by: INTERNAL MEDICINE

## 2024-04-24 RX ORDER — ONDANSETRON 4 MG/1
4 TABLET, ORALLY DISINTEGRATING ORAL EVERY 6 HOURS PRN
Qty: 15 TABLET | Refills: 0 | Status: SHIPPED | OUTPATIENT
Start: 2024-04-24

## 2024-04-24 RX ORDER — CEFAZOLIN SODIUM 2 G/50ML
2 SOLUTION INTRAVENOUS
Status: CANCELLED | OUTPATIENT
Start: 2024-04-24

## 2024-04-24 RX ORDER — ASPIRIN 81 MG/1
81 TABLET ORAL DAILY
Qty: 28 TABLET | Refills: 0 | Status: SHIPPED | OUTPATIENT
Start: 2024-04-24

## 2024-04-24 RX ORDER — MUPIROCIN 20 MG/G
OINTMENT TOPICAL
Status: CANCELLED | OUTPATIENT
Start: 2024-04-24

## 2024-04-24 RX ORDER — HYDROCODONE BITARTRATE AND ACETAMINOPHEN 7.5; 325 MG/1; MG/1
1 TABLET ORAL EVERY 6 HOURS PRN
Qty: 20 TABLET | Refills: 0 | Status: SHIPPED | OUTPATIENT
Start: 2024-04-24

## 2024-04-24 NOTE — PROGRESS NOTES
PREOPERATIVE APPOINTMENT    History 4/24/2024  Chelsea returns here today for follow-up evaluation of her right knee and to complete her preoperative paperwork.  Surgical intervention has been recommended and she is scheduled to undergo a right knee arthroscopy with synovectomy, lysis of adhesions and anterior interval slide on April 30, 2024.  She was medically cleared for surgery by her primary care physician earlier today.    History 4/22/2024:  69 y.o. Female with a history of right knee pain who presents for 2nd opinion concerning her right knee. She had a right total knee replacement with Dr. Summers in 2021. She states that the right knee gets stuck and feels stiff.    She states she did well for 3 months after surgery and then started developed symptoms in the anterior aspect of the knee.  She has difficulty when she flexes and extends her knee.  She feels a click in the front of the knee.  After it pops she starts to feel better.  This has been going on over the past 2-1/2 years.  She has at a point now that she wants something done.    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 Oneil    CATARACT EXTRACTION W/  INTRAOCULAR LENS IMPLANT Left 11/20/2018    Dr. Oneil      SECTION      x2    COLONOSCOPY N/A 10/20/2017    Procedure: COLONOSCOPY;  Surgeon: Mitchell Danielson Jr., MD;  Location: [facility] ENDO;  Service: Endoscopy;  Laterality: N/A;    COLONOSCOPY N/A 2021    Procedure: COLONOSCOPY/Suprep;  Surgeon: Malcolm Reyes MD;  Location: [facility] ENDO;  Service: Endoscopy;  Laterality: N/A;    CYST REMOVAL      on back    ESOPHAGOGASTRODUODENOSCOPY N/A 2021    Procedure: EGD (ESOPHAGOGASTRODUODENOSCOPY);  Surgeon: Malcolm Reyes MD;  Location: [facility] ENDO;  Service: Endoscopy;  Laterality: N/A;    HERNIA REPAIR      HYSTERECTOMY      at 25 yrs old    INTRAOCULAR PROSTHESES INSERTION Left 2018    Procedure: INSERTION, IOL PROSTHESIS;  Surgeon: Sergio Oneil MD;  Location: [facility] OR 1ST FLR;  Service: Ophthalmology;  Laterality: Left;    INTRAOCULAR PROSTHESES INSERTION Right 2018    Procedure: INSERTION, IOL PROSTHESIS;  Surgeon: Sergio Oneil MD;  Location: [facility] OR 2ND FLR;  Service: Ophthalmology;  Laterality: Right;    KNEE ARTHROPLASTY Right 2021    Procedure: ARTHROPLASTY, KNEE:RIGHT:DEPUY-SIGMA ;  Surgeon: Pedro Summers III, MD;  Location: [facility] OR;  Service: Orthopedics;  Laterality: Right;    OOPHORECTOMY      @ 45 yrs old    PHACOEMULSIFICATION OF CATARACT Left 2018    Procedure: PHACOEMULSIFICATION, CATARACT;  Surgeon: Sergio Oneil MD;  Location: [facility] OR 1ST FLR;  Service: Ophthalmology;  Laterality: Left;    PHACOEMULSIFICATION OF CATARACT Right 2018    Procedure: PHACOEMULSIFICATION, CATARACT;  Surgeon: Sergio Oneil MD;  Location: [facility] OR 2ND FLR;  Service: Ophthalmology;  Laterality: Right;    REFRACTIVE SURGERY          supracervical abdominal hysterectomy  1978    fibroids       FAMILY HISTORY    Family History   Problem Relation Name Age of Onset    Hypertension Mother      Heart disease Mother      Diabetes Mother      Hyperlipidemia Mother      Pancreatitis Mother       "Cataracts Mother      Macular degeneration Mother      Cancer Father      Prostate cancer Father      Hypertension Sister x1     Thyroid disease Sister x1     Diabetes Brother x1     Diabetes Brother x2     Cancer Brother x2     Heart disease Brother x2     Prostate cancer Brother x2     Thyroid cancer Brother x2     Hyperlipidemia Daughter x1     Hypertension Son x1     Breast cancer Paternal Cousin      Amblyopia Neg Hx      Blindness Neg Hx      Glaucoma Neg Hx      Strabismus Neg Hx      Retinal detachment Neg Hx         SOCIAL HISTORY    Social History     Socioeconomic History    Marital status: Single    Number of children: 2   Occupational History     Comment: retired   Tobacco Use    Smoking status: Never    Smokeless tobacco: Never   Substance and Sexual Activity    Alcohol use: Yes     Comment: once per month    Drug use: Never    Sexual activity: Not Currently   Social History Narrative    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 hx of left lumpectomy for "breast cancer"- with 5yrs of tamoxifen use. Sees Dr. Buckley ([facility] for surveillance/MMG)        Dr. Lala former PCP, [facility]          Social Determinants of Health     Financial Resource Strain: Medium Risk (3/25/2024)    Overall Financial Resource Strain (CARDIA)     Difficulty of Paying Living Expenses: Somewhat hard   Food Insecurity: Food Insecurity Present (3/25/2024)    Hunger Vital Sign     Worried About Running Out of Food in the Last Year: Often true     Ran Out of Food in the Last Year: Often true   Transportation Needs: No Transportation Needs (3/25/2024)    PRAPARE - Transportation     Lack of Transportation (Medical): No     Lack of Transportation (Non-Medical): No   Physical Activity: Insufficiently Active (3/25/2024)    Exercise Vital Sign     Days of Exercise per Week: 2 days     Minutes of Exercise per Session: 20 min   Stress: No Stress Concern Present " (3/25/2024)    Citizen of [place] [employer] Occupational Health - Occupational Stress Questionnaire     Feeling of Stress : Not at all   Social Connections: Moderately Integrated (3/25/2024)    Social Connection and Isolation Panel [NHANES]     Frequency of Communication with Friends and Family: More than three times a week     Frequency of Social Gatherings with Friends and Family: Twice a week     Attends Protestant Services: More than 4 times per year     Active Member of Clubs or Organizations: Yes     Attends Club or Organization Meetings: More than 4 times per year     Marital Status:    Housing Stability: Low Risk  (3/25/2024)    Housing Stability Vital Sign     Unable to Pay for Housing in the Last Year: No     Number of Places Lived in the Last Year: 1     Unstable Housing in the Last Year: No       MEDICATIONS      Current Outpatient Medications:     acetaminophen (TYLENOL) 650 MG TbSR, Take 1 tablet (650 mg total) by mouth every 8 (eight) hours as needed (pain)., Disp: 120 tablet, Rfl: 0    amoxicillin (AMOXIL) 500 MG capsule, Take 500 mg by mouth as needed. Before dental work, Disp: , Rfl:     atorvastatin (LIPITOR) 80 MG tablet, TAKE 1 TABLET (80 MG TOTAL) BY MOUTH ONCE DAILY., Disp: 90 tablet, Rfl: 3    azelastine (ASTELIN) 137 mcg (0.1 %) nasal spray, 1 spray by Nasal route 2 (two) times daily., Disp: , Rfl:     coenzyme Q10 100 mg capsule, Take 100 mg by mouth every evening., Disp: , Rfl:     diclofenac (VOLTAREN) 25 MG TbEC, Take 1 tablet (25 mg total) by mouth 2 (two) times daily as needed., Disp: 20 tablet, Rfl: 0    docusate sodium (COLACE) 100 MG capsule, Take 1 capsule (100 mg total) by mouth 2 (two) times daily as needed for Constipation., Disp: 60 capsule, Rfl: 0    ezetimibe (ZETIA) 10 mg tablet, Take 1 tablet (10 mg total) by mouth once daily., Disp: 90 tablet, Rfl: 3    fluticasone propionate (FLONASE) 50 mcg/actuation nasal spray, 1 spray by Each Nostril route., Disp: , Rfl:      hydroCHLOROthiazide (MICROZIDE) 12.5 mg capsule, TAKE 1 CAPSULE EVERY EVENING, Disp: 90 capsule, Rfl: 1    irbesartan (AVAPRO) 150 MG tablet, TAKE 1 TABLET ONE TIME DAILY, Disp: 90 tablet, Rfl: 3    Lactobacillus acidophilus (PROBIOTIC ACIDOPHILUS ORAL), Take by mouth., Disp: , Rfl:     loratadine (CLARITIN) 10 mg tablet, Take 1 tablet (10 mg total) by mouth once daily., Disp: 30 tablet, Rfl: 2    metFORMIN (GLUCOPHAGE-XR) 500 MG ER 24hr tablet, Take 1 tablet (500 mg total) by mouth daily with breakfast., Disp: 90 tablet, Rfl: 0    metoprolol succinate (TOPROL-XL) 25 MG 24 hr tablet, TAKE 1 TABLET EVERY EVENING, Disp: 90 tablet, Rfl: 3    multivitamin (THERAGRAN) per tablet, Take 1 tablet by mouth once daily., Disp: , Rfl:     omega-3 fatty acids/fish oil (FISH OIL-OMEGA-3 FATTY ACIDS) 300-1,000 mg capsule, Take by mouth once daily., Disp: , Rfl:     aspirin (ECOTRIN) 81 MG EC tablet, Take 1 tablet (81 mg total) by mouth 2 (two) times daily. (Patient taking differently: Take 81 mg by mouth.), Disp: 60 tablet, Rfl: 0    ALLERGIES     Review of patient's allergies indicates:   Allergen Reactions    Codeine Nausea And Vomiting         REVIEW OF SYSTEMS   Constitution: Negative. Negative for chills, fever and night sweats.   HENT: Negative for congestion and headaches.    Eyes: Negative for blurred vision, left vision loss and right vision loss.   Cardiovascular: Negative for chest pain and syncope.   Respiratory: Negative for cough and shortness of breath.    Endocrine: Negative for polydipsia, polyphagia and polyuria.   Hematologic/Lymphatic: Negative for bleeding problem. Does not bruise/bleed easily.   Skin: Negative for dry skin, itching and rash.   Musculoskeletal: Negative for falls. Positive for right knee pain.  Gastrointestinal: Negative for abdominal pain and bowel incontinence.   Genitourinary: Negative for bladder incontinence and nocturia.   Neurological: Negative for disturbances in coordination, loss of  "balance and seizures.   Psychiatric/Behavioral: Negative for depression. The patient does not have insomnia.    Allergic/Immunologic: Negative for hives and persistent infections.     PHYSICAL EXAMINATION    Vitals: /80   Pulse 78   Ht 4' 10" (1.473 m)   Wt 72.2 kg (159 lb 2.8 oz)   LMP  (LMP Unknown)   BMI 33.27 kg/m²     General: The patient appears active and healthy with no apparent physical problems.  No disturbance of mood or affect is demonstrated. Alert and Oriented.    HEENT: Eyes normal, pupils equally round, nose normal.    Resp: Equal and symmetrical chest rises. No wheezing    CV: Regular rate    Neck: Supple; nonpainful range of motion.    Extremities: no cyanosis, clubbing, edema, or diffuse swelling.  Palpable pulses, good capillary refill of the digits.  No coolness, discoloration, edema or obvious varicosities.    Skin: no lesions noted.    Lymphatic: no detected adenopathy in the upper or lower extremities.    Neurologic: normal mental status, normal reflexes, normal gait and balance.  Patient is alert and oriented to person, place and time.  No flaccidity or spasticity is noted.  No motor or sensory deficits are noted.  Light touch is intact    Orthopaedic: KNEE EXAM - RIGHT    Inspection:   Normal skin color and appearance with no ecchymosis and no effusion. Postoperative scarring  with well-healed midline incision    ROM:   Passive range of motion of 0-90 degrees.  She has difficulty getting extension from 45-0 secondary to an audible click and pain      Palpation:   There is no tenderness along medial joint line, medial plica, medial collateral ligament, pes bursa, lateral joint line, iliotibial band, lateral collateral ligament, popliteal fossa, patellar tendon, or quadriceps tendon.    Stability:  Grossly stable     No instability with varus or valgus stress at 0° or 30°. Negative dial  test at 30° and 90°.    Tests:   Pain with patellar mobilization    Motor:   Quadriceps " strength is 5/5 and hamstrings strength is 5/5. Hamstrings show no tightness.      Neuro:   Distal neurovascular status is normal    Vascular: Negative Homans and no palpable popliteal cords. 2+ pedal pulse with brisk cap refill    Gait Normal      IMAGING    MRI Knee Without Contrast Right  Narrative: EXAMINATION:  MRI KNEE WITHOUT CONTRAST RIGHT    CLINICAL HISTORY:  Knee replacement, soft-tissue abnormality suspected;MARS MRI R knee (with metal suppression) s/p R TKA eval for patellar clunk;    TECHNIQUE:  Routine multisequence multiplanar right MRI knee MARS metal reduction protocol performed without IV contrast.    COMPARISON:  Radiograph 03/12/2024    FINDINGS:  Extensor Mechanism: Intact.  No discrete soft tissue nodule/fibrosis identified at the quadriceps tendon insertion, noting the suprapatellar joint space is partially obscured by artifact.  No prepatellar fluid collections/bursitis.    Bone prosthesis interface: Intact.  No evidence of osteolysis.  No fractures, marrow replacement process, or evidence of osteomyelitis.    The muscle signal within normal limits.  No fatty atrophy or strain. No masses or fluid collections.  Impression: Prior right knee total arthroplasty.    No discrete cause for patellar clunk identified, noting the suprapatellar joint space is partially obscured by artifact from surgical hardware.    Electronically signed by: Renzo Van MD  Date:    04/19/2024  Time:    13:14        IMPRESSION       ICD-10-CM ICD-9-CM   1. Arthrofibrosis of total knee arthroplasty, initial encounter  T84.82XA 996.47   2. S/P total knee replacement, right  Z96.651 V43.65         MEDICATIONS PRESCRIBED      Aspirin 81 mg  Hydrocodone 7.5/325 mg  Zofran 4 mg      RECOMMENDATIONS     Surgical versus non-surgical options discussed today; Right knee arthroscopy with synovectomy, lysis of adhesions and anterior interval slide   The patient elected to proceed with operative intervention after all risks,  benefits, and alternatives were discussed with the patient.  The risks of surgery include bleeding, scar formation, injuries to nerves, arteries and veins, need for additional surgeries, blood clots, infections, inability to return to the same level of the performance and the risk of anesthesia.   Informed consent was obtained  Physical therapy was ordered      All questions were answered, pt will contact us for questions or concerns in the interim.

## 2024-04-24 NOTE — H&P
H&P    History 2024  Chelsea returns here today for follow-up evaluation of her right knee and to complete her preoperative paperwork.  Surgical intervention has been recommended and she is scheduled to undergo a right knee arthroscopy with synovectomy, lysis of adhesions and anterior interval slide on 2024.  She was medically cleared for surgery by her primary care physician earlier today.    History 2024:  69 y.o. Female with a history of right knee pain who presents for 2nd opinion concerning her right knee. She had a right total knee replacement with Dr. Summers in . She states that the right knee gets stuck and feels stiff.    She states she did well for 3 months after surgery and then started developed symptoms in the anterior aspect of the knee.  She has difficulty when she flexes and extends her knee.  She feels a click in the front of the knee.  After it pops she starts to feel better.  This has been going on over the past 2-1/2 years.  She has at a point now that she wants something done.    + mechanical symptoms, - instability         PAST MEDICAL HISTORY    Past Medical History:   Diagnosis Date    Bilateral knee pain     Carpal tunnel syndrome, bilateral     Fissure in skin of foot     Right small toe    HTN (hypertension)     Hyperlipidemia     Multiple thyroid nodules 11/10/2023    Palpitations     Rotator cuff injury     right    Screening for colorectal cancer 10/20/2017    Screening for malignant neoplasm of colon 2021    Statin-induced myositis 2018       PAST SURGICAL HISTORY     Past Surgical History:   Procedure Laterality Date    BILATERAL SALPINGOOPHORECTOMY  2000    BREAST BIOPSY Left     malignant    BREAST BIOPSY Left     negative    BREAST LUMPECTOMY Left     CATARACT EXTRACTION W/  INTRAOCULAR LENS IMPLANT Right 2018    Dr. Oneil    CATARACT EXTRACTION W/  INTRAOCULAR LENS IMPLANT Left 2018    Dr. Oneil     SECTION       x2    COLONOSCOPY N/A 10/20/2017    Procedure: COLONOSCOPY;  Surgeon: Mitchell Danielson Jr., MD;  Location: [facility] ENDO;  Service: Endoscopy;  Laterality: N/A;    COLONOSCOPY N/A 02/05/2021    Procedure: COLONOSCOPY/Suprep;  Surgeon: Malcolm Reyes MD;  Location: [facility] ENDO;  Service: Endoscopy;  Laterality: N/A;    CYST REMOVAL      on back    ESOPHAGOGASTRODUODENOSCOPY N/A 02/05/2021    Procedure: EGD (ESOPHAGOGASTRODUODENOSCOPY);  Surgeon: Malcolm Reyes MD;  Location: [facility] ENDO;  Service: Endoscopy;  Laterality: N/A;    HERNIA REPAIR      HYSTERECTOMY      at 25 yrs old    INTRAOCULAR PROSTHESES INSERTION Left 11/06/2018    Procedure: INSERTION, IOL PROSTHESIS;  Surgeon: Sergio Oneil MD;  Location: [facility] OR 1ST FLR;  Service: Ophthalmology;  Laterality: Left;    INTRAOCULAR PROSTHESES INSERTION Right 11/20/2018    Procedure: INSERTION, IOL PROSTHESIS;  Surgeon: Sergio Oneil MD;  Location: [facility] OR 2ND FLR;  Service: Ophthalmology;  Laterality: Right;    KNEE ARTHROPLASTY Right 03/31/2021    Procedure: ARTHROPLASTY, KNEE:RIGHT:DEPUY-SIGMA ;  Surgeon: Pedro Summers III, MD;  Location: [facility] OR;  Service: Orthopedics;  Laterality: Right;    OOPHORECTOMY      @ 45 yrs old    PHACOEMULSIFICATION OF CATARACT Left 11/06/2018    Procedure: PHACOEMULSIFICATION, CATARACT;  Surgeon: Sergio Oneil MD;  Location: [facility] OR 1ST FLR;  Service: Ophthalmology;  Laterality: Left;    PHACOEMULSIFICATION OF CATARACT Right 11/20/2018    Procedure: PHACOEMULSIFICATION, CATARACT;  Surgeon: Sergio Oneil MD;  Location: [facility] OR 2ND FLR;  Service: Ophthalmology;  Laterality: Right;    REFRACTIVE SURGERY      2023    supracervical abdominal hysterectomy  1978    fibroids       FAMILY HISTORY    Family History   Problem Relation Name Age of Onset    Hypertension Mother      Heart disease Mother      Diabetes Mother      Hyperlipidemia Mother      Pancreatitis Mother      Cataracts Mother       "Macular degeneration Mother      Cancer Father      Prostate cancer Father      Hypertension Sister x1     Thyroid disease Sister x1     Diabetes Brother x1     Diabetes Brother x2     Cancer Brother x2     Heart disease Brother x2     Prostate cancer Brother x2     Thyroid cancer Brother x2     Hyperlipidemia Daughter x1     Hypertension Son x1     Breast cancer Paternal Cousin      Amblyopia Neg Hx      Blindness Neg Hx      Glaucoma Neg Hx      Strabismus Neg Hx      Retinal detachment Neg Hx         SOCIAL HISTORY    Social History     Socioeconomic History    Marital status: Single    Number of children: 2   Occupational History     Comment: retired   Tobacco Use    Smoking status: Never    Smokeless tobacco: Never   Substance and Sexual Activity    Alcohol use: Yes     Comment: once per month    Drug use: Never    Sexual activity: Not Currently   Social History Narrative    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 hx of left lumpectomy for "breast cancer"- with 5yrs of tamoxifen use. Sees Dr. Buckley ([facility] for surveillance/MMG)        Dr. Lala former PCP, [facility]          Social Determinants of Health     Financial Resource Strain: Medium Risk (3/25/2024)    Overall Financial Resource Strain (CARDIA)     Difficulty of Paying Living Expenses: Somewhat hard   Food Insecurity: Food Insecurity Present (3/25/2024)    Hunger Vital Sign     Worried About Running Out of Food in the Last Year: Often true     Ran Out of Food in the Last Year: Often true   Transportation Needs: No Transportation Needs (3/25/2024)    PRAPARE - Transportation     Lack of Transportation (Medical): No     Lack of Transportation (Non-Medical): No   Physical Activity: Insufficiently Active (3/25/2024)    Exercise Vital Sign     Days of Exercise per Week: 2 days     Minutes of Exercise per Session: 20 min   Stress: No Stress Concern Present (3/25/2024)    [place] [employer] - Occupational Stress Questionnaire     Feeling of Stress : Not at all   Social Connections: Moderately Integrated (3/25/2024)    Social Connection and Isolation Panel [NHANES]     Frequency of Communication with Friends and Family: More than three times a week     Frequency of Social Gatherings with Friends and Family: Twice a week     Attends Hindu Services: More than 4 times per year     Active Member of Clubs or Organizations: Yes     Attends Club or Organization Meetings: More than 4 times per year     Marital Status:    Housing Stability: Low Risk  (3/25/2024)    Housing Stability Vital Sign     Unable to Pay for Housing in the Last Year: No     Number of Places Lived in the Last Year: 1     Unstable Housing in the Last Year: No       MEDICATIONS      Current Outpatient Medications:     acetaminophen (TYLENOL) 650 MG TbSR, Take 1 tablet (650 mg total) by mouth every 8 (eight) hours as needed (pain)., Disp: 120 tablet, Rfl: 0    amoxicillin (AMOXIL) 500 MG capsule, Take 500 mg by mouth as needed. Before dental work, Disp: , Rfl:     atorvastatin (LIPITOR) 80 MG tablet, TAKE 1 TABLET (80 MG TOTAL) BY MOUTH ONCE DAILY., Disp: 90 tablet, Rfl: 3    azelastine (ASTELIN) 137 mcg (0.1 %) nasal spray, 1 spray by Nasal route 2 (two) times daily., Disp: , Rfl:     coenzyme Q10 100 mg capsule, Take 100 mg by mouth every evening., Disp: , Rfl:     diclofenac (VOLTAREN) 25 MG TbEC, Take 1 tablet (25 mg total) by mouth 2 (two) times daily as needed., Disp: 20 tablet, Rfl: 0    docusate sodium (COLACE) 100 MG capsule, Take 1 capsule (100 mg total) by mouth 2 (two) times daily as needed for Constipation., Disp: 60 capsule, Rfl: 0    ezetimibe (ZETIA) 10 mg tablet, Take 1 tablet (10 mg total) by mouth once daily., Disp: 90 tablet, Rfl: 3    fluticasone propionate (FLONASE) 50 mcg/actuation nasal spray, 1 spray by Each Nostril route., Disp: , Rfl:     hydroCHLOROthiazide (MICROZIDE)  12.5 mg capsule, TAKE 1 CAPSULE EVERY EVENING, Disp: 90 capsule, Rfl: 1    irbesartan (AVAPRO) 150 MG tablet, TAKE 1 TABLET ONE TIME DAILY, Disp: 90 tablet, Rfl: 3    Lactobacillus acidophilus (PROBIOTIC ACIDOPHILUS ORAL), Take by mouth., Disp: , Rfl:     loratadine (CLARITIN) 10 mg tablet, Take 1 tablet (10 mg total) by mouth once daily., Disp: 30 tablet, Rfl: 2    metFORMIN (GLUCOPHAGE-XR) 500 MG ER 24hr tablet, Take 1 tablet (500 mg total) by mouth daily with breakfast., Disp: 90 tablet, Rfl: 0    metoprolol succinate (TOPROL-XL) 25 MG 24 hr tablet, TAKE 1 TABLET EVERY EVENING, Disp: 90 tablet, Rfl: 3    multivitamin (THERAGRAN) per tablet, Take 1 tablet by mouth once daily., Disp: , Rfl:     omega-3 fatty acids/fish oil (FISH OIL-OMEGA-3 FATTY ACIDS) 300-1,000 mg capsule, Take by mouth once daily., Disp: , Rfl:     aspirin (ECOTRIN) 81 MG EC tablet, Take 1 tablet (81 mg total) by mouth 2 (two) times daily. (Patient taking differently: Take 81 mg by mouth.), Disp: 60 tablet, Rfl: 0    ALLERGIES     Review of patient's allergies indicates:   Allergen Reactions    Codeine Nausea And Vomiting         REVIEW OF SYSTEMS   Constitution: Negative. Negative for chills, fever and night sweats.   HENT: Negative for congestion and headaches.    Eyes: Negative for blurred vision, left vision loss and right vision loss.   Cardiovascular: Negative for chest pain and syncope.   Respiratory: Negative for cough and shortness of breath.    Endocrine: Negative for polydipsia, polyphagia and polyuria.   Hematologic/Lymphatic: Negative for bleeding problem. Does not bruise/bleed easily.   Skin: Negative for dry skin, itching and rash.   Musculoskeletal: Negative for falls. Positive for right knee pain.  Gastrointestinal: Negative for abdominal pain and bowel incontinence.   Genitourinary: Negative for bladder incontinence and nocturia.   Neurological: Negative for disturbances in coordination, loss of balance and seizures.  "  Psychiatric/Behavioral: Negative for depression. The patient does not have insomnia.    Allergic/Immunologic: Negative for hives and persistent infections.     PHYSICAL EXAMINATION    Vitals: /80   Pulse 78   Ht 4' 10" (1.473 m)   Wt 72.2 kg (159 lb 2.8 oz)   LMP  (LMP Unknown)   BMI 33.27 kg/m²     General: The patient appears active and healthy with no apparent physical problems.  No disturbance of mood or affect is demonstrated. Alert and Oriented.    HEENT: Eyes normal, pupils equally round, nose normal.    Resp: Equal and symmetrical chest rises. No wheezing    CV: Regular rate    Neck: Supple; nonpainful range of motion.    Extremities: no cyanosis, clubbing, edema, or diffuse swelling.  Palpable pulses, good capillary refill of the digits.  No coolness, discoloration, edema or obvious varicosities.    Skin: no lesions noted.    Lymphatic: no detected adenopathy in the upper or lower extremities.    Neurologic: normal mental status, normal reflexes, normal gait and balance.  Patient is alert and oriented to person, place and time.  No flaccidity or spasticity is noted.  No motor or sensory deficits are noted.  Light touch is intact    Orthopaedic: KNEE EXAM - RIGHT    Inspection:   Normal skin color and appearance with no ecchymosis and no effusion. Postoperative scarring  with well-healed midline incision    ROM:   Passive range of motion of 0-90 degrees.  She has difficulty getting extension from 45-0 secondary to an audible click and pain      Palpation:   There is no tenderness along medial joint line, medial plica, medial collateral ligament, pes bursa, lateral joint line, iliotibial band, lateral collateral ligament, popliteal fossa, patellar tendon, or quadriceps tendon.    Stability:  Grossly stable     No instability with varus or valgus stress at 0° or 30°. Negative dial  test at 30° and 90°.    Tests:   Pain with patellar mobilization    Motor:   Quadriceps strength is 5/5 and " hamstrings strength is 5/5. Hamstrings show no tightness.      Neuro:   Distal neurovascular status is normal    Vascular: Negative Homans and no palpable popliteal cords. 2+ pedal pulse with brisk cap refill    Gait Normal      IMAGING    MRI Knee Without Contrast Right  Narrative: EXAMINATION:  MRI KNEE WITHOUT CONTRAST RIGHT    CLINICAL HISTORY:  Knee replacement, soft-tissue abnormality suspected;MARS MRI R knee (with metal suppression) s/p R TKA eval for patellar clunk;    TECHNIQUE:  Routine multisequence multiplanar right MRI knee MARS metal reduction protocol performed without IV contrast.    COMPARISON:  Radiograph 03/12/2024    FINDINGS:  Extensor Mechanism: Intact.  No discrete soft tissue nodule/fibrosis identified at the quadriceps tendon insertion, noting the suprapatellar joint space is partially obscured by artifact.  No prepatellar fluid collections/bursitis.    Bone prosthesis interface: Intact.  No evidence of osteolysis.  No fractures, marrow replacement process, or evidence of osteomyelitis.    The muscle signal within normal limits.  No fatty atrophy or strain. No masses or fluid collections.  Impression: Prior right knee total arthroplasty.    No discrete cause for patellar clunk identified, noting the suprapatellar joint space is partially obscured by artifact from surgical hardware.    Electronically signed by: Renzo Van MD  Date:    04/19/2024  Time:    13:14        IMPRESSION       ICD-10-CM ICD-9-CM   1. Arthrofibrosis of total knee arthroplasty, initial encounter  T84.82XA 996.47   2. S/P total knee replacement, right  Z96.651 V43.65         MEDICATIONS PRESCRIBED      Aspirin 81 mg  Hydrocodone 7.5/325 mg  Zofran 4 mg      RECOMMENDATIONS     Surgical versus non-surgical options discussed today; Right knee arthroscopy with synovectomy, lysis of adhesions and anterior interval slide   The patient elected to proceed with operative intervention after all risks, benefits, and  alternatives were discussed with the patient.  The risks of surgery include bleeding, scar formation, injuries to nerves, arteries and veins, need for additional surgeries, blood clots, infections, inability to return to the same level of the performance and the risk of anesthesia.   Informed consent was obtained  Physical therapy was ordered      All questions were answered, pt will contact us for questions or concerns in the interim.

## 2024-04-24 NOTE — H&P (VIEW-ONLY)
H&P    History 2024  Chelsea returns here today for follow-up evaluation of her right knee and to complete her preoperative paperwork.  Surgical intervention has been recommended and she is scheduled to undergo a right knee arthroscopy with synovectomy, lysis of adhesions and anterior interval slide on 2024.  She was medically cleared for surgery by her primary care physician earlier today.    History 2024:  69 y.o. Female with a history of right knee pain who presents for 2nd opinion concerning her right knee. She had a right total knee replacement with Dr. Summers in . She states that the right knee gets stuck and feels stiff.    She states she did well for 3 months after surgery and then started developed symptoms in the anterior aspect of the knee.  She has difficulty when she flexes and extends her knee.  She feels a click in the front of the knee.  After it pops she starts to feel better.  This has been going on over the past 2-1/2 years.  She has at a point now that she wants something done.    + mechanical symptoms, - instability         PAST MEDICAL HISTORY    Past Medical History:   Diagnosis Date    Bilateral knee pain     Carpal tunnel syndrome, bilateral     Fissure in skin of foot     Right small toe    HTN (hypertension)     Hyperlipidemia     Multiple thyroid nodules 11/10/2023    Palpitations     Rotator cuff injury     right    Screening for colorectal cancer 10/20/2017    Screening for malignant neoplasm of colon 2021    Statin-induced myositis 2018       PAST SURGICAL HISTORY     Past Surgical History:   Procedure Laterality Date    BILATERAL SALPINGOOPHORECTOMY  2000    BREAST BIOPSY Left     malignant    BREAST BIOPSY Left     negative    BREAST LUMPECTOMY Left     CATARACT EXTRACTION W/  INTRAOCULAR LENS IMPLANT Right 2018    Dr. Oneil    CATARACT EXTRACTION W/  INTRAOCULAR LENS IMPLANT Left 2018    Dr. Oneil     SECTION       x2    COLONOSCOPY N/A 10/20/2017    Procedure: COLONOSCOPY;  Surgeon: Mitchell Danielson Jr., MD;  Location: [facility] ENDO;  Service: Endoscopy;  Laterality: N/A;    COLONOSCOPY N/A 02/05/2021    Procedure: COLONOSCOPY/Suprep;  Surgeon: Malcolm Reyes MD;  Location: [facility] ENDO;  Service: Endoscopy;  Laterality: N/A;    CYST REMOVAL      on back    ESOPHAGOGASTRODUODENOSCOPY N/A 02/05/2021    Procedure: EGD (ESOPHAGOGASTRODUODENOSCOPY);  Surgeon: Malcolm Reyes MD;  Location: [facility] ENDO;  Service: Endoscopy;  Laterality: N/A;    HERNIA REPAIR      HYSTERECTOMY      at 25 yrs old    INTRAOCULAR PROSTHESES INSERTION Left 11/06/2018    Procedure: INSERTION, IOL PROSTHESIS;  Surgeon: Sergio Oneil MD;  Location: [facility] OR 1ST FLR;  Service: Ophthalmology;  Laterality: Left;    INTRAOCULAR PROSTHESES INSERTION Right 11/20/2018    Procedure: INSERTION, IOL PROSTHESIS;  Surgeon: Sergio Oneil MD;  Location: [facility] OR 2ND FLR;  Service: Ophthalmology;  Laterality: Right;    KNEE ARTHROPLASTY Right 03/31/2021    Procedure: ARTHROPLASTY, KNEE:RIGHT:DEPUY-SIGMA ;  Surgeon: Pedro Summers III, MD;  Location: [facility] OR;  Service: Orthopedics;  Laterality: Right;    OOPHORECTOMY      @ 45 yrs old    PHACOEMULSIFICATION OF CATARACT Left 11/06/2018    Procedure: PHACOEMULSIFICATION, CATARACT;  Surgeon: Sergio Oneil MD;  Location: [facility] OR 1ST FLR;  Service: Ophthalmology;  Laterality: Left;    PHACOEMULSIFICATION OF CATARACT Right 11/20/2018    Procedure: PHACOEMULSIFICATION, CATARACT;  Surgeon: Sergio Oneil MD;  Location: [facility] OR 2ND FLR;  Service: Ophthalmology;  Laterality: Right;    REFRACTIVE SURGERY      2023    supracervical abdominal hysterectomy  1978    fibroids       FAMILY HISTORY    Family History   Problem Relation Name Age of Onset    Hypertension Mother      Heart disease Mother      Diabetes Mother      Hyperlipidemia Mother      Pancreatitis Mother      Cataracts Mother       "Macular degeneration Mother      Cancer Father      Prostate cancer Father      Hypertension Sister x1     Thyroid disease Sister x1     Diabetes Brother x1     Diabetes Brother x2     Cancer Brother x2     Heart disease Brother x2     Prostate cancer Brother x2     Thyroid cancer Brother x2     Hyperlipidemia Daughter x1     Hypertension Son x1     Breast cancer Paternal Cousin      Amblyopia Neg Hx      Blindness Neg Hx      Glaucoma Neg Hx      Strabismus Neg Hx      Retinal detachment Neg Hx         SOCIAL HISTORY    Social History     Socioeconomic History    Marital status: Single    Number of children: 2   Occupational History     Comment: retired   Tobacco Use    Smoking status: Never    Smokeless tobacco: Never   Substance and Sexual Activity    Alcohol use: Yes     Comment: once per month    Drug use: Never    Sexual activity: Not Currently   Social History Narrative    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 hx of left lumpectomy for "breast cancer"- with 5yrs of tamoxifen use. Sees Dr. Buckley ([facility] for surveillance/MMG)        Dr. Lala former PCP, [facility]          Social Determinants of Health     Financial Resource Strain: Medium Risk (3/25/2024)    Overall Financial Resource Strain (CARDIA)     Difficulty of Paying Living Expenses: Somewhat hard   Food Insecurity: Food Insecurity Present (3/25/2024)    Hunger Vital Sign     Worried About Running Out of Food in the Last Year: Often true     Ran Out of Food in the Last Year: Often true   Transportation Needs: No Transportation Needs (3/25/2024)    PRAPARE - Transportation     Lack of Transportation (Medical): No     Lack of Transportation (Non-Medical): No   Physical Activity: Insufficiently Active (3/25/2024)    Exercise Vital Sign     Days of Exercise per Week: 2 days     Minutes of Exercise per Session: 20 min   Stress: No Stress Concern Present (3/25/2024)    [place] " [employer] - Occupational Stress Questionnaire     Feeling of Stress : Not at all   Social Connections: Moderately Integrated (3/25/2024)    Social Connection and Isolation Panel [NHANES]     Frequency of Communication with Friends and Family: More than three times a week     Frequency of Social Gatherings with Friends and Family: Twice a week     Attends Congregation Services: More than 4 times per year     Active Member of Clubs or Organizations: Yes     Attends Club or Organization Meetings: More than 4 times per year     Marital Status:    Housing Stability: Low Risk  (3/25/2024)    Housing Stability Vital Sign     Unable to Pay for Housing in the Last Year: No     Number of Places Lived in the Last Year: 1     Unstable Housing in the Last Year: No       MEDICATIONS      Current Outpatient Medications:     acetaminophen (TYLENOL) 650 MG TbSR, Take 1 tablet (650 mg total) by mouth every 8 (eight) hours as needed (pain)., Disp: 120 tablet, Rfl: 0    amoxicillin (AMOXIL) 500 MG capsule, Take 500 mg by mouth as needed. Before dental work, Disp: , Rfl:     atorvastatin (LIPITOR) 80 MG tablet, TAKE 1 TABLET (80 MG TOTAL) BY MOUTH ONCE DAILY., Disp: 90 tablet, Rfl: 3    azelastine (ASTELIN) 137 mcg (0.1 %) nasal spray, 1 spray by Nasal route 2 (two) times daily., Disp: , Rfl:     coenzyme Q10 100 mg capsule, Take 100 mg by mouth every evening., Disp: , Rfl:     diclofenac (VOLTAREN) 25 MG TbEC, Take 1 tablet (25 mg total) by mouth 2 (two) times daily as needed., Disp: 20 tablet, Rfl: 0    docusate sodium (COLACE) 100 MG capsule, Take 1 capsule (100 mg total) by mouth 2 (two) times daily as needed for Constipation., Disp: 60 capsule, Rfl: 0    ezetimibe (ZETIA) 10 mg tablet, Take 1 tablet (10 mg total) by mouth once daily., Disp: 90 tablet, Rfl: 3    fluticasone propionate (FLONASE) 50 mcg/actuation nasal spray, 1 spray by Each Nostril route., Disp: , Rfl:     hydroCHLOROthiazide (MICROZIDE)  12.5 mg capsule, TAKE 1 CAPSULE EVERY EVENING, Disp: 90 capsule, Rfl: 1    irbesartan (AVAPRO) 150 MG tablet, TAKE 1 TABLET ONE TIME DAILY, Disp: 90 tablet, Rfl: 3    Lactobacillus acidophilus (PROBIOTIC ACIDOPHILUS ORAL), Take by mouth., Disp: , Rfl:     loratadine (CLARITIN) 10 mg tablet, Take 1 tablet (10 mg total) by mouth once daily., Disp: 30 tablet, Rfl: 2    metFORMIN (GLUCOPHAGE-XR) 500 MG ER 24hr tablet, Take 1 tablet (500 mg total) by mouth daily with breakfast., Disp: 90 tablet, Rfl: 0    metoprolol succinate (TOPROL-XL) 25 MG 24 hr tablet, TAKE 1 TABLET EVERY EVENING, Disp: 90 tablet, Rfl: 3    multivitamin (THERAGRAN) per tablet, Take 1 tablet by mouth once daily., Disp: , Rfl:     omega-3 fatty acids/fish oil (FISH OIL-OMEGA-3 FATTY ACIDS) 300-1,000 mg capsule, Take by mouth once daily., Disp: , Rfl:     aspirin (ECOTRIN) 81 MG EC tablet, Take 1 tablet (81 mg total) by mouth 2 (two) times daily. (Patient taking differently: Take 81 mg by mouth.), Disp: 60 tablet, Rfl: 0    ALLERGIES     Review of patient's allergies indicates:   Allergen Reactions    Codeine Nausea And Vomiting         REVIEW OF SYSTEMS   Constitution: Negative. Negative for chills, fever and night sweats.   HENT: Negative for congestion and headaches.    Eyes: Negative for blurred vision, left vision loss and right vision loss.   Cardiovascular: Negative for chest pain and syncope.   Respiratory: Negative for cough and shortness of breath.    Endocrine: Negative for polydipsia, polyphagia and polyuria.   Hematologic/Lymphatic: Negative for bleeding problem. Does not bruise/bleed easily.   Skin: Negative for dry skin, itching and rash.   Musculoskeletal: Negative for falls. Positive for right knee pain.  Gastrointestinal: Negative for abdominal pain and bowel incontinence.   Genitourinary: Negative for bladder incontinence and nocturia.   Neurological: Negative for disturbances in coordination, loss of balance and seizures.  "  Psychiatric/Behavioral: Negative for depression. The patient does not have insomnia.    Allergic/Immunologic: Negative for hives and persistent infections.     PHYSICAL EXAMINATION    Vitals: /80   Pulse 78   Ht 4' 10" (1.473 m)   Wt 72.2 kg (159 lb 2.8 oz)   LMP  (LMP Unknown)   BMI 33.27 kg/m²     General: The patient appears active and healthy with no apparent physical problems.  No disturbance of mood or affect is demonstrated. Alert and Oriented.    HEENT: Eyes normal, pupils equally round, nose normal.    Resp: Equal and symmetrical chest rises. No wheezing    CV: Regular rate    Neck: Supple; nonpainful range of motion.    Extremities: no cyanosis, clubbing, edema, or diffuse swelling.  Palpable pulses, good capillary refill of the digits.  No coolness, discoloration, edema or obvious varicosities.    Skin: no lesions noted.    Lymphatic: no detected adenopathy in the upper or lower extremities.    Neurologic: normal mental status, normal reflexes, normal gait and balance.  Patient is alert and oriented to person, place and time.  No flaccidity or spasticity is noted.  No motor or sensory deficits are noted.  Light touch is intact    Orthopaedic: KNEE EXAM - RIGHT    Inspection:   Normal skin color and appearance with no ecchymosis and no effusion. Postoperative scarring  with well-healed midline incision    ROM:   Passive range of motion of 0-90 degrees.  She has difficulty getting extension from 45-0 secondary to an audible click and pain      Palpation:   There is no tenderness along medial joint line, medial plica, medial collateral ligament, pes bursa, lateral joint line, iliotibial band, lateral collateral ligament, popliteal fossa, patellar tendon, or quadriceps tendon.    Stability:  Grossly stable     No instability with varus or valgus stress at 0° or 30°. Negative dial  test at 30° and 90°.    Tests:   Pain with patellar mobilization    Motor:   Quadriceps strength is 5/5 and " hamstrings strength is 5/5. Hamstrings show no tightness.      Neuro:   Distal neurovascular status is normal    Vascular: Negative Homans and no palpable popliteal cords. 2+ pedal pulse with brisk cap refill    Gait Normal      IMAGING    MRI Knee Without Contrast Right  Narrative: EXAMINATION:  MRI KNEE WITHOUT CONTRAST RIGHT    CLINICAL HISTORY:  Knee replacement, soft-tissue abnormality suspected;MARS MRI R knee (with metal suppression) s/p R TKA eval for patellar clunk;    TECHNIQUE:  Routine multisequence multiplanar right MRI knee MARS metal reduction protocol performed without IV contrast.    COMPARISON:  Radiograph 03/12/2024    FINDINGS:  Extensor Mechanism: Intact.  No discrete soft tissue nodule/fibrosis identified at the quadriceps tendon insertion, noting the suprapatellar joint space is partially obscured by artifact.  No prepatellar fluid collections/bursitis.    Bone prosthesis interface: Intact.  No evidence of osteolysis.  No fractures, marrow replacement process, or evidence of osteomyelitis.    The muscle signal within normal limits.  No fatty atrophy or strain. No masses or fluid collections.  Impression: Prior right knee total arthroplasty.    No discrete cause for patellar clunk identified, noting the suprapatellar joint space is partially obscured by artifact from surgical hardware.    Electronically signed by: Renzo Van MD  Date:    04/19/2024  Time:    13:14        IMPRESSION       ICD-10-CM ICD-9-CM   1. Arthrofibrosis of total knee arthroplasty, initial encounter  T84.82XA 996.47   2. S/P total knee replacement, right  Z96.651 V43.65         MEDICATIONS PRESCRIBED      Aspirin 81 mg  Hydrocodone 7.5/325 mg  Zofran 4 mg      RECOMMENDATIONS     Surgical versus non-surgical options discussed today; Right knee arthroscopy with synovectomy, lysis of adhesions and anterior interval slide   The patient elected to proceed with operative intervention after all risks, benefits, and  alternatives were discussed with the patient.  The risks of surgery include bleeding, scar formation, injuries to nerves, arteries and veins, need for additional surgeries, blood clots, infections, inability to return to the same level of the performance and the risk of anesthesia.   Informed consent was obtained  Physical therapy was ordered      All questions were answered, pt will contact us for questions or concerns in the interim.

## 2024-04-24 NOTE — PROGRESS NOTES
PREOPERATIVE EXAMINATION    Chief Complaint   Patient presents with    Pre-op Exam        HPI: Mrs. Rodriguez is a 69 year old female with  hypertension, hyperlipidemia, osteoarthritis, chronic constipation, obstructive sleep apnea, bilateral carpal tunnel syndrome, lymphedema, thyroid nodules and history of breast cancer  who presents for preop examination.  She plans to undergo knee arthroscopy in 6 days.  She has no new acute complaints.  She has undergone multiple operations in the past.  She has not experienced any adverse reactions to general anesthesia.  She has not experienced any bleeding or blood clotting issues intraoperatively or postoperatively.  Patient can walk up a flight of stairs without shortness of breath or chest pain.  Patient does exercises in the pool without shortness of breath or chest pain.    Past Medical History:   Diagnosis Date    Bilateral knee pain     Carpal tunnel syndrome, bilateral     Fissure in skin of foot     Right small toe    HTN (hypertension)     Hyperlipidemia     Multiple thyroid nodules 11/10/2023    Palpitations     Rotator cuff injury     right    Screening for colorectal cancer 10/20/2017    Screening for malignant neoplasm of colon 2021    Statin-induced myositis 2018        Past Surgical History:   Procedure Laterality Date    BILATERAL SALPINGOOPHORECTOMY  2000    BREAST BIOPSY Left 2010    malignant    BREAST BIOPSY Left     negative    BREAST LUMPECTOMY Left     CATARACT EXTRACTION W/  INTRAOCULAR LENS IMPLANT Right 2018    Dr. Oneil    CATARACT EXTRACTION W/  INTRAOCULAR LENS IMPLANT Left 2018    Dr. Oneil     SECTION      x2    COLONOSCOPY N/A 10/20/2017    Procedure: COLONOSCOPY;  Surgeon: Mitchell Danielson Jr., MD;  Location: [facility];  Service: Endoscopy;  Laterality: N/A;    COLONOSCOPY N/A 2021    Procedure: COLONOSCOPY/Suprep;  Surgeon: Malcolm Reyes MD;  Location: [facility];  Service: Endoscopy;   Laterality: N/A;    CYST REMOVAL      on back    ESOPHAGOGASTRODUODENOSCOPY N/A 02/05/2021    Procedure: EGD (ESOPHAGOGASTRODUODENOSCOPY);  Surgeon: Malcolm Reyes MD;  Location: [facility] ENDO;  Service: Endoscopy;  Laterality: N/A;    HERNIA REPAIR      HYSTERECTOMY      at 25 yrs old    INTRAOCULAR PROSTHESES INSERTION Left 11/06/2018    Procedure: INSERTION, IOL PROSTHESIS;  Surgeon: Sergio Oneil MD;  Location: [facility] OR 1ST FLR;  Service: Ophthalmology;  Laterality: Left;    INTRAOCULAR PROSTHESES INSERTION Right 11/20/2018    Procedure: INSERTION, IOL PROSTHESIS;  Surgeon: Sergio Oneil MD;  Location: [facility] OR 2ND FLR;  Service: Ophthalmology;  Laterality: Right;    KNEE ARTHROPLASTY Right 03/31/2021    Procedure: ARTHROPLASTY, KNEE:RIGHT:DEPUY-SIGMA ;  Surgeon: Pedro Summers III, MD;  Location: [facility] OR;  Service: Orthopedics;  Laterality: Right;    OOPHORECTOMY      @ 45 yrs old    PHACOEMULSIFICATION OF CATARACT Left 11/06/2018    Procedure: PHACOEMULSIFICATION, CATARACT;  Surgeon: Sergio Oneil MD;  [facility];  Service: Ophthalmology;  Laterality: Left;    PHACOEMULSIFICATION OF CATARACT Right 11/20/2018    Procedure: PHACOEMULSIFICATION, CATARACT;  Surgeon: Sergio Oneil MD;  Location: [facility] OR 2ND FLR;  Service: Ophthalmology;  Laterality: Right;    REFRACTIVE SURGERY      2023    supracervical abdominal hysterectomy  1978    fibroids          Current Outpatient Medications:     acetaminophen (TYLENOL) 650 MG TbSR, Take 1 tablet (650 mg total) by mouth every 8 (eight) hours as needed (pain)., Disp: 120 tablet, Rfl: 0    amoxicillin (AMOXIL) 500 MG capsule, Take 500 mg by mouth as needed. Before dental work, Disp: , Rfl:     atorvastatin (LIPITOR) 80 MG tablet, TAKE 1 TABLET (80 MG TOTAL) BY MOUTH ONCE DAILY., Disp: 90 tablet, Rfl: 3    azelastine (ASTELIN) 137 mcg (0.1 %) nasal spray, 1 spray by Nasal route 2 (two) times daily., Disp: , Rfl:     coenzyme  Q10 100 mg capsule, Take 100 mg by mouth every evening., Disp: , Rfl:     diclofenac (VOLTAREN) 25 MG TbEC, Take 1 tablet (25 mg total) by mouth 2 (two) times daily as needed., Disp: 20 tablet, Rfl: 0    docusate sodium (COLACE) 100 MG capsule, Take 1 capsule (100 mg total) by mouth 2 (two) times daily as needed for Constipation., Disp: 60 capsule, Rfl: 0    ezetimibe (ZETIA) 10 mg tablet, Take 1 tablet (10 mg total) by mouth once daily., Disp: 90 tablet, Rfl: 3    fluticasone propionate (FLONASE) 50 mcg/actuation nasal spray, 1 spray by Each Nostril route., Disp: , Rfl:     hydroCHLOROthiazide (MICROZIDE) 12.5 mg capsule, TAKE 1 CAPSULE EVERY EVENING, Disp: 90 capsule, Rfl: 1    irbesartan (AVAPRO) 150 MG tablet, TAKE 1 TABLET ONE TIME DAILY, Disp: 90 tablet, Rfl: 3    Lactobacillus acidophilus (PROBIOTIC ACIDOPHILUS ORAL), Take by mouth., Disp: , Rfl:     loratadine (CLARITIN) 10 mg tablet, Take 1 tablet (10 mg total) by mouth once daily., Disp: 30 tablet, Rfl: 2    metFORMIN (GLUCOPHAGE-XR) 500 MG ER 24hr tablet, Take 1 tablet (500 mg total) by mouth daily with breakfast., Disp: 90 tablet, Rfl: 0    metoprolol succinate (TOPROL-XL) 25 MG 24 hr tablet, TAKE 1 TABLET EVERY EVENING, Disp: 90 tablet, Rfl: 3    multivitamin (THERAGRAN) per tablet, Take 1 tablet by mouth once daily., Disp: , Rfl:     omega-3 fatty acids/fish oil (FISH OIL-OMEGA-3 FATTY ACIDS) 300-1,000 mg capsule, Take by mouth once daily., Disp: , Rfl:     aspirin (ECOTRIN) 81 MG EC tablet, Take 1 tablet (81 mg total) by mouth once daily., Disp: 28 tablet, Rfl: 0    HYDROcodone-acetaminophen (NORCO) 7.5-325 mg per tablet, Take 1 tablet by mouth every 6 (six) hours as needed for Pain., Disp: 20 tablet, Rfl: 0    ondansetron (ZOFRAN-ODT) 4 MG TbDL, Dissolve 1 tablet (4 mg total) by mouth every 6 (six) hours as needed (nausea)., Disp: 15 tablet, Rfl: 0     Review of patient's allergies indicates:   Allergen Reactions    Codeine Nausea And Vomiting     "    Social History     Socioeconomic History    Marital status: Single    Number of children: 2   Occupational History     Comment: retired   Tobacco Use    Smoking status: Never    Smokeless tobacco: Never   Substance and Sexual Activity    Alcohol use: Yes     Comment: once per month    Drug use: Never    Sexual activity: Not Currently   Social History Narrative    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 hx of left lumpectomy for "breast cancer"- with 5yrs of tamoxifen use. Sees Dr. Buckley ([facility] for surveillance/MMG)        Dr. Lala former PCP, [facility]          Social Determinants of Health     Financial Resource Strain: Medium Risk (3/25/2024)    Overall Financial Resource Strain (CARDIA)     Difficulty of Paying Living Expenses: Somewhat hard   Food Insecurity: Food Insecurity Present (3/25/2024)    Hunger Vital Sign     Worried About Running Out of Food in the Last Year: Often true     Ran Out of Food in the Last Year: Often true   Transportation Needs: No Transportation Needs (3/25/2024)    PRAPARE - Transportation     Lack of Transportation (Medical): No     Lack of Transportation (Non-Medical): No   Physical Activity: Insufficiently Active (3/25/2024)    Exercise Vital Sign     Days of Exercise per Week: 2 days     Minutes of Exercise per Session: 20 min   Stress: No Stress Concern Present (3/25/2024)    [place] [employer] - Occupational Stress Questionnaire     Feeling of Stress : Not at all   Social Connections: Moderately Integrated (3/25/2024)    Social Connection and Isolation Panel [NHANES]     Frequency of Communication with Friends and Family: More than three times a week     Frequency of Social Gatherings with Friends and Family: Twice a week     Attends Christianity Services: More than 4 times per year     Active Member of Clubs or Organizations: Yes     Attends Club or Organization Meetings: More " than 4 times per year     Marital Status:    Housing Stability: Low Risk  (3/25/2024)    Housing Stability Vital Sign     Unable to Pay for Housing in the Last Year: No     Number of Places Lived in the Last Year: 1     Unstable Housing in the Last Year: No        Family History   Problem Relation Name Age of Onset    Hypertension Mother      Heart disease Mother      Diabetes Mother      Hyperlipidemia Mother      Pancreatitis Mother      Cataracts Mother      Macular degeneration Mother      Cancer Father      Prostate cancer Father      Hypertension Sister x1     Thyroid disease Sister x1     Diabetes Brother x1     Diabetes Brother x2     Cancer Brother x2     Heart disease Brother x2     Prostate cancer Brother x2     Thyroid cancer Brother x2     Hyperlipidemia Daughter x1     Hypertension Son x1     Breast cancer Paternal Cousin      Amblyopia Neg Hx      Blindness Neg Hx      Glaucoma Neg Hx      Strabismus Neg Hx      Retinal detachment Neg Hx          Review of Systems   All other systems reviewed and are negative.       Physical Exam  Vitals reviewed.   Constitutional:       General: She is not in acute distress.     Appearance: Normal appearance. She is obese. She is not ill-appearing, toxic-appearing or diaphoretic.   HENT:      Head: Normocephalic and atraumatic.      Right Ear: External ear normal.      Left Ear: External ear normal.      Nose: Nose normal.   Eyes:      Extraocular Movements: Extraocular movements intact.   Cardiovascular:      Rate and Rhythm: Normal rate and regular rhythm.      Pulses: Normal pulses.      Heart sounds: Normal heart sounds. No murmur heard.     No friction rub. No gallop.   Pulmonary:      Effort: Pulmonary effort is normal. No respiratory distress.      Breath sounds: Normal breath sounds. No stridor. No wheezing, rhonchi or rales.   Chest:      Chest wall: No tenderness.   Skin:     General: Skin is warm and dry.   Neurological:      General: No focal  deficit present.      Mental Status: She is alert and oriented to person, place, and time. Mental status is at baseline.   Psychiatric:         Mood and Affect: Mood normal.         Behavior: Behavior normal.         Thought Content: Thought content normal.         Judgment: Judgment normal.          Chelsea was seen today for pre-op exam.    Diagnoses and all orders for this visit:    Preoperative examination  -     CBC Auto Differential; Future  -     Comprehensive Metabolic Panel; Future  -     EKG 12-lead; Future    HENOK (obstructive sleep apnea)  Comments:  Not compliant with CPAP    Thyroid nodule  Comments:  continue to follo with endocrinology    History of left breast cancer    Mixed hyperlipidemia  Comments:  continue statin    Essential hypertension  Comments:  continue current medication           Patient is low risk for any adverse events occurring during a procedure

## 2024-04-25 ENCOUNTER — PATIENT MESSAGE (OUTPATIENT)
Dept: PREADMISSION TESTING | Facility: HOSPITAL | Age: 69
End: 2024-04-25
Payer: MEDICARE

## 2024-04-25 NOTE — ANESTHESIA PAT ROS NOTE
04/25/2024  Chelsea Rodriguez is a 69 y.o., female.      Pre-op Assessment    I have reviewed the Patient Summary Reports.       I have reviewed the Medications.     Review of Systems  Anesthesia Hx:  No problems with previous Anesthesia   History of prior surgery of interest to airway management or planning:  Previous anesthesia: MAC, Spinal   3/31/2021  Right TKA with spinal.  Procedure performed at an [facility].   Successful Spinal/Epidural level at L 3-4     2/5/2021  EGD with MAC.  Procedure performed at an [facility].    Denies Personal Hx of Anesthesia complications.                    Social:  Non-Smoker, Social Alcohol Use Worship      Hematology/Oncology:       -- Denies Anemia:               Hematology Comments: Vitamin D deficiency      --  Cancer in past history:       Breast    left no axillary node dissection no lymphedema surgery and radiation   Oncology Comments: S/P Left Lumpectomy and XRT in 2010     EENT/Dental:  chronic allergic rhinitis Multiple Thyroid Nodules, H/O bilateral cataracts, S/P Extractions with Intraocular Lens Implants,   Vitreous detachment of both eyes,  Dry eye syndrome of both eyes,  Refractive surgery, H/O tooth extraction          Cardiovascular:  Exercise tolerance: good   Hypertension, well controlled   Denies MI.  Denies CAD.       Denies Angina.     hyperlipidemia  Denies SOTO.  ECG has been reviewed. 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  Denies Asthma.   Denies Shortness of breath.  Sleep Apnea, CPAP Not compliant with CPAP          Education provided regarding risk of obstructive sleep apnea            Renal/:  Renal/ Normal  Denies Chronic Renal Disease.                Hepatic/GI:      Denies GERD. Denies Liver  Disease.  H/O Hernia Repair,  History of adenomatous polyp of colon          Musculoskeletal:  Arthritis   Arthrofibrosis of total knee arthroplasty,   S/P total knee replacement, right,  Bilateral knee pain, Right Rotator Cuff injury,  Other lack of coordination,   Primary osteoarthritis of right knee,  Osteoarthritis,  Swelling of hand,  Pain involving joint of finger of left hand,  Muscle weakness                  OB/GYN/PEDS:  C-Sections X2, Uterine fibroids, S/P Hysterectomy with BSO, Pelvic floor dysfunction           Neurological:    Denies CVA. Neuromuscular Disease,   Denies Headaches. Denies Seizures.    Bilateral Carpal Tunnel Syndrome, statin-induced myositis                            Endocrine:  Diabetes, well controlled, type 2 Denies Hypothyroidism.  Obesity (BMI 30.0-34.9),  HA1C = 6.2 on 2024      Obesity / BMI > 30  Dermatological:  H/O fissure in skin of right foot, small toe, H/O cyst on back, S/P Removal,  Venous stasis dermatitis   Psych:   anxiety                Past Medical History:   Diagnosis Date    Bilateral knee pain     Carpal tunnel syndrome, bilateral     Fissure in skin of foot     Right small toe    HTN (hypertension)     Hyperlipidemia     Multiple thyroid nodules 11/10/2023    Palpitations     Rotator cuff injury     right    Screening for colorectal cancer 10/20/2017    Screening for malignant neoplasm of colon 2021    Statin-induced myositis 2018     Past Surgical History:   Procedure Laterality Date    BILATERAL SALPINGOOPHORECTOMY  2000    BREAST BIOPSY Left 2010    malignant    BREAST BIOPSY Left     negative    BREAST LUMPECTOMY Left     CATARACT EXTRACTION W/  INTRAOCULAR LENS IMPLANT Right 2018    Dr. Oneil    CATARACT EXTRACTION W/  INTRAOCULAR LENS IMPLANT Left 2018    Dr. Oneil     SECTION      x2    COLONOSCOPY N/A 10/20/2017    Procedure: COLONOSCOPY;  Surgeon: Mitchell Danielson Jr., MD;  Location: [facility];   Service: Endoscopy;  Laterality: N/A;    COLONOSCOPY N/A 02/05/2021    Procedure: COLONOSCOPY/Suprep;  Surgeon: Malcolm Reyes MD;  Location: [facility] ENDO;  Service: Endoscopy;  Laterality: N/A;    CYST REMOVAL      on back    ESOPHAGOGASTRODUODENOSCOPY N/A 02/05/2021    Procedure: EGD (ESOPHAGOGASTRODUODENOSCOPY);  Surgeon: Malcolm Reyes MD;  Location: [facility] ENDO;  Service: Endoscopy;  Laterality: N/A;    HERNIA REPAIR      HYSTERECTOMY      at 25 yrs old    INTRAOCULAR PROSTHESES INSERTION Left 11/06/2018    Procedure: INSERTION, IOL PROSTHESIS;  Surgeon: Sergio Oneil MD;  Location: [facility] OR 1ST FLR;  Service: Ophthalmology;  Laterality: Left;    INTRAOCULAR PROSTHESES INSERTION Right 11/20/2018    Procedure: INSERTION, IOL PROSTHESIS;  Surgeon: Sergio Oneil MD;  Location: [facility] OR 2ND FLR;  Service: Ophthalmology;  Laterality: Right;    KNEE ARTHROPLASTY Right 03/31/2021    Procedure: ARTHROPLASTY, KNEE:RIGHT:DEPUY-SIGMA ;  Surgeon: Pedro Summers III, MD;  Location: [facility];  Service: Orthopedics;  Laterality: Right;    OOPHORECTOMY      @ 45 yrs old    PHACOEMULSIFICATION OF CATARACT Left 11/06/2018    Procedure: PHACOEMULSIFICATION, CATARACT;  Surgeon: Sergio Oneil MD;  [facility];  Service: Ophthalmology;  Laterality: Left;    PHACOEMULSIFICATION OF CATARACT Right 11/20/2018    Procedure: PHACOEMULSIFICATION, CATARACT;  Surgeon: Sergio Oneil MD;  Location: [facility] OR 2ND FLR;  Service: Ophthalmology;  Laterality: Right;    REFRACTIVE SURGERY      2023    supracervical abdominal hysterectomy  1978    fibroids       Anesthesia Assessment: Preoperative EQUATION    Planned Procedure: Procedure(s) (LRB):  LYSIS, ADHESIONS, KNEE, ARTHROSCOPIC (Right)  SYNOVECTOMY, KNEE (Right)  Requested Anesthesia Type:General/Regional  Surgeon: Ryanne Sumner MD  Service: Orthopedics  Known or anticipated Date of Surgery:4/30/2024    Surgeon notes: reviewed    Electronic  QUestionnaire Assessment completed via nurse interview with patient.        Triage considerations:     The patient has no apparent active cardiac condition (No unstable coronary Syndrome such as severe unstable angina or recent [<1 month] myocardial infarction, decompensated CHF, severe valvular   disease or significant arrhythmia)    Previous anesthesia records:MAC, CSE, and No problems    [facility]   Subspecialty notes: Cardiology: Interventional, Rheumatology    Other important co-morbidities: DM2, HLD, HTN, Obesity, and HENOK       EKG 1/22/2023:  Vent. Rate : 078 BPM     Atrial Rate : 078 BPM      P-R Int : 184 ms          QRS Dur : 090 ms       QT Int : 392 ms       P-R-T Axes : 064 012 040 degrees      QTc Int : 446 ms   Normal sinus rhythm   Normal ECG   When compared with ECG of 23-MAR-2021 11:57,   Criteria for Inferior infarct are no longer Present   Confirmed by Ciaran Ramirez MD ([id number]) on 2/7/2023 8:18:07 AM       Echo 8/9/2017:  CONCLUSIONS     1 - Normal left ventricular systolic function (EF 60-65%).     2 - Normal left ventricular diastolic function.     3 - Normal right ventricular systolic function .     4 - Mild mitral regurgitation.        Tests already available:  Results have been reviewed.             Instructions given. (See in Nurse's note)      Optimization:  Anesthesia Preop Clinic Assessment  Not Indicated    Medical Opinion Indicated: Yes, PCP       Sub-specialist consult indicated:  No      Plan:    Consultation:Patient's PCP for a statement of optimization      Patient  has previously scheduled Medical Appointment:4/24/2024    Navigation: Tests Scheduled.              Consults scheduled.  Patient is optimized for surgery by PCP, she is low risk for any adverse events occurring during a procedure.       Ht: 4'10  Wt: 72.2 kg (159 lb)  BMI: 33.27  Vaccinated

## 2024-04-26 LAB
OHS QRS DURATION: 90 MS
OHS QTC CALCULATION: 420 MS

## 2024-04-29 ENCOUNTER — TELEPHONE (OUTPATIENT)
Dept: SPORTS MEDICINE | Facility: CLINIC | Age: 69
End: 2024-04-29
Payer: MEDICARE

## 2024-04-30 ENCOUNTER — ANESTHESIA (OUTPATIENT)
Dept: SURGERY | Facility: HOSPITAL | Age: 69
End: 2024-04-30
Payer: MEDICARE

## 2024-04-30 ENCOUNTER — ANESTHESIA EVENT (OUTPATIENT)
Dept: SURGERY | Facility: HOSPITAL | Age: 69
End: 2024-04-30
Payer: MEDICARE

## 2024-04-30 ENCOUNTER — HOSPITAL ENCOUNTER (OUTPATIENT)
Facility: HOSPITAL | Age: 69
Discharge: HOME | End: 2024-04-30
Attending: ORTHOPAEDIC SURGERY | Admitting: ORTHOPAEDIC SURGERY
Payer: MEDICARE

## 2024-04-30 VITALS
TEMPERATURE: 98 F | WEIGHT: 156 LBS | DIASTOLIC BLOOD PRESSURE: 75 MMHG | OXYGEN SATURATION: 92 % | SYSTOLIC BLOOD PRESSURE: 149 MMHG | BODY MASS INDEX: 32.75 KG/M2 | HEART RATE: 61 BPM | HEIGHT: 58 IN | RESPIRATION RATE: 11 BRPM

## 2024-04-30 DIAGNOSIS — M17.11 PRIMARY OSTEOARTHRITIS OF RIGHT KNEE: ICD-10-CM

## 2024-04-30 DIAGNOSIS — T84.82XA ARTHROFIBROSIS OF TOTAL KNEE ARTHROPLASTY, INITIAL ENCOUNTER: ICD-10-CM

## 2024-04-30 DIAGNOSIS — R52 PAIN: Primary | ICD-10-CM

## 2024-04-30 DIAGNOSIS — Z96.651 S/P TOTAL KNEE REPLACEMENT, RIGHT: ICD-10-CM

## 2024-04-30 LAB
POCT GLUCOSE: 111 MG/DL (ref 70–110)
POCT GLUCOSE: 91 MG/DL (ref 70–110)

## 2024-04-30 PROCEDURE — D9220A PRA ANESTHESIA: Mod: CRNA,,, | Performed by: NURSE ANESTHETIST, CERTIFIED REGISTERED

## 2024-04-30 PROCEDURE — C1751 CATH, INF, PER/CENT/MIDLINE: HCPCS | Performed by: SURGERY

## 2024-04-30 PROCEDURE — 82962 GLUCOSE BLOOD TEST: CPT | Performed by: ORTHOPAEDIC SURGERY

## 2024-04-30 PROCEDURE — 71000033 HC RECOVERY, INTIAL HOUR: Performed by: ORTHOPAEDIC SURGERY

## 2024-04-30 PROCEDURE — 71000015 HC POSTOP RECOV 1ST HR: Performed by: ORTHOPAEDIC SURGERY

## 2024-04-30 PROCEDURE — 25000003 PHARM REV CODE 250: Performed by: PHYSICIAN ASSISTANT

## 2024-04-30 PROCEDURE — 63600175 PHARM REV CODE 636 W HCPCS: Performed by: ANESTHESIOLOGY

## 2024-04-30 PROCEDURE — 36000710: Performed by: ORTHOPAEDIC SURGERY

## 2024-04-30 PROCEDURE — D9220A PRA ANESTHESIA: Mod: ANES,,, | Performed by: SURGERY

## 2024-04-30 PROCEDURE — 25000003 PHARM REV CODE 250: Performed by: NURSE ANESTHETIST, CERTIFIED REGISTERED

## 2024-04-30 PROCEDURE — 27200651 HC AIRWAY, LMA: Performed by: SURGERY

## 2024-04-30 PROCEDURE — 63600175 PHARM REV CODE 636 W HCPCS: Performed by: NURSE ANESTHETIST, CERTIFIED REGISTERED

## 2024-04-30 PROCEDURE — 64448 NJX AA&/STRD FEM NRV NFS IMG: CPT | Performed by: SURGERY

## 2024-04-30 PROCEDURE — 25000003 PHARM REV CODE 250: Performed by: SURGERY

## 2024-04-30 PROCEDURE — 36000711: Performed by: ORTHOPAEDIC SURGERY

## 2024-04-30 PROCEDURE — 99900035 HC TECH TIME PER 15 MIN (STAT)

## 2024-04-30 PROCEDURE — 63600175 PHARM REV CODE 636 W HCPCS: Performed by: PHYSICIAN ASSISTANT

## 2024-04-30 PROCEDURE — 63600175 PHARM REV CODE 636 W HCPCS: Performed by: ORTHOPAEDIC SURGERY

## 2024-04-30 PROCEDURE — 37000008 HC ANESTHESIA 1ST 15 MINUTES: Performed by: ORTHOPAEDIC SURGERY

## 2024-04-30 PROCEDURE — 27201423 OPTIME MED/SURG SUP & DEVICES STERILE SUPPLY: Performed by: ORTHOPAEDIC SURGERY

## 2024-04-30 PROCEDURE — 63600175 PHARM REV CODE 636 W HCPCS: Performed by: SURGERY

## 2024-04-30 PROCEDURE — 29876 ARTHRS KNEE SURG SYNVCT MAJ: CPT | Mod: HCNC,RT,, | Performed by: ORTHOPAEDIC SURGERY

## 2024-04-30 PROCEDURE — 37000009 HC ANESTHESIA EA ADD 15 MINS: Performed by: ORTHOPAEDIC SURGERY

## 2024-04-30 PROCEDURE — 94761 N-INVAS EAR/PLS OXIMETRY MLT: CPT

## 2024-04-30 PROCEDURE — 71000016 HC POSTOP RECOV ADDL HR: Performed by: ORTHOPAEDIC SURGERY

## 2024-04-30 PROCEDURE — 25000003 PHARM REV CODE 250: Performed by: ANESTHESIOLOGY

## 2024-04-30 RX ORDER — ONDANSETRON HYDROCHLORIDE 2 MG/ML
INJECTION, SOLUTION INTRAMUSCULAR; INTRAVENOUS
Status: DISCONTINUED | OUTPATIENT
Start: 2024-04-30 | End: 2024-04-30

## 2024-04-30 RX ORDER — SODIUM CHLORIDE 0.9 % (FLUSH) 0.9 %
10 SYRINGE (ML) INJECTION
Status: DISCONTINUED | OUTPATIENT
Start: 2024-04-30 | End: 2024-04-30 | Stop reason: HOSPADM

## 2024-04-30 RX ORDER — LIDOCAINE HYDROCHLORIDE 20 MG/ML
INJECTION INTRAVENOUS
Status: DISCONTINUED | OUTPATIENT
Start: 2024-04-30 | End: 2024-04-30

## 2024-04-30 RX ORDER — DEXMEDETOMIDINE HYDROCHLORIDE 100 UG/ML
INJECTION, SOLUTION INTRAVENOUS
Status: DISCONTINUED | OUTPATIENT
Start: 2024-04-30 | End: 2024-04-30

## 2024-04-30 RX ORDER — EPINEPHRINE 1 MG/ML
INJECTION, SOLUTION, CONCENTRATE INTRAVENOUS
Status: DISCONTINUED | OUTPATIENT
Start: 2024-04-30 | End: 2024-04-30 | Stop reason: HOSPADM

## 2024-04-30 RX ORDER — DEXAMETHASONE SODIUM PHOSPHATE 4 MG/ML
INJECTION, SOLUTION INTRA-ARTICULAR; INTRALESIONAL; INTRAMUSCULAR; INTRAVENOUS; SOFT TISSUE
Status: DISCONTINUED | OUTPATIENT
Start: 2024-04-30 | End: 2024-04-30

## 2024-04-30 RX ORDER — ROPIVACAINE HYDROCHLORIDE 5 MG/ML
INJECTION, SOLUTION EPIDURAL; INFILTRATION; PERINEURAL
Status: COMPLETED | OUTPATIENT
Start: 2024-04-30 | End: 2024-04-30

## 2024-04-30 RX ORDER — FENTANYL CITRATE 50 UG/ML
25-200 INJECTION, SOLUTION INTRAMUSCULAR; INTRAVENOUS
Status: DISCONTINUED | OUTPATIENT
Start: 2024-04-30 | End: 2024-04-30 | Stop reason: HOSPADM

## 2024-04-30 RX ORDER — PROPOFOL 10 MG/ML
VIAL (ML) INTRAVENOUS
Status: DISCONTINUED | OUTPATIENT
Start: 2024-04-30 | End: 2024-04-30

## 2024-04-30 RX ORDER — ROPIVACAINE HYDROCHLORIDE 2 MG/ML
INJECTION, SOLUTION EPIDURAL; INFILTRATION; PERINEURAL CONTINUOUS
Status: DISCONTINUED | OUTPATIENT
Start: 2024-04-30 | End: 2024-04-30 | Stop reason: HOSPADM

## 2024-04-30 RX ORDER — METHOCARBAMOL 500 MG/1
1000 TABLET, FILM COATED ORAL ONCE
Status: COMPLETED | OUTPATIENT
Start: 2024-04-30 | End: 2024-04-30

## 2024-04-30 RX ORDER — TRIAMCINOLONE ACETONIDE 40 MG/ML
INJECTION, SUSPENSION INTRA-ARTICULAR; INTRAMUSCULAR
Status: DISCONTINUED | OUTPATIENT
Start: 2024-04-30 | End: 2024-04-30 | Stop reason: HOSPADM

## 2024-04-30 RX ORDER — FAMOTIDINE 10 MG/ML
INJECTION INTRAVENOUS
Status: DISCONTINUED | OUTPATIENT
Start: 2024-04-30 | End: 2024-04-30

## 2024-04-30 RX ORDER — HALOPERIDOL 5 MG/ML
0.5 INJECTION INTRAMUSCULAR EVERY 10 MIN PRN
Status: DISCONTINUED | OUTPATIENT
Start: 2024-04-30 | End: 2024-04-30 | Stop reason: HOSPADM

## 2024-04-30 RX ORDER — ACETAMINOPHEN 500 MG
1000 TABLET ORAL
Status: COMPLETED | OUTPATIENT
Start: 2024-04-30 | End: 2024-04-30

## 2024-04-30 RX ORDER — MIDAZOLAM HYDROCHLORIDE 1 MG/ML
.5-4 INJECTION, SOLUTION INTRAMUSCULAR; INTRAVENOUS
Status: DISCONTINUED | OUTPATIENT
Start: 2024-04-30 | End: 2024-04-30 | Stop reason: HOSPADM

## 2024-04-30 RX ORDER — MUPIROCIN 20 MG/G
OINTMENT TOPICAL
Status: DISCONTINUED | OUTPATIENT
Start: 2024-04-30 | End: 2024-04-30 | Stop reason: HOSPADM

## 2024-04-30 RX ORDER — FENTANYL CITRATE 50 UG/ML
25 INJECTION, SOLUTION INTRAMUSCULAR; INTRAVENOUS EVERY 5 MIN PRN
Status: DISCONTINUED | OUTPATIENT
Start: 2024-04-30 | End: 2024-04-30 | Stop reason: HOSPADM

## 2024-04-30 RX ORDER — FENTANYL CITRATE 50 UG/ML
INJECTION, SOLUTION INTRAMUSCULAR; INTRAVENOUS
Status: DISCONTINUED | OUTPATIENT
Start: 2024-04-30 | End: 2024-04-30

## 2024-04-30 RX ORDER — OXYCODONE HYDROCHLORIDE 5 MG/1
5 TABLET ORAL
Status: DISCONTINUED | OUTPATIENT
Start: 2024-04-30 | End: 2024-04-30 | Stop reason: HOSPADM

## 2024-04-30 RX ORDER — TRANEXAMIC ACID 100 MG/ML
INJECTION, SOLUTION INTRAVENOUS
Status: DISCONTINUED | OUTPATIENT
Start: 2024-04-30 | End: 2024-04-30

## 2024-04-30 RX ADMIN — PROPOFOL 200 MG: 10 INJECTION, EMULSION INTRAVENOUS at 01:04

## 2024-04-30 RX ADMIN — ONDANSETRON 4 MG: 2 INJECTION INTRAMUSCULAR; INTRAVENOUS at 01:04

## 2024-04-30 RX ADMIN — OXYCODONE 5 MG: 5 TABLET ORAL at 03:04

## 2024-04-30 RX ADMIN — SODIUM CHLORIDE: 0.9 INJECTION, SOLUTION INTRAVENOUS at 12:04

## 2024-04-30 RX ADMIN — MUPIROCIN: 20 OINTMENT TOPICAL at 12:04

## 2024-04-30 RX ADMIN — FENTANYL CITRATE 25 MCG: 50 INJECTION INTRAMUSCULAR; INTRAVENOUS at 03:04

## 2024-04-30 RX ADMIN — FENTANYL CITRATE 25 MCG: 50 INJECTION, SOLUTION INTRAMUSCULAR; INTRAVENOUS at 02:04

## 2024-04-30 RX ADMIN — TRANEXAMIC ACID 1000 MG: 100 INJECTION INTRAVENOUS at 02:04

## 2024-04-30 RX ADMIN — ROPIVACAINE HYDROCHLORIDE 7.5 ML: 5 INJECTION EPIDURAL; INFILTRATION; PERINEURAL at 12:04

## 2024-04-30 RX ADMIN — METHOCARBAMOL 1000 MG: 500 TABLET ORAL at 04:04

## 2024-04-30 RX ADMIN — MIDAZOLAM HYDROCHLORIDE 1 MG: 1 INJECTION, SOLUTION INTRAMUSCULAR; INTRAVENOUS at 12:04

## 2024-04-30 RX ADMIN — DEXMEDETOMIDINE 10 MCG: 100 INJECTION, SOLUTION, CONCENTRATE INTRAVENOUS at 01:04

## 2024-04-30 RX ADMIN — TRANEXAMIC ACID 1000 MG: 100 INJECTION INTRAVENOUS at 01:04

## 2024-04-30 RX ADMIN — FAMOTIDINE 20 MG: 10 INJECTION, SOLUTION INTRAVENOUS at 01:04

## 2024-04-30 RX ADMIN — Medication: at 03:04

## 2024-04-30 RX ADMIN — LIDOCAINE HYDROCHLORIDE 100 MG: 20 INJECTION INTRAVENOUS at 01:04

## 2024-04-30 RX ADMIN — CEFAZOLIN 2 G: 2 INJECTION, POWDER, FOR SOLUTION INTRAMUSCULAR; INTRAVENOUS at 01:04

## 2024-04-30 RX ADMIN — FENTANYL CITRATE 100 MCG: 50 INJECTION INTRAMUSCULAR; INTRAVENOUS at 12:04

## 2024-04-30 RX ADMIN — DEXAMETHASONE SODIUM PHOSPHATE 8 MG: 4 INJECTION, SOLUTION INTRAMUSCULAR; INTRAVENOUS at 01:04

## 2024-04-30 RX ADMIN — ACETAMINOPHEN 1000 MG: 500 TABLET ORAL at 12:04

## 2024-04-30 RX ADMIN — SODIUM CHLORIDE, SODIUM GLUCONATE, SODIUM ACETATE, POTASSIUM CHLORIDE, MAGNESIUM CHLORIDE, SODIUM PHOSPHATE, DIBASIC, AND POTASSIUM PHOSPHATE: .53; .5; .37; .037; .03; .012; .00082 INJECTION, SOLUTION INTRAVENOUS at 02:04

## 2024-04-30 NOTE — ANESTHESIA PROCEDURE NOTES
Right Adductor Canal Catheter    Patient location during procedure: pre-op   Block not for primary anesthetic.  Reason for block: at surgeon's request and post-op pain management   Post-op Pain Location: Right knee   Start time: 4/30/2024 12:45 PM  Timeout: 4/30/2024 12:45 PM   End time: 4/30/2024 12:55 PM    Staffing  Authorizing Provider: Miguel Navarro MD  Performing Provider: Miguel Navarro MD    Staffing  Performed by: Miguel Navarro MD  Authorized by: Miguel Navarro MD    Preanesthetic Checklist  Completed: patient identified, IV checked, site marked, risks and benefits discussed, surgical consent, monitors and equipment checked, pre-op evaluation and timeout performed  Peripheral Block  Patient position: supine  Prep: ChloraPrep and site prepped and draped  Patient monitoring: heart rate, cardiac monitor, continuous pulse ox, continuous capnometry and frequent blood pressure checks  Block type: adductor canal  Laterality: right  Injection technique: continuous  Needle  Needle type: Tuohy   Needle gauge: 17 G  Needle length: 3.5 in  Needle localization: anatomical landmarks and ultrasound guidance  Catheter type: spring wound  Catheter size: 19 G  Test dose: lidocaine 1.5% with Epi 1-to-200,000 and negative   -ultrasound image captured on disc.  Assessment  Injection assessment: negative aspiration, negative parasthesia and local visualized surrounding nerve  Paresthesia pain: none  Heart rate change: no  Slow fractionated injection: yes  Pain Tolerance: comfortable throughout block and no complaints  Medications:    Medications: ropivacaine (NAROPIN) injection 0.5% - Perineural   7.5 mL - 4/30/2024 12:50:00 PM    Additional Notes  VSS.  [facility] RN monitoring vitals throughout procedure.  Patient tolerated procedure well.  15 cc of 0.25% ropivacaine with epi 1:300k administered for th block.

## 2024-04-30 NOTE — PLAN OF CARE
Nursing pre-op complete. Pt calm, will continue to monitor. Call light within reach. Daughter visiting at bedside. Walker in waiting room with pt's sister. Family to keep belongings.

## 2024-04-30 NOTE — TRANSFER OF CARE
"Anesthesia Transfer of Care Note    Patient: Chelsea Rodriguez    Procedure(s) Performed: Procedure(s) (LRB):  ARTHROSCOPIC KNEE LYSIS, ADHESIONS AND ANTERIOR INTERVAL SLIDE (Right)  SYNOVECTOMY, KNEE (Right)    Patient location: PACU    Anesthesia Type: general    Transport from OR: Transported from OR on 100% O2 by closed face mask with adequate spontaneous ventilation    Post pain: adequate analgesia    Post assessment: no apparent anesthetic complications and tolerated procedure well    Post vital signs: stable    Level of consciousness: sedated and responds to stimulation    Nausea/Vomiting: no nausea/vomiting    Complications: none    Transfer of care protocol was followed    Last vitals: Visit Vitals  /61 (BP Location: Right arm, Patient Position: Lying)   Pulse 63   Temp 36.7 °C (98 °F) (Oral)   Resp 12   Ht 4' 10" (1.473 m)   Wt 70.8 kg (156 lb)   LMP  (LMP Unknown)   SpO2 100%   Breastfeeding No   BMI 32.60 kg/m²     "

## 2024-04-30 NOTE — BRIEF OP NOTE
[facility]  Brief Operative Note    Surgery Date: 4/30/2024     Surgeon(s) and Role:     * Ryanne Sumner MD - Primary      Pre-Operative Diagnosis: S/P total knee replacement, right [Z96.651]  Arthrofibrosis of total knee arthroplasty, initial encounter [T84.82XA]    Post-Operative Diagnosis: Post-Op Diagnosis Codes:     * S/P total knee replacement, right [Z96.651]     * Arthrofibrosis of total knee arthroplasty, initial encounter [T84.82XA]    Procedure(s) (LRB):  ARTHROSCOPIC KNEE LYSIS, ADHESIONS AND ANTERIOR INTERVAL SLIDE (Right)  SYNOVECTOMY, KNEE (Right)    Anesthesia: General    Operative Findings: See Op note.    Estimated Blood Loss: * No values recorded between 4/30/2024  1:47 PM and 4/30/2024  3:04 PM *         Specimens:   Specimen (24h ago, onward)      None              Discharge Note    OUTCOME: Patient tolerated treatment/procedure well without complication and is now ready for discharge.    DISPOSITION: Home or Self Care    FINAL DIAGNOSIS:  Post-Op Diagnosis Codes:     * S/P total knee replacement, right [Z96.651]     * Arthrofibrosis of total knee arthroplasty, initial encounter [T84.82XA]    FOLLOWUP: In clinic    DISCHARGE INSTRUCTIONS: See attached.

## 2024-04-30 NOTE — PLAN OF CARE
Patient is AAO and VSS.  Tolerating PO and states pain is tolerable.  Dressing CDI.  Patient states they are ready for d/c.  IV removed.  Catheter tip intact.  Caregiver at bedside.  Discharge instructions reviewed and copy given to the patient and caregiver.  Questions answered.  Both verbalized understanding.  Medication delivered to bedside. no DME needed. Patient has walker at home.  Patient wheeled to car by RN/PCT.

## 2024-04-30 NOTE — DISCHARGE INSTRUCTIONS
POST-OPERATIVE DISCHARGE INSTRUCTIONS    Diet: Ice chips, clear liquids, and then diet as tolerated.  Drink plenty of liquids after surgery.  Ice the area at least three times a day (20 minutes per session) if possible.    Elevate the extremity above the level of the heart to help reduce swelling.  Progress weight-bearing as tolerated.  Pain medication can be taken every four to six hours as needed.  It is helpful to take pain medication prior to physical therapy. If pain is not controlled, please take 800 mg ibuprofen every 8 hours as needed unless contraindicated (NSAID allergy, kidney disease, heart disease, currently taking blood thinners, etc.).  Any activity that requires precise thinking or accuracy should be avoided for a minimum of 72 hours after surgery and while on narcotic pain medication.  This includes operating machinery and/or driving a vehicle.  All sutures will be removed approximately 10-14 days from the time of surgery. Leave steri-strips (skin tapes) in place until sutures are removed.  If skin glue is used instead of stitches, do not apply any ointments or solutions to the incision.  Keep the incision dry.  The skin glue will peel off in 3-4 weeks.  Dressing change directions, unless otherwise instructed:     ?  Knee scope Change dressing on the first post-op day.  Use gauze for the first 3 days, then start using waterproof Band-Aids over the incision sites.   ?Other ____________________________________________________________________________    All casts, splints, braces, slings, crutches, abduction pillows, etc. are to be worn as instructed.  Babar Hose or Sequential Compression Devices are often used following surgery to help with swelling and prevent blood clots.  They can be removed for 2-3 hours daily as needed.  If the hose becomes uncomfortable after a few days, switch to an Ace Wrap if desired.  Keep the incision dry for 10-14 days.  A waterproof dressing ([facility] or [facility]) can be used  to shower.  No bath, pool, or hot tub until instructed.  You should notify our office if you notice any of the following:  A temperature greater than 101 F  Severe or increasing pain  Excessive drainage or redness of the incision  Calf swelling and pain  Chest pain or shortness of breath  Your post-op follow up appointment will be approximately 14 days from the time of surgery.  If you do not have an appointment scheduled, please call our office and schedule within 1-2 days.   If you have any problems or questions, please call our office at [phone].

## 2024-04-30 NOTE — OP NOTE
[facility])  Surgery Department  Operative Note    SUMMARY     Date of Procedure: 4/30/2024     Pre-Operative Diagnosis:   Right Knee  arthrofibrosis status post total knee arthroplasty    Post-Operative Diagnosis:   Right Knee arthrofibrosis status post total knee arthroplasty    Procedure:  1. Right Knee Arthroscopy, with lysis of adhesions 88906  2.  Right Knee Arthroscopy, knee, synovectomy, major 10127  3.  Right Knee  arthroscopic anterior interval slide procedure -07626      Surgeons and Role:     * Ryanne Sumner MD - Primary    Assistant:   Alan Oviedo PA-C    No resident or fellow was available throughout the entire procedure as a result it was medically necessary for Alan Oviedo PA-C to perform first assist duties.      Anesthesia: General/Regional    Complications: None    Tourniquet: None    Implants: * No implants in log *    Arthroscopic Findings:     Significant arthrofibrosis throughout the entire suprapatellar pouch with complete obliteration of the pouch.  Loss of the medial and lateral gutters.  Significant scarring throughout the retropatellar fat pad.  Scarring noted throughout the area of the post.    Exam Under Anesthesia: 5-80    Indication for Procedure:  The patient is a 69-year-old female with a history of a right total knee arthroplasty who has had significant difficulty with range of motion and developed arthrofibrosis after her total knee arthroplasty.  She was referred to my clinic for further management.  We had a long discussion concerning an arthroscopic extensive lysis of adhesions, synovectomy and anterior interval slide procedure.  She elected proceed with surgical intervention.  She understood the risks, benefits and alternatives.  She understood that she still may have stiffness after the procedure and would be at risk for getting recurrent arthrofibrosis.  We also discussed the risk of blood clots and infections.  She understood these risks and  wished to proceed.    Surgery in Detail:  The patient was marked identified in the holding room.  She was brought back to the operating room and placed in the supine position.  After general endotracheal anesthesia was administered the right lower extremity was prepped and draped in usual sterile fashion for a knee arthroscopy. The contralateral leg was secured and well padded. Preoperative antibiotics were given within 1 hour the procedure.  A time-out was taken prior starting. We used 0.25% Marcaine and epinephrine for local periportal anesthetic.  We created an anterior lateral portal and under direct visualization an anterior medial portal was created.    Ramirez were introduced into the patellofemoral compartment and an extensive lysis of adhesions and synovectomy was performed throughout the compartment it was completely obliterated.  We had to recreate the entire compartment from the medial gutter all the way back around to the lateral gutter.  We are able to free this all the way releasing the quadriceps off of the distal femur.  This was done using a vapor.  Hemostasis was achieved throughout.  We then extended our release down the medial gutter along the anterior aspect of the tibia and performed our anterior interval slide releasing all the scarring posterior to the patellar tendon all the way down to the attachment at the tibial tubercle.  We extended the release all the way to the lateral gutter.  We then brought in our shaver to remove any loose debris.  We remained very conscious of creating any bleeding.  All the debris was removed from the joint.The arthroscopes were removed from the joint.  A gentle manipulation under anesthesia was then performed.  We are able to get nearly 120° of knee flexion without any difficulty.  The patient was dressed with Adaptic, bacitracin, 4x4s, Webril and an Ace wrap from the toes up to the proximal thigh.  The patient was extubated and taken recovery in satisfactory  condition.      Post-Op Plan:  Aggressive knee range of motion protocol status post lysis of adhesions after a total knee arthroplasty    Attestation: I was present and scrubbed for the entire procedure.

## 2024-04-30 NOTE — ANESTHESIA PROCEDURE NOTES
Intubation    Date/Time: 4/30/2024 1:20 PM    Performed by: Tram Davis CRNA  Authorized by: Miguel Navarro MD    Intubation:     Induction:  Intravenous    Intubated:  Postinduction    Mask Ventilation:  Easy mask    Attempts:  1    Attempted By:  CRNA    Difficult Airway Encountered?: No      Complications:  None    Airway Device:  Supraglottic airway/LMA    Airway Device Size:  4.0    Style/Cuff Inflation:  Cuffed    Inflation Amount (mL):  5    Secured at:  The lips    Placement Verified By:  Capnometry    Complicating Factors:  None    Findings Post-Intubation:  BS equal bilateral

## 2024-04-30 NOTE — PLAN OF CARE
Pt's sister visiting at bedside. Daughter returned to waiting room. Pt's belongings placed in locker #1, 1 bag. Family keeping pt's phone and walker.

## 2024-05-01 ENCOUNTER — CLINICAL SUPPORT (OUTPATIENT)
Dept: REHABILITATION | Facility: HOSPITAL | Age: 69
End: 2024-05-01
Payer: MEDICARE

## 2024-05-01 DIAGNOSIS — Z74.09 IMPAIRED FUNCTIONAL MOBILITY, BALANCE, GAIT, AND ENDURANCE: ICD-10-CM

## 2024-05-01 DIAGNOSIS — T84.82XA ARTHROFIBROSIS OF TOTAL KNEE ARTHROPLASTY, INITIAL ENCOUNTER: ICD-10-CM

## 2024-05-01 DIAGNOSIS — Z96.651 S/P TOTAL KNEE REPLACEMENT, RIGHT: ICD-10-CM

## 2024-05-01 DIAGNOSIS — M25.661 DECREASED RANGE OF MOTION (ROM) OF RIGHT KNEE: Primary | ICD-10-CM

## 2024-05-01 PROCEDURE — 97112 NEUROMUSCULAR REEDUCATION: CPT

## 2024-05-01 PROCEDURE — 97535 SELF CARE MNGMENT TRAINING: CPT

## 2024-05-01 PROCEDURE — 97162 PT EVAL MOD COMPLEX 30 MIN: CPT

## 2024-05-01 NOTE — ANESTHESIA POST-OP PAIN MANAGEMENT
"Acute Pain Service Progress Note    5/1/2024 1449    Patient contacted regarding CADD infusion follow up. Reports that pain has been well controlled with the infusion pump. Denies signs of local anesthetic toxicity. PNC dressing remains clean, dry, and intact. All questions answered. Reminded patient that the infusion should be discontinued and PNC removed whenever the "infusion complete" alert is seen on the display screen or by POD 5 (5/5/24) at 12pm, whichever comes first. Encouraged patient to call the CADD 24/7 support line at [phone] or the [facility] line at [phone] for any CADD related questions/issues. Verbalizes understanding.         "

## 2024-05-01 NOTE — ANESTHESIA POSTPROCEDURE EVALUATION
Anesthesia Post Evaluation    Patient: Chelsea Rodriguez    Procedure(s) Performed: Procedure(s) (LRB):  ARTHROSCOPIC KNEE LYSIS, ADHESIONS AND ANTERIOR INTERVAL SLIDE (Right)  SYNOVECTOMY, KNEE (Right)    Final Anesthesia Type: general      Patient location during evaluation: PACU  Patient participation: Yes- Able to Participate  Level of consciousness: awake and alert and oriented  Post-procedure vital signs: reviewed and stable  Pain management: adequate  Airway patency: patent  HENOK mitigation strategies: Multimodal analgesia  PONV status at discharge: No PONV  Anesthetic complications: no      Cardiovascular status: blood pressure returned to baseline and hemodynamically stable  Respiratory status: unassisted, spontaneous ventilation and room air  Hydration status: euvolemic  Follow-up not needed.              Vitals Value Taken Time   /75 04/30/24 1717   Temp 36.7 °C (98.1 °F) 04/30/24 1715   Pulse 65 04/30/24 1726   Resp 15 04/30/24 1726   SpO2 98 % 04/30/24 1726   Vitals shown include unfiled device data.      Event Time   Out of Recovery 16:00:00         Pain/Chinyere Score: Pain Rating Prior to Med Admin: 6 (4/30/2024  4:08 PM)  Pain Rating Post Med Admin: 4 (4/30/2024  4:08 PM)  Chinyere Score: 10 (4/30/2024  5:08 PM)

## 2024-05-03 ENCOUNTER — CLINICAL SUPPORT (OUTPATIENT)
Dept: REHABILITATION | Facility: HOSPITAL | Age: 69
End: 2024-05-03
Payer: MEDICARE

## 2024-05-03 DIAGNOSIS — Z96.651 S/P TOTAL KNEE REPLACEMENT, RIGHT: ICD-10-CM

## 2024-05-03 DIAGNOSIS — T84.82XA ARTHROFIBROSIS OF TOTAL KNEE ARTHROPLASTY, INITIAL ENCOUNTER: Primary | ICD-10-CM

## 2024-05-03 DIAGNOSIS — M25.661 DECREASED RANGE OF MOTION (ROM) OF RIGHT KNEE: ICD-10-CM

## 2024-05-03 DIAGNOSIS — Z74.09 IMPAIRED FUNCTIONAL MOBILITY, BALANCE, GAIT, AND ENDURANCE: ICD-10-CM

## 2024-05-03 PROCEDURE — 97110 THERAPEUTIC EXERCISES: CPT | Mod: CQ

## 2024-05-03 PROCEDURE — 97116 GAIT TRAINING THERAPY: CPT | Mod: CQ

## 2024-05-03 NOTE — PROGRESS NOTES
[facility]   Physical Therapy Treatment Note      Name: Chelsea Rodriguez  [facility] Number: [id number]    Therapy Diagnosis:   Encounter Diagnoses   Name Primary?    Arthrofibrosis of total knee arthroplasty, initial encounter Yes    S/P total knee replacement, right      Physician: Elaine Kraus MD    Visit Date: 5/3/2024    Physician Orders: PT Eval and Treat knee  Medical Diagnosis from Referral: T84.82XA (ICD-10-CM) - Arthrofibrosis of total knee arthroplasty, initial encounter Z96.651 (ICD-10-CM) - S/P total knee replacement, right  Evaluation Date: 5/1/2024  Authorization Period Expiration: 4/24/2025  Plan of Care Expiration: 7/31/2024  Progress Note Due: 10th visit  Date of Surgery: 4/30/2024  Visit # / Visits authorized: 2/ 1 (auth pending)  PTA Visit #: 1/5     FOTO: 1/ 3      Time In: 10:05 AM  Time Out: 11:00 AM  Total Billable Time: 55 minutes     4/30/2024 - From MD surgery note  Procedure:  1. Right Knee Arthroscopy, with lysis of adhesions 21970  2.  Right Knee Arthroscopy, knee, synovectomy, major 81677  3.  Right Knee  arthroscopic anterior interval slide procedure -[id number]     Post-Op Plan:  Aggressive knee range of motion protocol status post lysis of adhesions after a total knee arthroplasty     Precautions: Standard and Fall       Subjective     Patient reports: that has been stretching her knee at home.  She was compliant with home exercise program.  Response to previous treatment: no adverse effects  Functional change: ongoing    Pain: 0/10  Location: n/a    Objective      Objective Measures updated at progress report unless specified.     Treatment     Chelsea received the treatments listed below:      therapeutic exercises to develop strength, endurance, ROM, flexibility, posture, and core stabilization for 23 minutes including:  Heel slides 3x20 with 5 sec holds  Wall squats: 3x10  Standing knee flexion on step: 1x10 with 5 sec hold + 1x10 with 10 sec hold      manual  therapy techniques: Joint mobilizations, Manual traction, and Soft tissue Mobilization were applied to the: right knee for 8 minutes, including:  PROM right knee flexion in prone     neuromuscular re-education activities to improve: Balance, Coordination, Kinesthetic, Sense, Proprioception, and Posture for 5 minutes. The following activities were included:  Quad set with heel prop and ball under knee: x20 with 5 sec hold    therapeutic activities to improve functional performance for 0 minutes, including:      gait training to improve functional mobility and safety for 19  minutes, including:  Pre-gait activities promoting proper knee flexion in swing phase of gait  -fwd/bkw weight shifting, both positions: 5 min  -weight shifting + step over abraham: x30  -dynamic marching in // bars: 5 laps  -ambulating with verbal cues to increase knee flexion in swing phase of gait      Patient Education and Home Exercises       Education provided:   - Patient was educated on HEP and on all therapeutic interventions performed during today's treatment visit.     Written Home Exercises Provided: Patient instructed to cont prior HEP. Exercises were reviewed and Chelsea was able to demonstrate them prior to the end of the session.  Chelsea demonstrated good  understanding of the education provided. See Electronic Medical Record under Patient Instructions for exercises provided during therapy sessions    Assessment     Patient tolerated treatment well with no adverse effects. Treatment focused heavily on increasing knee flexion. Patient gradually demonstrated increased knee flexion throughout today's treatment visit. Will progress as tolerated.       Patient prognosis is Good.     Patient will continue to benefit from skilled outpatient physical therapy to address the deficits listed in the problem list box on initial evaluation, provide pt/family education and to maximize pt's level of independence in the home and community environment.      Patient's spiritual, cultural and educational needs considered and pt agreeable to plan of care and goals.     Anticipated barriers to physical therapy: Chronic, consistency, age, PMH     Goals:   Short Term Goals: 4 weeks   1. Patient will reduce maximal pain rating to < 3/10 pain to facilitate ability to sleep through the night and recover from PT interventions.  2. Patient will be able to ambulate for at least 10 minutes with < 3/10 pain to improve walking tolerance.   3. Patient will improve knee range of motion to at least 100 degrees for improved functional performance.      Long Term Goals: 8-10 weeks   1. Patient will be able to complete the TUG in </= 10 seconds with the least restrictive assistive device to decrease fall risk.   2. Patient will demonstrate >/= 4/5 lower quarter strength to facilitate transfers from sit to stand from various surfaces without restriction.  3. Patient will improve 30 second sit to stand to at least 10x for improvement with transfer tasks.    4. Patient will improve knee range of motion to at least 115 degrees for improved functional performance.     Plan     Plan of care Certification: 5/1/2024 to 7/31/2024.     Outpatient Physical Therapy 2-3 times weekly for 8 weeks to include the following interventions: Electrical Stimulation , Gait Training, Manual Therapy, Moist Heat/ Ice, Neuromuscular Re-ed, Patient Education, Self Care, Therapeutic Activities, and Therapeutic Exercise.     Adrian Hartmann, PTA

## 2024-05-06 ENCOUNTER — CLINICAL SUPPORT (OUTPATIENT)
Dept: REHABILITATION | Facility: HOSPITAL | Age: 69
End: 2024-05-06
Payer: MEDICARE

## 2024-05-06 DIAGNOSIS — M25.661 DECREASED RANGE OF MOTION (ROM) OF RIGHT KNEE: ICD-10-CM

## 2024-05-06 DIAGNOSIS — Z74.09 IMPAIRED FUNCTIONAL MOBILITY, BALANCE, GAIT, AND ENDURANCE: Primary | ICD-10-CM

## 2024-05-06 PROCEDURE — 97140 MANUAL THERAPY 1/> REGIONS: CPT

## 2024-05-06 PROCEDURE — 97112 NEUROMUSCULAR REEDUCATION: CPT

## 2024-05-06 NOTE — PLAN OF CARE
[facility]   Physical Therapy Initial Evaluation      Name: Chelsea Rodriguez  Clinic Number: [id number]    Therapy Diagnosis:   Encounter Diagnoses   Name Primary?    Arthrofibrosis of total knee arthroplasty, initial encounter     S/P total knee replacement, right     Impaired functional mobility, balance, gait, and endurance     Decreased range of motion (ROM) of right knee Yes        Physician: Alan Oviedo PA-C    Physician Orders: PT Eval and Treat knee  Medical Diagnosis from Referral: T84.82XA (ICD-10-CM) - Arthrofibrosis of total knee arthroplasty, initial encounter Z96.651 (ICD-10-CM) - S/P total knee replacement, right  Evaluation Date: 5/1/2024  Authorization Period Expiration: 4/24/2025  Plan of Care Expiration: 7/31/2024  Progress Note Due: 10th visit  Date of Surgery: 4/30/2024  Visit # / Visits authorized: 1/ 1   FOTO: 1/ 3    Precautions: Standard and Fall     Time In: 9:00 AM  Time Out: 10:00 AM  Total Billable Time: 60 minutes    4/30/2024 - From MD surgery note  Procedure:  1. Right Knee Arthroscopy, with lysis of adhesions 41336  2.  Right Knee Arthroscopy, knee, synovectomy, major 36465  3.  Right Knee  arthroscopic anterior interval slide procedure -[id number]    Post-Op Plan:  Aggressive knee range of motion protocol status post lysis of adhesions after a total knee arthroplasty     Subjective     Date of onset: 4/30/2024    History of current condition - Chelsea reports to the clinic status post 1 day procedure above. Patient presents to the clinic with dressing donned and pain pump. Patient using rolling walker for ambulation. Patient states that she is doing well overall. She has CPM machine coming later this afternoon and states the MD wants her to be doing this throughout her day. Patient reports a history of total knee arthroplasty in right knee in 2021. Patient was using cane at baseline as needed around her house and community.     Falls: NA    Imaging: See imaging  "section     Prior Therapy: Yes  Social History: lives with their family  Occupation: Retired  Prior Level of Function: independent with activities of daily living/instrumental activities of daily living with use of single point cane   Current Level of Function: Difficulty with walking, stairs, bending, transfers    Pain:  Current 6/10, worst 7/10, best 2/10   Location: right knee    Description: Aching and Throbbing  Aggravating Factors: Standing, Walking, Night Time, and Getting out of bed/chair  Easing Factors: pain medication, ice, rest, and elevation    Patients goals: "Get back to my normal self"     Medical History:   Past Medical History:   Diagnosis Date    Bilateral knee pain     Carpal tunnel syndrome, bilateral     Fissure in skin of foot     Right small toe    HTN (hypertension)     Hyperlipidemia     Multiple thyroid nodules 11/10/2023    Palpitations     Rotator cuff injury     right    Screening for colorectal cancer 10/20/2017    Screening for malignant neoplasm of colon 2021    Statin-induced myositis 2018       Surgical History:   Chelsea Rodriguez  has a past surgical history that includes supracervical abdominal hysterectomy (); Bilateral salpingoophorectomy (); Colonoscopy (N/A, 10/20/2017); Intraocular prosthesis insertion (Left, 2018); Phacoemulsification of cataract (Left, 2018); Hernia repair; Cyst Removal;  section; Phacoemulsification of cataract (Right, 2018); Intraocular prosthesis insertion (Right, 2018); Breast lumpectomy (Left, ); Breast biopsy (Left, ); Breast biopsy (Left, ); Hysterectomy; Oophorectomy; Cataract extraction w/  intraocular lens implant (Right, 2018); Cataract extraction w/  intraocular lens implant (Left, 2018); Esophagogastroduodenoscopy (N/A, 2021); Colonoscopy (N/A, 2021); Knee Arthroplasty (Right, 2021); Refractive surgery; lysis, adhesions, knee, arthroscopic (Right, " 4/30/2024); and Synovectomy of knee (Right, 4/30/2024).    Medications:   Chelsea has a current medication list which includes the following prescription(s): acetaminophen, aspirin, atorvastatin, coenzyme q10, diclofenac, docusate sodium, ezetimibe, fluticasone propionate, hydrochlorothiazide, hydrocodone-acetaminophen, irbesartan, lactobacillus acidophilus, loratadine, metformin, metoprolol succinate, multivitamin, fish oil-omega-3 fatty acids, and ondansetron.    Allergies:   Review of patient's allergies indicates:   Allergen Reactions    Codeine Nausea And Vomiting        Objective      Observation: Patient presents to the clinic using front wheeled walker. Post-op dressing in place without concern.     Gait: Ambulating with front wheeled walker with decreased step and stride length, knee flexion during stance phase, and hip circumduction during swing phase. Education given on proper heel-toe gait pattern and use of front wheeled walker.      Range of Motion:   Knee Right Active Left Active   Flexion Pre: 68 degrees  Post: 95 degrees AAROM 125 degrees   Extension 0 degrees  0 degrees     Lower Extremity Strength:  Formal MMT not performed secondary to POD#1 and increased pain. Poor quad activation noted in right lower extremity and unable to perform straight leg raise without extensor lag.     Endurance Assessment:    Evaluation   Timed Up and Go 14 seconds with front wheeled walker    30 sec STS  5x with bilateral upper extremity assist        Functional tests:               DL Squat: Not performed 2/2 post-op.              Step-down: Not performed 2/2 post-op.              SLS EO: Not performed 2/2 post-op.              SLS EC: Not performed 2/2 post-op.    Joint Mobility: hypomobile as expected post-op.     Palpation: Tenderness as expected post-op     Sensation: Intact; diminished secondary to residual nerve block.     Flexibility: Grossly hypomobile quad, hamstring and gastroc as expected on post-op right  lower extremity      Edema: As expected post-op      Intake Outcome Measure for FOTO Knee Survey    Therapist reviewed FOTO scores for Chelsea Rodriguez on 5/1/2024.   FOTO report - see Media section or FOTO account episode details.    Intake Score: see media section          Treatment     Total Treatment time (time-based codes) separate from Evaluation: 45 minutes (2 NMR 1 self care)    Chelsea received the treatments listed below:      Self care home management: 10 minutes     therapeutic exercises: to develop strength, endurance, ROM, flexibility, posture, and core stabilization for 00 minutes including:     manual therapy techniques: Joint mobilizations and Manual traction were applied to the: tibiofemoral joint for 00 minutes, including:     neuromuscular re-education activities to improve: Balance, Coordination, Proprioception, Posture, and motor control for 35 minutes. The following activities were included:  Quad sets with towel under knee 5 second, 15x  Quad sets with towel under heel 5 seconds, 15x  Short arc quads with medium bolster 5 seconds, 15x  Short arc qauds with small bolster 5 seconds, 15x  Long arc quads 5 seconds, 15x  Heel slides x 2 rounds 5' each     cold pack for 10 minutes to left knee with lower extremity elevated.    therapeutic activities: to improve functional performance for 00 minutes, including:     Patient Education and Home Exercises     Education provided:   - Activity modification  - Plan of care  - Home exercise program  - Functional Anatomy    Written Home Exercises Provided: yes. Exercises were reviewed and Chelsea was able to demonstrate them prior to the end of the session.  Chelsea demonstrated good  understanding of the education provided. See EMR under Patient Instructions for exercises provided during therapy sessions.    Assessment     Chelsea is a 69 y.o. female referred to outpatient Physical Therapy with a medical diagnosis of T84.82XA (ICD-10-CM) - Arthrofibrosis of total knee  arthroplasty, initial encounter Z96.651 (ICD-10-CM) - S/P total knee replacement, right. Patient presents with decreased knee range of motion, gross lower extremity strength, and poor quadriceps motor control. Patient prognosis is Good. Patient will benefit from skilled outpatient Physical Therapy to address the deficits stated above and in the chart below, provide patient /family education, and to maximize patientt's level of independence.     Plan of care discussed with patient: Yes  Patient's spiritual, cultural and educational needs considered and patient is agreeable to the plan of care and goals as stated below:     Anticipated Barriers for therapy: Chronic, consistency, age, PMH    Medical Necessity is demonstrated by the following  History  Co-morbidities and personal factors that may impact the plan of care [] LOW: no personal factors / co-morbidities  [] MODERATE: 1-2 personal factors / co-morbidities  [x] HIGH: 3+ personal factors / co-morbidities    Moderate / High Support Documentation:   Co-morbidities affecting plan of care: see PMH    Personal Factors:   age  lifestyle     Examination  Body Structures and Functions, activity limitations and participation restrictions that may impact the plan of care [] LOW: addressing 1-2 elements  [] MODERATE: 3+ elements  [x] HIGH: 4+ elements (please support below)    Moderate / High Support Documentation: range of motion, strength, motor control, mobility      Clinical Presentation [] LOW: stable  [x] MODERATE: Evolving  [] HIGH: Unstable     Decision Making/ Complexity Score: moderate       Goals:  Short Term Goals: 4 weeks   1. Patient will reduce maximal pain rating to < 3/10 pain to facilitate ability to sleep through the night and recover from PT interventions.  2. Patient will be able to ambulate for at least 10 minutes with < 3/10 pain to improve walking tolerance.   3. Patient will improve knee range of motion to at least 100 degrees for improved  functional performance.     Long Term Goals: 8-10 weeks   1. Patient will be able to complete the TUG in </= 10 seconds with the least restrictive assistive device to decrease fall risk.   2. Patient will demonstrate >/= 4/5 lower quarter strength to facilitate transfers from sit to stand from various surfaces without restriction.  3. Patient will improve 30 second sit to stand to at least 10x for improvement with transfer tasks.    4. Patient will improve knee range of motion to at least 115 degrees for improved functional performance.     Plan     Plan of care Certification: 5/1/2024 to 7/31/2024.    Outpatient Physical Therapy 2-3 times weekly for 8 weeks to include the following interventions: Electrical Stimulation , Gait Training, Manual Therapy, Moist Heat/ Ice, Neuromuscular Re-ed, Patient Education, Self Care, Therapeutic Activities, and Therapeutic Exercise.       Nancy Tamez, PT, DPT        Physician's Signature: _________________________________________ Date: ________________

## 2024-05-08 ENCOUNTER — CLINICAL SUPPORT (OUTPATIENT)
Dept: REHABILITATION | Facility: HOSPITAL | Age: 69
End: 2024-05-08
Payer: MEDICARE

## 2024-05-08 DIAGNOSIS — M25.661 DECREASED RANGE OF MOTION (ROM) OF RIGHT KNEE: ICD-10-CM

## 2024-05-08 DIAGNOSIS — Z74.09 IMPAIRED FUNCTIONAL MOBILITY, BALANCE, GAIT, AND ENDURANCE: Primary | ICD-10-CM

## 2024-05-08 PROCEDURE — 97112 NEUROMUSCULAR REEDUCATION: CPT

## 2024-05-08 PROCEDURE — 97140 MANUAL THERAPY 1/> REGIONS: CPT

## 2024-05-08 NOTE — PROGRESS NOTES
[facility]   Physical Therapy Treatment Note      Name: Chelsea Rodriguez  [facility] Number: [id number]    Therapy Diagnosis:   Encounter Diagnoses   Name Primary?    Impaired functional mobility, balance, gait, and endurance Yes    Decreased range of motion (ROM) of right knee      Physician: Elaine Kraus MD  Surgeon: Dr. Sumner     Visit Date: 5/8/2024  Physician Orders: PT Eval and Treat knee  Medical Diagnosis from Referral: T84.82XA (ICD-10-CM) - Arthrofibrosis of total knee arthroplasty, initial encounter Z96.651 (ICD-10-CM) - S/P total knee replacement, right  Evaluation Date: 5/1/2024  Authorization Period Expiration: 4/24/2025  Plan of Care Expiration: 7/31/2024  Progress Note Due: 10th visit  Date of Surgery: 4/30/2024  Visit # / Visits authorized: 14/15   - This POC: 2 visits + eval    FOTO: 1/3     1st FOTO follow-up:  2nd FOTO follow-up:    PTA Visit #: 0/5     Date of Surgery: 4/30/2024  Return to MD: 5/15/2024, 6/12/2024    Procedure:  1. Right Knee Arthroscopy, with lysis of adhesions 15971  2.  Right Knee Arthroscopy, knee, synovectomy, major 69335  3.  Right Knee  arthroscopic anterior interval slide procedure -[id number]    Post-Op Plan:  Aggressive knee range of motion protocol status post lysis of adhesions after a total knee arthroplasty     Time In: 11:00 am   Time Out: 11:58 am   Total Appointment Time: 58 minutes (billable = 30)    Precautions: Standard and Fall     Subjective     Patient reports: She is feeling better each day. She has been doing her exercises at home and wears her CPM machine to sleep currently set at 110 degrees.   She was compliant with home exercise program.  Response to previous treatment: no adverse effects  Functional change: ongoing    Pain: 0/10  Location: n/a    Objective      Objective Measures updated at progress report unless specified.     Observation 5/8/2024:    Range of Motion:  Knee   Right AROM Right AAROM    Flexion 102 degrees  110  "degrees    Extension  0 degrees  0 degrees        Treatment   *Pt is 1 weeks and 1 days s/p lysis of adhesions R knee as of 5/8/2024.*    Chelsea received the treatments listed below:      therapeutic exercises to develop strength, endurance, ROM, flexibility, posture, and core stabilization for 00 minutes including:    Not Today:  Heel slides 3x20 with 5 sec holds  Wall squats: 3x10  Standing knee flexion on step: 1x10 with 5 sec hold + 1x10 with 10 sec hold    manual therapy techniques: Joint mobilizations, Manual traction, and Soft tissue Mobilization were applied to the: right knee for 14 minutes, including:    Patella mobilizations grade III-IV  Fat pad mobilizations   EOM knee distraction with AP tibiofemoral joint mobilizations   Knee flexion with inferior patella glides     Not Today:  PROM right knee flexion in prone     neuromuscular re-education activities to improve: Balance, Coordination, Kinesthetic, Sense, Proprioception, and Posture for 36 minutes. The following activities were included:    Assessment as above  Quad sets with towel and strap for TKE 2 x 10 x 5" holds  SAQ 2# 3 x 8 x 3" holds  LAQ 3 x 8 x 3" holds  Standing SLR 3 x 6 reps   - struggled with SLR supine and modified   Heel slides x 2' with 2-3" holds     therapeutic activities to improve functional performance for 08 minutes, including:    DL shuttle press 3 x 10 reps 62.5# to help with getting up and down   SL shuttle press 25# 3 x 8 reps     gait training to improve functional mobility and safety for 00 minutes, including:    Pre-gait activities promoting proper knee flexion in swing phase of gait  -fwd/bkw weight shifting, both positions: 5 min  -weight shifting + step over abraham: x30  -dynamic marching in // bars: 5 laps  -ambulating with verbal cues to increase knee flexion in swing phase of gait      Patient Education and Home Exercises       Education provided:   - Patient was educated on HEP and on all therapeutic interventions " performed during today's treatment visit.     Written Home Exercises Provided: Patient instructed to cont prior HEP. Exercises were reviewed and Chelsea was able to demonstrate them prior to the end of the session.  Chelsea demonstrated good  understanding of the education provided. See Electronic Medical Record under Patient Instructions for exercises provided during therapy sessions    Assessment     Chelsea tolerated treatment session well. She continues to demonstrate improvements in her range of motion each session. Further manual therapy added today to help maximize mobility followed with therex through her range of motion. She was further progressed to working on quad strengthening as well today as her motion improves and she continues with daily CPM and further quad strengthening to help maintain and strengthen through her range of motion. She was adequately challenged and fatigued end of session. Will continue to monitor and progress as able.    Patient prognosis is Good.     Patient will continue to benefit from skilled outpatient physical therapy to address the deficits listed in the problem list box on initial evaluation, provide pt/family education and to maximize pt's level of independence in the home and community environment.     Patient's spiritual, cultural and educational needs considered and pt agreeable to plan of care and goals.     Anticipated barriers to physical therapy: Chronicity of symptoms, surgeries, and co-morbidities     Goals:   Short Term Goals: 4 weeks (Progressing, not met)  1. Patient will reduce maximal pain rating to < 3/10 pain to facilitate ability to sleep through the night and recover from PT interventions.  2. Patient will be able to ambulate for at least 10 minutes with < 3/10 pain to improve walking tolerance.   3. Patient will improve knee range of motion to at least 100 degrees for improved functional performance.      Long Term Goals: 8-10 weeks (Progressing, not met)  1.  Patient will be able to complete the TUG in </= 10 seconds with the least restrictive assistive device to decrease fall risk.   2. Patient will demonstrate >/= 4/5 lower quarter strength to facilitate transfers from sit to stand from various surfaces without restriction.  3. Patient will improve 30 second sit to stand to at least 10x for improvement with transfer tasks.    4. Patient will improve knee range of motion to at least 115 degrees for improved functional performance.      Plan   Plan of care Certification: 5/1/2024 to 7/31/2024.     Continue with current PT POC.     Aissatou Dillon, PT, DPT, OCS

## 2024-05-10 ENCOUNTER — CLINICAL SUPPORT (OUTPATIENT)
Dept: REHABILITATION | Facility: HOSPITAL | Age: 69
End: 2024-05-10
Payer: MEDICARE

## 2024-05-10 DIAGNOSIS — M25.661 DECREASED RANGE OF MOTION (ROM) OF RIGHT KNEE: ICD-10-CM

## 2024-05-10 DIAGNOSIS — Z74.09 IMPAIRED FUNCTIONAL MOBILITY, BALANCE, GAIT, AND ENDURANCE: Primary | ICD-10-CM

## 2024-05-10 PROCEDURE — 97112 NEUROMUSCULAR REEDUCATION: CPT | Mod: KX

## 2024-05-10 PROCEDURE — 97140 MANUAL THERAPY 1/> REGIONS: CPT | Mod: KX

## 2024-05-10 NOTE — PROGRESS NOTES
"[facility]   Physical Therapy Treatment Note      Name: Chelsea Rodriguez  Clinic Number: [id number]    Therapy Diagnosis:   Encounter Diagnoses   Name Primary?    Impaired functional mobility, balance, gait, and endurance Yes    Decreased range of motion (ROM) of right knee      Physician: Elaine Kraus MD    Visit Date: 5/6/2024    Physician Orders: PT Eval and Treat knee  Medical Diagnosis from Referral: T84.82XA (ICD-10-CM) - Arthrofibrosis of total knee arthroplasty, initial encounter Z96.651 (ICD-10-CM) - S/P total knee replacement, right  Evaluation Date: 5/1/2024  Authorization Period Expiration: 4/24/2025  Plan of Care Expiration: 7/31/2024  Progress Note Due: 10th visit  Date of Surgery: 4/30/2024  Visit # / Visits authorized: 3/ ? (auth pending)  PTA Visit #: 1/5     FOTO: 1/ 3      Time In: 9:50 AM  Time Out: 10:45 AM  Total Billable Time: 33 minutes     4/30/2024 - From MD surgery note  Procedure:  1. Right Knee Arthroscopy, with lysis of adhesions 57858  2.  Right Knee Arthroscopy, knee, synovectomy, major 44441  3.  Right Knee  arthroscopic anterior interval slide procedure -[id number]     Post-Op Plan:  Aggressive knee range of motion protocol status post lysis of adhesions after a total knee arthroplasty     Precautions: Standard and Fall       Subjective     Patient reports: "I am feeling really good and have been doing my machine at home"  She was compliant with home exercise program.  Response to previous treatment: no adverse effects  Functional change: ongoing    Pain: 0/10  Location: n/a    Objective      Objective Measures updated at progress report unless specified.     Treatment     Cehlsea received the treatments listed below:      therapeutic exercises to develop strength, endurance, ROM, flexibility, posture, and core stabilization for 00 minutes including:    manual therapy techniques: Joint mobilizations, Manual traction, and Soft tissue Mobilization were applied to " the: right knee for 8 minutes, including:  PROM right knee flexion in prone     neuromuscular re-education activities to improve: Balance, Coordination, Kinesthetic, Sense, Proprioception, and Posture for 25 minutes. The following activities were included:  Quad set with heel prop and ball under knee: x20 with 5 sec hold  Heel slides 3x20 with 5 sec holds  Wall squats: 3x10  Standing knee flexion on step: 1x10 with 5 sec hold + 1x10 with 10 sec hold    therapeutic activities to improve functional performance for 0 minutes, including:    gait training to improve functional mobility and safety for 20 minutes, including:  Pre-gait activities promoting proper knee flexion in swing phase of gait  -fwd/bkw weight shifting, both positions: 5 min  -weight shifting + step over abraham: x30  -dynamic marching in // bars: 5 laps  -ambulating with verbal cues to increase knee flexion in swing phase of gait      Patient Education and Home Exercises       Education provided:   - Patient was educated on HEP and on all therapeutic interventions performed during today's treatment visit.     Written Home Exercises Provided: Patient instructed to cont prior HEP. Exercises were reviewed and Chelsea was able to demonstrate them prior to the end of the session.  Chelsea demonstrated good  understanding of the education provided. See Electronic Medical Record under Patient Instructions for exercises provided during therapy sessions    Assessment     Interventions continue to focus on knee flexion active range of motion and active assisted range of motion through loaded and unloaded positions. She tolerates all exercise interventions with good tolerance and significant improvement in knee range of motion noted. Measures active knee flexion post session to 105 degrees. Will progress as tolerated.     Patient prognosis is Good.     Patient will continue to benefit from skilled outpatient physical therapy to address the deficits listed in the  problem list box on initial evaluation, provide pt/family education and to maximize pt's level of independence in the home and community environment.     Patient's spiritual, cultural and educational needs considered and pt agreeable to plan of care and goals.     Anticipated barriers to physical therapy: Chronic, consistency, age, PMH     Goals:   Short Term Goals: 4 weeks   1. Patient will reduce maximal pain rating to < 3/10 pain to facilitate ability to sleep through the night and recover from PT interventions.  2. Patient will be able to ambulate for at least 10 minutes with < 3/10 pain to improve walking tolerance.   3. Patient will improve knee range of motion to at least 100 degrees for improved functional performance.      Long Term Goals: 8-10 weeks   1. Patient will be able to complete the TUG in </= 10 seconds with the least restrictive assistive device to decrease fall risk.   2. Patient will demonstrate >/= 4/5 lower quarter strength to facilitate transfers from sit to stand from various surfaces without restriction.  3. Patient will improve 30 second sit to stand to at least 10x for improvement with transfer tasks.    4. Patient will improve knee range of motion to at least 115 degrees for improved functional performance.     Plan     Plan of care Certification: 5/1/2024 to 7/31/2024.     Outpatient Physical Therapy 2-3 times weekly for 8 weeks to include the following interventions: Electrical Stimulation , Gait Training, Manual Therapy, Moist Heat/ Ice, Neuromuscular Re-ed, Patient Education, Self Care, Therapeutic Activities, and Therapeutic Exercise.     Nancy Tamez, PT, DPT

## 2024-05-10 NOTE — PROGRESS NOTES
[facility]   Physical Therapy Treatment Note      Name: Chelsea Rodriguez  [facility] Number: [id number]    Therapy Diagnosis:   Encounter Diagnoses   Name Primary?    Impaired functional mobility, balance, gait, and endurance Yes    Decreased range of motion (ROM) of right knee      Physician: Elaine Kraus MD  Surgeon: Dr. Sumner     Visit Date: 5/10/2024  Physician Orders: PT Eval and Treat knee  Medical Diagnosis from Referral: T84.82XA (ICD-10-CM) - Arthrofibrosis of total knee arthroplasty, initial encounter Z96.651 (ICD-10-CM) - S/P total knee replacement, right  Evaluation Date: 5/1/2024  Authorization Period Expiration: 4/24/2025  Plan of Care Expiration: 7/31/2024  Progress Note Due: 10th visit  Date of Surgery: 4/30/2024  Visit # / Visits authorized: 15/15   - This POC: 4 visits + eval    FOTO: 1/3     1st FOTO follow-up:  2nd FOTO follow-up:    PTA Visit #: 0/5     Date of Surgery: 4/30/2024  Return to MD: 5/15/2024, 6/12/2024    Procedure:  1. Right Knee Arthroscopy, with lysis of adhesions 64348  2.  Right Knee Arthroscopy, knee, synovectomy, major 95602  3.  Right Knee  arthroscopic anterior interval slide procedure -17533    Post-Op Plan:  Aggressive knee range of motion protocol status post lysis of adhesions after a total knee arthroplasty     Time In: 10:40 am   Time Out: 11:38 am   Total Appointment Time: 58 minutes    Precautions: Standard and Fall     Subjective     Patient reports: She has been doing her exercises at home and wears her CPM machine to sleep currently set at 120 degrees.   She was compliant with home exercise program.  Response to previous treatment: no adverse effects  Functional change: ongoing    Pain: 0/10  Location: n/a    Objective      Objective Measures updated at progress report unless specified.     Observation 5/8/2024:    Range of Motion:  Knee   Right AROM Right AAROM    Flexion 102 degrees  110 degrees    Extension  0 degrees  0 degrees   "      Treatment   *Pt is 1 weeks and 3 days s/p lysis of adhesions R knee as of 5/10/2024.*    Chelsea received the treatments listed below:      therapeutic exercises to develop strength, endurance, ROM, flexibility, posture, and core stabilization for 00 minutes including:    Not Today:  Heel slides 3x20 with 5 sec holds  Wall squats: 3x10  Standing knee flexion on step: 1x10 with 5 sec hold + 1x10 with 10 sec hold    manual therapy techniques: Joint mobilizations, Manual traction, and Soft tissue Mobilization were applied to the: right knee for 10 minutes, including:    Patella mobilizations grade III-IV  Fat pad mobilizations   EOM knee distraction with AP tibiofemoral joint mobilizations   Knee flexion with inferior patella glides     neuromuscular re-education activities to improve: Balance, Coordination, Kinesthetic, Sense, Proprioception, and Posture for 38 minutes. The following activities were included:    Quad sets with towel and strap for TKE 2 x 10 x 5" holds  SAQ 2# 3 x 8 x 3" holds  LAQ 3 x 8 x 3" holds  Standing SLR 3 x 10 reps   Heel slides x 2' with 2-3" holds     therapeutic activities to improve functional performance for 10 minutes, including:    DL shuttle press 3 x 10 reps 62.5# to help with getting up and down   SL shuttle press 25# 3 x 8 reps     gait training to improve functional mobility and safety for 00 minutes, including:    Pre-gait activities promoting proper knee flexion in swing phase of gait  -fwd/bkw weight shifting, both positions: 5 min  -weight shifting + step over abraham: x30  -dynamic marching in // bars: 5 laps  -ambulating with verbal cues to increase knee flexion in swing phase of gait      Patient Education and Home Exercises       Education provided:   - Patient was educated on HEP and on all therapeutic interventions performed during today's treatment visit.     Written Home Exercises Provided: Patient instructed to cont prior HEP. Exercises were reviewed and Chelsea was " able to demonstrate them prior to the end of the session.  Chelsea demonstrated good  understanding of the education provided. See Electronic Medical Record under Patient Instructions for exercises provided during therapy sessions    Assessment     Continued with interventions progressed from previous session. Active knee range of motion measured 110-112 degrees noting improvement and carryover each session. Patient able to perform straight leg raise without extensor lag this date noting good quadriceps control. Patient ambulates with single point cane for community ambulation but does not use in the clinic. She was adequately challenged and fatigued end of session. Will continue to monitor and progress as able.    Patient prognosis is Good.     Patient will continue to benefit from skilled outpatient physical therapy to address the deficits listed in the problem list box on initial evaluation, provide pt/family education and to maximize pt's level of independence in the home and community environment.     Patient's spiritual, cultural and educational needs considered and pt agreeable to plan of care and goals.     Anticipated barriers to physical therapy: Chronicity of symptoms, surgeries, and co-morbidities     Goals:   Short Term Goals: 4 weeks (Progressing, not met)  1. Patient will reduce maximal pain rating to < 3/10 pain to facilitate ability to sleep through the night and recover from PT interventions.  2. Patient will be able to ambulate for at least 10 minutes with < 3/10 pain to improve walking tolerance.   3. Patient will improve knee range of motion to at least 100 degrees for improved functional performance.      Long Term Goals: 8-10 weeks (Progressing, not met)  1. Patient will be able to complete the TUG in </= 10 seconds with the least restrictive assistive device to decrease fall risk.   2. Patient will demonstrate >/= 4/5 lower quarter strength to facilitate transfers from sit to stand from various  surfaces without restriction.  3. Patient will improve 30 second sit to stand to at least 10x for improvement with transfer tasks.    4. Patient will improve knee range of motion to at least 115 degrees for improved functional performance.      Plan   Plan of care Certification: 5/1/2024 to 7/31/2024.     Continue with current PT POC.     Nancy Tamez, PT, DPT

## 2024-05-13 ENCOUNTER — CLINICAL SUPPORT (OUTPATIENT)
Dept: REHABILITATION | Facility: HOSPITAL | Age: 69
End: 2024-05-13
Payer: MEDICARE

## 2024-05-13 DIAGNOSIS — M25.661 DECREASED RANGE OF MOTION (ROM) OF RIGHT KNEE: ICD-10-CM

## 2024-05-13 DIAGNOSIS — Z74.09 IMPAIRED FUNCTIONAL MOBILITY, BALANCE, GAIT, AND ENDURANCE: Primary | ICD-10-CM

## 2024-05-13 PROCEDURE — 97112 NEUROMUSCULAR REEDUCATION: CPT | Mod: KX

## 2024-05-13 PROCEDURE — 97530 THERAPEUTIC ACTIVITIES: CPT | Mod: KX

## 2024-05-13 NOTE — PROGRESS NOTES
[facility]   Physical Therapy Treatment Note      Name: Chelsea Rodriguez  Clinic Number: [id number]    Therapy Diagnosis:   Encounter Diagnoses   Name Primary?    Impaired functional mobility, balance, gait, and endurance Yes    Decreased range of motion (ROM) of right knee      Physician: Elaine Kraus MD  Surgeon: Dr. Sumner     Visit Date: 5/13/2024  Physician Orders: PT Eval and Treat knee  Medical Diagnosis from Referral: T84.82XA (ICD-10-CM) - Arthrofibrosis of total knee arthroplasty, initial encounter Z96.651 (ICD-10-CM) - S/P total knee replacement, right  Evaluation Date: 5/1/2024  Authorization Period Expiration: 4/24/2025  Plan of Care Expiration: 7/31/2024  Progress Note Due: 10th visit  Date of Surgery: 4/30/2024  Visit # / Visits authorized: 16/15 PT informed PARS to send for more visits  - This POC: 5 visits + eval    FOTO: 1/3     1st FOTO follow-up:  2nd FOTO follow-up:    PTA Visit #: 0/5     Date of Surgery: 4/30/2024  Return to MD: 5/15/2024, 6/12/2024    Procedure:  1. Right Knee Arthroscopy, with lysis of adhesions 28116  2.  Right Knee Arthroscopy, knee, synovectomy, major 92026  3.  Right Knee  arthroscopic anterior interval slide procedure -[id number]    Post-Op Plan:  Aggressive knee range of motion protocol status post lysis of adhesions after a total knee arthroplasty     Time In: 11:00 AM  Time Out: 11:55 AM   Total Appointment Time: 55 minutes    Precautions: Standard and Fall     Subjective     Patient reports: She reports that she started having some anterio/medial knee pain that started last night but it feels slightly better this morning. Patient has 2 week MD follow up on Wednesday and will return to PT session after. She reports that she wasn't sure how she could stairs or step up on her stool but she did stairs and it was fine.   She was compliant with home exercise program.  Response to previous treatment: no adverse effects  Functional change:  "ongoing    Pain: 0/10  Location: n/a    Objective      Objective Measures updated at progress report unless specified.     Observation 5/8/2024:    Range of Motion:  Knee   Right AROM Right AAROM    Flexion 102 degrees  110 degrees    Extension  0 degrees  0 degrees        Treatment   *Pt is 1 weeks and 6 days s/p lysis of adhesions R knee as of 5/13/2024.*    Chelsea received the treatments listed below:      therapeutic exercises to develop strength, endurance, ROM, flexibility, posture, and core stabilization for 00 minutes including:    Not Today:  Heel slides 3x20 with 5 sec holds  Wall squats: 3x10  Standing knee flexion on step: 1x10 with 5 sec hold + 1x10 with 10 sec hold    manual therapy techniques: Joint mobilizations, Manual traction, and Soft tissue Mobilization were applied to the: right knee for 00 minutes, including:    Not Today  Patella mobilizations grade III-IV  Fat pad mobilizations   EOM knee distraction with AP tibiofemoral joint mobilizations   Knee flexion with inferior patella glides     neuromuscular re-education activities to improve: Balance, Coordination, Kinesthetic, Sense, Proprioception, and Posture for 25 minutes. The following activities were included:    Quad sets with towel and strap for TKE 2 x 10 x 5" holds  SAQ 2# 3 x 8 x 3" holds  LAQ 3 x 8 x 3" holds  Standing SLR 3 x 10 reps   Heel slides x 2' with 2-3" holds     therapeutic activities to improve functional performance for 30 minutes, including:    DL shuttle press 3 x 10 reps 62.5# to help with getting up and down   SL shuttle press 25# 3 x 8 reps  4" step ups 3 x 8 each  Hurdles in // bars (3 small) 15 total without upper extremity assist   Standing lateral hurdles 3 x 10 (small) each     gait training to improve functional mobility and safety for 00 minutes, including:    Pre-gait activities promoting proper knee flexion in swing phase of gait  -fwd/bkw weight shifting, both positions: 5 min  -weight shifting + step over " abraham: x30  -dynamic marching in // bars: 5 laps  -ambulating with verbal cues to increase knee flexion in swing phase of gait      Patient Education and Home Exercises       Education provided:   - Patient was educated on HEP and on all therapeutic interventions performed during today's treatment visit.     Written Home Exercises Provided: Patient instructed to cont prior HEP. Exercises were reviewed and Chelsea was able to demonstrate them prior to the end of the session.  Chelsea demonstrated good  understanding of the education provided. See Electronic Medical Record under Patient Instructions for exercises provided during therapy sessions    Assessment     Chelsea continues to work hard in therapy and is able to perform all interventions with good challenge. Measured 115 degrees active assisted range of motion knee flexion and 113 active range of motion flexion post session. Her last subjective complaints was steps which she was able to perform 4 inches today with minimal cueing to reduce hip circumduction for compensatory patterns as she had full range of motion to perform properly; patient was able to correct on her own for last set of 10. She was adequately challenged and fatigued end of session. Will continue to monitor and progress as able regarding gaining full flexion active range of motion and step ups.     Patient prognosis is Good.     Patient will continue to benefit from skilled outpatient physical therapy to address the deficits listed in the problem list box on initial evaluation, provide pt/family education and to maximize pt's level of independence in the home and community environment.     Patient's spiritual, cultural and educational needs considered and pt agreeable to plan of care and goals.     Anticipated barriers to physical therapy: Chronicity of symptoms, surgeries, and co-morbidities     Goals:   Short Term Goals: 4 weeks (Progressing, not met)  1. Patient will reduce maximal pain rating to  < 3/10 pain to facilitate ability to sleep through the night and recover from PT interventions.  2. Patient will be able to ambulate for at least 10 minutes with < 3/10 pain to improve walking tolerance.   3. Patient will improve knee range of motion to at least 100 degrees for improved functional performance.      Long Term Goals: 8-10 weeks (Progressing, not met)  1. Patient will be able to complete the TUG in </= 10 seconds with the least restrictive assistive device to decrease fall risk.   2. Patient will demonstrate >/= 4/5 lower quarter strength to facilitate transfers from sit to stand from various surfaces without restriction.  3. Patient will improve 30 second sit to stand to at least 10x for improvement with transfer tasks.    4. Patient will improve knee range of motion to at least 115 degrees for improved functional performance.      Plan   Plan of care Certification: 5/1/2024 to 7/31/2024.     Continue with current PT POC.     Nancy aTmez, PT, DPT

## 2024-05-14 NOTE — PROGRESS NOTES
POST-OPERATIVE EXAMINATION    69 y.o. Female who returns for follow-up after surgery. She is 2 weeks s/p    PROCEDURE:   1. Right Knee Arthroscopy, with lysis of adhesions 76201  2. Right Knee Arthroscopy, knee, synovectomy, major 68234  3. Right Knee  arthroscopic anterior interval slide procedure -[id number]     She is doing well without any issues.     PHYSICAL EXAMINATION:  LMP  (LMP Unknown)   General: Well-developed well-nourished 69 y.o. femalein no acute distress   Cardiovascular: Regular rhythm   Lungs: No labored breathing or wheezing appreciated   Neuro: Alert and oriented ×3   Psychiatric: well oriented to person, place and time, demonstrates normal mood and affect   Skin: No rashes, lesions or ulcers, normal temperature, turgor, and texture on involved extremity    ORTHOPEDIC EXAM:  Normal post-operative swelling  Normal post-operative scarring  Strength: Grossly intact  ROM: 0-115  Tests: -    ASSESSMENT:      ICD-10-CM ICD-9-CM   1. S/P right knee arthroscopy  Z98.890 V45.89   2. Arthrofibrosis of total knee arthroplasty, initial encounter  T84.82XA 996.47         PLAN:       Sutures removed today  Continue physical therapy  RTC in 1 month with Alan Oviedo PA-C

## 2024-05-15 ENCOUNTER — CLINICAL SUPPORT (OUTPATIENT)
Dept: REHABILITATION | Facility: HOSPITAL | Age: 69
End: 2024-05-15
Payer: MEDICARE

## 2024-05-15 ENCOUNTER — OFFICE VISIT (OUTPATIENT)
Dept: SPORTS MEDICINE | Facility: CLINIC | Age: 69
End: 2024-05-15
Payer: MEDICARE

## 2024-05-15 VITALS
SYSTOLIC BLOOD PRESSURE: 133 MMHG | WEIGHT: 158.38 LBS | HEART RATE: 81 BPM | BODY MASS INDEX: 33.25 KG/M2 | DIASTOLIC BLOOD PRESSURE: 79 MMHG | HEIGHT: 58 IN

## 2024-05-15 DIAGNOSIS — T84.82XA ARTHROFIBROSIS OF TOTAL KNEE ARTHROPLASTY, INITIAL ENCOUNTER: ICD-10-CM

## 2024-05-15 DIAGNOSIS — Z98.890 S/P RIGHT KNEE ARTHROSCOPY: Primary | ICD-10-CM

## 2024-05-15 DIAGNOSIS — M25.661 DECREASED RANGE OF MOTION (ROM) OF RIGHT KNEE: ICD-10-CM

## 2024-05-15 DIAGNOSIS — Z74.09 IMPAIRED FUNCTIONAL MOBILITY, BALANCE, GAIT, AND ENDURANCE: Primary | ICD-10-CM

## 2024-05-15 PROCEDURE — 3075F SYST BP GE 130 - 139MM HG: CPT | Mod: HCNC,CPTII,S$GLB, | Performed by: ORTHOPAEDIC SURGERY

## 2024-05-15 PROCEDURE — 4010F ACE/ARB THERAPY RXD/TAKEN: CPT | Mod: HCNC,CPTII,S$GLB, | Performed by: ORTHOPAEDIC SURGERY

## 2024-05-15 PROCEDURE — 99024 POSTOP FOLLOW-UP VISIT: CPT | Mod: HCNC,S$GLB,, | Performed by: ORTHOPAEDIC SURGERY

## 2024-05-15 PROCEDURE — 3078F DIAST BP <80 MM HG: CPT | Mod: HCNC,CPTII,S$GLB, | Performed by: ORTHOPAEDIC SURGERY

## 2024-05-15 PROCEDURE — 97140 MANUAL THERAPY 1/> REGIONS: CPT | Mod: KX

## 2024-05-15 PROCEDURE — 3288F FALL RISK ASSESSMENT DOCD: CPT | Mod: HCNC,CPTII,S$GLB, | Performed by: ORTHOPAEDIC SURGERY

## 2024-05-15 PROCEDURE — 1157F ADVNC CARE PLAN IN RCRD: CPT | Mod: HCNC,CPTII,S$GLB, | Performed by: ORTHOPAEDIC SURGERY

## 2024-05-15 PROCEDURE — 1101F PT FALLS ASSESS-DOCD LE1/YR: CPT | Mod: HCNC,CPTII,S$GLB, | Performed by: ORTHOPAEDIC SURGERY

## 2024-05-15 PROCEDURE — 1159F MED LIST DOCD IN RCRD: CPT | Mod: HCNC,CPTII,S$GLB, | Performed by: ORTHOPAEDIC SURGERY

## 2024-05-15 PROCEDURE — 99999 PR PBB SHADOW E&M-EST. PATIENT-LVL III: CPT | Mod: PBBFAC,HCNC,, | Performed by: ORTHOPAEDIC SURGERY

## 2024-05-15 PROCEDURE — 1126F AMNT PAIN NOTED NONE PRSNT: CPT | Mod: HCNC,CPTII,S$GLB, | Performed by: ORTHOPAEDIC SURGERY

## 2024-05-15 PROCEDURE — 3044F HG A1C LEVEL LT 7.0%: CPT | Mod: HCNC,CPTII,S$GLB, | Performed by: ORTHOPAEDIC SURGERY

## 2024-05-15 NOTE — PROGRESS NOTES
[facility]   Physical Therapy Treatment Note      Name: Chelsea Rodriguez  [facility] Number: [id number]    Therapy Diagnosis:   Encounter Diagnoses   Name Primary?    Impaired functional mobility, balance, gait, and endurance Yes    Decreased range of motion (ROM) of right knee      Physician: Elaine Kraus MD  Surgeon: Dr. Sumner     Visit Date: 5/15/2024  Physician Orders: PT Eval and Treat knee  Medical Diagnosis from Referral: T84.82XA (ICD-10-CM) - Arthrofibrosis of total knee arthroplasty, initial encounter Z96.651 (ICD-10-CM) - S/P total knee replacement, right  Evaluation Date: 5/1/2024  Authorization Period Expiration: 4/24/2025  Plan of Care Expiration: 7/31/2024  Progress Note Due: 10th visit  Date of Surgery: 4/30/2024  Visit # / Visits authorized: 16/15 PT informed PARS to send for more visits  - This POC: 5 visits + eval    FOTO: 1/3     1st FOTO follow-up:  2nd FOTO follow-up:    PTA Visit #: 0/5     Date of Surgery: 4/30/2024  Return to MD: 5/15/2024, 6/12/2024    Procedure:  1. Right Knee Arthroscopy, with lysis of adhesions 76776  2.  Right Knee Arthroscopy, knee, synovectomy, major 17700  3.  Right Knee  arthroscopic anterior interval slide procedure -[id number]    Post-Op Plan:  Aggressive knee range of motion protocol status post lysis of adhesions after a total knee arthroplasty     Time In: 10:00 am  Time Out: 10:58 am    Total Appointment Time: 58 minutes (20 min)    Precautions: Standard and Fall     Subjective     Patient reports: Saw the doctor for her 2 week follow-up and he said she is doing well and she is really happy with her progress. She had her stitches removed and one had a little problem and they put some steri strips on it to help.    She was compliant with home exercise program.  Response to previous treatment: no adverse effects  Functional change: ongoing    Pain: 0/10  Location: n/a    Objective      Objective Measures updated at progress report unless  "specified.     Observation 5/15/2024:    Range of Motion:  Knee   Right AROM Right AAROM    Flexion 108 degrees  110 degrees    Extension  0 degrees  0 degrees      End of Session range of motion: 0-0-112 degrees       Treatment   *Pt is 1 weeks and 6 days s/p lysis of adhesions R knee as of 5/13/2024.*    Chelsea received the treatments listed below:      therapeutic exercises to develop strength, endurance, ROM, flexibility, posture, and core stabilization for 00 minutes including:    Not Today:  Heel slides 3x20 with 5 sec holds  Wall squats: 3x10  Standing knee flexion on step: 1x10 with 5 sec hold + 1x10 with 10 sec hold    manual therapy techniques: Joint mobilizations, Manual traction, and Soft tissue Mobilization were applied to the: right knee for 10 minutes, including:    Patella mobilizations grade III-IV  Fat pad mobilizations   EOM knee distraction with AP tibiofemoral joint mobilizations   Knee flexion with inferior patella glides     neuromuscular re-education activities to improve: Balance, Coordination, Kinesthetic, Sense, Proprioception, and Posture for 36 minutes. The following activities were included:    Assessment as above   Bandage/incision assessment   Quad sets 1 x 10 x 10" holds  SAQ 4# 3 x 8 x 3" holds  Quad set into SLR 3 x 5 reps   LAQ 2# 3 x 8 x 3" holds    Not Today:  Standing SLR 3 x 10 reps   Heel slides x 2' with 2-3" holds     therapeutic activities to improve functional performance for 12 minutes, including:    Lateral band walks with [employer] 3 laps each direction   Step ups on 4-inch 3 x 8 reps     Not Today:  DL shuttle press 3 x 10 reps 62.5# to help with getting up and down   SL shuttle press 25# 3 x 8 reps  4" step ups 3 x 8 each  Hurdles in // bars (3 small) 15 total without upper extremity assist   Standing lateral hurdles 3 x 10 (small) each     gait training to improve functional mobility and safety for 00 minutes, including:    Pre-gait activities promoting proper knee " flexion in swing phase of gait  -fwd/bkw weight shifting, both positions: 5 min  -weight shifting + step over abraham: x30  -dynamic marching in // bars: 5 laps  -ambulating with verbal cues to increase knee flexion in swing phase of gait      Patient Education and Home Exercises       Education provided:   - Patient was educated on HEP and on all therapeutic interventions performed during today's treatment visit.     Written Home Exercises Provided: Patient instructed to cont prior HEP. Exercises were reviewed and Chelsea was able to demonstrate them prior to the end of the session.  Chelsea demonstrated good  understanding of the education provided. See Electronic Medical Record under Patient Instructions for exercises provided during therapy sessions    Assessment     Chelsea presented to today's session following 2 week follow-up with doctor with good range of motion at 108 degrees at start of therapy session. Manual therapy utilized to improve mobility with good response able to actively achieve 112 degrees following. Continued progression with quad strengthening and functional activities with good tolerance. Following step ups some increased drainage (pus like liquid) from one of the incisions and increased time spent addressing and limited flexion work following. She was able to complete a SLR at today's session with no quad lag as well. Overall progressing well with no adverse effects reported. Continue to monitor and progress as able.     Patient prognosis is Good.     Patient will continue to benefit from skilled outpatient physical therapy to address the deficits listed in the problem list box on initial evaluation, provide pt/family education and to maximize pt's level of independence in the home and community environment.     Patient's spiritual, cultural and educational needs considered and pt agreeable to plan of care and goals.     Anticipated barriers to physical therapy: Chronicity of symptoms, surgeries,  and co-morbidities     Goals:   Short Term Goals: 4 weeks (Progressing, not met)  1. Patient will reduce maximal pain rating to < 3/10 pain to facilitate ability to sleep through the night and recover from PT interventions.  2. Patient will be able to ambulate for at least 10 minutes with < 3/10 pain to improve walking tolerance.   3. Patient will improve knee range of motion to at least 100 degrees for improved functional performance.      Long Term Goals: 8-10 weeks (Progressing, not met)  1. Patient will be able to complete the TUG in </= 10 seconds with the least restrictive assistive device to decrease fall risk.   2. Patient will demonstrate >/= 4/5 lower quarter strength to facilitate transfers from sit to stand from various surfaces without restriction.  3. Patient will improve 30 second sit to stand to at least 10x for improvement with transfer tasks.    4. Patient will improve knee range of motion to at least 115 degrees for improved functional performance.      Plan   Plan of care Certification: 5/1/2024 to 7/31/2024.     Continue with current PT POC.     Aissatou Dillon, PT, DPT

## 2024-05-18 RX ORDER — FLUTICASONE PROPIONATE 50 MCG
SPRAY, SUSPENSION (ML) NASAL
Qty: 16 G | Refills: 3 | Status: SHIPPED | OUTPATIENT
Start: 2024-05-18

## 2024-05-20 ENCOUNTER — CLINICAL SUPPORT (OUTPATIENT)
Dept: REHABILITATION | Facility: HOSPITAL | Age: 69
End: 2024-05-20
Payer: MEDICARE

## 2024-05-20 ENCOUNTER — TELEPHONE (OUTPATIENT)
Dept: SPORTS MEDICINE | Facility: CLINIC | Age: 69
End: 2024-05-20
Payer: MEDICARE

## 2024-05-20 DIAGNOSIS — Z74.09 IMPAIRED FUNCTIONAL MOBILITY, BALANCE, GAIT, AND ENDURANCE: Primary | ICD-10-CM

## 2024-05-20 DIAGNOSIS — M25.661 DECREASED RANGE OF MOTION (ROM) OF RIGHT KNEE: ICD-10-CM

## 2024-05-20 PROCEDURE — 97530 THERAPEUTIC ACTIVITIES: CPT

## 2024-05-20 NOTE — PROGRESS NOTES
[facility]   Physical Therapy Treatment Note/ Discharge Summary     Name: Chelsea Rodriguez  Clinic Number: [id number]    Therapy Diagnosis:   Encounter Diagnoses   Name Primary?    Impaired functional mobility, balance, gait, and endurance Yes    Decreased range of motion (ROM) of right knee      Physician: Elaine Kraus MD  Surgeon: Dr. Sumner     Visit Date: 5/20/2024  Physician Orders: PT Eval and Treat knee  Medical Diagnosis from Referral: T84.82XA (ICD-10-CM) - Arthrofibrosis of total knee arthroplasty, initial encounter Z96.651 (ICD-10-CM) - S/P total knee replacement, right  Evaluation Date: 5/1/2024  Authorization Period Expiration: 4/24/2025  Plan of Care Expiration: 7/31/2024  Progress Note Due: 10th visit  Date of Surgery: 4/30/2024  Visit # / Visits authorized: 18/15 PT informed PARS to send for more visits  FOTO: 1/3     1st FOTO follow-up:  2nd FOTO follow-up:    PTA Visit #: 0/5     Date of Surgery: 4/30/2024  Return to MD: 5/15/2024, 6/12/2024    Procedure:  1. Right Knee Arthroscopy, with lysis of adhesions 22907  2.  Right Knee Arthroscopy, knee, synovectomy, major 52296  3.  Right Knee  arthroscopic anterior interval slide procedure -11712    Post-Op Plan:  Aggressive knee range of motion protocol status post lysis of adhesions after a total knee arthroplasty     Time In: 11:00 AM  Time Out: 11:55 AM   Total Appointment Time: 55 minutes    Precautions: Standard and Fall     Subjective     Patient reports: Patient reports that she is doing well and without any problems. She is very pleased with her progress and is so happy with her knee.    She was compliant with home exercise program.  Response to previous treatment: no adverse effects  Functional change: ongoing    Pain: 0/10  Location: n/a    Objective      Objective Measures updated at progress report unless specified.     Observation 5/8/2024:    Range of Motion:  Knee   Right AROM Right AAROM    Flexion 102 degrees   110 degrees    Extension  0 degrees  0 degrees      Observation: Patient presents to the clinic using front wheeled walker. Post-op dressing in place without concern.      Gait: Ambulating without assisive device      Range of Motion:   Knee Right Active Left Active   Flexion Pre: 68 degrees  Post: 95 degrees AAROM; 115 degrees 125 degrees   Extension 0 degrees  0 degrees      Lower Extremity Strength:  grossly 4+/5 and able to perform without extensor lag      Endurance Assessment:    Evaluation   Timed Up and Go 14 seconds with front wheeled walker; 8 seconds   30 sec STS  5x with bilateral upper extremity assist; 15x with no assist          Functional tests:               DL Squat: Not performed 2/2 post-op; No deficit              Step-down: Not performed 2/2 post-op; alternating gait without deficit               SLS EO: Not performed 2/2 post-op; 30+ bilateral      Joint Mobility: hypomobile as expected post-op.     Palpation: Tenderness as expected post-op     Sensation: Intact; diminished secondary to residual nerve block.     Flexibility: Grossly hypomobile quad, hamstring and gastroc as expected on post-op right lower extremity      Edema: As expected post-op        Intake Outcome Measure for FOTO Knee Survey     Therapist reviewed FOTO scores for Chelsea Rodriguez on 5/1/2024.   FOTO report - see Media section or FOTO account episode details.     Intake Score: see media section                 Treatment   *Pt is 1 weeks and 6 days s/p lysis of adhesions R knee as of 5/13/2024.*    Chelsea received the treatments listed below:      therapeutic exercises to develop strength, endurance, ROM, flexibility, posture, and core stabilization for 00 minutes including:    manual therapy techniques: Joint mobilizations, Manual traction, and Soft tissue Mobilization were applied to the: right knee for 00 minutes, including:    neuromuscular re-education activities to improve: Balance, Coordination, Kinesthetic, Sense,  Proprioception, and Posture for 00 minutes. The following activities were included:    therapeutic activities to improve functional performance for 30 minutes, including:  Reassessment/ Discharge Summary       Patient Education and Home Exercises       Education provided:   - Patient was educated on HEP and on all therapeutic interventions performed during today's treatment visit.     Written Home Exercises Provided: Patient instructed to cont prior HEP. Exercises were reviewed and Chelsea was able to demonstrate them prior to the end of the session.  Chelsea demonstrated good  understanding of the education provided. See Electronic Medical Record under Patient Instructions for exercises provided during therapy sessions    Assessment     Chelsea has attended physical therapy for 8 visits for status post procedure above and has met 7 of 7 goals. She has demonstrated improvement with range of motion, strength, FOTO score, TUG score, and 30 second sit to stand. Patient's main functional limitation remains strength. Patient is appropriate for discharge at this time and plans to continue with progressions made during therapy. Patient provided comprehensive home exercise program and given resistance bands to continue with at home program as well. All questions were answered and concerns were addressed. Patient discharged this date.     Patient prognosis is Good.     Patient will continue to benefit from skilled outpatient physical therapy to address the deficits listed in the problem list box on initial evaluation, provide pt/family education and to maximize pt's level of independence in the home and community environment.     Patient's spiritual, cultural and educational needs considered and pt agreeable to plan of care and goals.     Anticipated barriers to physical therapy: Chronicity of symptoms, surgeries, and co-morbidities     Goals:   Short Term Goals: 4 weeks  1. Patient will reduce maximal pain rating to < 3/10 pain  to facilitate ability to sleep through the night and recover from PT interventions.(Met)  2. Patient will be able to ambulate for at least 10 minutes with < 3/10 pain to improve walking tolerance. (Met)  3. Patient will improve knee range of motion to at least 100 degrees for improved functional performance. (Met)     Long Term Goals: 8-10 weeks   1. Patient will be able to complete the TUG in </= 10 seconds with the least restrictive assistive device to decrease fall risk. (Met)  2. Patient will demonstrate >/= 4/5 lower quarter strength to facilitate transfers from sit to stand from various surfaces without restriction.(Met)  3. Patient will improve 30 second sit to stand to at least 10x for improvement with transfer tasks.  (Met)  4. Patient will improve knee range of motion to at least 115 degrees for improved functional performance. (Met)     Plan   Plan of care Certification: 5/1/2024 to 7/31/2024.     Continue with current PT POC.     Nancy Tamez, PT, DPT

## 2024-05-20 NOTE — TELEPHONE ENCOUNTER
Called and spoke with patient, she cannot come in tomorrow but she would like to come in earlier on Friday.  Change her time to 9:45.  Pt is fine with time.    ----- Message from Skip Durand sent at 5/20/2024  2:10 PM CDT -----  Regarding: PT IS CALLING TO INFORM [facility] THAT SHE IS GOIGN TO KEEP APPT ON 5/24  Contact: PT  Confirmed contact info below:  Contact Name: Chelsea Rodriguez  Phone Number: [phone]

## 2024-05-20 NOTE — TELEPHONE ENCOUNTER
Called and spoke with patient to let her know that Alan will be in Jury Duty on 05/22/24 so we will not be in clinic.  I did offer her tomorrow since it was open at the last min today between the hours of 10 to 2.  Patient will call back as soon she talk to a friend to get a ride for tomorrow.    ----- Message from Skip Durand sent at 5/20/2024 12:39 PM CDT -----  Regarding: PT IS WANTING TO SEE IF APPT CAN BE RESCHEDULED TO 5/22 BETWEEN 10:45-11  Contact: PT  Confirmed contact info below:  Contact Name: Chelsea Rodriguez  Phone Number: [phone]

## 2024-05-24 ENCOUNTER — OFFICE VISIT (OUTPATIENT)
Dept: SPORTS MEDICINE | Facility: CLINIC | Age: 69
End: 2024-05-24
Payer: MEDICARE

## 2024-05-24 VITALS
DIASTOLIC BLOOD PRESSURE: 63 MMHG | WEIGHT: 156 LBS | BODY MASS INDEX: 32.75 KG/M2 | HEART RATE: 76 BPM | SYSTOLIC BLOOD PRESSURE: 111 MMHG | HEIGHT: 58 IN

## 2024-05-24 DIAGNOSIS — T84.82XA ARTHROFIBROSIS OF TOTAL KNEE ARTHROPLASTY, INITIAL ENCOUNTER: ICD-10-CM

## 2024-05-24 DIAGNOSIS — Z98.890 S/P RIGHT KNEE ARTHROSCOPY: Primary | ICD-10-CM

## 2024-05-24 PROCEDURE — 3044F HG A1C LEVEL LT 7.0%: CPT | Mod: HCNC,CPTII,S$GLB, | Performed by: PHYSICIAN ASSISTANT

## 2024-05-24 PROCEDURE — 3288F FALL RISK ASSESSMENT DOCD: CPT | Mod: HCNC,CPTII,S$GLB, | Performed by: PHYSICIAN ASSISTANT

## 2024-05-24 PROCEDURE — 1160F RVW MEDS BY RX/DR IN RCRD: CPT | Mod: HCNC,CPTII,S$GLB, | Performed by: PHYSICIAN ASSISTANT

## 2024-05-24 PROCEDURE — 1101F PT FALLS ASSESS-DOCD LE1/YR: CPT | Mod: HCNC,CPTII,S$GLB, | Performed by: PHYSICIAN ASSISTANT

## 2024-05-24 PROCEDURE — 1159F MED LIST DOCD IN RCRD: CPT | Mod: HCNC,CPTII,S$GLB, | Performed by: PHYSICIAN ASSISTANT

## 2024-05-24 PROCEDURE — 3074F SYST BP LT 130 MM HG: CPT | Mod: HCNC,CPTII,S$GLB, | Performed by: PHYSICIAN ASSISTANT

## 2024-05-24 PROCEDURE — 4010F ACE/ARB THERAPY RXD/TAKEN: CPT | Mod: HCNC,CPTII,S$GLB, | Performed by: PHYSICIAN ASSISTANT

## 2024-05-24 PROCEDURE — 3078F DIAST BP <80 MM HG: CPT | Mod: HCNC,CPTII,S$GLB, | Performed by: PHYSICIAN ASSISTANT

## 2024-05-24 PROCEDURE — 99024 POSTOP FOLLOW-UP VISIT: CPT | Mod: HCNC,S$GLB,, | Performed by: PHYSICIAN ASSISTANT

## 2024-05-24 PROCEDURE — 1125F AMNT PAIN NOTED PAIN PRSNT: CPT | Mod: HCNC,CPTII,S$GLB, | Performed by: PHYSICIAN ASSISTANT

## 2024-05-24 PROCEDURE — 99999 PR PBB SHADOW E&M-EST. PATIENT-LVL III: CPT | Mod: PBBFAC,HCNC,, | Performed by: PHYSICIAN ASSISTANT

## 2024-05-24 PROCEDURE — 1157F ADVNC CARE PLAN IN RCRD: CPT | Mod: HCNC,CPTII,S$GLB, | Performed by: PHYSICIAN ASSISTANT

## 2024-05-24 NOTE — PROGRESS NOTES
"POST-OPERATIVE EXAMINATION    69 y.o. Female who returns for follow-up after surgery. She is 3 weeks s/p    PROCEDURE:   1. Right Knee Arthroscopy, with lysis of adhesions 21940  2. Right Knee Arthroscopy, knee, synovectomy, major 48017  3. Right Knee  arthroscopic anterior interval slide procedure -61603     She is doing well without any issues.     PHYSICAL EXAMINATION:  /63   Pulse 76   Ht 4' 10" (1.473 m)   Wt 70.8 kg (156 lb)   LMP  (LMP Unknown)   BMI 32.60 kg/m²   General: Well-developed well-nourished 69 y.o. femalein no acute distress   Cardiovascular: Regular rhythm   Lungs: No labored breathing or wheezing appreciated   Neuro: Alert and oriented ×3   Psychiatric: well oriented to person, place and time, demonstrates normal mood and affect   Skin: No rashes, lesions or ulcers, normal temperature, turgor, and texture on involved extremity    ORTHOPEDIC EXAM:  Normal post-operative swelling  Normal post-operative scarring  Strength: Grossly intact  ROM: 0-115  Incisions are healing well with no outward signs of infection.  No surrounding erythema or warmth.  No drainage.    ASSESSMENT:      ICD-10-CM ICD-9-CM   1. S/P right knee arthroscopy  Z98.890 V45.89   2. Arthrofibrosis of total knee arthroplasty, initial encounter  T84.82XA 996.47           PLAN:       Continue HEP  RTC in 3 weeks as scheduled                  "

## 2024-06-03 ENCOUNTER — TELEPHONE (OUTPATIENT)
Dept: GASTROENTEROLOGY | Facility: CLINIC | Age: 69
End: 2024-06-03
Payer: MEDICARE

## 2024-06-06 ENCOUNTER — TELEPHONE (OUTPATIENT)
Dept: ENDOSCOPY | Facility: HOSPITAL | Age: 69
End: 2024-06-06
Payer: MEDICARE

## 2024-06-06 NOTE — TELEPHONE ENCOUNTER
Unable to reach patient by phone or leave voicemail. Sent portal message for patient to call Endoscopy Scheduling to review instructions and confirm appointment for Colonoscopy on 6/14/24.

## 2024-06-07 ENCOUNTER — TELEPHONE (OUTPATIENT)
Dept: ENDOSCOPY | Facility: HOSPITAL | Age: 69
End: 2024-06-07
Payer: MEDICARE

## 2024-06-07 NOTE — TELEPHONE ENCOUNTER
Received message from Dr. Valenzuela for Pt to be rescheduled due to change in scoping schedule on 6/14/24. Pt was contacted, all numbers attempted, there was no answer, voice messages were left for Pt to call Endoscopy Scheduling to confirm change in arrival time on 6/14/24 for colonoscopy with Dr. Avendano. Portal message sent to Pt.

## 2024-06-10 ENCOUNTER — TELEPHONE (OUTPATIENT)
Dept: ENDOSCOPY | Facility: HOSPITAL | Age: 69
End: 2024-06-10
Payer: MEDICARE

## 2024-06-10 NOTE — TELEPHONE ENCOUNTER
Contacted pt in regards to message received. Pt stated she spoke to a nurse. MA advise pt. Pt verbalized understanding.

## 2024-06-10 NOTE — TELEPHONE ENCOUNTER
----- Message from Michelle Pope sent at 6/10/2024  8:18 AM CDT -----  Regarding: FW: pt advice  Contact: [phone]    ----- Message -----  From: Kristy Shane RN  Sent: 6/7/2024  12:17 PM CDT  To: [facility]  Subject: FW: pt advice                                      ----- Message -----  From: Erin Martinez  Sent: 6/7/2024  12:12 PM CDT  To: [facility] Staff  Subject: pt advice                                        Pt calling in regards previously appt time being changed for the appt 6/14/24 but needing to know if appt can it be changed back to previous time . Sophia paul

## 2024-06-10 NOTE — TELEPHONE ENCOUNTER
Returned Pt's call regarding her upcoming colonoscopy with Dr. Avendano on 6/14/24 at [facility]. Pt confirmed 1350 arrival time. Pt did not have any further questions at this time.

## 2024-06-11 ENCOUNTER — ANESTHESIA EVENT (OUTPATIENT)
Dept: ENDOSCOPY | Facility: HOSPITAL | Age: 69
End: 2024-06-11
Payer: MEDICARE

## 2024-06-11 NOTE — ANESTHESIA PREPROCEDURE EVALUATION
06/11/2024  Chelsea Rodriguez is a 69 y.o., female.    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, bilateral    Vitamin D deficiency    Hyperlipidemia    Obesity (BMI 30.0-34.9)    HENOK (obstructive sleep apnea)    H/O tooth extraction    Primary osteoarthritis of right knee    Venous stasis dermatitis    Lymphedema of both lower extremities    Pelvic floor dysfunction    Other lack of coordination    Acquired absence of both cervix and uterus    Anxiety    Osteoarthritis    Swelling of hand    Pain involving joint of finger of left hand    Muscle weakness    Multiple thyroid nodules    Thyroid nodule    Prediabetes    Impaired functional mobility, balance, gait, and endurance    Decreased range of motion (ROM) of right knee       Past Medical History:   Diagnosis Date    Bilateral knee pain     Carpal tunnel syndrome, bilateral     Fissure in skin of foot     Right small toe    HTN (hypertension)     Hyperlipidemia     Multiple thyroid nodules 11/10/2023    Palpitations     Rotator cuff injury     right    Screening for colorectal cancer 10/20/2017    Screening for malignant neoplasm of colon 2/5/2021    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      There is no height or weight on file to calculate BMI.    Tobacco Use: Low Risk  (5/24/2024)    Patient History     Smoking Tobacco Use:  Never     Smokeless Tobacco Use: Never     Passive Exposure: Not on file       Social History     Substance and Sexual Activity   Drug Use Never        Alcohol Use: Not At Risk (3/25/2024)    AUDIT-C     Frequency of Alcohol Consumption: Monthly or less     Average Number of Drinks: 1 or 2     Frequency of Binge Drinking: Never       Review of patient's allergies indicates:   Allergen Reactions    Codeine Nausea And Vomiting         Airway:  No value filed.    Pre-op Assessment    I have reviewed the Patient Summary Reports.    I have reviewed the NPO Status.   I have reviewed the Medications.     Review of Systems  Anesthesia Hx:             Denies Family Hx of Anesthesia complications.    Denies Personal Hx of Anesthesia complications.                    Hematology/Oncology:  Hematology Normal   Oncology Normal                                   EENT/Dental:  EENT/Dental Normal           Cardiovascular:     Hypertension                                        Pulmonary:        Sleep Apnea                Renal/:  Renal/ Normal                 Hepatic/GI:  Hepatic/GI Normal                 Musculoskeletal:  Arthritis               Neurological:    Neuromuscular Disease,                                   Endocrine:  Endocrine Normal          Obesity / BMI > 30  Dermatological:  Skin Normal    Psych:  Psychiatric Normal                  Physical Exam    Airway:  Mallampati: II     Anesthesia Plan  Type of Anesthesia, risks & benefits discussed:    Anesthesia Type: Gen Natural Airway  Intra-op Monitoring Plan: Standard ASA Monitors  Post Op Pain Control Plan: multimodal analgesia  Induction:  IV  Informed Consent: Informed consent signed with the Patient and all parties understand the risks and agree with anesthesia plan.  All questions answered.   ASA Score: 3  Day of Surgery Review of History & Physical: H&P Update referred to the surgeon/provider.    Ready For Surgery From Anesthesia Perspective.     .

## 2024-06-12 ENCOUNTER — OFFICE VISIT (OUTPATIENT)
Dept: SPORTS MEDICINE | Facility: CLINIC | Age: 69
End: 2024-06-12
Payer: MEDICARE

## 2024-06-12 VITALS
DIASTOLIC BLOOD PRESSURE: 65 MMHG | BODY MASS INDEX: 32.85 KG/M2 | HEIGHT: 58 IN | WEIGHT: 156.5 LBS | SYSTOLIC BLOOD PRESSURE: 104 MMHG | HEART RATE: 75 BPM

## 2024-06-12 DIAGNOSIS — T84.82XA ARTHROFIBROSIS OF TOTAL KNEE ARTHROPLASTY, INITIAL ENCOUNTER: ICD-10-CM

## 2024-06-12 DIAGNOSIS — Z96.651 S/P TOTAL KNEE REPLACEMENT, RIGHT: ICD-10-CM

## 2024-06-12 DIAGNOSIS — Z98.890 S/P RIGHT KNEE ARTHROSCOPY: Primary | ICD-10-CM

## 2024-06-12 PROCEDURE — 1159F MED LIST DOCD IN RCRD: CPT | Mod: HCNC,CPTII,S$GLB, | Performed by: PHYSICIAN ASSISTANT

## 2024-06-12 PROCEDURE — 99024 POSTOP FOLLOW-UP VISIT: CPT | Mod: HCNC,S$GLB,, | Performed by: PHYSICIAN ASSISTANT

## 2024-06-12 PROCEDURE — 3074F SYST BP LT 130 MM HG: CPT | Mod: HCNC,CPTII,S$GLB, | Performed by: PHYSICIAN ASSISTANT

## 2024-06-12 PROCEDURE — 1126F AMNT PAIN NOTED NONE PRSNT: CPT | Mod: HCNC,CPTII,S$GLB, | Performed by: PHYSICIAN ASSISTANT

## 2024-06-12 PROCEDURE — 3288F FALL RISK ASSESSMENT DOCD: CPT | Mod: HCNC,CPTII,S$GLB, | Performed by: PHYSICIAN ASSISTANT

## 2024-06-12 PROCEDURE — 99999 PR PBB SHADOW E&M-EST. PATIENT-LVL IV: CPT | Mod: PBBFAC,HCNC,, | Performed by: PHYSICIAN ASSISTANT

## 2024-06-12 PROCEDURE — 4010F ACE/ARB THERAPY RXD/TAKEN: CPT | Mod: HCNC,CPTII,S$GLB, | Performed by: PHYSICIAN ASSISTANT

## 2024-06-12 PROCEDURE — 3078F DIAST BP <80 MM HG: CPT | Mod: HCNC,CPTII,S$GLB, | Performed by: PHYSICIAN ASSISTANT

## 2024-06-12 PROCEDURE — 1101F PT FALLS ASSESS-DOCD LE1/YR: CPT | Mod: HCNC,CPTII,S$GLB, | Performed by: PHYSICIAN ASSISTANT

## 2024-06-12 PROCEDURE — 1160F RVW MEDS BY RX/DR IN RCRD: CPT | Mod: HCNC,CPTII,S$GLB, | Performed by: PHYSICIAN ASSISTANT

## 2024-06-12 PROCEDURE — 3044F HG A1C LEVEL LT 7.0%: CPT | Mod: HCNC,CPTII,S$GLB, | Performed by: PHYSICIAN ASSISTANT

## 2024-06-12 PROCEDURE — 1157F ADVNC CARE PLAN IN RCRD: CPT | Mod: HCNC,CPTII,S$GLB, | Performed by: PHYSICIAN ASSISTANT

## 2024-06-12 NOTE — PROGRESS NOTES
"POST-OPERATIVE EXAMINATION    69 y.o. Female who returns for follow-up after surgery. She is 6 weeks s/p    PROCEDURE:   1. Right Knee Arthroscopy, with lysis of adhesions 68252  2. Right Knee Arthroscopy, knee, synovectomy, major 79265  3. Right Knee Arthroscopic anterior interval slide procedure -19518     She is doing well without any issues.  She has noticed a significant improvement in her pain, range of motion, and function.  She is extremely happy with her progress and response to surgery.    PHYSICAL EXAMINATION:  /65   Pulse 75   Ht 4' 10" (1.473 m)   Wt 71 kg (156 lb 8.4 oz)   LMP  (LMP Unknown)   BMI 32.71 kg/m²   General: Well-developed well-nourished 69 y.o. female in no acute distress   Cardiovascular: Regular rhythm   Lungs: No labored breathing or wheezing appreciated   Neuro: Alert and oriented ×3   Psychiatric: well oriented to person, place and time, demonstrates normal mood and affect   Skin: No rashes, lesions or ulcers, normal temperature, turgor, and texture on involved extremity    ORTHOPEDIC EXAM:  Normal post-operative swelling  Normal post-operative scarring  Strength: Grossly intact  ROM: 0-135  Incisions are well healed    ASSESSMENT:      ICD-10-CM ICD-9-CM   1. S/P right knee arthroscopy  Z98.890 V45.89   2. Arthrofibrosis of total knee arthroplasty, initial encounter  T84.82XA 996.47   3. S/P total knee replacement, right  Z96.651 V43.65             PLAN:       She continues to show signs of improvement of her range of motion.  She attributes this to our protocol and the use of the CPM postoperatively  Continue HEP  RTC in 2 months for one last follow-up                    "

## 2024-06-13 ENCOUNTER — TELEPHONE (OUTPATIENT)
Dept: ENDOSCOPY | Facility: HOSPITAL | Age: 69
End: 2024-06-13
Payer: MEDICARE

## 2024-06-13 NOTE — TELEPHONE ENCOUNTER
Contacted pt to come earlier for Colonoscopy on 6/14/24.  Pt states she already has her transportation all set up and can't come any earlier.

## 2024-06-13 NOTE — TELEPHONE ENCOUNTER
Received patient's call regarding prep instructions. Instructed patient how to locate instructions on portal message. Instructions reviewed and verbalized. Instructions emailed to [email or web address]. C  Pre-call complete, Pt does not have any further questions. Arrival time: 1:50 PM.

## 2024-06-14 ENCOUNTER — HOSPITAL ENCOUNTER (OUTPATIENT)
Facility: HOSPITAL | Age: 69
Discharge: HOME | End: 2024-06-14
Attending: COLON & RECTAL SURGERY | Admitting: COLON & RECTAL SURGERY
Payer: MEDICARE

## 2024-06-14 ENCOUNTER — ANESTHESIA (OUTPATIENT)
Dept: ENDOSCOPY | Facility: HOSPITAL | Age: 69
End: 2024-06-14
Payer: MEDICARE

## 2024-06-14 VITALS
RESPIRATION RATE: 16 BRPM | WEIGHT: 156 LBS | HEIGHT: 58 IN | TEMPERATURE: 98 F | SYSTOLIC BLOOD PRESSURE: 140 MMHG | OXYGEN SATURATION: 99 % | BODY MASS INDEX: 32.75 KG/M2 | HEART RATE: 68 BPM | DIASTOLIC BLOOD PRESSURE: 65 MMHG

## 2024-06-14 DIAGNOSIS — Z86.010 HISTORY OF COLON POLYPS: Primary | ICD-10-CM

## 2024-06-14 DIAGNOSIS — K59.00 CONSTIPATION, UNSPECIFIED CONSTIPATION TYPE: Primary | ICD-10-CM

## 2024-06-14 LAB
GLUCOSE SERPL-MCNC: 82 MG/DL (ref 70–110)
POCT GLUCOSE: 82 MG/DL (ref 70–110)

## 2024-06-14 PROCEDURE — 45385 COLONOSCOPY W/LESION REMOVAL: CPT | Mod: PT,HCNC,, | Performed by: COLON & RECTAL SURGERY

## 2024-06-14 PROCEDURE — 45385 COLONOSCOPY W/LESION REMOVAL: CPT | Mod: PT | Performed by: COLON & RECTAL SURGERY

## 2024-06-14 PROCEDURE — 27201089 HC SNARE, DISP (ANY): Performed by: COLON & RECTAL SURGERY

## 2024-06-14 PROCEDURE — 37000009 HC ANESTHESIA EA ADD 15 MINS: Performed by: COLON & RECTAL SURGERY

## 2024-06-14 PROCEDURE — 25000003 PHARM REV CODE 250: Performed by: REGISTERED NURSE

## 2024-06-14 PROCEDURE — 45380 COLONOSCOPY AND BIOPSY: CPT | Mod: PT,59,HCNC, | Performed by: COLON & RECTAL SURGERY

## 2024-06-14 PROCEDURE — 82962 GLUCOSE BLOOD TEST: CPT | Performed by: COLON & RECTAL SURGERY

## 2024-06-14 PROCEDURE — 88305 TISSUE EXAM BY PATHOLOGIST: CPT | Mod: HCNC | Performed by: PATHOLOGY

## 2024-06-14 PROCEDURE — 27201012 HC FORCEPS, HOT/COLD, DISP: Performed by: COLON & RECTAL SURGERY

## 2024-06-14 PROCEDURE — 37000008 HC ANESTHESIA 1ST 15 MINUTES: Performed by: COLON & RECTAL SURGERY

## 2024-06-14 PROCEDURE — 63600175 PHARM REV CODE 636 W HCPCS: Performed by: REGISTERED NURSE

## 2024-06-14 PROCEDURE — 25000003 PHARM REV CODE 250: Performed by: COLON & RECTAL SURGERY

## 2024-06-14 PROCEDURE — 45380 COLONOSCOPY AND BIOPSY: CPT | Mod: PT,59 | Performed by: COLON & RECTAL SURGERY

## 2024-06-14 RX ORDER — PROPOFOL 10 MG/ML
VIAL (ML) INTRAVENOUS
Status: DISCONTINUED | OUTPATIENT
Start: 2024-06-14 | End: 2024-06-14

## 2024-06-14 RX ORDER — SODIUM CHLORIDE 9 MG/ML
INJECTION, SOLUTION INTRAVENOUS CONTINUOUS
Status: DISCONTINUED | OUTPATIENT
Start: 2024-06-14 | End: 2024-06-14 | Stop reason: HOSPADM

## 2024-06-14 RX ORDER — PROPOFOL 10 MG/ML
VIAL (ML) INTRAVENOUS CONTINUOUS PRN
Status: DISCONTINUED | OUTPATIENT
Start: 2024-06-14 | End: 2024-06-14

## 2024-06-14 RX ORDER — LIDOCAINE HYDROCHLORIDE 20 MG/ML
INJECTION INTRAVENOUS
Status: DISCONTINUED | OUTPATIENT
Start: 2024-06-14 | End: 2024-06-14

## 2024-06-14 RX ADMIN — PROPOFOL 150 MCG/KG/MIN: 10 INJECTION, EMULSION INTRAVENOUS at 02:06

## 2024-06-14 RX ADMIN — PROPOFOL 100 MG: 10 INJECTION, EMULSION INTRAVENOUS at 02:06

## 2024-06-14 RX ADMIN — SODIUM CHLORIDE: 0.9 INJECTION, SOLUTION INTRAVENOUS at 02:06

## 2024-06-14 RX ADMIN — LIDOCAINE HYDROCHLORIDE 100 MG: 20 INJECTION INTRAVENOUS at 02:06

## 2024-06-14 NOTE — H&P
Procedure : Colonoscopy    Indication(s):  personal history of colon polyps    Last colonoscopy: 2/2021  - Tortuous colon.   - Four 2 to 5 mm polyps in the ascending colon - TA's  - External and internal hemorrhoids.     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 Authorizing Provider   acetaminophen (TYLENOL) 650 MG TbSR Take 1 tablet (650 mg total) by mouth every 8 (eight) hours as needed (pain). 3/29/21   Sofi Altman PA-C   aspirin (ECOTRIN) 81 MG EC tablet Take 1 tablet (81 mg total) by mouth once daily. 4/24/24   Alan Oviedo PA-C   atorvastatin (LIPITOR) 80 MG tablet TAKE 1 TABLET (80 MG TOTAL) BY MOUTH ONCE DAILY. 8/24/23   Marcell Rico MD PhD   coenzyme Q10 100 mg capsule Take 100 mg by mouth every evening.    Provider, Historical   diclofenac (VOLTAREN) 25 MG TbEC Take 1 tablet (25 mg total) by mouth 2 (two) times daily as needed.  Patient not taking: Reported on 6/12/2024 6/14/23   Yazmin Moreno MD   docusate sodium (COLACE) 100 MG capsule Take 1 capsule (100 mg total) by mouth 2 (two) times daily as needed for Constipation. 3/29/21   Sofi Altman PA-C   ezetimibe (ZETIA) 10 mg tablet Take 1 tablet (10 mg total) by mouth once daily. 10/10/23 10/9/24  Marcell Rico MD PhD   fluticasone propionate (FLONASE) 50 mcg/actuation nasal spray SHAKE LIQUID AND USE 1 SPRAY(50 MCG) IN EACH NOSTRIL EVERY DAY  Patient not taking:  Reported on 6/12/2024 5/18/24   Skye Rice MD   hydroCHLOROthiazide (MICROZIDE) 12.5 mg capsule TAKE 1 CAPSULE EVERY EVENING 11/8/23   Eliza Alva MD   HYDROcodone-acetaminophen (NORCO) 7.5-325 mg per tablet Take 1 tablet by mouth every 6 (six) hours as needed for Pain.  Patient not taking: Reported on 6/12/2024 4/24/24   Alan Oviedo PA-C   irbesartan (AVAPRO) 150 MG tablet TAKE 1 TABLET ONE TIME DAILY 7/18/23   Eliza Alva MD   Lactobacillus acidophilus (PROBIOTIC ACIDOPHILUS ORAL) Take by mouth.    Provider, Historical   loratadine (CLARITIN) 10 mg tablet Take 1 tablet (10 mg total) by mouth once daily. 7/15/23 7/14/24  Skye Rice MD   metFORMIN (GLUCOPHAGE-XR) 500 MG ER 24hr tablet Take 1 tablet (500 mg total) by mouth daily with breakfast. 3/21/24 6/19/24  Nancy Montes De Oca MD   metoprolol succinate (TOPROL-XL) 25 MG 24 hr tablet TAKE 1 TABLET EVERY EVENING 6/8/23   Eliza Alva MD   multivitamin (THERAGRAN) per tablet Take 1 tablet by mouth once daily.    Provider, Historical   omega-3 fatty acids/fish oil (FISH OIL-OMEGA-3 FATTY ACIDS) 300-1,000 mg capsule Take by mouth once daily.    Provider, Historical   ondansetron (ZOFRAN-ODT) 4 MG TbDL Dissolve 1 tablet (4 mg total) by mouth every 6 (six) hours as needed (nausea).  Patient not taking: Reported on 6/12/2024 4/24/24   Alan Oviedo PA-C       Sedation Problems: NO    Family History   Problem Relation Name Age of Onset    Hypertension Mother      Heart disease Mother      Diabetes Mother      Hyperlipidemia Mother      Pancreatitis Mother      Cataracts Mother      Macular degeneration Mother      Cancer Father      Prostate cancer Father      Hypertension Sister x1     Thyroid disease Sister x1     Diabetes Brother x1     Diabetes Brother x2     Cancer Brother x2     Heart disease Brother x2     Prostate cancer Brother x2     Thyroid cancer Brother x2     Hyperlipidemia Daughter x1     Hypertension Son  "x1     Breast cancer Paternal Cousin      Amblyopia Neg Hx      Blindness Neg Hx      Glaucoma Neg Hx      Strabismus Neg Hx      Retinal detachment Neg Hx         Fam Hx of Sedation Problems: NO    Social History     Socioeconomic History    Marital status: Single    Number of children: 2   Occupational History     Comment: retired   Tobacco Use    Smoking status: Never    Smokeless tobacco: Never   Substance and Sexual Activity    Alcohol use: Yes     Comment: once per month    Drug use: Never    Sexual activity: Not Currently   Social History Narrative    Dr. Frandy Sandy MD - [place], [place] - General Surgery & Surgery - active  Cancer doctor        Last MMG 11/2016- negative. hx of left lumpectomy for "breast cancer"- with 5yrs of tamoxifen use. Sees Dr. Buckley ([facility] for surveillance/MMG)        Dr. Lala former PCP, [facility]          Social Determinants of Health     Financial Resource Strain: Medium Risk (3/25/2024)    Overall Financial Resource Strain (CARDIA)     Difficulty of Paying Living Expenses: Somewhat hard   Food Insecurity: Food Insecurity Present (3/25/2024)    Hunger Vital Sign     Worried About Running Out of Food in the Last Year: Often true     Ran Out of Food in the Last Year: Often true   Transportation Needs: No Transportation Needs (3/25/2024)    PRAPARE - Transportation     Lack of Transportation (Medical): No     Lack of Transportation (Non-Medical): No   Physical Activity: Insufficiently Active (3/25/2024)    Exercise Vital Sign     Days of Exercise per Week: 2 days     Minutes of Exercise per Session: 20 min   Stress: No Stress Concern Present (3/25/2024)    [employer] [place] [employer] - Occupational Stress Questionnaire     Feeling of Stress : Not at all   Housing Stability: Low Risk  (3/25/2024)    Housing Stability Vital Sign     Unable to Pay for Housing in the Last Year: No     Number of Places Lived in the Last Year: 1     Unstable Housing in the Last Year: No "       Review of Systems -     Respiratory ROS: no cough, shortness of breath, or wheezing  Cardiovascular ROS: no chest pain or dyspnea on exertion  Gastrointestinal ROS: no abdominal pain, change in bowel habits, or black or bloody stools  Musculoskeletal ROS: negative  Neurological ROS: no TIA or stroke symptoms        Physical Exam:  General: no distress  Head: normocephalic  Airway:  normal oropharynx, airway normal  Neck: supple, symmetrical, trachea midline  Lungs:  normal respiratory effort  Heart: regular rate and rhythm  Abdomen: soft, non-tender non-distented; bowel sounds normal; no masses,  no organomegaly  Extremities: no cyanosis or edema, or clubbing       Deep Sedation: Mallampati Score per anesthesia     SedationPlan :Moderate     ASA : III    Patient is medically cleared for anesthesia.    Anesthesia/Surgery risks, benefits and alternative options discussed and understood by patient/family.

## 2024-06-14 NOTE — TRANSFER OF CARE
"Anesthesia Transfer of Care Note    Patient: Chelsea Rodriguez    Procedure(s) Performed: Procedure(s) (LRB):  COLONOSCOPY (N/A)    Patient location: PACU    Anesthesia Type: general    Transport from OR: Transported from OR on room air with adequate spontaneous ventilation    Post pain: adequate analgesia    Post assessment: no apparent anesthetic complications and tolerated procedure well    Post vital signs: stable    Level of consciousness: sedated    Nausea/Vomiting: no nausea/vomiting    Complications: none    Transfer of care protocol was followed      Last vitals: Visit Vitals  BP (!) 157/74 (BP Location: Left arm, Patient Position: Lying)   Pulse 81   Temp 36.7 °C (98.1 °F) (Temporal)   Resp 16   Ht 4' 10" (1.473 m)   Wt 70.8 kg (156 lb)   LMP  (LMP Unknown)   SpO2 100%   Breastfeeding No   BMI 32.60 kg/m²     "

## 2024-06-14 NOTE — PROVATION PATIENT INSTRUCTIONS
Discharge Summary/Instructions after an Endoscopic Procedure  Patient Name: Chelsea Rodriguez  Patient MRN: [id number]  Patient YOB: 1955 Friday, June 14, 2024  Frandy Avendano MD  Dear patient,  As a result of recent federal legislation (The Federal Cures Act), you may   receive lab or pathology results from your procedure in your [facility]   account before your physician is able to contact you. Your physician or   their representative will relay the results to you with their   recommendations at their soonest availability.  Thank you,  RESTRICTIONS:  During your procedure today, you received medications for sedation.  These   medications may affect your judgment, balance and coordination.  Therefore,   for 24 hours, you have the following restrictions:   - DO NOT drive a car, operate machinery, make legal/financial decisions,   sign important papers or drink alcohol.    ACTIVITY:  Today: no heavy lifting, straining or running due to procedural   sedation/anesthesia.  The following day: return to full activity including work.  DIET:  Eat and drink normally unless instructed otherwise.     TREATMENT FOR COMMON SIDE EFFECTS:  - Mild abdominal pain, nausea, belching, bloating or excessive gas:  rest,   eat lightly and use a heating pad.  - Sore Throat: treat with throat lozenges and/or gargle with warm salt   water.  - Because air was used during the procedure, expelling large amounts of air   from your rectum or belching is normal.  - If a bowel prep was taken, you may not have a bowel movement for 1-3 days.    This is normal.  SYMPTOMS TO WATCH FOR AND REPORT TO YOUR PHYSICIAN:  1. Abdominal pain or bloating, other than gas cramps.  2. Chest pain.  3. Back pain.  4. Signs of infection such as: chills or fever occurring within 24 hours   after the procedure.  5. Rectal bleeding, which would show as bright red, maroon, or black stools.   (A tablespoon of blood from the rectum is not serious, especially if    hemorrhoids are present.)  6. Vomiting.  7. Weakness or dizziness.  GO DIRECTLY TO THE NEAREST EMERGENCY ROOM IF YOU HAVE ANY OF THE FOLLOWING:      Difficulty breathing              Chills and/or fever over 101 F   Persistent vomiting and/or vomiting blood   Severe abdominal pain   Severe chest pain   Black, tarry stools   Bleeding- more than one tablespoon   Any other symptom or condition that you feel may need urgent attention  Your doctor recommends these additional instructions:  If any biopsies were taken, your doctors clinic will contact you in 1 to 2   weeks with any results.  - Discharge patient to home.   - High fiber diet.   - Continue present medications.   - Await pathology results.   - Patient has a contact number available for emergencies.  The signs and   symptoms of potential delayed complications were discussed with the   patient.  Return to normal activities tomorrow.  Written discharge   instructions were provided to the patient.   - Repeat colonoscopy date to be determined after pending pathology results   are reviewed for surveillance based on pathology results.  For questions, problems or results please call your physician - Franyd Avendano MD at Work:  [phone].  [facility] [place], EMERGENCY ROOM PHONE NUMBER: [phone]  IF A COMPLICATION OR EMERGENCY SITUATION ARISES AND YOU ARE UNABLE TO REACH   YOUR PHYSICIAN - GO DIRECTLY TO THE EMERGENCY ROOM.  Frandy Avendano MD  6/14/2024 3:27:40 PM  This report has been verified and signed electronically.  Dear patient,  As a result of recent federal legislation (The Federal Cures Act), you may   receive lab or pathology results from your procedure in your [facility]   account before your physician is able to contact you. Your physician or   their representative will relay the results to you with their   recommendations at their soonest availability.  Thank you,  PROVATION

## 2024-06-17 ENCOUNTER — TELEPHONE (OUTPATIENT)
Dept: SURGERY | Facility: CLINIC | Age: 69
End: 2024-06-17
Payer: MEDICARE

## 2024-06-17 LAB
FINAL PATHOLOGIC DIAGNOSIS: NORMAL
GROSS: NORMAL
Lab: NORMAL

## 2024-06-17 NOTE — TELEPHONE ENCOUNTER
Contacted pt to schedule X-rays for Sitzmarker Test. Pt scheduled June 24, 26, 28, 2024. Pt has appt on tomorrow, I will meet her downstairs on the 2nd fl.       TH

## 2024-06-18 ENCOUNTER — OFFICE VISIT (OUTPATIENT)
Dept: BARIATRICS | Facility: CLINIC | Age: 69
End: 2024-06-18
Payer: MEDICARE

## 2024-06-18 VITALS
WEIGHT: 158.31 LBS | BODY MASS INDEX: 33.08 KG/M2 | DIASTOLIC BLOOD PRESSURE: 66 MMHG | HEART RATE: 79 BPM | SYSTOLIC BLOOD PRESSURE: 152 MMHG | OXYGEN SATURATION: 98 %

## 2024-06-18 DIAGNOSIS — I10 ESSENTIAL HYPERTENSION: ICD-10-CM

## 2024-06-18 DIAGNOSIS — I89.0 LYMPHEDEMA OF BOTH LOWER EXTREMITIES: ICD-10-CM

## 2024-06-18 DIAGNOSIS — R73.03 PREDIABETES: ICD-10-CM

## 2024-06-18 DIAGNOSIS — Z85.3 HISTORY OF LEFT BREAST CANCER: ICD-10-CM

## 2024-06-18 DIAGNOSIS — E78.2 MIXED HYPERLIPIDEMIA: ICD-10-CM

## 2024-06-18 DIAGNOSIS — Z71.3 ENCOUNTER FOR WEIGHT LOSS COUNSELING: ICD-10-CM

## 2024-06-18 DIAGNOSIS — E66.09 CLASS 1 OBESITY DUE TO EXCESS CALORIES WITH SERIOUS COMORBIDITY AND BODY MASS INDEX (BMI) OF 33.0 TO 33.9 IN ADULT: Primary | ICD-10-CM

## 2024-06-18 DIAGNOSIS — G47.33 OSA (OBSTRUCTIVE SLEEP APNEA): ICD-10-CM

## 2024-06-18 PROCEDURE — 1126F AMNT PAIN NOTED NONE PRSNT: CPT | Mod: HCNC,CPTII,S$GLB, | Performed by: STUDENT IN AN ORGANIZED HEALTH CARE EDUCATION/TRAINING PROGRAM

## 2024-06-18 PROCEDURE — 3077F SYST BP >= 140 MM HG: CPT | Mod: HCNC,CPTII,S$GLB, | Performed by: STUDENT IN AN ORGANIZED HEALTH CARE EDUCATION/TRAINING PROGRAM

## 2024-06-18 PROCEDURE — 1159F MED LIST DOCD IN RCRD: CPT | Mod: HCNC,CPTII,S$GLB, | Performed by: STUDENT IN AN ORGANIZED HEALTH CARE EDUCATION/TRAINING PROGRAM

## 2024-06-18 PROCEDURE — 99213 OFFICE O/P EST LOW 20 MIN: CPT | Mod: HCNC,S$GLB,, | Performed by: STUDENT IN AN ORGANIZED HEALTH CARE EDUCATION/TRAINING PROGRAM

## 2024-06-18 PROCEDURE — 4010F ACE/ARB THERAPY RXD/TAKEN: CPT | Mod: HCNC,CPTII,S$GLB, | Performed by: STUDENT IN AN ORGANIZED HEALTH CARE EDUCATION/TRAINING PROGRAM

## 2024-06-18 PROCEDURE — 99999 PR PBB SHADOW E&M-EST. PATIENT-LVL IV: CPT | Mod: PBBFAC,HCNC,, | Performed by: STUDENT IN AN ORGANIZED HEALTH CARE EDUCATION/TRAINING PROGRAM

## 2024-06-18 PROCEDURE — 3078F DIAST BP <80 MM HG: CPT | Mod: HCNC,CPTII,S$GLB, | Performed by: STUDENT IN AN ORGANIZED HEALTH CARE EDUCATION/TRAINING PROGRAM

## 2024-06-18 PROCEDURE — 3008F BODY MASS INDEX DOCD: CPT | Mod: HCNC,CPTII,S$GLB, | Performed by: STUDENT IN AN ORGANIZED HEALTH CARE EDUCATION/TRAINING PROGRAM

## 2024-06-18 PROCEDURE — 1101F PT FALLS ASSESS-DOCD LE1/YR: CPT | Mod: HCNC,CPTII,S$GLB, | Performed by: STUDENT IN AN ORGANIZED HEALTH CARE EDUCATION/TRAINING PROGRAM

## 2024-06-18 PROCEDURE — 3044F HG A1C LEVEL LT 7.0%: CPT | Mod: HCNC,CPTII,S$GLB, | Performed by: STUDENT IN AN ORGANIZED HEALTH CARE EDUCATION/TRAINING PROGRAM

## 2024-06-18 PROCEDURE — 1160F RVW MEDS BY RX/DR IN RCRD: CPT | Mod: HCNC,CPTII,S$GLB, | Performed by: STUDENT IN AN ORGANIZED HEALTH CARE EDUCATION/TRAINING PROGRAM

## 2024-06-18 PROCEDURE — 3288F FALL RISK ASSESSMENT DOCD: CPT | Mod: HCNC,CPTII,S$GLB, | Performed by: STUDENT IN AN ORGANIZED HEALTH CARE EDUCATION/TRAINING PROGRAM

## 2024-06-18 PROCEDURE — 1157F ADVNC CARE PLAN IN RCRD: CPT | Mod: HCNC,CPTII,S$GLB, | Performed by: STUDENT IN AN ORGANIZED HEALTH CARE EDUCATION/TRAINING PROGRAM

## 2024-06-18 RX ORDER — METFORMIN HYDROCHLORIDE 500 MG/1
500 TABLET, EXTENDED RELEASE ORAL
Qty: 90 TABLET | Refills: 0 | Status: SHIPPED | OUTPATIENT
Start: 2024-06-18 | End: 2024-09-16

## 2024-06-18 NOTE — PROGRESS NOTES
Subjective     Patient ID: Chelsea Rodriguez is a 69 y.o. female.    Chief Complaint: Obesity, Follow-up, and Weight Check    Patient presents for treatment of obesity.   Referred by Dr. Rico, was concerned with cholesterol  Weighted about 130 lbs prior to COVID    Co-morbidities   HTN  HLD  HENOK  Prediabetes  Lymphedema, BLE  H/o breast cancer    Weight History  Lowest adult weight: 125 lbs  Highest adult weight: 170 lbs    Current Physical Activity  Walking 15 minutes on treadmill  Glider    Current Eating Habits  Breakfast - coffee, was drinking half & half or whole milk; eggs, biscuit, skips sometimes  Often eats 1 meals per day around 2pm - may be leftovers, breakfast food (eggs, pancakes, breakfast meat), greens, rice with mushrooms, onions, spinach, gravy  Snacks - cheese, cookies, peanut butter, fruit, candy  Beverages - coffee, milk, lemonade sweetened with xylitol     Medical Weight Loss  1/11/2024: 163.6 lbs, BMI 34.8, BFP 47.2%, BFM 77.2 lbs, SMM 47 lbs, BMR 1216 kcal  3/21/2024: 159 lbs, BMI 33.2, BFP 47%, BFM 74.8 lbs, SMM 45.6 lbs, BMR 1196 kcal  6/18/2024: 158.3 lbs, BMI 33.6, BFP 46.6%, BFM 73.9 lbs, SMM 45.4 lbs, BMR 1197 kcal          Review of Systems   Constitutional:  Negative for chills and fever.   Respiratory:  Negative for shortness of breath.    Cardiovascular:  Negative for chest pain and palpitations.   Gastrointestinal:  Negative for abdominal pain, nausea and vomiting.   Neurological:  Negative for dizziness and light-headedness.   Psychiatric/Behavioral:  The patient is not nervous/anxious.           Objective    Latest Reference Range & Units 05/24/23 08:39 10/02/23 08:49   WBC 3.90 - 12.70 K/uL 5.98    RBC 4.00 - 5.40 M/uL 5.09    Hemoglobin 12.0 - 16.0 g/dL 13.1    Hematocrit 37.0 - 48.5 % 43.0    MCV 82 - 98 fL 85    MCH 27.0 - 31.0 pg 25.7 (L)    MCHC 32.0 - 36.0 g/dL 30.5 (L)    RDW 11.5 - 14.5 % 15.2 (H)    Platelet Count 150 - 450 K/uL 311    MPV 9.2 - 12.9 fL 10.7    Gran %  38.0 - 73.0 % 51.0    Lymph % 18.0 - 48.0 % 35.6    Mono % 4.0 - 15.0 % 8.5    Eosinophil % 0.0 - 8.0 % 4.2    Basophil % 0.0 - 1.9 % 0.5    Immature Granulocytes 0.0 - 0.5 % 0.2    Gran # (ANC) 1.8 - 7.7 K/uL 3.1    Lymph # 1.0 - 4.8 K/uL 2.1    Mono # 0.3 - 1.0 K/uL 0.5    Eos # 0.0 - 0.5 K/uL 0.3    Baso # 0.00 - 0.20 K/uL 0.03    Immature Grans (Abs) 0.00 - 0.04 K/uL 0.01    nRBC 0 /100 WBC 0    Differential Method  Automated    Sodium 136 - 145 mmol/L 142    Potassium 3.5 - 5.1 mmol/L 4.2    Chloride 95 - 110 mmol/L 105    CO2 23 - 29 mmol/L 28    Anion Gap 8 - 16 mmol/L 9    BUN 8 - 23 mg/dL 16    Creatinine 0.5 - 1.4 mg/dL 0.7    eGFR >60 mL/min/1.73 m^2 >60.0    Glucose 70 - 110 mg/dL 101    Calcium 8.7 - 10.5 mg/dL 9.5    ALP 55 - 135 U/L 89    PROTEIN TOTAL 6.0 - 8.4 g/dL 7.0    Albumin 3.5 - 5.2 g/dL 3.9    BILIRUBIN TOTAL 0.1 - 1.0 mg/dL 0.5    AST 10 - 40 U/L 32    ALT 10 - 44 U/L 23    Cholesterol Total 120 - 199 mg/dL 201 (H) 192   HDL 40 - 75 mg/dL 42 42   HDL/Cholesterol Ratio 20.0 - 50.0 % 20.9 21.9   Non-HDL Cholesterol mg/dL 159 150   Total Cholesterol/HDL Ratio 2.0 - 5.0  4.8 4.6   Triglycerides 30 - 150 mg/dL 79 85   LDL Cholesterol 63.0 - 159.0 mg/dL 143.2 133.0   Vit D, 25-Hydroxy 30 - 96 ng/mL 40    Hemoglobin A1C External 4.0 - 5.6 % 6.0 (H)    Estimated Avg Glucose 68 - 131 mg/dL 126    TSH 0.400 - 4.000 uIU/mL  1.296   Free T4 0.71 - 1.51 ng/dL  1.02   Thyrotropin Receptor Ab 0.00 - 1.75 IU/L  <1.10   Thyroperoxidase Antibodies <6.0 IU/mL  <6.0   (L): Data is abnormally low  (H): Data is abnormally high    Vitals:    06/18/24 0946   BP: (!) 152/66   Pulse: 79       Physical Exam  Vitals reviewed.   Constitutional:       General: She is not in acute distress.     Appearance: Normal appearance. She is obese. She is not ill-appearing, toxic-appearing or diaphoretic.   HENT:      Head: Normocephalic and atraumatic.   Cardiovascular:      Rate and Rhythm: Normal rate.   Pulmonary:      Effort:  Pulmonary effort is normal. No respiratory distress.   Skin:     General: Skin is warm and dry.   Neurological:      Mental Status: She is alert and oriented to person, place, and time.            Assessment and Plan     1. Class 1 obesity due to excess calories with serious comorbidity and body mass index (BMI) of 33.0 to 33.9 in adult    2. Prediabetes  -     metFORMIN (GLUCOPHAGE-XR) 500 MG ER 24hr tablet; Take 1 tablet (500 mg total) by mouth daily with breakfast.  Dispense: 90 tablet; Refill: 0    3. Essential hypertension    4. Mixed hyperlipidemia    5. History of left breast cancer    6. HENOK (obstructive sleep apnea)    7. Lymphedema of both lower extremities    8. Encounter for weight loss counseling      - Log all food and beverage intake with a daily calorie goal of 1000 calories per day    - Walk 15-30 minutes 3-5x/week    - Resistance band exercises given in AVS

## 2024-06-24 ENCOUNTER — HOSPITAL ENCOUNTER (OUTPATIENT)
Dept: RADIOLOGY | Facility: HOSPITAL | Age: 69
Discharge: HOME | End: 2024-06-24
Attending: COLON & RECTAL SURGERY
Payer: MEDICARE

## 2024-06-24 DIAGNOSIS — K59.00 CONSTIPATION, UNSPECIFIED CONSTIPATION TYPE: ICD-10-CM

## 2024-06-24 PROCEDURE — 74018 RADEX ABDOMEN 1 VIEW: CPT | Mod: TC,HCNC

## 2024-06-24 PROCEDURE — 74018 RADEX ABDOMEN 1 VIEW: CPT | Mod: 26,HCNC,, | Performed by: RADIOLOGY

## 2024-06-26 ENCOUNTER — HOSPITAL ENCOUNTER (OUTPATIENT)
Dept: RADIOLOGY | Facility: HOSPITAL | Age: 69
Discharge: HOME | End: 2024-06-26
Attending: COLON & RECTAL SURGERY
Payer: MEDICARE

## 2024-06-26 DIAGNOSIS — K59.00 CONSTIPATION, UNSPECIFIED CONSTIPATION TYPE: ICD-10-CM

## 2024-06-26 PROCEDURE — 74018 RADEX ABDOMEN 1 VIEW: CPT | Mod: TC,HCNC

## 2024-06-26 PROCEDURE — 74018 RADEX ABDOMEN 1 VIEW: CPT | Mod: 26,HCNC,, | Performed by: RADIOLOGY

## 2024-06-27 ENCOUNTER — OFFICE VISIT (OUTPATIENT)
Dept: ORTHOPEDICS | Facility: CLINIC | Age: 69
End: 2024-06-27
Payer: MEDICARE

## 2024-06-27 VITALS — BODY MASS INDEX: 32.6 KG/M2 | HEIGHT: 59 IN | WEIGHT: 161.69 LBS

## 2024-06-27 DIAGNOSIS — Z96.651 STATUS POST TOTAL RIGHT KNEE REPLACEMENT: Primary | ICD-10-CM

## 2024-06-27 DIAGNOSIS — M17.12 PRIMARY OSTEOARTHRITIS OF LEFT KNEE: ICD-10-CM

## 2024-06-27 PROCEDURE — 99999 PR PBB SHADOW E&M-EST. PATIENT-LVL III: CPT | Mod: PBBFAC,HCNC,, | Performed by: ORTHOPAEDIC SURGERY

## 2024-06-27 RX ORDER — TRIAMCINOLONE ACETONIDE 40 MG/ML
40 INJECTION, SUSPENSION INTRA-ARTICULAR; INTRAMUSCULAR
Status: DISCONTINUED | OUTPATIENT
Start: 2024-06-27 | End: 2024-06-27 | Stop reason: HOSPADM

## 2024-06-27 RX ADMIN — TRIAMCINOLONE ACETONIDE 40 MG: 40 INJECTION, SUSPENSION INTRA-ARTICULAR; INTRAMUSCULAR at 09:06

## 2024-06-27 NOTE — PROCEDURES
Large Joint Aspiration/Injection: L knee    Date/Time: 6/27/2024 9:15 AM    Performed by: Pedro Summers III, MD  Authorized by: Pedro Summers III, MD    Consent Done?:  Yes (Verbal)  Indications:  Pain  Timeout: prior to procedure the correct patient, procedure, and site was verified    Prep: patient was prepped and draped in usual sterile fashion      Local anesthesia used?: Yes    Local anesthetic:  Lidocaine 1% without epinephrine  Anesthetic total (ml):  5      Details:  Needle Size:  21 G  Location:  Knee  Site:  L knee  Medications:  40 mg triamcinolone acetonide 40 mg/mL  Patient tolerance:  Patient tolerated the procedure well with no immediate complications

## 2024-06-27 NOTE — PROGRESS NOTES
"  Subjective:     HPI:   Chelsea Rodriguez is a 69 y.o. female who presents for f/u R TKA    R TKA 3/31/21  3/26/24 Dx patellar clunk  R knee ATS Dr Sumner 4/30/24: Significant arthrofibrosis throughout the entire suprapatellar pouch with complete obliteration of the pouch. Loss of the medial and lateral gutters. Significant scarring throughout the retropatellar fat pad. Scarring noted throughout the area of the post.   We are able to get nearly 120° of knee flexion without any difficulty.   F/u Alan Sonadeo 6/12/24: She is extremely happy with her progress and response to surgery. ROM 0-135    "I'm loving it, I am just too happy, can't stop smiling"  Can do stairs and stepstool which was goal  No pain meds  No asst devices  No specific limitations  Not catching/locking, occ clicks     History of Present Illness  The patient presents for post-op follow-up.    The patient underwent a right knee arthroscopy in 2021, followed by a subsequent knee arthroscopy on 04/30/2023. Currently, she is capable of ascending stairs without the aid of a cane, walker, or crutches. She is not utilizing any analgesics. Her mobility has improved significantly, as evidenced by her ability to ascend stairs independently. She reports no catching or locking of the knee, although she does experience a snapping sensation. Her exercise regimen has been inconsistent.       Objective:   Body mass index is 32.66 kg/m².  Exam:    Physical Exam  Patient's gait has improved. No limp, nonantalgic gait, negative Trendelenburg. No groin pain with straight leg raise. Incisions are well healed. Scope portal incisions are healed. There is slightly more effusions around those portal sites, which are expected postoperatively. Knee range of motion is 0 to 120 degrees, 5 degrees valgus alignment. Knee is stable to anterior, posterior, varus and valgus stresses without flexion contracture or extensor lag. Overall joint is moving better. Much less arthrofibrotic " "resistance in varus, valgus, anterior, posterior stresses and patella is tracking well without clunk or catching.    ROM 3/12/24: 0-100      Imaging:    Results      None today      Assessment/Plan:       ICD-10-CM ICD-9-CM   1. Status post total right knee replacement  Z96.651 V43.65   2. Primary osteoarthritis of left knee  M17.12 715.16        Patellar clunk/arthrofibrosis much improved    Assessment/Plan:     Assessment & Plan  1. Post-op follow-up.  The patient's overall alignment is satisfactory. Continuation of home exercises is advised. A follow-up with Dr. Best is advised. An injection will be administered today.    Continue HEP at home  Act mod PRN    Std f/u with Dr Sumner    1 year f/u with me, rpt R knee XR    L knee OA: "holding the right knee back"  Requests another CSI today  F/u PRN L knee      No orders of the defined types were placed in this encounter.      This note was generated with the assistance of ambient listening technology. Verbal consent was obtained by the patient and accompanying visitor(s) for the recording of patient appointment to facilitate this note. I attest to having reviewed and edited the generated note for accuracy, though some syntax or spelling errors may persist. Please contact the author of this note for any clarification.            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 Oneil    CATARACT EXTRACTION W/  INTRAOCULAR LENS IMPLANT Left 2018    Dr. Oneil     SECTION      x2    COLONOSCOPY N/A 10/20/2017    Procedure: COLONOSCOPY;  Surgeon: Mitchell Danielson Jr., MD;  Location: [facility];  Service: Endoscopy;  Laterality: N/A;    COLONOSCOPY N/A 2021    Procedure: COLONOSCOPY/Suprep;  Surgeon: Malcolm Reyes MD;  Location: [facility] ENDO;  Service: Endoscopy;  Laterality: N/A;    COLONOSCOPY N/A 2024    Procedure: COLONOSCOPY;  Surgeon: Frandy Avendano MD;  Location: [facility] ENDOSCOPY;  Service: Endoscopy;  Laterality: N/A;  Ref by Dr RYAN Alva, pt request Miralax, portal - PC. 24 at 4;45 pm pt. confirmed appt. EC  24- portal msg for pc. DBM  24- Per DR. Valenzuela Pt to be r/s due to 1 MD scoping on 24 in AM, LVM to inform Pt of change in scoping MD and arrival time- ERW   precall    CYST REMOVAL      on back    ESOPHAGOGASTRODUODENOSCOPY N/A 2021    Procedure: EGD (ESOPHAGOGASTRODUODENOSCOPY);  Surgeon: Malcolm Reyes MD;  Location: [facility];  Service: Endoscopy;  Laterality: N/A;    HERNIA REPAIR      HYSTERECTOMY      at 25 yrs old    INTRAOCULAR PROSTHESES INSERTION Left 2018    Procedure: INSERTION, IOL PROSTHESIS;  Surgeon: Sergio Oneil MD;  Location: [facility] OR 1ST FLR;  Service: Ophthalmology;  Laterality: Left;    INTRAOCULAR PROSTHESES INSERTION Right 2018    Procedure: INSERTION, IOL PROSTHESIS;  Surgeon: Sergio Oneil MD;  Location: [facility] OR 2ND FLR;  Service: Ophthalmology;  Laterality: Right;    KNEE ARTHROPLASTY Right 2021    Procedure: ARTHROPLASTY, KNEE:RIGHT:DEPUY-SIGMA ;  Surgeon: Pedro Summers III, MD;  Location: [facility] OR;  Service: Orthopedics;  Laterality: Right;    LYSIS, ADHESIONS, KNEE, ARTHROSCOPIC Right 2024    Procedure: ARTHROSCOPIC KNEE LYSIS, ADHESIONS AND ANTERIOR INTERVAL SLIDE;  Surgeon: Ryanne Sumner MD;   Location: [facility] OR;  Service: Orthopedics;  Laterality: Right;  REGIONAL BLOCK    OOPHORECTOMY      @ 45 yrs old    PHACOEMULSIFICATION OF CATARACT Left 11/06/2018    Procedure: PHACOEMULSIFICATION, CATARACT;  Surgeon: Sergio Oneil MD;  Location: [facility] OR 1ST FLR;  Service: Ophthalmology;  Laterality: Left;    PHACOEMULSIFICATION OF CATARACT Right 11/20/2018    Procedure: PHACOEMULSIFICATION, CATARACT;  Surgeon: Sergio Oneil MD;  Location: [facility] OR 2ND FLR;  Service: Ophthalmology;  Laterality: Right;    REFRACTIVE SURGERY      2023    supracervical abdominal hysterectomy  1978    fibroids    SYNOVECTOMY OF KNEE Right 4/30/2024    Procedure: SYNOVECTOMY, KNEE;  Surgeon: Ryanne Sumner MD;  Location: [facility] OR;  Service: Orthopedics;  Laterality: Right;       Family History   Problem Relation Name Age of Onset    Hypertension Mother      Heart disease Mother      Diabetes Mother      Hyperlipidemia Mother      Pancreatitis Mother      Cataracts Mother      Macular degeneration Mother      Cancer Father      Prostate cancer Father      Hypertension Sister x1     Thyroid disease Sister x1     Diabetes Brother x1     Diabetes Brother x2     Cancer Brother x2     Heart disease Brother x2     Prostate cancer Brother x2     Thyroid cancer Brother x2     Hyperlipidemia Daughter x1     Hypertension Son x1     Breast cancer Paternal Cousin      Amblyopia Neg Hx      Blindness Neg Hx      Glaucoma Neg Hx      Strabismus Neg Hx      Retinal detachment Neg Hx         Social History     Socioeconomic History    Marital status: Single    Number of children: 2   Occupational History     Comment: retired   Tobacco Use    Smoking status: Never    Smokeless tobacco: Never   Substance and Sexual Activity    Alcohol use: Yes     Comment: once per month    Drug use: Never    Sexual activity: Not Currently   Social History Narrative    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 hx of left lumpectomy for "breast cancer"- with 5yrs of tamoxifen use. Sees Dr. Buckley ([facility] for surveillance/MMG)        Dr. Lala former PCP, [facility]          Social Determinants of Health     Financial Resource Strain: Medium Risk (3/25/2024)    Overall Financial Resource Strain (CARDIA)     Difficulty of Paying Living Expenses: Somewhat hard   Food Insecurity: Food Insecurity Present (3/25/2024)    Hunger Vital Sign     Worried About Running Out of Food in the Last Year: Often true     Ran Out of Food in the Last Year: Often true   Transportation Needs: No Transportation Needs (3/25/2024)    PRAPARE - Transportation     Lack of Transportation (Medical): No     Lack of Transportation (Non-Medical): No   Physical Activity: Insufficiently Active (3/25/2024)    Exercise Vital Sign     Days of Exercise per Week: 2 days     Minutes of Exercise per Session: 20 min   Stress: No Stress Concern Present (3/25/2024)    [employer] [place] [employer] - Occupational Stress Questionnaire     Feeling of Stress : Not at all   Housing Stability: Low Risk  (3/25/2024)    Housing Stability Vital Sign     Unable to Pay for Housing in the Last Year: No     Number of Places Lived in the Last Year: 1     Unstable Housing in the Last Year: No             "

## 2024-06-28 ENCOUNTER — HOSPITAL ENCOUNTER (OUTPATIENT)
Dept: RADIOLOGY | Facility: HOSPITAL | Age: 69
Discharge: HOME | End: 2024-06-28
Attending: COLON & RECTAL SURGERY
Payer: MEDICARE

## 2024-06-28 DIAGNOSIS — K59.00 CONSTIPATION, UNSPECIFIED CONSTIPATION TYPE: ICD-10-CM

## 2024-06-28 PROCEDURE — 74018 RADEX ABDOMEN 1 VIEW: CPT | Mod: TC,HCNC

## 2024-06-28 PROCEDURE — 74018 RADEX ABDOMEN 1 VIEW: CPT | Mod: 26,HCNC,, | Performed by: RADIOLOGY

## 2024-07-08 ENCOUNTER — OFFICE VISIT (OUTPATIENT)
Dept: URGENT CARE | Facility: CLINIC | Age: 69
End: 2024-07-08
Payer: MEDICARE

## 2024-07-08 VITALS
HEART RATE: 85 BPM | BODY MASS INDEX: 32.36 KG/M2 | WEIGHT: 160.5 LBS | HEIGHT: 59 IN | OXYGEN SATURATION: 99 % | RESPIRATION RATE: 20 BRPM | SYSTOLIC BLOOD PRESSURE: 148 MMHG | DIASTOLIC BLOOD PRESSURE: 81 MMHG | TEMPERATURE: 99 F

## 2024-07-08 DIAGNOSIS — R10.9 FLANK PAIN: ICD-10-CM

## 2024-07-08 DIAGNOSIS — N30.00 ACUTE CYSTITIS WITHOUT HEMATURIA: Primary | ICD-10-CM

## 2024-07-08 LAB
BILIRUBIN, UA POC OHS: NEGATIVE
BLOOD, UA POC OHS: ABNORMAL
CLARITY, UA POC OHS: CLEAR
COLOR, UA POC OHS: YELLOW
GLUCOSE, UA POC OHS: NEGATIVE
KETONES, UA POC OHS: NEGATIVE
LEUKOCYTES, UA POC OHS: NEGATIVE
NITRITE, UA POC OHS: NEGATIVE
PH, UA POC OHS: 5.5
PROTEIN, UA POC OHS: NEGATIVE
SPECIFIC GRAVITY, UA POC OHS: >=1.03
UROBILINOGEN, UA POC OHS: 0.2

## 2024-07-08 PROCEDURE — 87086 URINE CULTURE/COLONY COUNT: CPT | Mod: HCNC

## 2024-07-08 PROCEDURE — 81003 URINALYSIS AUTO W/O SCOPE: CPT | Mod: QW,S$GLB,,

## 2024-07-08 PROCEDURE — 99213 OFFICE O/P EST LOW 20 MIN: CPT | Mod: S$GLB,,,

## 2024-07-08 RX ORDER — NITROFURANTOIN 25; 75 MG/1; MG/1
100 CAPSULE ORAL 2 TIMES DAILY
Qty: 14 CAPSULE | Refills: 0 | Status: SHIPPED | OUTPATIENT
Start: 2024-07-08 | End: 2024-07-15

## 2024-07-08 NOTE — PATIENT INSTRUCTIONS
Macrobid twice daily for 7 days. This is an antibiotic to treat infection. Please take to completion.   Urine culture was ordered if you have a history of recurrent UTIs, pediatrics/elderly population. You will get a phone call within 3-5 days regarding urine culture results.  Please drink plenty of fluids.  Please get plenty of rest.  You can purchase Azo (phenazopyridine 99 mg) over the counter at drug stores. This will help with discomfort.  If you  smoke, please stop smoking.  If not allergic, take Tylenol (Acetaminophen) 650 mg to  1 g every 6 hours as needed for fever and/or Motrin (Ibuprofen) 600 to 800 mg every 6 hours as needed for fever as directed for control of pain and/or fever    Please remember that you have received care at an urgent care today. Urgent cares are not emergency rooms and are not equipped to handle life threatening emergencies and cannot rule in or out certain medical conditions and you may be released before all of your medical problems are known or treated. Please arrange follow up with your primary care physician or speciality clinic  within 2-5 days if your signs and symptoms have not resolved or worsen. Patient can call our Referral Hotline at [phone] to make an appointment.    Please return here or go to the Emergency Department for any concerns or worsening of condition.Patient was educated on signs/symptoms that would warrant emergent medical attention.   Signs of infection. These include a fever of 100.4°F (38°C) or higher, chills, back pain, nausea, throwing up, or bloody urine.  Signs come back after treatment ends  You notice more blood in your urine.  Your signs get worse or do not improve within 24 hours of starting treatment.  You are not able to urinate for more than 8 hours.  Your signs come back after treatment has stopped.

## 2024-07-08 NOTE — PROGRESS NOTES
"Subjective:      Patient ID: Chelsea Rodriguez is a 69 y.o. female.    Vitals:  height is 4' 11" (1.499 m) and weight is 72.8 kg (160 lb 7.9 oz). Her temperature is 98.8 °F (37.1 °C). Her blood pressure is 148/81 (abnormal) and her pulse is 85. Her respiration is 20 and oxygen saturation is 99%.     Chief Complaint: Back Pain    69 year old female presents today with lower back pain, urinary frequency/urgency. States the pain is across the lower back on both sides. Symptoms started 07/06/2024. Treatments at home includes Aleve and AZO. Pain scale currently 0/10, but with movement pain is 04/10. Sitting subsides the pain.     Provider note starts below:  Patient presents to clinic for evaluation of right flank pain and urinary urgency and frequency which onset several days ago. Patient states the back pain is intermittent. She has been taking Azo and Aleve at home which she states has improved her pain. Currently patient does not have any pain. She denies any fever, chills, hematuria, dysuria, abdominal pain, nausea, vomiting, dizziness, confusion, AMS. No injury or trauma to back. No other complaints.     Back Pain  This is a new problem. The current episode started in the past 7 days. The problem occurs constantly. The problem is unchanged. The pain is present in the lumbar spine. The quality of the pain is described as aching. The pain does not radiate. The pain is at a severity of 4/10. The symptoms are aggravated by position. Pertinent negatives include no abdominal pain, bladder incontinence, bowel incontinence, chest pain, dysuria, fever, headaches, leg pain, numbness, paresis, paresthesias, pelvic pain, perianal numbness, tingling, weakness or weight loss. She has tried NSAIDs for the symptoms.       Constitution: Negative for chills and fever.   Neck: Negative for neck pain and neck stiffness.   Cardiovascular:  Negative for chest pain.   Respiratory:  Negative for shortness of breath.    Gastrointestinal:  " Negative for abdominal pain, nausea, vomiting and bowel incontinence.   Genitourinary:  Positive for frequency, urgency and flank pain (R). Negative for dysuria, urine decreased, bladder incontinence, hematuria and pelvic pain.   Musculoskeletal:  Negative for trauma.   Skin:  Negative for pale and rash.   Neurological:  Negative for dizziness, light-headedness, headaches, disorientation, altered mental status and numbness.   Psychiatric/Behavioral:  Negative for altered mental status, disorientation and confusion.       Objective:     Physical Exam   Constitutional: She is oriented to person, place, and time.  Non-toxic appearance. She does not appear ill. No distress.   HENT:   Head: Normocephalic and atraumatic.   Eyes: Conjunctivae are normal. Extraocular movement intact   Neck: Neck supple.   Cardiovascular: Normal rate, regular rhythm, normal heart sounds and normal pulses.   Pulmonary/Chest: Effort normal and breath sounds normal.   Abdominal: Normal appearance. She exhibits no distension. There is no abdominal tenderness. There is no rebound, no guarding, no left CVA tenderness and no right CVA tenderness.   Musculoskeletal: Normal range of motion.         General: Normal range of motion.   Neurological: She is alert, oriented to person, place, and time and at baseline.   Skin: Skin is warm and dry.   Psychiatric: Her behavior is normal. Mood normal.   Nursing note and vitals reviewed.    Assessment:     Results for orders placed or performed in visit on 07/08/24   POCT Urinalysis(Instrument)   Result Value Ref Range    Color, POC UA Yellow Yellow, Straw, Colorless    Clarity, POC UA Clear Clear    Glucose, POC UA Negative Negative    Bilirubin, POC UA Negative Negative    Ketones, POC UA Negative Negative    Spec Grav POC UA >=1.030 1.005 - 1.030    Blood, POC UA Trace-intact (A) Negative    pH, POC UA 5.5 5.0 - 8.0    Protein, POC UA Negative Negative    Urobilinogen, POC UA 0.2 <=1.0    Nitrite, POC UA  Negative Negative    WBC, POC UA Negative Negative       1. Acute cystitis without hematuria    2. Flank pain        Plan:     Acute cystitis without hematuria  -     nitrofurantoin, macrocrystal-monohydrate, (MACROBID) 100 MG capsule; Take 1 capsule (100 mg total) by mouth 2 (two) times daily. for 7 days  Dispense: 14 capsule; Refill: 0  -     CULTURE, URINE    Flank pain  -     POCT Urinalysis(Instrument)            Patient Instructions   Macrobid twice daily for 7 days. This is an antibiotic to treat infection. Please take to completion.   Urine culture was ordered if you have a history of recurrent UTIs, pediatrics/elderly population. You will get a phone call within 3-5 days regarding urine culture results.  Please drink plenty of fluids.  Please get plenty of rest.  You can purchase Azo (phenazopyridine 99 mg) over the counter at drug stores. This will help with discomfort.  If you  smoke, please stop smoking.  If not allergic, take Tylenol (Acetaminophen) 650 mg to  1 g every 6 hours as needed for fever and/or Motrin (Ibuprofen) 600 to 800 mg every 6 hours as needed for fever as directed for control of pain and/or fever    Please remember that you have received care at an urgent care today. Urgent cares are not emergency rooms and are not equipped to handle life threatening emergencies and cannot rule in or out certain medical conditions and you may be released before all of your medical problems are known or treated. Please arrange follow up with your primary care physician or speciality clinic  within 2-5 days if your signs and symptoms have not resolved or worsen. Patient can call our Referral Hotline at [phone] to make an appointment.    Please return here or go to the Emergency Department for any concerns or worsening of condition.Patient was educated on signs/symptoms that would warrant emergent medical attention.   Signs of infection. These include a fever of 100.4°F (38°C) or higher, chills,  back pain, nausea, throwing up, or bloody urine.  Signs come back after treatment ends  You notice more blood in your urine.  Your signs get worse or do not improve within 24 hours of starting treatment.  You are not able to urinate for more than 8 hours.  Your signs come back after treatment has stopped.

## 2024-07-10 LAB — BACTERIA UR CULT: NO GROWTH

## 2024-07-12 ENCOUNTER — TELEPHONE (OUTPATIENT)
Dept: URGENT CARE | Facility: CLINIC | Age: 69
End: 2024-07-12
Payer: MEDICARE

## 2024-07-12 NOTE — TELEPHONE ENCOUNTER
Called and spoke with patient in regards to her negative urine culture result.  Patient states she is feeling better.----- Message from Shahla Shirley NP sent at 7/12/2024 10:15 AM CDT -----  Please call and notify patient about negative urine culture results.

## 2024-07-17 ENCOUNTER — TELEPHONE (OUTPATIENT)
Dept: SURGERY | Facility: CLINIC | Age: 69
End: 2024-07-17
Payer: MEDICARE

## 2024-07-17 DIAGNOSIS — K59.00 CONSTIPATION, UNSPECIFIED CONSTIPATION TYPE: Primary | ICD-10-CM

## 2024-07-17 NOTE — TELEPHONE ENCOUNTER
Left message. Patient's Sitz Marker showed evidence of colonic inertia per Dr Avendano. Will need to check pelvic floor function with MR Def and anorectal manometry study. Will schedule office visit after tests with Dr Avendano to discuss results.

## 2024-07-17 NOTE — TELEPHONE ENCOUNTER
----- Message from Seda Melara sent at 7/17/2024 10:36 AM CDT -----  Regarding: appt /results  Contact: @ [phone]  Pt requesting a appointment for the following in person so she can talk to the doctor about her results from June no available dates ...Please call and adv @ [phone]

## 2024-07-17 NOTE — TELEPHONE ENCOUNTER
Spoke with patient. States she is inquiring about plan of care following Sitz Marker results. Will discuss with Dr Avendano and return call.

## 2024-07-18 ENCOUNTER — PATIENT OUTREACH (OUTPATIENT)
Dept: ADMINISTRATIVE | Facility: HOSPITAL | Age: 69
End: 2024-07-18
Payer: MEDICARE

## 2024-07-18 ENCOUNTER — TELEPHONE (OUTPATIENT)
Dept: ENDOSCOPY | Facility: HOSPITAL | Age: 69
End: 2024-07-18
Payer: MEDICARE

## 2024-07-18 DIAGNOSIS — K59.00 CONSTIPATION, UNSPECIFIED CONSTIPATION TYPE: Primary | ICD-10-CM

## 2024-07-18 NOTE — PROGRESS NOTES
Population Health Chart Review & Patient Outreach Details      Additional [facility] Health Notes:               Updates Requested / Reviewed:      Updated Care Coordination Note, Care Everywhere, and Immunizations Reconciliation Completed or Queried: [place]         Health Maintenance Topics Overdue:      VB Score: 1     Uncontrolled BP    RSV Vaccine                  Health Maintenance Topic(s) Outreach Outcomes & Actions Taken:    Provider Pt Reattribution - Outreach Outcomes & Actions Taken  : Upcoming Appt with Another Provider Already Scheduled

## 2024-07-18 NOTE — TELEPHONE ENCOUNTER
Spoke to pt to schedule procedure(s) Anorectal Manometry (ARM)       Physician to perform procedure(s) Dr. GIOVANNA Avendano  Date of Procedure (s) 9/03/24  Arrival Time 12:00 PM  Time of Procedure(s) 1:00 PM   Location of Procedure(s) [place] 4th Floor  Type of Rx Prep sent to patient: Enema  Instructions provided to patient via [facility]    Patient was informed on the following information and verbalized understanding. Screening questionnaire reviewed with patient and complete. No ride arrangements are required for this procedure.   Appointment details are tentative, especially check-in time. Patient will receive a prep-op call 7 days prior to confirm check-in time for procedure. If applicable the patient should contact their pharmacy to verify Rx for procedure prep is ready for pick-up. Patient was advised to call the scheduling department at [phone] if pharmacy states no Rx is available. Patient was advised to call the endoscopy scheduling department if any questions or concerns arise.       Endoscopy Scheduling Department

## 2024-07-19 ENCOUNTER — TELEPHONE (OUTPATIENT)
Dept: SURGERY | Facility: CLINIC | Age: 69
End: 2024-07-19
Payer: MEDICARE

## 2024-07-19 NOTE — TELEPHONE ENCOUNTER
Spoke with patient, rescheduled MR DEF. Patient is helping a friend with surgery and needed to reschedule. Will email instructions for Def and Manometry.

## 2024-07-19 NOTE — TELEPHONE ENCOUNTER
Left message in regards to MR Def appt scheduled for Monday 7/22. Reviewed arrival time, length of appt, and enemas. CRS contact information given for call back. Follow up appt scheduled with Dr Avendano following MR Def and Manometry on 9/9 to discuss results.

## 2024-08-05 ENCOUNTER — OFFICE VISIT (OUTPATIENT)
Dept: FAMILY MEDICINE | Facility: CLINIC | Age: 69
End: 2024-08-05
Payer: MEDICARE

## 2024-08-05 VITALS
HEIGHT: 59 IN | WEIGHT: 159.38 LBS | DIASTOLIC BLOOD PRESSURE: 78 MMHG | HEART RATE: 82 BPM | OXYGEN SATURATION: 95 % | BODY MASS INDEX: 32.13 KG/M2 | SYSTOLIC BLOOD PRESSURE: 134 MMHG

## 2024-08-05 DIAGNOSIS — I10 ESSENTIAL HYPERTENSION: ICD-10-CM

## 2024-08-05 DIAGNOSIS — E66.09 CLASS 1 OBESITY DUE TO EXCESS CALORIES WITH SERIOUS COMORBIDITY AND BODY MASS INDEX (BMI) OF 32.0 TO 32.9 IN ADULT: ICD-10-CM

## 2024-08-05 DIAGNOSIS — Z85.3 HISTORY OF LEFT BREAST CANCER: ICD-10-CM

## 2024-08-05 DIAGNOSIS — J30.9 ALLERGIC RHINITIS, UNSPECIFIED SEASONALITY, UNSPECIFIED TRIGGER: ICD-10-CM

## 2024-08-05 DIAGNOSIS — Z20.822 CONTACT WITH AND (SUSPECTED) EXPOSURE TO COVID-19: ICD-10-CM

## 2024-08-05 DIAGNOSIS — R73.03 PREDIABETES: ICD-10-CM

## 2024-08-05 DIAGNOSIS — Z53.1 BLOOD TRANSFUSION DECLINED BECAUSE PATIENT IS JEHOVAH'S WITNESS: ICD-10-CM

## 2024-08-05 DIAGNOSIS — M62.838 MUSCLE SPASMS OF NECK: Primary | ICD-10-CM

## 2024-08-05 DIAGNOSIS — J34.89 RHINORRHEA: ICD-10-CM

## 2024-08-05 DIAGNOSIS — Z86.010 HISTORY OF ADENOMATOUS POLYP OF COLON: ICD-10-CM

## 2024-08-05 DIAGNOSIS — E78.2 MIXED HYPERLIPIDEMIA: ICD-10-CM

## 2024-08-05 LAB
CTP QC/QA: YES
SARS-COV-2 RDRP RESP QL NAA+PROBE: NEGATIVE

## 2024-08-05 PROCEDURE — 1101F PT FALLS ASSESS-DOCD LE1/YR: CPT | Mod: HCNC,CPTII,S$GLB, | Performed by: FAMILY MEDICINE

## 2024-08-05 PROCEDURE — 99214 OFFICE O/P EST MOD 30 MIN: CPT | Mod: HCNC,S$GLB,, | Performed by: FAMILY MEDICINE

## 2024-08-05 PROCEDURE — 1157F ADVNC CARE PLAN IN RCRD: CPT | Mod: HCNC,CPTII,S$GLB, | Performed by: FAMILY MEDICINE

## 2024-08-05 PROCEDURE — 4010F ACE/ARB THERAPY RXD/TAKEN: CPT | Mod: HCNC,CPTII,S$GLB, | Performed by: FAMILY MEDICINE

## 2024-08-05 PROCEDURE — 3078F DIAST BP <80 MM HG: CPT | Mod: HCNC,CPTII,S$GLB, | Performed by: FAMILY MEDICINE

## 2024-08-05 PROCEDURE — 3044F HG A1C LEVEL LT 7.0%: CPT | Mod: HCNC,CPTII,S$GLB, | Performed by: FAMILY MEDICINE

## 2024-08-05 PROCEDURE — 3288F FALL RISK ASSESSMENT DOCD: CPT | Mod: HCNC,CPTII,S$GLB, | Performed by: FAMILY MEDICINE

## 2024-08-05 PROCEDURE — 1159F MED LIST DOCD IN RCRD: CPT | Mod: HCNC,CPTII,S$GLB, | Performed by: FAMILY MEDICINE

## 2024-08-05 PROCEDURE — 1125F AMNT PAIN NOTED PAIN PRSNT: CPT | Mod: HCNC,CPTII,S$GLB, | Performed by: FAMILY MEDICINE

## 2024-08-05 PROCEDURE — 3008F BODY MASS INDEX DOCD: CPT | Mod: HCNC,CPTII,S$GLB, | Performed by: FAMILY MEDICINE

## 2024-08-05 PROCEDURE — 99999 PR PBB SHADOW E&M-EST. PATIENT-LVL III: CPT | Mod: PBBFAC,HCNC,, | Performed by: FAMILY MEDICINE

## 2024-08-05 PROCEDURE — 3075F SYST BP GE 130 - 139MM HG: CPT | Mod: HCNC,CPTII,S$GLB, | Performed by: FAMILY MEDICINE

## 2024-08-05 PROCEDURE — 87635 SARS-COV-2 COVID-19 AMP PRB: CPT | Mod: QW,HCNC,S$GLB, | Performed by: FAMILY MEDICINE

## 2024-08-05 RX ORDER — HYDROCHLOROTHIAZIDE 12.5 MG/1
12.5 CAPSULE ORAL NIGHTLY
Qty: 90 CAPSULE | Refills: 3 | Status: SHIPPED | OUTPATIENT
Start: 2024-08-05

## 2024-08-05 RX ORDER — METOPROLOL SUCCINATE 25 MG/1
25 TABLET, EXTENDED RELEASE ORAL NIGHTLY
Qty: 90 TABLET | Refills: 3 | Status: SHIPPED | OUTPATIENT
Start: 2024-08-05 | End: 2024-08-05

## 2024-08-05 RX ORDER — HYDROCHLOROTHIAZIDE 12.5 MG/1
12.5 CAPSULE ORAL NIGHTLY
Qty: 90 CAPSULE | Refills: 3 | Status: SHIPPED | OUTPATIENT
Start: 2024-08-05 | End: 2024-08-05

## 2024-08-05 RX ORDER — ATORVASTATIN CALCIUM 80 MG/1
80 TABLET, FILM COATED ORAL DAILY
Qty: 90 TABLET | Refills: 3 | Status: SHIPPED | OUTPATIENT
Start: 2024-08-05 | End: 2024-08-05

## 2024-08-05 RX ORDER — IRBESARTAN 150 MG/1
150 TABLET ORAL DAILY
Qty: 90 TABLET | Refills: 3 | Status: SHIPPED | OUTPATIENT
Start: 2024-08-05 | End: 2024-08-05

## 2024-08-05 RX ORDER — LORATADINE 10 MG/1
10 TABLET ORAL DAILY
Qty: 90 TABLET | Refills: 3 | Status: SHIPPED | OUTPATIENT
Start: 2024-08-05 | End: 2025-08-05

## 2024-08-05 RX ORDER — METFORMIN HYDROCHLORIDE 500 MG/1
500 TABLET, EXTENDED RELEASE ORAL
Qty: 90 TABLET | Refills: 3 | Status: SHIPPED | OUTPATIENT
Start: 2024-08-05

## 2024-08-05 RX ORDER — EZETIMIBE 10 MG/1
10 TABLET ORAL DAILY
Qty: 90 TABLET | Refills: 3 | Status: SHIPPED | OUTPATIENT
Start: 2024-08-05 | End: 2024-08-05

## 2024-08-05 RX ORDER — METFORMIN HYDROCHLORIDE 500 MG/1
500 TABLET, EXTENDED RELEASE ORAL
Qty: 90 TABLET | Refills: 3 | Status: SHIPPED | OUTPATIENT
Start: 2024-08-05 | End: 2024-08-05

## 2024-08-05 RX ORDER — LORATADINE 10 MG/1
10 TABLET ORAL DAILY
Qty: 90 TABLET | Refills: 3 | Status: SHIPPED | OUTPATIENT
Start: 2024-08-05 | End: 2024-08-05

## 2024-08-05 RX ORDER — EZETIMIBE 10 MG/1
10 TABLET ORAL DAILY
Qty: 90 TABLET | Refills: 3 | Status: SHIPPED | OUTPATIENT
Start: 2024-08-05 | End: 2025-08-05

## 2024-08-05 RX ORDER — METOPROLOL SUCCINATE 25 MG/1
25 TABLET, EXTENDED RELEASE ORAL NIGHTLY
Qty: 90 TABLET | Refills: 3 | Status: SHIPPED | OUTPATIENT
Start: 2024-08-05

## 2024-08-05 RX ORDER — ATORVASTATIN CALCIUM 80 MG/1
80 TABLET, FILM COATED ORAL DAILY
Qty: 90 TABLET | Refills: 3 | Status: SHIPPED | OUTPATIENT
Start: 2024-08-05

## 2024-08-05 RX ORDER — IRBESARTAN 150 MG/1
150 TABLET ORAL DAILY
Qty: 90 TABLET | Refills: 3 | Status: SHIPPED | OUTPATIENT
Start: 2024-08-05

## 2024-08-14 ENCOUNTER — OFFICE VISIT (OUTPATIENT)
Dept: SPORTS MEDICINE | Facility: CLINIC | Age: 69
End: 2024-08-14
Payer: MEDICARE

## 2024-08-14 VITALS
HEART RATE: 76 BPM | HEIGHT: 59 IN | SYSTOLIC BLOOD PRESSURE: 123 MMHG | DIASTOLIC BLOOD PRESSURE: 77 MMHG | BODY MASS INDEX: 31.43 KG/M2 | WEIGHT: 155.88 LBS

## 2024-08-14 DIAGNOSIS — Z96.651 S/P TOTAL KNEE REPLACEMENT, RIGHT: ICD-10-CM

## 2024-08-14 DIAGNOSIS — Z98.890 S/P RIGHT KNEE ARTHROSCOPY: Primary | ICD-10-CM

## 2024-08-14 DIAGNOSIS — T84.82XA ARTHROFIBROSIS OF TOTAL KNEE ARTHROPLASTY, INITIAL ENCOUNTER: ICD-10-CM

## 2024-08-14 PROCEDURE — 3074F SYST BP LT 130 MM HG: CPT | Mod: HCNC,CPTII,S$GLB, | Performed by: PHYSICIAN ASSISTANT

## 2024-08-14 PROCEDURE — 3044F HG A1C LEVEL LT 7.0%: CPT | Mod: HCNC,CPTII,S$GLB, | Performed by: PHYSICIAN ASSISTANT

## 2024-08-14 PROCEDURE — 1101F PT FALLS ASSESS-DOCD LE1/YR: CPT | Mod: HCNC,CPTII,S$GLB, | Performed by: PHYSICIAN ASSISTANT

## 2024-08-14 PROCEDURE — 1126F AMNT PAIN NOTED NONE PRSNT: CPT | Mod: HCNC,CPTII,S$GLB, | Performed by: PHYSICIAN ASSISTANT

## 2024-08-14 PROCEDURE — 4010F ACE/ARB THERAPY RXD/TAKEN: CPT | Mod: HCNC,CPTII,S$GLB, | Performed by: PHYSICIAN ASSISTANT

## 2024-08-14 PROCEDURE — 99213 OFFICE O/P EST LOW 20 MIN: CPT | Mod: HCNC,S$GLB,, | Performed by: PHYSICIAN ASSISTANT

## 2024-08-14 PROCEDURE — 3078F DIAST BP <80 MM HG: CPT | Mod: HCNC,CPTII,S$GLB, | Performed by: PHYSICIAN ASSISTANT

## 2024-08-14 PROCEDURE — 99999 PR PBB SHADOW E&M-EST. PATIENT-LVL III: CPT | Mod: PBBFAC,HCNC,, | Performed by: PHYSICIAN ASSISTANT

## 2024-08-14 PROCEDURE — 3288F FALL RISK ASSESSMENT DOCD: CPT | Mod: HCNC,CPTII,S$GLB, | Performed by: PHYSICIAN ASSISTANT

## 2024-08-14 PROCEDURE — 1160F RVW MEDS BY RX/DR IN RCRD: CPT | Mod: HCNC,CPTII,S$GLB, | Performed by: PHYSICIAN ASSISTANT

## 2024-08-14 PROCEDURE — 1159F MED LIST DOCD IN RCRD: CPT | Mod: HCNC,CPTII,S$GLB, | Performed by: PHYSICIAN ASSISTANT

## 2024-08-14 PROCEDURE — 1157F ADVNC CARE PLAN IN RCRD: CPT | Mod: HCNC,CPTII,S$GLB, | Performed by: PHYSICIAN ASSISTANT

## 2024-08-14 PROCEDURE — 3008F BODY MASS INDEX DOCD: CPT | Mod: HCNC,CPTII,S$GLB, | Performed by: PHYSICIAN ASSISTANT

## 2024-08-14 NOTE — PROGRESS NOTES
"ESTABLISHED PATIENT    69 y.o. Female who returns for follow-up after surgery. She is 3 1/2 months s/p:    PROCEDURE:   1. Right Knee Arthroscopy, with lysis of adhesions 33794  2. Right Knee Arthroscopy, knee, synovectomy, major 73812  3. Right Knee Arthroscopic anterior interval slide procedure -[id number]     She is doing well without any issues.  She has noticed a significant improvement in her pain, range of motion, and function.  She is extremely happy with her progress and response to surgery.    PHYSICAL EXAMINATION:  /77   Pulse 76   Ht 4' 11" (1.499 m)   Wt 70.7 kg (155 lb 13.8 oz)   LMP  (LMP Unknown)   BMI 31.48 kg/m²   General: Well-developed well-nourished 69 y.o. female in no acute distress   Cardiovascular: Regular rhythm   Lungs: No labored breathing or wheezing appreciated   Neuro: Alert and oriented ×3   Psychiatric: well oriented to person, place and time, demonstrates normal mood and affect   Skin: No rashes, lesions or ulcers, normal temperature, turgor, and texture on involved extremity    ORTHOPEDIC EXAM:  Normal post-operative swelling  Normal post-operative scarring  Strength: Grossly intact  ROM: 0-135  Incisions are well healed    ASSESSMENT:      ICD-10-CM ICD-9-CM   1. S/P right knee arthroscopy  Z98.890 V45.89   2. S/P total knee replacement, right  Z96.651 V43.65   3. Arthrofibrosis of total knee arthroplasty, initial encounter  T84.82XA 996.47               PLAN:       Continue HEP  RTC as needed                      "

## 2024-08-15 ENCOUNTER — TELEPHONE (OUTPATIENT)
Dept: ENDOSCOPY | Facility: HOSPITAL | Age: 69
End: 2024-08-15
Payer: MEDICARE

## 2024-08-15 NOTE — TELEPHONE ENCOUNTER
----- Message from Michelle Pope sent at 8/15/2024 10:21 AM CDT -----  Regarding: FW: Surgery time  Contact: [phone]    ----- Message -----  From: Garrett Woody RN  Sent: 8/14/2024   1:48 PM CDT  To: [facility] Endoscopy Schedulers  Subject: FW: Surgery time                                   ----- Message -----  From: Rio King  Sent: 8/14/2024   1:25 PM CDT  To: [facility] Staff  Subject: Surgery time                                     Calling in regards to speaking with nurse to confirm report time and instructions for upcoming procedure/surgery on 09-03-24. Please call back as soon as possible to confirm details.

## 2024-08-22 ENCOUNTER — LAB VISIT (OUTPATIENT)
Dept: LAB | Facility: HOSPITAL | Age: 69
End: 2024-08-22
Attending: INTERNAL MEDICINE
Payer: MEDICARE

## 2024-08-22 ENCOUNTER — TELEPHONE (OUTPATIENT)
Dept: ENDOSCOPY | Facility: HOSPITAL | Age: 69
End: 2024-08-22
Payer: MEDICARE

## 2024-08-22 DIAGNOSIS — E78.2 MIXED HYPERLIPIDEMIA: ICD-10-CM

## 2024-08-22 LAB
ALBUMIN SERPL BCP-MCNC: 3.8 G/DL (ref 3.5–5.2)
ALP SERPL-CCNC: 85 U/L (ref 55–135)
ALT SERPL W/O P-5'-P-CCNC: 25 U/L (ref 10–44)
ANION GAP SERPL CALC-SCNC: 6 MMOL/L (ref 8–16)
AST SERPL-CCNC: 33 U/L (ref 10–40)
BILIRUB SERPL-MCNC: 0.6 MG/DL (ref 0.1–1)
BUN SERPL-MCNC: 17 MG/DL (ref 8–23)
CALCIUM SERPL-MCNC: 9.3 MG/DL (ref 8.7–10.5)
CHLORIDE SERPL-SCNC: 105 MMOL/L (ref 95–110)
CHOLEST SERPL-MCNC: 152 MG/DL (ref 120–199)
CHOLEST/HDLC SERPL: 3.4 {RATIO} (ref 2–5)
CO2 SERPL-SCNC: 29 MMOL/L (ref 23–29)
CREAT SERPL-MCNC: 0.8 MG/DL (ref 0.5–1.4)
EST. GFR  (NO RACE VARIABLE): >60 ML/MIN/1.73 M^2
GLUCOSE SERPL-MCNC: 88 MG/DL (ref 70–110)
HDLC SERPL-MCNC: 45 MG/DL (ref 40–75)
HDLC SERPL: 29.6 % (ref 20–50)
LDLC SERPL CALC-MCNC: 94.6 MG/DL (ref 63–159)
NONHDLC SERPL-MCNC: 107 MG/DL
POTASSIUM SERPL-SCNC: 4.6 MMOL/L (ref 3.5–5.1)
PROT SERPL-MCNC: 6.6 G/DL (ref 6–8.4)
SODIUM SERPL-SCNC: 140 MMOL/L (ref 136–145)
TRIGL SERPL-MCNC: 62 MG/DL (ref 30–150)

## 2024-08-22 PROCEDURE — 80061 LIPID PANEL: CPT | Mod: HCNC | Performed by: INTERNAL MEDICINE

## 2024-08-22 PROCEDURE — 80053 COMPREHEN METABOLIC PANEL: CPT | Mod: HCNC | Performed by: INTERNAL MEDICINE

## 2024-08-22 PROCEDURE — 36415 COLL VENOUS BLD VENIPUNCTURE: CPT | Mod: HCNC,PO | Performed by: INTERNAL MEDICINE

## 2024-08-22 NOTE — TELEPHONE ENCOUNTER
Received patient's call. Patient wanted to know instructions for her ARM procedure. Instructed patient how to locate instructions on portal. Instructions reviewed and verbalized. Instructions resent on portal and emailed to [email or web address]. Patient verbalized an understanding.

## 2024-08-23 ENCOUNTER — TELEPHONE (OUTPATIENT)
Dept: ENDOSCOPY | Facility: HOSPITAL | Age: 69
End: 2024-08-23
Payer: MEDICARE

## 2024-08-23 ENCOUNTER — HOSPITAL ENCOUNTER (OUTPATIENT)
Dept: RADIOLOGY | Facility: HOSPITAL | Age: 69
Discharge: HOME | End: 2024-08-23
Attending: COLON & RECTAL SURGERY
Payer: MEDICARE

## 2024-08-23 DIAGNOSIS — K59.00 CONSTIPATION, UNSPECIFIED CONSTIPATION TYPE: ICD-10-CM

## 2024-08-23 PROCEDURE — 72195 MRI PELVIS W/O DYE: CPT | Mod: TC,HCNC

## 2024-08-23 PROCEDURE — 72195 MRI PELVIS W/O DYE: CPT | Mod: 26,HCNC,, | Performed by: INTERNAL MEDICINE

## 2024-08-23 NOTE — TELEPHONE ENCOUNTER
Spoke to patient for pre-call to confirm scheduled Anorectal Manometry (ARM) and patient verbalized understanding of the following:       Date of Procedure (s)  verified 9/3/24  Arrival Time 10:30 AM verified.  Location of Procedure(s) [place] 4th Floor verified.  NPO status reinforced. Ok to continue clear liquids up until 2 hours prior to the Endoscopy procedure.     Pt confirmed receipt of prep instructions and Rx prep (if applicable).  Instructions provided to patient via [facility]  Pt confirmed ride home after procedure if procedure requires anesthesia.   Pre-call screening questionnaire reviewed and completed with patient.   Appointment details are tentative, including check-in time.  If the patient begins taking any blood thinning medications, injectable weight loss/diabetes medications (other than insulin), or Adipex (phentermine) patient was instructed to contact the endoscopy scheduling department as soon as possible.  Patient was advised to call the endoscopy scheduling department if any questions or concerns arise.       [facility] Endoscopy Scheduling Department

## 2024-08-23 NOTE — TELEPHONE ENCOUNTER
Contacted Pt for Endoscopy precall to confirm scheduled procedure(s) Anorectal Manometry (ARM) on 9/3/24. Spoke with patient. Patient states she will not have enough time to do enemas at home prior to procedure because she will leave home at 11:00 am to arrive for 12:00 pm. Patient informed she can arrive for 10:30 am and to enemas once she is checked in and brought to the procedure area.

## 2024-08-28 ENCOUNTER — CLINICAL SUPPORT (OUTPATIENT)
Dept: REHABILITATION | Facility: HOSPITAL | Age: 69
End: 2024-08-28
Payer: MEDICARE

## 2024-08-28 DIAGNOSIS — R29.3 POOR POSTURE: ICD-10-CM

## 2024-08-28 DIAGNOSIS — R29.898 DECREASED RANGE OF MOTION OF NECK: Primary | ICD-10-CM

## 2024-08-28 DIAGNOSIS — M62.838 MUSCLE SPASMS OF NECK: ICD-10-CM

## 2024-08-28 PROCEDURE — 97161 PT EVAL LOW COMPLEX 20 MIN: CPT

## 2024-08-28 PROCEDURE — 97110 THERAPEUTIC EXERCISES: CPT

## 2024-08-29 NOTE — PLAN OF CARE
[facility]   Physical Therapy Initial Evaluation      Name: Chelsea Rodriguez  Clinic Number: [id number]    Therapy Diagnosis:   Encounter Diagnoses   Name Primary?    Muscle spasms of neck     Decreased range of motion of neck Yes    Poor posture         Physician: Florina Sanchez MD    Physician Orders: PT Eval and Treat   Medical Diagnosis from Referral: M62.838 (ICD-10-CM) - Muscle spasms of neck   Evaluation Date: 8/28/2024  Authorization Period Expiration: 12/31/2024  Plan of Care Expiration: 11/28/2024  Progress Note Due: 9/28/2024  Visit # / Visits authorized: 1/1   FOTO: 1/3    Precautions: Standard, HTN, B shoulder pain     Time In: 9:15 AM (pt with late arrival)  Time Out: 10:00 AM  Total Billable Time: 45 minutes    Subjective   Date of onset: a month ago    History of current condition - Chelsea reports insidious onset of neck pain that began about a month ago. She states MD gave her a few stretches to do at home and the pain is improving. She reports minimal pain currently rated 2/10. She feels tightness in her neck/upper trap when turning her head Left and Right. She reports no limitations with ADLs. She reports history of bilateral shoulder pain.      Imaging: none on file     Prior Therapy: no  Social History:   Occupation: retired   Prior Level of Function: no limitations with ADLs  Current Level of Function: no limitations with ADLs    Pain:  Current 2/10, worst 3/10, best 0/10   Location: neck  Description: tightness  Aggravating Factors: turning head Left & Right  Easing Factors: rest    Patients goals: decrease the pain/tightness     Medical History:   Past Medical History:   Diagnosis Date    Bilateral knee pain     Carpal tunnel syndrome, bilateral     Fissure in skin of foot     Right small toe    HTN (hypertension)     Hyperlipidemia     Multiple thyroid nodules 11/10/2023    Palpitations     Rotator cuff injury     right    Screening for colorectal cancer 10/20/2017     Screening for malignant neoplasm of colon 2021    Statin-induced myositis 2018     Surgical History:   Chelsea Rodriguez  has a past surgical history that includes supracervical abdominal hysterectomy (); Bilateral salpingoophorectomy (); Colonoscopy (N/A, 10/20/2017); Intraocular prosthesis insertion (Left, 2018); Phacoemulsification of cataract (Left, 2018); Hernia repair; Cyst Removal;  section; Phacoemulsification of cataract (Right, 2018); Intraocular prosthesis insertion (Right, 2018); Breast lumpectomy (Left, ); Breast biopsy (Left, ); Breast biopsy (Left, ); Hysterectomy; Oophorectomy; Cataract extraction w/  intraocular lens implant (Right, 2018); Cataract extraction w/  intraocular lens implant (Left, 2018); Esophagogastroduodenoscopy (N/A, 2021); Colonoscopy (N/A, 2021); Knee Arthroplasty (Right, 2021); Refractive surgery; lysis, adhesions, knee, arthroscopic (Right, 2024); Synovectomy of knee (Right, 2024); and Colonoscopy (N/A, 2024).    Medications:   Chelsea has a current medication list which includes the following prescription(s): acetaminophen, aspirin, atorvastatin, coenzyme q10, docusate sodium, ezetimibe, fluticasone propionate, hydrochlorothiazide, irbesartan, lactobacillus acidophilus, loratadine, metformin, metoprolol succinate, multivitamin, and fish oil-omega-3 fatty acids.    Allergies:   Review of patient's allergies indicates:   Allergen Reactions    Codeine Nausea And Vomiting      Objective      Posture: forward head, increased thoracic kyphosis    Cervical Range of Motion:    Degrees   Flexion 25 deg   Extension 45 deg   Right Rotation 43 deg   Left Rotation 50 deg   Right Side Bending 20 deg   Left Side Bending 25 deg      Upper Extremity Strength:  (R) UE  (L) UE  Goals   Shoulder flexion: 4+/5 Shoulder flexion: 4+/5 5/5   Shoulder Abduction: 4+/5 Shoulder abduction: 4/5, pain 5/5    Shoulder ER 4/5 Shoulder ER 4/5 5/5   Shoulder IR 4+/5 Shoulder IR 4+/5 5/5   Lower Trap NT Lower Trap NT 5/5   Middle Trap NT Middle Trap NT 5/5   Rhomboids NT Rhomboids NT 5/5       Joint Mobility: hypomobile throughout lower cervical and upper thoracic spine bilaterally      Palpation: no TTP noted         Intake Outcome Measure for FOTO Cervical Survey    Therapist reviewed FOTO scores for Chelsea Rodriguez on 8/28/2024.   FOTO report - see Media section or FOTO account episode details.    Intake Score: 79% functional ability          Treatment     Total Treatment time (time-based codes) separate from Evaluation: 10 minutes     Chelsea received the treatments listed below:      therapeutic exercises to develop strength, ROM, and posture for 10 minutes including:    HEP instruction:  Sidelying open book  Supine chin tucks  Seated scap retraction      Patient Education and Home Exercises     Education provided:   - Anatomy and exam objective findings  - PT role and POC  - HEP    Written Home Exercises Provided: Yes. Exercises were reviewed and Chelsea was able to demonstrate them prior to the end of the session.  Chelsea demonstrated good  understanding of the education provided. See EMR under Patient Instructions for exercises provided during therapy sessions.    Assessment   Chelsea is a 69 y.o. female referred to outpatient Physical Therapy with a medical diagnosis of M62.838 (ICD-10-CM) - Muscle spasms of neck. Upon physical assessment, patient demonstrates decreased cervical range of motion, poor posture, and bilateral shoulder weakness. Patient prognosis is Good. Patient will benefit from skilled outpatient Physical Therapy to address the deficits stated above and in the chart below, provide patient education, and to maximize patient's quality of life.     Plan of care discussed with patient: Yes  Patient's spiritual, cultural and educational needs considered and patient is agreeable to the plan of care and goals as  stated below:     Anticipated Barriers for therapy: none    Medical Necessity is demonstrated by the following  History  Co-morbidities and personal factors that may impact the plan of care [] LOW: no personal factors / co-morbidities  [x] MODERATE: 1-2 personal factors / co-morbidities  [] HIGH: 3+ personal factors / co-morbidities    Moderate / High Support Documentation:   Co-morbidities affecting plan of care: HTN, B shoulder pain    Personal Factors:   age     Examination  Body Structures and Functions, activity limitations and participation restrictions that may impact the plan of care [] LOW: addressing 1-2 elements  [x] MODERATE: 3+ elements  [] HIGH: 4+ elements (please support below)    Moderate / High Support Documentation: poor posture, decreased cervical ROM, B shoulder weakness     Clinical Presentation [x] LOW: stable  [] MODERATE: Evolving  [] HIGH: Unstable     Decision Making/ Complexity Score: low       Goals:    SHORT TERM GOALS:  4 weeks    Patient will be independent with HEP to supplement therapy in return to maximal function.    Patient will demonstrate cervical rotation AROM at least 55 degrees bilaterally.     Patient will demonstrate bilateral shoulder strength 4+/5.      LONG TERM GOALS: 8 weeks    Patient will demonstrate cervical rotation AROM at least 60 degrees bilaterally to turn her head while driving with no difficulty.     Patient will demonstrate bilateral shoulder strength 5/5.    Pain Rating at Worst: 1/10 to improve overall Quality of Life.     Patient will meet predicted functional outcome (FOTO) score: 85% functional ability or greater to increase self-perceived functional ability.    Patient will be able to drive 30 minutes without increased neck symptoms.     Patient will be independent with updated HEP at discharge for self-management of symptoms.         Plan   Plan of care Certification: 8/28/2024 to 11/28/2024.    Outpatient Physical Therapy 1-2 times weekly for 6-8  weeks to include the following interventions: Manual Therapy, Neuromuscular Re-ed, Patient Education, Therapeutic Activities, and Therapeutic Exercise.     Farrah Mahoney PT      Physician's Signature: _________________________________________ Date: ________________

## 2024-08-30 ENCOUNTER — OFFICE VISIT (OUTPATIENT)
Dept: CARDIOLOGY | Facility: CLINIC | Age: 69
End: 2024-08-30
Payer: MEDICARE

## 2024-08-30 VITALS
HEIGHT: 58 IN | HEART RATE: 64 BPM | SYSTOLIC BLOOD PRESSURE: 132 MMHG | DIASTOLIC BLOOD PRESSURE: 74 MMHG | BODY MASS INDEX: 33 KG/M2 | WEIGHT: 157.19 LBS

## 2024-08-30 DIAGNOSIS — E78.2 MIXED HYPERLIPIDEMIA: ICD-10-CM

## 2024-08-30 DIAGNOSIS — I83.893 VARICOSE VEINS OF LOWER EXTREMITY WITH EDEMA, BILATERAL: Primary | ICD-10-CM

## 2024-08-30 DIAGNOSIS — I87.2 VENOUS STASIS DERMATITIS: ICD-10-CM

## 2024-08-30 DIAGNOSIS — I10 ESSENTIAL HYPERTENSION: ICD-10-CM

## 2024-08-30 DIAGNOSIS — I89.0 LYMPHEDEMA OF BOTH LOWER EXTREMITIES: ICD-10-CM

## 2024-08-30 DIAGNOSIS — E66.09 CLASS 1 OBESITY DUE TO EXCESS CALORIES WITH SERIOUS COMORBIDITY AND BODY MASS INDEX (BMI) OF 32.0 TO 32.9 IN ADULT: ICD-10-CM

## 2024-08-30 DIAGNOSIS — Z74.09 IMPAIRED FUNCTIONAL MOBILITY, BALANCE, GAIT, AND ENDURANCE: ICD-10-CM

## 2024-08-30 DIAGNOSIS — G47.33 OSA (OBSTRUCTIVE SLEEP APNEA): ICD-10-CM

## 2024-08-30 PROCEDURE — 99999 PR PBB SHADOW E&M-EST. PATIENT-LVL III: CPT | Mod: PBBFAC,HCNC,, | Performed by: INTERNAL MEDICINE

## 2024-08-30 NOTE — PROGRESS NOTES
[facility]    CC: BLE edema      Patient ID: Chelsea Rodriguez is a 69 y.o. female with HTN,. HLD, obesity, HENOK (on CPAP), who presents for a follow up.  Pertinent history/events are as follows:     -Pt kindly referred by Dr. Summers for evaluation of BLE edema.    Initial clinic visit 4/19/2022: Mrs. Rodriguez reports leg swelling for several years.  States leg welling became worse following right knee replacement surgery in 3/2021.  She has no claudication or tissue loss.  Plan:  BLE Edema- Due to lymphedema and possible venous insufficiency.  Check BLE venous reflux study and RAMOS study.  Refer to lymphedema clinic.  Limit sodium intake to 2,000 mg daily.  Limit volume intake to 1.5 liters daily.  Elevate legs when resting.  HTN- Continue current medications.  HLD- Discontinue pravastatin and start atorvastatin 80 mg daily.  Continue ASA 81 mg daily.  Obesity- Encourage diet, exercise and weight loss.  HENOK- Continue CPAP nightly.     -Follow up clinic visit 7/19/2022: Mrs. Rodriguez reports feeling well. She states she missed some doses of cholesterol medicine and may have been eating more fatty foods recently. She has no other complaints.  Plan:   BLE Edema- Due to lymphedema. Lymphedema clinic on the waiting list starting early September. Venous ultrasound negative for reflux however she does have clinical features of venous insufficiency and this may be a false negative test.  Limit sodium intake to 2,000 mg daily.  Limit volume intake to 1.5 liters daily.  Elevate legs when resting. Compression stockings.  HTN- Continue current medications.  HLD-  while taking atorvastatin 80mg. Instructed to modify diet and increase exercise. Continue ASA 81 mg daily. Lipid panel prior to next visit.  Obesity- Encourage diet, exercise and weight loss.  HENOK- Continue CPAP nightly.    -12/20/2022 clinic visit: Mrs. Rodriguez reports significant improvement in leg swelling since completing lymphedema clinic therapy.    on 12/13/2022 vs 133 on 7/13/2022.   Plan:   BLE Edema- Due to lymphedema. Mrs. Rodriguez reports significant improvement in leg swelling since completing lymphedema clinic therapy.  Continue lymphedema clinic techniques at home.  Limit sodium intake to 2,000 mg daily.  Limit volume intake to 1.5 liters daily.  Elevate legs when resting. .  HTN- Continue current medications.  HLD-  on 12/13/2022 vs 133 on 7/13/2022.  Continue ASA 81 mg daily and atorvastatin 80 mg daily.  Consider adding zetia next visit if LDL <70.    Obesity- Encourage diet, exercise and weight loss.  HENOK- Continue CPAP nightly.    -10/6/2023 clinic visit: Mrs. Rodriguez reports no chest pain or SOB. States leg swelling hs been well controlled.   on 10/2/2023.   Plan:   BLE Edema- Due to lymphedema. Edema is stable. Continue lymphedema clinic techniques at home. Limit sodium intake to 2,000 mg daily. Limit volume intake to 1.5 liters daily. Elevate legs when resting. .  HTN- Continue current medications.  HLD-  on 10/2/2023.  Start zetia 10 mg daily.  Continue ASA 81 mg daily and atorvastatin 80 mg daily.      Obesity- Refer to Bariatric Medicine for evaluation.  Encourage diet, exercise and weight loss.  HENOK- Continue CPAP nightly.    -2/15/2024 clinic visit: Mrs. Rodriguez reports doing well with no chest pain, SOB, TIA symptoms or syncope.  States leg swelling remains well controlled.  She has lost 8 pounds since clinic visit on 10/6/2023.  LDL 84 on 2/8/2024.    Plan:  BLE Edema- Due to lymphedema. Edema is stable.  Continue lymphedema clinic techniques at home.  Limit sodium intake to 2,000 mg daily.  Limit volume intake to 1.5 liters daily.  Elevate legs when resting. .  HTN- Continue current medications.  HLD- LDL 84 on 2/8/2024.  Continue zetia 10 mg daily, ASA 81 mg daily, and atorvastatin 80 mg daily.      Obesity- Pt referred to Bariatric Medicine for evaluation.  She has lost 8 pounds since clinic visit on 10/6/2023.   Encourage diet, exercise and weight loss.  HENOK- Continue CPAP nightly.    HPI:  Mrs. Rodriguez reports doing well with no chest pain, SOB, TIA symptoms or syncope.  Leg swelling is well controlled and stable. LDL 95 on 2024 vs 84 on 2024.          Past Medical History:   Diagnosis Date    Bilateral knee pain     Carpal tunnel syndrome, bilateral     Fissure in skin of foot     Right small toe    HTN (hypertension)     Hyperlipidemia     Multiple thyroid nodules 11/10/2023    Palpitations     Rotator cuff injury     right    Screening for colorectal cancer 10/20/2017    Screening for malignant neoplasm of colon 2021    Statin-induced myositis 2018     Past Surgical History:   Procedure Laterality Date    BILATERAL SALPINGOOPHORECTOMY  2000    BREAST BIOPSY Left 2010    malignant    BREAST BIOPSY Left     negative    BREAST LUMPECTOMY Left     CATARACT EXTRACTION W/  INTRAOCULAR LENS IMPLANT Right 2018    Dr. Oneil    CATARACT EXTRACTION W/  INTRAOCULAR LENS IMPLANT Left 2018    Dr. Oneil     SECTION      x2    COLONOSCOPY N/A 10/20/2017    Procedure: COLONOSCOPY;  Surgeon: Mitchell Danielson Jr., MD;  Location: [facility];  Service: Endoscopy;  Laterality: N/A;    COLONOSCOPY N/A 2021    Procedure: COLONOSCOPY/Suprep;  Surgeon: Malcolm Reyes MD;  Location: [facility] ENDO;  Service: Endoscopy;  Laterality: N/A;    COLONOSCOPY N/A 2024    Procedure: COLONOSCOPY;  Surgeon: Frandy Avendano MD;  Location: [facility] ENDOSCOPY;  Service: Endoscopy;  Laterality: N/A;  Ref by Dr RYAN Alva, pt request Miralax, portal - PC. 24 at 4;45 pm pt. confirmed appt. EC  24- portal msg for pc. DBM  24- Per DR. Valenzuela Pt to be r/s due to 1 MD scoping on 24 in AM, LVM to inform Pt of change in scoping MD and arrival time- ERW   precall    CYST REMOVAL      on back    ESOPHAGOGASTRODUODENOSCOPY N/A 2021    Procedure: EGD (ESOPHAGOGASTRODUODENOSCOPY);   Surgeon: Malcolm Reyes MD;  Location: [facility] ENDO;  Service: Endoscopy;  Laterality: N/A;    HERNIA REPAIR      HYSTERECTOMY      at 25 yrs old    INTRAOCULAR PROSTHESES INSERTION Left 11/06/2018    Procedure: INSERTION, IOL PROSTHESIS;  Surgeon: Sergio Oneil MD;  Location: [facility] OR 1ST FLR;  Service: Ophthalmology;  Laterality: Left;    INTRAOCULAR PROSTHESES INSERTION Right 11/20/2018    Procedure: INSERTION, IOL PROSTHESIS;  Surgeon: Sergio Oneil MD;  Location: [facility] OR 2ND FLR;  Service: Ophthalmology;  Laterality: Right;    KNEE ARTHROPLASTY Right 03/31/2021    Procedure: ARTHROPLASTY, KNEE:RIGHT:DEPUY-SIGMA ;  Surgeon: Pedro Summers III, MD;  Location: [facility] OR;  Service: Orthopedics;  Laterality: Right;    LYSIS, ADHESIONS, KNEE, ARTHROSCOPIC Right 4/30/2024    Procedure: ARTHROSCOPIC KNEE LYSIS, ADHESIONS AND ANTERIOR INTERVAL SLIDE;  Surgeon: Ryanne Sumner MD;  Location: [facility] OR;  Service: Orthopedics;  Laterality: Right;  REGIONAL BLOCK    OOPHORECTOMY      @ 45 yrs old    PHACOEMULSIFICATION OF CATARACT Left 11/06/2018    Procedure: PHACOEMULSIFICATION, CATARACT;  Surgeon: Sergio Oneil MD;  Location: [facility] OR 1ST FLR;  Service: Ophthalmology;  Laterality: Left;    PHACOEMULSIFICATION OF CATARACT Right 11/20/2018    Procedure: PHACOEMULSIFICATION, CATARACT;  Surgeon: Sergio Oneil MD;  Location: [facility] OR 2ND FLR;  Service: Ophthalmology;  Laterality: Right;    REFRACTIVE SURGERY      2023    supracervical abdominal hysterectomy  1978    fibroids    SYNOVECTOMY OF KNEE Right 4/30/2024    Procedure: SYNOVECTOMY, KNEE;  Surgeon: Ryanne Sunmer MD;  Location: [facility] OR;  Service: Orthopedics;  Laterality: Right;     Social History     Socioeconomic History    Marital status: Single    Number of children: 2   Occupational History     Comment: retired   Tobacco Use    Smoking status: Never    Smokeless tobacco: Never   Substance and Sexual Activity    Alcohol use: Yes  "    Comment: once per month    Drug use: Never    Sexual activity: Not Currently   Social History Narrative    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 hx of left lumpectomy for "breast cancer"- with 5yrs of tamoxifen use. Sees Dr. Buckley ([facility] for surveillance/MMG)        Dr. Lala former PCP, [facility]          Social Determinants of Health     Financial Resource Strain: Medium Risk (3/25/2024)    Overall Financial Resource Strain (CARDIA)     Difficulty of Paying Living Expenses: Somewhat hard   Food Insecurity: Food Insecurity Present (3/25/2024)    Hunger Vital Sign     Worried About Running Out of Food in the Last Year: Often true     Ran Out of Food in the Last Year: Often true   Transportation Needs: No Transportation Needs (3/25/2024)    PRAPARE - Transportation     Lack of Transportation (Medical): No     Lack of Transportation (Non-Medical): No   Physical Activity: Insufficiently Active (3/25/2024)    Exercise Vital Sign     Days of Exercise per Week: 2 days     Minutes of Exercise per Session: 20 min   Stress: No Stress Concern Present (3/25/2024)    [employer] - Occupational Stress Questionnaire     Feeling of Stress : Not at all   Housing Stability: Low Risk  (3/25/2024)    Housing Stability Vital Sign     Unable to Pay for Housing in the Last Year: No     Number of Places Lived in the Last Year: 1     Unstable Housing in the Last Year: No     Family History   Problem Relation Name Age of Onset    Hypertension Mother      Heart disease Mother      Diabetes Mother      Hyperlipidemia Mother      Pancreatitis Mother      Cataracts Mother      Macular degeneration Mother      Cancer Father      Prostate cancer Father      Hypertension Sister x1     Thyroid disease Sister x1     Diabetes Brother x1     Diabetes Brother x2     Cancer Brother x2     Heart disease Brother x2     Prostate cancer " Brother x2     Thyroid cancer Brother x2     Hyperlipidemia Daughter x1     Hypertension Son x1     Breast cancer Paternal Cousin      Amblyopia Neg Hx      Blindness Neg Hx      Glaucoma Neg Hx      Strabismus Neg Hx      Retinal detachment Neg Hx         Review of patient's allergies indicates:   Allergen Reactions    Codeine Nausea And Vomiting       Medication List with Changes/Refills   Current Medications    ACETAMINOPHEN (TYLENOL) 650 MG TBSR    Take 1 tablet (650 mg total) by mouth every 8 (eight) hours as needed (pain).    ASPIRIN (ECOTRIN) 81 MG EC TABLET    Take 1 tablet (81 mg total) by mouth once daily.    ATORVASTATIN (LIPITOR) 80 MG TABLET    Take 1 tablet (80 mg total) by mouth once daily.    COENZYME Q10 100 MG CAPSULE    Take 100 mg by mouth every evening.    DOCUSATE SODIUM (COLACE) 100 MG CAPSULE    Take 1 capsule (100 mg total) by mouth 2 (two) times daily as needed for Constipation.    EZETIMIBE (ZETIA) 10 MG TABLET    Take 1 tablet (10 mg total) by mouth once daily.    FLUTICASONE PROPIONATE (FLONASE) 50 MCG/ACTUATION NASAL SPRAY    SHAKE LIQUID AND USE 1 SPRAY(50 MCG) IN EACH NOSTRIL EVERY DAY    HYDROCHLOROTHIAZIDE (MICROZIDE) 12.5 MG CAPSULE    Take 1 capsule (12.5 mg total) by mouth every evening.    IRBESARTAN (AVAPRO) 150 MG TABLET    Take 1 tablet (150 mg total) by mouth once daily.    LACTOBACILLUS ACIDOPHILUS (PROBIOTIC ACIDOPHILUS ORAL)    Take by mouth.    LORATADINE (CLARITIN) 10 MG TABLET    Take 1 tablet (10 mg total) by mouth once daily.    METFORMIN (GLUCOPHAGE-XR) 500 MG ER 24HR TABLET    Take 1 tablet (500 mg total) by mouth daily with breakfast.    METOPROLOL SUCCINATE (TOPROL-XL) 25 MG 24 HR TABLET    Take 1 tablet (25 mg total) by mouth every evening.    MULTIVITAMIN (THERAGRAN) PER TABLET    Take 1 tablet by mouth once daily.    OMEGA-3 FATTY ACIDS/FISH OIL (FISH OIL-OMEGA-3 FATTY ACIDS) 300-1,000 MG CAPSULE    Take by mouth once daily.       Review of  "Systems  Constitution: Denies chills, fever, and sweats.  HENT: Denies headaches or blurry vision.  Cardiovascular: Denies chest pain or irregular heart beat.  Respiratory: Denies cough or shortness of breath.  Gastrointestinal: Denies abdominal pain, nausea, or vomiting.  Musculoskeletal: Positive for leg swelling.  Neurological: Denies dizziness or focal weakness.  Psychiatric/Behavioral: Normal mental status.  Hematologic/Lymphatic: Denies bleeding problem or easy bruising/bleeding.  Skin: Denies rash or suspicious lesions    Physical Examination  Ht 4' 10" (1.473 m)   Wt 71.3 kg (157 lb 3 oz)   LMP  (LMP Unknown)   BMI 32.85 kg/m²     Constitutional: No acute distress, conversant  HEENT: Sclera anicteric, Pupils equal, round and reactive to light, extraocular motions intact, Oropharynx clear  Neck: No JVD, no carotid bruits  Cardiovascular: regular rate and rhythm, no murmur, rubs or gallops, normal S1/S2  Pulmonary: Clear to auscultation bilaterally  Abdominal: Abdomen soft, nontender, nondistended, positive bowel sounds  Extremities: BLE's with trace edema, venous stasis dermatitis, and changes consistent with lymphedema   Pulses:  Carotid pulses are 2+ on the right side, and 2+ on the left side.  Radial pulses are 2+ on the right side, and 2+ on the left side.   Femoral pulses are 2+ on the right side, and 2+ on the left side.  Popliteal pulses are 2+ on the right side, and 2+ on the left side.   Dorsalis pedis pulses are 2+ on the right side, and 2+ on the left side.   Posterior tibial pulses are 2+ on the right side, and 2+ on the left side.    Skin: No ecchymosis, erythema, or ulcers  Psych: Alert and oriented x 3, appropriate affect  Neuro: CNII-XII intact, no focal deficits    Labs:  Most Recent Data  CBC:   Lab Results   Component Value Date    WBC 6.09 04/24/2024    HGB 13.2 04/24/2024    HCT 43.4 04/24/2024     04/24/2024    MCV 88 04/24/2024    RDW 15.9 (H) 04/24/2024     BMP:   Lab Results "   Component Value Date     08/22/2024    K 4.6 08/22/2024     08/22/2024    CO2 29 08/22/2024    BUN 17 08/22/2024    CREATININE 0.8 08/22/2024    GLU 88 08/22/2024    CALCIUM 9.3 08/22/2024    PHOS 2.9 10/12/2017     LFTS;   Lab Results   Component Value Date    PROT 6.6 08/22/2024    ALBUMIN 3.8 08/22/2024    BILITOT 0.6 08/22/2024    AST 33 08/22/2024    ALKPHOS 85 08/22/2024    ALT 25 08/22/2024     COAGS:   Lab Results   Component Value Date    INR 0.9 03/23/2021     FLP:   Lab Results   Component Value Date    CHOL 152 08/22/2024    HDL 45 08/22/2024    LDLCALC 94.6 08/22/2024    TRIG 62 08/22/2024    CHOLHDL 29.6 08/22/2024     CARDIAC:   Lab Results   Component Value Date    TROPONINI <0.006 01/22/2023    BNP 15 01/22/2023     Imaging    RAMOS 4/26/22:  Normal rest and exercise RAMOS bilaterally.  Normal PVR waveforms bilaterally.    BLE Venous Ultrasound 4/26/22:  No evidence of lower extremity DVT or venous reflux bilaterally.    Assessment/Plan:  Chelsea Rodriguez is a 69 y.o. female with HTN, HLD, obesity, HENOK (on CPAP), who presents for a follow up.    1. BLE Edema- Due to lymphedema. Edema is improved and stable.  Continue lymphedema clinic techniques at home.  Limit sodium intake to 2,000 mg daily.  Limit volume intake to 1.5 liters daily.  Elevate legs when resting. .    2. HTN- Continue current medications.    3. HLD- LDL 95 on 8/22/2024 vs 84 on 2/8/2024.  Continue zetia 10 mg daily, ASA 81 mg daily, and atorvastatin 80 mg daily.        4. Obesity- Pt referred to Bariatric Medicine for evaluation.  She has lost 8 pounds since clinic visit on 10/6/2023.  Encourage diet, exercise and weight loss.    5. HENOK- Continue CPAP nightly.    Follow up in 6 months with lipids and cmp prior     Total duration of face to face visit time 45 minutes.  Total time spent counseling greater than fifty percent of total visit time.  Counseling included discussion regarding imaging findings, diagnosis, possibilities,  treatment options, risks and benefits.  The patient had many questions regarding the options and long-term effects.    Marcell Rico MD, PhD  Interventional Cardiology

## 2024-08-30 NOTE — PATIENT INSTRUCTIONS
Assessment/Plan:  Chelsea Rodriguez is a 69 y.o. female with HTN, HLD, obesity, HENOK (on CPAP), who presents for a follow up.    1. BLE Edema- Due to lymphedema. Edema is improved and stable.  Continue lymphedema clinic techniques at home.  Limit sodium intake to 2,000 mg daily.  Limit volume intake to 1.5 liters daily.  Elevate legs when resting. .    2. HTN- Continue current medications.    3. HLD- LDL 95 on 8/22/2024 vs 84 on 2/8/2024.  Continue zetia 10 mg daily, ASA 81 mg daily, and atorvastatin 80 mg daily.        4. Obesity- Pt referred to Bariatric Medicine for evaluation.  She has lost 8 pounds since clinic visit on 10/6/2023.  Encourage diet, exercise and weight loss.    5. HENOK- Continue CPAP nightly.    Follow up in 6 months with lipids and cmp prior

## 2024-09-03 ENCOUNTER — HOSPITAL ENCOUNTER (OUTPATIENT)
Facility: HOSPITAL | Age: 69
Discharge: HOME | End: 2024-09-03
Attending: COLON & RECTAL SURGERY | Admitting: COLON & RECTAL SURGERY
Payer: MEDICARE

## 2024-09-03 VITALS
HEIGHT: 59 IN | RESPIRATION RATE: 16 BRPM | HEART RATE: 75 BPM | OXYGEN SATURATION: 99 % | BODY MASS INDEX: 31.25 KG/M2 | DIASTOLIC BLOOD PRESSURE: 70 MMHG | WEIGHT: 155 LBS | TEMPERATURE: 98 F | SYSTOLIC BLOOD PRESSURE: 146 MMHG

## 2024-09-03 DIAGNOSIS — K59.00 CONSTIPATION, UNSPECIFIED CONSTIPATION TYPE: Primary | ICD-10-CM

## 2024-09-03 DIAGNOSIS — K59.00 CONSTIPATION: ICD-10-CM

## 2024-09-03 PROCEDURE — 91122 PR ANAL PRESSURE RECORD: CPT | Mod: 26,HCNC,, | Performed by: COLON & RECTAL SURGERY

## 2024-09-03 PROCEDURE — 91122 HC ANORECTAL MANOMETRY: CPT | Mod: TC,HCNC | Performed by: COLON & RECTAL SURGERY

## 2024-09-03 RX ORDER — SODIUM CHLORIDE 9 MG/ML
INJECTION, SOLUTION INTRAVENOUS CONTINUOUS
Status: DISCONTINUED | OUTPATIENT
Start: 2024-09-03 | End: 2024-09-03 | Stop reason: HOSPADM

## 2024-09-04 ENCOUNTER — PATIENT MESSAGE (OUTPATIENT)
Dept: SURGERY | Facility: CLINIC | Age: 69
End: 2024-09-04
Payer: MEDICARE

## 2024-09-04 PROBLEM — K59.00 CONSTIPATION: Status: ACTIVE | Noted: 2024-09-04

## 2024-09-04 NOTE — PROCEDURES
See full report under Media tab    Procedure:  Anorectal manometry  Indication: Constipation  Referring physician: Frandy Avendano MD  Interpreting Physician:  Frandy Avendano MD  Date: 9/3/2023      Mean Resting Sphincter Pressure: 71.9 mmHg (normal: 40-70 mm Hg)  Maximum Sphincter Pressure: 242.3 mmHg (normal: 100-180 mm Hg)  Length of HPZ: 4.2 cm (normal: Male 2.5-3.5 cm, Female 2-3 cm)     RAIR: Present  First sensation: 90 cc (normal: 10-30 cc)  Maximum rectal compliance: 1.51 cc/mmHg (normal: 3-15 cc/mm Hg)  Balloon expulsion: Passed 60 cc water-filled expulsion balloon in 10 sec    Summary:  Elevated resting sphincter pressure  Elevated maximum squeeze pressure  Intact RAIR  Elevated volume at 1st sensation  Decreased rectal compliance  Normal Balloon Expulsion     Recommendations:  -Defecography shows partial-thickness internal intussusception and moderate rectocele - unable to defecate adequately during the exam.  -Sitz marker study on Day 5 shows significant fecal burden with 8 markers in the rectum, 1 in the sigmoid colon, 2 at the splenic flexure, and 2 at the hepatic flexure.   -Manometry shows elevated sphincter pressures but intact RAIR and normal balloon expulsion.  -Will refer to Pelvic Floor Multi-disciplinary clinic.    Frandy Avendano MD, FACS, FASCRS  Department of Colon & Rectal Surgery

## 2024-09-05 ENCOUNTER — TELEPHONE (OUTPATIENT)
Dept: SURGERY | Facility: CLINIC | Age: 69
End: 2024-09-05
Payer: MEDICARE

## 2024-09-05 NOTE — TELEPHONE ENCOUNTER
Placed call to pt regarding appt scheduling with Dr. Funk.    Following [facility] screening & speaking with Dr. Funk, pt determined to not be a candidate for pelvic floor clinic as she denies any UroGyn symptoms.     Pt was offered several appointments next week. She agreed to 9/12 @ 1:20 PM & was scheduled for appt during call. Location details reviewed & directions provided.     No additional needs identified at this time.

## 2024-09-05 NOTE — TELEPHONE ENCOUNTER
Placed call to screen for [facility] candidacy.  Referring provider: Dr. Frandy Avendano ([facility])  Referring Dept/Clinic: Pelvic Floor Clinic ([facility])  Dx: Pelvic floor dysfunction, constipation/colonic inertia, partial thickness internal rectal prolapse    Sitz Marker testing completed on 6/28  MRI defo 8/23  Manometry 9/3    Chelsea Rodriguez complains of constipation, irregular bowel habits, incomplete evacuation.    Referring Provider:  Dr. Frandy Avendano    Pelvic Health Screening:     Do you...   Usually experience frequent urination? No  Usually leak urine with a feeling of urgency or strong sensation of needing to go to the bathroom? No  Usually leak urine with coughing, sneezing, laughing or exercise or exertion? No  Usually experience small amounts or drops of urine leakage? No    Have a bulge or something falling out that you can see or feel in your vaginal area or rectum? No  Have to push on the vagina or around the rectum to have complete a bowel movement? Yes, both; sometimes at the same time  Usually push up on the bulge in the vaginal area with your fingers to start or complete urination?  No    Usually lose stool beyond your control or have trouble with stool passing? Yes, trouble passing stool  Experience a strong sense of urgency and must rush to the bathroom to have a bowel movement? Only if taking a laxative     Patient identifies inability to have a bowel movement without the use of laxatives or enemas as most bothersome.    Background:  Relevant History:    Past Surgical History:   Procedure Laterality Date    ANORECTAL MANOMETRY N/A 9/3/2024    Procedure: MANOMETRY, ANORECTAL;  Surgeon: Frandy Avendano MD;  Location: [address];  Service: Endoscopy;  Laterality: N/A;  Prep instructions sent via portal-dw  Prep-Enemas-dw  8/22 traveler, [facility] complete-st    BILATERAL SALPINGOOPHORECTOMY  2000    BREAST BIOPSY Left 2010    malignant    BREAST BIOPSY Left 2008    negative    BREAST LUMPECTOMY Left 2010     CATARACT EXTRACTION W/  INTRAOCULAR LENS IMPLANT Right 2018    Dr. Oneil    CATARACT EXTRACTION W/  INTRAOCULAR LENS IMPLANT Left 2018    Dr. Oneil     SECTION      x2    COLONOSCOPY N/A 10/20/2017    Procedure: COLONOSCOPY;  Surgeon: Mitchell Danielson Jr., MD;  Location: [facility] ENDO;  Service: Endoscopy;  Laterality: N/A;    COLONOSCOPY N/A 2021    Procedure: COLONOSCOPY/Suprep;  Surgeon: Malcolm Reyes MD;  Location: [facility] ENDO;  Service: Endoscopy;  Laterality: N/A;    COLONOSCOPY N/A 2024    Procedure: COLONOSCOPY;  Surgeon: Frandy Avendano MD;  Location: [facility] ENDOSCOPY;  Service: Endoscopy;  Laterality: N/A;  Ref by Dr RYAN Alva, pt request Miralax, portal - PC. 24 at 4;45 pm pt. confirmed appt. EC  24- portal msg for pc. DBM  24- Per DR. Valenzuela Pt to be r/s due to 1 MD scoping on 24 in AM, LVM to inform Pt of change in scoping MD and arrival time- ERW   precall    CYST REMOVAL      on back    ESOPHAGOGASTRODUODENOSCOPY N/A 2021    Procedure: EGD (ESOPHAGOGASTRODUODENOSCOPY);  Surgeon: Malcolm Reyes MD;  Location: [facility];  Service: Endoscopy;  Laterality: N/A;    HERNIA REPAIR      HYSTERECTOMY      at 25 yrs old    INTRAOCULAR PROSTHESES INSERTION Left 2018    Procedure: INSERTION, IOL PROSTHESIS;  Surgeon: Sergio Oneil MD;  Location: [facility] OR 1ST FLR;  Service: Ophthalmology;  Laterality: Left;    INTRAOCULAR PROSTHESES INSERTION Right 2018    Procedure: INSERTION, IOL PROSTHESIS;  Surgeon: Sergio Oneil MD;  Location: [facility] OR 2ND FLR;  Service: Ophthalmology;  Laterality: Right;    KNEE ARTHROPLASTY Right 2021    Procedure: ARTHROPLASTY, KNEE:RIGHT:DEPUY-SIGMA ;  Surgeon: Pedro Summers III, MD;  Location: [facility] OR;  Service: Orthopedics;  Laterality: Right;    LYSIS, ADHESIONS, KNEE, ARTHROSCOPIC Right 2024    Procedure: ARTHROSCOPIC KNEE LYSIS, ADHESIONS AND ANTERIOR INTERVAL SLIDE;   Surgeon: Ryanne Sumner MD;  Location: [facility] OR;  Service: Orthopedics;  Laterality: Right;  REGIONAL BLOCK    OOPHORECTOMY      @ 45 yrs old    PHACOEMULSIFICATION OF CATARACT Left 2018    Procedure: PHACOEMULSIFICATION, CATARACT;  Surgeon: Sergio Oneil MD;  Location: [facility] OR 1ST FLR;  Service: Ophthalmology;  Laterality: Left;    PHACOEMULSIFICATION OF CATARACT Right 2018    Procedure: PHACOEMULSIFICATION, CATARACT;  Surgeon: Sergio Oneil MD;  Location: [facility] OR 2ND FLR;  Service: Ophthalmology;  Laterality: Right;    REFRACTIVE SURGERY          supracervical abdominal hysterectomy  1978    fibroids    SYNOVECTOMY OF KNEE Right 2024    Procedure: SYNOVECTOMY, KNEE;  Surgeon: Ryanne Sumner MD;  Location: [facility] OR;  Service: Orthopedics;  Laterality: Right;       Past Medical History:   Diagnosis Date    Bilateral knee pain     Carpal tunnel syndrome, bilateral     Fissure in skin of foot     Right small toe    HTN (hypertension)     Hyperlipidemia     Multiple thyroid nodules 11/10/2023    Palpitations     Rotator cuff injury     right    Screening for colorectal cancer 10/20/2017    Screening for malignant neoplasm of colon 2021    Statin-induced myositis 2018      OB History    Para Term  AB Living   4 2 2 0 0 2   SAB IAB Ectopic Multiple Live Births   0 0 0 0 1        Previous Treatment:  Pessary - No  PFPT- Yes, had done before.   Diet changes, minimal water intake.   Uses squatty potty to facilitate passing of BM    Pt was notified the appt with Dr. Avendano on Monday will be cancelled. RN to speak with Dr. Funk given the above provided information to determine MDC placement. Pt informed RN will contact her again to set up CRS or MDC appt.

## 2024-09-11 ENCOUNTER — TELEPHONE (OUTPATIENT)
Dept: SURGERY | Facility: CLINIC | Age: 69
End: 2024-09-11
Payer: MEDICARE
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
## 2024-09-12 ENCOUNTER — OFFICE VISIT (OUTPATIENT)
Dept: SURGERY | Facility: CLINIC | Age: 69
End: 2024-09-12
Attending: COLON & RECTAL SURGERY
Payer: MEDICARE

## 2024-09-12 VITALS
HEART RATE: 76 BPM | HEIGHT: 58 IN | BODY MASS INDEX: 32.76 KG/M2 | WEIGHT: 156.06 LBS | DIASTOLIC BLOOD PRESSURE: 69 MMHG | SYSTOLIC BLOOD PRESSURE: 119 MMHG

## 2024-09-12 DIAGNOSIS — K59.00 CONSTIPATION, UNSPECIFIED CONSTIPATION TYPE: Primary | ICD-10-CM

## 2024-09-12 PROCEDURE — 1159F MED LIST DOCD IN RCRD: CPT | Mod: HCNC,CPTII,S$GLB, | Performed by: COLON & RECTAL SURGERY

## 2024-09-12 PROCEDURE — 3078F DIAST BP <80 MM HG: CPT | Mod: HCNC,CPTII,S$GLB, | Performed by: COLON & RECTAL SURGERY

## 2024-09-12 PROCEDURE — 1157F ADVNC CARE PLAN IN RCRD: CPT | Mod: HCNC,CPTII,S$GLB, | Performed by: COLON & RECTAL SURGERY

## 2024-09-12 PROCEDURE — 1126F AMNT PAIN NOTED NONE PRSNT: CPT | Mod: HCNC,CPTII,S$GLB, | Performed by: COLON & RECTAL SURGERY

## 2024-09-12 PROCEDURE — 3288F FALL RISK ASSESSMENT DOCD: CPT | Mod: HCNC,CPTII,S$GLB, | Performed by: COLON & RECTAL SURGERY

## 2024-09-12 PROCEDURE — 99999 PR PBB SHADOW E&M-EST. PATIENT-LVL III: CPT | Mod: PBBFAC,HCNC,, | Performed by: COLON & RECTAL SURGERY

## 2024-09-12 PROCEDURE — 3008F BODY MASS INDEX DOCD: CPT | Mod: HCNC,CPTII,S$GLB, | Performed by: COLON & RECTAL SURGERY

## 2024-09-12 PROCEDURE — 3074F SYST BP LT 130 MM HG: CPT | Mod: HCNC,CPTII,S$GLB, | Performed by: COLON & RECTAL SURGERY

## 2024-09-12 PROCEDURE — 4010F ACE/ARB THERAPY RXD/TAKEN: CPT | Mod: HCNC,CPTII,S$GLB, | Performed by: COLON & RECTAL SURGERY

## 2024-09-12 PROCEDURE — 1160F RVW MEDS BY RX/DR IN RCRD: CPT | Mod: HCNC,CPTII,S$GLB, | Performed by: COLON & RECTAL SURGERY

## 2024-09-12 PROCEDURE — 1101F PT FALLS ASSESS-DOCD LE1/YR: CPT | Mod: HCNC,CPTII,S$GLB, | Performed by: COLON & RECTAL SURGERY

## 2024-09-12 PROCEDURE — 3044F HG A1C LEVEL LT 7.0%: CPT | Mod: HCNC,CPTII,S$GLB, | Performed by: COLON & RECTAL SURGERY

## 2024-09-12 PROCEDURE — 99205 OFFICE O/P NEW HI 60 MIN: CPT | Mod: HCNC,S$GLB,, | Performed by: COLON & RECTAL SURGERY

## 2024-09-12 NOTE — PROGRESS NOTES
CRS Office Visit History and Physical    Referring MD:   Frandy Avendano Md  [address] [address]    SUBJECTIVE:     Chief Complaint: Constipation    History of Present Illness:  The patient is known patient to this practice.   Course is as follows:  Patient is a 69 y.o. female presents with constipation.  Once a week uses herbal tea +/- herbal pills and colon cleanse pills to have non bloody bowel movements once a week. Initially strains to get out harder stool then the remainder is soft but feels like incomplete evacuation.  Tried fiber, felt it backed her up. Does not use miralax. Previously used dulcolax. Has used soap sups enemas in the past but is concerned that in the past year she but no longer has the sensation that there is something to evacuate after instilling the enema.  Splinting with her fingers posteriorly below the coccyx, occasionally also anteriorly but denies splinting vaginally.  + prior Pelvic Floor PT (did strengthening and Squatty Potty)  Denies pain or prolapsed tissue.  1 episode bloody BM 1-2 months ago.  2 c-sec, no vaginal births.  Drinks < 64oz/day of water.    Has seen Dr Avendano (no note available).  Also established with Dr Seymour. Was prescribed Linzess with him but never took it.    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 Oneil    CATARACT EXTRACTION W/  INTRAOCULAR LENS IMPLANT Left 2018    Dr. Oneil     SECTION      x2    COLONOSCOPY N/A 10/20/2017    Procedure: COLONOSCOPY;  Surgeon: Mitchell Danielson Jr., MD;  Location: [facility];  Service: Endoscopy;  Laterality: N/A;    COLONOSCOPY N/A 2021    Procedure: COLONOSCOPY/Suprep;  Surgeon: Malcolm Reyes MD;  Location: [facility];  Service: Endoscopy;  Laterality: N/A;    COLONOSCOPY N/A 2024    Procedure: COLONOSCOPY;  Surgeon: Frandy Avendano MD;  Location: [facility] ENDOSCOPY;  Service: Endoscopy;  Laterality: N/A;  Ref by Dr RYAN Alva, pt request Miralax, portal - PC. 24 at 4;45 pm pt. confirmed appt. EC  24- portal msg for pc. DBM  24- Per DR. Valenzuela Pt to be r/s due to 1 MD scoping on 24 in AM, LVM to inform Pt of change in scoping MD and arrival time- ERW   precall    CYST REMOVAL      on back    ESOPHAGOGASTRODUODENOSCOPY N/A 2021    Procedure: EGD (ESOPHAGOGASTRODUODENOSCOPY);  Surgeon: Malcolm Reyes MD;  Location: [facility];  Service: Endoscopy;  Laterality: N/A;    HERNIA REPAIR      HYSTERECTOMY      at 25 yrs old    INTRAOCULAR PROSTHESES INSERTION Left 2018    Procedure: INSERTION, IOL PROSTHESIS;  Surgeon: Sergio Oneil MD;  Location: [facility] OR 1ST FLR;  Service: Ophthalmology;  Laterality: Left;    INTRAOCULAR PROSTHESES INSERTION Right 2018    Procedure: INSERTION, IOL PROSTHESIS;  Surgeon: Sergio Oneil MD;  Location: [facility] OR 2ND FLR;  Service:  Ophthalmology;  Laterality: Right;    KNEE ARTHROPLASTY Right 03/31/2021    Procedure: ARTHROPLASTY, KNEE:RIGHT:DEPUY-SIGMA ;  Surgeon: Pedro Summers III, MD;  Location: [facility] OR;  Service: Orthopedics;  Laterality: Right;    LYSIS, ADHESIONS, KNEE, ARTHROSCOPIC Right 4/30/2024    Procedure: ARTHROSCOPIC KNEE LYSIS, ADHESIONS AND ANTERIOR INTERVAL SLIDE;  Surgeon: Ryanne Sumner MD;  Location: [facility] OR;  Service: Orthopedics;  Laterality: Right;  REGIONAL BLOCK    OOPHORECTOMY      @ 45 yrs old    PHACOEMULSIFICATION OF CATARACT Left 11/06/2018    Procedure: PHACOEMULSIFICATION, CATARACT;  Surgeon: Sergio Oneil MD;  Location: [facility] OR 1ST FLR;  Service: Ophthalmology;  Laterality: Left;    PHACOEMULSIFICATION OF CATARACT Right 11/20/2018    Procedure: PHACOEMULSIFICATION, CATARACT;  Surgeon: Sergio Oneil MD;  Location: [facility] OR 2ND FLR;  Service: Ophthalmology;  Laterality: Right;    REFRACTIVE SURGERY      2023    supracervical abdominal hysterectomy  1978    fibroids    SYNOVECTOMY OF KNEE Right 4/30/2024    Procedure: SYNOVECTOMY, KNEE;  Surgeon: Ryanne Sumner MD;  Location: [facility] OR;  Service: Orthopedics;  Laterality: Right;     Family History   Problem Relation Name Age of Onset    Hypertension Mother      Heart disease Mother      Diabetes Mother      Hyperlipidemia Mother      Pancreatitis Mother      Cataracts Mother      Macular degeneration Mother      Cancer Father      Prostate cancer Father      Hypertension Sister x1     Thyroid disease Sister x1     Diabetes Brother x1     Diabetes Brother x2     Cancer Brother x2     Heart disease Brother x2     Prostate cancer Brother x2     Thyroid cancer Brother x2     Hyperlipidemia Daughter x1     Hypertension Son x1     Breast cancer Paternal Cousin      Amblyopia Neg Hx      Blindness Neg Hx      Glaucoma Neg Hx      Strabismus Neg Hx      Retinal detachment Neg Hx       Social History     Tobacco Use    Smoking status: Never     "Smokeless tobacco: Never   Substance Use Topics    Alcohol use: Yes     Comment: once per month    Drug use: Never        Review of Systems:  ROS    OBJECTIVE:     Vital Signs (Most Recent)  /69   Pulse 76   Ht 4' 10" (1.473 m)   Wt 70.8 kg (156 lb 1.4 oz)   LMP  (LMP Unknown)   BMI 32.62 kg/m²     Physical Exam:  General: Black or  female in no distress   Neuro: Alert and oriented x 4.  Moves all extremities.     HEENT: No icterus.  Trachea midline  Respiratory: Respirations are even and unlabored  Cardiac: Regular rate  Abdomen: soft non tender  Extremities: Warm dry and intact  Skin: No rashes    Studies:  Manometry (9/3/2024)  Mean Resting Sphincter Pressure: 71.9 mmHg (normal: 40-70 mm Hg)  Maximum Sphincter Pressure: 242.3 mmHg (normal: 100-180 mm Hg)  Length of HPZ: 4.2 cm (normal: Male 2.5-3.5 cm, Female 2-3 cm)   RAIR: Present  First sensation: 90 cc (normal: 10-30 cc)  Maximum rectal compliance: 1.51 cc/mmHg (normal: 3-15 cc/mm Hg)  Balloon expulsion: Passed 60 cc water-filled expulsion balloon in 10 sec  Summary:  Elevated resting sphincter pressure  Elevated maximum squeeze pressure  Intact RAIR  Elevated volume at 1st sensation  Decreased rectal compliance  Normal Balloon Expulsion     Sitz Marker Study (Day 5, 6/28/2024)  8 markers are in the rectum 1 in the sigmoid colon.  There are 2 at the splenic flexure and there are 2 at the hepatic flexure.  There is constipation.  No obstruction, ileus, or perforation seen.     MRI Defecography (8/23/2024)  1.    Anatomic findings: Hysterectomy..  2.    Functional evaluation: widened levator hiatus and normal anorectal junction at rest with moderate/Grade 2 (8-10 cm) widening and moderate/Grade 2 (4-6 cm) descent during defecation/maximal strain.  3.    Anterior compartment findings: minimal/Grade 1 (1-3 cm) cystocele, with urethral hypermobility.  4.    Middle compartment findings: No vaginal descent.  5.    Posterior compartment " findings: Normal anorectal angles. Partial-thickness intra-rectal intussusception.  Moderate (2-4 cm) rectocele. No peritoneocele/enterocele/sigmoidocele.    ** I have reviewed these images **    ASSESSMENT/PLAN:     68yo F w/ constipation    - Reviewed the results of each of her studies using diagrams: Sitz marker with slow transit constipation (she was off all bowel regimen during this study); MRI Defo with 3cm rectocele, normal relaxation, firm stool balls in vault, Grade II intussusception; Manometry with normal tone and RAIR, able to empty balloon  - Discussed addressing hydration and regular bowel regimen.  Increasing water intake to at least 64 oz/day.  MiraLax daily to 3x/day.    Nathaly Funk MD  Staff Surgeon  Colon & Rectal Surgery

## 2024-09-17 NOTE — PROGRESS NOTES
[facility]   Physical Therapy Treatment Note      Name: Chelsea Rodriguez  Clinic Number: [id number]    Therapy Diagnosis:   Encounter Diagnoses   Name Primary?    Decreased range of motion of neck Yes    Poor posture      Physician: Florina Sanchez MD    Visit Date: 9/18/2024    Physician Orders: PT Eval and Treat   Medical Diagnosis from Referral: M62.838 (ICD-10-CM) - Muscle spasms of neck   Evaluation Date: 8/28/2024  Authorization Period Expiration: 12/31/2024  Plan of Care Expiration: 11/28/2024  Progress Note Due: 9/28/2024  Visit # / Visits authorized: 1/1; 1/20  FOTO: 1/3     Precautions: Standard, HTN, B shoulder pain      PTA Visit #: 1/5     Time In: 1100 AM   Time Out: 1253 AM   Total Billable Time: 53 minutes    Subjective     Pt reports: continued discomfort throughout cervical spine and both shoulders. Patient with concerns on if rotator cuff tear could be causing her back.  She was compliant with home exercise program.  Response to previous treatment: initial eval   Functional change: none     Pain: 4/10  Location: cervical spine    Objective      Objective Measures updated at progress report unless specified.     Treatment     Chelsea received the treatments listed below:      therapeutic exercises to develop strength, ROM, and flexibility for 53 minutes including:    UBE 3 minutes forward/ 3 minutes backwards   Red thera band rows 2 x 10   Red thera band bilateral shoulder extension 2 x 10   Red thera band bilateral shoulder external rotation 2 x 10   Wall slides 2 x 10   Standing shoulder flexion 1 lb 2 x 10   Standing shoulder abduction 1 lb 2 x 10   Sidelying open book x 15 3 second hold each side   Supine T's yellow thera band 2 x 10   Supine serratus punch outs 2 lbs 2 x 10     NPT   Supine chin tucks  Seated scap retraction    Patient Education and Home Exercises       Education provided:   - Anatomy and exam objective findings  - PT role and POC  - HEP     Written Home  Exercises Provided: Yes. Exercises were reviewed and Chelsea was able to demonstrate them prior to the end of the session.  Chelsea demonstrated good  understanding of the education provided. See EMR under Patient Instructions for exercises provided during therapy sessions.    Assessment     Patient with more range of motion throughout right upper extremity throughout bilateral shoulder external rotation and standing shoulder abduction, however, after a couple of reps throughout standing shoulder abduction patient was able to increase range of motion. Patient required some verbal cues throughout thera band therapeutic exercise for proper technique throughout tasks, however, patient able to complete properly after verbal cues. Patient slightly limited throughout open books, however, patient without reproduced or increased pain throughout or after stretch. Patient with decreased discomfort upon leaving Physical Therapy treatment today.     Chelsea Is progressing well towards her goals.   Pt prognosis is Good.     Pt will continue to benefit from skilled outpatient physical therapy to address the deficits listed in the problem list box on initial evaluation, provide pt/family education and to maximize pt's level of independence in the home and community environment.     Pt's spiritual, cultural and educational needs considered and pt agreeable to plan of care and goals.     Anticipated barriers to physical therapy: none     Goals:     SHORT TERM GOALS:  4 weeks     Patient will be independent with HEP to supplement therapy in return to maximal function.     Patient will demonstrate cervical rotation AROM at least 55 degrees bilaterally.      Patient will demonstrate bilateral shoulder strength 4+/5.        LONG TERM GOALS: 8 weeks     Patient will demonstrate cervical rotation AROM at least 60 degrees bilaterally to turn her head while driving with no difficulty.      Patient will demonstrate bilateral shoulder strength 5/5.      Pain Rating at Worst: 1/10 to improve overall Quality of Life.      Patient will meet predicted functional outcome (FOTO) score: 85% functional ability or greater to increase self-perceived functional ability.     Patient will be able to drive 30 minutes without increased neck symptoms.      Patient will be independent with updated HEP at discharge for self-management of symptoms.        Plan     Continue with Physical Therapist Plan of Care.     PT/PTA met face to face to discuss pt's treatment plan and progress towards established goals. Pt will be seen by a physical therapist minimally every 6th visit or every 30 days.    Jani Dutta PTA

## 2024-09-18 ENCOUNTER — CLINICAL SUPPORT (OUTPATIENT)
Dept: REHABILITATION | Facility: HOSPITAL | Age: 69
End: 2024-09-18
Payer: MEDICARE

## 2024-09-18 DIAGNOSIS — R29.898 DECREASED RANGE OF MOTION OF NECK: Primary | ICD-10-CM

## 2024-09-18 DIAGNOSIS — R29.3 POOR POSTURE: ICD-10-CM

## 2024-09-18 PROCEDURE — 97110 THERAPEUTIC EXERCISES: CPT | Mod: CQ

## 2024-09-25 ENCOUNTER — CLINICAL SUPPORT (OUTPATIENT)
Dept: REHABILITATION | Facility: HOSPITAL | Age: 69
End: 2024-09-25
Attending: FAMILY MEDICINE
Payer: MEDICARE

## 2024-09-25 DIAGNOSIS — R29.898 DECREASED RANGE OF MOTION OF NECK: Primary | ICD-10-CM

## 2024-09-25 DIAGNOSIS — R29.3 POOR POSTURE: ICD-10-CM

## 2024-09-25 PROCEDURE — 97110 THERAPEUTIC EXERCISES: CPT | Mod: CQ

## 2024-09-25 NOTE — PROGRESS NOTES
[facility]   Physical Therapy Treatment Note      Name: Chelsea Rodriguez  Clinic Number: [id number]    Therapy Diagnosis:   Encounter Diagnoses   Name Primary?    Decreased range of motion of neck Yes    Poor posture      Physician: Florina Sanchez MD    Visit Date: 9/25/2024    Physician Orders: PT Eval and Treat   Medical Diagnosis from Referral: M62.838 (ICD-10-CM) - Muscle spasms of neck   Evaluation Date: 8/28/2024  Authorization Period Expiration: 12/31/2024  Plan of Care Expiration: 11/28/2024  Progress Note Due: 9/28/2024  Visit # / Visits authorized: 1/1; 2/20  FOTO: 1/3     Precautions: Standard, HTN, B shoulder pain      PTA Visit #: 1/5     Time In: 1100 AM   Time Out: 1253 PM   Total Billable Time: 53 minutes    Subjective     Pt reports: feeling good after last treatment session. Patient also reports no pain after last treatment session.     She was compliant with home exercise program.  Response to previous treatment: initial eval   Functional change: none     Pain: 3/10  Location: cervical spine    Objective      Objective Measures updated at progress report unless specified.     Treatment     Chelsea received the treatments listed below:      therapeutic exercises to develop strength, ROM, and flexibility for 53 minutes including:    UBE 3 minutes forward/ 3 minutes backwards   SNAGs x 10 3 second hold each side   Green thera band rows 2 x 10   Red thera band bilateral shoulder extension 2 x 10   Yellow thera band bilateral shoulder external rotation 2 x 10   Wall slides 2 x 10   Right shoulder external rotation isometrics 10 x 10 second hold   Standing shoulder flexion 1 lb 2 x 10   Standing shoulder abduction 1 lb 2 x 10   Sidelying open book x 20 3 second hold each side   Supine T's yellow thera band 2 x 10   Supine serratus punch outs 2 lbs 2 x 10   Supine chin tucks 2 x 10 3 second hold each     Patient Education and Home Exercises       Education provided:   - Anatomy and  exam objective findings  - PT role and POC  - HEP     Written Home Exercises Provided: Yes. Exercises were reviewed and Chelsea was able to demonstrate them prior to the end of the session.  Chelsea demonstrated good  understanding of the education provided. See EMR under Patient Instructions for exercises provided during therapy sessions.    Assessment     Physical Therapist Assistant added right shoulder external rotation isometrics due to patient unable to external rotation right upper extremity throughout bilateral shoulder external rotation with light resistance, therefore, patient performed isometrics into wall to help increase muscle activation. Patient was able to complete external rotation isometrics without increased or reproduced pain. Patient still required verbal cues with standing shoulder flexion due to patient with elbow flexion, however, after VC patient was able to complete task properly. Patient also able to tolerate SNAG cervical tugs without increased pain, however, patient with more limitation throughout right cervical rotation.     Chelsea Is progressing well towards her goals.   Pt prognosis is Good.     Pt will continue to benefit from skilled outpatient physical therapy to address the deficits listed in the problem list box on initial evaluation, provide pt/family education and to maximize pt's level of independence in the home and community environment.     Pt's spiritual, cultural and educational needs considered and pt agreeable to plan of care and goals.     Anticipated barriers to physical therapy: none     Goals:     SHORT TERM GOALS:  4 weeks     Patient will be independent with HEP to supplement therapy in return to maximal function.     Patient will demonstrate cervical rotation AROM at least 55 degrees bilaterally.      Patient will demonstrate bilateral shoulder strength 4+/5.        LONG TERM GOALS: 8 weeks     Patient will demonstrate cervical rotation AROM at least 60 degrees  bilaterally to turn her head while driving with no difficulty.      Patient will demonstrate bilateral shoulder strength 5/5.     Pain Rating at Worst: 1/10 to improve overall Quality of Life.      Patient will meet predicted functional outcome (FOTO) score: 85% functional ability or greater to increase self-perceived functional ability.     Patient will be able to drive 30 minutes without increased neck symptoms.      Patient will be independent with updated HEP at discharge for self-management of symptoms.        Plan     Continue with Physical Therapist Plan of Care.     PT/PTA met face to face to discuss pt's treatment plan and progress towards established goals. Pt will be seen by a physical therapist minimally every 6th visit or every 30 days.    Jani Dutta PTA

## 2024-09-27 ENCOUNTER — TELEPHONE (OUTPATIENT)
Dept: RHEUMATOLOGY | Facility: CLINIC | Age: 69
End: 2024-09-27
Payer: MEDICARE

## 2024-09-27 NOTE — TELEPHONE ENCOUNTER
Called pt to schedule appointment with Dr. Kraus . Appt scheduled for September 30th at 9:30am .

## 2024-09-30 ENCOUNTER — LAB VISIT (OUTPATIENT)
Dept: LAB | Facility: HOSPITAL | Age: 69
End: 2024-09-30
Payer: MEDICARE

## 2024-09-30 ENCOUNTER — OFFICE VISIT (OUTPATIENT)
Dept: RHEUMATOLOGY | Facility: CLINIC | Age: 69
End: 2024-09-30
Payer: MEDICARE

## 2024-09-30 VITALS
SYSTOLIC BLOOD PRESSURE: 150 MMHG | WEIGHT: 157.88 LBS | HEART RATE: 73 BPM | BODY MASS INDEX: 33.14 KG/M2 | DIASTOLIC BLOOD PRESSURE: 81 MMHG | HEIGHT: 58 IN

## 2024-09-30 DIAGNOSIS — M79.89 SWELLING OF HAND: ICD-10-CM

## 2024-09-30 DIAGNOSIS — R13.10 DYSPHAGIA, UNSPECIFIED TYPE: ICD-10-CM

## 2024-09-30 DIAGNOSIS — R23.4 SKIN THICKENING: ICD-10-CM

## 2024-09-30 DIAGNOSIS — R20.2 NUMBNESS AND TINGLING: ICD-10-CM

## 2024-09-30 DIAGNOSIS — L98.491 ULCER OF FINGER, LIMITED TO BREAKDOWN OF SKIN: ICD-10-CM

## 2024-09-30 DIAGNOSIS — R20.0 NUMBNESS AND TINGLING: ICD-10-CM

## 2024-09-30 DIAGNOSIS — L98.491 ULCER OF FINGER, LIMITED TO BREAKDOWN OF SKIN: Primary | ICD-10-CM

## 2024-09-30 LAB — CK SERPL-CCNC: 242 U/L (ref 20–180)

## 2024-09-30 PROCEDURE — 83516 IMMUNOASSAY NONANTIBODY: CPT | Mod: 59,HCNC | Performed by: STUDENT IN AN ORGANIZED HEALTH CARE EDUCATION/TRAINING PROGRAM

## 2024-09-30 PROCEDURE — 3044F HG A1C LEVEL LT 7.0%: CPT | Mod: HCNC,CPTII,GC,S$GLB | Performed by: STUDENT IN AN ORGANIZED HEALTH CARE EDUCATION/TRAINING PROGRAM

## 2024-09-30 PROCEDURE — 3079F DIAST BP 80-89 MM HG: CPT | Mod: HCNC,CPTII,GC,S$GLB | Performed by: STUDENT IN AN ORGANIZED HEALTH CARE EDUCATION/TRAINING PROGRAM

## 2024-09-30 PROCEDURE — 83520 IMMUNOASSAY QUANT NOS NONAB: CPT | Mod: HCNC | Performed by: STUDENT IN AN ORGANIZED HEALTH CARE EDUCATION/TRAINING PROGRAM

## 2024-09-30 PROCEDURE — 1157F ADVNC CARE PLAN IN RCRD: CPT | Mod: HCNC,CPTII,GC,S$GLB | Performed by: STUDENT IN AN ORGANIZED HEALTH CARE EDUCATION/TRAINING PROGRAM

## 2024-09-30 PROCEDURE — 82550 ASSAY OF CK (CPK): CPT | Mod: HCNC | Performed by: STUDENT IN AN ORGANIZED HEALTH CARE EDUCATION/TRAINING PROGRAM

## 2024-09-30 PROCEDURE — 99999 PR PBB SHADOW E&M-EST. PATIENT-LVL IV: CPT | Mod: PBBFAC,HCNC,GC, | Performed by: STUDENT IN AN ORGANIZED HEALTH CARE EDUCATION/TRAINING PROGRAM

## 2024-09-30 PROCEDURE — 1101F PT FALLS ASSESS-DOCD LE1/YR: CPT | Mod: HCNC,CPTII,GC,S$GLB | Performed by: STUDENT IN AN ORGANIZED HEALTH CARE EDUCATION/TRAINING PROGRAM

## 2024-09-30 PROCEDURE — 3288F FALL RISK ASSESSMENT DOCD: CPT | Mod: HCNC,CPTII,GC,S$GLB | Performed by: STUDENT IN AN ORGANIZED HEALTH CARE EDUCATION/TRAINING PROGRAM

## 2024-09-30 PROCEDURE — 86235 NUCLEAR ANTIGEN ANTIBODY: CPT | Mod: 59,HCNC | Performed by: STUDENT IN AN ORGANIZED HEALTH CARE EDUCATION/TRAINING PROGRAM

## 2024-09-30 PROCEDURE — 4010F ACE/ARB THERAPY RXD/TAKEN: CPT | Mod: HCNC,CPTII,GC,S$GLB | Performed by: STUDENT IN AN ORGANIZED HEALTH CARE EDUCATION/TRAINING PROGRAM

## 2024-09-30 PROCEDURE — 82085 ASSAY OF ALDOLASE: CPT | Mod: HCNC | Performed by: STUDENT IN AN ORGANIZED HEALTH CARE EDUCATION/TRAINING PROGRAM

## 2024-09-30 PROCEDURE — 86235 NUCLEAR ANTIGEN ANTIBODY: CPT | Mod: HCNC | Performed by: STUDENT IN AN ORGANIZED HEALTH CARE EDUCATION/TRAINING PROGRAM

## 2024-09-30 PROCEDURE — 86038 ANTINUCLEAR ANTIBODIES: CPT | Mod: HCNC | Performed by: STUDENT IN AN ORGANIZED HEALTH CARE EDUCATION/TRAINING PROGRAM

## 2024-09-30 PROCEDURE — 3008F BODY MASS INDEX DOCD: CPT | Mod: HCNC,CPTII,GC,S$GLB | Performed by: STUDENT IN AN ORGANIZED HEALTH CARE EDUCATION/TRAINING PROGRAM

## 2024-09-30 PROCEDURE — 99214 OFFICE O/P EST MOD 30 MIN: CPT | Mod: HCNC,GC,S$GLB, | Performed by: STUDENT IN AN ORGANIZED HEALTH CARE EDUCATION/TRAINING PROGRAM

## 2024-09-30 PROCEDURE — 1125F AMNT PAIN NOTED PAIN PRSNT: CPT | Mod: HCNC,CPTII,GC,S$GLB | Performed by: STUDENT IN AN ORGANIZED HEALTH CARE EDUCATION/TRAINING PROGRAM

## 2024-09-30 PROCEDURE — 83516 IMMUNOASSAY NONANTIBODY: CPT | Mod: HCNC | Performed by: STUDENT IN AN ORGANIZED HEALTH CARE EDUCATION/TRAINING PROGRAM

## 2024-09-30 PROCEDURE — 3077F SYST BP >= 140 MM HG: CPT | Mod: HCNC,CPTII,GC,S$GLB | Performed by: STUDENT IN AN ORGANIZED HEALTH CARE EDUCATION/TRAINING PROGRAM

## 2024-09-30 PROCEDURE — 1159F MED LIST DOCD IN RCRD: CPT | Mod: HCNC,CPTII,GC,S$GLB | Performed by: STUDENT IN AN ORGANIZED HEALTH CARE EDUCATION/TRAINING PROGRAM

## 2024-09-30 NOTE — PROGRESS NOTES
9/28/2024     5:47 PM   Rapid3 Question Responses and Scores   MDHAQ Score 0.5   Psychologic Score 1.1   Pain Score 2   When you awakened in the morning OVER THE LAST WEEK, did you feel stiff? Yes   If Yes, please indicate the number of hours until you are as limber as you will be for the day 1   Fatigue Score 6   Global Health Score 4   RAPID3 Score 2.56     Answers submitted by the patient for this visit:  Rheumatology Questionnaire (Submitted on 9/28/2024)  fever: No  eye redness: Yes  mouth sores: No  headaches: No  shortness of breath: No  chest pain: No  trouble swallowing: No  diarrhea: No  constipation: Yes  unexpected weight change: No  genital sore: No  dysuria: No  During the last 3 days, have you had a skin rash?: No  Bruises or bleeds easily: No  cough: No
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
## 2024-10-01 LAB
ALDOLASE SERPL-CCNC: 4.6 U/L (ref 1.2–7.6)
ANA SER QL IF: NORMAL
ENA SCL70 IGG SER IA-ACNC: 1.8 U

## 2024-10-02 ENCOUNTER — CLINICAL SUPPORT (OUTPATIENT)
Dept: REHABILITATION | Facility: HOSPITAL | Age: 69
End: 2024-10-02
Attending: FAMILY MEDICINE
Payer: MEDICARE

## 2024-10-02 DIAGNOSIS — R29.3 POOR POSTURE: ICD-10-CM

## 2024-10-02 DIAGNOSIS — R29.898 DECREASED RANGE OF MOTION OF NECK: Primary | ICD-10-CM

## 2024-10-02 LAB
ANTI SM/RNP ANTIBODY: 0.07 RATIO (ref 0–0.99)
ANTI-SM/RNP INTERPRETATION: NEGATIVE

## 2024-10-02 PROCEDURE — 97110 THERAPEUTIC EXERCISES: CPT | Mod: CQ

## 2024-10-02 NOTE — PROGRESS NOTES
[facility]   Physical Therapy Treatment Note      Name: Chelsea Rodriguez  [facility] Number: [id number]    Therapy Diagnosis:   Encounter Diagnoses   Name Primary?    Decreased range of motion of neck Yes    Poor posture      Physician: Florina Sanchez MD    Visit Date: 10/2/2024    Physician Orders: PT Eval and Treat   Medical Diagnosis from Referral: M62.838 (ICD-10-CM) - Muscle spasms of neck   Evaluation Date: 8/28/2024  Authorization Period Expiration: 12/31/2024  Plan of Care Expiration: 11/28/2024  Progress Note Due: 9/28/2024  Visit # / Visits authorized: 1/1; 3/20  FOTO: 1/3     Precautions: Standard, HTN, B shoulder pain      PTA Visit #: 3/5     Time In: 1117 AM   Time Out: 1200 PM   Total Billable Time: 43 minutes    Subjective     Pt reports: no increased pain or irritation to     She was compliant with home exercise program.  Response to previous treatment: initial eval   Functional change: none     Pain: 2/10  Location: cervical spine    Objective      10/2/2024  Cervical Range of Motion:     Degrees   Flexion 30 deg   Extension 50 deg   Right Rotation 55 deg   Left Rotation 62 deg   Right Side Bending 27 deg   Left Side Bending 25 deg      Upper Extremity Strength:  (R) UE   (L) UE   Goals   Shoulder flexion: 5/5 Shoulder flexion: 4+/5 5/5   Shoulder Abduction: 5/5 Shoulder abduction: 4+/5, pain 5/5   Shoulder ER 4+/5 Shoulder ER 4/5 5/5   Shoulder IR 5/5 Shoulder IR 5/5 5/5   Lower Trap NT Lower Trap NT 5/5   Middle Trap NT Middle Trap NT 5/5   Rhomboids NT Rhomboids NT 5/5        Treatment     Chelsea received the treatments listed below:      therapeutic exercises to develop strength, ROM, and flexibility for 43 minutes including:    UBE 3 minutes forward/ 3 minutes backwards   SNAGs x 10 3 second hold each side  NP   Blue thera band rows 2 x 10   Red thera band bilateral shoulder extension 2 x 10   Yellow thera band bilateral shoulder external rotation 2 x 10   Wall slides 2 x 10    Right shoulder external rotation isometrics 10 x 10 second hold   Standing shoulder flexion 1 lb 2 x 10 NP   Standing shoulder abduction 1 lb 2 x 10 NP   Sidelying open book x 20 3 second hold each side   Supine T's yellow thera band 2 x 10   Supine serratus punch outs 2 lbs 2 x 10   Supine chin tucks 2 x 10 3 second hold each     Patient Education and Home Exercises       Education provided:   - Anatomy and exam objective findings  - PT role and POC  - HEP     Written Home Exercises Provided: Yes. Exercises were reviewed and Chelsea was able to demonstrate them prior to the end of the session.  Chelsea demonstrated good  understanding of the education provided. See EMR under Patient Instructions for exercises provided during therapy sessions.    Assessment     Patient able to tolerate increased resistance with thera band rows. Patient still with most difficulty throughout bilateral shoulder external rotation with yellow thera band, however, patient still able to complete task with limited range throughout right upper extremity. Patient unable to complete full therapeutic exercise due to patient running late and Physical Therapist performing reassessment on patient today. Will continue to monitor and progress patient as tolerated.     Chelsea Is progressing well towards her goals.   Pt prognosis is Good.     Pt will continue to benefit from skilled outpatient physical therapy to address the deficits listed in the problem list box on initial evaluation, provide pt/family education and to maximize pt's level of independence in the home and community environment.     Pt's spiritual, cultural and educational needs considered and pt agreeable to plan of care and goals.     Anticipated barriers to physical therapy: none     Goals:     SHORT TERM GOALS:  4 weeks     Patient will be independent with HEP to supplement therapy in return to maximal function.     Patient will demonstrate cervical rotation AROM at least 55 degrees  bilaterally.      Patient will demonstrate bilateral shoulder strength 4+/5.        LONG TERM GOALS: 8 weeks     Patient will demonstrate cervical rotation AROM at least 60 degrees bilaterally to turn her head while driving with no difficulty.      Patient will demonstrate bilateral shoulder strength 5/5.     Pain Rating at Worst: 1/10 to improve overall Quality of Life.      Patient will meet predicted functional outcome (FOTO) score: 85% functional ability or greater to increase self-perceived functional ability.     Patient will be able to drive 30 minutes without increased neck symptoms.      Patient will be independent with updated HEP at discharge for self-management of symptoms.        Plan     Continue with Physical Therapist Plan of Care.     PT/PTA met face to face to discuss pt's treatment plan and progress towards established goals. Pt will be seen by a physical therapist minimally every 6th visit or every 30 days.    Jani Dutta PTA

## 2024-10-04 ENCOUNTER — OFFICE VISIT (OUTPATIENT)
Dept: DERMATOLOGY | Facility: CLINIC | Age: 69
End: 2024-10-04
Payer: MEDICARE

## 2024-10-04 DIAGNOSIS — R23.4 SKIN THICKENING: ICD-10-CM

## 2024-10-04 LAB — ENA SCL70 AB SER-ACNC: 2.4 U/ML

## 2024-10-04 PROCEDURE — 99999 PR PBB SHADOW E&M-EST. PATIENT-LVL IV: CPT | Mod: PBBFAC,HCNC,, | Performed by: PHYSICIAN ASSISTANT

## 2024-10-04 NOTE — PROGRESS NOTES
Subjective:      Patient ID:  Chelsea Rodriguez is a 69 y.o. female who presents for   Chief Complaint   Patient presents with    Growth     On hands & feet     She present for evaluation of her fingers and toes, referred by Rheumatology (in the middle of a scleroderma workup).    Patient with new area of concern:   Location: hands and toes   Duration: 2 yrs hands/ 4yr toes  No s/s  Previous treatments: hanna oils, sal acid, glycolic acid       She reports noticing that she has calloused fingertips which led her to do her own research and led her to believe she potentially has scleroderma.     She reports decreased sensation in her fingertips. She noticed this after cutting something at home and realizing that she was actually cutting her finger without realizing it.    She also is unable to separate her toes on the left side.       Review of Systems   Musculoskeletal:         Unable to extend toes all the way on the left side   Skin:  Negative for itching and rash.        Tightness of skin around jaw and forehead (more on the forehead)    Neurological:         Decreased sensation in fingertips       Objective:   Physical Exam   Constitutional: She appears well-developed and well-nourished. No distress.   Neurological: She is alert and oriented to person, place, and time. She is not disoriented.   Psychiatric: She has a normal mood and affect.   Skin:   Areas Examined (abnormalities noted in diagram):   Head / Face Inspection Performed  Chest / Axilla Inspection Performed  Back Inspection Performed  RUE Inspected  LUE Inspection Performed  RLE Inspected  LLE Inspection Performed  Nails and Digits Inspection Performed                                                   Diagram Legend     Erythematous scaling macule/papule c/w actinic keratosis       Vascular papule c/w angioma      Pigmented verrucoid papule/plaque c/w seborrheic keratosis      Yellow umbilicated papule c/w sebaceous hyperplasia      Irregularly shaped  tan macule c/w lentigo     1-2 mm smooth white papules consistent with Milia      Movable subcutaneous cyst with punctum c/w epidermal inclusion cyst      Subcutaneous movable cyst c/w pilar cyst      Firm pink to brown papule c/w dermatofibroma      Pedunculated fleshy papule(s) c/w skin tag(s)      Evenly pigmented macule c/w junctional nevus     Mildly variegated pigmented, slightly irregular-bordered macule c/w mildly atypical nevus      Flesh colored to evenly pigmented papule c/w intradermal nevus       Pink pearly papule/plaque c/w basal cell carcinoma      Erythematous hyperkeratotic cursted plaque c/w SCC      Surgical scar with no sign of skin cancer recurrence      Open and closed comedones      Inflammatory papules and pustules      Verrucoid papule consistent consistent with wart     Erythematous eczematous patches and plaques     Dystrophic onycholytic nail with subungual debris c/w onychomycosis     Umbilicated papule    Erythematous-base heme-crusted tan verrucoid plaque consistent with inflamed seborrheic keratosis     Erythematous Silvery Scaling Plaque c/w Psoriasis     See annotation      Assessment / Plan:        Skin thickening  -     Ambulatory referral/consult to Dermatology    She was evaluated by Dr. Joel and I today, and based on her history and physical exam, no signs of scleroderma are present.     Discussed continued use of keratolytics (sal acid, etc.) for thickened skin, but that should continue to improve with time.            Follow up if symptoms worsen or fail to improve.

## 2024-10-04 NOTE — PATIENT INSTRUCTIONS
Skin thickening  -     Ambulatory referral/consult to Dermatology    She was evaluated by Dr. Joel and I today, and based on her history and physical exam, no signs of scleroderma are present.     Discussed continued use of keratolytics (sal acid, etc.) for thickened skin, but that should continue to improve with time.            Follow up if symptoms worsen or fail to improve.

## 2024-10-07 ENCOUNTER — PATIENT MESSAGE (OUTPATIENT)
Dept: RHEUMATOLOGY | Facility: CLINIC | Age: 69
End: 2024-10-07
Payer: MEDICARE

## 2024-10-09 ENCOUNTER — CLINICAL SUPPORT (OUTPATIENT)
Dept: REHABILITATION | Facility: HOSPITAL | Age: 69
End: 2024-10-09
Payer: MEDICARE

## 2024-10-09 DIAGNOSIS — R29.898 DECREASED RANGE OF MOTION OF NECK: Primary | ICD-10-CM

## 2024-10-09 DIAGNOSIS — R29.3 POOR POSTURE: ICD-10-CM

## 2024-10-09 PROCEDURE — 97110 THERAPEUTIC EXERCISES: CPT | Mod: CQ

## 2024-10-09 NOTE — PROGRESS NOTES
[facility]   Physical Therapy Treatment Note      Name: Chelsea Rodriguez  Clinic Number: [id number]    Therapy Diagnosis:   Encounter Diagnoses   Name Primary?    Decreased range of motion of neck Yes    Poor posture      Physician: Florina Sanchez MD    Visit Date: 10/9/2024    Physician Orders: PT Eval and Treat   Medical Diagnosis from Referral: M62.838 (ICD-10-CM) - Muscle spasms of neck   Evaluation Date: 8/28/2024  Authorization Period Expiration: 12/31/2024  Plan of Care Expiration: 11/28/2024  Progress Note Due: 11/2/2024  Visit # / Visits authorized: 1/1; 4/20  FOTO: 1/3     Precautions: Standard, HTN, B shoulder pain      PTA Visit #: 4 /5     Time In: 1000 AM   Time Out: 1200 PM   Total Billable Time: 60 minutes    Subjective     Pt reports: some discomfort throughout left shoulder upon entering Physical Therapy treatment today.     She was compliant with home exercise program.  Response to previous treatment: initial eval   Functional change: none     Pain: 2/10  Location: cervical spine    Objective      10/2/2024  Cervical Range of Motion:     Degrees   Flexion 30 deg   Extension 50 deg   Right Rotation 55 deg   Left Rotation 62 deg   Right Side Bending 27 deg   Left Side Bending 25 deg      Upper Extremity Strength:  (R) UE   (L) UE   Goals   Shoulder flexion: 5/5 Shoulder flexion: 4+/5 5/5   Shoulder Abduction: 5/5 Shoulder abduction: 4+/5, pain 5/5   Shoulder ER 4+/5 Shoulder ER 4/5 5/5   Shoulder IR 5/5 Shoulder IR 5/5 5/5   Lower Trap NT Lower Trap NT 5/5   Middle Trap NT Middle Trap NT 5/5   Rhomboids NT Rhomboids NT 5/5      Treatment     Chelsea received the treatments listed below:      therapeutic exercises to develop strength, ROM, and flexibility for 60 minutes including:    UBE 3 minutes forward/ 3 minutes backwards   SNAGs x 10 3 second hold each side   Inspire 25 lbs rows 2 x 10   Red thera band bilateral shoulder extension 2 x 10   Yellow thera band bilateral shoulder  external rotation 2 x 10   Wall slides 2 x 10   Right shoulder external rotation isometrics 10 x 10 second hold   Standing shoulder flexion 1 lb 2 x 10    Standing shoulder abduction 1 lb 2 x 10    Sidelying open book x 20 3 second hold each side   Supine T's yellow thera band 2 x 10   Supine serratus punch outs 2 lbs 2 x 10   Supine chin tucks 2 x 10 3 second hold each     Patient Education and Home Exercises       Education provided:   - Anatomy and exam objective findings  - PT role and POC  - HEP     Written Home Exercises Provided: Yes. Exercises were reviewed and Chelsea was able to demonstrate them prior to the end of the session.  Chelsea demonstrated good  understanding of the education provided. See EMR under Patient Instructions for exercises provided during therapy sessions.    Assessment     Patient with decreased tightness and discomfort throughout left shoulder/cervical spine after self SNAG stretching and scapular strengthening. Patient still required verbal and tactile cues throughout therapeutic exercise for proper technique throughout tasks. Patient still unable to perform bilateral shoulder external rotation with right upper extremity due to weakness throughout arm.     Chelsea Is progressing well towards her goals.   Pt prognosis is Good.     Pt will continue to benefit from skilled outpatient physical therapy to address the deficits listed in the problem list box on initial evaluation, provide pt/family education and to maximize pt's level of independence in the home and community environment.     Pt's spiritual, cultural and educational needs considered and pt agreeable to plan of care and goals.     Anticipated barriers to physical therapy: none     Goals:     SHORT TERM GOALS:  4 weeks     Patient will be independent with HEP to supplement therapy in return to maximal function.     Patient will demonstrate cervical rotation AROM at least 55 degrees bilaterally.      Patient will demonstrate  bilateral shoulder strength 4+/5.        LONG TERM GOALS: 8 weeks     Patient will demonstrate cervical rotation AROM at least 60 degrees bilaterally to turn her head while driving with no difficulty.      Patient will demonstrate bilateral shoulder strength 5/5.     Pain Rating at Worst: 1/10 to improve overall Quality of Life.      Patient will meet predicted functional outcome (FOTO) score: 85% functional ability or greater to increase self-perceived functional ability.     Patient will be able to drive 30 minutes without increased neck symptoms.      Patient will be independent with updated HEP at discharge for self-management of symptoms.        Plan     Continue with Physical Therapist Plan of Care.     PT/PTA met face to face to discuss pt's treatment plan and progress towards established goals. Pt will be seen by a physical therapist minimally every 6th visit or every 30 days.    Jani Dutta PTA

## 2024-10-16 ENCOUNTER — CLINICAL SUPPORT (OUTPATIENT)
Dept: REHABILITATION | Facility: HOSPITAL | Age: 69
End: 2024-10-16
Attending: FAMILY MEDICINE
Payer: MEDICARE

## 2024-10-16 DIAGNOSIS — R29.3 POOR POSTURE: ICD-10-CM

## 2024-10-16 DIAGNOSIS — R29.898 DECREASED RANGE OF MOTION OF NECK: Primary | ICD-10-CM

## 2024-10-16 PROCEDURE — 97110 THERAPEUTIC EXERCISES: CPT

## 2024-10-16 NOTE — PROGRESS NOTES
[facility]   Physical Therapy Treatment Note / Discharge Summary      Name: Chelsea Rodriguez  Clinic Number: [id number]    Therapy Diagnosis:   Encounter Diagnoses   Name Primary?    Decreased range of motion of neck Yes    Poor posture      Physician: Florina Sanchez MD    Visit Date: 10/16/2024    Physician Orders: PT Eval and Treat   Medical Diagnosis from Referral: M62.838 (ICD-10-CM) - Muscle spasms of neck   Evaluation Date: 8/28/2024  Authorization Period Expiration: 12/31/2024  Plan of Care Expiration: 11/28/2024  Progress Note Due: 11/2/2024  Visit # / Visits authorized: 6/20  FOTO: 2/3     Precautions: Standard, HTN, B shoulder pain      PTA Visit #: 0/5     Time In: 1100 AM   Time Out: 1155 AM   Total Billable Time: 55 minutes    Subjective     Pt reports: PT has been helpful and she has no neck pain today. She states her Left shoulder continues to bother her. She feels ready for discharge today.     She was compliant with home exercise program.  Response to previous treatment:   Functional change:     Pain: 0/10  Location: cervical spine    Objective      Cervical Range of Motion:     Degrees   Flexion 30 deg   Extension 50 deg   Right Rotation 55 deg   Left Rotation 62 deg   Right Side Bending 27 deg   Left Side Bending 25 deg      Upper Extremity Strength:  (R) UE   (L) UE   Goals   Shoulder flexion: 5/5 Shoulder flexion: 4+/5, pain  5/5   Shoulder Abduction: 5/5 Shoulder abduction: 4+/5, pain 5/5   Shoulder ER 4+/5 Shoulder ER 4/5 5/5   Shoulder IR 5/5 Shoulder IR 5/5 5/5   Lower Trap NT Lower Trap NT 5/5   Middle Trap NT Middle Trap NT 5/5   Rhomboids NT Rhomboids NT 5/5      FOTO: 92% functional ability     Treatment     Chelsea received the treatments listed below:      therapeutic exercises to develop strength, ROM, and flexibility for 55 minutes including:    UBE 3 minutes forward/ 3 minutes backwards   SNAGs x 10 3 second hold each side   Inspire 25 lbs rows 2 x 10   Red thera  band bilateral shoulder extension 2 x 10   Yellow thera band bilateral shoulder external rotation 2 x 10   Wall slides 2 x 10   Right shoulder external rotation isometrics 10 x 10 second hold   Standing shoulder flexion 1 lb 2 x 10    Standing shoulder abduction 1 lb 2 x 10    Sidelying open book x 20 3 second hold each side   Supine T's yellow thera band 2 x 10   Supine serratus punch outs 2 lbs 2 x 10   Supine chin tucks 2 x 10 3 second hold each       Patient Education and Home Exercises       Education provided:   - Anatomy and exam objective findings  - PT role and POC  - HEP     Written Home Exercises Provided: Yes. Exercises were reviewed and Chelsea was able to demonstrate them prior to the end of the session.  Chelsea demonstrated good  understanding of the education provided. See EMR under Patient Instructions for exercises provided during therapy sessions.    Assessment     Patient has responded well to physical therapy treatment with improvement in cervical ROM and neck symptoms. She arrived to session without c/o neck pain today. She is still limited by Left shoulder pain. Advised her to follow up with MD regarding shoulder pain. She reports she feels ready to discharge from PT for her neck today. Patient with FOTO score: 92% functional ability. She is being discharged from PT at this time to continue with home exercise program.        Goals:     SHORT TERM GOALS:  4 weeks     Patient will be independent with HEP to supplement therapy in return to maximal function. Met    Patient will demonstrate cervical rotation AROM at least 55 degrees bilaterally.  Met     Patient will demonstrate bilateral shoulder strength 4+/5.  Not met, limited by Left shoulder pain       LONG TERM GOALS: 8 weeks     Patient will demonstrate cervical rotation AROM at least 60 degrees bilaterally to turn her head while driving with no difficulty.   Not met   Patient will demonstrate bilateral shoulder strength 5/5.  Not met   Pain  Rating at Worst: 1/10 to improve overall Quality of Life.                          Met   Patient will meet predicted functional outcome (FOTO) score: 85% functional ability or greater to increase self-perceived functional ability.                         Met   Patient will be able to drive 30 minutes without increased neck symptoms.                          Met   Patient will be independent with updated HEP at discharge for self-management of symptoms.                          Met     Plan     Discharge from physical therapy.      Farrah Mahoney, PT

## 2024-10-28 ENCOUNTER — PATIENT MESSAGE (OUTPATIENT)
Dept: ADMINISTRATIVE | Facility: OTHER | Age: 69
End: 2024-10-28
Payer: MEDICARE

## 2024-10-28 ENCOUNTER — HOSPITAL ENCOUNTER (OUTPATIENT)
Dept: RADIOLOGY | Facility: HOSPITAL | Age: 69
Discharge: HOME | End: 2024-10-28
Attending: STUDENT IN AN ORGANIZED HEALTH CARE EDUCATION/TRAINING PROGRAM
Payer: MEDICARE

## 2024-10-28 ENCOUNTER — CLINICAL SUPPORT (OUTPATIENT)
Dept: SPEECH THERAPY | Facility: HOSPITAL | Age: 69
End: 2024-10-28
Payer: MEDICARE

## 2024-10-28 DIAGNOSIS — R20.0 NUMBNESS AND TINGLING: ICD-10-CM

## 2024-10-28 DIAGNOSIS — R13.10 DYSPHAGIA, UNSPECIFIED TYPE: ICD-10-CM

## 2024-10-28 DIAGNOSIS — L98.491 ULCER OF FINGER, LIMITED TO BREAKDOWN OF SKIN: ICD-10-CM

## 2024-10-28 DIAGNOSIS — R09.A2 GLOBUS SENSATION: ICD-10-CM

## 2024-10-28 DIAGNOSIS — R13.12 DYSPHAGIA, OROPHARYNGEAL: Primary | ICD-10-CM

## 2024-10-28 DIAGNOSIS — R20.2 NUMBNESS AND TINGLING: ICD-10-CM

## 2024-10-28 PROCEDURE — 74230 X-RAY XM SWLNG FUNCJ C+: CPT | Mod: 26,HCNC,, | Performed by: RADIOLOGY

## 2024-10-28 PROCEDURE — A9698 NON-RAD CONTRAST MATERIALNOC: HCPCS | Mod: HCNC | Performed by: STUDENT IN AN ORGANIZED HEALTH CARE EDUCATION/TRAINING PROGRAM

## 2024-10-28 PROCEDURE — 25500020 PHARM REV CODE 255: Mod: HCNC | Performed by: STUDENT IN AN ORGANIZED HEALTH CARE EDUCATION/TRAINING PROGRAM

## 2024-10-28 PROCEDURE — 74230 X-RAY XM SWLNG FUNCJ C+: CPT | Mod: TC,HCNC

## 2024-10-28 RX ADMIN — BARIUM SULFATE 100 ML: 0.81 POWDER, FOR SUSPENSION ORAL at 02:10

## 2024-11-13 ENCOUNTER — TELEPHONE (OUTPATIENT)
Dept: RHEUMATOLOGY | Facility: CLINIC | Age: 69
End: 2024-11-13
Payer: MEDICARE

## 2024-11-13 NOTE — TELEPHONE ENCOUNTER
Called patient and answered questions regarding low positive PM-Scl antibody. We will plan to repeat this at her next visit. She also mentioned some shoulder pain. Reviewed shoulder xray from last year. Minimal degenerative changes. She will try some home exercises and we can discuss other options at follow-up visit.    Elaine Kraus MD  Rheumatology Fellow, PGY5

## 2025-01-06 ENCOUNTER — HOSPITAL ENCOUNTER (OUTPATIENT)
Dept: RADIOLOGY | Facility: HOSPITAL | Age: 70
Discharge: HOME | End: 2025-01-06
Attending: INTERNAL MEDICINE
Payer: MEDICARE

## 2025-01-06 DIAGNOSIS — E04.1 THYROID NODULE: ICD-10-CM

## 2025-01-06 PROCEDURE — 76536 US EXAM OF HEAD AND NECK: CPT | Mod: 26,HCNC,, | Performed by: RADIOLOGY

## 2025-01-06 PROCEDURE — 76536 US EXAM OF HEAD AND NECK: CPT | Mod: TC,HCNC

## 2025-01-07 ENCOUNTER — PATIENT MESSAGE (OUTPATIENT)
Dept: ENDOCRINOLOGY | Facility: CLINIC | Age: 70
End: 2025-01-07
Payer: MEDICARE

## 2025-01-07 DIAGNOSIS — E04.1 THYROID NODULE: Primary | ICD-10-CM

## 2025-01-13 DIAGNOSIS — Z00.00 ENCOUNTER FOR MEDICARE ANNUAL WELLNESS EXAM: ICD-10-CM

## 2025-01-17 ENCOUNTER — TELEPHONE (OUTPATIENT)
Dept: HEMATOLOGY/ONCOLOGY | Facility: CLINIC | Age: 70
End: 2025-01-17
Payer: MEDICARE

## 2025-01-17 ENCOUNTER — HOSPITAL ENCOUNTER (EMERGENCY)
Facility: HOSPITAL | Age: 70
Discharge: HOME | End: 2025-01-17
Attending: EMERGENCY MEDICINE
Payer: MEDICARE

## 2025-01-17 VITALS
HEIGHT: 59 IN | RESPIRATION RATE: 18 BRPM | TEMPERATURE: 98 F | SYSTOLIC BLOOD PRESSURE: 174 MMHG | OXYGEN SATURATION: 99 % | WEIGHT: 154 LBS | HEART RATE: 72 BPM | BODY MASS INDEX: 31.04 KG/M2 | DIASTOLIC BLOOD PRESSURE: 84 MMHG

## 2025-01-17 DIAGNOSIS — M79.89 RIGHT LEG SWELLING: ICD-10-CM

## 2025-01-17 PROCEDURE — 99284 EMERGENCY DEPT VISIT MOD MDM: CPT | Mod: 25,HCNC

## 2025-01-17 NOTE — ED NOTES
Per pt she has pain in her R calf, increased when ambulating. Denies injury or trauma. Calf does feel warm to touch distal pulses palpated. Pt ambulatory.

## 2025-01-17 NOTE — ED PROVIDER NOTES
Encounter Date: 2025       History     Chief Complaint   Patient presents with    Leg Pain     Right calf pain x 3-4 weeks. Notable swelling to RLE.      Patient is a 70-year-old female who complains of nontraumatic swelling to her right calf over the past 3 weeks.  She has mild pain associated with this.  No rash.  No fever.  Chest pain or shortness of breath.  She has no history of blood clots.      Review of patient's allergies indicates:   Allergen Reactions    Codeine Nausea And Vomiting     Past Medical History:   Diagnosis Date    Bilateral knee pain     Carpal tunnel syndrome, bilateral     Fissure in skin of foot     Right small toe    HTN (hypertension)     Hyperlipidemia     Multiple thyroid nodules 11/10/2023    Palpitations     Rotator cuff injury     right    Screening for colorectal cancer 10/20/2017    Screening for malignant neoplasm of colon 2021    Statin-induced myositis 2018     Past Surgical History:   Procedure Laterality Date    ANORECTAL MANOMETRY N/A 9/3/2024    Procedure: MANOMETRY, ANORECTAL;  Surgeon: Frandy Avendano MD;  Location: [facility] ([address] [place] [address]);  Service: Endoscopy;  Laterality: N/A;  Prep instructions sent via portal-dw  Prep-Enemas-dw   traveler, [facility]    BILATERAL SALPINGOOPHORECTOMY      BREAST BIOPSY Left     malignant    BREAST BIOPSY Left     negative    BREAST LUMPECTOMY Left     CATARACT EXTRACTION W/  INTRAOCULAR LENS IMPLANT Right 2018    Dr. Oneil    CATARACT EXTRACTION W/  INTRAOCULAR LENS IMPLANT Left 2018    Dr. Oneil     SECTION      x2    COLONOSCOPY N/A 10/20/2017    Procedure: COLONOSCOPY;  Surgeon: Mitchell Danielson Jr., MD;  Location: [facility];  Service: Endoscopy;  Laterality: N/A;    COLONOSCOPY N/A 2021    Procedure: COLONOSCOPY/Suprep;  Surgeon: Malcolm Reyes MD;  Location: [facility];  Service: Endoscopy;  Laterality: N/A;    COLONOSCOPY N/A 2024    Procedure:  COLONOSCOPY;  Surgeon: Frandy Avendano MD;  Location: [facility] ENDOSCOPY;  Service: Endoscopy;  Laterality: N/A;  Ref by Dr RYAN Alva, pt request Miral[employer], portal - PC. 6/7/24 at 4;45 pm pt. confirmed appt. EC  6/6/24- portal msg for pc. DBM  6/7/24- Per DR. Valenzuela Pt to be r/s due to 1 MD scoping on 6/14/24 in AM, LVM to inform Pt of change in scoping MD and arrival time- ERW  6/13 precall    CYST REMOVAL      on back    ESOPHAGOGASTRODUODENOSCOPY N/A 02/05/2021    Procedure: EGD (ESOPHAGOGASTRODUODENOSCOPY);  Surgeon: Malcolm Reyes MD;  Location: [facility];  Service: Endoscopy;  Laterality: N/A;    HERNIA REPAIR      HYSTERECTOMY      at 25 yrs old    INTRAOCULAR PROSTHESES INSERTION Left 11/06/2018    Procedure: INSERTION, IOL PROSTHESIS;  Surgeon: Sergio Oneil MD;  Location: [facility] OR 1ST FLR;  Service: Ophthalmology;  Laterality: Left;    INTRAOCULAR PROSTHESES INSERTION Right 11/20/2018    Procedure: INSERTION, IOL PROSTHESIS;  Surgeon: eSrgio Oneil MD;  Location: [facility] OR 2ND FLR;  Service: Ophthalmology;  Laterality: Right;    KNEE ARTHROPLASTY Right 03/31/2021    Procedure: ARTHROPLASTY, KNEE:RIGHT:DEPUY-SIGMA ;  Surgeon: Pedro Summers III, MD;  Location: [facility] OR;  Service: Orthopedics;  Laterality: Right;    LYSIS, ADHESIONS, KNEE, ARTHROSCOPIC Right 4/30/2024    Procedure: ARTHROSCOPIC KNEE LYSIS, ADHESIONS AND ANTERIOR INTERVAL SLIDE;  Surgeon: Ryanne Sumner MD;  Location: [facility] OR;  Service: Orthopedics;  Laterality: Right;  REGIONAL BLOCK    OOPHORECTOMY      @ 45 yrs old    PHACOEMULSIFICATION OF CATARACT Left 11/06/2018    Procedure: PHACOEMULSIFICATION, CATARACT;  Surgeon: Sergio Oneil MD;  Location: [facility] OR 1ST FLR;  Service: Ophthalmology;  Laterality: Left;    PHACOEMULSIFICATION OF CATARACT Right 11/20/2018    Procedure: PHACOEMULSIFICATION, CATARACT;  Surgeon: Sergio Oneil MD;  Location: [facility] OR 2ND FLR;  Service: Ophthalmology;  Laterality: Right;     REFRACTIVE SURGERY      2023    supracervical abdominal hysterectomy  1978    fibroids    SYNOVECTOMY OF KNEE Right 4/30/2024    Procedure: SYNOVECTOMY, KNEE;  Surgeon: Ryanne Sumner MD;  Location: [facility];  Service: Orthopedics;  Laterality: Right;     Family History   Problem Relation Name Age of Onset    Hypertension Mother      Heart disease Mother      Diabetes Mother      Hyperlipidemia Mother      Pancreatitis Mother      Cataracts Mother      Macular degeneration Mother      Cancer Father      Prostate cancer Father      Hypertension Sister x1     Thyroid disease Sister x1     Diabetes Brother x1     Diabetes Brother x2     Cancer Brother x2     Heart disease Brother x2     Prostate cancer Brother x2     Thyroid cancer Brother x2     Hyperlipidemia Daughter x1     Hypertension Son x1     Breast cancer Paternal Cousin      Amblyopia Neg Hx      Blindness Neg Hx      Glaucoma Neg Hx      Strabismus Neg Hx      Retinal detachment Neg Hx       Social History     Tobacco Use    Smoking status: Never    Smokeless tobacco: Never   Substance Use Topics    Alcohol use: Yes     Comment: once per month    Drug use: Never     Review of Systems   Constitutional:  Negative for fever.   Respiratory:  Negative for shortness of breath.    Musculoskeletal:         Right calf swelling   All other systems reviewed and are negative.      Physical Exam     Initial Vitals [01/17/25 1403]   BP Pulse Resp Temp SpO2   (!) 156/84 89 18 98.3 °F (36.8 °C) 98 %      MAP       --         Physical Exam    Nursing note and vitals reviewed.  Constitutional: No distress.   Cardiovascular:  Normal rate, regular rhythm and normal heart sounds.           Pulmonary/Chest: Breath sounds normal.   Musculoskeletal:      Comments: There is diffuse swelling of the right calf with mild tenderness to the posterior aspect.  There are no palpable cords of the right calf.  No erythema or induration.  Negative Homans sign.     Neurological: She is alert  and oriented to person, place, and time.   Skin: Skin is warm and dry.   Psychiatric: Thought content normal.         ED Course   Procedures  Labs Reviewed - No data to display       Imaging Results              US Lower Extremity Veins Right (Final result)  Result time 01/17/25 15:32:24      Final result by Frandy Brar MD (01/17/25 15:32:24)                   Impression:      No evidence of deep venous thrombosis in the right lower extremity.    Right calf nonspecific soft tissue collection, as above.  Differential considerations can include hematoma in the setting of any recent trauma versus seroma with any recent operative change versus abscess in the appropriate clinical setting.      Electronically signed by: Frandy Brar MD  Date:    01/17/2025  Time:    15:32               Narrative:    EXAMINATION:  US LOWER EXTREMITY VEINS RIGHT    CLINICAL HISTORY:  Other specified soft tissue disorders    TECHNIQUE:  Duplex and color flow Doppler evaluation and graded compression of the right lower extremity veins was performed.    COMPARISON:  None    FINDINGS:  Right thigh veins: The common femoral, femoral, popliteal, upper greater saphenous, and deep femoral veins are patent and free of thrombus. The veins are normally compressible and have normal phasic flow and augmentation response.    Right calf veins: Visualized veins are patent.    Contralateral CFV: The contralateral (left) common femoral vein is patent and free of thrombus.    Miscellaneous: At the upper medial aspect of the right calf, there is a 2.6 x 0.7 x 3.2 cm nonvascular hypoechoic collection within the deep subcutaneous soft tissues.                                       Medications - No data to display  Medical Decision Making  Emergent evaluation of a 70-year-old female with nontraumatic swelling to her right calf.  Ultrasound shows no evidence of DVT.  There is a small nonspecific soft tissue collection of uncertain etiology.  I have explained  to the patient that this will need to be rechecked in the near future.  She may also return to the emergency department for any possible worsening.    Amount and/or Complexity of Data Reviewed  Radiology: ordered.     Details: Ultrasound of the right lower extremity shows no evidence of DVT.  There is a small nonvascular hypoechoic collection within the deep soft tissue.                                      Clinical Impression:  Final diagnoses:  [M79.89] Right leg swelling          ED Disposition Condition    Discharge Stable          ED Prescriptions    None       Follow-up Information       Follow up With Specialties Details Why Contact Info    Florina Sanchez MD Family Medicine Schedule an appointment as soon as possible for a visit   [address] [facility] [id number] [phone]      [place] - Emergency Dept Emergency Medicine  If symptoms worsen [address] [facility] [address] [phone]             Kun Lockett MD  01/20/25 0627

## 2025-01-17 NOTE — TELEPHONE ENCOUNTER
This pt showed up at window from [place] shortly after the appt was scheduled by a navigator in [place]  Nurse spoke with nurse  She stated she had a DVT and need to be seen nurse inquired which physician was referring her and asked where were the U/S results that confirmed she had a DVT  She then said she just wanted to come check herself if she had a DVT  She did not have a physician referral  She stated she had surgery on her RT knee 2 teas ago and again last year and her knee is hurting and she feels  lump behind it  Informed her that Dr can was at hospital but without a referral and a confirmed diagnosis of a DVT we would have to refer her to the ER  Advised it that is was definitely wise to have it evaluated now to make sure she did not have anything to be concerned about  She agreed to present to [facility] ER  Nurse showed her the location of the Er and she left the office stating she was going to ER now

## 2025-01-19 ENCOUNTER — PATIENT MESSAGE (OUTPATIENT)
Dept: OBSTETRICS AND GYNECOLOGY | Facility: CLINIC | Age: 70
End: 2025-01-19
Payer: MEDICARE

## 2025-02-03 ENCOUNTER — LAB VISIT (OUTPATIENT)
Dept: LAB | Facility: HOSPITAL | Age: 70
End: 2025-02-03
Attending: FAMILY MEDICINE
Payer: MEDICARE

## 2025-02-03 DIAGNOSIS — I10 ESSENTIAL HYPERTENSION: ICD-10-CM

## 2025-02-03 DIAGNOSIS — R73.03 PREDIABETES: ICD-10-CM

## 2025-02-03 LAB
BASOPHILS # BLD AUTO: 0.04 K/UL (ref 0–0.2)
BASOPHILS NFR BLD: 0.5 % (ref 0–1.9)
DIFFERENTIAL METHOD BLD: ABNORMAL
EOSINOPHIL # BLD AUTO: 0.2 K/UL (ref 0–0.5)
EOSINOPHIL NFR BLD: 2.8 % (ref 0–8)
ERYTHROCYTE [DISTWIDTH] IN BLOOD BY AUTOMATED COUNT: 15.8 % (ref 11.5–14.5)
ESTIMATED AVG GLUCOSE: 120 MG/DL (ref 68–131)
HBA1C MFR BLD: 5.8 % (ref 4–5.6)
HCT VFR BLD AUTO: 44.4 % (ref 37–48.5)
HGB BLD-MCNC: 13.2 G/DL (ref 12–16)
IMM GRANULOCYTES # BLD AUTO: 0.03 K/UL (ref 0–0.04)
IMM GRANULOCYTES NFR BLD AUTO: 0.4 % (ref 0–0.5)
LYMPHOCYTES # BLD AUTO: 2.7 K/UL (ref 1–4.8)
LYMPHOCYTES NFR BLD: 36.1 % (ref 18–48)
MCH RBC QN AUTO: 25.9 PG (ref 27–31)
MCHC RBC AUTO-ENTMCNC: 29.7 G/DL (ref 32–36)
MCV RBC AUTO: 87 FL (ref 82–98)
MONOCYTES # BLD AUTO: 0.6 K/UL (ref 0.3–1)
MONOCYTES NFR BLD: 7.9 % (ref 4–15)
NEUTROPHILS # BLD AUTO: 3.9 K/UL (ref 1.8–7.7)
NEUTROPHILS NFR BLD: 52.3 % (ref 38–73)
NRBC BLD-RTO: 0 /100 WBC
PLATELET # BLD AUTO: 327 K/UL (ref 150–450)
PMV BLD AUTO: 10.8 FL (ref 9.2–12.9)
RBC # BLD AUTO: 5.1 M/UL (ref 4–5.4)
TSH SERPL DL<=0.005 MIU/L-ACNC: 1.73 UIU/ML (ref 0.4–4)
WBC # BLD AUTO: 7.37 K/UL (ref 3.9–12.7)

## 2025-02-03 PROCEDURE — 85025 COMPLETE CBC W/AUTO DIFF WBC: CPT | Mod: HCNC | Performed by: FAMILY MEDICINE

## 2025-02-03 PROCEDURE — 83036 HEMOGLOBIN GLYCOSYLATED A1C: CPT | Mod: HCNC | Performed by: FAMILY MEDICINE

## 2025-02-03 PROCEDURE — 36415 COLL VENOUS BLD VENIPUNCTURE: CPT | Mod: HCNC,PO | Performed by: FAMILY MEDICINE

## 2025-02-03 PROCEDURE — 84443 ASSAY THYROID STIM HORMONE: CPT | Mod: HCNC | Performed by: FAMILY MEDICINE

## 2025-02-07 ENCOUNTER — OFFICE VISIT (OUTPATIENT)
Dept: FAMILY MEDICINE | Facility: CLINIC | Age: 70
End: 2025-02-07
Payer: MEDICARE

## 2025-02-07 VITALS
SYSTOLIC BLOOD PRESSURE: 138 MMHG | OXYGEN SATURATION: 96 % | HEART RATE: 74 BPM | BODY MASS INDEX: 31.86 KG/M2 | HEIGHT: 59 IN | WEIGHT: 158.06 LBS | DIASTOLIC BLOOD PRESSURE: 78 MMHG

## 2025-02-07 DIAGNOSIS — I10 ESSENTIAL HYPERTENSION: Primary | ICD-10-CM

## 2025-02-07 DIAGNOSIS — E66.811 CLASS 1 OBESITY DUE TO EXCESS CALORIES WITH SERIOUS COMORBIDITY AND BODY MASS INDEX (BMI) OF 31.0 TO 31.9 IN ADULT: ICD-10-CM

## 2025-02-07 DIAGNOSIS — B89 PARASITOSIS: ICD-10-CM

## 2025-02-07 DIAGNOSIS — Z83.79 FAMILY HISTORY OF CELIAC DISEASE: ICD-10-CM

## 2025-02-07 DIAGNOSIS — R73.03 PREDIABETES: ICD-10-CM

## 2025-02-07 DIAGNOSIS — E78.2 MIXED HYPERLIPIDEMIA: ICD-10-CM

## 2025-02-07 DIAGNOSIS — E66.09 CLASS 1 OBESITY DUE TO EXCESS CALORIES WITH SERIOUS COMORBIDITY AND BODY MASS INDEX (BMI) OF 31.0 TO 31.9 IN ADULT: ICD-10-CM

## 2025-02-07 DIAGNOSIS — J30.9 ALLERGIC RHINITIS, UNSPECIFIED SEASONALITY, UNSPECIFIED TRIGGER: ICD-10-CM

## 2025-02-07 DIAGNOSIS — R20.8 OTHER DISTURBANCES OF SKIN SENSATION: ICD-10-CM

## 2025-02-07 DIAGNOSIS — K90.9 INTESTINAL MALABSORPTION, UNSPECIFIED TYPE: ICD-10-CM

## 2025-02-07 DIAGNOSIS — R19.5 STOOL CONTENTS FINDING, ABNORMAL: ICD-10-CM

## 2025-02-07 PROCEDURE — 3075F SYST BP GE 130 - 139MM HG: CPT | Mod: HCNC,CPTII,S$GLB, | Performed by: FAMILY MEDICINE

## 2025-02-07 PROCEDURE — 3008F BODY MASS INDEX DOCD: CPT | Mod: HCNC,CPTII,S$GLB, | Performed by: FAMILY MEDICINE

## 2025-02-07 PROCEDURE — 1101F PT FALLS ASSESS-DOCD LE1/YR: CPT | Mod: HCNC,CPTII,S$GLB, | Performed by: FAMILY MEDICINE

## 2025-02-07 PROCEDURE — 3078F DIAST BP <80 MM HG: CPT | Mod: HCNC,CPTII,S$GLB, | Performed by: FAMILY MEDICINE

## 2025-02-07 PROCEDURE — 1157F ADVNC CARE PLAN IN RCRD: CPT | Mod: HCNC,CPTII,S$GLB, | Performed by: FAMILY MEDICINE

## 2025-02-07 PROCEDURE — 99999 PR PBB SHADOW E&M-EST. PATIENT-LVL IV: CPT | Mod: PBBFAC,HCNC,, | Performed by: FAMILY MEDICINE

## 2025-02-07 PROCEDURE — 1126F AMNT PAIN NOTED NONE PRSNT: CPT | Mod: HCNC,CPTII,S$GLB, | Performed by: FAMILY MEDICINE

## 2025-02-07 PROCEDURE — 3288F FALL RISK ASSESSMENT DOCD: CPT | Mod: HCNC,CPTII,S$GLB, | Performed by: FAMILY MEDICINE

## 2025-02-07 PROCEDURE — 99214 OFFICE O/P EST MOD 30 MIN: CPT | Mod: HCNC,S$GLB,, | Performed by: FAMILY MEDICINE

## 2025-02-07 PROCEDURE — 3044F HG A1C LEVEL LT 7.0%: CPT | Mod: HCNC,CPTII,S$GLB, | Performed by: FAMILY MEDICINE

## 2025-02-07 PROCEDURE — G2211 COMPLEX E/M VISIT ADD ON: HCPCS | Mod: HCNC,S$GLB,, | Performed by: FAMILY MEDICINE

## 2025-02-07 PROCEDURE — 4010F ACE/ARB THERAPY RXD/TAKEN: CPT | Mod: HCNC,CPTII,S$GLB, | Performed by: FAMILY MEDICINE

## 2025-02-07 RX ORDER — HYDROCHLOROTHIAZIDE 12.5 MG/1
12.5 CAPSULE ORAL NIGHTLY
Qty: 90 CAPSULE | Refills: 3 | Status: SHIPPED | OUTPATIENT
Start: 2025-02-07

## 2025-02-07 RX ORDER — METOPROLOL SUCCINATE 25 MG/1
25 TABLET, EXTENDED RELEASE ORAL NIGHTLY
Qty: 90 TABLET | Refills: 3 | Status: SHIPPED | OUTPATIENT
Start: 2025-02-07

## 2025-02-07 RX ORDER — EZETIMIBE 10 MG/1
10 TABLET ORAL DAILY
Qty: 90 TABLET | Refills: 3 | Status: SHIPPED | OUTPATIENT
Start: 2025-02-07 | End: 2026-02-07

## 2025-02-07 RX ORDER — LORATADINE 10 MG/1
10 TABLET ORAL DAILY
Qty: 90 TABLET | Refills: 3 | Status: SHIPPED | OUTPATIENT
Start: 2025-02-07 | End: 2026-02-07

## 2025-02-07 RX ORDER — ATORVASTATIN CALCIUM 80 MG/1
80 TABLET, FILM COATED ORAL DAILY
Qty: 90 TABLET | Refills: 3 | Status: SHIPPED | OUTPATIENT
Start: 2025-02-07

## 2025-02-07 RX ORDER — IRBESARTAN 300 MG/1
300 TABLET ORAL DAILY
Qty: 90 TABLET | Refills: 3 | Status: SHIPPED | OUTPATIENT
Start: 2025-02-07

## 2025-02-07 NOTE — PROGRESS NOTES
Subjective:         Patient ID: Chelsea Rodriguez is a 70 y.o. female.    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, bilateral    Vitamin D deficiency    Mixed hyperlipidemia    Class 1 obesity due to excess calories with serious comorbidity and body mass index (BMI) of 31.0 to 31.9 in adult    HENOK (obstructive sleep apnea)    H/O tooth extraction    Primary osteoarthritis of right knee    Venous stasis dermatitis    Lymphedema of both lower extremities    Pelvic floor dysfunction    Other lack of coordination    Acquired absence of both cervix and uterus    Anxiety    Osteoarthritis    Swelling of hand    Pain involving joint of finger of left hand    Muscle weakness    Multiple thyroid nodules    Thyroid nodule    Prediabetes    Impaired functional mobility, balance, gait, and endurance    Decreased range of motion (ROM) of right knee    Blood transfusion declined because patient is Mu-ism    Decreased range of motion of neck    Poor posture    Constipation      BRIELLE Tran is a 70 y.o. female    History of Present Illness    CHIEF COMPLAINT:  Chelsea presents today for blood pressure follow-up    BLOOD PRESSURE:  She did not take blood pressure medication this morning. Home blood pressure readings have been well controlled with readings of 127/72 on February 3rd and 131/75 on January 29th. Previous highest reading was 154 after medication increase.    CURRENT SYMPTOMS:  She reports leg swelling, pain, and a palpable knot that is exacerbated by walking. She denies any deep pain  "suggestive of blood clots. She also reports persistently cold fingers and describes sensations of things crawling in her head, expressing concerns about possible parasites.    MEDICATIONS:  She takes Tylenol as needed and maintains daily baby aspirin regimen. She temporarily discontinued cholesterol medication but resumed after experiencing increased leg swelling and general malaise, with improvement in symptoms after restarting. She discontinued diabetes medication before Christmas. She reports taking Cleanse 24 supplement purchased online.    MEDICAL HISTORY:  She has a history of breast lump in 2010. A colonoscopy around June of last year revealed polyps and diverticular disease. She also has a history of cataract surgery.         Objective:     Vitals:    02/07/25 1051 02/07/25 1133   BP: (!) 142/78 138/78   BP Location: Left arm Left arm   Patient Position: Sitting Sitting   Pulse: 74    SpO2: 96%    Weight: 71.7 kg (158 lb 1.1 oz)    Height: 4' 11" (1.499 m)          Physical Exam  Vitals and nursing note reviewed.   Constitutional:       General: She is not in acute distress.     Appearance: Normal appearance. She is not ill-appearing, toxic-appearing or diaphoretic.   HENT:      Head: Normocephalic and atraumatic.   Eyes:      General: No scleral icterus.     Conjunctiva/sclera: Conjunctivae normal.   Cardiovascular:      Rate and Rhythm: Normal rate.      Heart sounds: Normal heart sounds. No murmur heard.  Pulmonary:      Effort: Pulmonary effort is normal. No respiratory distress.   Skin:     Coloration: Skin is not pale.   Neurological:      Mental Status: She is alert. Mental status is at baseline.   Psychiatric:         Attention and Perception: Attention and perception normal.         Mood and Affect: Mood and affect normal.         Speech: Speech normal.         Behavior: Behavior normal.         Cognition and Memory: Cognition and memory normal.         Judgment: Judgment normal.       Physical Exam "    Musculoskeletal: Presence of a knot in the calf. Bruising in the leg.       Assessment:       1. Essential hypertension    2. Mixed hyperlipidemia    3. Prediabetes    4. Allergic rhinitis, unspecified seasonality, unspecified trigger    5. Stool contents finding, abnormal    6. Parasitosis    7. Class 1 obesity due to excess calories with serious comorbidity and body mass index (BMI) of 31.0 to 31.9 in adult    8. Other disturbances of skin sensation    9. Intestinal malabsorption, unspecified type    10. Family history of celiac disease          Plan:   Recent relevant labs results reviewed with patient.         Assessment & Plan    Assessed blood pressure control; noted home readings are better than in-office  Evaluated patient's concerns about parasites; low clinical suspicion given stable weight and lack of specific symptoms  Considering vitamin deficiencies or other causes for patient's sensations  Reviewed recent imaging results; mammogram normal, leg ultrasound showed no blood clots  Assessed calf pain; likely muscle knot rather than circulatory issue  Reviewed thyroid nodules; follow-up ordered  Evaluated for potential scleroderma; watching for progression of symptoms    HYPERTENSION:  - Continue current antihypertensive medication.  - Home blood pressure logs show good control (127/72 on the 3rd, 131/75 on January 29th).  - Highest reading at home was 154 after medication adjustment.  - Today's elevated reading likely due to patient not taking medication before appointment.  - Previous medication increase was necessary and effective.  - Recheck blood pressure before patient leaves office.  - Advise patient to take medication before next appointment.  - Follow-up in 3 months.    PREDIABETES:  - Discontinue metformin as blood sugar has improved to 5.8 without medication.  - At patient's age of 70, blood sugar targets can be less stringent.  - Advise caution with sugar intake, especially from sources like  "coconut water.  - Maintain healthy diet and lifestyle to manage prediabetes.    HYPERLIPIDEMIA:  - Restart cholesterol medication.  - Check cholesterol levels in upcoming lab work on the 20th.  - Chelsea reports inconsistent adherence to two cholesterol medications but has resumed taking them.  - Note improvement in leg swelling after resuming medication.    DIVERTICULOSIS:  - Chelsea had a colonoscopy around June last year showing diverticulosis.    CONSTIPATION:  - Chelsea reports always having solid stools and needing laxatives for bowel movements.    LEG PAIN:  - Chelsea reports leg pain, particularly in the calf area, with a palpable knot.  - Examination revealed muscle knots, likely cause of pain rather than blood clots. Ultrasound LE already done and unremarkable  - Recommend using a tennis ball to roll out the affected calf area.    COLON POLYPS:  - Colonoscopy from June last year showed polyps.    NUTRITION COUNSELING:  - Recommend eating whole fruits and vegetables rather than juicing, as juicing removes beneficial fiber.  - Advise against excessive consumption of coconut water due to high sugar content.  - Encourage choosing foods closer to their natural state rather than highly processed options.    MEDICATIONS/SUPPLEMENTS:  - Continue baby aspirin.  - Educate patient on limited absorption of many supplements and limited evidence supporting "superfood" claims and cleanses.    OTHER INSTRUCTIONS:  - Discuss that sensations of crawling can be caused by various factors, including blood pressure changes.  - Note patient reports cold fingers all the time, which led to initial doctor visit.  - Acknowledge patient's symptoms but no definitive diagnosis of scleroderma.  - Schedule follow-up visit this month to monitor condition.         1. Essential hypertension  -     irbesartan (AVAPRO) 300 MG tablet; Take 1 tablet (300 mg total) by mouth once daily.  Dispense: 90 tablet; Refill: 3  -     hydroCHLOROthiazide " (MICROZIDE) 12.5 mg capsule; Take 1 capsule (12.5 mg total) by mouth every evening.  Dispense: 90 capsule; Refill: 3  -     metoprolol succinate (TOPROL-XL) 25 MG 24 hr tablet; Take 1 tablet (25 mg total) by mouth every evening.  Dispense: 90 tablet; Refill: 3    2. Mixed hyperlipidemia  -     atorvastatin (LIPITOR) 80 MG tablet; Take 1 tablet (80 mg total) by mouth once daily.  Dispense: 90 tablet; Refill: 3  -     ezetimibe (ZETIA) 10 mg tablet; Take 1 tablet (10 mg total) by mouth once daily.  Dispense: 90 tablet; Refill: 3    3. Prediabetes    4. Allergic rhinitis, unspecified seasonality, unspecified trigger  -     loratadine (CLARITIN) 10 mg tablet; Take 1 tablet (10 mg total) by mouth once daily.  Dispense: 90 tablet; Refill: 3    5. Stool contents finding, abnormal  -     Stool Exam-Ova,Cysts,Parasites; Future; Expected date: 02/07/2025  -     Lactoferrin, fecal, quantitative; Future; Expected date: 02/07/2025  -     WBC, Stool; Future; Expected date: 02/07/2025  -     Occult blood x 1, stool; Future; Expected date: 02/07/2025  -     Calprotectin, Stool; Future; Expected date: 02/07/2025  -     Celiac Disease Panel; Future; Expected date: 02/07/2025    6. Parasitosis  -     Vitamin B12; Future; Expected date: 02/07/2025  -     FOLATE; Future; Expected date: 02/07/2025  -     IRON AND TIBC; Future; Expected date: 02/07/2025  -     Ferritin; Future; Expected date: 02/07/2025  -     HIV 1/2 Ag/Ab (4th Gen); Future; Expected date: 02/07/2025  -     Treponema Pallidium Antibodies IgG, IgM; Future; Expected date: 02/07/2025    7. Class 1 obesity due to excess calories with serious comorbidity and body mass index (BMI) of 31.0 to 31.9 in adult    8. Other disturbances of skin sensation  -     Vitamin B12; Future; Expected date: 02/07/2025  -     FOLATE; Future; Expected date: 02/07/2025  -     IRON AND TIBC; Future; Expected date: 02/07/2025  -     Ferritin; Future; Expected date: 02/07/2025    9. Intestinal  malabsorption, unspecified type  -     Vitamin B12; Future; Expected date: 02/07/2025  -     FOLATE; Future; Expected date: 02/07/2025  -     IRON AND TIBC; Future; Expected date: 02/07/2025  -     Ferritin; Future; Expected date: 02/07/2025  -     Calprotectin, Stool; Future; Expected date: 02/07/2025  -     Celiac Disease Panel; Future; Expected date: 02/07/2025    10. Family history of celiac disease  -     Celiac Disease Panel; Future; Expected date: 02/07/2025      Patient's questions answered. Plan reviewed with patient at the end of visit. Relevant precautions to chief complaint and reasons to seek further medical care or to contact the office sooner reviewed with patient.     Follow up in about 3 months (around 5/7/2025) for Hypertension Follow-up, Results Review.        Part of this note was dictated using voice recognition software. Please excuse any typographical errors.     This note was generated with the assistance of ambient listening technology. Verbal consent was obtained by the patient and accompanying visitor(s) for the recording of patient appointment to facilitate this note. I attest to having reviewed and edited the generated note for accuracy, though some syntax or spelling errors may persist. Please contact the author of this note for any clarification.

## 2025-02-10 ENCOUNTER — TELEPHONE (OUTPATIENT)
Dept: PODIATRY | Facility: CLINIC | Age: 70
End: 2025-02-10
Payer: MEDICARE

## 2025-02-10 ENCOUNTER — OFFICE VISIT (OUTPATIENT)
Dept: RHEUMATOLOGY | Facility: CLINIC | Age: 70
End: 2025-02-10
Payer: MEDICARE

## 2025-02-10 ENCOUNTER — TELEPHONE (OUTPATIENT)
Dept: ORTHOPEDICS | Facility: CLINIC | Age: 70
End: 2025-02-10
Payer: MEDICARE

## 2025-02-10 ENCOUNTER — LAB VISIT (OUTPATIENT)
Dept: LAB | Facility: HOSPITAL | Age: 70
End: 2025-02-10
Attending: FAMILY MEDICINE
Payer: MEDICARE

## 2025-02-10 VITALS
DIASTOLIC BLOOD PRESSURE: 73 MMHG | HEIGHT: 59 IN | BODY MASS INDEX: 31.78 KG/M2 | HEART RATE: 74 BPM | SYSTOLIC BLOOD PRESSURE: 147 MMHG | WEIGHT: 157.63 LBS

## 2025-02-10 DIAGNOSIS — K90.9 INTESTINAL MALABSORPTION, UNSPECIFIED TYPE: ICD-10-CM

## 2025-02-10 DIAGNOSIS — R20.0 NUMBNESS AND TINGLING: ICD-10-CM

## 2025-02-10 DIAGNOSIS — R20.2 NUMBNESS AND TINGLING: ICD-10-CM

## 2025-02-10 DIAGNOSIS — L98.491 ULCER OF FINGER, LIMITED TO BREAKDOWN OF SKIN: Primary | ICD-10-CM

## 2025-02-10 DIAGNOSIS — G56.03 BILATERAL CARPAL TUNNEL SYNDROME: ICD-10-CM

## 2025-02-10 DIAGNOSIS — R19.5 STOOL CONTENTS FINDING, ABNORMAL: ICD-10-CM

## 2025-02-10 DIAGNOSIS — M19.90 OSTEOARTHRITIS, UNSPECIFIED OSTEOARTHRITIS TYPE, UNSPECIFIED SITE: ICD-10-CM

## 2025-02-10 LAB — OB PNL STL: NEGATIVE

## 2025-02-10 PROCEDURE — 83630 LACTOFERRIN FECAL (QUAL): CPT | Mod: HCNC | Performed by: FAMILY MEDICINE

## 2025-02-10 PROCEDURE — 1157F ADVNC CARE PLAN IN RCRD: CPT | Mod: HCNC,CPTII,GC,S$GLB | Performed by: STUDENT IN AN ORGANIZED HEALTH CARE EDUCATION/TRAINING PROGRAM

## 2025-02-10 PROCEDURE — 99214 OFFICE O/P EST MOD 30 MIN: CPT | Mod: HCNC,GC,S$GLB, | Performed by: STUDENT IN AN ORGANIZED HEALTH CARE EDUCATION/TRAINING PROGRAM

## 2025-02-10 PROCEDURE — 4010F ACE/ARB THERAPY RXD/TAKEN: CPT | Mod: HCNC,CPTII,GC,S$GLB | Performed by: STUDENT IN AN ORGANIZED HEALTH CARE EDUCATION/TRAINING PROGRAM

## 2025-02-10 PROCEDURE — 89055 LEUKOCYTE ASSESSMENT FECAL: CPT | Mod: HCNC | Performed by: FAMILY MEDICINE

## 2025-02-10 PROCEDURE — 87209 SMEAR COMPLEX STAIN: CPT | Mod: HCNC | Performed by: FAMILY MEDICINE

## 2025-02-10 PROCEDURE — 1159F MED LIST DOCD IN RCRD: CPT | Mod: HCNC,CPTII,GC,S$GLB | Performed by: STUDENT IN AN ORGANIZED HEALTH CARE EDUCATION/TRAINING PROGRAM

## 2025-02-10 PROCEDURE — 3008F BODY MASS INDEX DOCD: CPT | Mod: HCNC,CPTII,GC,S$GLB | Performed by: STUDENT IN AN ORGANIZED HEALTH CARE EDUCATION/TRAINING PROGRAM

## 2025-02-10 PROCEDURE — 3288F FALL RISK ASSESSMENT DOCD: CPT | Mod: HCNC,CPTII,GC,S$GLB | Performed by: STUDENT IN AN ORGANIZED HEALTH CARE EDUCATION/TRAINING PROGRAM

## 2025-02-10 PROCEDURE — 99999 PR PBB SHADOW E&M-EST. PATIENT-LVL III: CPT | Mod: PBBFAC,HCNC,GC, | Performed by: STUDENT IN AN ORGANIZED HEALTH CARE EDUCATION/TRAINING PROGRAM

## 2025-02-10 PROCEDURE — 3077F SYST BP >= 140 MM HG: CPT | Mod: HCNC,CPTII,GC,S$GLB | Performed by: STUDENT IN AN ORGANIZED HEALTH CARE EDUCATION/TRAINING PROGRAM

## 2025-02-10 PROCEDURE — 1101F PT FALLS ASSESS-DOCD LE1/YR: CPT | Mod: HCNC,CPTII,GC,S$GLB | Performed by: STUDENT IN AN ORGANIZED HEALTH CARE EDUCATION/TRAINING PROGRAM

## 2025-02-10 PROCEDURE — 3078F DIAST BP <80 MM HG: CPT | Mod: HCNC,CPTII,GC,S$GLB | Performed by: STUDENT IN AN ORGANIZED HEALTH CARE EDUCATION/TRAINING PROGRAM

## 2025-02-10 PROCEDURE — 3044F HG A1C LEVEL LT 7.0%: CPT | Mod: HCNC,CPTII,GC,S$GLB | Performed by: STUDENT IN AN ORGANIZED HEALTH CARE EDUCATION/TRAINING PROGRAM

## 2025-02-10 PROCEDURE — 82272 OCCULT BLD FECES 1-3 TESTS: CPT | Mod: HCNC | Performed by: FAMILY MEDICINE

## 2025-02-10 PROCEDURE — 83993 ASSAY FOR CALPROTECTIN FECAL: CPT | Mod: HCNC | Performed by: FAMILY MEDICINE

## 2025-02-10 PROCEDURE — 1126F AMNT PAIN NOTED NONE PRSNT: CPT | Mod: HCNC,CPTII,GC,S$GLB | Performed by: STUDENT IN AN ORGANIZED HEALTH CARE EDUCATION/TRAINING PROGRAM

## 2025-02-10 NOTE — PROGRESS NOTES
2/7/2025     1:44 PM   Rapid3 Question Responses and Scores   MDHAQ Score 0.6   Psychologic Score 1.1   Pain Score 3   When you awakened in the morning OVER THE LAST WEEK, did you feel stiff? Yes   If Yes, please indicate the number of hours until you are as limber as you will be for the day 1   Fatigue Score 6.5   Global Health Score 4   RAPID3 Score 3     Answers submitted by the patient for this visit:  Rheumatology Questionnaire (Submitted on 2/7/2025)  fever: No  eye redness: Yes  mouth sores: No  headaches: No  shortness of breath: No  chest pain: No  trouble swallowing: No  diarrhea: No  constipation: Yes  unexpected weight change: No  genital sore: No  dysuria: No  During the last 3 days, have you had a skin rash?: No  Bruises or bleeds easily: No  cough: No

## 2025-02-10 NOTE — TELEPHONE ENCOUNTER
Spoke with pt and she is dealing with bilateral hand and feet numbeness and tingling. Also states she be digging materials up from under her finger/toe nails that sounds and smells like an infection.  I explained to her we are only able to treat the hand issues but will refer her over to a podiatrist to be scheduled. She expressed understanding. She is scheduled to see Nathaly Velasco in March and will update imaging at that time.     ----- Message from Alex Galvin sent at 2/10/2025  1:42 PM CST -----  Contact: Self [phone]  Please call her and schedule with Doreen next available.  ----- Message -----  From: Tessy Ceballos  Sent: 2/10/2025   1:00 PM CST  To: Russo Digeorge Jamie Staff    Would like to receive medical advice.     Would they like a call back or a response via [facility]:  call back    Additional information:  Calling to schedule a follow up appt and discuss surgery to no avail.

## 2025-02-10 NOTE — TELEPHONE ENCOUNTER
Call pt in regards to help get her scheduled for new patient appointment. I was able to assist in getting her a new appointment date and time with Dr. Acosta at the [place] location. Pt verbally confirmed appointment date and time.
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
## 2025-02-11 LAB — WBC #/AREA STL HPF: NORMAL /[HPF]

## 2025-02-13 LAB — LACTOFERRIN STL QL IA: NEGATIVE

## 2025-02-17 ENCOUNTER — TELEPHONE (OUTPATIENT)
Dept: PODIATRY | Facility: CLINIC | Age: 70
End: 2025-02-17
Payer: MEDICARE

## 2025-02-17 ENCOUNTER — PATIENT MESSAGE (OUTPATIENT)
Dept: PODIATRY | Facility: CLINIC | Age: 70
End: 2025-02-17
Payer: MEDICARE

## 2025-02-17 NOTE — TELEPHONE ENCOUNTER
I spoke with patient appt confirmation 2/17 @2:30. Dr. Fitzpatrick will exam your toe. If procedure is needed you will assist with scheduling a procedure date.

## 2025-02-18 ENCOUNTER — OFFICE VISIT (OUTPATIENT)
Dept: PODIATRY | Facility: CLINIC | Age: 70
End: 2025-02-18
Payer: MEDICARE

## 2025-02-18 VITALS
HEIGHT: 59 IN | SYSTOLIC BLOOD PRESSURE: 169 MMHG | WEIGHT: 156.5 LBS | BODY MASS INDEX: 31.55 KG/M2 | HEART RATE: 86 BPM | DIASTOLIC BLOOD PRESSURE: 76 MMHG

## 2025-02-18 DIAGNOSIS — M20.41 HAMMER TOES OF BOTH FEET: Primary | ICD-10-CM

## 2025-02-18 DIAGNOSIS — M20.42 HAMMER TOES OF BOTH FEET: Primary | ICD-10-CM

## 2025-02-18 DIAGNOSIS — L60.0 INGROWN NAIL: ICD-10-CM

## 2025-02-18 DIAGNOSIS — L84 CORN OR CALLUS: ICD-10-CM

## 2025-02-18 PROCEDURE — 3077F SYST BP >= 140 MM HG: CPT | Mod: HCNC,CPTII,S$GLB, | Performed by: PODIATRIST

## 2025-02-18 PROCEDURE — 3288F FALL RISK ASSESSMENT DOCD: CPT | Mod: HCNC,CPTII,S$GLB, | Performed by: PODIATRIST

## 2025-02-18 PROCEDURE — 99204 OFFICE O/P NEW MOD 45 MIN: CPT | Mod: HCNC,S$GLB,, | Performed by: PODIATRIST

## 2025-02-18 PROCEDURE — 99999 PR PBB SHADOW E&M-EST. PATIENT-LVL III: CPT | Mod: PBBFAC,HCNC,, | Performed by: PODIATRIST

## 2025-02-18 PROCEDURE — 3078F DIAST BP <80 MM HG: CPT | Mod: HCNC,CPTII,S$GLB, | Performed by: PODIATRIST

## 2025-02-18 PROCEDURE — 1159F MED LIST DOCD IN RCRD: CPT | Mod: HCNC,CPTII,S$GLB, | Performed by: PODIATRIST

## 2025-02-18 PROCEDURE — 1125F AMNT PAIN NOTED PAIN PRSNT: CPT | Mod: HCNC,CPTII,S$GLB, | Performed by: PODIATRIST

## 2025-02-18 PROCEDURE — 1101F PT FALLS ASSESS-DOCD LE1/YR: CPT | Mod: HCNC,CPTII,S$GLB, | Performed by: PODIATRIST

## 2025-02-18 PROCEDURE — 3008F BODY MASS INDEX DOCD: CPT | Mod: HCNC,CPTII,S$GLB, | Performed by: PODIATRIST

## 2025-02-18 PROCEDURE — 3044F HG A1C LEVEL LT 7.0%: CPT | Mod: HCNC,CPTII,S$GLB, | Performed by: PODIATRIST

## 2025-02-18 PROCEDURE — 4010F ACE/ARB THERAPY RXD/TAKEN: CPT | Mod: HCNC,CPTII,S$GLB, | Performed by: PODIATRIST

## 2025-02-18 PROCEDURE — 1160F RVW MEDS BY RX/DR IN RCRD: CPT | Mod: HCNC,CPTII,S$GLB, | Performed by: PODIATRIST

## 2025-02-18 PROCEDURE — 1157F ADVNC CARE PLAN IN RCRD: CPT | Mod: HCNC,CPTII,S$GLB, | Performed by: PODIATRIST

## 2025-02-18 RX ORDER — AMMONIUM LACTATE 12 G/100G
1 CREAM TOPICAL DAILY
Qty: 140 G | Refills: 1 | Status: SHIPPED | OUTPATIENT
Start: 2025-02-18

## 2025-02-18 RX ORDER — AMMONIUM LACTATE 12 G/100G
1 CREAM TOPICAL DAILY
Qty: 30 G | Refills: 0 | Status: SHIPPED | OUTPATIENT
Start: 2025-02-18 | End: 2025-02-18

## 2025-02-18 NOTE — PROGRESS NOTES
Subjective:   Pt late still seen    Patient ID: Chelsea Rodriguez is a 70 y.o. female.    Chief Complaint:   Ingrown Toenail (Right great pain when pressure applied ) and Hammer Toe (Bilateral )    Chelsea is a 70 y.o. female who presents to the podiatry clinic  with complaint of  right foot pain.     Patient relates she noticed some pain about 2 weeks ago  No bleeding or drainage to the nail bed.   She tried tea tree oil an essential oil    No injury    Also discusses difficulty with shoes painful hammertoes      Per chart review saw podiatry in 2017:  Hammer toes of both feet  Ingrown nail  I counseled the patient on her conditions, their implications and medical management.     Discussed importance of supportive shoes with accommodative toe box to reduce pressure and irritation to forefoot     Gel toe sleeves or pads as needed.     Discussed conservative vs surgical treatment of recurrent painful ingrown toenails with associated risks and benefits.     She will continue local care and consider nail avulsion if pain returns.     After cleansing the  area w/ alcohol prep pad the above mentioned hyperkeratosis was trimmed utilizing No 15 scapel, to a smooth base with out incident. Patient tolerated this  well and reported comfort to the area of 2nd toes, bilat.       Rheum:  Initial Presentation  Chelsea Rodriguez is a 70 y.o. F with a past medical history of HTN, HLD, OA, obesity and HENOK (on CPAP) who presents today for follow-up.      She was referred by Dr. Moreno for OA. She was last seen by Rheumatology in October 2023, at that time pain was 0/10. XR of the hands showed mild DJD. She has been using Voltaren gel on her knees but it was not helping much. She follows with Orthopedics. She had a R TKA and has severe OA of bilateral knees, follows with Dr. Summers. She was also seen by Dr. Quarles in the past for R chronic rotator cuff tear.     She reported that she had pain with movement, she has pain in the  shoulders, knees, toes (hammer toe), neck, hands (related to carpal tunnel). She never noticed any swelling of the joints. She does not currently take anything regularly for her pain    Plan:  - Given the patients concerns, we completed a full workup for scleroderma with DANGELO, scleroderma antibodies during last visit   - Scl-70 resulted as low positive   - Low suspicion for scleroderma at this time   - Will repeat the scleroderma lab work with her labs next week   - Given the patients concerns, we will continue to follow the patient annually to ensure no changes in condition   - Recommended carpal tunnel release given severe carpal tunnel bilaterally, she will reach out to [facility] regarding next steps   - Recommend Tylenol up to 3000 mg per day, Voltaren gel on affected areas, paraffin wax baths for moist heat to ease pain and stiffness, Tumeric 3289-9146 mg daily for OA  Past Medical History:   Diagnosis Date    Bilateral knee pain     Carpal tunnel syndrome, bilateral     Fissure in skin of foot     Right small toe    HTN (hypertension)     Hyperlipidemia     Multiple thyroid nodules 11/10/2023    Palpitations     Rotator cuff injury     right    Screening for colorectal cancer 10/20/2017    Screening for malignant neoplasm of colon 2/5/2021    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 Oneil    CATARACT EXTRACTION W/  INTRAOCULAR LENS IMPLANT Left 11/20/2018    Dr. Oneil      SECTION      x2    COLONOSCOPY N/A 10/20/2017    Procedure: COLONOSCOPY;  Surgeon: Mitchell Danielson Jr., MD;  Location: [facility] ENDO;  Service: Endoscopy;  Laterality: N/A;    COLONOSCOPY N/A 2021    Procedure: COLONOSCOPY/Suprep;  Surgeon: Malcolm Reyes MD;  Location: [facility] ENDO;  Service: Endoscopy;  Laterality: N/A;    COLONOSCOPY N/A 2024    Procedure: COLONOSCOPY;  Surgeon: Frandy Avendano MD;  Location: [facility] ENDOSCOPY;  Service: Endoscopy;  Laterality: N/A;  Ref by Dr RYAN Alva, pt request Miralax, portal - PC. 24 at 4;45 pm pt. confirmed appt. EC  24- portal msg for pc. DBM  24- Per DR. Valenzuela Pt to be r/s due to 1 MD scoping on 24 in AM, LVM to inform Pt of change in scoping MD and arrival time- ERW   precall    CYST REMOVAL      on back    ESOPHAGOGASTRODUODENOSCOPY N/A 2021    Procedure: EGD (ESOPHAGOGASTRODUODENOSCOPY);  Surgeon: Malcolm Reyes MD;  Location: [facility];  Service: Endoscopy;  Laterality: N/A;    HERNIA REPAIR      HYSTERECTOMY      at 25 yrs old    INTRAOCULAR PROSTHESES INSERTION Left 2018    Procedure: INSERTION, IOL PROSTHESIS;  Surgeon: Sergio Oneil MD;  Location: [facility] OR 1ST FLR;  Service: Ophthalmology;  Laterality: Left;    INTRAOCULAR PROSTHESES INSERTION Right 2018    Procedure: INSERTION, IOL PROSTHESIS;  Surgeon: Sergio Oneil MD;  Location: [facility] OR 2ND FLR;  Service: Ophthalmology;  Laterality: Right;    KNEE ARTHROPLASTY Right 2021    Procedure: ARTHROPLASTY, KNEE:RIGHT:DEPUY-SIGMA ;  Surgeon: Pedro Summers III, MD;  Location: [facility];  Service: Orthopedics;  Laterality: Right;    LYSIS, ADHESIONS, KNEE, ARTHROSCOPIC Right 2024    Procedure: ARTHROSCOPIC KNEE LYSIS, ADHESIONS AND ANTERIOR INTERVAL SLIDE;  Surgeon: Ryanne Sumner MD;  Location: [facility] OR;  Service: Orthopedics;  Laterality: Right;  REGIONAL BLOCK    OOPHORECTOMY      @ 45 yrs old    PHACOEMULSIFICATION OF  CATARACT Left 11/06/2018    Procedure: PHACOEMULSIFICATION, CATARACT;  Surgeon: Sergio Oneil MD;  Location: [facility] OR 1ST FLR;  Service: Ophthalmology;  Laterality: Left;    PHACOEMULSIFICATION OF CATARACT Right 11/20/2018    Procedure: PHACOEMULSIFICATION, CATARACT;  Surgeon: Sergio Oneil MD;  Location: [facility] OR 2ND FLR;  Service: Ophthalmology;  Laterality: Right;    REFRACTIVE SURGERY      2023    supracervical abdominal hysterectomy  1978    fibroids    SYNOVECTOMY OF KNEE Right 4/30/2024    Procedure: SYNOVECTOMY, KNEE;  Surgeon: Ryanne Sumner MD;  Location: [facility];  Service: Orthopedics;  Laterality: Right;     Medications Ordered Prior to Encounter[1]  Review of patient's allergies indicates:   Allergen Reactions    Codeine Nausea And Vomiting       Review of Systems   Constitutional: Negative for chills, decreased appetite, fever, malaise/fatigue, night sweats, weight gain and weight loss.   Cardiovascular:  Negative for chest pain, claudication, dyspnea on exertion, leg swelling, palpitations and syncope.   Respiratory:  Negative for cough and shortness of breath.    Endocrine: Negative for cold intolerance and heat intolerance.   Hematologic/Lymphatic: Negative for bleeding problem. Does not bruise/bleed easily.   Skin:  Positive for nail changes. Negative for color change, dry skin, flushing, itching, poor wound healing, rash, skin cancer, suspicious lesions and unusual hair distribution.   Musculoskeletal:  Positive for arthritis. Negative for back pain, falls, gout, joint pain, joint swelling, muscle cramps, muscle weakness, myalgias, neck pain and stiffness.   Gastrointestinal:  Negative for diarrhea, nausea and vomiting.   Neurological:  Negative for dizziness, focal weakness, light-headedness, numbness, paresthesias, tremors, vertigo and weakness.   Psychiatric/Behavioral:  Negative for altered mental status and depression. The patient does not have insomnia.   "  Allergic/Immunologic: Negative.            Objective:       Vitals:    25 1439   BP: (!) 169/76   Pulse: 86   Weight: 71 kg (156 lb 8.4 oz)   Height: 4' 11" (1.499 m)   PainSc:   8   PainLoc: Toe   71 kg (156 lb 8.4 oz)     Physical Exam  Vitals reviewed.   Constitutional:       General: She is not in acute distress.     Appearance: She is well-developed. She is not ill-appearing, toxic-appearing or diaphoretic.      Comments: Proper supportive shoegear      Cardiovascular:      Pulses:           Dorsalis pedis pulses are 2+ on the right side and 2+ on the left side.        Posterior tibial pulses are 1+ on the right side and 1+ on the left side.   Musculoskeletal:      Right lower le+ Edema present.      Left lower le+ Edema present.      Right ankle: Normal.      Right Achilles Tendon: Normal.      Left ankle: Normal.      Left Achilles Tendon: Normal.      Right foot: Decreased range of motion. Deformity, bunion, prominent metatarsal heads and tenderness present. No bony tenderness.      Left foot: Decreased range of motion. Deformity, bunion and prominent metatarsal heads present. No tenderness or bony tenderness.      Comments: Flexible pes planus foot type w/ medial arch collapse and mild gastroc equinus       Semi Reducible extensor and flexor contractures at the MTPJ and PIPJ of toes 2-5, bilat.       Mild pop to nail border   Feet:      Right foot:      Protective Sensation: 5 sites tested.  5 sites sensed.      Skin integrity: Callus (2nd Hammertoe PIPJ) and dry skin present. No ulcer, blister, skin breakdown, erythema or warmth.      Toenail Condition: Right toenails are abnormally thick.      Left foot:      Protective Sensation: 5 sites tested.  5 sites sensed.      Skin integrity: Callus and dry skin present. No ulcer, blister, skin breakdown, erythema or warmth.      Toenail Condition: Left toenails are abnormally thick.      Comments: medialhallux nail margin of rightfoot with ingrown " nail plate. No erythema and minimal edema is noted there is Negative granuloma formation noted. Negative malodor    Negative Purulence/drainage     Underlying xerosis to nail borders    Skin:     General: Skin is warm.      Capillary Refill: Capillary refill takes 2 to 3 seconds.      Coloration: Skin is not pale.      Findings: No erythema or rash.   Neurological:      Mental Status: She is alert and oriented to person, place, and time.      Sensory: No sensory deficit.      Gait: Gait is intact.   Psychiatric:         Attention and Perception: Attention normal.         Mood and Affect: Mood normal.         Speech: Speech normal.         Behavior: Behavior normal.         Thought Content: Thought content normal.         Cognition and Memory: Cognition normal.         Judgment: Judgment normal.               Assessment:       Encounter Diagnoses   Name Primary?    Hammer toes of both feet Yes    Corn or callus     Ingrown nail          Plan:       Chelsea was seen today for ingrown toenail and hammer toe.    Diagnoses and all orders for this visit:    Hammer toes of both feet    Corn or callus    Ingrown nail    Other orders  -     Discontinue: ammonium lactate 12 % Crea; Apply 1 g topically Daily.  -     ammonium lactate 12 % Crea; Apply 1 g topically Daily.      I counseled the patient on her conditions, their implications and medical management.      - continue proper shoe gear    -- With patient's permission, Utilizing a #15 scalpel, I trimmed the corns and calluses at the above mentioned location.      The patient will continue to monitor the areas daily, inspect the feet, wear protective shoe gear when ambulatory, and moisturizer to maintain skin integrity.   Utilizing sterile toenail clippers I aggressively debrided the offending nail border approximately 3 mm from its edge and carried the nail plate incision down at an angle in order to wedge out the offending cryptotic portion of the nail plate. The offending  border was then removed in toto. The area was cleansed with alcohol. Patient tolerated the procedure well and related significant relief.    - rx amm lac    - no ingrown toenail procedure indicated today consider    - consider surgical consult for hammertoe correction     prn         [1]   Current Outpatient Medications on File Prior to Visit   Medication Sig Dispense Refill    acetaminophen (TYLENOL) 650 MG TbSR Take 1 tablet (650 mg total) by mouth every 8 (eight) hours as needed (pain). 120 tablet 0    aspirin (ECOTRIN) 81 MG EC tablet Take 1 tablet (81 mg total) by mouth once daily. 28 tablet 0    atorvastatin (LIPITOR) 80 MG tablet Take 1 tablet (80 mg total) by mouth once daily. 90 tablet 3    coenzyme Q10 100 mg capsule Take 100 mg by mouth every evening.      docusate sodium (COLACE) 100 MG capsule Take 1 capsule (100 mg total) by mouth 2 (two) times daily as needed for Constipation. 60 capsule 0    ezetimibe (ZETIA) 10 mg tablet Take 1 tablet (10 mg total) by mouth once daily. 90 tablet 3    fluticasone propionate (FLONASE) 50 mcg/actuation nasal spray SHAKE LIQUID AND USE 1 SPRAY(50 MCG) IN EACH NOSTRIL EVERY DAY 16 g 3    hydroCHLOROthiazide (MICROZIDE) 12.5 mg capsule Take 1 capsule (12.5 mg total) by mouth every evening. 90 capsule 3    irbesartan (AVAPRO) 300 MG tablet Take 1 tablet (300 mg total) by mouth once daily. 90 tablet 3    Lactobacillus acidophilus (PROBIOTIC ACIDOPHILUS ORAL) Take by mouth Daily.      loratadine (CLARITIN) 10 mg tablet Take 1 tablet (10 mg total) by mouth once daily. 90 tablet 3    metoprolol succinate (TOPROL-XL) 25 MG 24 hr tablet Take 1 tablet (25 mg total) by mouth every evening. 90 tablet 3    multivitamin (THERAGRAN) per tablet Take 1 tablet by mouth once daily.      omega-3 fatty acids/fish oil (FISH OIL-OMEGA-3 FATTY ACIDS) 300-1,000 mg capsule Take by mouth once daily.       No current facility-administered medications on file prior to visit.

## 2025-02-20 ENCOUNTER — LAB VISIT (OUTPATIENT)
Dept: LAB | Facility: HOSPITAL | Age: 70
End: 2025-02-20
Attending: INTERNAL MEDICINE
Payer: MEDICARE

## 2025-02-20 DIAGNOSIS — Z83.79 FAMILY HISTORY OF CELIAC DISEASE: ICD-10-CM

## 2025-02-20 DIAGNOSIS — K90.9 INTESTINAL MALABSORPTION, UNSPECIFIED TYPE: ICD-10-CM

## 2025-02-20 DIAGNOSIS — R19.5 STOOL CONTENTS FINDING, ABNORMAL: ICD-10-CM

## 2025-02-20 DIAGNOSIS — E78.2 MIXED HYPERLIPIDEMIA: ICD-10-CM

## 2025-02-20 DIAGNOSIS — L98.491 ULCER OF FINGER, LIMITED TO BREAKDOWN OF SKIN: ICD-10-CM

## 2025-02-20 DIAGNOSIS — B89 PARASITOSIS: ICD-10-CM

## 2025-02-20 DIAGNOSIS — R20.8 OTHER DISTURBANCES OF SKIN SENSATION: ICD-10-CM

## 2025-02-20 LAB
CHOLEST SERPL-MCNC: 204 MG/DL (ref 120–199)
CHOLEST/HDLC SERPL: 3.8 {RATIO} (ref 2–5)
FERRITIN SERPL-MCNC: 36 NG/ML (ref 20–300)
FOLATE SERPL-MCNC: 16.8 NG/ML (ref 4–24)
HDLC SERPL-MCNC: 53 MG/DL (ref 40–75)
HDLC SERPL: 26 % (ref 20–50)
HIV 1+2 AB+HIV1 P24 AG SERPL QL IA: NORMAL
IRON SERPL-MCNC: 78 UG/DL (ref 30–160)
LDLC SERPL CALC-MCNC: 134.2 MG/DL (ref 63–159)
NONHDLC SERPL-MCNC: 151 MG/DL
SATURATED IRON: 15 % (ref 20–50)
TOTAL IRON BINDING CAPACITY: 514 UG/DL (ref 250–450)
TRANSFERRIN SERPL-MCNC: 347 MG/DL (ref 200–375)
TREPONEMA PALLIDUM IGG+IGM AB [PRESENCE] IN SERUM OR PLASMA BY IMMUNOASSAY: NONREACTIVE
TRIGL SERPL-MCNC: 84 MG/DL (ref 30–150)
VIT B12 SERPL-MCNC: 911 PG/ML (ref 210–950)

## 2025-02-20 PROCEDURE — 83520 IMMUNOASSAY QUANT NOS NONAB: CPT | Mod: HCNC | Performed by: STUDENT IN AN ORGANIZED HEALTH CARE EDUCATION/TRAINING PROGRAM

## 2025-02-20 PROCEDURE — 82607 VITAMIN B-12: CPT | Mod: HCNC | Performed by: FAMILY MEDICINE

## 2025-02-20 PROCEDURE — 80061 LIPID PANEL: CPT | Mod: HCNC | Performed by: INTERNAL MEDICINE

## 2025-02-20 PROCEDURE — 86235 NUCLEAR ANTIGEN ANTIBODY: CPT | Mod: HCNC | Performed by: STUDENT IN AN ORGANIZED HEALTH CARE EDUCATION/TRAINING PROGRAM

## 2025-02-20 PROCEDURE — 83540 ASSAY OF IRON: CPT | Mod: HCNC | Performed by: FAMILY MEDICINE

## 2025-02-20 PROCEDURE — 82728 ASSAY OF FERRITIN: CPT | Mod: HCNC | Performed by: FAMILY MEDICINE

## 2025-02-20 PROCEDURE — 36415 COLL VENOUS BLD VENIPUNCTURE: CPT | Mod: HCNC | Performed by: STUDENT IN AN ORGANIZED HEALTH CARE EDUCATION/TRAINING PROGRAM

## 2025-02-20 PROCEDURE — 82746 ASSAY OF FOLIC ACID SERUM: CPT | Mod: HCNC | Performed by: FAMILY MEDICINE

## 2025-02-20 PROCEDURE — 86258 DGP ANTIBODY EACH IG CLASS: CPT | Mod: HCNC | Performed by: FAMILY MEDICINE

## 2025-02-20 PROCEDURE — 86235 NUCLEAR ANTIGEN ANTIBODY: CPT | Mod: 59,HCNC | Performed by: STUDENT IN AN ORGANIZED HEALTH CARE EDUCATION/TRAINING PROGRAM

## 2025-02-20 PROCEDURE — 87389 HIV-1 AG W/HIV-1&-2 AB AG IA: CPT | Mod: HCNC | Performed by: FAMILY MEDICINE

## 2025-02-20 PROCEDURE — 83516 IMMUNOASSAY NONANTIBODY: CPT | Mod: HCNC | Performed by: STUDENT IN AN ORGANIZED HEALTH CARE EDUCATION/TRAINING PROGRAM

## 2025-02-20 PROCEDURE — 86593 SYPHILIS TEST NON-TREP QUANT: CPT | Mod: HCNC | Performed by: FAMILY MEDICINE

## 2025-02-20 PROCEDURE — 83516 IMMUNOASSAY NONANTIBODY: CPT | Mod: 59,HCNC | Performed by: STUDENT IN AN ORGANIZED HEALTH CARE EDUCATION/TRAINING PROGRAM

## 2025-02-22 LAB — ENA SCL70 IGG SER IA-ACNC: 1.9 U

## 2025-02-24 LAB
ANTI SM/RNP ANTIBODY: 0.13 RATIO (ref 0–0.99)
ANTI-SM/RNP INTERPRETATION: NEGATIVE

## 2025-02-25 ENCOUNTER — RESULTS FOLLOW-UP (OUTPATIENT)
Dept: RHEUMATOLOGY | Facility: CLINIC | Age: 70
End: 2025-02-25

## 2025-02-25 LAB
ENA SCL70 AB SER-ACNC: 3.5 U/ML
GLIADIN PEPTIDE IGA SER-ACNC: 2.5 U/ML
GLIADIN PEPTIDE IGG SER-ACNC: 9.7 U/ML
IGA SERPL-MCNC: 166 MG/DL (ref 70–400)
TTG IGA SER-ACNC: 0.4 U/ML
TTG IGG SER-ACNC: 0.9 U/ML

## 2025-02-27 ENCOUNTER — RESULTS FOLLOW-UP (OUTPATIENT)
Dept: FAMILY MEDICINE | Facility: CLINIC | Age: 70
End: 2025-02-27

## 2025-02-28 ENCOUNTER — OFFICE VISIT (OUTPATIENT)
Dept: CARDIOLOGY | Facility: CLINIC | Age: 70
End: 2025-02-28
Payer: MEDICARE

## 2025-02-28 VITALS
HEIGHT: 59 IN | HEART RATE: 76 BPM | WEIGHT: 157.19 LBS | BODY MASS INDEX: 31.69 KG/M2 | SYSTOLIC BLOOD PRESSURE: 142 MMHG | DIASTOLIC BLOOD PRESSURE: 84 MMHG

## 2025-02-28 DIAGNOSIS — I10 ESSENTIAL HYPERTENSION: ICD-10-CM

## 2025-02-28 DIAGNOSIS — G47.33 OSA (OBSTRUCTIVE SLEEP APNEA): ICD-10-CM

## 2025-02-28 DIAGNOSIS — I87.2 VENOUS STASIS DERMATITIS: ICD-10-CM

## 2025-02-28 DIAGNOSIS — E66.811 CLASS 1 OBESITY DUE TO EXCESS CALORIES WITH SERIOUS COMORBIDITY AND BODY MASS INDEX (BMI) OF 31.0 TO 31.9 IN ADULT: ICD-10-CM

## 2025-02-28 DIAGNOSIS — I83.893 VARICOSE VEINS OF LOWER EXTREMITY WITH EDEMA, BILATERAL: Primary | ICD-10-CM

## 2025-02-28 DIAGNOSIS — I89.0 LYMPHEDEMA OF BOTH LOWER EXTREMITIES: ICD-10-CM

## 2025-02-28 DIAGNOSIS — E78.2 MIXED HYPERLIPIDEMIA: ICD-10-CM

## 2025-02-28 DIAGNOSIS — E66.09 CLASS 1 OBESITY DUE TO EXCESS CALORIES WITH SERIOUS COMORBIDITY AND BODY MASS INDEX (BMI) OF 31.0 TO 31.9 IN ADULT: ICD-10-CM

## 2025-02-28 PROCEDURE — 99999 PR PBB SHADOW E&M-EST. PATIENT-LVL IV: CPT | Mod: PBBFAC,HCNC,, | Performed by: INTERNAL MEDICINE

## 2025-02-28 NOTE — PATIENT INSTRUCTIONS
Assessment/Plan:  Chelsea Rodriguez is a 70 y.o. female with HTN, HLD, obesity, HENOK (on CPAP), who presents for a follow up.    1. BLE Edema- Due to lymphedema. Edema is improved and stable.  Continue lymphedema clinic techniques at home.  Limit sodium intake to 2,000 mg daily.  Limit volume intake to 1.5 liters daily.  Elevate legs when resting. .    2. HTN- Continue current medications.    3. HLD- Pt states she stopped taking zetia and atorvastatin in 11/20224 and restarted in 1/2025.  on 2/20/2025 vs 95 on 8/22/2024. Continue zetia 10 mg daily, ASA 81 mg daily, and atorvastatin 80 mg daily.        4. Obesity- Pt referred to Bariatric Medicine for evaluation.  She has lost 8 pounds since clinic visit on 10/6/2023.  Encourage diet, exercise and weight loss.    5. HENOK- Continue CPAP nightly.    Follow up in 6 months with lipids and cmp prior

## 2025-02-28 NOTE — PROGRESS NOTES
[facility]    CC: BLE edema      Patient ID: Chelsea Rodriguez is a 70 y.o. female with HTN,. HLD, obesity, HENOK (on CPAP), who presents for a follow up.  Pertinent history/events are as follows:     -Pt kindly referred by Dr. Summers for evaluation of BLE edema.    Initial clinic visit 4/19/2022: Mrs. Rodriguez reports leg swelling for several years.  States leg welling became worse following right knee replacement surgery in 3/2021.  She has no claudication or tissue loss.  Plan:  BLE Edema- Due to lymphedema and possible venous insufficiency.  Check BLE venous reflux study and RAMOS study.  Refer to lymphedema clinic.  Limit sodium intake to 2,000 mg daily.  Limit volume intake to 1.5 liters daily.  Elevate legs when resting.  HTN- Continue current medications.  HLD- Discontinue pravastatin and start atorvastatin 80 mg daily.  Continue ASA 81 mg daily.  Obesity- Encourage diet, exercise and weight loss.  HENOK- Continue CPAP nightly.     -Follow up clinic visit 7/19/2022: Mrs. Rodriguez reports feeling well. She states she missed some doses of cholesterol medicine and may have been eating more fatty foods recently. She has no other complaints.  Plan:   BLE Edema- Due to lymphedema. Lymphedema clinic on the waiting list starting early September. Venous ultrasound negative for reflux however she does have clinical features of venous insufficiency and this may be a false negative test.  Limit sodium intake to 2,000 mg daily.  Limit volume intake to 1.5 liters daily.  Elevate legs when resting. Compression stockings.  HTN- Continue current medications.  HLD-  while taking atorvastatin 80mg. Instructed to modify diet and increase exercise. Continue ASA 81 mg daily. Lipid panel prior to next visit.  Obesity- Encourage diet, exercise and weight loss.  HENOK- Continue CPAP nightly.    -12/20/2022 clinic visit: Mrs. Rodriguez reports significant improvement in leg swelling since completing lymphedema clinic therapy.    on 12/13/2022 vs 133 on 7/13/2022.   Plan:   BLE Edema- Due to lymphedema. Mrs. Rodriguez reports significant improvement in leg swelling since completing lymphedema clinic therapy.  Continue lymphedema clinic techniques at home.  Limit sodium intake to 2,000 mg daily.  Limit volume intake to 1.5 liters daily.  Elevate legs when resting. .  HTN- Continue current medications.  HLD-  on 12/13/2022 vs 133 on 7/13/2022.  Continue ASA 81 mg daily and atorvastatin 80 mg daily.  Consider adding zetia next visit if LDL <70.    Obesity- Encourage diet, exercise and weight loss.  HENOK- Continue CPAP nightly.    -10/6/2023 clinic visit: Mrs. Rodriguez reports no chest pain or SOB. States leg swelling hs been well controlled.   on 10/2/2023.   Plan:   BLE Edema- Due to lymphedema. Edema is stable. Continue lymphedema clinic techniques at home. Limit sodium intake to 2,000 mg daily. Limit volume intake to 1.5 liters daily. Elevate legs when resting. .  HTN- Continue current medications.  HLD-  on 10/2/2023.  Start zetia 10 mg daily.  Continue ASA 81 mg daily and atorvastatin 80 mg daily.      Obesity- Refer to Bariatric Medicine for evaluation.  Encourage diet, exercise and weight loss.  HENOK- Continue CPAP nightly.    -2/15/2024 clinic visit: Mrs. Rodriguez reports doing well with no chest pain, SOB, TIA symptoms or syncope.  States leg swelling remains well controlled.  She has lost 8 pounds since clinic visit on 10/6/2023.  LDL 84 on 2/8/2024.    Plan:  BLE Edema- Due to lymphedema. Edema is stable.  Continue lymphedema clinic techniques at home.  Limit sodium intake to 2,000 mg daily.  Limit volume intake to 1.5 liters daily.  Elevate legs when resting. .  HTN- Continue current medications.  HLD- LDL 84 on 2/8/2024.  Continue zetia 10 mg daily, ASA 81 mg daily, and atorvastatin 80 mg daily.      Obesity- Pt referred to Bariatric Medicine for evaluation.  She has lost 8 pounds since clinic visit on 10/6/2023.   Encourage diet, exercise and weight loss.  HENOK- Continue CPAP nightly.    8/30/2024 clinic visit: Mrs. Rodriguez reports doing well with no chest pain, SOB, TIA symptoms or syncope.  Leg swelling is well controlled and stable. LDL 95 on 8/22/2024 vs 84 on 2/8/2024.    Plan:  BLE Edema- Due to lymphedema. Edema is improved and stable.  Continue lymphedema clinic techniques at home.  Limit sodium intake to 2,000 mg daily.  Limit volume intake to 1.5 liters daily.  Elevate legs when resting. .  HTN- Continue current medications.  HLD- LDL 95 on 8/22/2024 vs 84 on 2/8/2024.  Continue zetia 10 mg daily, ASA 81 mg daily, and atorvastatin 80 mg daily.      Obesity- Pt referred to Bariatric Medicine for evaluation.  She has lost 8 pounds since clinic visit on 10/6/2023.  Encourage diet, exercise and weight loss.  HENOK- Continue CPAP nightly.    HPI:  Mrs. Rodriguez reports no chest pain, SOB, TIA symptoms or syncope.  Leg swelling remains well controlled and stable. States she stopped taking zetia and atorvastatin in 11/20224 and restarted in 1/2025.  on 2/20/2025 vs 95 on 8/22/2024.          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 Oneil    CATARACT EXTRACTION W/  INTRAOCULAR LENS IMPLANT Left 2018    Dr. Oneil     SECTION      x2    COLONOSCOPY N/A 10/20/2017    Procedure: COLONOSCOPY;  Surgeon: Mitchell Danielson Jr., MD;  Location: [facility] ENDO;  Service: Endoscopy;  Laterality: N/A;    COLONOSCOPY N/A 2021    Procedure: COLONOSCOPY/Suprep;  Surgeon: Malcolm Reyes MD;  Location: [facility] ENDO;  Service: Endoscopy;  Laterality: N/A;    COLONOSCOPY N/A 2024    Procedure: COLONOSCOPY;  Surgeon: Frandy Avendano MD;  Location: [facility] ENDOSCOPY;  Service: Endoscopy;  Laterality: N/A;  Ref by Dr RYAN Alva, pt request Miralax, portal - PC. 24 at 4;45 pm pt. confirmed appt. EC  24- portal msg for pc. DBM  24- Per DR. Valenzuela Pt to be r/s due to 1 MD scoping on 24 in AM, LVM to inform Pt of change in scoping MD and arrival time- ERW   precall    CYST REMOVAL      on back    ESOPHAGOGASTRODUODENOSCOPY N/A 2021    Procedure: EGD (ESOPHAGOGASTRODUODENOSCOPY);  Surgeon: Malcolm Reyes MD;  Location: [facility];  Service: Endoscopy;  Laterality: N/A;    HERNIA REPAIR      HYSTERECTOMY      at 25 yrs old    INTRAOCULAR PROSTHESES INSERTION Left 2018    Procedure: INSERTION, IOL PROSTHESIS;  Surgeon: Sergio Oneil MD;  Location: [facility] OR 1ST FLR;  Service: Ophthalmology;  Laterality: Left;    INTRAOCULAR PROSTHESES INSERTION Right 2018    Procedure: INSERTION, IOL PROSTHESIS;  Surgeon: Sergio Oneil MD;  Location: [facility] OR 2ND FLR;  Service: Ophthalmology;  Laterality: Right;    KNEE ARTHROPLASTY Right 2021    Procedure: ARTHROPLASTY, KNEE:RIGHT:DEPUY-SIGMA ;  Surgeon: Pedro Summers III, MD;  Location: [facility];  Service: Orthopedics;  Laterality: Right;    LYSIS, ADHESIONS, KNEE, ARTHROSCOPIC Right 2024    Procedure: ARTHROSCOPIC KNEE LYSIS,  "ADHESIONS AND ANTERIOR INTERVAL SLIDE;  Surgeon: Ryanne Sumner MD;  Location: [facility] OR;  Service: Orthopedics;  Laterality: Right;  REGIONAL BLOCK    OOPHORECTOMY      @ 45 yrs old    PHACOEMULSIFICATION OF CATARACT Left 11/06/2018    Procedure: PHACOEMULSIFICATION, CATARACT;  Surgeon: Sergio Oneil MD;  Location: [facility] OR 1ST FLR;  Service: Ophthalmology;  Laterality: Left;    PHACOEMULSIFICATION OF CATARACT Right 11/20/2018    Procedure: PHACOEMULSIFICATION, CATARACT;  Surgeon: Sergio Oneil MD;  Location: [facility] OR 2ND FLR;  Service: Ophthalmology;  Laterality: Right;    REFRACTIVE SURGERY      2023    supracervical abdominal hysterectomy  1978    fibroids    SYNOVECTOMY OF KNEE Right 4/30/2024    Procedure: SYNOVECTOMY, KNEE;  Surgeon: Ryanne Sumner MD;  Location: 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 hx of left lumpectomy for "breast cancer"- with 5yrs of tamoxifen use. [place]e [place]        Dr. Lala former PCP, [facility]          Social Drivers of Health     Financial Resource Strain: Medium Risk (3/25/2024)    Overall Financial Resource Strain (CARDIA)     Difficulty of Paying Living Expenses: Somewhat hard   Food Insecurity: Food Insecurity Present (3/25/2024)    Hunger Vital Sign     Worried About Running Out of Food in the Last Year: Often true     Ran Out of Food in the Last Year: Often true   Transportation Needs: No Transportation Needs (3/25/2024)    " PRAPARE - Transportation     Lack of Transportation (Medical): No     Lack of Transportation (Non-Medical): No   Physical Activity: Insufficiently Active (3/25/2024)    Exercise Vital Sign     Days of Exercise per Week: 2 days     Minutes of Exercise per Session: 20 min   Stress: No Stress Concern Present (3/25/2024)    [employer] [facility] [employer] - Occupational Stress Questionnaire     Feeling of Stress : Not at all   Housing Stability: Low Risk  (3/25/2024)    Housing Stability Vital Sign     Unable to Pay for Housing in the Last Year: No     Number of Places Lived in the Last Year: 1     Unstable Housing in the Last Year: No     Family History   Problem Relation Name Age of Onset    Hypertension Mother      Heart disease Mother      Diabetes Mother      Hyperlipidemia Mother      Pancreatitis Mother      Cataracts Mother      Macular degeneration Mother      Cancer Father      Prostate cancer Father      Hypertension Sister x1     Thyroid disease Sister x1     Diabetes Brother x1     Diabetes Brother x2     Cancer Brother x2     Heart disease Brother x2     Prostate cancer Brother x2     Thyroid cancer Brother x2     Hyperlipidemia Daughter x1     Hypertension Son x1     Breast cancer Paternal Cousin      Amblyopia Neg Hx      Blindness Neg Hx      Glaucoma Neg Hx      Strabismus Neg Hx      Retinal detachment Neg Hx         Review of patient's allergies indicates:   Allergen Reactions    Codeine Nausea And Vomiting       Medication List with Changes/Refills   Current Medications    ACETAMINOPHEN (TYLENOL) 650 MG TBSR    Take 1 tablet (650 mg total) by mouth every 8 (eight) hours as needed (pain).    AMMONIUM LACTATE 12 % CREA    Apply 1 g topically Daily.    ASPIRIN (ECOTRIN) 81 MG EC TABLET    Take 1 tablet (81 mg total) by mouth once daily.    ATORVASTATIN (LIPITOR) 80 MG TABLET    Take 1 tablet (80 mg total) by mouth once daily.    COENZYME Q10 100 MG CAPSULE    Take 100 mg by mouth every  "evening.    DOCUSATE SODIUM (COLACE) 100 MG CAPSULE    Take 1 capsule (100 mg total) by mouth 2 (two) times daily as needed for Constipation.    EZETIMIBE (ZETIA) 10 MG TABLET    Take 1 tablet (10 mg total) by mouth once daily.    FLUTICASONE PROPIONATE (FLONASE) 50 MCG/ACTUATION NASAL SPRAY    SHAKE LIQUID AND USE 1 SPRAY(50 MCG) IN EACH NOSTRIL EVERY DAY    HYDROCHLOROTHIAZIDE (MICROZIDE) 12.5 MG CAPSULE    Take 1 capsule (12.5 mg total) by mouth every evening.    IRBESARTAN (AVAPRO) 300 MG TABLET    Take 1 tablet (300 mg total) by mouth once daily.    LACTOBACILLUS ACIDOPHILUS (PROBIOTIC ACIDOPHILUS ORAL)    Take 1 capsule by mouth Daily.    LORATADINE (CLARITIN) 10 MG TABLET    Take 1 tablet (10 mg total) by mouth once daily.    METOPROLOL SUCCINATE (TOPROL-XL) 25 MG 24 HR TABLET    Take 1 tablet (25 mg total) by mouth every evening.    MULTIVITAMIN (THERAGRAN) PER TABLET    Take 1 tablet by mouth once daily.    OMEGA-3 FATTY ACIDS/FISH OIL (FISH OIL-OMEGA-3 FATTY ACIDS) 300-1,000 MG CAPSULE    Take 1 capsule by mouth once daily.       Review of Systems  Constitution: Denies chills, fever, and sweats.  HENT: Denies headaches or blurry vision.  Cardiovascular: Denies chest pain or irregular heart beat.  Respiratory: Denies cough or shortness of breath.  Gastrointestinal: Denies abdominal pain, nausea, or vomiting.  Musculoskeletal: Positive for leg swelling.  Neurological: Denies dizziness or focal weakness.  Psychiatric/Behavioral: Normal mental status.  Hematologic/Lymphatic: Denies bleeding problem or easy bruising/bleeding.  Skin: Denies rash or suspicious lesions    Physical Examination  BP (!) 142/84   Pulse 76   Ht 4' 11" (1.499 m)   Wt 71.3 kg (157 lb 3 oz)   LMP  (LMP Unknown)   BMI 31.75 kg/m²     Constitutional: No acute distress, conversant  HEENT: Sclera anicteric, Pupils equal, round and reactive to light, extraocular motions intact, Oropharynx clear  Neck: No JVD, no carotid " bruits  Cardiovascular: regular rate and rhythm, no murmur, rubs or gallops, normal S1/S2  Pulmonary: Clear to auscultation bilaterally  Abdominal: Abdomen soft, nontender, nondistended, positive bowel sounds  Extremities: BLE's with trace edema, venous stasis dermatitis, and changes consistent with lymphedema   Pulses:  Carotid pulses are 2+ on the right side, and 2+ on the left side.  Radial pulses are 2+ on the right side, and 2+ on the left side.   Femoral pulses are 2+ on the right side, and 2+ on the left side.  Popliteal pulses are 2+ on the right side, and 2+ on the left side.   Dorsalis pedis pulses are 2+ on the right side, and 2+ on the left side.   Posterior tibial pulses are 2+ on the right side, and 2+ on the left side.    Skin: No ecchymosis, erythema, or ulcers  Psych: Alert and oriented x 3, appropriate affect  Neuro: CNII-XII intact, no focal deficits    Labs:  Most Recent Data  CBC:   Lab Results   Component Value Date    WBC 7.37 02/03/2025    HGB 13.2 02/03/2025    HCT 44.4 02/03/2025     02/03/2025    MCV 87 02/03/2025    RDW 15.8 (H) 02/03/2025     BMP:   Lab Results   Component Value Date     01/06/2025    K 4.2 01/06/2025     01/06/2025    CO2 29 01/06/2025    BUN 15 01/06/2025    CREATININE 0.7 01/06/2025    GLU 86 01/06/2025    CALCIUM 9.8 01/06/2025    PHOS 2.9 10/12/2017     LFTS;   Lab Results   Component Value Date    PROT 6.6 08/22/2024    ALBUMIN 3.8 08/22/2024    BILITOT 0.6 08/22/2024    AST 33 08/22/2024    ALKPHOS 85 08/22/2024    ALT 25 08/22/2024     COAGS:   Lab Results   Component Value Date    INR 0.9 03/23/2021     FLP:   Lab Results   Component Value Date    CHOL 204 (H) 02/20/2025    HDL 53 02/20/2025    LDLCALC 134.2 02/20/2025    TRIG 84 02/20/2025    CHOLHDL 26.0 02/20/2025     CARDIAC:   Lab Results   Component Value Date    TROPONINI <0.006 01/22/2023    BNP 15 01/22/2023     Imaging    RAMOS 4/26/22:  Normal rest and exercise RAMOS  bilaterally.  Normal PVR waveforms bilaterally.    BLE Venous Ultrasound 4/26/22:  No evidence of lower extremity DVT or venous reflux bilaterally.    Assessment/Plan:  Chelsea Rodriguez is a 70 y.o. female with HTN, HLD, obesity, HENOK (on CPAP), who presents for a follow up.    1. BLE Edema- Due to lymphedema. Edema is improved and stable.  Continue lymphedema clinic techniques at home.  Limit sodium intake to 2,000 mg daily.  Limit volume intake to 1.5 liters daily.  Elevate legs when resting. .    2. HTN- Continue current medications.    3. HLD- Pt states she stopped taking zetia and atorvastatin in 11/20224 and restarted in 1/2025.  on 2/20/2025 vs 95 on 8/22/2024. Continue zetia 10 mg daily, ASA 81 mg daily, and atorvastatin 80 mg daily.        4. Obesity- Pt referred to Bariatric Medicine for evaluation.  She has lost 8 pounds since clinic visit on 10/6/2023.  Encourage diet, exercise and weight loss.    5. HENOK- Continue CPAP nightly.    Follow up in 6 months with lipids and cmp prior     Total duration of face to face visit time 15 minutes.  Total time spent counseling greater than fifty percent of total visit time.  Counseling included discussion regarding imaging findings, diagnosis, possibilities, treatment options, risks and benefits.  The patient had many questions regarding the options and long-term effects.    Marcell Rico MD, PhD  Interventional Cardiology

## 2025-03-07 ENCOUNTER — PATIENT MESSAGE (OUTPATIENT)
Dept: PODIATRY | Facility: CLINIC | Age: 70
End: 2025-03-07
Payer: MEDICARE

## 2025-03-10 ENCOUNTER — PATIENT MESSAGE (OUTPATIENT)
Dept: FAMILY MEDICINE | Facility: CLINIC | Age: 70
End: 2025-03-10
Payer: MEDICARE

## 2025-03-10 ENCOUNTER — TELEPHONE (OUTPATIENT)
Dept: PODIATRY | Facility: CLINIC | Age: 70
End: 2025-03-10
Payer: MEDICARE

## 2025-03-10 NOTE — TELEPHONE ENCOUNTER
Left detail message This is a confirmation of your upcoming appointment with our podiatry department. We look forward to seeing you on 3/12 at 10:15  with Dr. Fitzpatrick [address] [place] [address] [address] . Please park behind the second grey building.      If you have any questions or need further assistance, please do not hesitate to contact our office at [phone].

## 2025-03-11 ENCOUNTER — PROCEDURE VISIT (OUTPATIENT)
Dept: PODIATRY | Facility: CLINIC | Age: 70
End: 2025-03-11
Payer: MEDICARE

## 2025-03-11 ENCOUNTER — TELEPHONE (OUTPATIENT)
Dept: PODIATRY | Facility: CLINIC | Age: 70
End: 2025-03-11
Payer: MEDICARE

## 2025-03-11 VITALS
DIASTOLIC BLOOD PRESSURE: 77 MMHG | WEIGHT: 156.88 LBS | HEART RATE: 89 BPM | BODY MASS INDEX: 31.68 KG/M2 | SYSTOLIC BLOOD PRESSURE: 160 MMHG

## 2025-03-11 DIAGNOSIS — L60.0 INGROWN NAIL: Primary | ICD-10-CM

## 2025-03-11 DIAGNOSIS — M79.672 FOOT PAIN, BILATERAL: ICD-10-CM

## 2025-03-11 DIAGNOSIS — M20.12 VALGUS DEFORMITY OF BOTH GREAT TOES: ICD-10-CM

## 2025-03-11 DIAGNOSIS — M20.11 VALGUS DEFORMITY OF BOTH GREAT TOES: ICD-10-CM

## 2025-03-11 DIAGNOSIS — M79.671 FOOT PAIN, BILATERAL: ICD-10-CM

## 2025-03-11 DIAGNOSIS — M20.42 HAMMER TOES OF BOTH FEET: ICD-10-CM

## 2025-03-11 DIAGNOSIS — M20.41 HAMMER TOES OF BOTH FEET: ICD-10-CM

## 2025-03-11 PROCEDURE — 99214 OFFICE O/P EST MOD 30 MIN: CPT | Mod: HCNC,S$GLB,, | Performed by: PODIATRIST

## 2025-03-11 NOTE — TELEPHONE ENCOUNTER
----- Message from Nurse Melendez sent at 3/11/2025 11:59 AM CDT -----  Regarding: FW: Appointment tomorrow  Please respond to this message that was left in the in basket on yesterday. Thanks!  ----- Message -----  From: Elsie Fitzpatrick DPM  Sent: 3/10/2025  10:05 AM CDT  To: [facility] Staff  Subject: Appointment tomorrow                             Please call patient and confirm tomorrow's appointment.  She is scheduled for a procedure however last visit with me no procedure was indicated.I am happy to re-evaluate her tomorrow and we can see if it is indicated

## 2025-03-11 NOTE — PROCEDURES
Subjective:   J.W.    Patient ID: Chelsea Rodriguez is a 70 y.o. female.    Chief Complaint:   Ingrown Toenail (Right medial, pain with pressure)    Chelsea is a 70 y.o. female who rtc eval nail that was previously painful. Overall feeling better.   Not sure procedure is needed. Here to discuss    Past Medical History:   Diagnosis Date    Bilateral knee pain     Carpal tunnel syndrome, bilateral     Fissure in skin of foot     Right small toe    HTN (hypertension)     Hyperlipidemia     Multiple thyroid nodules 11/10/2023    Palpitations     Rotator cuff injury     right    Screening for colorectal cancer 10/20/2017    Screening for malignant neoplasm of colon 2021    Statin-induced myositis 2018     Past Surgical History:   Procedure Laterality Date    ANORECTAL MANOMETRY N/A 9/3/2024    Procedure: MANOMETRY, ANORECTAL;  Surgeon: Frandy Avendano MD;  Location: [address]);  Service: Endoscopy;  Laterality: N/A;  Prep instructions sent via portal-  Prep-Enemas-dw   traveler, [facility] complete-st    BILATERAL SALPINGOOPHORECTOMY  2000    BREAST BIOPSY Left 2010    malignant    BREAST BIOPSY Left     negative    BREAST LUMPECTOMY Left     CATARACT EXTRACTION W/  INTRAOCULAR LENS IMPLANT Right 2018    Dr. Oneil    CATARACT EXTRACTION W/  INTRAOCULAR LENS IMPLANT Left 2018    Dr. Oneil     SECTION      x2    COLONOSCOPY N/A 10/20/2017    Procedure: COLONOSCOPY;  Surgeon: Mitchell Danielson Jr., MD;  Location: [facility];  Service: Endoscopy;  Laterality: N/A;    COLONOSCOPY N/A 2021    Procedure: COLONOSCOPY/Suprep;  Surgeon: Malcolm Reyes MD;  Location: [facility];  Service: Endoscopy;  Laterality: N/A;    COLONOSCOPY N/A 2024    Procedure: COLONOSCOPY;  Surgeon: Frandy Avendano MD;  Location: [facility] ENDOSCOPY;  Service: Endoscopy;  Laterality: N/A;  Ref by Dr YRAN Alva, pt request Miral[employer], portal - PC. 24 at 4;45 pm pt. confirmed appt. EC  24-  portal msg for pc. DBM  6/7/24- Per DR. Valenzuela Pt to be r/s due to 1 MD scoping on 6/14/24 in AM, LVM to inform Pt of change in scoping MD and arrival time- ERW  6/13 precall    CYST REMOVAL      on back    ESOPHAGOGASTRODUODENOSCOPY N/A 02/05/2021    Procedure: EGD (ESOPHAGOGASTRODUODENOSCOPY);  Surgeon: Malcolm Reyes MD;  Location: [facility];  Service: Endoscopy;  Laterality: N/A;    HERNIA REPAIR      HYSTERECTOMY      at 25 yrs old    INTRAOCULAR PROSTHESES INSERTION Left 11/06/2018    Procedure: INSERTION, IOL PROSTHESIS;  Surgeon: Sergio Oneil MD;  Location: [facility] OR [facility];  Service: Ophthalmology;  Laterality: Left;    INTRAOCULAR PROSTHESES INSERTION Right 11/20/2018    Procedure: INSERTION, IOL PROSTHESIS;  Surgeon: Sergio Oneil MD;  Location: [facility] OR 2ND FLR;  Service: Ophthalmology;  Laterality: Right;    KNEE ARTHROPLASTY Right 03/31/2021    Procedure: ARTHROPLASTY, KNEE:RIGHT:DEPUY-SIGMA ;  Surgeon: Pedro Summers III, MD;  Location: [facility];  Service: Orthopedics;  Laterality: Right;    LYSIS, ADHESIONS, KNEE, ARTHROSCOPIC Right 4/30/2024    Procedure: ARTHROSCOPIC KNEE LYSIS, ADHESIONS AND ANTERIOR INTERVAL SLIDE;  Surgeon: Ryanne Sumner MD;  Location: [facility];  Service: Orthopedics;  Laterality: Right;  REGIONAL BLOCK    OOPHORECTOMY      @ 45 yrs old    PHACOEMULSIFICATION OF CATARACT Left 11/06/2018    Procedure: PHACOEMULSIFICATION, CATARACT;  Surgeon: Sergio Oneil MD;  Location: [facility] OR [address] [place] [address];  Service: Ophthalmology;  Laterality: Left;    PHACOEMULSIFICATION OF CATARACT Right 11/20/2018    Procedure: PHACOEMULSIFICATION, CATARACT;  Surgeon: Sergio Oneil MD;  Location: [facility] OR 2ND FLR;  Service: Ophthalmology;  Laterality: Right;    REFRACTIVE SURGERY      2023    supracervical abdominal hysterectomy  1978    fibroids    SYNOVECTOMY OF KNEE Right 4/30/2024    Procedure: SYNOVECTOMY, KNEE;  Surgeon: Ryanne Sumner MD;  Location: [facility];   Service: Orthopedics;  Laterality: Right;     Medications Ordered Prior to Encounter[1]  Review of patient's allergies indicates:   Allergen Reactions    Codeine Nausea And Vomiting       Review of Systems   Constitutional: Negative for chills, decreased appetite, fever, malaise/fatigue, night sweats, weight gain and weight loss.   Cardiovascular:  Negative for chest pain, claudication, dyspnea on exertion, leg swelling, palpitations and syncope.   Respiratory:  Negative for cough and shortness of breath.    Endocrine: Negative for cold intolerance and heat intolerance.   Hematologic/Lymphatic: Negative for bleeding problem. Does not bruise/bleed easily.   Skin:  Positive for nail changes. Negative for color change, dry skin, flushing, itching, poor wound healing, rash, skin cancer, suspicious lesions and unusual hair distribution.   Musculoskeletal:  Positive for arthritis. Negative for back pain, falls, gout, joint pain, joint swelling, muscle cramps, muscle weakness, myalgias, neck pain and stiffness.   Gastrointestinal:  Negative for diarrhea, nausea and vomiting.   Neurological:  Negative for dizziness, focal weakness, light-headedness, numbness, paresthesias, tremors, vertigo and weakness.   Psychiatric/Behavioral:  Negative for altered mental status and depression. The patient does not have insomnia.    Allergic/Immunologic: Negative.            Objective:       Vitals:    03/11/25 1053   BP: (!) 160/77   Pulse: 89   Weight: 71.2 kg (156 lb 13.7 oz)   PainSc: 0-No pain   PainLoc: Toe   71.2 kg (156 lb 13.7 oz)     Physical Exam  Vitals reviewed.   Constitutional:       General: She is not in acute distress.     Appearance: She is well-developed. She is not ill-appearing, toxic-appearing or diaphoretic.      Comments: Proper supportive shoegear      Cardiovascular:      Pulses:           Dorsalis pedis pulses are 2+ on the right side and 2+ on the left side.        Posterior tibial pulses are 1+ on the right  side and 1+ on the left side.   Musculoskeletal:      Right lower le+ Edema present.      Left lower le+ Edema present.      Right ankle: Normal.      Right Achilles Tendon: Normal.      Left ankle: Normal.      Left Achilles Tendon: Normal.      Right foot: Decreased range of motion. Deformity, bunion, prominent metatarsal heads and tenderness present. No bony tenderness.      Left foot: Decreased range of motion. Deformity, bunion and prominent metatarsal heads present. No tenderness or bony tenderness.      Comments: Flexible pes planus foot type w/ medial arch collapse and mild gastroc equinus       Semi Reducible extensor and flexor contractures at the MTPJ and PIPJ of toes 2-5, bilat.       Mild pop to nail border   Feet:      Right foot:      Protective Sensation: 5 sites tested.  5 sites sensed.      Skin integrity: Callus (2nd Hammertoe PIPJ) and dry skin present. No ulcer, blister, skin breakdown, erythema or warmth.      Toenail Condition: Right toenails are abnormally thick.      Left foot:      Protective Sensation: 5 sites tested.  5 sites sensed.      Skin integrity: Callus and dry skin present. No ulcer, blister, skin breakdown, erythema or warmth.      Toenail Condition: Left toenails are abnormally thick.      Comments: medialhallux nail margin of rightfoot mild edema. No soi  No erythema    Skin:     General: Skin is warm and dry.      Capillary Refill: Capillary refill takes 2 to 3 seconds.      Coloration: Skin is not pale.      Findings: No erythema or rash.   Neurological:      Mental Status: She is alert and oriented to person, place, and time.      Sensory: No sensory deficit.   Psychiatric:         Attention and Perception: Attention normal.         Mood and Affect: Mood normal.         Speech: Speech normal.         Behavior: Behavior normal.         Thought Content: Thought content normal.         Cognition and Memory: Cognition normal.         Judgment: Judgment normal.                Assessment:       Encounter Diagnoses   Name Primary?    Ingrown nail Yes    Hammer toes of both feet     Valgus deformity of both great toes     Foot pain, bilateral          Plan:       Chelsea was seen today for ingrown toenail.    Diagnoses and all orders for this visit:    Ingrown nail    Hammer toes of both feet  -     X-Ray Foot Complete Bilateral; Future    Valgus deformity of both great toes  -     X-Ray Foot Complete Bilateral; Future    Foot pain, bilateral  -     X-Ray Foot Complete Bilateral; Future      I counseled the patient on her conditions, their implications and medical management.      - continue proper shoe gear    -- With patient's permission, Utilizing a #15 scalpel, I trimmed the corns and calluses at the above mentioned location.      The patient will continue to monitor the areas daily, inspect the feet, wear protective shoe gear when ambulatory, and moisturizer to maintain skin integrity.   Utilizing sterile toenail clippers I aggressively debrided the offending nail border approximately 3 mm from its edge and carried the nail plate incision down at an angle in order to wedge out the offending cryptotic portion of the nail plate. The offending border was then removed in toto. The area was cleansed with alcohol. Patient tolerated the procedure well and related significant relief.    - rx amm lac    - no ingrown toenail procedure indicated today consider    - consider surgical consult for hammertoe correction     prn         [1]   Current Outpatient Medications on File Prior to Visit   Medication Sig Dispense Refill    acetaminophen (TYLENOL) 650 MG TbSR Take 1 tablet (650 mg total) by mouth every 8 (eight) hours as needed (pain). 120 tablet 0    ammonium lactate 12 % Crea Apply 1 g topically Daily. 140 g 1    aspirin (ECOTRIN) 81 MG EC tablet Take 1 tablet (81 mg total) by mouth once daily. 28 tablet 0    atorvastatin (LIPITOR) 80 MG tablet Take 1 tablet (80 mg total) by mouth  once daily. 90 tablet 3    coenzyme Q10 100 mg capsule Take 100 mg by mouth every evening.      docusate sodium (COLACE) 100 MG capsule Take 1 capsule (100 mg total) by mouth 2 (two) times daily as needed for Constipation. 60 capsule 0    ezetimibe (ZETIA) 10 mg tablet Take 1 tablet (10 mg total) by mouth once daily. 90 tablet 3    fluticasone propionate (FLONASE) 50 mcg/actuation nasal spray SHAKE LIQUID AND USE 1 SPRAY(50 MCG) IN EACH NOSTRIL EVERY DAY (Patient taking differently: 1 spray as needed.) 16 g 3    hydroCHLOROthiazide (MICROZIDE) 12.5 mg capsule Take 1 capsule (12.5 mg total) by mouth every evening. 90 capsule 3    irbesartan (AVAPRO) 300 MG tablet Take 1 tablet (300 mg total) by mouth once daily. 90 tablet 3    Lactobacillus acidophilus (PROBIOTIC ACIDOPHILUS ORAL) Take 1 capsule by mouth Daily.      loratadine (CLARITIN) 10 mg tablet Take 1 tablet (10 mg total) by mouth once daily. 90 tablet 3    metoprolol succinate (TOPROL-XL) 25 MG 24 hr tablet Take 1 tablet (25 mg total) by mouth every evening. 90 tablet 3    multivitamin (THERAGRAN) per tablet Take 1 tablet by mouth once daily.      omega-3 fatty acids/fish oil (FISH OIL-OMEGA-3 FATTY ACIDS) 300-1,000 mg capsule Take 1 capsule by mouth once daily.       No current facility-administered medications on file prior to visit.

## 2025-03-13 ENCOUNTER — TELEPHONE (OUTPATIENT)
Dept: FAMILY MEDICINE | Facility: CLINIC | Age: 70
End: 2025-03-13
Payer: MEDICARE

## 2025-03-13 NOTE — TELEPHONE ENCOUNTER
----- Message from Calli sent at 3/13/2025  1:29 PM CDT -----  ..Type:  Needs reschedule  Who Called: pt SKINNY FULTON [[id number]]Would the patient rather a call back or a response via [facility]? Call [facility] Call Back Number: [phone] Additional Information: pt requesting call back to reschedule appointment on Friday 03/14/25

## 2025-03-14 ENCOUNTER — HOSPITAL ENCOUNTER (OUTPATIENT)
Dept: RADIOLOGY | Facility: HOSPITAL | Age: 70
Discharge: HOME | End: 2025-03-14
Attending: PODIATRIST
Payer: MEDICARE

## 2025-03-14 DIAGNOSIS — M20.11 VALGUS DEFORMITY OF BOTH GREAT TOES: ICD-10-CM

## 2025-03-14 DIAGNOSIS — M20.12 VALGUS DEFORMITY OF BOTH GREAT TOES: ICD-10-CM

## 2025-03-14 DIAGNOSIS — M79.671 FOOT PAIN, BILATERAL: ICD-10-CM

## 2025-03-14 DIAGNOSIS — M79.672 FOOT PAIN, BILATERAL: ICD-10-CM

## 2025-03-14 DIAGNOSIS — M20.41 HAMMER TOES OF BOTH FEET: ICD-10-CM

## 2025-03-14 DIAGNOSIS — M20.42 HAMMER TOES OF BOTH FEET: ICD-10-CM

## 2025-03-14 PROCEDURE — 73630 X-RAY EXAM OF FOOT: CPT | Mod: 26,50,HCNC, | Performed by: STUDENT IN AN ORGANIZED HEALTH CARE EDUCATION/TRAINING PROGRAM

## 2025-03-14 PROCEDURE — 73630 X-RAY EXAM OF FOOT: CPT | Mod: TC,50,HCNC,FY

## 2025-03-17 ENCOUNTER — RESULTS FOLLOW-UP (OUTPATIENT)
Dept: PODIATRY | Facility: CLINIC | Age: 70
End: 2025-03-17

## 2025-03-20 ENCOUNTER — PATIENT MESSAGE (OUTPATIENT)
Dept: ORTHOPEDICS | Facility: CLINIC | Age: 70
End: 2025-03-20
Payer: MEDICARE

## 2025-03-20 DIAGNOSIS — Z96.651 STATUS POST TOTAL RIGHT KNEE REPLACEMENT: Primary | ICD-10-CM

## 2025-03-21 NOTE — PROGRESS NOTES
[facility]  History & Physical  Orthopedics    SUBJECTIVE:      Chief Complaint:  Bilateral hand pain    Referring Provider: No ref. provider found     Dr. Willis is the supervising physician for this encounter/patient    History of Present Illness:  Patient is a 70 y.o. right hand dominant female who presents today with complaints of left ring finger injury occurred on 6/7/23 when the finger was lightly stretched/ulnar deviated when it got caught on something. She felt pain/swelling the next day. Urgent care visit on 6/14/23 with negative xrays. She reports the finger feels unstable. No treatments for this.  I have previously seen her for severe bilateral carpal tunnel syndrome. She has insisted on treating this conservatively.     Interval History March 26, 2025: The patient returns today for reevaluation of her bilateral hand pain. She reports she is experiencing stiffness/numbness to the bilateral hands. She reports a 0/10 pain, and she notes her numbness is the median nerve distrubution.  She denies ulnar nerve distribution symptoms at this time.  She reports her numbness is constant in nature with the left hand is more bothersome than the right.  She notes about 2 years ago she was using a knife and accidentally cut the distal tip of the left middle finger as she was unable to feel this fingertip.  She has attempted bracing in the past, but reports she is not utilizing wrist braces at this time.  She presents today for re-evaluation of her carpal tunnel syndrome  and to discuss further intervention.    Onset of symptoms/DOI 30+ years    Symptoms are aggravated by at night and during the day    Symptoms are alleviated by rest.    Symptoms consist of numbness/tingling in the median nerve distribution    The patient rates their pain as a 0/10     Attempted treatment(s) and/or interventions include activity modifications, rest.     The patient denies any fevers, chills, N/V, D/C and presents  for evaluation.       Past Medical History:   Diagnosis Date    Bilateral knee pain     Carpal tunnel syndrome, bilateral     Fissure in skin of foot     Right small toe    HTN (hypertension)     Hyperlipidemia     Multiple thyroid nodules 11/10/2023    Palpitations     Rotator cuff injury     right    Screening for colorectal cancer 10/20/2017    Screening for malignant neoplasm of colon 2021    Statin-induced myositis 2018     Past Surgical History:   Procedure Laterality Date    ANORECTAL MANOMETRY N/A 9/3/2024    Procedure: MANOMETRY, ANORECTAL;  Surgeon: Frandy Avendano MD;  Location: [address]);  Service: Endoscopy;  Laterality: N/A;  Prep instructions sent via portal-dw  Prep-Enemas-dw   traveler, preca complete-st    BILATERAL SALPINGOOPHORECTOMY      BREAST BIOPSY Left     malignant    BREAST BIOPSY Left     negative    BREAST LUMPECTOMY Left     CATARACT EXTRACTION W/  INTRAOCULAR LENS IMPLANT Right 2018    Dr. Oneil    CATARACT EXTRACTION W/  INTRAOCULAR LENS IMPLANT Left 2018    Dr. Oneil     SECTION      x2    COLONOSCOPY N/A 10/20/2017    Procedure: COLONOSCOPY;  Surgeon: Mitchell Danielson Jr., MD;  Location: [facility];  Service: Endoscopy;  Laterality: N/A;    COLONOSCOPY N/A 2021    Procedure: COLONOSCOPY/Suprep;  Surgeon: Malcolm Reyes MD;  Location: [facility];  Service: Endoscopy;  Laterality: N/A;    COLONOSCOPY N/A 2024    Procedure: COLONOSCOPY;  Surgeon: Frandy Avendano MD;  Location: [facility] ENDOSCOPY;  Service: Endoscopy;  Laterality: N/A;  Ref by Dr RYAN Alva, pt request Miralax, portal - PC. 24 at 4;45 pm pt. confirmed appt. EC  24- portal msg for pc. DBM  24- Per DR. Valenzuela Pt to be r/s due to 1 MD scoping on 24 in AM, LVM to inform Pt of change in scoping MD and arrival time- ERW   precall    CYST REMOVAL      on back    ESOPHAGOGASTRODUODENOSCOPY N/A 2021    Procedure: EGD  (ESOPHAGOGASTRODUODENOSCOPY);  Surgeon: Malcolm Reyes MD;  Location: [facility] ENDO;  Service: Endoscopy;  Laterality: N/A;    HERNIA REPAIR      HYSTERECTOMY      at 25 yrs old    INTRAOCULAR PROSTHESES INSERTION Left 11/06/2018    Procedure: INSERTION, IOL PROSTHESIS;  Surgeon: Sergio Oneil MD;  Location: [facility] OR 1ST FLR;  Service: Ophthalmology;  Laterality: Left;    INTRAOCULAR PROSTHESES INSERTION Right 11/20/2018    Procedure: INSERTION, IOL PROSTHESIS;  Surgeon: Sergio Oneil MD;  Location: [facility] OR 2ND FLR;  Service: Ophthalmology;  Laterality: Right;    KNEE ARTHROPLASTY Right 03/31/2021    Procedure: ARTHROPLASTY, KNEE:RIGHT:DEPUY-SIGMA ;  Surgeon: Pedro Summers III, MD;  Location: [facility] OR;  Service: Orthopedics;  Laterality: Right;    LYSIS, ADHESIONS, KNEE, ARTHROSCOPIC Right 4/30/2024    Procedure: ARTHROSCOPIC KNEE LYSIS, ADHESIONS AND ANTERIOR INTERVAL SLIDE;  Surgeon: Ryanne Sumner MD;  Location: [facility] OR;  Service: Orthopedics;  Laterality: Right;  REGIONAL BLOCK    OOPHORECTOMY      @ 45 yrs old    PHACOEMULSIFICATION OF CATARACT Left 11/06/2018    Procedure: PHACOEMULSIFICATION, CATARACT;  Surgeon: Sergio Oneil MD;  Location: [facility] OR 1ST FLR;  Service: Ophthalmology;  Laterality: Left;    PHACOEMULSIFICATION OF CATARACT Right 11/20/2018    Procedure: PHACOEMULSIFICATION, CATARACT;  Surgeon: Sergio Oneil MD;  Location: 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 hx of left lumpectomy for "breast cancer"- with 5yrs of tamoxifen use. Sees Dr. Buckley ([facility] for surveillance/MMG)        Dr. Lala former PCP, [facility]          Family History   Problem Relation Name Age of Onset    Hypertension Mother      Heart disease Mother      Diabetes Mother      Hyperlipidemia Mother      Pancreatitis Mother      Cataracts Mother      Macular degeneration Mother      Cancer Father      Prostate cancer Father      Hypertension Sister x1     Thyroid disease Sister x1     Diabetes Brother x1     Diabetes Brother x2     Cancer Brother x2     Heart disease Brother x2     Prostate cancer Brother x2     Thyroid cancer Brother x2     Hyperlipidemia Daughter x1     Hypertension Son x1     Breast cancer Paternal Cousin      Amblyopia Neg Hx      Blindness Neg Hx      Glaucoma Neg Hx      Strabismus Neg Hx      Retinal detachment Neg Hx           Current Outpatient Medications:     acetaminophen (TYLENOL) 650 MG TbSR, Take 1 tablet (650 mg total) by mouth every 8 (eight) hours as needed (pain)., Disp: 120 tablet, Rfl: 0    ammonium lactate 12 % Crea, Apply 1 g topically Daily., Disp: 140 g, Rfl: 1    aspirin (ECOTRIN) 81 MG EC tablet, Take 1 tablet (81 mg total) by mouth once daily., Disp: 28 tablet, Rfl: 0    atorvastatin (LIPITOR) 80 MG tablet, Take 1 tablet (80 mg total) by mouth once daily., Disp: 90 tablet, Rfl: 3    coenzyme Q10 100 mg capsule, Take 100 mg by mouth every evening., Disp: , Rfl:     docusate sodium (COLACE) 100 MG capsule, Take 1 capsule (100 mg total) by mouth 2 (two) times daily as needed for Constipation., Disp: 60 capsule, Rfl: 0    ezetimibe (ZETIA) 10 mg tablet, Take 1 tablet (10 mg total) by mouth once daily., Disp: 90 tablet, Rfl: 3    fluticasone propionate (FLONASE) 50 mcg/actuation nasal spray, SHAKE LIQUID AND USE 1 SPRAY(50 MCG) IN EACH NOSTRIL EVERY DAY (Patient taking differently: 1 spray as needed.), Disp: 16 g, Rfl: 3    " hydroCHLOROthiazide (MICROZIDE) 12.5 mg capsule, Take 1 capsule (12.5 mg total) by mouth every evening., Disp: 90 capsule, Rfl: 3    irbesartan (AVAPRO) 300 MG tablet, Take 1 tablet (300 mg total) by mouth once daily., Disp: 90 tablet, Rfl: 3    Lactobacillus acidophilus (PROBIOTIC ACIDOPHILUS ORAL), Take 1 capsule by mouth Daily., Disp: , Rfl:     loratadine (CLARITIN) 10 mg tablet, Take 1 tablet (10 mg total) by mouth once daily., Disp: 90 tablet, Rfl: 3    metoprolol succinate (TOPROL-XL) 25 MG 24 hr tablet, Take 1 tablet (25 mg total) by mouth every evening., Disp: 90 tablet, Rfl: 3    multivitamin (THERAGRAN) per tablet, Take 1 tablet by mouth once daily., Disp: , Rfl:     omega-3 fatty acids/fish oil (FISH OIL-OMEGA-3 FATTY ACIDS) 300-1,000 mg capsule, Take 1 capsule by mouth once daily., Disp: , Rfl:       Review of Systems:  Constitutional: no fever or chills  Eyes: no visual changes  ENT: no nasal congestion or sore throat  Respiratory: no cough or shortness of breath  Cardiovascular: no chest pain  Gastrointestinal: no nausea or vomiting, tolerating diet  Musculoskeletal: pain and soreness    OBJECTIVE:      Vital Signs (Most Recent):  There were no vitals filed for this visit.  There is no height or weight on file to calculate BMI.      Physical Exam:  Constitutional: The patient appears well-developed and well-nourished. No distress.   Skin: No lesions appreciated  Head: Normocephalic and atraumatic.   Nose: Nose normal.   Ears: No deformities seen  Eyes: Conjunctivae and EOM are normal.   Neck: No tracheal deviation present.   Cardiovascular: Normal rate and intact distal pulses.    Pulmonary/Chest: Effort normal. No respiratory distress.   Abdominal: There is no guarding.   Neurological: The patient is alert.   Psychiatric: The patient has a normal mood and affect.     Bilateral Hand/Wrist Examination:    Observation/Inspection:  Swelling  diffuse bone spurs noted amongst the DIP joints of the  hand    Deformity  none  Discoloration  none     Scars   none    Atrophy  thenar atrophy noted bilaterally to the thumb with the right greater than left    HAND/WRIST EXAMINATION:  Finkelstein's Test   Neg  WHAT Test    Neg  Snuff box tenderness   Neg  Garcia's Test    Neg  Hook of Hamate Tenderness  Neg  CMC grind    positive bilaterally  Circumduction test   mildly positive bilaterally    She is nontender to palpate the bilateral thumb CMC joints    Neurovascular Exam:  Digits WWP, brisk CR < 3s throughout  NVI motor/LTS to M/R/U nerves, radial pulse 2+; motor function is intact  Tinel's Test - Carpal Tunnel  positive bilaterally  Tinel's Test - Cubital Tunnel  negative bilaterally  Phalen's Test    positive bilaterally  Median Nerve Compression Test positive bilaterally    ROM hand full, painless; she is able make full composite fist to the bilateral hands    ROM wrist full, painless    ROM elbow full, painless    Abdomen not guarded  Respirations nonlabored  Perfusion intact    Diagnostic Results:     Imaging - I independently viewed the patient's imaging as well as the radiology report.  X-rays of the patient's left hand revealed significant left thumb CMC joint arthritis.  There is also notable degenerative change to the left thumb IP joint.  Scattered DJD most noted to all left DIP joints.  X-rays of the patient's right hand revealed significant right thumb CMC arthritis.  There is also scattered DJD most notable to the right small finger PIP, right small finger DIPJ, and right ring finger DIPJ.  No acute fracture or dislocation noted.    FINDINGS:  Left hand progression of DJD changes 1st metacarpal-carpal, DIP joints 1st-5th digits.     Right hand, mild moderate DJD 1st metacarpal-carpal, distal navicular carpal, and 1st, 3rd and 5th D IP, 5th PIP joints with some medial subluxation 5th PIP and lateral subluxation 5th D IP joints.  Less prominent DJD 2nd MCP joint.     EMG February 2023  severe bilateral  carpal tunnel syndrome (median neuropathy at the wrists) with secondary denervation of the APB muscles   ASSESSMENT/PLAN:      70 y.o. yo female with severe bilateral carpal tunnel syndrome    Plan: The patient and I had a thorough discussion today.  We discussed the working diagnosis as well as several other potential alternative diagnoses.  Treatment options were discussed, both conservative and surgical.  Conservative treatment options would include things such as activity modifications, workplace modifications, a period of rest, oral vs topical OTC and prescription anti-inflammatory medications, occupational therapy, splinting/bracing, immobilization, corticosteroid injections, and others.  Surgical options were discussed as well.     At this time, the patient is struggling with carpal tunnel syndrome to the bilateral wrist.  The patient has severe carpal tunnel syndrome bilaterally, so I highly recommended surgery for her condition. She would like to follow up with Dr. Willis to discuss surgical interventions.  She would like to hold off on surgery until after August as she is scheduled for a trip this summer. I recommended she utilize nocturnal gel braces and undergo ergonomic changes.  New nocturnal braces provided today.  She also would like to participate in occupational therapy for nerve glides.  OT orders placed today.  However, considering she has severe carpal tunnel I educated the patient her symptoms of numbness and tingling may never fully resolved.  Patient voiced understanding.  She will follow up with our clinic in June or July to discuss surgical intervention with Dr. Willis.    Should the patient's symptoms worsen, persist, or fail to improve they should return for reevaluation and I would be happy to see them back anytime.    Disclaimer:  This note is prepared using voice recognition software and as such is likely to have errors and has not been proof read. Please contact me for questions.      Nathaly Velasco PA-C  Orthopedic/Hand Surgery

## 2025-03-24 ENCOUNTER — CLINICAL SUPPORT (OUTPATIENT)
Dept: OTOLARYNGOLOGY | Facility: CLINIC | Age: 70
End: 2025-03-24
Payer: MEDICARE

## 2025-03-24 ENCOUNTER — OFFICE VISIT (OUTPATIENT)
Dept: OTOLARYNGOLOGY | Facility: CLINIC | Age: 70
End: 2025-03-24
Payer: MEDICARE

## 2025-03-24 VITALS
BODY MASS INDEX: 31.95 KG/M2 | HEART RATE: 67 BPM | SYSTOLIC BLOOD PRESSURE: 126 MMHG | DIASTOLIC BLOOD PRESSURE: 79 MMHG | WEIGHT: 158.19 LBS

## 2025-03-24 DIAGNOSIS — H93.13 TINNITUS, BILATERAL: Primary | ICD-10-CM

## 2025-03-24 DIAGNOSIS — H61.22 IMPACTED CERUMEN OF LEFT EAR: ICD-10-CM

## 2025-03-24 DIAGNOSIS — H91.91 HIGH-FREQUENCY HEARING LOSS OF RIGHT EAR: ICD-10-CM

## 2025-03-24 DIAGNOSIS — H91.8X3 ASYMMETRICAL HEARING LOSS: ICD-10-CM

## 2025-03-24 DIAGNOSIS — H93.13 TINNITUS OF BOTH EARS: Primary | ICD-10-CM

## 2025-03-24 PROCEDURE — 1101F PT FALLS ASSESS-DOCD LE1/YR: CPT | Mod: HCNC,CPTII,S$GLB, | Performed by: NURSE PRACTITIONER

## 2025-03-24 PROCEDURE — 3074F SYST BP LT 130 MM HG: CPT | Mod: HCNC,CPTII,S$GLB, | Performed by: NURSE PRACTITIONER

## 2025-03-24 PROCEDURE — 92556 SPEECH AUDIOMETRY COMPLETE: CPT | Mod: HCNC,S$GLB,,

## 2025-03-24 PROCEDURE — 1159F MED LIST DOCD IN RCRD: CPT | Mod: HCNC,CPTII,S$GLB, | Performed by: NURSE PRACTITIONER

## 2025-03-24 PROCEDURE — 99214 OFFICE O/P EST MOD 30 MIN: CPT | Mod: 25,HCNC,S$GLB, | Performed by: NURSE PRACTITIONER

## 2025-03-24 PROCEDURE — 3044F HG A1C LEVEL LT 7.0%: CPT | Mod: HCNC,CPTII,S$GLB, | Performed by: NURSE PRACTITIONER

## 2025-03-24 PROCEDURE — 99999 PR PBB SHADOW E&M-EST. PATIENT-LVL I: CPT | Mod: PBBFAC,HCNC,,

## 2025-03-24 PROCEDURE — 3008F BODY MASS INDEX DOCD: CPT | Mod: HCNC,CPTII,S$GLB, | Performed by: NURSE PRACTITIONER

## 2025-03-24 PROCEDURE — 99999 PR PBB SHADOW E&M-EST. PATIENT-LVL III: CPT | Mod: PBBFAC,HCNC,, | Performed by: NURSE PRACTITIONER

## 2025-03-24 PROCEDURE — 92567 TYMPANOMETRY: CPT | Mod: HCNC,S$GLB,,

## 2025-03-24 PROCEDURE — 92552 PURE TONE AUDIOMETRY AIR: CPT | Mod: HCNC,S$GLB,,

## 2025-03-24 PROCEDURE — 3288F FALL RISK ASSESSMENT DOCD: CPT | Mod: HCNC,CPTII,S$GLB, | Performed by: NURSE PRACTITIONER

## 2025-03-24 PROCEDURE — G0268 REMOVAL OF IMPACTED WAX MD: HCPCS | Mod: HCNC,S$GLB,, | Performed by: NURSE PRACTITIONER

## 2025-03-24 PROCEDURE — 3078F DIAST BP <80 MM HG: CPT | Mod: HCNC,CPTII,S$GLB, | Performed by: NURSE PRACTITIONER

## 2025-03-24 PROCEDURE — 1126F AMNT PAIN NOTED NONE PRSNT: CPT | Mod: HCNC,CPTII,S$GLB, | Performed by: NURSE PRACTITIONER

## 2025-03-24 PROCEDURE — 1157F ADVNC CARE PLAN IN RCRD: CPT | Mod: HCNC,CPTII,S$GLB, | Performed by: NURSE PRACTITIONER

## 2025-03-24 PROCEDURE — 4010F ACE/ARB THERAPY RXD/TAKEN: CPT | Mod: HCNC,CPTII,S$GLB, | Performed by: NURSE PRACTITIONER

## 2025-03-24 PROCEDURE — 1160F RVW MEDS BY RX/DR IN RCRD: CPT | Mod: HCNC,CPTII,S$GLB, | Performed by: NURSE PRACTITIONER

## 2025-03-24 NOTE — PROCEDURES
Ear Cerumen Removal    Date/Time: 3/24/2025 11:20 AM    Performed by: Veronika Coombs NP  Authorized by: Veronika Coombs NP    Consent Done?:  Yes (Verbal)    Local anesthetic:  None  Location details:  Left ear  Procedure type: curette    Procedure type comment:  Suction  Cerumen  Removal Results:  Cerumen completely removed  Patient tolerance:  Patient tolerated the procedure well with no immediate complications

## 2025-03-24 NOTE — PROGRESS NOTES
Chief Complaint   Patient presents with    Tinnitus     Bilateral. About a month. Denies pain, drainage. Some itching   .     HPI 4/9/2024: Chelsea Rodriguez is 70 y.o. female who is self-referred for evaluation of right ear pain.  She had itchy right ear on 3/26/24 and she flushed her ear. A couple days after, she was having ear pain and swelling. She went to the urgent care on 4/3/2024 and was rx'ed cortisporin. The ear drops eased the pain. She reports decreased hearing in her right ear.     Interval HPI 4/16/2024:  Follow up visit. She is here today with her daughter. She reports improvement with her ear pain and hearing loss.  Right ear culture on 4/9/2024 was positive for pseudomonas aeruginosa and switched to ofloxacin ear drops. She noticed improvement after started on the ofloxacin. She reports of tinnitus prior to the ear infection but since the it has been more constant.     Interval HPI 3/24/2025:  Ms. Rodriguez is here today for evaluation of bilateral tinnitus. She perceives tinnitus in the left ear is worse. She describes tinnitus as high-pitch noise. Denies ear pain, hearing loss, and dizziness. She does not perceive a difference in her hearing in either ear. Denies history of noise exposure.     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 hx of left lumpectomy for "breast cancer"- with 5yrs of tamoxifen use. Sees Dr. Buckley ([facility] for surveillance/MMG)        Dr. Lala former PCP, [facility]          Social Drivers of Health     Financial Resource Strain: Medium Risk (3/23/2025)    Overall Financial Resource Strain (CARDIA)     Difficulty of Paying Living Expenses: Somewhat hard   Food Insecurity: No Food Insecurity (3/23/2025)    Hunger Vital Sign     Worried About Running Out of Food in the Last Year: Never true     Ran Out of Food in the Last Year: Never true   Transportation Needs: No Transportation Needs (3/23/2025)    PRAPARE - Transportation     Lack of Transportation (Medical): No     Lack of Transportation (Non-Medical): No   Physical Activity: Inactive (3/23/2025)    Exercise Vital Sign     Days of Exercise per Week: 0 days     Minutes of Exercise per Session: 20 min   Stress: No Stress Concern Present (3/23/2025)    [employer] - Occupational Stress Questionnaire     Feeling of Stress : Not at all   Housing Stability: Low Risk  (3/23/2025)    Housing Stability Vital Sign     Unable to Pay for Housing in the Last Year: No     Number of Times Moved in the Last Year: 0     Homeless in the Last Year: No     Past Surgical History:   Procedure Laterality Date    ANORECTAL MANOMETRY N/A 9/3/2024    Procedure: MANOMETRY, ANORECTAL;  Surgeon: Frandy Avendano MD;  Location: [address]);  Service: Endoscopy;  Laterality: N/A;  Prep instructions sent via portal-dw  Prep-Enemas-dw  8/22 traveler, precall complete-st    BILATERAL SALPINGOOPHORECTOMY  2000    BREAST BIOPSY Left 2010    malignant    BREAST BIOPSY Left 2008    negative    BREAST LUMPECTOMY Left 2010    CATARACT EXTRACTION W/  INTRAOCULAR LENS IMPLANT Right 11/06/2018 "    Dr. Oneil    CATARACT EXTRACTION W/  INTRAOCULAR LENS IMPLANT Left 2018    Dr. Oneil     SECTION      x2    COLONOSCOPY N/A 10/20/2017    Procedure: COLONOSCOPY;  Surgeon: Mitchell Danielson Jr., MD;  Location: [facility];  Service: Endoscopy;  Laterality: N/A;    COLONOSCOPY N/A 2021    Procedure: COLONOSCOPY/Suprep;  Surgeon: Malcolm Reyes MD;  Location: [facility] ENDO;  Service: Endoscopy;  Laterality: N/A;    COLONOSCOPY N/A 2024    Procedure: COLONOSCOPY;  Surgeon: Frandy Avendano MD;  Location: [facility] ENDOSCOPY;  Service: Endoscopy;  Laterality: N/A;  Ref by Dr RYAN Alva, pt request Miralax, portal - PC. 24 at 4;45 pm pt. confirmed appt. EC  24- portal msg for pc. DBM  24- Per DR. Valenzuela Pt to be r/s due to 1 MD scoping on 24 in AM, LVM to inform Pt of change in scoping MD and arrival time- ERW   precall    CYST REMOVAL      on back    ESOPHAGOGASTRODUODENOSCOPY N/A 2021    Procedure: EGD (ESOPHAGOGASTRODUODENOSCOPY);  Surgeon: Malcolm Reyes MD;  Location: [facility];  Service: Endoscopy;  Laterality: N/A;    HERNIA REPAIR      HYSTERECTOMY      at 25 yrs old    INTRAOCULAR PROSTHESES INSERTION Left 2018    Procedure: INSERTION, IOL PROSTHESIS;  Surgeon: Sergio Oneil MD;  Location: [facility] OR 1ST FLR;  Service: Ophthalmology;  Laterality: Left;    INTRAOCULAR PROSTHESES INSERTION Right 2018    Procedure: INSERTION, IOL PROSTHESIS;  Surgeon: Sergio Oneil MD;  Location: [facility] OR 2ND FLR;  Service: Ophthalmology;  Laterality: Right;    KNEE ARTHROPLASTY Right 2021    Procedure: ARTHROPLASTY, KNEE:RIGHT:DEPUY-SIGMA ;  Surgeon: Pedro Summers III, MD;  Location: [facility];  Service: Orthopedics;  Laterality: Right;    LYSIS, ADHESIONS, KNEE, ARTHROSCOPIC Right 2024    Procedure: ARTHROSCOPIC KNEE LYSIS, ADHESIONS AND ANTERIOR INTERVAL SLIDE;  Surgeon: Ryanne Sumner MD;  Location: [facility];  Service:  Orthopedics;  Laterality: Right;  REGIONAL BLOCK    OOPHORECTOMY      @ 45 yrs old    PHACOEMULSIFICATION OF CATARACT Left 11/06/2018    Procedure: PHACOEMULSIFICATION, CATARACT;  Surgeon: Sergio Oneil MD;  Location: [facility] OR 1ST [facility];  Service: Ophthalmology;  Laterality: Left;    PHACOEMULSIFICATION OF CATARACT Right 11/20/2018    Procedure: PHACOEMULSIFICATION, CATARACT;  Surgeon: Sergio Oneil MD;  Location: [facility] OR 2ND FLR;  Service: Ophthalmology;  Laterality: Right;    REFRACTIVE SURGERY      2023    supracervical abdominal hysterectomy  1978    fibroids    SYNOVECTOMY OF KNEE Right 4/30/2024    Procedure: SYNOVECTOMY, KNEE;  Surgeon: Ryanne Sumner MD;  Location: [facility];  Service: Orthopedics;  Laterality: Right;     Family History   Problem Relation Name Age of Onset    Hypertension Mother      Heart disease Mother      Diabetes Mother      Hyperlipidemia Mother      Pancreatitis Mother      Cataracts Mother      Macular degeneration Mother      Cancer Father      Prostate cancer Father      Hypertension Sister x1     Thyroid disease Sister x1     Diabetes Brother x1     Diabetes Brother x2     Cancer Brother x2     Heart disease Brother x2     Prostate cancer Brother x2     Thyroid cancer Brother x2     Hyperlipidemia Daughter x1     Hypertension Son x1     Breast cancer Paternal Cousin      Amblyopia Neg Hx      Blindness Neg Hx      Glaucoma Neg Hx      Strabismus Neg Hx      Retinal detachment Neg Hx             Review of Systems  General: negative for chills, fever or weight loss  Psychological: negative for mood changes or depression  Ophthalmic: negative for blurry vision, photophobia or eye pain  ENT: see HPI  Respiratory: no cough, shortness of breath, or wheezing  Cardiovascular: no chest pain or dyspnea on exertion  Gastrointestinal: no abdominal pain, change in bowel habits, or black/ bloody stools  Musculoskeletal: negative for gait disturbance or muscular  weakness  Neurological: no syncope or seizures; no ataxia  Dermatological: negative for puritis,  rash and jaundice  Hematologic/lymphatic: no easy bruising, no new lumps or bumps      Physical Exam:    Vitals:    03/24/25 1105   BP: 126/79   Pulse: 67           Constitutional: Well appearing / communicating without difficutly.  NAD.  Eyes: EOM I Bilaterally  Head/Face: Normocephalic.  Negative paranasal sinus pressure/tenderness.  Salivary glands WNL.  House Brackmann I Bilaterally.    Right Ear: Auricle normal appearance. External Auditory Canal within normal limits no lesions or masses, no drainage, edema or erythema. TM w/o masses/lesions/perforations.   Left Ear: Auricle normal appearance. External Auditory Canal with cerumen impaction. EAC within normal limits no lesions or masses,TM w/o masses/lesions/perforations. TM mobility noted.  Nose: No gross nasal septal deviation. Inferior Turbinates 3+ bilaterally. No septal perforation. No masses/lesions. External nasal skin appears normal without masses/lesions.  Oral Cavity: Gingiva/lips within normal limits.  Dentition/gingiva healthy appearing. Mucus membranes moist. Floor of mouth soft, no masses palpated. Oral Tongue mobile. Hard Palate appears normal.    Oropharynx: Base of tongue appears normal. No masses/lesions noted. Tonsillar fossa/pharyngeal wall without lesions. Posterior oropharynx WNL.  Soft palate without masses. Midline uvula.   Neck/Lymphatic: No LAD I-VI bilaterally.  No thyromegaly.  No masses noted on exam.      Ear Cerumen Removal    Date/Time: 3/24/2025 11:20 AM    Performed by: Veronika Coombs NP  Authorized by: Veronika Coombs NP    Consent Done?:  Yes (Verbal)    Local anesthetic:  None  Location details:  Left ear  Procedure type: curette    Procedure type comment:  Suction  Cerumen  Removal Results:  Cerumen completely removed  Patient tolerance:  Patient tolerated the procedure well with no immediate complications      Diagnostic  testing reviewed:  Pure tone testing revealed borderline normal hearing with mild loss at 8000 Hz in the right ear and normal hearing in the left ear. Speech reception thresholds were obtained at 15 dBHL in the right ear and 10 dBHL in the left ear. Speech discrimination scores were 100% in the right ear and 100% in the left ear. Tympanometry revealed Type As tympanogram in both ears.               Assessment:    ICD-10-CM ICD-9-CM    1. Tinnitus of both ears  H93.13 388.30       2. Impacted cerumen of left ear  H61.22 380.4 Ear Cerumen Removal      3. High-frequency hearing loss of right ear -mild  H91.91 389.8             The primary encounter diagnosis was Tinnitus of both ears. Diagnoses of Impacted cerumen of left ear and High-frequency hearing loss of right ear -mild were also pertinent to this visit.      Plan:  Orders Placed This Encounter   Procedures    Ear Cerumen Removal     -Cerumen impaction:  Removed under microscopy today without difficulty.  I would recommend the use of a wax softening drop, either over the counter Debrox or mineral oil, on a weekly basis.  I also instructed the patient to avoid Qtips.    -We reviewed her audiogram together in detail. We also discussed that tinnitus is most often caused by a hearing loss, and that as the hair cells are damaged, either genetic or as a result of loud noise exposure, they then cause tinnitus.  Some patients find that restricting the salt or caffeine in their diet helps.  Tinnitus tends to be louder in times of stress and fatigue, and may decrease with time.  Sound machines may also be an effective masking technique if needed at night.  -recommend annual audiograms  -follow up 1 year or sooner as needed      Veronika Coombs NP      Answers submitted by the patient for this visit:  Review of Symptoms Questionnaire  (Submitted on 3/23/2025)  Fatigue (Tiredness)?: Yes  Voice Change?: Yes  None of these : Yes  Snoring?: Yes  Sleep Apnea?: Yes  Irregular  heartbeat?: Yes  Foot swelling?: Yes  constipation: Yes  None of these: Yes  Muscle aches / pain?: Yes  neck pain: Yes  None of these : Yes  None of these: Yes  Cold all of the time? : Yes  None of these: Yes  swollen glands: Yes  None of these: Yes

## 2025-03-24 NOTE — PROGRESS NOTES
Chelsea Rodriguez, a 70 y.o. female was seen today in the clinic for an audiologic evaluation. The patient's primary complaint was worsening tinnitus in both ears. Past record review (4-9-24) indicated tinnitus of the right ear with mixed hearing loss and normal hearing in the left ear. Ms. Rodriguez denies ear pain today. She does not have hearing specific concerns at this time.    Pure tone testing revealed borderline normal hearing with mild loss at 8000 Hz in the right ear and normal hearing in the left ear. Speech reception thresholds were obtained at 15 dBHL in the right ear and 10 dBHL in the left ear. Speech discrimination scores were 100% in the right ear and 100% in the left ear. Tympanometry revealed Type As tympanogram in both ears.     Recommendations:  Otologic evaluation  Annual audiologic evaluation  Hearing protection in noise

## 2025-03-26 ENCOUNTER — OFFICE VISIT (OUTPATIENT)
Dept: ORTHOPEDICS | Facility: CLINIC | Age: 70
End: 2025-03-26
Payer: MEDICARE

## 2025-03-26 ENCOUNTER — HOSPITAL ENCOUNTER (OUTPATIENT)
Dept: RADIOLOGY | Facility: HOSPITAL | Age: 70
Discharge: HOME | End: 2025-03-26
Payer: MEDICARE

## 2025-03-26 DIAGNOSIS — M79.642 BILATERAL HAND PAIN: ICD-10-CM

## 2025-03-26 DIAGNOSIS — M79.641 BILATERAL HAND PAIN: ICD-10-CM

## 2025-03-26 DIAGNOSIS — M18.0 ARTHRITIS OF CARPOMETACARPAL (CMC) JOINT OF BOTH THUMBS: ICD-10-CM

## 2025-03-26 DIAGNOSIS — G56.03 BILATERAL CARPAL TUNNEL SYNDROME: Primary | ICD-10-CM

## 2025-03-26 PROCEDURE — 99999 PR PBB SHADOW E&M-EST. PATIENT-LVL III: CPT | Mod: PBBFAC,HCNC,,

## 2025-03-26 PROCEDURE — 1160F RVW MEDS BY RX/DR IN RCRD: CPT | Mod: HCNC,CPTII,S$GLB,

## 2025-03-26 PROCEDURE — 73130 X-RAY EXAM OF HAND: CPT | Mod: TC,50,HCNC

## 2025-03-26 PROCEDURE — 1101F PT FALLS ASSESS-DOCD LE1/YR: CPT | Mod: HCNC,CPTII,S$GLB,

## 2025-03-26 PROCEDURE — 1159F MED LIST DOCD IN RCRD: CPT | Mod: HCNC,CPTII,S$GLB,

## 2025-03-26 PROCEDURE — 4010F ACE/ARB THERAPY RXD/TAKEN: CPT | Mod: HCNC,CPTII,S$GLB,

## 2025-03-26 PROCEDURE — 1126F AMNT PAIN NOTED NONE PRSNT: CPT | Mod: HCNC,CPTII,S$GLB,

## 2025-03-26 PROCEDURE — 99214 OFFICE O/P EST MOD 30 MIN: CPT | Mod: HCNC,S$GLB,,

## 2025-03-26 PROCEDURE — 1157F ADVNC CARE PLAN IN RCRD: CPT | Mod: HCNC,CPTII,S$GLB,

## 2025-03-26 PROCEDURE — 3288F FALL RISK ASSESSMENT DOCD: CPT | Mod: HCNC,CPTII,S$GLB,

## 2025-03-26 PROCEDURE — 3044F HG A1C LEVEL LT 7.0%: CPT | Mod: HCNC,CPTII,S$GLB,

## 2025-03-26 PROCEDURE — 73130 X-RAY EXAM OF HAND: CPT | Mod: 26,50,HCNC, | Performed by: RADIOLOGY

## 2025-03-31 ENCOUNTER — CLINICAL SUPPORT (OUTPATIENT)
Dept: REHABILITATION | Facility: HOSPITAL | Age: 70
End: 2025-03-31
Payer: MEDICARE

## 2025-03-31 DIAGNOSIS — M79.641 BILATERAL HAND PAIN: ICD-10-CM

## 2025-03-31 DIAGNOSIS — M79.642 BILATERAL HAND PAIN: ICD-10-CM

## 2025-03-31 DIAGNOSIS — R53.1 DECREASED STRENGTH: Primary | ICD-10-CM

## 2025-03-31 PROCEDURE — 97530 THERAPEUTIC ACTIVITIES: CPT

## 2025-03-31 PROCEDURE — 97165 OT EVAL LOW COMPLEX 30 MIN: CPT

## 2025-03-31 NOTE — PATIENT INSTRUCTIONS
[facility] O[facility] THERAPY  HOME EXERCISE PROGRAM            .

## 2025-04-01 PROBLEM — R53.1 DECREASED STRENGTH: Status: ACTIVE | Noted: 2023-07-18

## 2025-04-01 NOTE — PROGRESS NOTES
Outpatient Rehab    Occupational Therapy Evaluation    Patient Name: Chelsea Rodriguez  MRN: [id number]  YOB: 1955  Encounter Date: 3/31/2025    Therapy Diagnosis:   Encounter Diagnoses   Name Primary?    Bilateral hand pain     Decreased strength Yes     Physician: Nathaly Velasco PA-C    Physician Orders: Eval and Treat  Medical Diagnosis: Bilateral hand pain    Visit # / Visits Authorized: 1 / 1  Insurance Authorization Period: 3/26/2025 to 3/26/2026  Date of Evaluation: 3/31/2025  Plan of Care Certification: 3/31/2025 to 5/9/2025     Time In: 1100   Time Out: 1200  Total Time: 60       Intake Outcome Measure for FOTO Survey    Therapist reviewed FOTO scores for Chelsea Rodriguez on 3/31/2025.   FOTO report - see Media section or FOTO account episode details.     Intake Score: 64%    Precautions  Right Upper Extremity Weight-Bearing Status: Weight-bearing as tolerated         Subjective   History of Present Illness  Chelsea is a 70 y.o. female who reports to occupational therapy with a chief concern of Bilateral hand pain; Decreased strength.     The patient reports a medical diagnosis of M79.641,M79.642 (ICD-10-CM) - Bilateral hand pain.                 Activities of Daily Living  Social history was obtained from Patient.               Previously independent with activities of daily living? Yes     Currently independent with activities of daily living? No  Activities currently needing assistance include Dressing - upper body and Grooming.        Previously independent with instrumental activities of daily living? Yes                    Pain     Patient reports a current pain level of 0/10. Pain at best is reported as 6/10. Pain at worst is reported as 0/10.   Location: Dorsal side of hands; happens occassionally  Pain Qualities: Aching, Dull         Living Arrangements  Living Situation  Living Arrangements: Family members  Support Systems: Family members        Employment  Patient does not report that:  Does the patient's condition impact their ability to work?  Employment Status: Retired          Past Medical History/Physical Systems Review:   Chelsea Rodriguez  has a past medical history of Bilateral knee pain, Carpal tunnel syndrome, bilateral, Fissure in skin of foot, HTN (hypertension), Hyperlipidemia, Multiple thyroid nodules, Palpitations, Rotator cuff injury, Screening for colorectal cancer, Screening for malignant neoplasm of colon, and Statin-induced myositis.    Chelsea Rodriguez  has a past surgical history that includes supracervical abdominal hysterectomy (); Bilateral salpingoophorectomy (); Colonoscopy (N/A, 10/20/2017); Intraocular prosthesis insertion (Left, 2018); Phacoemulsification of cataract (Left, 2018); Hernia repair; Cyst Removal;  section; Phacoemulsification of cataract (Right, 2018); Intraocular prosthesis insertion (Right, 2018); Breast lumpectomy (Left, ); Breast biopsy (Left, ); Breast biopsy (Left, ); Hysterectomy; Oophorectomy; Cataract extraction w/  intraocular lens implant (Right, 2018); Cataract extraction w/  intraocular lens implant (Left, 2018); Esophagogastroduodenoscopy (N/A, 2021); Colonoscopy (N/A, 2021); Knee Arthroplasty (Right, 2021); Refractive surgery; lysis, adhesions, knee, arthroscopic (Right, 2024); Synovectomy of knee (Right, 2024); Colonoscopy (N/A, 2024); and Anorectal manometry (N/A, 9/3/2024).    Chelsea has a current medication list which includes the following prescription(s): acetaminophen, ammonium lactate, aspirin, atorvastatin, coenzyme q10, docusate sodium, ezetimibe, fluticasone propionate, hydrochlorothiazide, irbesartan, lactobacillus acidophilus, loratadine, metoprolol succinate, multivitamin, and fish oil-omega-3 fatty acids.    Review of patient's allergies indicates:   Allergen Reactions    Codeine Nausea And Vomiting        Objective      Upper Extremity  Sensation            Pt. reported numbness and tingling sensation along median nerve distribution         Elbow/Forearm Range of Motion   Right Elbow/Forearm   Active (deg) Passive (deg) Pain   Flexion         Extension         Forearm Pronation 65       Forearm Supination 78         Left Elbow/Forearm   Active (deg) Passive (deg) Pain   Flexion         Extension         Forearm Pronation 60       Forearm Supination 85                Wrist Range of Motion  Right Wrist   Active (deg) Passive (deg) Pain Comment   Flexion 61         Extension 50         Radial Deviation 10         Ulnar Deviation 45           Left Wrist   Active (deg) Passive (deg) Pain Comment   Flexion 60         Extension 60         Radial Deviation 15         Ulnar Deviation 40                            Right  Strength  Right Hand Dynamometer Position: 2  Elbow Position Forearm Position Trial 1 (lbs) Trial 2  (lbs) Trial 3  (lbs) Average  (lbs) Pain   Flexed Neutral 44 49 53 48.67         Left  Strength  Left Hand Dynamometer Position: 2  Elbow Position Forearm Position Trial 1 (lbs) Trial 2 (lbs) Trial 3 (lbs) Average (lbs) Pain   Flexed Neutral 51 54 57 54         Right Pinch Strength   Trial 1 (lbs) Trial 2 (lbs) Trial 3 (lbs) Average (lbs) Pain   Lateral (Key Pinch) 14 12 13 13     Three Point (Three Jaw Jaime) 8 8 9 8.33     Two Point (Tip to Tip)                 Left Pinch Strength   Trial 1 (lbs) Trial 2 (lbs) Trial 3 (lbs) Average (lbs) Pain   Lateral (Key Pinch) 14 12 12 12.67     Three Point (Three Jaw Jaime) 10 8 7 8.33     Two Point (Tip to Tip)                        Wrist/Hand Special Tests  Hand Coordination Special Tests  Right 9-Hole Peg Test (sec): 31  Left 9-Hole Peg Test (sec): 29       Coordination  Right 9-Hole Peg Test (sec): 31  Left 9-Hole Peg Test (sec): 29               Treatment:  Modalities  Paraffin Bath: Parrafin applied to (B) hand(s) for 10 minutes to increase blood flow, circulation, pain management, and  for tissue elasticity prior to therex.    Time Entry(in minutes):  OT Evaluation (Low) Time Entry: 35  Paraffin Bath Time Entry: 10  Therapeutic Activity Time Entry: 15    Assessment & Plan   Assessment  Chelsea presents with a condition of Low complexity.   Presentation of Symptoms: Stable  Will Comorbidities Impact Care: No          Functional Limitations: Carrying objects, Fine motor coordination, Gross motor coordination, Manipulating objects, Activity tolerance              Occupational profile: This patient is being referred to Outpatient Occupational Therapy  and presents with limitations in range of motion, decrease strength, and requires to use gain functional use of extermity.  The following goals were discussed with the patient and  is in agreement with them as to be addressed in the treatment plan..   Evaluation/Treatment Response: Patient responded to treatment well  Prognosis: Good    Plan  From an occupational therapy perspective, the patient would benefit from: Skilled Rehab Services    Planned therapy interventions include: Therapeutic exercise, Therapeutic activities, ADLs/IADLs, Manual therapy, and Neuromuscular re-education.    Planned modalities to include: Biofeedback, Contrast bath, Cryotherapy (cold pack), Fluidotherapy, Iontophoresis, Low-level laser therapy, Paraffin bath, Ultrasound, Thermotherapy (hot pack), and Whirlpool.        Visit Frequency: 2 times Per Week for 6 Weeks.                     Patient's spiritual, cultural, and educational needs considered and patient agreeable to plan of care and goals.     Education  Education was done with Patient. The patient's learning style includes Demonstration, Listening, and Pictures/video. The patient Demonstrates understanding and Verbalizes understanding.                 Goals:   Active       Fine hand motor use       Patient will improve 9-hole peg test score by 3-4 seconds to demonstrate development with FM skills         Start:  04/01/25     Expected End:  05/09/25               Functional outcome       Patient will improve scorein  FOTO patient-reported outcome tool by 5-8 points to demonstrate subjective improvement       Start:  04/01/25    Expected End:  05/09/25               Home activities       Patient will report Mod (I) performing household indoor maintenance        Start:  04/01/25    Expected End:  05/09/25               Pain       Patient will report pain of 1-2/10 demonstrating a reduction of overall pain       Start:  04/01/25    Expected End:  05/09/25               Range of Motion       Patient will achieve right wrist extension/RD AROM by 5-8 degrees       Start:  04/01/25    Expected End:  05/09/25               Strength       Patient will improve right  strength by 5-10lbs  in the two position       Start:  04/01/25    Expected End:  05/09/25            Patient will improve right tip to tip and key pinch strength by 3-4psi        Start:  04/01/25    Expected End:  05/09/25                Zac Ascencio OT

## 2025-04-02 ENCOUNTER — CLINICAL SUPPORT (OUTPATIENT)
Dept: REHABILITATION | Facility: HOSPITAL | Age: 70
End: 2025-04-02
Payer: MEDICARE

## 2025-04-02 DIAGNOSIS — R53.1 DECREASED STRENGTH: Primary | ICD-10-CM

## 2025-04-02 PROCEDURE — 97018 PARAFFIN BATH THERAPY: CPT | Mod: CO

## 2025-04-02 PROCEDURE — 97530 THERAPEUTIC ACTIVITIES: CPT | Mod: CO

## 2025-04-02 NOTE — PROGRESS NOTES
Outpatient Rehab    Occupational Therapy Visit    Patient Name: Chelsea Rodriguez  MRN: [id number]  YOB: 1955  Encounter Date: 4/2/2025    Therapy Diagnosis:   Encounter Diagnosis   Name Primary?    Decreased strength Yes     Physician: Nathaly Velasco PA-C    Physician Orders: Eval and Treat  Medical Diagnosis: Pain in right hand    Visit # / Visits Authorized: 1 / 20  Insurance Authorization Period: 4/2/2025 to 12/31/2025  Date of Evaluation: 3/31/2025  Plan of Care Certification: 3/31/2025 to 5/9/2025     Time In: 1024   Time Out: 1119  Total Time: 55   Total Billable Time:      FOTO:  Intake Score:  %  Survey Score 1:  %  Survey Score 2:  %         Subjective   My fingers are always numb.  Pain reported as 1/10.      Objective           Treatment:  Therapeutic Exercise  TE 1: Digit AROM: TGE, Spreads, Lifts x 10 ea  TE 2: Wrist AROM: Flex/ext, RD/UD, sup/pro x 10 ea  Balance/Neuromuscular Re-Education  NMR 1: median nerve glides x 5 ea hand  NMR 2: isospheres x 3 min ea  NMR 3: wrist maze x 3 min  NMR 4: alphabet ball 1 set ea  Modalities  Paraffin Bath: Parrafin applied to (B) hand(s) for 10 minutes to increase blood flow, circulation, pain management, and for tissue elasticity prior to therex.    Time Entry(in minutes):  Paraffin Bath Time Entry: 10  Therapeutic Activity Time Entry: 30  Therapeutic Exercise Time Entry: 15    Assessment & Plan   Assessment: added MNG to HEP and pt demo'd good understanding. No c/o pain or worsening symptoms with todays exercises  Evaluation/Treatment Tolerance: Patient tolerated treatment well  Client Care conference completed with evaluating therapist in regards to this patients POC as evidenced by co signature of supervising therapist.     Patient will continue to benefit from skilled outpatient occupational therapy to address the deficits listed in the problem list box on initial evaluation, provide pt/family education and to maximize pt's level of independence  in the home and community environment.     Patient's spiritual, cultural, and educational needs considered and patient agreeable to plan of care and goals.           Plan: cont to address OT goals as tolerated    Goals:   Active       Fine hand motor use       Patient will improve 9-hole peg test score by 3-4 seconds to demonstrate development with FM skills   (Progressing)       Start:  04/01/25    Expected End:  05/09/25               Functional outcome       Patient will improve scorein  FOTO patient-reported outcome tool by 5-8 points to demonstrate subjective improvement (Progressing)       Start:  04/01/25    Expected End:  05/09/25               Home activities       Patient will report Mod (I) performing household indoor maintenance  (Progressing)       Start:  04/01/25    Expected End:  05/09/25               Pain       Patient will report pain of 1-2/10 demonstrating a reduction of overall pain (Progressing)       Start:  04/01/25    Expected End:  05/09/25               Range of Motion       Patient will achieve right wrist extension/RD AROM by 5-8 degrees (Progressing)       Start:  04/01/25    Expected End:  05/09/25               Strength       Patient will improve right  strength by 5-10lbs  in the two position (Progressing)       Start:  04/01/25    Expected End:  05/09/25            Patient will improve right tip to tip and key pinch strength by 3-4psi  (Progressing)       Start:  04/01/25    Expected End:  05/09/25                MATI Melo/NEHA

## 2025-04-02 NOTE — PATIENT INSTRUCTIONS
MEDIAN NERVE GLIDING    Complete 10 repetitions of each exercise 4-6 times a day.      Make a fist, keep the wrist straight.  Straighten the fingers, keep the wrist straight.  Gently bend the wrist back, keep the thumb close to the fingers.  Extend the thumb out.  Turn forearm so palm is facing up.  Gently stretch the thumb out using the other hand.    If any of these exercises aggravate your hands, stop, rest and try returning again to the previous exercise performed without pain. Proceed at your own pace using pain tolerance as your guide.

## 2025-04-07 ENCOUNTER — CLINICAL SUPPORT (OUTPATIENT)
Dept: REHABILITATION | Facility: HOSPITAL | Age: 70
End: 2025-04-07
Payer: MEDICARE

## 2025-04-07 DIAGNOSIS — R53.1 DECREASED STRENGTH: Primary | ICD-10-CM

## 2025-04-07 PROCEDURE — 97018 PARAFFIN BATH THERAPY: CPT | Mod: CO

## 2025-04-07 PROCEDURE — 97112 NEUROMUSCULAR REEDUCATION: CPT | Mod: CO

## 2025-04-07 NOTE — PROGRESS NOTES
Outpatient Rehab    Occupational Therapy Visit    Patient Name: Chelsea Rodriguez  MRN: [id number]  YOB: 1955  Encounter Date: 4/7/2025    Therapy Diagnosis:   Encounter Diagnosis   Name Primary?    Decreased strength Yes     Physician: Nathaly Velasco PA-C    Physician Orders: Eval and Treat  Medical Diagnosis: Pain in right hand    Visit # / Visits Authorized: 2 / 20  Insurance Authorization Period: 4/2/2025 to 12/31/2025  Date of Evaluation: 3/31/2025  Plan of Care Certification: 3/31/2025 to 5/9/2025     Time In: 1017   Time Out: 1112  Total Time: 55   Total Billable Time:      FOTO:  Intake Score:  %  Survey Score 1:  %  Survey Score 2:  %         Subjective   no new updates.  Pain reported as 0/10.      Objective           Treatment:  Therapeutic Exercise  TE 1: Digit AROM: TGE, Spreads, Lifts x 10 ea  TE 2: Wrist AROM: Flex/ext, RD/UD x 10 ea  TE 3: prayer stretch 30 sec holds x 3 reps  Balance/Neuromuscular Re-Education  NMR 1: median nerve glides x 8 ea hand  NMR 2: isospheres x 3 min ea  NMR 3: wrist maze x 3 min  NMR 4: alphabet ball 1 set ea  Modalities  Paraffin Bath: Parrafin applied to (B) hand(s) for 10 minutes to increase blood flow, circulation, pain management, and for tissue elasticity prior to therex.    Time Entry(in minutes):  Paraffin Bath Time Entry: 10  Neuromuscular Re-Education Time Entry: 25  Therapeutic Exercise Time Entry: 20    Assessment & Plan   Assessment:    Evaluation/Treatment Tolerance: Patient tolerated treatment well    Patient will continue to benefit from skilled outpatient occupational therapy to address the deficits listed in the problem list box on initial evaluation, provide pt/family education and to maximize pt's level of independence in the home and community environment.     Patient's spiritual, cultural, and educational needs considered and patient agreeable to plan of care and goals.           Plan:      Goals:   Active       Fine hand motor use        Patient will improve 9-hole peg test score by 3-4 seconds to demonstrate development with FM skills   (Progressing)       Start:  04/01/25    Expected End:  05/09/25               Functional outcome       Patient will improve scorein  FOTO patient-reported outcome tool by 5-8 points to demonstrate subjective improvement (Progressing)       Start:  04/01/25    Expected End:  05/09/25               Home activities       Patient will report Mod (I) performing household indoor maintenance  (Progressing)       Start:  04/01/25    Expected End:  05/09/25               Pain       Patient will report pain of 1-2/10 demonstrating a reduction of overall pain (Progressing)       Start:  04/01/25    Expected End:  05/09/25               Range of Motion       Patient will achieve right wrist extension/RD AROM by 5-8 degrees (Progressing)       Start:  04/01/25    Expected End:  05/09/25               Strength       Patient will improve right  strength by 5-10lbs  in the two position (Progressing)       Start:  04/01/25    Expected End:  05/09/25            Patient will improve right tip to tip and key pinch strength by 3-4psi  (Progressing)       Start:  04/01/25    Expected End:  05/09/25                MATI Melo/NEHA

## 2025-04-09 ENCOUNTER — CLINICAL SUPPORT (OUTPATIENT)
Dept: REHABILITATION | Facility: HOSPITAL | Age: 70
End: 2025-04-09
Payer: MEDICARE

## 2025-04-09 DIAGNOSIS — R53.1 DECREASED STRENGTH: Primary | ICD-10-CM

## 2025-04-09 PROCEDURE — 97110 THERAPEUTIC EXERCISES: CPT

## 2025-04-09 NOTE — PROGRESS NOTES
Outpatient Rehab    Occupational Therapy Visit    Patient Name: Chelsea Rodriguez  MRN: [id number]  YOB: 1955  Encounter Date: 4/9/2025    Therapy Diagnosis:   Encounter Diagnosis   Name Primary?    Decreased strength Yes     Physician: Nathaly Velasco PA-C    Physician Orders: Eval and Treat  Medical Diagnosis: Pain in right hand    Visit # / Visits Authorized: 2 / 20  Insurance Authorization Period: 4/2/2025 to 12/31/2025  Date of Evaluation: 3/31/2025  Plan of Care Certification: 3/31/2025 to 5/9/2025     Time In: 0900   Time Out: 1000  Total Time: 60        Subjective   No complaints of pain or numbness.  Pain reported as 0/10. (B) hands    Objective           Treatment:  Therapeutic Exercise  TE 1: Digit AROM: TGE, Spreads (blue foam ), Lifts x 10 ea  TE 2: Wrist AROM: Flex/ext, RD/UD x 10 ea  TE 3: prayer stretch 30 sec holds x 3 reps  TE 4: Median nerve glides x10  TE 5: Foam roller x 2 min ( 1 minute each hand)  TE 6: Table top stretch x 10  TE 7: Hammer pron/sup x 10  TE 8: Grooved peg board FM activity  Manual Therapy  MT 1: Grade I-II wrist mobs    Time Entry(in minutes):  Paraffin Bath Time Entry: 10  Manual Therapy Time Entry: 5  Therapeutic Exercise Time Entry: 45    Assessment & Plan   Assessment: No complaints of pain or numbness while engagin in treatment session. Limitations with FM skills evident due to pt. dropping pegs and increased amoint of time needed to complete task       Patient will continue to benefit from skilled outpatient occupational therapy to address the deficits listed in the problem list box on initial evaluation, provide pt/family education and to maximize pt's level of independence in the home and community environment.     Patient's spiritual, cultural, and educational needs considered and patient agreeable to plan of care and goals.           Plan: cont to address OT goals as tolerated    Goals:   Active       Fine hand motor use       Patient will improve 9-hole  peg test score by 3-4 seconds to demonstrate development with FM skills   (Progressing)       Start:  04/01/25    Expected End:  05/09/25               Functional outcome       Patient will improve scorein  FOTO patient-reported outcome tool by 5-8 points to demonstrate subjective improvement (Progressing)       Start:  04/01/25    Expected End:  05/09/25               Home activities       Patient will report Mod (I) performing household indoor maintenance  (Progressing)       Start:  04/01/25    Expected End:  05/09/25               Pain       Patient will report pain of 1-2/10 demonstrating a reduction of overall pain (Progressing)       Start:  04/01/25    Expected End:  05/09/25               Range of Motion       Patient will achieve right wrist extension/RD AROM by 5-8 degrees (Progressing)       Start:  04/01/25    Expected End:  05/09/25               Strength       Patient will improve right  strength by 5-10lbs  in the two position (Progressing)       Start:  04/01/25    Expected End:  05/09/25            Patient will improve right tip to tip and key pinch strength by 3-4psi  (Progressing)       Start:  04/01/25    Expected End:  05/09/25                Zac Ascencio OT

## 2025-04-14 ENCOUNTER — CLINICAL SUPPORT (OUTPATIENT)
Dept: REHABILITATION | Facility: HOSPITAL | Age: 70
End: 2025-04-14
Payer: MEDICARE

## 2025-04-14 DIAGNOSIS — R53.1 DECREASED STRENGTH: Primary | ICD-10-CM

## 2025-04-14 PROCEDURE — 97110 THERAPEUTIC EXERCISES: CPT

## 2025-04-14 NOTE — PROGRESS NOTES
Outpatient Rehab    Occupational Therapy Visit    Patient Name: Chelsea Rodriguez  MRN: [id number]  YOB: 1955  Encounter Date: 4/14/2025    Therapy Diagnosis:   Encounter Diagnosis   Name Primary?    Decreased strength Yes     Physician: Nathaly Velasco PA-C    Physician Orders: Eval and Treat  Medical Diagnosis: Pain in right hand    Visit # / Visits Authorized: 4 / 20  Insurance Authorization Period: 4/2/2025 to 12/31/2025  Date of Evaluation: 3/31/2025  Plan of Care Certification: 3/31/2025 to 5/9/2025     Time In: 1200   Time Out: 1300  Total Time: 60          Subjective   No complaints of pain or numbness.  Pain reported as 0/10. (B) hands    Objective           Treatment:  Therapeutic Exercise  TE 1: Tendon Glides x 10  TE 2: Green flexbar flexion/extension 2 x 10  TE 3: Green flexbar oscilation x 1 min each hand  TE 4: Pom pom  with red resistive clip x1 container each hand  TE 5: Yellow putty squeezes x 12  Therapeutic Activity  TA 1: Grooved peg board manipulation nesting 3 pegs  Manual Therapy  MT 1: Soft tissue massage over carpal tunnel region of (B) hands  Modalities  Fluidotherapy: 115 degrees and 50 speed, to (B) hand for 10 minutes to increase blood flow and circulation, desensitization and sensory re-education, pain management, and increased tissue extensibility prior to therex. Pt instructed on performing active range of motion exercises while receiving heat to increase active motion.    Time Entry(in minutes):  Whirlpool Time Entry: 10  Manual Therapy Time Entry: 5  Therapeutic Activity Time Entry: 5  Therapeutic Exercise Time Entry: 40    Assessment & Plan   Assessment: Min. verbal/visual cueing provided to reinforce proper mechanics when performing tendon glides. Decreased FM skills present when completing grooved pegboard.  Evaluation/Treatment Tolerance: Patient tolerated treatment well    Patient will continue to benefit from skilled outpatient occupational therapy to  address the deficits listed in the problem list box on initial evaluation, provide pt/family education and to maximize pt's level of independence in the home and community environment.     Patient's spiritual, cultural, and educational needs considered and patient agreeable to plan of care and goals.           Plan: cont to address OT goals as tolerated    Goals:   Active       Fine hand motor use       Patient will improve 9-hole peg test score by 3-4 seconds to demonstrate development with FM skills   (Progressing)       Start:  04/01/25    Expected End:  05/09/25               Functional outcome       Patient will improve scorein  FOTO patient-reported outcome tool by 5-8 points to demonstrate subjective improvement (Progressing)       Start:  04/01/25    Expected End:  05/09/25               Home activities       Patient will report Mod (I) performing household indoor maintenance  (Progressing)       Start:  04/01/25    Expected End:  05/09/25               Pain       Patient will report pain of 1-2/10 demonstrating a reduction of overall pain (Progressing)       Start:  04/01/25    Expected End:  05/09/25               Range of Motion       Patient will achieve right wrist extension/RD AROM by 5-8 degrees (Progressing)       Start:  04/01/25    Expected End:  05/09/25               Strength       Patient will improve right  strength by 5-10lbs  in the two position (Progressing)       Start:  04/01/25    Expected End:  05/09/25            Patient will improve right tip to tip and key pinch strength by 3-4psi  (Progressing)       Start:  04/01/25    Expected End:  05/09/25                Zac Ascencio OT

## 2025-04-15 ENCOUNTER — TELEPHONE (OUTPATIENT)
Dept: PODIATRY | Facility: CLINIC | Age: 70
End: 2025-04-15
Payer: MEDICARE

## 2025-04-15 NOTE — PROGRESS NOTES
"  Outpatient Rehab    Occupational Therapy Visit    Patient Name: Chelsea Rodriguez  MRN: [id number]  YOB: 1955  Encounter Date: 4/16/2025    Therapy Diagnosis:   Encounter Diagnosis   Name Primary?    Decreased strength Yes     Physician: Nathaly Velasco PA-C    Physician Orders: Eval and Treat  Medical Diagnosis: Pain in right hand    Visit # / Visits Authorized: 5 / 20  Insurance Authorization Period: 4/2/2025 to 12/31/2025  Date of Evaluation: 3/31/2025  Plan of Care Certification: 3/31/2025 to 5/9/2025     Time In: 0926   Time Out: 1020  Total Time: 54   Total Billable Time:      FOTO:  Intake Score:  %  Survey Score 1:  %  Survey Score 2:  %         Subjective   "The tingling is getting a lot better.".  Pain reported as 0/10.      Objective           Treatment:  Therapeutic Exercise  TE 2: Green flexbar frowns/smiles  2 x 10  TE 3: Green flexbar oscilation x 2 min each hand  TE 6: Median nerve glides x 10  TE 7: bunny ears on tennis ball x 10  Therapeutic Activity  TA 1: Grooved peg board manipulation nesting 3 pegs  Manual Therapy  MT 2: tissue movers to open fascia away from carpal tunnel  Modalities  Fluidotherapy: 115 degrees and 50 speed, to (B) hand for 10 minutes to increase blood flow and circulation, desensitization and sensory re-education, pain management, and increased tissue extensibility prior to therex. Pt instructed on performing active range of motion exercises while receiving heat to increase active motion.    Time Entry(in minutes):  Whirlpool Time Entry: 10  Manual Therapy Time Entry: 5  Therapeutic Activity Time Entry: 8  Therapeutic Exercise Time Entry: 31    Assessment & Plan   Assessment:    Evaluation/Treatment Tolerance: Patient tolerated treatment well    Patient will continue to benefit from skilled outpatient occupational therapy to address the deficits listed in the problem list box on initial evaluation, provide pt/family education and to maximize pt's level of " independence in the home and community environment.     Patient's spiritual, cultural, and educational needs considered and patient agreeable to plan of care and goals.           Plan: cont to address OT goals as tolerated    Goals:   Active       Fine hand motor use       Patient will improve 9-hole peg test score by 3-4 seconds to demonstrate development with FM skills   (Progressing)       Start:  04/01/25    Expected End:  05/09/25               Functional outcome       Patient will improve scorein  FOTO patient-reported outcome tool by 5-8 points to demonstrate subjective improvement (Progressing)       Start:  04/01/25    Expected End:  05/09/25               Home activities       Patient will report Mod (I) performing household indoor maintenance  (Progressing)       Start:  04/01/25    Expected End:  05/09/25               Pain       Patient will report pain of 1-2/10 demonstrating a reduction of overall pain (Progressing)       Start:  04/01/25    Expected End:  05/09/25               Range of Motion       Patient will achieve right wrist extension/RD AROM by 5-8 degrees (Progressing)       Start:  04/01/25    Expected End:  05/09/25               Strength       Patient will improve right  strength by 5-10lbs  in the two position (Progressing)       Start:  04/01/25    Expected End:  05/09/25            Patient will improve right tip to tip and key pinch strength by 3-4psi  (Progressing)       Start:  04/01/25    Expected End:  05/09/25                MATI Melo/NEHA

## 2025-04-15 NOTE — TELEPHONE ENCOUNTER
Call patient in regards to appointment on 04/29/2025 with Dr. Whiting due to him being out of clinic. Patient answer and was made aware and I was able to get her rescheduled, patient verbally confirmed new appointment date and time.

## 2025-04-16 ENCOUNTER — OFFICE VISIT (OUTPATIENT)
Dept: ORTHOPEDICS | Facility: CLINIC | Age: 70
End: 2025-04-16
Payer: MEDICARE

## 2025-04-16 ENCOUNTER — HOSPITAL ENCOUNTER (OUTPATIENT)
Dept: RADIOLOGY | Facility: HOSPITAL | Age: 70
Discharge: HOME | End: 2025-04-16
Attending: ORTHOPAEDIC SURGERY
Payer: MEDICARE

## 2025-04-16 ENCOUNTER — CLINICAL SUPPORT (OUTPATIENT)
Dept: REHABILITATION | Facility: HOSPITAL | Age: 70
End: 2025-04-16
Payer: MEDICARE

## 2025-04-16 VITALS — BODY MASS INDEX: 32.29 KG/M2 | WEIGHT: 160.19 LBS | HEIGHT: 59 IN

## 2025-04-16 DIAGNOSIS — R53.1 DECREASED STRENGTH: Primary | ICD-10-CM

## 2025-04-16 DIAGNOSIS — Z96.651 STATUS POST TOTAL RIGHT KNEE REPLACEMENT: Primary | ICD-10-CM

## 2025-04-16 DIAGNOSIS — Z96.651 STATUS POST TOTAL RIGHT KNEE REPLACEMENT: ICD-10-CM

## 2025-04-16 PROCEDURE — 3008F BODY MASS INDEX DOCD: CPT | Mod: HCNC,CPTII,S$GLB, | Performed by: ORTHOPAEDIC SURGERY

## 2025-04-16 PROCEDURE — 97110 THERAPEUTIC EXERCISES: CPT | Mod: CO

## 2025-04-16 PROCEDURE — 99213 OFFICE O/P EST LOW 20 MIN: CPT | Mod: HCNC,S$GLB,, | Performed by: ORTHOPAEDIC SURGERY

## 2025-04-16 PROCEDURE — 1125F AMNT PAIN NOTED PAIN PRSNT: CPT | Mod: HCNC,CPTII,S$GLB, | Performed by: ORTHOPAEDIC SURGERY

## 2025-04-16 PROCEDURE — 99999 PR PBB SHADOW E&M-EST. PATIENT-LVL III: CPT | Mod: PBBFAC,HCNC,, | Performed by: ORTHOPAEDIC SURGERY

## 2025-04-16 PROCEDURE — 1101F PT FALLS ASSESS-DOCD LE1/YR: CPT | Mod: HCNC,CPTII,S$GLB, | Performed by: ORTHOPAEDIC SURGERY

## 2025-04-16 PROCEDURE — 1159F MED LIST DOCD IN RCRD: CPT | Mod: HCNC,CPTII,S$GLB, | Performed by: ORTHOPAEDIC SURGERY

## 2025-04-16 PROCEDURE — 97022 WHIRLPOOL THERAPY: CPT | Mod: CO

## 2025-04-16 PROCEDURE — 4010F ACE/ARB THERAPY RXD/TAKEN: CPT | Mod: HCNC,CPTII,S$GLB, | Performed by: ORTHOPAEDIC SURGERY

## 2025-04-16 PROCEDURE — 73562 X-RAY EXAM OF KNEE 3: CPT | Mod: TC,HCNC,RT

## 2025-04-16 PROCEDURE — 73562 X-RAY EXAM OF KNEE 3: CPT | Mod: 26,HCNC,RT, | Performed by: RADIOLOGY

## 2025-04-16 PROCEDURE — 3044F HG A1C LEVEL LT 7.0%: CPT | Mod: HCNC,CPTII,S$GLB, | Performed by: ORTHOPAEDIC SURGERY

## 2025-04-16 PROCEDURE — 1157F ADVNC CARE PLAN IN RCRD: CPT | Mod: HCNC,CPTII,S$GLB, | Performed by: ORTHOPAEDIC SURGERY

## 2025-04-16 PROCEDURE — 3288F FALL RISK ASSESSMENT DOCD: CPT | Mod: HCNC,CPTII,S$GLB, | Performed by: ORTHOPAEDIC SURGERY

## 2025-04-16 NOTE — PROGRESS NOTES
Subjective:     HPI:   Chelsea Rodriguez is a 70 y.o. female who presents for annual follow up right TKA    Date of surgery:   R TKA 3/31/21 sigma  R knee ATS Dr Sumner 4/30/24, patellar clunk    History of Present Illness    CHIEF COMPLAINT:  - Right knee follow-up s/p arthroscopy    HPI:  Ms. Rodriguez presents for follow-up approximately one year after a knee arthroscopy. She reports her knee is functioning adequately but mentions some discomfort. She believes she may be developing more scar tissue across the knee area. She has been trying to increase movement and exercise, particularly by using stairs, as it is the only knee she can put pressure on. She occasionally uses a cane when walking outside, especially on uneven ground or when going up and down steps. She denies that the knee keeps her from doing anything and reports no catching in the knee. She mentions a clicking sound in the knee, which she was informed to expect post-surgery. No pain medications are being taken for the knee. She denies using a walker or crutches, or experiencing any limitations due to the knee.    PREVIOUS TREATMENTS:  - Ms. Rodriguez had a knee procedure in 2021.    SURGICAL HISTORY:  - Knee arthroscopy: 1 year ago    SOCIAL HISTORY:  - Ms. Rodriguez is a [employer]  - Attends [employer]  - Participates in [employer] ministry  - Recently attended memorial celebration of Nazario Joe's death  - Does not celebrate [facility]  - Follows [employer] calendar for Muslim observances         Medications: none    Assistive Devices: rare cane long walks in street/stairs/uneven    Limitations: none    Life is good  Knee doing well  No catching    Says she is being evaluated for scleroderma - rheum working up     Objective:   Body mass index is 32.35 kg/m².  Exam:    Gait: limp/antalgic none    Incision: healed    Stability:  Knee stable anterior-posterior varus and valgus stresses, no extensor lag    Extension: 0    Flexion: 110    Valgus angle:  5    No catching/clunk with ROM       ROM 3/12/24: 0-100   6/27/24 0-120    Physical Exam    Musculoskeletal: Walking well. Incisions good. Moving well.  IMAGING:  - XR Knee were taken today.           Imaging:  Indication:  Exam status post right total knee arthroplasty  Exam Ordered: Radiographs of the right knee include a standing anteroposterior view, a lateral view, and a sunrise view  Details of Examination: Todays exam show a well fixed, well positioned total knee arthroplasty with no evidence of wear, osteolysis, or loosening.  Impression:  Status post right total knee arthroplasty, implant in good position with no abnormality     Results                  Assessment:       ICD-10-CM ICD-9-CM   1. Status post total right knee replacement  Z96.651 V43.65        Doing well     Plan:       Patient is doing very well with their total knee arthroplasty.  They will continue with their routine care of the knee replacement and see me back for their follow-up at the routine interval.  If there are problems in the interim they will see me back sooner.    Assessment & Plan    POSTDYSENTERIC ARTHROPATHY:   Continue cane when walking on uneven surfaces or climbing stairs.    DIFFICULTY IN WALKING:   Continue cane when walking on uneven surfaces or climbing stairs.    FOLLOW-UP:   Follow up as needed.         5 year follow up for standard xrays    F/u 1st week of June with NAWAF for L knee CSI     This note was generated with the assistance of ambient listening technology. Verbal consent was obtained by the patient and accompanying visitor(s) for the recording of patient appointment to facilitate this note. I attest to having reviewed and edited the generated note for accuracy, though some syntax or spelling errors may persist. Please contact the author of this note for any clarification.      No orders of the defined types were placed in this encounter.            Past Medical History:   Diagnosis Date    Bilateral knee  pain     Carpal tunnel syndrome, bilateral     Fissure in skin of foot     Right small toe    HTN (hypertension)     Hyperlipidemia     Multiple thyroid nodules 11/10/2023    Palpitations     Rotator cuff injury     right    Screening for colorectal cancer 10/20/2017    Screening for malignant neoplasm of colon 2021    Statin-induced myositis 2018       Past Surgical History:   Procedure Laterality Date    ANORECTAL MANOMETRY N/A 9/3/2024    Procedure: MANOMETRY, ANORECTAL;  Surgeon: Frandy Avendano MD;  Location: [address];  Service: Endoscopy;  Laterality: N/A;  Prep instructions sent via portal-  Prep-Enemas-dw   traveler, precall complete-st    BILATERAL SALPINGOOPHORECTOMY      BREAST BIOPSY Left     malignant    BREAST BIOPSY Left     negative    BREAST LUMPECTOMY Left     CATARACT EXTRACTION W/  INTRAOCULAR LENS IMPLANT Right 2018    Dr. Oneil    CATARACT EXTRACTION W/  INTRAOCULAR LENS IMPLANT Left 2018    Dr. Oneil     SECTION      x2    COLONOSCOPY N/A 10/20/2017    Procedure: COLONOSCOPY;  Surgeon: Mitchell Danielson Jr., MD;  Location: [facility];  Service: Endoscopy;  Laterality: N/A;    COLONOSCOPY N/A 2021    Procedure: COLONOSCOPY/Suprep;  Surgeon: Malcolm Reyes MD;  Location: [facility];  Service: Endoscopy;  Laterality: N/A;    COLONOSCOPY N/A 2024    Procedure: COLONOSCOPY;  Surgeon: Frandy Avendano MD;  Location: [facility] ENDOSCOPY;  Service: Endoscopy;  Laterality: N/A;  Ref by Dr RYAN Alva, pt request Miralax, portal - PC. 24 at 4;45 pm pt. confirmed appt. EC  24- portal msg for pc. DBM  24- Per DR. Valenzuela Pt to be r/s due to 1 MD scoping on 24 in AM, LVM to inform Pt of change in scoping MD and arrival time- ERW   precall    CYST REMOVAL      on back    ESOPHAGOGASTRODUODENOSCOPY N/A 2021    Procedure: EGD (ESOPHAGOGASTRODUODENOSCOPY);  Surgeon: Malcolm Reyes MD;  Location: [facility];   Service: Endoscopy;  Laterality: N/A;    HERNIA REPAIR      HYSTERECTOMY      at 25 yrs old    INTRAOCULAR PROSTHESES INSERTION Left 11/06/2018    Procedure: INSERTION, IOL PROSTHESIS;  Surgeon: Sergio Oneil MD;  Location: [facility] OR 1ST FLR;  Service: Ophthalmology;  Laterality: Left;    INTRAOCULAR PROSTHESES INSERTION Right 11/20/2018    Procedure: INSERTION, IOL PROSTHESIS;  Surgeon: Sergio Oneil MD;  Location: [facility] OR 2ND FLR;  Service: Ophthalmology;  Laterality: Right;    KNEE ARTHROPLASTY Right 03/31/2021    Procedure: ARTHROPLASTY, KNEE:RIGHT:DEPUY-SIGMA ;  Surgeon: Pedro Summers III, MD;  Location: [facility] OR;  Service: Orthopedics;  Laterality: Right;    LYSIS, ADHESIONS, KNEE, ARTHROSCOPIC Right 4/30/2024    Procedure: ARTHROSCOPIC KNEE LYSIS, ADHESIONS AND ANTERIOR INTERVAL SLIDE;  Surgeon: Ryanne Sumner MD;  Location: [facility] OR;  Service: Orthopedics;  Laterality: Right;  REGIONAL BLOCK    OOPHORECTOMY      @ 45 yrs old    PHACOEMULSIFICATION OF CATARACT Left 11/06/2018    Procedure: PHACOEMULSIFICATION, CATARACT;  Surgeon: Sergio Oneil MD;  [facility];  Service: Ophthalmology;  Laterality: Left;    PHACOEMULSIFICATION OF CATARACT Right 11/20/2018    Procedure: PHACOEMULSIFICATION, CATARACT;  Surgeon: Sergio Oneil MD;  Location: [facility] OR 2ND FLR;  Service: Ophthalmology;  Laterality: Right;    REFRACTIVE SURGERY      2023    supracervical abdominal hysterectomy  1978    fibroids    SYNOVECTOMY OF KNEE Right 4/30/2024    Procedure: SYNOVECTOMY, KNEE;  Surgeon: Ryanne Sumner MD;  Location: [facility] OR;  Service: Orthopedics;  Laterality: Right;       Family History   Problem Relation Name Age of Onset    Hypertension Mother      Heart disease Mother      Diabetes Mother      Hyperlipidemia Mother      Pancreatitis Mother      Cataracts Mother      Macular degeneration Mother      Cancer Father      Prostate cancer Father      Hypertension Sister x1   "   Thyroid disease Sister x1     Diabetes Brother x1     Diabetes Brother x2     Cancer Brother x2     Heart disease Brother x2     Prostate cancer Brother x2     Thyroid cancer Brother x2     Hyperlipidemia Daughter x1     Hypertension Son x1     Breast cancer Paternal Cousin      Amblyopia Neg Hx      Blindness Neg Hx      Glaucoma Neg Hx      Strabismus Neg Hx      Retinal detachment Neg Hx         Social History     Socioeconomic History    Marital status: Single    Number of children: 2   Occupational History     Comment: retired   Tobacco Use    Smoking status: Never    Smokeless tobacco: Never   Substance and Sexual Activity    Alcohol use: Yes     Comment: once per month    Drug use: Never    Sexual activity: Not Currently   Social History Narrative    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 hx of left lumpectomy for "breast cancer"- with 5yrs of tamoxifen use. Sees Dr. Buckley ([facility] for surveillance/MMG)        Dr. Lala former PCP, [facility]          Social Drivers of Health     Financial Resource Strain: Medium Risk (3/23/2025)    Overall Financial Resource Strain (CARDIA)     Difficulty of Paying Living Expenses: Somewhat hard   Food Insecurity: No Food Insecurity (3/23/2025)    Hunger Vital Sign     Worried About Running Out of Food in the Last Year: Never true     Ran Out of Food in the Last Year: Never true   Transportation Needs: No Transportation Needs (3/23/2025)    PRAPARE - Transportation     Lack of Transportation (Medical): No     Lack of Transportation (Non-Medical): No   Physical Activity: Unknown (3/23/2025)    Exercise Vital Sign     Days of Exercise per Week: 0 days   Recent Concern: Physical Activity - Inactive (3/23/2025)    Exercise Vital Sign     Days of Exercise per Week: 0 days     Minutes of Exercise per Session: 20 min   Stress: No Stress Concern Present (3/23/2025)    [employer] - Occupational Stress Questionnaire     Feeling of Stress : Not at all   Housing Stability: Low Risk  (3/23/2025)    Housing Stability Vital Sign     Unable to Pay for Housing in the Last Year: No     Number of Times Moved in the Last Year: 0     Homeless in the Last Year: No

## 2025-04-21 ENCOUNTER — CLINICAL SUPPORT (OUTPATIENT)
Dept: REHABILITATION | Facility: HOSPITAL | Age: 70
End: 2025-04-21
Payer: MEDICARE

## 2025-04-21 DIAGNOSIS — R53.1 DECREASED STRENGTH: Primary | ICD-10-CM

## 2025-04-21 PROCEDURE — 97112 NEUROMUSCULAR REEDUCATION: CPT | Mod: CO

## 2025-04-21 PROCEDURE — 97022 WHIRLPOOL THERAPY: CPT | Mod: CO

## 2025-04-21 NOTE — PROGRESS NOTES
Outpatient Rehab    Occupational Therapy Visit    Patient Name: Chelsea Rodriguez  MRN: [id number]  YOB: 1955  Encounter Date: 4/21/2025    Therapy Diagnosis:   Encounter Diagnosis   Name Primary?    Decreased strength Yes     Physician: Nathaly Velasco PA-C    Physician Orders: Eval and Treat  Medical Diagnosis: Pain in right hand    Visit # / Visits Authorized: 6 / 20  Insurance Authorization Period: 4/2/2025 to 12/31/2025  Date of Evaluation: 3/31/2025  Plan of Care Certification: 3/31/2025 to 5/9/2025     Time In: 1207   Time Out: 1250  Total Time: 43   Total Billable Time:      FOTO:  Intake Score:  %  Survey Score 1:  %  Survey Score 2:  %         Subjective   hands started tingling yesterday while she was using them.  Pain reported as 0/10. R hand worse    Objective           Treatment:  Therapeutic Exercise  TE 2: Green flexbar frowns/smiles  2 x 10  TE 3: Green flexbar oscilation x 2 min each hand  TE 6: Median nerve glides x 10  TE 7: bunny ears on tennis ball x 10  TE 8: prayer stretch 30 sec holds x 3  TE 9: yellow digi extender 2 x 15  Modalities  Fluidotherapy: 115 degrees and 50 speed, to (B) hand for 10 minutes to increase blood flow and circulation, desensitization and sensory re-education, pain management, and increased tissue extensibility prior to therex. Pt instructed on performing active range of motion exercises while receiving heat to increase active motion.    Time Entry(in minutes):  Whirlpool Time Entry: 10  Neuromuscular Re-Education Time Entry: 33    Assessment & Plan   Assessment:    Evaluation/Treatment Tolerance: Patient tolerated treatment well    Patient will continue to benefit from skilled outpatient occupational therapy to address the deficits listed in the problem list box on initial evaluation, provide pt/family education and to maximize pt's level of independence in the home and community environment.     Patient's spiritual, cultural, and educational needs  considered and patient agreeable to plan of care and goals.           Plan: cont to address OT goals as tolerated    Goals:   Active       Fine hand motor use       Patient will improve 9-hole peg test score by 3-4 seconds to demonstrate development with FM skills   (Progressing)       Start:  04/01/25    Expected End:  05/09/25               Functional outcome       Patient will improve scorein  FOTO patient-reported outcome tool by 5-8 points to demonstrate subjective improvement (Progressing)       Start:  04/01/25    Expected End:  05/09/25               Home activities       Patient will report Mod (I) performing household indoor maintenance  (Progressing)       Start:  04/01/25    Expected End:  05/09/25               Pain       Patient will report pain of 1-2/10 demonstrating a reduction of overall pain (Progressing)       Start:  04/01/25    Expected End:  05/09/25               Range of Motion       Patient will achieve right wrist extension/RD AROM by 5-8 degrees (Progressing)       Start:  04/01/25    Expected End:  05/09/25               Strength       Patient will improve right  strength by 5-10lbs  in the two position (Progressing)       Start:  04/01/25    Expected End:  05/09/25            Patient will improve right tip to tip and key pinch strength by 3-4psi  (Progressing)       Start:  04/01/25    Expected End:  05/09/25                MATI Melo/NEHA

## 2025-04-23 ENCOUNTER — CLINICAL SUPPORT (OUTPATIENT)
Dept: REHABILITATION | Facility: HOSPITAL | Age: 70
End: 2025-04-23
Payer: MEDICARE

## 2025-04-23 DIAGNOSIS — R53.1 DECREASED STRENGTH: Primary | ICD-10-CM

## 2025-04-23 PROCEDURE — 97530 THERAPEUTIC ACTIVITIES: CPT

## 2025-04-23 PROCEDURE — 97110 THERAPEUTIC EXERCISES: CPT

## 2025-04-27 NOTE — PROGRESS NOTES
"  Outpatient Rehab    Occupational Therapy Updated Plan of Care    Patient Name: Chelsea Rodriguez  MRN: [id number]  YOB: 1955  Encounter Date: 4/23/2025    Therapy Diagnosis:   Encounter Diagnosis   Name Primary?    Decreased strength Yes     Physician: Nathaly Velasco PA-C    Physician Orders: Eval and Treat  Medical Diagnosis: Pain in right hand    Visit # / Visits Authorized: 7 / 20  Insurance Authorization Period: 4/2/2025 to 12/31/2025  Date of Evaluation: 3/31/2025  Plan of Care Certification: 4/23/2025 to 5/30/2025     Time In: 1002   Time Out: 1100  Total Time: 58              Subjective   " Still some numbness and tingling but my hands are getting better".  Pain reported as 0/10.      Objective     Upper Extremity Sensation            Numbness and tingling still present         Wrist Range of Motion  Right Wrist   Active (deg) Passive (deg) Pain Comment   Flexion 62         Extension           Radial Deviation 12         Ulnar Deviation 45           Left Wrist   Active (deg) Passive (deg) Pain Comment   Flexion 60         Extension 61         Radial Deviation 15         Ulnar Deviation 40                            Right  Strength  Right Hand Dynamometer Position: 2  Elbow Position Forearm Position Trial 1 (lbs) Trial 2  (lbs) Trial 3  (lbs) Average  (lbs) Pain   Flexed Neutral 39 37 41 39         Left  Strength  Left Hand Dynamometer Position: 2  Elbow Position Forearm Position Trial 1 (lbs) Trial 2 (lbs) Trial 3 (lbs) Average (lbs) Pain   Flexed Neutral 42 46 51 46.33         Right Pinch Strength   Trial 1 (lbs) Trial 2 (lbs) Trial 3 (lbs) Average (lbs) Pain   Lateral (Key Pinch) 8 11 14 11     Three Point (Three Jaw Jaime) 6 7 8 7     Two Point (Tip to Tip)                 Left Pinch Strength   Trial 1 (lbs) Trial 2 (lbs) Trial 3 (lbs) Average (lbs) Pain   Lateral (Key Pinch) 14 12 13 13     Three Point (Three Jaw Jaime) 8 9 7 8     Two Point (Tip to Tip)                    "     Wrist/Hand Special Tests  Hand Coordination Special Tests  Right 9-Hole Peg Test (sec): 21  Left 9-Hole Peg Test (sec): 27               Treatment:  Therapeutic Exercise  TE 1: Tendon Glides x 10  TE 2: Green flexbar frowns/smiles  2 x 10  TE 3: Green flexbar oscilation x 2 min each hand  TE 4: Pom pom  with red resistive clip x1 container each hand  TE 5: Yellow putty squeezes x 12  TE 6: Yellow putty stamps  TE 7: Red finger web fist/pull 2 x 15  Therapeutic Activity  TA 1: Measurments and special tests taken to assess pt. current performance  Modalities  Fluidotherapy: 115 degrees and 50 speed, to (B) hand for 10 minutes to increase blood flow and circulation, desensitization and sensory re-education, pain management, and increased tissue extensibility prior to therex. Pt instructed on performing active range of motion exercises while receiving heat to increase active motion.    Time Entry(in minutes):  Whirlpool Time Entry: 10  Therapeutic Activity Time Entry: 15  Therapeutic Exercise Time Entry: 30    Assessment & Plan   Assessment: After assessed pt. progress results indicated decline in  strength. AROM of wrists are within functional limits. Pt. has demonstrated progress with FM skills evident by improvement with 9 hole peg test.  Evaluation/Treatment Tolerance: Patient tolerated treatment well    Patient will continue to benefit from skilled outpatient occupational therapy to address the deficits listed in the problem list box on initial evaluation, provide pt/family education and to maximize pt's level of independence in the home and community environment.     Patient's spiritual, cultural, and educational needs considered and patient agreeable to plan of care and goals.           Plan: cont to address OT goals as tolerated    Goals:   Active       Functional outcome       Patient will improve scorein  FOTO patient-reported outcome tool by 5-8 points to demonstrate subjective improvement  (Progressing)       Start:  04/01/25    Expected End:  05/09/25               Home activities       Patient will report Mod (I) performing household indoor maintenance  (Progressing)       Start:  04/01/25    Expected End:  05/09/25               Range of Motion       Patient will achieve right wrist extension/RD AROM by 5-8 degrees (Progressing)       Start:  04/01/25    Expected End:  05/09/25               Strength       Patient will improve right  strength by 5-10lbs  in the two position (Progressing)       Start:  04/01/25    Expected End:  05/09/25            Patient will improve right tip to tip and key pinch strength by 3-4psi  (Progressing)       Start:  04/01/25    Expected End:  05/09/25              Resolved       Fine hand motor use       Patient will improve 9-hole peg test score by 3-4 seconds to demonstrate development with FM skills   (Met)       Start:  04/01/25    Expected End:  05/09/25    Resolved:  04/27/25            Pain       Patient will report pain of 1-2/10 demonstrating a reduction of overall pain (Met)       Start:  04/01/25    Expected End:  05/09/25    Resolved:  04/27/25             Zac Ascencio OT

## 2025-04-28 ENCOUNTER — CLINICAL SUPPORT (OUTPATIENT)
Dept: REHABILITATION | Facility: HOSPITAL | Age: 70
End: 2025-04-28
Payer: MEDICARE

## 2025-04-28 DIAGNOSIS — R53.1 DECREASED STRENGTH: Primary | ICD-10-CM

## 2025-04-28 PROCEDURE — 97530 THERAPEUTIC ACTIVITIES: CPT

## 2025-04-28 PROCEDURE — 97110 THERAPEUTIC EXERCISES: CPT

## 2025-04-28 NOTE — PROGRESS NOTES
Outpatient Rehab    Occupational Therapy Visit    Patient Name: Chelsea Rodriguez  MRN: [id number]  YOB: 1955  Encounter Date: 4/28/2025    Therapy Diagnosis:   Encounter Diagnosis   Name Primary?    Decreased strength Yes     Physician: Nathaly Velasco PA-C    Physician Orders: Eval and Treat  Medical Diagnosis: Pain in right hand    Visit # / Visits Authorized: 8 / 20  Insurance Authorization Period: 4/2/2025 to 12/31/2025  Date of Evaluation: 3/31/2025  Plan of Care Certification: 4/23/2025 to 5/30/2025    Time In: 1400   Time Out: 1500  Total Time: 60            Subjective   I have been feeling well and making good progress.  Pain reported as 0/10.      Objective        Right  Strength  Right Hand Dynamometer Position: 2  Elbow Position Forearm Position Trial 1 (lbs) Trial 2  (lbs) Trial 3  (lbs) Average  (lbs) Pain   Flexed Neutral 41 42 41 41.33                   Treatment:  Therapeutic Exercise  TE 2: Green flexbar frowns/smiles  2 x 10  TE 3: Green flexbar oscilation x 2 min each hand  TE 8: prayer stretch 30 sec holds x 3  TE 9: Phalen's Stretch 3 x 30'  Therapeutic Activity  TA 1:  strength assessed  TA 2: Updated HEP; pt. performed exercsies with yellow theraputty and demonstrated competency with exercises  Manual Therapy  MT 1: Soft tissue massage over carpal tunnel region of (B) hands    Time Entry(in minutes):  Whirlpool Time Entry: 10    Assessment & Plan   Assessment: Good progress with functionanl perfromance utilizing (B)UE; Pt. was able to tolerate the strengthening exercsies in HEP well with no complaints  Evaluation/Treatment Tolerance: Patient tolerated treatment well    Patient will continue to benefit from skilled outpatient occupational therapy to address the deficits listed in the problem list box on initial evaluation, provide pt/family education and to maximize pt's level of independence in the home and community environment.     Patient's spiritual, cultural, and  educational needs considered and patient agreeable to plan of care and goals.           Plan:      Goals:   Active       Functional outcome       Patient will improve scorein  FOTO patient-reported outcome tool by 5-8 points to demonstrate subjective improvement (Progressing)       Start:  04/01/25    Expected End:  05/09/25               Home activities       Patient will report Mod (I) performing household indoor maintenance  (Progressing)       Start:  04/01/25    Expected End:  05/09/25               Range of Motion       Patient will achieve right wrist extension/RD AROM by 5-8 degrees (Progressing)       Start:  04/01/25    Expected End:  05/09/25               Strength       Patient will improve right  strength by 5-10lbs  in the two position (Progressing)       Start:  04/01/25    Expected End:  05/09/25            Patient will improve right tip to tip and key pinch strength by 3-4psi  (Progressing)       Start:  04/01/25    Expected End:  05/09/25              Resolved       Fine hand motor use       Patient will improve 9-hole peg test score by 3-4 seconds to demonstrate development with FM skills   (Met)       Start:  04/01/25    Expected End:  05/09/25    Resolved:  04/27/25            Pain       Patient will report pain of 1-2/10 demonstrating a reduction of overall pain (Met)       Start:  04/01/25    Expected End:  05/09/25    Resolved:  04/27/25             Zac Ascencio OT

## 2025-04-28 NOTE — PATIENT INSTRUCTIONS
[facility]  OCCUPATIONAL THERAPY  HOME EXERCISE PROGRAM     Complete the following strengthening program 1x/day.                       _                        Hanover Queen, MOT, LOTR,   Occupational Therapist

## 2025-04-29 ENCOUNTER — TELEPHONE (OUTPATIENT)
Dept: PODIATRY | Facility: CLINIC | Age: 70
End: 2025-04-29
Payer: MEDICARE

## 2025-04-29 NOTE — TELEPHONE ENCOUNTER
Call patient in regards to appointment on 06/23/2025 with Dr. Whiting due to him being out of clinic to rescheduled. Patient answer and was made aware and verbally confirmed new appointment date and time.

## 2025-05-05 ENCOUNTER — CLINICAL SUPPORT (OUTPATIENT)
Dept: REHABILITATION | Facility: HOSPITAL | Age: 70
End: 2025-05-05
Payer: MEDICARE

## 2025-05-05 DIAGNOSIS — R53.1 DECREASED STRENGTH: Primary | ICD-10-CM

## 2025-05-05 PROCEDURE — 97530 THERAPEUTIC ACTIVITIES: CPT

## 2025-05-05 PROCEDURE — 97110 THERAPEUTIC EXERCISES: CPT

## 2025-05-08 ENCOUNTER — HOSPITAL ENCOUNTER (EMERGENCY)
Facility: HOSPITAL | Age: 70
Discharge: HOME | End: 2025-05-08
Attending: EMERGENCY MEDICINE
Payer: MEDICARE

## 2025-05-08 VITALS
SYSTOLIC BLOOD PRESSURE: 116 MMHG | BODY MASS INDEX: 31.25 KG/M2 | WEIGHT: 155 LBS | HEART RATE: 70 BPM | DIASTOLIC BLOOD PRESSURE: 62 MMHG | OXYGEN SATURATION: 100 % | RESPIRATION RATE: 18 BRPM | HEIGHT: 59 IN | TEMPERATURE: 98 F

## 2025-05-08 DIAGNOSIS — S39.012A BACK STRAIN, INITIAL ENCOUNTER: ICD-10-CM

## 2025-05-08 DIAGNOSIS — M62.830 MUSCLE SPASM OF BACK: Primary | ICD-10-CM

## 2025-05-08 LAB
ABSOLUTE EOSINOPHIL (OHS): 0.33 K/UL
ABSOLUTE MONOCYTE (OHS): 0.63 K/UL (ref 0.3–1)
ABSOLUTE NEUTROPHIL COUNT (OHS): 4.7 K/UL (ref 1.8–7.7)
ALBUMIN SERPL BCP-MCNC: 4.2 G/DL (ref 3.5–5.2)
ALP SERPL-CCNC: 103 UNIT/L (ref 40–150)
ALT SERPL W/O P-5'-P-CCNC: 25 UNIT/L (ref 10–44)
ANION GAP (OHS): 8 MMOL/L (ref 8–16)
AST SERPL-CCNC: 30 UNIT/L (ref 11–45)
BASOPHILS # BLD AUTO: 0.03 K/UL
BASOPHILS NFR BLD AUTO: 0.4 %
BILIRUB SERPL-MCNC: 0.9 MG/DL (ref 0.1–1)
BILIRUB UR QL STRIP.AUTO: NEGATIVE
BUN SERPL-MCNC: 9 MG/DL (ref 8–23)
CALCIUM SERPL-MCNC: 9.9 MG/DL (ref 8.7–10.5)
CHLORIDE SERPL-SCNC: 105 MMOL/L (ref 95–110)
CLARITY UR: CLEAR
CO2 SERPL-SCNC: 29 MMOL/L (ref 23–29)
COLOR UR AUTO: YELLOW
CREAT SERPL-MCNC: 0.7 MG/DL (ref 0.5–1.4)
ERYTHROCYTE [DISTWIDTH] IN BLOOD BY AUTOMATED COUNT: 15.2 % (ref 11.5–14.5)
GFR SERPLBLD CREATININE-BSD FMLA CKD-EPI: >60 ML/MIN/1.73/M2
GLUCOSE SERPL-MCNC: 86 MG/DL (ref 70–110)
GLUCOSE UR QL STRIP: NEGATIVE
HCT VFR BLD AUTO: 43.1 % (ref 37–48.5)
HGB BLD-MCNC: 13.5 GM/DL (ref 12–16)
HGB UR QL STRIP: NEGATIVE
HOLD SPECIMEN: NORMAL
IMM GRANULOCYTES # BLD AUTO: 0.04 K/UL (ref 0–0.04)
IMM GRANULOCYTES NFR BLD AUTO: 0.5 % (ref 0–0.5)
KETONES UR QL STRIP: NEGATIVE
LEUKOCYTE ESTERASE UR QL STRIP: NEGATIVE
LYMPHOCYTES # BLD AUTO: 2.11 K/UL (ref 1–4.8)
MCH RBC QN AUTO: 26.4 PG (ref 27–31)
MCHC RBC AUTO-ENTMCNC: 31.3 G/DL (ref 32–36)
MCV RBC AUTO: 84 FL (ref 82–98)
NITRITE UR QL STRIP: NEGATIVE
NUCLEATED RBC (/100WBC) (OHS): 0 /100 WBC
PH UR STRIP: 8 [PH]
PLATELET # BLD AUTO: 292 K/UL (ref 150–450)
PMV BLD AUTO: 9.5 FL (ref 9.2–12.9)
POTASSIUM SERPL-SCNC: 3.7 MMOL/L (ref 3.5–5.1)
PROT SERPL-MCNC: 7.8 GM/DL (ref 6–8.4)
PROT UR QL STRIP: NEGATIVE
RBC # BLD AUTO: 5.11 M/UL (ref 4–5.4)
RELATIVE EOSINOPHIL (OHS): 4.2 %
RELATIVE LYMPHOCYTE (OHS): 26.9 % (ref 18–48)
RELATIVE MONOCYTE (OHS): 8 % (ref 4–15)
RELATIVE NEUTROPHIL (OHS): 60 % (ref 38–73)
SODIUM SERPL-SCNC: 142 MMOL/L (ref 136–145)
SP GR UR STRIP: 1.02
UROBILINOGEN UR STRIP-ACNC: NEGATIVE EU/DL
WBC # BLD AUTO: 7.84 K/UL (ref 3.9–12.7)

## 2025-05-08 PROCEDURE — 99284 EMERGENCY DEPT VISIT MOD MDM: CPT | Mod: 25,HCNC

## 2025-05-08 PROCEDURE — 81003 URINALYSIS AUTO W/O SCOPE: CPT | Mod: HCNC | Performed by: NURSE PRACTITIONER

## 2025-05-08 PROCEDURE — 96374 THER/PROPH/DIAG INJ IV PUSH: CPT | Mod: HCNC

## 2025-05-08 PROCEDURE — 63600175 PHARM REV CODE 636 W HCPCS: Mod: JZ,TB,HCNC

## 2025-05-08 PROCEDURE — 85025 COMPLETE CBC W/AUTO DIFF WBC: CPT | Mod: HCNC | Performed by: NURSE PRACTITIONER

## 2025-05-08 PROCEDURE — 25000003 PHARM REV CODE 250: Mod: HCNC

## 2025-05-08 PROCEDURE — 84075 ASSAY ALKALINE PHOSPHATASE: CPT | Mod: HCNC | Performed by: NURSE PRACTITIONER

## 2025-05-08 RX ORDER — LIDOCAINE 50 MG/G
1 PATCH TOPICAL
Status: DISCONTINUED | OUTPATIENT
Start: 2025-05-08 | End: 2025-05-08 | Stop reason: HOSPADM

## 2025-05-08 RX ORDER — METHOCARBAMOL 500 MG/1
500 TABLET, FILM COATED ORAL 4 TIMES DAILY
Qty: 40 TABLET | Refills: 0 | Status: SHIPPED | OUTPATIENT
Start: 2025-05-08 | End: 2025-05-16

## 2025-05-08 RX ORDER — KETOROLAC TROMETHAMINE 30 MG/ML
15 INJECTION, SOLUTION INTRAMUSCULAR; INTRAVENOUS
Status: COMPLETED | OUTPATIENT
Start: 2025-05-08 | End: 2025-05-08

## 2025-05-08 RX ORDER — METHOCARBAMOL 500 MG/1
500 TABLET, FILM COATED ORAL
Status: COMPLETED | OUTPATIENT
Start: 2025-05-08 | End: 2025-05-08

## 2025-05-08 RX ORDER — KETOROLAC TROMETHAMINE 10 MG/1
10 TABLET, FILM COATED ORAL EVERY 6 HOURS
Qty: 20 TABLET | Refills: 0 | Status: SHIPPED | OUTPATIENT
Start: 2025-05-08 | End: 2025-05-13

## 2025-05-08 RX ORDER — LIDOCAINE 50 MG/G
1 PATCH TOPICAL DAILY
Qty: 7 PATCH | Refills: 0 | Status: SHIPPED | OUTPATIENT
Start: 2025-05-08 | End: 2025-05-15

## 2025-05-08 RX ADMIN — LIDOCAINE 1 PATCH: 50 PATCH CUTANEOUS at 02:05

## 2025-05-08 RX ADMIN — METHOCARBAMOL 500 MG: 500 TABLET ORAL at 02:05

## 2025-05-08 RX ADMIN — KETOROLAC TROMETHAMINE 15 MG: 30 INJECTION, SOLUTION INTRAMUSCULAR; INTRAVENOUS at 02:05

## 2025-05-08 NOTE — ED TRIAGE NOTES
Pt presents to ED today c/o bilateral flank pain   Denies associated urinary sx  Reports insect bite to right buttocks that has not completely healed   Pt also reports taking OTC parasite medication a few days ago

## 2025-05-08 NOTE — DISCHARGE INSTRUCTIONS
You were seen here today for back pain. I believe this is most likely due to a muscle strain/sprain.  This can happen with overuse or pulling the muscle.  Put ice or a cold pack on the sore area for 10 to 20 minutes at a time. Try to do this every 1 to 2 hours for the next 3 days (when you are awake). Put a thin cloth between the ice and your skin. After 2 or 3 days, you can try applying heat to the area that hurts. Apply heat for 10 to 20 minutes at a time, several times a day. You might also try switching between ice and heat.  Over the next few days while you were resting, be sure to elevate the area, ideally above the heart, to reduce swelling.    I recommend over the counter Tylenol (Acetaminophen) and/or Motrin (Ibuprofen) for additional control of pain. You can stagger the dosing so you are taking one or the other every three hours while spacing out the Tylenol and every 6 hours and the Motrin every 6 hours. HOWEVER, if you are taking another NSAID such as Ibuprofen, Meloxicam, Toradol, Celebrex, or others, DO NOT take Ibuprofen at the same time.     See discharge paperwork for more information on your diagnosis and stretches/exercises you can do to alleviate the pain. Do not do anything that makes the pain worse. Return to exercise gradually as you feel better.    Thank you for allowing me and my emergency team to take care of you here today! I hope you feel better soon. Please do not hesitate to return with any additional concerns that may arise from this or any new problem you encounter.    Our goal in the emergency department is to always give you outstanding care and exceptional service. If you receive a survey by mail or e-mail in the next week regarding your experience in our ED, we would greatly appreciate you completing it. Your feedback provides us with a way to recognize our staff who give very good care and it helps us learn how to improve when your experience was below the excellence we aspire to  be!    Brook Juneau, PA-C [facility], [facility]   Emergency Room Physician Assistant

## 2025-05-08 NOTE — FIRST PROVIDER EVALUATION
Emergency Department TeleTriage Encounter Note      CHIEF COMPLAINT    Chief Complaint   Patient presents with    Back Pain     C/o bilateral flank pain and spasms starting yesterday morning. 4/10, blunt, but sharp/stabbing at times. Worsens with mobility. Denies hx of kidney stones. No attempts to treat PTA. -frequency -dysuria -strong smelling urine -- vaginal DC or order -N/V +chronic constipation       VITAL SIGNS   Initial Vitals [05/08/25 1242]   BP Pulse Resp Temp SpO2   (!) 119/57 73 20 98.1 °F (36.7 °C) 99 %      MAP       --            ALLERGIES    Review of patient's allergies indicates:   Allergen Reactions    Codeine Nausea And Vomiting       PROVIDER TRIAGE NOTE  Verbal consent for the teletriage evaluation was given by the patient at the start of the evaluation.  All efforts will be made to maintain patient's privacy during the evaluation.      This is a teletriage evaluation of a 70 y.o. female presenting to the ED with c/o bilateral flank pain that started yesterday morning.  Denies any additional symptoms. Limited physical exam via telehealth: The patient is awake, alert, answering questions appropriately and is not in respiratory distress.  As the Teletriage provider, I performed an initial assessment and ordered appropriate labs and imaging studies, if any, to facilitate the patient's care once placed in the ED. Once a room is available, care and a full evaluation will be completed by an alternate ED provider.  Any additional orders and the final disposition will be determined by that provider.  All imaging and labs will not be followed-up by the Teletriage Team, including myself.          ORDERS  Labs Reviewed   CBC W/ AUTO DIFFERENTIAL    Narrative:     The following orders were created for panel order CBC auto differential.  Procedure                               Abnormality         Status                     ---------                               -----------         ------                      CBC with Differential[[id number]]                                                        Please view results for these tests on the individual orders.   COMPREHENSIVE METABOLIC PANEL   URINALYSIS, REFLEX TO URINE CULTURE   CBC WITH DIFFERENTIAL       ED Orders (720h ago, onward)      Start Ordered     Status Ordering Provider    05/08/25 1314 05/08/25 1313  Saline lock IV  Once         Ordered KATY LANG NIK    05/08/25 1314 05/08/25 1313  CBC auto differential  STAT         Ordered KATY LANG    05/08/25 1314 05/08/25 1313  Comprehensive metabolic panel  STAT         Ordered KATY LANG    05/08/25 1314 05/08/25 1313  Urinalysis, Reflex to Urine Culture Urine, Clean Catch  STAT         Ordered KATY LANG    05/08/25 1314 05/08/25 1313  CBC with Differential  PROCEDURE ONCE         Ordered KATY LANG              Virtual Visit Note: The provider triage portion of this emergency department evaluation and documentation was performed via [employer], a HIPAA-compliant telemedicine application, in concert with a tele-presenter in the room. A face to face patient evaluation with one of my colleagues will occur once the patient is placed in an emergency department room.      DISCLAIMER: This note was prepared with [employer] voice recognition transcription software. Garbled syntax, mangled pronouns, and other bizarre constructions may be attributed to that software system.

## 2025-05-08 NOTE — ED NOTES
Pt presents to ED with C/O bilat lower back pain that wraps around. She states she get intermittent spasms to the area. Pt states the pain is worse with movement. She states the pain is 4/10 at this time.

## 2025-05-08 NOTE — ED PROVIDER NOTES
"Encounter Date: 5/8/2025       History     Chief Complaint   Patient presents with    Back Pain     C/o bilateral flank pain and spasms starting yesterday morning. 4/10, blunt, but sharp/stabbing at times. Worsens with mobility. Denies hx of kidney stones. No attempts to treat PTA. -frequency -dysuria -strong smelling urine -- vaginal DC or order -N/V +chronic constipation     Patient is a 70-year-old female with a past medical history of carpal tunnel syndrome, hypertension, hyperlipidemia, palpitations, and statin induced myositis who presents to emergency room for mid to lower back pain that onset yesterday.  Patient states that 5-6 days ago she was working in her garden when she was lifting up sand bags.  She does not remember any injury at that time.  However, states that she does have a history of muscle spasms that occurred days after inciting event.  She is also concerned because she started taking over-the-counter parasite medication 2-3 days ago.  States that she did not have any symptoms to prompt her to start taking this.  However, she brought the medication about a year ago and "wanted to use it." She denies any abdominal pain, nausea, vomiting, or changes in bowel movements.  No rectal bleeding.  She is also concerned because she experience and insect bite to right upper thigh about a week ago that has been healing.  Denies dysuria, hematuria, frequency, urgency, incontinence, fever, body aches, chills, or others at this time.  Back pain is worse with movement and improves with lying or sitting.  No medications for pain taken prior to arrival.    The history is provided by the patient. No  was used.     Review of patient's allergies indicates:   Allergen Reactions    Codeine Nausea And Vomiting     Past Medical History:   Diagnosis Date    Bilateral knee pain     Carpal tunnel syndrome, bilateral     Fissure in skin of foot     Right small toe    HTN (hypertension)     " Hyperlipidemia     Multiple thyroid nodules 11/10/2023    Palpitations     Rotator cuff injury     right    Screening for colorectal cancer 10/20/2017    Screening for malignant neoplasm of colon 2021    Statin-induced myositis 2018     Past Surgical History:   Procedure Laterality Date    ANORECTAL MANOMETRY N/A 9/3/2024    Procedure: MANOMETRY, ANORECTAL;  Surgeon: Frandy Avendano MD;  Location: [address]);  Service: Endoscopy;  Laterality: N/A;  Prep instructions sent via portal-  Prep-Enemas-dw   traveler, precall complete-st    BILATERAL SALPINGOOPHORECTOMY  2000    BREAST BIOPSY Left 2010    malignant    BREAST BIOPSY Left     negative    BREAST LUMPECTOMY Left     CATARACT EXTRACTION W/  INTRAOCULAR LENS IMPLANT Right 2018    Dr. Oneil    CATARACT EXTRACTION W/  INTRAOCULAR LENS IMPLANT Left 2018    Dr. Oneil     SECTION      x2    COLONOSCOPY N/A 10/20/2017    Procedure: COLONOSCOPY;  Surgeon: Mitchell Danielson Jr., MD;  Location: [facility];  Service: Endoscopy;  Laterality: N/A;    COLONOSCOPY N/A 2021    Procedure: COLONOSCOPY/Suprep;  Surgeon: Malcolm Reyes MD;  Location: [facility];  Service: Endoscopy;  Laterality: N/A;    COLONOSCOPY N/A 2024    Procedure: COLONOSCOPY;  Surgeon: Frandy Avendano MD;  Location: [facility] ENDOSCOPY;  Service: Endoscopy;  Laterality: N/A;  Ref by Dr RYAN Alva, pt request Miralax, portal - PC. 24 at 4;45 pm pt. confirmed appt. EC  24- portal msg for pc. DBM  24- Per DR. Valenzuela Pt to be r/s due to 1 MD scoping on 24 in AM, LVM to inform Pt of change in scoping MD and arrival time- ERW   precall    CYST REMOVAL      on back    ESOPHAGOGASTRODUODENOSCOPY N/A 2021    Procedure: EGD (ESOPHAGOGASTRODUODENOSCOPY);  Surgeon: Malcolm Reyes MD;  Location: [facility];  Service: Endoscopy;  Laterality: N/A;    HERNIA REPAIR      HYSTERECTOMY      at 25 yrs old    INTRAOCULAR PROSTHESES  INSERTION Left 11/06/2018    Procedure: INSERTION, IOL PROSTHESIS;  Surgeon: Sergio Oneil MD;  Location: [facility] OR 1ST FLR;  Service: Ophthalmology;  Laterality: Left;    INTRAOCULAR PROSTHESES INSERTION Right 11/20/2018    Procedure: INSERTION, IOL PROSTHESIS;  Surgeon: Sergio Oneil MD;  Location: [facility] OR 2ND FLR;  Service: Ophthalmology;  Laterality: Right;    KNEE ARTHROPLASTY Right 03/31/2021    Procedure: ARTHROPLASTY, KNEE:RIGHT:DEPUY-SIGMA ;  Surgeon: Pedro Summers III, MD;  Location: [facility] OR;  Service: Orthopedics;  Laterality: Right;    LYSIS, ADHESIONS, KNEE, ARTHROSCOPIC Right 4/30/2024    Procedure: ARTHROSCOPIC KNEE LYSIS, ADHESIONS AND ANTERIOR INTERVAL SLIDE;  Surgeon: Ryanne Sumner MD;  Location: [facility] OR;  Service: Orthopedics;  Laterality: Right;  REGIONAL BLOCK    OOPHORECTOMY      @ 45 yrs old    PHACOEMULSIFICATION OF CATARACT Left 11/06/2018    Procedure: PHACOEMULSIFICATION, CATARACT;  Surgeon: Sergio Oneil MD;  Location: [facility] OR 1ST FLR;  Service: Ophthalmology;  Laterality: Left;    PHACOEMULSIFICATION OF CATARACT Right 11/20/2018    Procedure: PHACOEMULSIFICATION, CATARACT;  Surgeon: Sergio Oneil MD;  Location: [facility] OR 2ND FLR;  Service: Ophthalmology;  Laterality: Right;    REFRACTIVE SURGERY      2023    supracervical abdominal hysterectomy  1978    fibroids    SYNOVECTOMY OF KNEE Right 4/30/2024    Procedure: SYNOVECTOMY, KNEE;  Surgeon: Ryanne Sumner MD;  Location: [facility];  Service: Orthopedics;  Laterality: Right;     Family History   Problem Relation Name Age of Onset    Hypertension Mother      Heart disease Mother      Diabetes Mother      Hyperlipidemia Mother      Pancreatitis Mother      Cataracts Mother      Macular degeneration Mother      Cancer Father      Prostate cancer Father      Hypertension Sister x1     Thyroid disease Sister x1     Diabetes Brother x1     Diabetes Brother x2     Cancer Brother x2     Heart disease  Brother x2     Prostate cancer Brother x2     Thyroid cancer Brother x2     Hyperlipidemia Daughter x1     Hypertension Son x1     Breast cancer Paternal Cousin      Amblyopia Neg Hx      Blindness Neg Hx      Glaucoma Neg Hx      Strabismus Neg Hx      Retinal detachment Neg Hx       Social History[1]  Review of Systems   Constitutional:  Negative for chills, diaphoresis, fatigue and fever.   HENT:  Negative for congestion, sore throat and trouble swallowing.    Respiratory:  Negative for cough and shortness of breath.    Cardiovascular:  Negative for chest pain and palpitations.   Gastrointestinal:  Negative for abdominal pain, blood in stool, constipation, diarrhea, nausea and vomiting.   Genitourinary:  Negative for difficulty urinating, dysuria, frequency and urgency.   Musculoskeletal:  Positive for back pain (Mid to lower). Negative for myalgias.   Skin:  Negative for rash and wound.   Neurological:  Negative for weakness, light-headedness, numbness and headaches.       Physical Exam     Initial Vitals [05/08/25 1242]   BP Pulse Resp Temp SpO2   (!) 119/57 73 20 98.1 °F (36.7 °C) 99 %      MAP       --         Physical Exam    Nursing note and vitals reviewed.  Constitutional: She appears well-developed and well-nourished. She is not diaphoretic. No distress.   HENT:   Head: Normocephalic and atraumatic.   Right Ear: External ear normal.   Left Ear: External ear normal.   Eyes: Conjunctivae and EOM are normal.   Neck: Neck supple.   Normal range of motion.  Pulmonary/Chest: No respiratory distress.   Musculoskeletal:         General: No edema.      Cervical back: Normal, normal range of motion and neck supple.      Thoracic back: Tenderness present.      Lumbar back: Tenderness present. Decreased range of motion. Negative right straight leg raise test and negative left straight leg raise test.        Back:       Comments: Diffuse tenderness to palpation to highlighted region above.     Neurological: She is  alert and oriented to person, place, and time. She has normal strength.   Patient able to ambulate with steady gait.  Strength 5/5 in bilateral lower extremities.  Sensation intact throughout.   Skin: Skin is warm.   Psychiatric: She has a normal mood and affect. Her behavior is normal. Thought content normal.         ED Course   Procedures  Labs Reviewed   CBC WITH DIFFERENTIAL - Abnormal       Result Value    WBC 7.84      RBC 5.11      HGB 13.5      HCT 43.1      MCV 84      MCH 26.4 (*)     MCHC 31.3 (*)     RDW 15.2 (*)     Platelet Count 292      MPV 9.5      Nucleated RBC 0      Neut % 60.0      Lymph % 26.9      Mono % 8.0      Eos % 4.2      Basophil % 0.4      Imm Grans % 0.5      Neut # 4.70      Lymph # 2.11      Mono # 0.63      Eos # 0.33      Baso # 0.03      Imm Grans # 0.04     COMPREHENSIVE METABOLIC PANEL - Normal    Sodium 142      Potassium 3.7      Chloride 105      CO2 29      Glucose 86      BUN 9      Creatinine 0.7      Calcium 9.9      Protein Total 7.8      Albumin 4.2      Bilirubin Total 0.9            AST 30      ALT 25      Anion Gap 8      eGFR >60     URINALYSIS, REFLEX TO URINE CULTURE - Normal    Color, UA Yellow      Appearance, UA Clear      pH, UA 8.0      Spec Grav UA 1.020      Protein, UA Negative      Glucose, UA Negative      Ketones, UA Negative      Bilirubin, UA Negative      Blood, UA Negative      Nitrites, UA Negative      Urobilinogen, UA Negative      Leukocyte Esterase, UA Negative     CBC W/ AUTO DIFFERENTIAL    Narrative:     The following orders were created for panel order CBC auto differential.  Procedure                               Abnormality         Status                     ---------                               -----------         ------                     CBC with Differential[[id number]]       Abnormal            Final result                 Please view results for these tests on the individual orders.   GREY TOP URINE HOLD          Imaging  Results    None          Medications   LIDOcaine 5 % patch 1 patch (1 patch Transdermal Patch Applied 5/8/25 1425)   ketorolac injection 15 mg (15 mg Intravenous Given 5/8/25 1448)   methocarbamoL tablet 500 mg (500 mg Oral Given 5/8/25 1425)     Medical Decision Making  Patient presents to emergency room for back pain.  Vital signs stable.  Physical exam as stated above.    Differential diagnosis includes but is not limited to muscle strain/sprain, slipped disc w/ radicular pain including sciatica, slipped disc w/ cauda equina syndrome, vertebral fracture, vertebral tumor, epidural abscess / discitis, or pyelonephritis.  I do not suspect fracture due to lack of inciting event or injury prior to onset of symptoms.  Patient without urinary retention, incontinence, or bilateral lower extremity weakness/numbness; therefore, I do not suspect cauda equina syndrome.  Emergent MRI not indicated at this time.  Patient clinically appears well and is afebrile without any recent procedures.  Unlikely abscess or discitis.  Lab work relatively unremarkable.  No evidence of kidney or liver disease.  Urinalysis without evidence of UTI.  Clinical presentation and physical exam most suggestive of muscle spasm/strain. Patient given Toradol, Robaxin, and lidocaine patch in the emergency room with improvement in symptoms. Will prescribe same medications to use upon discharge.  Discussed conservative management such as stretching and ice/heat to the area.    I see no indication of an emergent process beyond that addressed during our encounter. Patient stable for discharge at this time. I have counseled the patient regarding follow up with primary care and gave strict return precautions. I have discussed the final diagnosis and gave instructions regarding prescribed medications. Patient verbalized understanding and is agreeable.     Problems Addressed:  Back strain, initial encounter: acute illness or injury  Muscle spasm of back: acute  illness or injury    Amount and/or Complexity of Data Reviewed  External Data Reviewed: notes.     Details: Last seen by family medicine in 02/2025.  Also being seen by orthopedics status post right knee replacement.  Labs: ordered. Decision-making details documented in ED Course.  Radiology:      Details: Consider ordering CT spine.  However, no blunt trauma to suggest fracture or dislocation.  She also does not have any neurological deficits to suggest cauda equina.  No abdominal pain at this time.  ECG/medicine tests:      Details: Considered ordering EKG, though patient without any chest pain, palpitations, leg swelling, or SOB at this time.     Risk  OTC drugs.  Prescription drug management.  Risk Details: Comorbidities taken into consideration during the patient's evaluation and treatment include carpal tunnel syndrome, hypertension, hyperlipidemia, palpitations, and statin induced myositis.  Social determinants of health taken into consideration during development of our treatment plan include difficulty in obtaining follow-up, obtaining medications, health literacy, access to healthy options for preventative/conservative management, and/or support systems due to, but not limited to, transportation limitations, socioeconomic status, and environmental factors.                ED Course as of 05/08/25 1551   Thu May 08, 2025   1441 Urinalysis, Reflex to Urine Culture Urine, Clean Catch  Urinalysis without leukocytes or occult blood. [BJ]   1451 CBC auto differential(!)  CBC relatively unremarkable.  No leukocytosis.  H/H stable and within normal limits.  Platelet count within normal limits. [BJ]   1524 Comprehensive metabolic panel  CMP relatively unremarkable.  Electrolytes within normal limits.  BUN and creatinine within normal limits.  LFTs within normal limits. [BJ]   1542 On re-evaluation, patient states that she feels much improved.  Will plan for discharge. [BJ]      ED Course User Index  [BJ] Juneau,  BEATRIS Mark                           Clinical Impression:  Final diagnoses:  [M62.830] Muscle spasm of back (Primary)  [S39.012A] Back strain, initial encounter          ED Disposition Condition    Discharge Stable          ED Prescriptions       Medication Sig Dispense Start Date End Date Auth. Provider    LIDOcaine (LIDODERM) 5 % Place 1 patch onto the skin once daily. Remove & Discard patch within 12 hours or as directed by MD for 7 days 7 patch 5/8/2025 5/15/2025 Deedee Delacruz PA-C    ketorolac (TORADOL) 10 mg tablet Take 1 tablet (10 mg total) by mouth every 6 (six) hours. for 5 days 20 tablet 5/8/2025 5/13/2025 Deedee Delacruz PA-C    methocarbamoL (ROBAXIN) 500 MG Tab Take 1 tablet (500 mg total) by mouth 4 (four) times daily. for 10 days 40 tablet 5/8/2025 5/18/2025 Deedee Delacruz PA-C          Follow-up Information       Follow up With Specialties Details Why Contact Info    Florina Sanchez MD Family Medicine   [address] [facility] [address]  [phone]      [facility] Emergency Dept Emergency Medicine Go to  If new or worsening symptoms occur [address] [place] [id number] [phone]            This note was partially created using [employer] Voice Recognition software. Typographical and content errors may occur with this process. While efforts are made to detect and correct such errors, in some cases errors will persist. For this reason, wording in this document should be considered in the proper context and not strictly verbatim.          [1]   Social History  Tobacco Use    Smoking status: Never    Smokeless tobacco: Never   Substance Use Topics    Alcohol use: Yes     Comment: once per month    Drug use: Never        Deedee Delacruz PA-C  05/08/25 3929

## 2025-05-13 ENCOUNTER — PATIENT OUTREACH (OUTPATIENT)
Dept: ADMINISTRATIVE | Facility: HOSPITAL | Age: 70
End: 2025-05-13
Payer: MEDICARE

## 2025-05-13 DIAGNOSIS — R73.03 PREDIABETES: Primary | ICD-10-CM

## 2025-05-14 NOTE — PROGRESS NOTES
Health Maintenance Topic(s) Outreach Outcomes & Actions Taken:    Lab(s) - Outreach Outcomes & Actions Taken  : Overdue Lab(s) Scheduled

## 2025-05-15 NOTE — PROGRESS NOTES
Outpatient Rehab    Occupational Therapy Discharge    Patient Name: Chelsea Rodriguez  MRN: [id number]  YOB: 1955  Encounter Date: 5/5/2025    Therapy Diagnosis:   Encounter Diagnosis   Name Primary?    Decreased strength Yes     Physician: Nathaly Velasco PA-C    Physician Orders: Eval and Treat  Medical Diagnosis: Pain in right hand    Visit # / Visits Authorized: 9 / 20  Insurance Authorization Period: 4/2/2025 to 12/31/2025  Date of Evaluation: 3/26/2025  Plan of Care Certification:  4/23/2025 to 5/30/2025     Time In: 1000   Time Out: 1050  Total Time (in minutes): 50         Precautions:       Subjective   My hand has been feeling much better.  Pain reported as 0/10.      Objective      Wrist Range of Motion     (R) wrist AROM is within functional limits               Right  Strength  Right Hand Dynamometer Position: 2  Elbow Position Forearm Position Trial 1 (lbs) Trial 2  (lbs) Trial 3  (lbs) Average  (lbs) Pain   Flexed Neutral 47 47 45 46.33         Left  Strength  Left Hand Dynamometer Position: 2  Elbow Position Forearm Position Trial 1 (lbs) Trial 2 (lbs) Trial 3 (lbs) Average (lbs) Pain   Flexed Neutral 50 50 46 48.67                Wrist/Hand Special Tests  Hand Coordination Special Tests  Right 9-Hole Peg Test (sec): 21  Left 9-Hole Peg Test (sec): 27       Coordination  Right 9-Hole Peg Test (sec): 21  Left 9-Hole Peg Test (sec): 27                Treatment:  Therapeutic Exercise  TE 2: Green flexbar frowns/smiles  2 x 10  TE 3: Green flexbar oscilation x 2 min each hand  TE 5: Yellow putty squeezes x 12  TE 8: prayer stretch 30 sec holds x 3  TE 9: Phalen's Stretch 3 x 30'  Therapeutic Activity  TA 1: Measurments taken to reassess progress  TA 2: Review of HEP  Manual Therapy  MT 1: Soft tissue massage over carpal tunnel region of (B) hands    Time Entry(in minutes):  Manual Therapy Time Entry: 5  Therapeutic Activity Time Entry: 20  Therapeutic Exercise Time Entry:  25    Assessment & Plan   Assessment: Good progress with functionanl perfromance utilizing (B)UE       Patient's spiritual, cultural, and educational needs considered and patient agreeable to plan of care and goals.           Plan: Pt. will be discharged this date    Goals:   Resolved       Fine hand motor use       Patient will improve 9-hole peg test score by 3-4 seconds to demonstrate development with FM skills   (Met)       Start:  04/01/25    Expected End:  05/09/25    Resolved:  04/27/25            Functional outcome       Patient will improve scorein  FOTO patient-reported outcome tool by 5-8 points to demonstrate subjective improvement (Met)       Start:  04/01/25    Expected End:  05/09/25    Resolved:  05/14/25            Home activities       Patient will report Mod (I) performing household indoor maintenance  (Met)       Start:  04/01/25    Expected End:  05/09/25    Resolved:  05/14/25            Pain       Patient will report pain of 1-2/10 demonstrating a reduction of overall pain (Met)       Start:  04/01/25    Expected End:  05/09/25    Resolved:  04/27/25            Range of Motion       Patient will achieve right wrist extension/RD AROM by 5-8 degrees (Met)       Start:  04/01/25    Expected End:  05/09/25    Resolved:  05/14/25            Strength       Patient will improve right  strength by 5-10lbs  in the two position (Met)       Start:  04/01/25    Expected End:  05/09/25    Resolved:  05/14/25         Patient will improve right tip to tip and key pinch strength by 3-4psi  (Met)       Start:  04/01/25    Expected End:  05/09/25    Resolved:  05/14/25             Zac Ascencio OT

## 2025-05-16 ENCOUNTER — LAB VISIT (OUTPATIENT)
Dept: LAB | Facility: HOSPITAL | Age: 70
End: 2025-05-16
Attending: FAMILY MEDICINE
Payer: MEDICARE

## 2025-05-16 ENCOUNTER — OFFICE VISIT (OUTPATIENT)
Dept: FAMILY MEDICINE | Facility: CLINIC | Age: 70
End: 2025-05-16
Payer: MEDICARE

## 2025-05-16 VITALS
SYSTOLIC BLOOD PRESSURE: 106 MMHG | DIASTOLIC BLOOD PRESSURE: 60 MMHG | HEART RATE: 83 BPM | BODY MASS INDEX: 30.84 KG/M2 | WEIGHT: 153 LBS | HEIGHT: 59 IN | OXYGEN SATURATION: 95 %

## 2025-05-16 DIAGNOSIS — E78.2 MIXED HYPERLIPIDEMIA: ICD-10-CM

## 2025-05-16 DIAGNOSIS — E66.09 CLASS 1 OBESITY DUE TO EXCESS CALORIES WITH SERIOUS COMORBIDITY AND BODY MASS INDEX (BMI) OF 30.0 TO 30.9 IN ADULT: ICD-10-CM

## 2025-05-16 DIAGNOSIS — R73.03 PREDIABETES: ICD-10-CM

## 2025-05-16 DIAGNOSIS — Z00.00 ENCOUNTER FOR MEDICARE ANNUAL WELLNESS EXAM: Primary | ICD-10-CM

## 2025-05-16 DIAGNOSIS — R26.9 ABNORMALITY OF GAIT AND MOBILITY: ICD-10-CM

## 2025-05-16 DIAGNOSIS — I10 ESSENTIAL HYPERTENSION: ICD-10-CM

## 2025-05-16 DIAGNOSIS — E66.811 CLASS 1 OBESITY DUE TO EXCESS CALORIES WITH SERIOUS COMORBIDITY AND BODY MASS INDEX (BMI) OF 30.0 TO 30.9 IN ADULT: ICD-10-CM

## 2025-05-16 DIAGNOSIS — J30.9 ALLERGIC RHINITIS, UNSPECIFIED SEASONALITY, UNSPECIFIED TRIGGER: ICD-10-CM

## 2025-05-16 LAB
ALBUMIN/CREAT UR: NORMAL
CHOLEST SERPL-MCNC: 138 MG/DL (ref 120–199)
CHOLEST/HDLC SERPL: 3.3 {RATIO} (ref 2–5)
CREAT UR-MCNC: 23 MG/DL (ref 15–325)
HDLC SERPL-MCNC: 42 MG/DL (ref 40–75)
HDLC SERPL: 30.4 % (ref 20–50)
LDLC SERPL CALC-MCNC: 84 MG/DL (ref 63–159)
MICROALBUMIN UR-MCNC: <5 UG/ML (ref ?–5000)
NONHDLC SERPL-MCNC: 96 MG/DL
TRIGL SERPL-MCNC: 60 MG/DL (ref 30–150)

## 2025-05-16 PROCEDURE — 80061 LIPID PANEL: CPT | Mod: HCNC

## 2025-05-16 PROCEDURE — 99999 PR PBB SHADOW E&M-EST. PATIENT-LVL V: CPT | Mod: PBBFAC,HCNC,, | Performed by: NURSE PRACTITIONER

## 2025-05-16 PROCEDURE — 36415 COLL VENOUS BLD VENIPUNCTURE: CPT | Mod: HCNC,PO

## 2025-05-16 PROCEDURE — 82043 UR ALBUMIN QUANTITATIVE: CPT | Mod: HCNC

## 2025-05-16 NOTE — PATIENT INSTRUCTIONS
Counseling and Referral of Other Preventative  (Italic type indicates deductible and co-insurance are waived)    Patient Name: Chelsea Rodriguez  Today's Date: 5/16/2025    Health Maintenance       Date Due Completion Date    RSV Vaccine (Age 60+ and Pregnant patients) (1 - Risk 60-74 years 1-dose series) Never done ---    Annual UACr 05/14/2025 ---    COVID-19 Vaccine (6 - 2024-25 season) 02/07/2026 (Originally 9/1/2024) 1/26/2023    Hemoglobin A1c (Prediabetes) 02/03/2026 2/3/2025    Mammogram 02/06/2026 2/6/2025    Override on 11/16/2016: Done    DEXA Scan 09/28/2027 9/28/2023    TETANUS VACCINE 11/03/2027 11/3/2017    Colorectal Cancer Screening 06/14/2028 6/14/2024    Override on 11/5/2012: Done    Lipid Panel 02/20/2030 2/20/2025        No orders of the defined types were placed in this encounter.    The following information is provided to all patients.  This information is to help you find resources for any of the problems found today that may be affecting your health:                  Living healthy guide: www.[facility].[place].gov      Understanding Diabetes: www.diabetes.org      Eating healthy: www.cdc.gov/healthyweight      CDC home safety checklist: www.cdc.gov/steadi/patient.html      Agency on Aging: www.goea.[place].[facility]      Alcoholics anonymous (AA): www.aa.org      Physical Activity: www.agusto.nih.gov/ob6esre      Tobacco use: www.quitwithusla.org

## 2025-05-16 NOTE — PROGRESS NOTES
"  Chelsea Rodriguez presented for a  Medicare AWV and comprehensive Health Risk Assessment today. The following components were reviewed and updated:    Medical history  Family History  Social history  Allergies and Current Medications  Health Risk Assessment  Health Maintenance  Care Team     Patient screened moderate and/or high risk for one or more social determinants of health (SDOH). Patient connected to community resources through the ED Navigator.      ** See Completed Assessments for Annual Wellness Visit within the encounter summary.**         The following assessments were completed:  Living Situation  CAGE  Depression Screening  Timed Get Up and Go  Whisper Test  Cognitive Function Screening      Nutrition Screening  ADL Screening  PAQ Screening      Opioid documentation:      Patient does not have a current opioid prescription.        Vitals:    05/16/25 1121   BP: 106/60   BP Location: Left arm   Patient Position: Sitting   Pulse: 83   SpO2: 95%   Weight: 69.4 kg (153 lb)   Height: 4' 11" (1.499 m)     Body mass index is 30.9 kg/m².    Physical Exam  Vitals reviewed.   Constitutional:       General: She is not in acute distress.     Appearance: Normal appearance. She is well-developed and well-groomed. She is obese.   HENT:      Head: Normocephalic.   Cardiovascular:      Rate and Rhythm: Normal rate.   Pulmonary:      Effort: Pulmonary effort is normal. No respiratory distress.   Skin:     Coloration: Skin is not pale.   Neurological:      Mental Status: She is alert and oriented to person, place, and time.      Coordination: Coordination normal.      Gait: Gait normal.   Psychiatric:         Attention and Perception: Attention normal.         Mood and Affect: Mood and affect normal.         Speech: Speech normal.         Behavior: Behavior normal. Behavior is cooperative.         Thought Content: Thought content normal.             Diagnoses and health risks identified today and associated " recommendations/orders:    1. Encounter for Medicare annual wellness exam  - Ambulatory Referral/Consult to Enhanced Annual Wellness Visit (eAWV)    2. Essential hypertension  Chronic; stable on hydrochlorothiazide, irbesartan and metoprolol medication. Follow up with PCP.    3. Mixed hyperlipidemia  Chronic; stable on atorvastatin medication. Follow up with PCP.    4. Prediabetes  Chronic; stable. Diet controlled. Follow up with PCP.    5. Allergic rhinitis, unspecified seasonality, unspecified trigger  Chronic; stable using flonase nasal spray medication. Follow up with PCP.    6. Abnormality of gait and mobility  Ambulates independently; reports some difficulty with climbing stairs. Follow up with PCP.    7. Class 1 obesity due to excess calories with serious comorbidity and body mass index (BMI) of 30.0 to 30.9 in adult  Eat a low salt/low fat ADA diet and discussed importance of engaging in physical activity at least 5x/week for a minimum of 30 min/day.      Provided Chelsea with a 5-10 year written screening schedule and personal prevention plan. Recommendations were developed using the USPSTF age appropriate recommendations. Education, counseling, and referrals were provided as needed. After Visit Summary printed and given to patient which includes a list of additional screenings/tests needed.    Follow up for your next annual wellness visit.    Teresa Redman NP      Advance Care Planning     I offered to discuss advanced care planning, including how to pick a person who would make decisions for you if you were unable to make them for yourself, called a health care power of , and what kind of decisions you might make such as use of life sustaining treatments such as ventilators and tube feeding when faced with a life limiting illness recorded on a living will that they will need to know. (How you want to be cared for as you near the end of your natural life)     X  Patient has advanced directives  on file, they would like to make changes. I provided information on how to revoke their previous directives and make new ones.

## 2025-05-23 ENCOUNTER — OFFICE VISIT (OUTPATIENT)
Dept: FAMILY MEDICINE | Facility: CLINIC | Age: 70
End: 2025-05-23
Payer: MEDICARE

## 2025-05-23 VITALS
HEART RATE: 68 BPM | BODY MASS INDEX: 31.34 KG/M2 | OXYGEN SATURATION: 97 % | WEIGHT: 155.44 LBS | DIASTOLIC BLOOD PRESSURE: 74 MMHG | SYSTOLIC BLOOD PRESSURE: 124 MMHG | HEIGHT: 59 IN

## 2025-05-23 DIAGNOSIS — I10 ESSENTIAL HYPERTENSION: Primary | ICD-10-CM

## 2025-05-23 DIAGNOSIS — Z00.01 ENCOUNTER FOR GENERAL ADULT MEDICAL EXAMINATION WITH ABNORMAL FINDINGS: ICD-10-CM

## 2025-05-23 DIAGNOSIS — E78.2 MIXED HYPERLIPIDEMIA: ICD-10-CM

## 2025-05-23 PROCEDURE — 99999 PR PBB SHADOW E&M-EST. PATIENT-LVL IV: CPT | Mod: PBBFAC,HCNC,,

## 2025-05-23 NOTE — PROGRESS NOTES
[facility]    5/23/2025      Subjective:       Patient ID:  Chelsea is a 70 y.o. female .  has a past medical history of Bilateral knee pain, Carpal tunnel syndrome, bilateral, Fissure in skin of foot, HTN (hypertension), Hyperlipidemia, Multiple thyroid nodules, Palpitations, Rotator cuff injury, Screening for colorectal cancer, Screening for malignant neoplasm of colon, and Statin-induced myositis.    History of Present Illness    CHIEF COMPLAINT:  Ms. Rodriguez presents today for follow up    HYPERTENSION:  She has a history of elevated blood pressure. Her medication was increased from 150mg to 300mg during a telemedicine consultation. Since the medication adjustment, her blood pressure has been well-controlled, ranging between 106 and 129.  Patient has no symptoms of low blood pressure such as dizziness or any headaches.    LABS:  Cholesterol levels have decreased from 204 to 138. Iron levels were slightly low with normal ferritin levels.    PARASITIC CONCERNS:  She reports seeing seed-like material extruding from neck and nostrils. She has completed stool and urine samples and is currently taking Cleans 24 medication.    DIET:  She reports avoiding fried foods with only 1-2 instances of consumption since last visit.      ROS:  General: -fever, -chills, -fatigue, -weight gain, -weight loss  Eyes: -vision changes, -redness, -discharge  ENT: -ear pain, -nasal congestion, -sore throat, +nasal discharge  Cardiovascular: -chest pain, -palpitations, -lower extremity edema  Respiratory: -cough, -shortness of breath  Gastrointestinal: -abdominal pain, -nausea, -vomiting, -diarrhea, -constipation, -blood in stool  Genitourinary: -dysuria, -hematuria, -frequency  Musculoskeletal: -joint pain, -muscle pain  Skin: -rash, -lesion  Neurological: -headache, -dizziness, -numbness, -tingling  Psychiatric: -anxiety, -depression, -sleep difficulty           Problem List[1]      Last HgbA1C:    Lab  Results   Component Value Date    HGBA1C 5.8 (H) 02/03/2025    HGBA1C 6.2 (H) 01/29/2024    HGBA1C 6.0 (H) 05/24/2023         Last Lipid Panel:    Lab Results   Component Value Date    HDL 42 05/16/2025    HDL 53 02/20/2025    HDL 45 08/22/2024       Lab Results   Component Value Date    LDLCALC 84.0 05/16/2025    LDLCALC 134.2 02/20/2025    LDLCALC 94.6 08/22/2024       Lab Results   Component Value Date    TRIG 60 05/16/2025    TRIG 84 02/20/2025    TRIG 62 08/22/2024       Lab Results   Component Value Date    CHOLHDL 30.4 05/16/2025    CHOLHDL 26.0 02/20/2025    CHOLHDL 29.6 08/22/2024         Review of patient's allergies indicates:   Allergen Reactions    Codeine Nausea And Vomiting        Medication List with Changes/Refills   Current Medications    ACETAMINOPHEN (TYLENOL) 650 MG TBSR    Take 1 tablet (650 mg total) by mouth every 8 (eight) hours as needed (pain).    AMMONIUM LACTATE 12 % CREA    Apply 1 g topically Daily.    ASPIRIN (ECOTRIN) 81 MG EC TABLET    Take 1 tablet (81 mg total) by mouth once daily.    ATORVASTATIN (LIPITOR) 80 MG TABLET    Take 1 tablet (80 mg total) by mouth once daily.    COENZYME Q10 100 MG CAPSULE    Take 100 mg by mouth every evening.    DOCUSATE SODIUM (COLACE) 100 MG CAPSULE    Take 1 capsule (100 mg total) by mouth 2 (two) times daily as needed for Constipation.    EZETIMIBE (ZETIA) 10 MG TABLET    Take 1 tablet (10 mg total) by mouth once daily.    FLUTICASONE PROPIONATE (FLONASE) 50 MCG/ACTUATION NASAL SPRAY    SHAKE LIQUID AND USE 1 SPRAY(50 MCG) IN EACH NOSTRIL EVERY DAY    HYDROCHLOROTHIAZIDE (MICROZIDE) 12.5 MG CAPSULE    Take 1 capsule (12.5 mg total) by mouth every evening.    IRBESARTAN (AVAPRO) 300 MG TABLET    Take 1 tablet (300 mg total) by mouth once daily.    LACTOBACILLUS ACIDOPHILUS (PROBIOTIC ACIDOPHILUS ORAL)    Take 1 capsule by mouth Daily.    METOPROLOL SUCCINATE (TOPROL-XL) 25 MG 24 HR TABLET    Take 1 tablet (25 mg total) by mouth every evening.     "MULTIVITAMIN (THERAGRAN) PER TABLET    Take 1 tablet by mouth once daily.    OMEGA-3 FATTY ACIDS/FISH OIL (FISH OIL-OMEGA-3 FATTY ACIDS) 300-1,000 MG CAPSULE    Take 1 capsule by mouth once daily.               Objective:      /74 (BP Location: Left arm, Patient Position: Sitting)   Pulse 68   Ht 4' 11" (1.499 m)   Wt 70.5 kg (155 lb 6.8 oz)   LMP  (LMP Unknown)   SpO2 97%   BMI 31.39 kg/m²   Estimated body mass index is 31.39 kg/m² as calculated from the following:    Height as of this encounter: 4' 11" (1.499 m).    Weight as of this encounter: 70.5 kg (155 lb 6.8 oz).    Physical Exam  Vitals reviewed.   Constitutional:       Appearance: Normal appearance.   HENT:      Head: Normocephalic and atraumatic.   Eyes:      General: No scleral icterus.     Conjunctiva/sclera: Conjunctivae normal.   Cardiovascular:      Rate and Rhythm: Normal rate.   Pulmonary:      Effort: Pulmonary effort is normal. No respiratory distress.   Musculoskeletal:      Cervical back: Normal range of motion.   Skin:     Coloration: Skin is not pale.   Neurological:      Mental Status: She is alert and oriented to person, place, and time.   Psychiatric:         Mood and Affect: Mood normal.         Behavior: Behavior normal.         Thought Content: Thought content normal.             Assessment and Plan:   1. Essential hypertension    2. Encounter for general adult medical examination with abnormal findings  - Comprehensive Metabolic Panel; Future  - CBC Auto Differential; Future  - Lipid Panel; Future  - Hemoglobin A1C; Future  - TSH; Future  - Ferritin; Future  - Iron and TIBC; Future    3. Mixed hyperlipidemia     Assessment & Plan    PLAN SUMMARY:   Labs ordered for November appointment  Review labs   Continue current antihypertensive medication    Consider iron supplementation based on future lab results   Continue current medication regimen   Follow up in November for annual follow-up and lab work    HYPERTENSION:   Blood " pressure today is 122/74, showing adequate control following the increase in antihypertensive medication to 300 mg.   This represents significant improvement from previously elevated readings.   Will continue current medication regimen with follow up in 6 months to reassess control.    HYPERLIPIDEMIA:   Recent lab results show significant improvement in cholesterol levels from 204 to 138.   While this is a marked improvement, discussed the importance of maintaining levels below 100 due to patient's cardiac risk factors.   Will continue monitoring cholesterol levels at regular intervals.    FOLLOW-UP:   Ordered labs for November appointment.   Ms. Rodriguez to follow up in November for annual follow-up and lab work.  In 6 months after        The patient was informed of the following statements     Emergency Care:Seek immediate medical attention in the emergency room if you experience any new or worsening symptoms, or if your current condition significantly changes or becomes more severe.  Patient Acknowledgment: Patient verbalizes understanding of the plan and agrees to proceed with the recommended care.      Follow Up:  11/24/2025 Nayan García PA   Future Appointments   Date Time Provider Department Center   6/6/2025  1:30 PM Celestine Be PA-C [facility] ORTHO [address]   6/30/2025  1:15 PM [facility] [address] [place]   7/1/2025 11:15 AM Rayshawn Willis MD [facility] [place] [facility]   11/17/2025  8:15 AM LAB, [facility] [place]   11/24/2025  9:00 AM Nayan García PA [facility] MED [place]   5/20/2026 10:30 AM Teresa Redman NP [facility] MED [place]                     No follow-ups on file.    Other Orders Placed This Visit:  Orders Placed This Encounter   Procedures    Comprehensive Metabolic Panel    CBC Auto Differential    Lipid Panel    Hemoglobin A1C    TSH    Ferritin    Iron and TIBC         This note was generated with the assistance of ambient listening  technology. Verbal consent was obtained by the patient and accompanying visitor(s) for the recording of patient appointment to facilitate this note. I attest to having reviewed and edited the generated note for accuracy, though some syntax or spelling errors may persist. Please contact the author of this note for any clarification.        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, bilateral    Vitamin D deficiency    Mixed hyperlipidemia    Class 1 obesity due to excess calories with serious comorbidity and body mass index (BMI) of 30.0 to 30.9 in adult    HENOK (obstructive sleep apnea)    H/O tooth extraction    Primary osteoarthritis of right knee    Venous stasis dermatitis    Lymphedema of both lower extremities    Pelvic floor dysfunction    Other lack of coordination    Acquired absence of both cervix and uterus    Anxiety    Osteoarthritis    Swelling of hand    Pain involving joint of finger of left hand    Decreased strength    Multiple thyroid nodules    Thyroid nodule    Prediabetes    Impaired functional mobility, balance, gait, and endurance    Decreased range of motion (ROM) of right knee    Blood transfusion declined because patient is Christianity    Decreased range of motion of neck    Poor posture    Constipation

## 2025-05-27 ENCOUNTER — PATIENT OUTREACH (OUTPATIENT)
Dept: ADMINISTRATIVE | Facility: HOSPITAL | Age: 70
End: 2025-05-27
Payer: MEDICARE

## 2025-05-27 NOTE — PROGRESS NOTES
05/27/2025  VB chart audit performed. Care Everywhere updates requested and reviewed  Overdue HM topic chart audit and/or requested. LINKS triggered and reconciled. Media reviewed.

## 2025-06-06 ENCOUNTER — OFFICE VISIT (OUTPATIENT)
Dept: ORTHOPEDICS | Facility: CLINIC | Age: 70
End: 2025-06-06
Payer: MEDICARE

## 2025-06-06 DIAGNOSIS — M17.12 PRIMARY OSTEOARTHRITIS OF LEFT KNEE: Primary | ICD-10-CM

## 2025-06-06 PROCEDURE — 99999 PR PBB SHADOW E&M-EST. PATIENT-LVL II: CPT | Mod: PBBFAC,HCNC,,

## 2025-06-06 RX ORDER — TRIAMCINOLONE ACETONIDE 40 MG/ML
40 INJECTION, SUSPENSION INTRA-ARTICULAR; INTRAMUSCULAR ONCE
Status: COMPLETED | OUTPATIENT
Start: 2025-06-06 | End: 2025-06-06

## 2025-06-06 RX ADMIN — TRIAMCINOLONE ACETONIDE 40 MG: 40 INJECTION, SUSPENSION INTRA-ARTICULAR; INTRAMUSCULAR at 01:06

## 2025-06-09 ENCOUNTER — PATIENT MESSAGE (OUTPATIENT)
Dept: ORTHOPEDICS | Facility: CLINIC | Age: 70
End: 2025-06-09
Payer: MEDICARE

## 2025-06-16 ENCOUNTER — TELEPHONE (OUTPATIENT)
Dept: ORTHOPEDICS | Facility: CLINIC | Age: 70
End: 2025-06-16
Payer: MEDICARE

## 2025-06-17 ENCOUNTER — TELEPHONE (OUTPATIENT)
Dept: ORTHOPEDICS | Facility: CLINIC | Age: 70
End: 2025-06-17
Payer: MEDICARE

## 2025-06-30 ENCOUNTER — OFFICE VISIT (OUTPATIENT)
Dept: PODIATRY | Facility: CLINIC | Age: 70
End: 2025-06-30
Payer: MEDICARE

## 2025-06-30 VITALS
WEIGHT: 155.31 LBS | BODY MASS INDEX: 31.31 KG/M2 | HEART RATE: 70 BPM | SYSTOLIC BLOOD PRESSURE: 108 MMHG | DIASTOLIC BLOOD PRESSURE: 61 MMHG | HEIGHT: 59 IN

## 2025-06-30 DIAGNOSIS — M79.671 FOOT PAIN, BILATERAL: ICD-10-CM

## 2025-06-30 DIAGNOSIS — M79.672 FOOT PAIN, BILATERAL: ICD-10-CM

## 2025-06-30 DIAGNOSIS — L84 CORN OR CALLUS: ICD-10-CM

## 2025-06-30 DIAGNOSIS — M20.41 HAMMER TOES OF BOTH FEET: Primary | ICD-10-CM

## 2025-06-30 DIAGNOSIS — M20.12 VALGUS DEFORMITY OF BOTH GREAT TOES: ICD-10-CM

## 2025-06-30 DIAGNOSIS — M20.11 VALGUS DEFORMITY OF BOTH GREAT TOES: ICD-10-CM

## 2025-06-30 DIAGNOSIS — M20.42 HAMMER TOES OF BOTH FEET: Primary | ICD-10-CM

## 2025-06-30 PROCEDURE — 3078F DIAST BP <80 MM HG: CPT | Mod: CPTII,HCNC,S$GLB, | Performed by: PODIATRIST

## 2025-06-30 PROCEDURE — 1160F RVW MEDS BY RX/DR IN RCRD: CPT | Mod: CPTII,HCNC,S$GLB, | Performed by: PODIATRIST

## 2025-06-30 PROCEDURE — 3066F NEPHROPATHY DOC TX: CPT | Mod: CPTII,HCNC,S$GLB, | Performed by: PODIATRIST

## 2025-06-30 PROCEDURE — 3061F NEG MICROALBUMINURIA REV: CPT | Mod: CPTII,HCNC,S$GLB, | Performed by: PODIATRIST

## 2025-06-30 PROCEDURE — 3044F HG A1C LEVEL LT 7.0%: CPT | Mod: CPTII,HCNC,S$GLB, | Performed by: PODIATRIST

## 2025-06-30 PROCEDURE — 3074F SYST BP LT 130 MM HG: CPT | Mod: CPTII,HCNC,S$GLB, | Performed by: PODIATRIST

## 2025-06-30 PROCEDURE — 1126F AMNT PAIN NOTED NONE PRSNT: CPT | Mod: CPTII,HCNC,S$GLB, | Performed by: PODIATRIST

## 2025-06-30 PROCEDURE — 3008F BODY MASS INDEX DOCD: CPT | Mod: CPTII,HCNC,S$GLB, | Performed by: PODIATRIST

## 2025-06-30 PROCEDURE — 1101F PT FALLS ASSESS-DOCD LE1/YR: CPT | Mod: CPTII,HCNC,S$GLB, | Performed by: PODIATRIST

## 2025-06-30 PROCEDURE — 4010F ACE/ARB THERAPY RXD/TAKEN: CPT | Mod: CPTII,HCNC,S$GLB, | Performed by: PODIATRIST

## 2025-06-30 PROCEDURE — 3288F FALL RISK ASSESSMENT DOCD: CPT | Mod: CPTII,HCNC,S$GLB, | Performed by: PODIATRIST

## 2025-06-30 PROCEDURE — 1157F ADVNC CARE PLAN IN RCRD: CPT | Mod: CPTII,HCNC,S$GLB, | Performed by: PODIATRIST

## 2025-06-30 PROCEDURE — 1159F MED LIST DOCD IN RCRD: CPT | Mod: CPTII,HCNC,S$GLB, | Performed by: PODIATRIST

## 2025-06-30 PROCEDURE — 99999 PR PBB SHADOW E&M-EST. PATIENT-LVL III: CPT | Mod: PBBFAC,HCNC,, | Performed by: PODIATRIST

## 2025-06-30 PROCEDURE — 99214 OFFICE O/P EST MOD 30 MIN: CPT | Mod: HCNC,S$GLB,, | Performed by: PODIATRIST

## 2025-07-08 NOTE — PROGRESS NOTES
Subjective:   J.W.    Patient ID: Chelsea Rodriguez is a 70 y.o. female.    Chief Complaint:   No chief complaint on file.    Chelsea is a 70 y.o. female who rtc eval nail that was previously painful. Overall feeling better.   Not sure procedure is needed. Here to discuss possible surgical intervention for hammertoes.  For the most part asymptomatic.    Past Medical History:   Diagnosis Date    Bilateral knee pain     Carpal tunnel syndrome, bilateral     Fissure in skin of foot     Right small toe    HTN (hypertension)     Hyperlipidemia     Multiple thyroid nodules 11/10/2023    Palpitations     Rotator cuff injury     right    Screening for colorectal cancer 10/20/2017    Screening for malignant neoplasm of colon 2021    Statin-induced myositis 2018     Past Surgical History:   Procedure Laterality Date    ANORECTAL MANOMETRY N/A 9/3/2024    Procedure: MANOMETRY, ANORECTAL;  Surgeon: Frandy Avendano MD;  Location: [address]);  Service: Endoscopy;  Laterality: N/A;  Prep instructions sent via portal-  Prep[facility]-dw   [facility]    BILATERAL SALPINGOOPHORECTOMY  2000    BREAST BIOPSY Left 2010    malignant    BREAST BIOPSY Left     negative    BREAST LUMPECTOMY Left     CATARACT EXTRACTION W/  INTRAOCULAR LENS IMPLANT Right 2018    Dr. Oneil    CATARACT EXTRACTION W/  INTRAOCULAR LENS IMPLANT Left 2018    Dr. Oneil     SECTION      x2    COLONOSCOPY N/A 10/20/2017    Procedure: COLONOSCOPY;  Surgeon: Mitchell Danielson Jr., MD;  Location: [facility];  Service: Endoscopy;  Laterality: N/A;    COLONOSCOPY N/A 2021    Procedure: COLONOSCOPY/Suprep;  Surgeon: Malcolm Reyes MD;  Location: [facility] ENDO;  Service: Endoscopy;  Laterality: N/A;    COLONOSCOPY N/A 2024    Procedure: COLONOSCOPY;  Surgeon: Frandy Avendano MD;  Location: [facility] ENDOSCOPY;  Service: Endoscopy;  Laterality: N/A;  Ref by Dr RYAN Alva, pt request [place], portal  - PC. 6/7/24 at 4;45 pm pt. confirmed appt. EC  6/6/24- portal msg for pc. DBM  6/7/24- Per DR. Valenzuela Pt to be r/s due to 1 MD scoping on 6/14/24 in AM, LVM to inform Pt of change in scoping MD and arrival time- ERW  6/13 precall    CYST REMOVAL      on back    ESOPHAGOGASTRODUODENOSCOPY N/A 02/05/2021    Procedure: EGD (ESOPHAGOGASTRODUODENOSCOPY);  Surgeon: Malcolm Reyes MD;  Location: [facility];  Service: Endoscopy;  Laterality: N/A;    HERNIA REPAIR      HYSTERECTOMY      at 25 yrs old    INTRAOCULAR PROSTHESES INSERTION Left 11/06/2018    Procedure: INSERTION, IOL PROSTHESIS;  Surgeon: Sergio Oneil MD;  Location: [facility] OR [facility];  Service: Ophthalmology;  Laterality: Left;    INTRAOCULAR PROSTHESES INSERTION Right 11/20/2018    Procedure: INSERTION, IOL PROSTHESIS;  Surgeon: Sergio Oneil MD;  Location: [facility] OR 2ND FLR;  Service: Ophthalmology;  Laterality: Right;    KNEE ARTHROPLASTY Right 03/31/2021    Procedure: ARTHROPLASTY, KNEE:RIGHT:DEPUY-SIGMA ;  Surgeon: Pedro Summers III, MD;  Location: [facility] OR;  Service: Orthopedics;  Laterality: Right;    LYSIS, ADHESIONS, KNEE, ARTHROSCOPIC Right 4/30/2024    Procedure: ARTHROSCOPIC KNEE LYSIS, ADHESIONS AND ANTERIOR INTERVAL SLIDE;  Surgeon: Ryanne Sumner MD;  Location: [facility] OR;  Service: Orthopedics;  Laterality: Right;  REGIONAL BLOCK    OOPHORECTOMY      @ 45 yrs old    PHACOEMULSIFICATION OF CATARACT Left 11/06/2018    Procedure: PHACOEMULSIFICATION, CATARACT;  Surgeon: Sergio Oneil MD;  Location: [facility] OR 1ST FLR;  Service: Ophthalmology;  Laterality: Left;    PHACOEMULSIFICATION OF CATARACT Right 11/20/2018    Procedure: PHACOEMULSIFICATION, CATARACT;  Surgeon: Sergio Oneil MD;  Location: [facility] OR 2ND FLR;  Service: Ophthalmology;  Laterality: Right;    REFRACTIVE SURGERY      2023    supracervical abdominal hysterectomy  1978    fibroids    SYNOVECTOMY OF KNEE Right 4/30/2024    Procedure: SYNOVECTOMY, KNEE;   "Surgeon: Ryanne Sumner MD;  Location: [facility];  Service: Orthopedics;  Laterality: Right;     Medications Ordered Prior to Encounter[1]  Review of patient's allergies indicates:   Allergen Reactions    Codeine Nausea And Vomiting       Review of Systems   Constitutional: Negative for chills, decreased appetite, fever, malaise/fatigue, night sweats, weight gain and weight loss.   Cardiovascular:  Negative for chest pain, claudication, dyspnea on exertion, leg swelling, palpitations and syncope.   Respiratory:  Negative for cough and shortness of breath.    Endocrine: Negative for cold intolerance and heat intolerance.   Hematologic/Lymphatic: Negative for bleeding problem. Does not bruise/bleed easily.   Skin:  Positive for nail changes. Negative for color change, dry skin, flushing, itching, poor wound healing, rash, skin cancer, suspicious lesions and unusual hair distribution.   Musculoskeletal:  Positive for arthritis. Negative for back pain, falls, gout, joint pain, joint swelling, muscle cramps, muscle weakness, myalgias, neck pain and stiffness.   Gastrointestinal:  Negative for diarrhea, nausea and vomiting.   Neurological:  Negative for dizziness, focal weakness, light-headedness, numbness, paresthesias, tremors, vertigo and weakness.   Psychiatric/Behavioral:  Negative for altered mental status and depression. The patient does not have insomnia.    Allergic/Immunologic: Negative.            Objective:       Vitals:    06/30/25 1322   BP: 108/61   Pulse: 70   Weight: 70.5 kg (155 lb 5 oz)   Height: 4' 11" (1.499 m)   PainSc: 0-No pain   PainLoc: Foot   70.5 kg (155 lb 5 oz)     Physical Exam  Vitals reviewed.   Constitutional:       General: She is not in acute distress.     Appearance: She is well-developed. She is not ill-appearing, toxic-appearing or diaphoretic.      Comments: Proper supportive shoegear      Cardiovascular:      Pulses:           Dorsalis pedis pulses are 2+ on the right side and 2+ " on the left side.        Posterior tibial pulses are 1+ on the right side and 1+ on the left side.   Musculoskeletal:      Right lower le+ Edema present.      Left lower le+ Edema present.      Right ankle: Normal.      Right Achilles Tendon: Normal.      Left ankle: Normal.      Left Achilles Tendon: Normal.      Right foot: Decreased range of motion. Deformity, bunion, prominent metatarsal heads and tenderness present. No bony tenderness.      Left foot: Decreased range of motion. Deformity, bunion and prominent metatarsal heads present. No tenderness or bony tenderness.      Comments: Flexible pes planus foot type w/ medial arch collapse and mild gastroc equinus       Semi Reducible extensor and flexor contractures at the MTPJ and PIPJ of toes 2-5, bilat.          Feet:      Right foot:      Protective Sensation: 5 sites tested.  5 sites sensed.      Skin integrity: Callus (2nd Hammertoe PIPJ) and dry skin present. No ulcer, blister, skin breakdown, erythema or warmth.      Toenail Condition: Right toenails are abnormally thick.      Left foot:      Protective Sensation: 5 sites tested.  5 sites sensed.      Skin integrity: Callus and dry skin present. No ulcer, blister, skin breakdown, erythema or warmth.      Toenail Condition: Left toenails are abnormally thick.      Comments: medialhallux nail margin of rightfoot mild edema. No soi  No erythema    Skin:     General: Skin is warm and dry.      Capillary Refill: Capillary refill takes 2 to 3 seconds.      Coloration: Skin is not pale.      Findings: No erythema or rash.   Neurological:      Mental Status: She is alert and oriented to person, place, and time.      Sensory: No sensory deficit.   Psychiatric:         Attention and Perception: Attention normal.         Mood and Affect: Mood normal.         Speech: Speech normal.         Behavior: Behavior normal.         Thought Content: Thought content normal.         Cognition and Memory: Cognition  normal.         Judgment: Judgment normal.               Assessment:       Encounter Diagnoses   Name Primary?    Hammer toes of both feet Yes    Valgus deformity of both great toes     Foot pain, bilateral     Corn or callus          Plan:       Diagnoses and all orders for this visit:    Hammer toes of both feet    Valgus deformity of both great toes    Foot pain, bilateral    Corn or callus      I counseled the patient on her conditions, their implications and medical management.      - continue proper shoe gear    Appropriate shoe gear and insoles discussed in detail.  Appropriate digital padding discussed in detail as well.  We will consider hammertoe surgery if symptoms worsen.  Follow-up as needed.         [1]   Current Outpatient Medications on File Prior to Visit   Medication Sig Dispense Refill    acetaminophen (TYLENOL) 650 MG TbSR Take 1 tablet (650 mg total) by mouth every 8 (eight) hours as needed (pain). 120 tablet 0    ammonium lactate 12 % Crea Apply 1 g topically Daily. 140 g 1    aspirin (ECOTRIN) 81 MG EC tablet Take 1 tablet (81 mg total) by mouth once daily. 28 tablet 0    atorvastatin (LIPITOR) 80 MG tablet Take 1 tablet (80 mg total) by mouth once daily. 90 tablet 3    coenzyme Q10 100 mg capsule Take 100 mg by mouth every evening.      docusate sodium (COLACE) 100 MG capsule Take 1 capsule (100 mg total) by mouth 2 (two) times daily as needed for Constipation. 60 capsule 0    ezetimibe (ZETIA) 10 mg tablet Take 1 tablet (10 mg total) by mouth once daily. 90 tablet 3    fluticasone propionate (FLONASE) 50 mcg/actuation nasal spray SHAKE LIQUID AND USE 1 SPRAY(50 MCG) IN EACH NOSTRIL EVERY DAY 16 g 3    hydroCHLOROthiazide (MICROZIDE) 12.5 mg capsule Take 1 capsule (12.5 mg total) by mouth every evening. 90 capsule 3    irbesartan (AVAPRO) 300 MG tablet Take 1 tablet (300 mg total) by mouth once daily. 90 tablet 3    Lactobacillus acidophilus (PROBIOTIC ACIDOPHILUS ORAL) Take 1 capsule by  mouth Daily.      metoprolol succinate (TOPROL-XL) 25 MG 24 hr tablet Take 1 tablet (25 mg total) by mouth every evening. 90 tablet 3    multivitamin (THERAGRAN) per tablet Take 1 tablet by mouth once daily.      omega-3 fatty acids/fish oil (FISH OIL-OMEGA-3 FATTY ACIDS) 300-1,000 mg capsule Take 1 capsule by mouth once daily.       No current facility-administered medications on file prior to visit.

## 2025-07-31 ENCOUNTER — OFFICE VISIT (OUTPATIENT)
Dept: PODIATRY | Facility: CLINIC | Age: 70
End: 2025-07-31
Payer: MEDICARE

## 2025-07-31 VITALS
HEIGHT: 59 IN | DIASTOLIC BLOOD PRESSURE: 69 MMHG | HEART RATE: 63 BPM | BODY MASS INDEX: 31.51 KG/M2 | SYSTOLIC BLOOD PRESSURE: 133 MMHG | WEIGHT: 156.31 LBS

## 2025-07-31 DIAGNOSIS — M20.42 HAMMER TOES OF BOTH FEET: Primary | ICD-10-CM

## 2025-07-31 DIAGNOSIS — M79.672 FOOT PAIN, BILATERAL: ICD-10-CM

## 2025-07-31 DIAGNOSIS — M20.41 HAMMER TOES OF BOTH FEET: Primary | ICD-10-CM

## 2025-07-31 DIAGNOSIS — M79.671 FOOT PAIN, BILATERAL: ICD-10-CM

## 2025-07-31 DIAGNOSIS — L84 CORN OR CALLUS: ICD-10-CM

## 2025-07-31 PROCEDURE — 99999 PR PBB SHADOW E&M-EST. PATIENT-LVL III: CPT | Mod: PBBFAC,HCNC,, | Performed by: PODIATRIST

## 2025-07-31 NOTE — PROGRESS NOTES
Subjective:   J.W.    Patient ID: Chelsea Rodriguez is a 70 y.o. female.    Chief Complaint:   Ingrown Toenail and Nail Problem    Chelsea is a 70 y.o. female who rtc eval nail eval  Using the cream. Does see that the nail border has improved... still thickened.   No pain     Pt did see Dr. Whiting for hammertoe correction options.  Considering tenotomy.  Pt relates she sees rheum and is being worked up for scleroderma.           Past Medical History:   Diagnosis Date    Bilateral knee pain     Carpal tunnel syndrome, bilateral     Fissure in skin of foot     Right small toe    HTN (hypertension)     Hyperlipidemia     Multiple thyroid nodules 11/10/2023    Palpitations     Rotator cuff injury     right    Screening for colorectal cancer 10/20/2017    Screening for malignant neoplasm of colon 2021    Statin-induced myositis 2018     Past Surgical History:   Procedure Laterality Date    ANORECTAL MANOMETRY N/A 9/3/2024    Procedure: MANOMETRY, ANORECTAL;  Surgeon: Frandy Avendano MD;  Location: [address]);  Service: Endoscopy;  Laterality: N/A;  Prep instructions sent via portal-dw  Prep-[facility]-dw   traveler, [facility]-    BILATERAL SALPINGOOPHORECTOMY      BREAST BIOPSY Left     malignant    BREAST BIOPSY Left     negative    BREAST LUMPECTOMY Left     CATARACT EXTRACTION W/  INTRAOCULAR LENS IMPLANT Right 2018    Dr. Oneil    CATARACT EXTRACTION W/  INTRAOCULAR LENS IMPLANT Left 2018    Dr. Oneil     SECTION      x2    COLONOSCOPY N/A 10/20/2017    Procedure: COLONOSCOPY;  Surgeon: Mitchell Danielson Jr., MD;  Location: [facility] ENDO;  Service: Endoscopy;  Laterality: N/A;    COLONOSCOPY N/A 2021    Procedure: COLONOSCOPY/Suprep;  Surgeon: Malcolm Reyes MD;  Location: [facility] ENDO;  Service: Endoscopy;  Laterality: N/A;    COLONOSCOPY N/A 2024    Procedure: COLONOSCOPY;  Surgeon: Frandy Avendano MD;  Location: [facility] ENDOSCOPY;  Service:  Endoscopy;  Laterality: N/A;  Ref by Dr RYAN Alva, pt request [place], portal - PC. 6/7/24 at 4;45 pm pt. confirmed appt. EC  6/6/24- portal msg for pc. DBM  6/7/24- Per DR. Valenzuela Pt to be r/s due to 1 MD scoping on 6/14/24 in AM, LVM to inform Pt of change in scoping MD and arrival time- ERW  6/13 precall    CYST REMOVAL      on back    ESOPHAGOGASTRODUODENOSCOPY N/A 02/05/2021    Procedure: EGD (ESOPHAGOGASTRODUODENOSCOPY);  Surgeon: Malcolm Reyes MD;  Location: [facility];  Service: Endoscopy;  Laterality: N/A;    HERNIA REPAIR      HYSTERECTOMY      at 25 yrs old    INTRAOCULAR PROSTHESES INSERTION Left 11/06/2018    Procedure: INSERTION, IOL PROSTHESIS;  Surgeon: Sergio Oneil MD;  Location: [facility] OR 1ST FLR;  Service: Ophthalmology;  Laterality: Left;    INTRAOCULAR PROSTHESES INSERTION Right 11/20/2018    Procedure: INSERTION, IOL PROSTHESIS;  Surgeon: Sergio Oneil MD;  Location: [facility] OR 2ND FLR;  Service: Ophthalmology;  Laterality: Right;    KNEE ARTHROPLASTY Right 03/31/2021    Procedure: ARTHROPLASTY, KNEE:RIGHT:DEPUY-SIGMA ;  Surgeon: Pedro Summers III, MD;  Location: [facility] OR;  Service: Orthopedics;  Laterality: Right;    LYSIS, ADHESIONS, KNEE, ARTHROSCOPIC Right 4/30/2024    Procedure: ARTHROSCOPIC KNEE LYSIS, ADHESIONS AND ANTERIOR INTERVAL SLIDE;  Surgeon: Ryanne Sumner MD;  Location: [facility] OR;  Service: Orthopedics;  Laterality: Right;  REGIONAL BLOCK    OOPHORECTOMY      @ 45 yrs old    PHACOEMULSIFICATION OF CATARACT Left 11/06/2018    Procedure: PHACOEMULSIFICATION, CATARACT;  Surgeon: Sergio Oneil MD;  Location: [facility] OR 1ST FLR;  Service: Ophthalmology;  Laterality: Left;    PHACOEMULSIFICATION OF CATARACT Right 11/20/2018    Procedure: PHACOEMULSIFICATION, CATARACT;  Surgeon: Sergio Oneil MD;  Location: [facility] OR 2ND FLR;  Service: Ophthalmology;  Laterality: Right;    REFRACTIVE SURGERY      2023    supracervical abdominal hysterectomy  1978     "fibroids    SYNOVECTOMY OF KNEE Right 4/30/2024    Procedure: SYNOVECTOMY, KNEE;  Surgeon: Ryanne Sumner MD;  Location: [facility];  Service: Orthopedics;  Laterality: Right;     Medications Ordered Prior to Encounter[1]  Review of patient's allergies indicates:   Allergen Reactions    Codeine Nausea And Vomiting       Review of Systems   Constitutional: Negative for chills, decreased appetite, fever, malaise/fatigue, night sweats, weight gain and weight loss.   Cardiovascular:  Negative for chest pain, claudication, dyspnea on exertion, leg swelling, palpitations and syncope.   Respiratory:  Negative for cough and shortness of breath.    Endocrine: Negative for cold intolerance and heat intolerance.   Hematologic/Lymphatic: Negative for bleeding problem. Does not bruise/bleed easily.   Skin:  Positive for nail changes. Negative for color change, dry skin, flushing, itching, poor wound healing, rash, skin cancer, suspicious lesions and unusual hair distribution.   Musculoskeletal:  Positive for arthritis. Negative for back pain, falls, gout, joint pain, joint swelling, muscle cramps, muscle weakness, myalgias, neck pain and stiffness.   Gastrointestinal:  Negative for diarrhea, nausea and vomiting.   Neurological:  Negative for dizziness, focal weakness, light-headedness, numbness, paresthesias, tremors, vertigo and weakness.   Psychiatric/Behavioral:  Negative for altered mental status and depression. The patient does not have insomnia.    Allergic/Immunologic: Negative.            Objective:       Vitals:    07/31/25 0808   BP: 133/69   Pulse: 63   Weight: 70.9 kg (156 lb 4.9 oz)   Height: 4' 11" (1.499 m)   PainLoc: Toe   70.9 kg (156 lb 4.9 oz)     Physical Exam  Vitals reviewed.   Constitutional:       General: She is not in acute distress.     Appearance: She is well-developed. She is not ill-appearing, toxic-appearing or diaphoretic.      Comments: Proper supportive shoegear      Cardiovascular:      Pulses: "           Dorsalis pedis pulses are 2+ on the right side and 2+ on the left side.        Posterior tibial pulses are 1+ on the right side and 1+ on the left side.   Musculoskeletal:      Right lower le+ Edema present.      Left lower le+ Edema present.      Right ankle: Normal.      Right Achilles Tendon: Normal.      Left ankle: Normal.      Left Achilles Tendon: Normal.      Right foot: Decreased range of motion. Deformity, bunion and prominent metatarsal heads present. No tenderness or bony tenderness.      Left foot: Decreased range of motion. Deformity, bunion and prominent metatarsal heads present. No tenderness or bony tenderness.      Comments: Flexible pes planus foot type w/ medial arch collapse and mild gastroc equinus       Semi Reducible extensor and flexor contractures at the MTPJ and PIPJ of toes 2-5, bilat.       No pop   Feet:      Right foot:      Protective Sensation: 5 sites tested.  5 sites sensed.      Skin integrity: Callus (2nd Hammertoe PIPJ) and dry skin present. No ulcer, blister, skin breakdown, erythema or warmth.      Toenail Condition: Right toenails are abnormally thick.      Left foot:      Protective Sensation: 5 sites tested.  5 sites sensed.      Skin integrity: Callus and dry skin present. No ulcer, blister, skin breakdown, erythema or warmth.      Toenail Condition: Left toenails are abnormally thick.      Comments:  Mild incurvation hallux nails. Thickening medial nail borders        Skin:     General: Skin is warm and dry.      Capillary Refill: Capillary refill takes 2 to 3 seconds.      Coloration: Skin is not pale.      Findings: No erythema or rash.   Neurological:      Mental Status: She is alert and oriented to person, place, and time.      Sensory: No sensory deficit.   Psychiatric:         Attention and Perception: Attention normal.         Mood and Affect: Mood normal.         Speech: Speech normal.         Behavior: Behavior normal.         Thought Content:  Thought content normal.         Cognition and Memory: Cognition normal.         Judgment: Judgment normal.               Assessment:       Encounter Diagnoses   Name Primary?    Hammer toes of both feet Yes    Foot pain, bilateral     Corn or callus            Plan:       Chelsea was seen today for ingrown toenail and nail problem.    Diagnoses and all orders for this visit:    Hammer toes of both feet    Foot pain, bilateral    Corn or callus        I counseled the patient on her conditions, their implications and medical management.      - continue proper shoe gear       - With patient's permission, the toenails mentioned above were aggressively reduced and debrided using a nail nipper, removing all offending nail and debris. Utilizing a #15 scalpel, I trimmed the corns and calluses at the above mentioned location.      The patient will continue to monitor the areas daily, inspect the feet, wear protective shoe gear when ambulatory, and moisturizer to maintain skin integrity.     - continue amm lac    - pt is considering tenotomy with Dr. Whiting. Reassurance given.         Pt will call for appt for tenotomy with Dr. Whiting           [1]   Current Outpatient Medications on File Prior to Visit   Medication Sig Dispense Refill    acetaminophen (TYLENOL) 650 MG TbSR Take 1 tablet (650 mg total) by mouth every 8 (eight) hours as needed (pain). 120 tablet 0    ammonium lactate 12 % Crea Apply 1 g topically Daily. 140 g 1    aspirin (ECOTRIN) 81 MG EC tablet Take 1 tablet (81 mg total) by mouth once daily. 28 tablet 0    atorvastatin (LIPITOR) 80 MG tablet Take 1 tablet (80 mg total) by mouth once daily. 90 tablet 3    coenzyme Q10 100 mg capsule Take 100 mg by mouth every evening.      docusate sodium (COLACE) 100 MG capsule Take 1 capsule (100 mg total) by mouth 2 (two) times daily as needed for Constipation. 60 capsule 0    ezetimibe (ZETIA) 10 mg tablet Take 1 tablet (10 mg total) by mouth once daily. 90 tablet 3     fluticasone propionate (FLONASE) 50 mcg/actuation nasal spray SHAKE LIQUID AND USE 1 SPRAY(50 MCG) IN EACH NOSTRIL EVERY DAY 16 g 3    hydroCHLOROthiazide (MICROZIDE) 12.5 mg capsule Take 1 capsule (12.5 mg total) by mouth every evening. 90 capsule 3    irbesartan (AVAPRO) 300 MG tablet Take 1 tablet (300 mg total) by mouth once daily. 90 tablet 3    Lactobacillus acidophilus (PROBIOTIC ACIDOPHILUS ORAL) Take 1 capsule by mouth Daily.      metoprolol succinate (TOPROL-XL) 25 MG 24 hr tablet Take 1 tablet (25 mg total) by mouth every evening. 90 tablet 3    multivitamin (THERAGRAN) per tablet Take 1 tablet by mouth once daily.      omega-3 fatty acids/fish oil (FISH OIL-OMEGA-3 FATTY ACIDS) 300-1,000 mg capsule Take 1 capsule by mouth once daily.       No current facility-administered medications on file prior to visit.

## 2025-08-11 ENCOUNTER — OFFICE VISIT (OUTPATIENT)
Dept: ORTHOPEDICS | Facility: CLINIC | Age: 70
End: 2025-08-11
Payer: MEDICARE

## 2025-08-11 VITALS — BODY MASS INDEX: 31.78 KG/M2 | WEIGHT: 157.63 LBS | HEIGHT: 59 IN

## 2025-08-11 DIAGNOSIS — G56.03 BILATERAL CARPAL TUNNEL SYNDROME: Primary | ICD-10-CM

## 2025-08-11 PROCEDURE — 99999 PR PBB SHADOW E&M-EST. PATIENT-LVL III: CPT | Mod: PBBFAC,HCNC,, | Performed by: ORTHOPAEDIC SURGERY

## 2025-08-11 PROCEDURE — 4010F ACE/ARB THERAPY RXD/TAKEN: CPT | Mod: CPTII,HCNC,S$GLB, | Performed by: ORTHOPAEDIC SURGERY

## 2025-08-11 PROCEDURE — 3288F FALL RISK ASSESSMENT DOCD: CPT | Mod: CPTII,HCNC,S$GLB, | Performed by: ORTHOPAEDIC SURGERY

## 2025-08-11 PROCEDURE — 1125F AMNT PAIN NOTED PAIN PRSNT: CPT | Mod: CPTII,HCNC,S$GLB, | Performed by: ORTHOPAEDIC SURGERY

## 2025-08-11 PROCEDURE — 3066F NEPHROPATHY DOC TX: CPT | Mod: CPTII,HCNC,S$GLB, | Performed by: ORTHOPAEDIC SURGERY

## 2025-08-11 PROCEDURE — 3044F HG A1C LEVEL LT 7.0%: CPT | Mod: CPTII,HCNC,S$GLB, | Performed by: ORTHOPAEDIC SURGERY

## 2025-08-11 PROCEDURE — 99214 OFFICE O/P EST MOD 30 MIN: CPT | Mod: HCNC,S$GLB,, | Performed by: ORTHOPAEDIC SURGERY

## 2025-08-11 PROCEDURE — 3061F NEG MICROALBUMINURIA REV: CPT | Mod: CPTII,HCNC,S$GLB, | Performed by: ORTHOPAEDIC SURGERY

## 2025-08-11 PROCEDURE — 1101F PT FALLS ASSESS-DOCD LE1/YR: CPT | Mod: CPTII,HCNC,S$GLB, | Performed by: ORTHOPAEDIC SURGERY

## 2025-08-11 PROCEDURE — 1157F ADVNC CARE PLAN IN RCRD: CPT | Mod: CPTII,HCNC,S$GLB, | Performed by: ORTHOPAEDIC SURGERY

## 2025-08-11 PROCEDURE — 3008F BODY MASS INDEX DOCD: CPT | Mod: CPTII,HCNC,S$GLB, | Performed by: ORTHOPAEDIC SURGERY

## 2025-08-11 PROCEDURE — 1159F MED LIST DOCD IN RCRD: CPT | Mod: CPTII,HCNC,S$GLB, | Performed by: ORTHOPAEDIC SURGERY

## 2025-08-28 DIAGNOSIS — E78.2 MIXED HYPERLIPIDEMIA: ICD-10-CM

## 2025-08-28 RX ORDER — ATORVASTATIN CALCIUM 80 MG/1
80 TABLET, FILM COATED ORAL DAILY
Qty: 90 TABLET | Refills: 1 | Status: SHIPPED | OUTPATIENT
Start: 2025-08-28

## 2025-08-29 ENCOUNTER — LAB VISIT (OUTPATIENT)
Dept: LAB | Facility: HOSPITAL | Age: 70
End: 2025-08-29
Attending: INTERNAL MEDICINE
Payer: MEDICARE

## 2025-08-29 DIAGNOSIS — E78.2 MIXED HYPERLIPIDEMIA: ICD-10-CM

## 2025-08-29 LAB
ALBUMIN SERPL BCP-MCNC: 4 G/DL (ref 3.5–5.2)
ALP SERPL-CCNC: 90 UNIT/L (ref 40–150)
ALT SERPL W/O P-5'-P-CCNC: 30 UNIT/L (ref 0–55)
ANION GAP (OHS): 9 MMOL/L (ref 8–16)
AST SERPL-CCNC: 43 UNIT/L (ref 0–50)
BILIRUB SERPL-MCNC: 0.4 MG/DL (ref 0.1–1)
BUN SERPL-MCNC: 17 MG/DL (ref 8–23)
CALCIUM SERPL-MCNC: 9.3 MG/DL (ref 8.7–10.5)
CHLORIDE SERPL-SCNC: 103 MMOL/L (ref 95–110)
CHOLEST SERPL-MCNC: 138 MG/DL (ref 120–199)
CHOLEST/HDLC SERPL: 3.5 {RATIO} (ref 2–5)
CO2 SERPL-SCNC: 26 MMOL/L (ref 23–29)
CREAT SERPL-MCNC: 0.7 MG/DL (ref 0.5–1.4)
GFR SERPLBLD CREATININE-BSD FMLA CKD-EPI: >60 ML/MIN/1.73/M2
GLUCOSE SERPL-MCNC: 94 MG/DL (ref 70–110)
HDLC SERPL-MCNC: 39 MG/DL (ref 40–75)
HDLC SERPL: 28.3 % (ref 20–50)
LDLC SERPL CALC-MCNC: 86.2 MG/DL (ref 63–159)
NONHDLC SERPL-MCNC: 99 MG/DL
POTASSIUM SERPL-SCNC: 4.6 MMOL/L (ref 3.5–5.1)
PROT SERPL-MCNC: 6.8 GM/DL (ref 6–8.4)
SODIUM SERPL-SCNC: 138 MMOL/L (ref 136–145)
TRIGL SERPL-MCNC: 64 MG/DL (ref 30–150)

## 2025-08-29 PROCEDURE — 82040 ASSAY OF SERUM ALBUMIN: CPT | Mod: HCNC

## 2025-08-29 PROCEDURE — 36415 COLL VENOUS BLD VENIPUNCTURE: CPT | Mod: HCNC,PO

## 2025-08-29 PROCEDURE — 80061 LIPID PANEL: CPT | Mod: HCNC

## (undated) DEVICE — STRIP MG FML-GLO .06 - ORDER F

## (undated) DEVICE — GOWN ECLIPSE REINF LVL4 TWL LG

## (undated) DEVICE — ALCOHOL 70% ISOP W/GREEN 16OZ

## (undated) DEVICE — COVER PROXIMA MAYO STAND

## (undated) DEVICE — SHEILD & GARTERS FOX METAL EYE

## (undated) DEVICE — BRUSH SCRUB HIBICLENS 4%

## (undated) DEVICE — PUMP COLD THERAPY

## (undated) DEVICE — SEE MEDLINE ITEM 146298

## (undated) DEVICE — GLOVE PROTEXIS LIGHT BROWN 8.5

## (undated) DEVICE — NDL 18GA X1 1/2 REG BEVEL

## (undated) DEVICE — HYDRODISSECTOR NUCLEUS 27GX7/8

## (undated) DEVICE — SUT MCRYL PLUS 4-0 PS2 27IN

## (undated) DEVICE — DRESSING TRANS 4X4 TEGADERM

## (undated) DEVICE — SOL IRR SOD CHL .9% POUR

## (undated) DEVICE — BANDAGE ACE ELASTIC 6"

## (undated) DEVICE — SEE MEDLINE ITEM 157144

## (undated) DEVICE — SHAVER SYS 5.5 ULRAFRR

## (undated) DEVICE — UNDERGLOVES BIOGEL PI SIZE 8.5

## (undated) DEVICE — DRAPE STERI U-SHAPED 47X51IN

## (undated) DEVICE — NDL HYPO STD REG BVL 18GX1.5IN

## (undated) DEVICE — SYR 50CC LL

## (undated) DEVICE — SOLUTION BSS PLUS

## (undated) DEVICE — UNDERGLOVES BIOGEL PI SIZE 8

## (undated) DEVICE — SUT 2/0 36IN COATED VICRYL

## (undated) DEVICE — TAPE SILK 3IN

## (undated) DEVICE — PAD CAST SPECIALIST STRL 6

## (undated) DEVICE — BLADE SAGITTAL 18 X 1.27 X 90M

## (undated) DEVICE — KIT IRR SUCTION HND PIECE

## (undated) DEVICE — SOL BETADINE 5%

## (undated) DEVICE — DRESSING N ADH OIL EMUL 3X3

## (undated) DEVICE — ELECTRODE REM PLYHSV RETURN 9

## (undated) DEVICE — GAUZE SPONGE 4X4 12PLY

## (undated) DEVICE — SOL IRR NACL .9% 3000ML

## (undated) DEVICE — PROBE ARTHO ENERGY 90 DEG

## (undated) DEVICE — Device

## (undated) DEVICE — DRESSING TELFA PAD N ADH 2X3

## (undated) DEVICE — DRAPE SURG W/TWL 17 5/8X23

## (undated) DEVICE — SUT ETHILON 3/0 18IN PS-1

## (undated) DEVICE — COVER CAMERA OPERATING ROOM

## (undated) DEVICE — SPONGE COTTON TRAY 4X4IN

## (undated) DEVICE — GLOVE BIOGEL SKINSENSE PI 8.0

## (undated) DEVICE — SYS REVOLUTION CEMENT MIXING

## (undated) DEVICE — SUT QUILL PDO VIOL CP 45CM 2

## (undated) DEVICE — SEE MEDLINE ITEM 157131

## (undated) DEVICE — SEE MEDLINE ITEM 159592

## (undated) DEVICE — BANDAGE MATRIX HK LOOP 6IN 5YD

## (undated) DEVICE — SPONGE GAUZE 16PLY 4X4

## (undated) DEVICE — DRAPE INCISE IOBAN 2 23X33IN

## (undated) DEVICE — CONTAINER SPECIMEN STRL 4OZ

## (undated) DEVICE — GOWN SURGICAL X-LARGE

## (undated) DEVICE — SUT 1 36IN COATED VICRYL UN

## (undated) DEVICE — PAD COLD THERAPY KNEE WRAP ON

## (undated) DEVICE — DRESSING AQUACEL AG RBBN 2X45

## (undated) DEVICE — BLADE DUAL CUT SAG 35X64X.89MM

## (undated) DEVICE — PAD KNEE POLAR XL

## (undated) DEVICE — MARKER SKIN STND TIP BLUE BARR

## (undated) DEVICE — GOWN ECLIPSE REINF L4 XLNG XXL

## (undated) DEVICE — SET INFLOW TUBE ARTHSCP

## (undated) DEVICE — GLOVE PROTEXIS LTX MICRO 8

## (undated) DEVICE — GLOVE BIOGEL SKINSENSE PI 6.5

## (undated) DEVICE — DRAPE STERI 32 X 50

## (undated) DEVICE — UNDERGLOVES BIOGEL PI SZ 7 LF

## (undated) DEVICE — KIT GREY EYE

## (undated) DEVICE — NDL FLTR 5MCRN BLNT TIP 18GX1

## (undated) DEVICE — DRESSING TELFA N ADH 3X8

## (undated) DEVICE — SOL WATER STRL IRR 1000ML

## (undated) DEVICE — COVER LIGHT HANDLE 80/CA

## (undated) DEVICE — SYS KNEE EPAK PIN ATTUNE
Type: IMPLANTABLE DEVICE | Site: KNEE | Status: NON-FUNCTIONAL
Removed: 2021-03-31

## (undated) DEVICE — DRAPE TOP 53X102IN

## (undated) DEVICE — SOL NACL IRR 3000ML

## (undated) DEVICE — CATH SUCTION 10FR

## (undated) DEVICE — TOURNIQUET SB QC DP 34X4IN

## (undated) DEVICE — APPLICATOR CHLORAPREP ORN 26ML

## (undated) DEVICE — DRAPE ARTHSCP FLD CTRL POUCH

## (undated) DEVICE — ADHESIVE DERMABOND ADVANCED

## (undated) DEVICE — TOWEL OR XRAY WHITE 17X26IN

## (undated) DEVICE — KIT TOTAL KNEE TKOFG

## (undated) DEVICE — MASK FLYTE HOOD PEEL AWAY